# Patient Record
Sex: FEMALE | Race: WHITE | NOT HISPANIC OR LATINO | Employment: FULL TIME | URBAN - METROPOLITAN AREA
[De-identification: names, ages, dates, MRNs, and addresses within clinical notes are randomized per-mention and may not be internally consistent; named-entity substitution may affect disease eponyms.]

---

## 2017-01-13 ENCOUNTER — ALLSCRIPTS OFFICE VISIT (OUTPATIENT)
Dept: OTHER | Facility: OTHER | Age: 67
End: 2017-01-13

## 2017-01-25 ENCOUNTER — GENERIC CONVERSION - ENCOUNTER (OUTPATIENT)
Dept: OTHER | Facility: OTHER | Age: 67
End: 2017-01-25

## 2017-02-07 ENCOUNTER — GENERIC CONVERSION - ENCOUNTER (OUTPATIENT)
Dept: OTHER | Facility: OTHER | Age: 67
End: 2017-02-07

## 2017-03-01 ENCOUNTER — GENERIC CONVERSION - ENCOUNTER (OUTPATIENT)
Dept: OTHER | Facility: OTHER | Age: 67
End: 2017-03-01

## 2017-03-11 ENCOUNTER — APPOINTMENT (OUTPATIENT)
Dept: LAB | Facility: CLINIC | Age: 67
End: 2017-03-11
Payer: COMMERCIAL

## 2017-03-11 DIAGNOSIS — R92.8 OTHER ABNORMAL AND INCONCLUSIVE FINDINGS ON DIAGNOSTIC IMAGING OF BREAST: ICD-10-CM

## 2017-03-11 DIAGNOSIS — E78.5 HYPERLIPIDEMIA: ICD-10-CM

## 2017-03-11 DIAGNOSIS — J44.9 CHRONIC OBSTRUCTIVE PULMONARY DISEASE (HCC): ICD-10-CM

## 2017-03-11 DIAGNOSIS — I10 ESSENTIAL (PRIMARY) HYPERTENSION: ICD-10-CM

## 2017-03-11 DIAGNOSIS — E11.9 TYPE 2 DIABETES MELLITUS WITHOUT COMPLICATIONS (HCC): ICD-10-CM

## 2017-03-11 DIAGNOSIS — E03.9 HYPOTHYROIDISM: ICD-10-CM

## 2017-03-11 LAB
ANION GAP SERPL CALCULATED.3IONS-SCNC: 6 MMOL/L (ref 4–13)
BUN SERPL-MCNC: 18 MG/DL (ref 5–25)
CALCIUM SERPL-MCNC: 9.2 MG/DL (ref 8.3–10.1)
CHLORIDE SERPL-SCNC: 106 MMOL/L (ref 100–108)
CHOLEST SERPL-MCNC: 109 MG/DL (ref 50–200)
CO2 SERPL-SCNC: 30 MMOL/L (ref 21–32)
CREAT SERPL-MCNC: 0.86 MG/DL (ref 0.6–1.3)
CREAT UR-MCNC: 258 MG/DL
EST. AVERAGE GLUCOSE BLD GHB EST-MCNC: 134 MG/DL
GFR SERPL CREATININE-BSD FRML MDRD: >60 ML/MIN/1.73SQ M
GLUCOSE SERPL-MCNC: 92 MG/DL (ref 65–140)
HBA1C MFR BLD: 6.3 % (ref 4.2–6.3)
HDLC SERPL-MCNC: 42 MG/DL (ref 40–60)
LDLC SERPL CALC-MCNC: 36 MG/DL (ref 0–100)
MICROALBUMIN UR-MCNC: 10.7 MG/L (ref 0–20)
MICROALBUMIN/CREAT 24H UR: 4 MG/G CREATININE (ref 0–30)
POTASSIUM SERPL-SCNC: 4.4 MMOL/L (ref 3.5–5.3)
SODIUM SERPL-SCNC: 142 MMOL/L (ref 136–145)
TRIGL SERPL-MCNC: 157 MG/DL

## 2017-03-11 PROCEDURE — 83036 HEMOGLOBIN GLYCOSYLATED A1C: CPT

## 2017-03-11 PROCEDURE — 82043 UR ALBUMIN QUANTITATIVE: CPT

## 2017-03-11 PROCEDURE — 80061 LIPID PANEL: CPT

## 2017-03-11 PROCEDURE — 80048 BASIC METABOLIC PNL TOTAL CA: CPT

## 2017-03-11 PROCEDURE — 82570 ASSAY OF URINE CREATININE: CPT

## 2017-03-11 PROCEDURE — 36415 COLL VENOUS BLD VENIPUNCTURE: CPT

## 2017-03-23 ENCOUNTER — ALLSCRIPTS OFFICE VISIT (OUTPATIENT)
Dept: OTHER | Facility: OTHER | Age: 67
End: 2017-03-23

## 2017-03-23 DIAGNOSIS — L98.9 DISORDER OF SKIN OR SUBCUTANEOUS TISSUE: ICD-10-CM

## 2017-03-23 DIAGNOSIS — K21.9 GASTRO-ESOPHAGEAL REFLUX DISEASE WITHOUT ESOPHAGITIS: ICD-10-CM

## 2017-03-23 DIAGNOSIS — E03.9 HYPOTHYROIDISM: ICD-10-CM

## 2017-03-23 DIAGNOSIS — E78.5 HYPERLIPIDEMIA: ICD-10-CM

## 2017-03-23 DIAGNOSIS — Z00.00 ENCOUNTER FOR GENERAL ADULT MEDICAL EXAMINATION WITHOUT ABNORMAL FINDINGS: ICD-10-CM

## 2017-03-23 DIAGNOSIS — E11.9 TYPE 2 DIABETES MELLITUS WITHOUT COMPLICATIONS (HCC): ICD-10-CM

## 2017-07-20 ENCOUNTER — GENERIC CONVERSION - ENCOUNTER (OUTPATIENT)
Dept: OTHER | Facility: OTHER | Age: 67
End: 2017-07-20

## 2017-07-23 DIAGNOSIS — E78.5 HYPERLIPIDEMIA: ICD-10-CM

## 2017-07-23 DIAGNOSIS — E03.9 HYPOTHYROIDISM: ICD-10-CM

## 2017-07-23 DIAGNOSIS — K21.9 GASTRO-ESOPHAGEAL REFLUX DISEASE WITHOUT ESOPHAGITIS: ICD-10-CM

## 2017-07-23 DIAGNOSIS — L98.9 DISORDER OF SKIN OR SUBCUTANEOUS TISSUE: ICD-10-CM

## 2017-07-23 DIAGNOSIS — R92.2 INCONCLUSIVE MAMMOGRAM: ICD-10-CM

## 2017-07-23 DIAGNOSIS — Z78.0 ASYMPTOMATIC MENOPAUSAL STATE: ICD-10-CM

## 2017-07-23 DIAGNOSIS — Z00.00 ENCOUNTER FOR GENERAL ADULT MEDICAL EXAMINATION WITHOUT ABNORMAL FINDINGS: ICD-10-CM

## 2017-07-23 DIAGNOSIS — E11.9 TYPE 2 DIABETES MELLITUS WITHOUT COMPLICATIONS (HCC): ICD-10-CM

## 2017-08-21 ENCOUNTER — ALLSCRIPTS OFFICE VISIT (OUTPATIENT)
Dept: OTHER | Facility: OTHER | Age: 67
End: 2017-08-21

## 2017-08-21 PROCEDURE — G0145 SCR C/V CYTO,THINLAYER,RESCR: HCPCS | Performed by: NURSE PRACTITIONER

## 2017-08-22 ENCOUNTER — LAB REQUISITION (OUTPATIENT)
Dept: LAB | Facility: HOSPITAL | Age: 67
End: 2017-08-22
Payer: MEDICARE

## 2017-08-22 DIAGNOSIS — Z01.419 ENCOUNTER FOR GYNECOLOGICAL EXAMINATION WITHOUT ABNORMAL FINDING: ICD-10-CM

## 2017-08-24 LAB
LAB AP GYN PRIMARY INTERPRETATION: NORMAL
Lab: NORMAL

## 2017-08-25 ENCOUNTER — GENERIC CONVERSION - ENCOUNTER (OUTPATIENT)
Dept: OTHER | Facility: OTHER | Age: 67
End: 2017-08-25

## 2017-08-30 ENCOUNTER — APPOINTMENT (OUTPATIENT)
Dept: LAB | Facility: CLINIC | Age: 67
End: 2017-08-30
Payer: COMMERCIAL

## 2017-08-30 DIAGNOSIS — K21.9 GASTRO-ESOPHAGEAL REFLUX DISEASE WITHOUT ESOPHAGITIS: ICD-10-CM

## 2017-08-30 DIAGNOSIS — Z00.00 ENCOUNTER FOR GENERAL ADULT MEDICAL EXAMINATION WITHOUT ABNORMAL FINDINGS: ICD-10-CM

## 2017-08-30 DIAGNOSIS — L98.9 DISORDER OF SKIN OR SUBCUTANEOUS TISSUE: ICD-10-CM

## 2017-08-30 DIAGNOSIS — E03.9 HYPOTHYROIDISM: ICD-10-CM

## 2017-08-30 DIAGNOSIS — E11.9 TYPE 2 DIABETES MELLITUS WITHOUT COMPLICATIONS (HCC): ICD-10-CM

## 2017-08-30 DIAGNOSIS — E78.5 HYPERLIPIDEMIA: ICD-10-CM

## 2017-08-30 LAB
ALBUMIN SERPL BCP-MCNC: 3.4 G/DL (ref 3.5–5)
ALP SERPL-CCNC: 69 U/L (ref 46–116)
ALT SERPL W P-5'-P-CCNC: 22 U/L (ref 12–78)
ANION GAP SERPL CALCULATED.3IONS-SCNC: 5 MMOL/L (ref 4–13)
AST SERPL W P-5'-P-CCNC: 15 U/L (ref 5–45)
BILIRUB SERPL-MCNC: 0.33 MG/DL (ref 0.2–1)
BUN SERPL-MCNC: 19 MG/DL (ref 5–25)
CALCIUM SERPL-MCNC: 8.8 MG/DL (ref 8.3–10.1)
CHLORIDE SERPL-SCNC: 106 MMOL/L (ref 100–108)
CHOLEST SERPL-MCNC: 115 MG/DL (ref 50–200)
CO2 SERPL-SCNC: 27 MMOL/L (ref 21–32)
CREAT SERPL-MCNC: 0.74 MG/DL (ref 0.6–1.3)
EST. AVERAGE GLUCOSE BLD GHB EST-MCNC: 134 MG/DL
GFR SERPL CREATININE-BSD FRML MDRD: 83 ML/MIN/1.73SQ M
GLUCOSE P FAST SERPL-MCNC: 107 MG/DL (ref 65–99)
HBA1C MFR BLD: 6.3 % (ref 4.2–6.3)
HDLC SERPL-MCNC: 43 MG/DL (ref 40–60)
LDLC SERPL CALC-MCNC: 50 MG/DL (ref 0–100)
POTASSIUM SERPL-SCNC: 4.4 MMOL/L (ref 3.5–5.3)
PROT SERPL-MCNC: 6.9 G/DL (ref 6.4–8.2)
SODIUM SERPL-SCNC: 138 MMOL/L (ref 136–145)
TRIGL SERPL-MCNC: 111 MG/DL
TSH SERPL DL<=0.05 MIU/L-ACNC: 1.86 UIU/ML (ref 0.36–3.74)

## 2017-08-30 PROCEDURE — 36415 COLL VENOUS BLD VENIPUNCTURE: CPT

## 2017-08-30 PROCEDURE — 80061 LIPID PANEL: CPT

## 2017-08-30 PROCEDURE — 80053 COMPREHEN METABOLIC PANEL: CPT

## 2017-08-30 PROCEDURE — 84443 ASSAY THYROID STIM HORMONE: CPT

## 2017-08-30 PROCEDURE — 83036 HEMOGLOBIN GLYCOSYLATED A1C: CPT

## 2017-09-01 ENCOUNTER — GENERIC CONVERSION - ENCOUNTER (OUTPATIENT)
Dept: OTHER | Facility: OTHER | Age: 67
End: 2017-09-01

## 2018-01-01 DIAGNOSIS — I10 ESSENTIAL (PRIMARY) HYPERTENSION: ICD-10-CM

## 2018-01-01 DIAGNOSIS — E03.9 HYPOTHYROIDISM: ICD-10-CM

## 2018-01-01 DIAGNOSIS — K58.1 IRRITABLE BOWEL SYNDROME WITH CONSTIPATION: ICD-10-CM

## 2018-01-01 DIAGNOSIS — R92.8 OTHER ABNORMAL AND INCONCLUSIVE FINDINGS ON DIAGNOSTIC IMAGING OF BREAST: ICD-10-CM

## 2018-01-01 DIAGNOSIS — E66.9 OBESITY: ICD-10-CM

## 2018-01-01 DIAGNOSIS — E11.9 TYPE 2 DIABETES MELLITUS WITHOUT COMPLICATIONS (HCC): ICD-10-CM

## 2018-01-01 DIAGNOSIS — R92.2 INCONCLUSIVE MAMMOGRAM: ICD-10-CM

## 2018-01-08 ENCOUNTER — HOSPITAL ENCOUNTER (OUTPATIENT)
Dept: BONE DENSITY | Facility: CLINIC | Age: 68
Discharge: HOME | End: 2018-01-08
Payer: COMMERCIAL

## 2018-01-08 ENCOUNTER — GENERIC CONVERSION - ENCOUNTER (OUTPATIENT)
Dept: OTHER | Facility: OTHER | Age: 68
End: 2018-01-08

## 2018-01-08 ENCOUNTER — HOSPITAL ENCOUNTER (OUTPATIENT)
Dept: MAMMOGRAPHY | Facility: CLINIC | Age: 68
Discharge: HOME | End: 2018-01-08
Payer: COMMERCIAL

## 2018-01-08 DIAGNOSIS — Z78.0 ASYMPTOMATIC MENOPAUSAL STATE: ICD-10-CM

## 2018-01-08 DIAGNOSIS — R92.2 INCONCLUSIVE MAMMOGRAM: ICD-10-CM

## 2018-01-08 PROCEDURE — 77080 DXA BONE DENSITY AXIAL: CPT

## 2018-01-08 PROCEDURE — 77067 SCR MAMMO BI INCL CAD: CPT

## 2018-01-08 PROCEDURE — 77063 BREAST TOMOSYNTHESIS BI: CPT

## 2018-01-09 ENCOUNTER — GENERIC CONVERSION - ENCOUNTER (OUTPATIENT)
Dept: OTHER | Facility: OTHER | Age: 68
End: 2018-01-09

## 2018-01-11 NOTE — RESULT NOTES
Verified Results  (1) THIN PREP PAP WITH IMAGING [id number] 02:42PM Jesu Haji     Test Name Result Flag Reference   LAB AP CASE REPORT (Report)     Gynecologic Cytology Report            Case: [id number]                  Authorizing Provider: PRESLEY Brown    Collected:      08/21/2017           First Screen:     COURTNEY Wong    Received:      08/22/2017 1437        Specimen:  LIQUID-BASED PAP, SCREENING, Cervix   LAB AP GYN PRIMARY INTERPRETATION      Negative for intraepithelial lesion or malignancy  Electronically signed by COURTNEY Wong on 8/24/2017 at 2:42 PM   LAB AP GYN SPECIMEN ADEQUACY      Satisfactory for evaluation  (See note)   LAB AP GYN NOTE      No endocervical cells identified  It is difficult to differentiate between   squamous metaplastic cells and parabasal cells in atrophic specimens due   to numerous causes including menopause, postpartum changes and   progestational agents  LAB AP GYN ADDITIONAL INFORMATION (Report)     "[employer]'s FDA approved ,  and ThinPrep Imaging System are   utilized with strict adherence to the 's instruction manual to   prepare gynecologic and non-gynecologic cytology specimens for the   production of ThinPrep slides as well as for gynecologic ThinPrep imaging  These processes have been validated by our laboratory and/or by the     The Pap test is not a diagnostic procedure and should not be used as the   sole means to detect cervical cancer  It is only a screening procedure to   aid in the detection of cervical cancer and its precursors  Both   false-negative and false-positive results have been experienced  Your   patient's test result should be interpreted in this context together with   the history and clinical findings     LAB AP LMP POSTMENO     POSTMENO

## 2018-01-12 VITALS
SYSTOLIC BLOOD PRESSURE: 140 MMHG | HEIGHT: 63 IN | HEART RATE: 80 BPM | DIASTOLIC BLOOD PRESSURE: 74 MMHG | WEIGHT: 192.25 LBS | BODY MASS INDEX: 34.06 KG/M2

## 2018-01-13 VITALS
BODY MASS INDEX: 35.44 KG/M2 | HEIGHT: 63 IN | SYSTOLIC BLOOD PRESSURE: 142 MMHG | WEIGHT: 200 LBS | DIASTOLIC BLOOD PRESSURE: 82 MMHG

## 2018-01-13 VITALS
BODY MASS INDEX: 34.9 KG/M2 | SYSTOLIC BLOOD PRESSURE: 130 MMHG | HEART RATE: 82 BPM | TEMPERATURE: 98.4 F | WEIGHT: 197 LBS | DIASTOLIC BLOOD PRESSURE: 76 MMHG

## 2018-01-19 ENCOUNTER — HOSPITAL ENCOUNTER (OUTPATIENT)
Dept: MAMMOGRAPHY | Facility: CLINIC | Age: 68
Discharge: HOME | End: 2018-01-19
Payer: COMMERCIAL

## 2018-01-19 ENCOUNTER — GENERIC CONVERSION - ENCOUNTER (OUTPATIENT)
Dept: OBGYN CLINIC | Facility: CLINIC | Age: 68
End: 2018-01-19

## 2018-01-19 ENCOUNTER — HOSPITAL ENCOUNTER (OUTPATIENT)
Dept: ULTRASOUND IMAGING | Facility: CLINIC | Age: 68
Discharge: HOME | End: 2018-01-19
Payer: COMMERCIAL

## 2018-01-19 DIAGNOSIS — R92.8 OTHER ABNORMAL AND INCONCLUSIVE FINDINGS ON DIAGNOSTIC IMAGING OF BREAST: ICD-10-CM

## 2018-01-19 DIAGNOSIS — R92.2 INCONCLUSIVE MAMMOGRAM: ICD-10-CM

## 2018-01-19 PROCEDURE — 76642 ULTRASOUND BREAST LIMITED: CPT

## 2018-01-19 PROCEDURE — 77065 DX MAMMO INCL CAD UNI: CPT

## 2018-01-19 PROCEDURE — G0279 TOMOSYNTHESIS, MAMMO: HCPCS

## 2018-01-22 VITALS
SYSTOLIC BLOOD PRESSURE: 130 MMHG | DIASTOLIC BLOOD PRESSURE: 80 MMHG | OXYGEN SATURATION: 96 % | BODY MASS INDEX: 35.97 KG/M2 | HEART RATE: 75 BPM | WEIGHT: 203 LBS | HEIGHT: 63 IN

## 2018-01-23 NOTE — RESULT NOTES
Verified Results  * DXA BONE DENSITY SPINE HIP AND PELVIS [id number] 07:31AM Cady Diaz Order Number: [id number]    - Patient Instructions: To schedule this appointment, please contact Central Scheduling at [phone]  Test Name Result Flag Reference   DXA BONE DENSITY SPINE HIP AND PELVIS (Report)     CENTRAL DXA SCAN     CLINICAL HISTORY:  69year old post-menopausal  female  Z78 0: Asymptomatic menopausal state  History taken directly from the electronic ordering system  TECHNIQUE: Bone densitometry was performed using a Horizon C bone densitometer  Regions of interest appear properly placed  There are no obvious fractures or other confounding variables which could limit the study  COMPARISON: None  RESULTS:    LUMBAR SPINE: L1-L4:   BMD 1 045 gm/cm2   T-score 0 0   Z-score 2 1     LEFT TOTAL HIP:   BMD 1 078 gm/cm2   T-score 1 1   Z-score 2 6     LEFT FEMORAL NECK:   BMD 0 772 gm/cm2   T-score -0 7   Z-score 1 1             IMPRESSION:   1  Based on the [facility] classification, this study is normal and the patient is considered at low risk for fracture  2  A daily intake of calcium of at least 1200 mg and vitamin D, 800-1000 IU, as well as weight bearing and muscle strengthening exercise, fall prevention and avoidance of tobacco and excessive alcohol intake as basic preventive measures are recommended  3  Repeat DXA scan on the same equipment in 18-24 months as clinically indicated  The 10 year risk of hip fracture is 0 6%, with the 10 year risk of major osteoporotic fracture being 7 5%, as calculated by the [facility] fracture risk assessment tool (FRAX)  The current NOF guidelines recommend treating patients with FRAX 10 year risk score   of >3% for hip fracture and >20% for major osteoporotic fracture        WHO CLASSIFICATION:   Normal (a T-score of -1 0 or higher)   Low bone mineral density (a T-score of less than -1 0 but higher than -2 5)   Osteoporosis (a T-score of -2 5 or less)   Severe osteoporosis (a T-score of -2 5 or less with a fragility fracture)             Workstation performed: [id number]     Signed by:   Juliette Rosenthal MD   1/8/18     MAMMO SCREENING BILATERAL W 3D & CAD [id number] 07:30AM Bin Yost Order Number: [id number]    - Patient Instructions: To schedule this appointment, please contact Central Scheduling at [phone]  Do not wear any perfume, powder, lotion or deodorant on breast or underarm area  Please bring your doctors order, referral (if needed) and insurance information with you on the day of the test  Failure to bring this information may result in this test being rescheduled  Arrive 15 minutes prior to your appointment time to register  On the day of your test, please bring any prior mammogram or breast studies with you that were not performed at a [facility]  Failure to bring prior exams may result in your test needing to be rescheduled  Test Name Result Flag Reference   MAMMO SCREENING BILATERAL W 3D & CAD (Report)     Patient History:   Patient is postmenopausal    Family history of breast cancer at age 72 in mother, endometrial    cancer at age 61 in mother, breast cancer under age 48 in    maternal aunt, breast cancer at age 48 or over in maternal aunt,    breast cancer at age 48 or over in maternal aunt, breast cancer    at age 48 or over in maternal grandmother  Patient has never smoked  Patient's BMI is 35 4  Reason for exam: screening, asymptomatic  Screening     Mammo Screening Bilateral W DBT and CAD: January 8, 2018 - Check    In #: [de-identified]   2D/3D Procedure   3D Bilateral CC and MLO view(s) were taken  2D Bilateral CC and MLO view(s) were taken  Technologist: KEVIN Grajeda (KEVIN)(M)   Prior study comparison: October 13, 2016, bilateral screening    mammogram, performed at [facility]  August 12, 2014, left    breast diagnostic mammogram, performed at [facility]  August 1, 2014, bilateral screening mammogram, performed at    [address]  April 2, 2013, bilateral screening mammogram,    performed at [address]  November 9, 2011, bilateral    screening mammogram, performed at [address]  There are scattered fibroglandular densities  Increasing right focal asymmetry along the posterior nipple line  The finding is in the deep 3rd of the breast at approximately    8 2 cm in depth on both projections  No left-sided developing    focal asymmetries or masses  Few, scattered round calcifications in both breasts are    characteristically benign  No suspicious microcalcifications,    architectural distortion, or abnormalities of the nipples or skin   in either breast      SUMMARY:   1 New focal asymmetry in the right breast in the deep 3rd along    the posterior nipple line  This warrants further imaging    evaluation  Recommend unilateral right diagnostic mammogram    including true ML, spot compression CC, and spot compression MLO    views as well as targeted right ultrasound  2 No evidence for malignancy in the left breast     ACR BI-RADSï¾® Assessments: BiRad:0 - Incomplete: needs additional    imaging evaluation     Recommendation:   Further imaging of the right breast  A breast health care nurse    from our facility will be contacting the patient regarding the    need for additional imaging  The patient is scheduled in a reminder system for screening    mammography  8-10% of cancers will be missed on mammography  Management of a    palpable abnormality must be based on clinical grounds  Patients    will be notified of their results via letter from our facility  Accredited by [address] [place] [address] of Radiology and FDA       Transcription Location: [facility]: [id number]     Risk Value(s):   Tyrer-Cuzick 10 Year: 13 400%, Tyrer-Cuzick Lifetime: 21 900%,    Myriad Table: 2 6%, NIKKI 5 Year: 3 4%, NCI Lifetime: 10 2%, MRS    Risk Manager: Based on personal and/or family history,    consideration of hereditary risk assessment may be warranted

## 2018-01-23 NOTE — MISCELLANEOUS
Message   Recorded as Task   Date: 01/17/2018 02:52 PM, Created By: Dinorah Duong   Task Name: Follow Up   Assigned To: Aguilar Valentina   Regarding Patient: Temitope Forde, Status: In Progress   Comment:    Virgen Baxter - 17 Jan 2018 2:52 PM     TASK CREATED  Pt is aware of screening mammogram results and has an appointment on 1/19/18 for a right 3D diagnostic mammogram and right LIMITED(diagnostic) u/s if needed  Please enter orders into [employer] for these tests  Thank you! Bridget De Dios - 17 Jan 2018 2:52 PM     TASK IN PROGRESS   Bridget De Dios - 17 Jan 2018 2:55 PM     TASK EDITED  Orders area available in Allscripts  Active Problems    1  Abdominal pain, bilateral lower quadrant (789 03,789 04) (R10 31,R10 32)   2  Abnormal mammogram (793 80) (R92 8)   3  Active advance directive (V49 89) (Z78 9)   4  Allergic rhinitis (477 9) (J30 9)   5  Asthma (493 90) (J45 909)   6  Asymptomatic postmenopausal state (V49 81) (Z78 0)   7  Chronic obstructive pulmonary disease (496) (J44 9)   8  Cramp in limb (729 82) (R25 2)   9  Dense breasts (793 82) (R92 2)   10  DMII (diabetes mellitus, type 2) (250 00) (E11 9)   11  Edema (782 3) (R60 9)   12  Encounter for gynecological examination with abnormal finding (V72 31) (Z01 411)   13  Encounter for gynecological examination without abnormal finding (V72 31) (Z01 419)   14  Esophageal reflux (530 81) (K21 9)   15  History of knee replacement, total (V43 65) (Z96 659)   16  Hyperlipidemia (272 4) (E78 5)   17  Hypertension (401 9) (I10)   18  Hypothyroidism (244 9) (E03 9)   19  Irritable bowel syndrome with constipation (564 1) (K58 1)   20  Need for immunization against influenza (V04 81) (Z23)   21  Obesity (278 00) (E66 9)   22  Obstructive sleep apnea (327 23) (G47 33)   23  Primary osteoarthritis (715 10) (M19 91)   24  Rectal bleeding (569 3) (K62 5)   25  Shortness of breath on exertion (786 05) (R06 02)   26  Skin lesion (889 9) (L98 9)   27  Vaginal itching (698 1) (L29 8)    Current Meds   1  Aspir-81 TBEC; Therapy: (Recorded:02Jan2015) to Recorded   2  B Complex Oral Tablet; Therapy: (Recorded:02Jan2015) to Recorded   3  BD Insulin Syringe Ultrafine 31G X 15/64" 0 3 ML Miscellaneous; Therapy: 10JGK2555 to (Evaluate:26Nov2015) Recorded   4  Calcium 600+D 600-200 MG-UNIT Oral Tablet; Therapy: (QOIYHQGY:56MGW7674) to Recorded   5  Clindamycin HCl - 300 MG Oral Capsule; Therapy: 22Apr2015 to (Evaluate:86Fbc4108) Recorded   6  Clotrimazole-Betamethasone 1-0 05 % External Cream; APPLY AS DIRECTED; Therapy: 57Jvm0421 to (Evaluate:20Oct2017)  Requested for: 86Ooe5152; Last   Rx:52Plg8412 Ordered   7  Ezetimibe 10 MG Oral Tablet; Take 1 tablet daily; Therapy: 39Yro2962 to (Last Rx:01Dzr8288)  Requested for: 28Fvf0298 Ordered   8  Fish Oil 1200 MG Oral Capsule; Therapy: (079 0452 1744) to Recorded   9  FreeStyle Lancets Miscellaneous; USE TWICE DAILY   DX:250 00; Therapy: 55FBQ8944 to (Last WZ:13KGA3755)  Requested for: 80WQR2953 Ordered   10  FreeStyle Lite Test In Vitro Strip; USE TO TEST BLOOD SUGARS FOUR TIMES A DAY; Therapy: 05WJL2089 to (Last Rx:09Jun2017)  Requested for: 34GLM7449 Ordered   11  Furosemide 20 MG Oral Tablet; take 1 tablet every day; Therapy: 33PHI3753 to (Clemon Cerise)  Requested for: 52Ymd8023; Last    Rx:94Mqx2178 Ordered   12  GlipiZIDE ER 2 5 MG Oral Tablet Extended Release 24 Hour (Glucotrol XL); TAKE ONE    TABLET BY MOUTH EVERY MORNING; Therapy: 13RSF8935 to (Evaluate:30Mar2018)  Requested for: 26ELD6304; Last    Rx:01Akc4351 Ordered   13  HumaLOG 100 UNIT/ML Subcutaneous Solution; INJECT AS DIRECTED  1 UNIT OVER    150  SLIDING SCALE; Therapy: 66BZN6665 to (Last Rx:77Hpp1963)  Requested for: 10Eki4536 Ordered   14  Levothyroxine Sodium 75 MCG Oral Tablet (Synthroid); take 1 tablet every day;     Therapy: 03NKG2739 to (Michael Kelly)  Requested for: 80Nbc9834; Last    VV:37FKA2588 Ordered   15  Linzess 145 MCG Oral Capsule; TAKE 1 CAPSULE IN THE MORNING AT LEAST 30    MINUTES BEFORE BREAKFAST DAILY; Therapy: 05XOD7934 to (Last Rx:04Mar2017)  Requested for: 84HYR0014 Ordered   16  Losartan Potassium 50 MG Oral Tablet; TAKE 1 TABLET DAILY AS DIRECTED; Therapy: 85NIO5203 to (Evaluate:30Mar2018)  Requested for: 04ZMK5104; Last    Rx:04Apr2017 Ordered   17  Magnesium Oxide 400 MG Oral Capsule; Therapy: (073 0446 1744) to Recorded   18  Meloxicam 15 MG Oral Tablet; TAKE ONE TABLET BY MOUTH DAILY AS NEEDED; Therapy: 51XOT2026 to (Evaluate:02Jul2017)  Requested for: 61JZS3575; Last    Rx:84Tma1423 Ordered   19  MetFORMIN HCl  MG Oral Tablet Extended Release 24 Hour; TAKE 1 TABLET IN    THE MORNING  AND TAKE 2 TABLETS IN THE EVENING; Therapy: 17WXU3322 to (Evaluate:30Mar2018)  Requested for: 78 955 399; Last    Rx:04Apr2017 Ordered   20  Pantoprazole Sodium 40 MG Oral Tablet Delayed Release (Protonix); take 1 tablet every    day; Therapy: 91MOE0851 to (Evaluate:30Mar2018)  Requested for: 81LJF3191; Last    Rx:04Apr2017 Ordered   21  Potassium Chloride ER 10 MEQ Oral Capsule Extended Release (Micro-K); TAKE 1    CAPSULE EVERY DAY; Therapy: 91LPF8601 to (Velna Leopard)  Requested for: 77IWT1195; Last    Rx:06Nov2017 Ordered   22  Rosuvastatin Calcium 20 MG Oral Tablet; Take 1 tablet daily; Therapy: 13TYG6139 to (Last Rx:50Rxc4355)  Requested for: 54Uuy6867 Ordered   23  Symbicort 160-4 5 MCG/ACT Inhalation Aerosol; USE 2 INHALATIONS TWICE A DAY    (RINSE MOUTH AFTER USE); Therapy: 60MHA9696 to (Last Rx:32Qxe6586)  Requested for: 18TOT0257 Ordered   24  Ventolin  (90 Base) MCG/ACT Inhalation Aerosol Solution; INHALE 2 PUFFS    FOUR TIMES DAILY AS NEEDED  Requested for: 45BYV7082; Last Rx:19Jan2016    Ordered   25  Zetia 10 MG Oral Tablet (Ezetimibe); Take 1 tablet daily;     Therapy: 27RWY5879 to (Last Rx:17Bxe2175)  Requested for: 48WOF0439 Ordered    Allergies    1  Augmentin TABS   2  Darvocet-N 50 TABS   3  Demerol TABS   4  Erythromycin Derivatives   5  Morphine Derivatives   6  Penicillins   7  Relafen TABS   8  Robaxin TABS   9  Sulfa Drugs   10  Tetracyclines    Plan  Abnormal mammogram    · *US BREAST RIGHT LIMITED (DIAGNOSTIC); Status:Hold For -  Scheduling,Retrospective By Protocol Authorization; Requested IXB:[id number]; Abnormal mammogram, Dense breasts    · MAMMO DIAGNOSTIC RIGHT W 3D & CAD; Status:Active - Retrospective By Protocol  Authorization;  Requested SBJ:[id number];     Signatures   Electronically signed by : Jose Angel [place], ; Jan 17 2018  2:56PM EST                       (Author)

## 2018-01-23 NOTE — MISCELLANEOUS
Message   Recorded as Task   Date: 01/08/2018 05:57 PM, Created By: Yaa Moreno   Task Name: Follow Up   Assigned To: Anabelle Garcia   Regarding Patient: Jonnathan Robledo, Status: In Progress   Comment:    Joanna Kaur - 08 Jan 2018 5:57 PM     TASK CREATED  Pls notify pt her dxa scan was nml,thx  She should continue calcium,vit d and weight bearing exercise if possible   Joanna Kaur - 08 Jan 2018 5:59 PM     TASK EDITED  Pls see below   Ramone 91 - 09 Jan 2018 7:53 AM     TASK IN PROGRESS   TanaAnnie saravia - 09 Jan 2018 8:06 AM     TASK EDITED  Left voicemail message today @ [phone] per Pt's communication consent form signed on 8/21/17 in Pt's EHR  Pt informed that Fred Chacon reviewed Pt's recent DEXA Scan test result, DEXA Scan result normal per PRESLEY Kaur's review  Advised Pt, via voicemail message, that she should continue taking calcium supplement 1200 mg, 800-1000 IU vitamin D, and weight bearing exercise if possible  Reiterated to Pt that if she has any questions/concerns, to contact office  Active Problems    1  Abdominal pain, bilateral lower quadrant (789 03,789 04) (R10 31,R10 32)   2  Active advance directive (V49 89) (Z78 9)   3  Allergic rhinitis (477 9) (J30 9)   4  Asthma (493 90) (J45 909)   5  Asymptomatic postmenopausal state (V49 81) (Z78 0)   6  Chronic obstructive pulmonary disease (496) (J44 9)   7  Cramp in limb (729 82) (R25 2)   8  Dense breasts (793 82) (R92 2)   9  DMII (diabetes mellitus, type 2) (250 00) (E11 9)   10  Edema (782 3) (R60 9)   11  Encounter for gynecological examination with abnormal finding (V72 31) (Z01 411)   12  Encounter for gynecological examination without abnormal finding (V72 31) (Z01 419)   13  Esophageal reflux (530 81) (K21 9)   14  History of knee replacement, total (V43 65) (Z96 659)   15  Hyperlipidemia (272 4) (E78 5)   16  Hypertension (401 9) (I10)   17  Hypothyroidism (244 9) (E03 9)   18   Irritable bowel syndrome with constipation (564 1) (K58 1)   19  Need for immunization against influenza (V04 81) (Z23)   20  Obesity (278 00) (E66 9)   21  Obstructive sleep apnea (327 23) (G47 33)   22  Primary osteoarthritis (715 10) (M19 91)   23  Rectal bleeding (569 3) (K62 5)   24  Shortness of breath on exertion (786 05) (R06 02)   25  Skin lesion (709 9) (L98 9)   26  Vaginal itching (698 1) (L29 8)    Current Meds   1  Aspir-81 TBEC; Therapy: (Recorded:02Jan2015) to Recorded   2  B Complex Oral Tablet; Therapy: (Recorded:02Jan2015) to Recorded   3  BD Insulin Syringe Ultrafine 31G X 15/64" 0 3 ML Miscellaneous; Therapy: 08PZI2266 to (Evaluate:26Nov2015) Recorded   4  Calcium 600+D 600-200 MG-UNIT Oral Tablet; Therapy: (OXGYWM:72KSV9806) to Recorded   5  Clindamycin HCl - 300 MG Oral Capsule; Therapy: 22Apr2015 to (Evaluate:54Ioq9281) Recorded   6  Clotrimazole-Betamethasone 1-0 05 % External Cream; APPLY AS DIRECTED; Therapy: 11Sau3599 to (Evaluate:20Oct2017)  Requested for: 55Ngb9396; Last   Rx:86Qrs4483 Ordered   7  Ezetimibe 10 MG Oral Tablet; Take 1 tablet daily; Therapy: 51Oxj7938 to (Last Rx:01Jpx0210)  Requested for: 34Muo5384 Ordered   8  Fish Oil 1200 MG Oral Capsule; Therapy: (0699 982 13 20) to Recorded   9  FreeStyle Lancets Miscellaneous; USE TWICE DAILY   DX:250 00; Therapy: 29RDU0581 to (Last CHILANGO:83CHX3167)  Requested for: 09FQD4273 Ordered   10  FreeStyle Lite Test In Vitro Strip; USE TO TEST BLOOD SUGARS FOUR TIMES A DAY; Therapy: 45XLZ1475 to (Last Rx:09Jun2017)  Requested for: 21CQH0095 Ordered   11  Furosemide 20 MG Oral Tablet; take 1 tablet every day; Therapy: 46HBB0432 to (Gloria Curran)  Requested for: 45Led9437; Last    Rx:24Gbj1244 Ordered   12  GlipiZIDE ER 2 5 MG Oral Tablet Extended Release 24 Hour (Glucotrol XL); TAKE ONE    TABLET BY MOUTH EVERY MORNING; Therapy: 06GNA7056 to (Evaluate:30Mar2018)  Requested for: 02MRD3784; Last    Rx:04Apr2017 Ordered   13  HumaLOG 100 UNIT/ML Subcutaneous Solution; INJECT AS DIRECTED  1 UNIT OVER    150  SLIDING SCALE; Therapy: 84PPY2901 to (Last Rx:10Apr2017)  Requested for: 44Gpq3068 Ordered   14  Levothyroxine Sodium 75 MCG Oral Tablet (Synthroid); take 1 tablet every day; Therapy: 71IDM9126 to (Gil Gaytan)  Requested for: 45Cql0748; Last    Rx:52Doy1369 Ordered   15  Linzess 145 MCG Oral Capsule; TAKE 1 CAPSULE IN THE MORNING AT LEAST 30    MINUTES BEFORE BREAKFAST DAILY; Therapy: 51UPV4485 to (Last Rx:04Mar2017)  Requested for: 81GWV0001 Ordered   16  Losartan Potassium 50 MG Oral Tablet; TAKE 1 TABLET DAILY AS DIRECTED; Therapy: 21IKB2565 to (Evaluate:30Mar2018)  Requested for: 05NXF3406; Last    Rx:04Apr2017 Ordered   17  Magnesium Oxide 400 MG Oral Capsule; Therapy: (50-92-49-29) to Recorded   18  Meloxicam 15 MG Oral Tablet; TAKE ONE TABLET BY MOUTH DAILY AS NEEDED; Therapy: 73RFP3578 to (Evaluate:02Jul2017)  Requested for: 19NVL1141; Last    Rx:88Jwc0985 Ordered   19  MetFORMIN HCl  MG Oral Tablet Extended Release 24 Hour; TAKE 1 TABLET IN    THE MORNING  AND TAKE 2 TABLETS IN THE EVENING; Therapy: 27DHY3135 to (Evaluate:30Mar2018)  Requested for: 78 955 399; Last    Rx:04Apr2017 Ordered   20  Pantoprazole Sodium 40 MG Oral Tablet Delayed Release (Protonix); take 1 tablet every    day; Therapy: 39IYF3989 to (Evaluate:30Mar2018)  Requested for: 21HDF9030; Last    Rx:04Apr2017 Ordered   21  Potassium Chloride ER 10 MEQ Oral Capsule Extended Release (Micro-K); TAKE 1    CAPSULE EVERY DAY; Therapy: 52PZK4744 to (Mg Valentin)  Requested for: 50BAL5978; Last    Rx:06Nov2017 Ordered   22  Rosuvastatin Calcium 20 MG Oral Tablet; Take 1 tablet daily; Therapy: 89VRP6970 to (Last Rx:49Gsc3390)  Requested for: 01Dbq2547 Ordered   23  Symbicort 160-4 5 MCG/ACT Inhalation Aerosol; USE 2 INHALATIONS TWICE A DAY    (RINSE MOUTH AFTER USE);     Therapy: 94JXX4229 to (Last Rx:76Aam4383) Requested for: 28AVG3747 Ordered   24  Ventolin  (90 Base) MCG/ACT Inhalation Aerosol Solution; INHALE 2 PUFFS    FOUR TIMES DAILY AS NEEDED  Requested for: 92RYV8918; Last Rx:19Jan2016    Ordered   25  Zetia 10 MG Oral Tablet (Ezetimibe); Take 1 tablet daily; Therapy: 36ZWE8629 to (Last Rx:76Tcb3805)  Requested for: 63Nqz9163 Ordered    Allergies    1  Augmentin TABS   2  Darvocet-N 50 TABS   3  Demerol TABS   4  Erythromycin Derivatives   5  Morphine Derivatives   6  Penicillins   7  Relafen TABS   8  Robaxin TABS   9  Sulfa Drugs   10   Tetracyclines    Signatures   Electronically signed by : Rio Rodriguez MA; Jan 9 2018  8:06AM EST                       (Author)

## 2018-02-02 RX ORDER — MELOXICAM 15 MG/1
1 TABLET ORAL DAILY PRN
COMMUNITY
Start: 2016-07-07 | End: 2018-03-28 | Stop reason: SDUPTHER

## 2018-02-02 RX ORDER — GLIPIZIDE 2.5 MG/1
1 TABLET, EXTENDED RELEASE ORAL
COMMUNITY
Start: 2015-06-30 | End: 2018-03-28 | Stop reason: SDUPTHER

## 2018-02-02 RX ORDER — CHLORHEXIDINE GLUCONATE 0.12 MG/ML
RINSE ORAL
Refills: 1 | COMMUNITY
Start: 2017-11-15 | End: 2018-02-05 | Stop reason: ALTCHOICE

## 2018-02-02 RX ORDER — LANCETS 28 GAUGE
EACH MISCELLANEOUS 2 TIMES DAILY
COMMUNITY
Start: 2016-05-06 | End: 2018-07-12

## 2018-02-02 RX ORDER — PANTOPRAZOLE SODIUM 40 MG/1
1 TABLET, DELAYED RELEASE ORAL DAILY
COMMUNITY
Start: 2015-02-25 | End: 2018-03-28 | Stop reason: SDUPTHER

## 2018-02-02 RX ORDER — CALCIUM CARBONATE/VITAMIN D3 500-10/5ML
LIQUID (ML) ORAL
COMMUNITY

## 2018-02-02 RX ORDER — B-COMPLEX WITH VITAMIN C
TABLET ORAL
COMMUNITY
End: 2019-03-25 | Stop reason: ALTCHOICE

## 2018-02-02 RX ORDER — FUROSEMIDE 20 MG/1
1 TABLET ORAL DAILY
COMMUNITY
Start: 2016-12-06 | End: 2018-03-28 | Stop reason: SDUPTHER

## 2018-02-02 RX ORDER — LEVOTHYROXINE SODIUM 0.07 MG/1
1 TABLET ORAL DAILY
COMMUNITY
Start: 2016-12-06 | End: 2018-03-28 | Stop reason: SDUPTHER

## 2018-02-02 RX ORDER — METFORMIN HYDROCHLORIDE 500 MG/1
TABLET, EXTENDED RELEASE ORAL
COMMUNITY
Start: 2015-03-05 | End: 2018-02-05 | Stop reason: SDUPTHER

## 2018-02-02 RX ORDER — AMOXICILLIN 500 MG
CAPSULE ORAL
COMMUNITY

## 2018-02-02 RX ORDER — ALBUTEROL SULFATE 90 UG/1
2 AEROSOL, METERED RESPIRATORY (INHALATION) 4 TIMES DAILY PRN
COMMUNITY
End: 2019-03-20 | Stop reason: HOSPADM

## 2018-02-02 RX ORDER — CLINDAMYCIN HYDROCHLORIDE 300 MG/1
CAPSULE ORAL
COMMUNITY
Start: 2015-04-22 | End: 2019-04-04 | Stop reason: ALTCHOICE

## 2018-02-02 RX ORDER — EZETIMIBE 10 MG/1
1 TABLET ORAL DAILY
COMMUNITY
Start: 2017-09-11 | End: 2018-06-08 | Stop reason: SDUPTHER

## 2018-02-02 RX ORDER — POTASSIUM CHLORIDE 750 MG/1
1 CAPSULE, EXTENDED RELEASE ORAL DAILY
COMMUNITY
Start: 2016-12-06 | End: 2018-03-28 | Stop reason: SDUPTHER

## 2018-02-02 RX ORDER — CLOTRIMAZOLE AND BETAMETHASONE DIPROPIONATE 10; .64 MG/G; MG/G
CREAM TOPICAL
COMMUNITY
Start: 2017-08-21 | End: 2018-03-27 | Stop reason: SDUPTHER

## 2018-02-02 RX ORDER — BUDESONIDE AND FORMOTEROL FUMARATE DIHYDRATE 160; 4.5 UG/1; UG/1
AEROSOL RESPIRATORY (INHALATION) 2 TIMES DAILY
Status: ON HOLD | COMMUNITY
Start: 2016-07-11 | End: 2018-07-30

## 2018-02-02 RX ORDER — ASPIRIN 81 MG/1
TABLET ORAL
COMMUNITY
End: 2020-11-02 | Stop reason: CLARIF

## 2018-02-02 RX ORDER — LOSARTAN POTASSIUM 50 MG/1
1 TABLET ORAL DAILY
COMMUNITY
Start: 2015-02-25 | End: 2018-03-28 | Stop reason: SDUPTHER

## 2018-02-02 RX ORDER — ROSUVASTATIN CALCIUM 20 MG/1
1 TABLET, COATED ORAL DAILY
COMMUNITY
Start: 2016-12-01 | End: 2018-05-28 | Stop reason: SDUPTHER

## 2018-02-03 ENCOUNTER — APPOINTMENT (OUTPATIENT)
Dept: LAB | Facility: CLINIC | Age: 68
End: 2018-02-03
Payer: COMMERCIAL

## 2018-02-03 DIAGNOSIS — I10 ESSENTIAL (PRIMARY) HYPERTENSION: ICD-10-CM

## 2018-02-03 DIAGNOSIS — K58.1 IRRITABLE BOWEL SYNDROME WITH CONSTIPATION: ICD-10-CM

## 2018-02-03 DIAGNOSIS — E66.9 OBESITY: ICD-10-CM

## 2018-02-03 DIAGNOSIS — E11.9 TYPE 2 DIABETES MELLITUS WITHOUT COMPLICATIONS (HCC): ICD-10-CM

## 2018-02-03 DIAGNOSIS — E03.9 HYPOTHYROIDISM: ICD-10-CM

## 2018-02-03 LAB
ALBUMIN SERPL BCP-MCNC: 4.2 G/DL (ref 3.5–5)
ALP SERPL-CCNC: 72 U/L (ref 46–116)
ALT SERPL W P-5'-P-CCNC: 41 U/L (ref 12–78)
ANION GAP SERPL CALCULATED.3IONS-SCNC: 7 MMOL/L (ref 4–13)
AST SERPL W P-5'-P-CCNC: 23 U/L (ref 5–45)
BASOPHILS # BLD MANUAL: 0.19 THOUSAND/UL (ref 0–0.1)
BASOPHILS NFR MAR MANUAL: 3 % (ref 0–1)
BILIRUB SERPL-MCNC: 0.36 MG/DL (ref 0.2–1)
BUN SERPL-MCNC: 22 MG/DL (ref 5–25)
CALCIUM SERPL-MCNC: 9.5 MG/DL (ref 8.3–10.1)
CHLORIDE SERPL-SCNC: 105 MMOL/L (ref 100–108)
CHOLEST SERPL-MCNC: 128 MG/DL (ref 50–200)
CO2 SERPL-SCNC: 28 MMOL/L (ref 21–32)
CREAT SERPL-MCNC: 0.81 MG/DL (ref 0.6–1.3)
CREAT UR-MCNC: 47.3 MG/DL
EOSINOPHIL # BLD MANUAL: 0.43 THOUSAND/UL (ref 0–0.4)
EOSINOPHIL NFR BLD MANUAL: 7 % (ref 0–6)
ERYTHROCYTE [DISTWIDTH] IN BLOOD BY AUTOMATED COUNT: 13.8 % (ref 11.6–15.1)
EST. AVERAGE GLUCOSE BLD GHB EST-MCNC: 128 MG/DL
GFR SERPL CREATININE-BSD FRML MDRD: 74 ML/MIN/1.73SQ M
GLUCOSE P FAST SERPL-MCNC: 92 MG/DL (ref 65–99)
HBA1C MFR BLD: 6.1 % (ref 4.2–6.3)
HCT VFR BLD AUTO: 40.5 % (ref 34.8–46.1)
HDLC SERPL-MCNC: 42 MG/DL (ref 40–60)
HGB BLD-MCNC: 13.2 G/DL (ref 11.5–15.4)
LDLC SERPL CALC-MCNC: 60 MG/DL (ref 0–100)
LYMPHOCYTES # BLD AUTO: 1.8 THOUSAND/UL (ref 0.6–4.47)
LYMPHOCYTES # BLD AUTO: 29 % (ref 14–44)
MCH RBC QN AUTO: 28.5 PG (ref 26.8–34.3)
MCHC RBC AUTO-ENTMCNC: 32.6 G/DL (ref 31.4–37.4)
MCV RBC AUTO: 88 FL (ref 82–98)
MICROALBUMIN UR-MCNC: <5 MG/L (ref 0–20)
MICROALBUMIN/CREAT 24H UR: <11 MG/G CREATININE (ref 0–30)
MONOCYTES # BLD AUTO: 0.56 THOUSAND/UL (ref 0–1.22)
MONOCYTES NFR BLD: 9 % (ref 4–12)
NEUTROPHILS # BLD MANUAL: 3.1 THOUSAND/UL (ref 1.85–7.62)
NEUTS SEG NFR BLD AUTO: 50 % (ref 43–75)
NRBC BLD AUTO-RTO: 0 /100 WBCS
PLATELET # BLD AUTO: 306 THOUSANDS/UL (ref 149–390)
PLATELET BLD QL SMEAR: ADEQUATE
PMV BLD AUTO: 9.4 FL (ref 8.9–12.7)
POIKILOCYTOSIS BLD QL SMEAR: PRESENT
POTASSIUM SERPL-SCNC: 4.3 MMOL/L (ref 3.5–5.3)
PROT SERPL-MCNC: 7.9 G/DL (ref 6.4–8.2)
RBC # BLD AUTO: 4.63 MILLION/UL (ref 3.81–5.12)
RBC MORPH BLD: PRESENT
SODIUM SERPL-SCNC: 140 MMOL/L (ref 136–145)
T4 FREE SERPL-MCNC: 1.18 NG/DL (ref 0.76–1.46)
TRIGL SERPL-MCNC: 130 MG/DL
TSH SERPL DL<=0.05 MIU/L-ACNC: 1.04 UIU/ML (ref 0.36–3.74)
VARIANT LYMPHS # BLD AUTO: 2 %
WBC # BLD AUTO: 6.19 THOUSAND/UL (ref 4.31–10.16)

## 2018-02-03 PROCEDURE — 85007 BL SMEAR W/DIFF WBC COUNT: CPT

## 2018-02-03 PROCEDURE — 84443 ASSAY THYROID STIM HORMONE: CPT

## 2018-02-03 PROCEDURE — 83036 HEMOGLOBIN GLYCOSYLATED A1C: CPT

## 2018-02-03 PROCEDURE — 82043 UR ALBUMIN QUANTITATIVE: CPT

## 2018-02-03 PROCEDURE — 82570 ASSAY OF URINE CREATININE: CPT

## 2018-02-03 PROCEDURE — 80053 COMPREHEN METABOLIC PANEL: CPT

## 2018-02-03 PROCEDURE — 84439 ASSAY OF FREE THYROXINE: CPT

## 2018-02-03 PROCEDURE — 80061 LIPID PANEL: CPT

## 2018-02-03 PROCEDURE — 36415 COLL VENOUS BLD VENIPUNCTURE: CPT

## 2018-02-03 PROCEDURE — 85027 COMPLETE CBC AUTOMATED: CPT

## 2018-02-05 ENCOUNTER — OFFICE VISIT (OUTPATIENT)
Dept: INTERNAL MEDICINE CLINIC | Facility: CLINIC | Age: 68
End: 2018-02-05
Payer: COMMERCIAL

## 2018-02-05 VITALS
WEIGHT: 206.6 LBS | RESPIRATION RATE: 14 BRPM | DIASTOLIC BLOOD PRESSURE: 70 MMHG | BODY MASS INDEX: 36.61 KG/M2 | HEIGHT: 63 IN | HEART RATE: 80 BPM | SYSTOLIC BLOOD PRESSURE: 116 MMHG

## 2018-02-05 DIAGNOSIS — I10 ESSENTIAL HYPERTENSION: ICD-10-CM

## 2018-02-05 DIAGNOSIS — E11.8 TYPE 2 DIABETES MELLITUS WITH COMPLICATION, WITHOUT LONG-TERM CURRENT USE OF INSULIN (HCC): Primary | ICD-10-CM

## 2018-02-05 DIAGNOSIS — R06.02 SHORTNESS OF BREATH: ICD-10-CM

## 2018-02-05 DIAGNOSIS — E78.00 PURE HYPERCHOLESTEROLEMIA: ICD-10-CM

## 2018-02-05 DIAGNOSIS — K21.9 GASTROESOPHAGEAL REFLUX DISEASE, ESOPHAGITIS PRESENCE NOT SPECIFIED: ICD-10-CM

## 2018-02-05 DIAGNOSIS — E11.9 CONTROLLED TYPE 2 DIABETES MELLITUS WITHOUT COMPLICATION, WITHOUT LONG-TERM CURRENT USE OF INSULIN (HCC): ICD-10-CM

## 2018-02-05 DIAGNOSIS — E03.9 HYPOTHYROIDISM, UNSPECIFIED TYPE: ICD-10-CM

## 2018-02-05 PROCEDURE — 99214 OFFICE O/P EST MOD 30 MIN: CPT | Performed by: INTERNAL MEDICINE

## 2018-02-05 RX ORDER — METFORMIN HYDROCHLORIDE 500 MG/1
TABLET, EXTENDED RELEASE ORAL
Qty: 270 TABLET | Refills: 3 | Status: SHIPPED | OUTPATIENT
Start: 2018-02-05 | End: 2018-03-02 | Stop reason: SDUPTHER

## 2018-02-05 NOTE — PROGRESS NOTES
Assessment/Plan:  Regarding her blood sugars they are stable with an A1c of 6 1  Her LDL cholesterol is less than 100  She does have dyspnea on exertion is concerned about cardiac issues  She will be referred to Cardiology  She needs to see her pulmonologist   I reviewed her labs with her  She is also having some bilateral hip pain  She is going to choose orthopedist for that purpose  Otherwise I reviewed all of her labs and she is stable  Will see her back in 4 months  No problem-specific Assessment & Plan notes found for this encounter  Diagnoses and all orders for this visit:    Type 2 diabetes mellitus with complication, without long-term current use of insulin (HCC)  -     metFORMIN (GLUCOPHAGE-XR) 500 mg 24 hr tablet; TAKE 1 TABLET IN THE MORNING AND 2 TABLETS IN THE EVENING  -     Basic metabolic panel; Future  -     Hemoglobin A1c; Future    Controlled type 2 diabetes mellitus without complication, without long-term current use of insulin ([facility])    Pure hypercholesterolemia  -     Lipid panel; Future    Essential hypertension    Hypothyroidism, unspecified type  -     TSH, 3rd generation with T4 reflex; Future    Gastroesophageal reflux disease, esophagitis presence not specified    Shortness of breath  -     Ambulatory referral to Cardiology; Future    Other orders  -     aspirin (ASPIR-81) 81 mg EC tablet; Take by mouth  -     B Complex Vitamins (B COMPLEX 1 PO); Take by mouth  -     Insulin Syringe-Needle U-100 (BD INSULIN SYRINGE ULTRAFINE) 31G X 15/64" 0 3 ML MISC; by Does not apply route  -     Calcium Carbonate-Vitamin D (CALCIUM 600+D) 600-200 MG-UNIT TABS; Take by mouth  -     Discontinue: chlorhexidine (PERIDEX) 0 12 % solution; RINSE AS DIRECTED TWICE DAILY  -     clindamycin (CLEOCIN) 300 MG capsule; Take by mouth  -     clotrimazole-betamethasone (LOTRISONE) 1-0 05 % cream; Apply topically  -     ezetimibe (ZETIA) 10 mg tablet;  Take 1 tablet by mouth daily  - Omega-3 Fatty Acids (FISH OIL) 1200 MG CAPS; Take by mouth  -     Lancets (FREESTYLE) lancets; by Does not apply route 2 (two) times a day  -     glucose blood (FREESTYLE LITE) test strip; by In Vitro route  -     furosemide (LASIX) 20 mg tablet; Take 1 tablet by mouth daily  -     glipiZIDE (GLUCOTROL XL) 2 5 mg 24 hr tablet; Take 1 tablet by mouth  -     insulin lispro (HUMALOG) 100 units/mL injection; Inject under the skin  -     levothyroxine 75 mcg tablet; Take 1 tablet by mouth daily  -     Linaclotide (LINZESS) 145 MCG CAPS; Take by mouth  -     losartan (COZAAR) 50 mg tablet; Take 1 tablet by mouth daily  -     Magnesium Oxide 400 MG CAPS; Take by mouth  -     meloxicam (MOBIC) 15 mg tablet; Take 1 tablet by mouth daily as needed  -     Discontinue: metFORMIN (GLUCOPHAGE-XR) 500 mg 24 hr tablet; Take by mouth  -     pantoprazole (PROTONIX) 40 mg tablet; Take 1 tablet by mouth daily  -     potassium chloride (MICRO-K) 10 MEQ CR capsule; Take 1 capsule by mouth daily  -     rosuvastatin (CRESTOR) 20 MG tablet; Take 1 tablet by mouth daily  -     budesonide-formoterol (SYMBICORT) 160-4 5 mcg/act inhaler; Inhale Twice daily  -     albuterol (VENTOLIN HFA) 90 mcg/act inhaler; Inhale 2 puffs 4 (four) times a day as needed          Subjective:  Problems are 1  Type 2 diabetes 2  Hyperlipidemia 3  Hypothyroid 4  Hypertension 5  GERD 6  Shortness of breath      Patient ID: Karyn Melendez is a 69 y o  female  HPI she comes in for follow-up  She is having problems with her hips  She is also having some shortness of breath and is going to see her pulmonologist     She has GERD is going to follow up with her gastroenterologist     Her blood sugars are under fairly good control      The following portions of the patient's history were reviewed and updated as appropriate: allergies, current medications, past family history, past medical history, past social history, past surgical history and problem list     Review of Systems   Constitutional: Negative for activity change, appetite change, chills, diaphoresis, fatigue, fever and unexpected weight change  HENT: Negative for congestion, ear pain, hearing loss, mouth sores, nosebleeds, postnasal drip, sinus pain, sinus pressure, sore throat and trouble swallowing  Eyes: Negative for pain, discharge and visual disturbance  Respiratory: Positive for shortness of breath and wheezing  Negative for apnea, cough and chest tightness  Cardiovascular: Negative for chest pain, palpitations and leg swelling  Gastrointestinal: Negative for abdominal pain, anal bleeding, blood in stool, constipation, diarrhea, nausea and vomiting  Endocrine: Negative for polydipsia, polyphagia and polyuria  She has well-controlled type 2 diabetes  Genitourinary: Negative for decreased urine volume, dysuria, flank pain, frequency, hematuria and urgency  Musculoskeletal: Positive for arthralgias  Negative for back pain, gait problem, joint swelling and myalgias  Skin: Negative for pallor, rash and wound  Allergic/Immunologic: Negative for environmental allergies and food allergies  Neurological: Negative for dizziness, tremors, seizures, syncope, speech difficulty, weakness, light-headedness, numbness and headaches  Hematological: Negative for adenopathy  Does not bruise/bleed easily  Psychiatric/Behavioral: Negative for agitation, confusion, hallucinations, sleep disturbance and suicidal ideas  The patient is not nervous/anxious  Objective:     Physical Exam   Constitutional: She appears well-developed  No distress  She is moderately overweight  HENT:   Head: Normocephalic  Right Ear: External ear normal    Left Ear: External ear normal    Nose: Nose normal    Mouth/Throat: Oropharynx is clear and moist  No oropharyngeal exudate  Eyes: Conjunctivae and EOM are normal  Pupils are equal, round, and reactive to light   Right eye exhibits no discharge  Left eye exhibits no discharge  Neck: Normal range of motion  Neck supple  No thyromegaly present  Cardiovascular: Normal rate, regular rhythm, normal heart sounds and intact distal pulses  Exam reveals no gallop and no friction rub  No murmur heard  Pulmonary/Chest: Effort normal and breath sounds normal  No respiratory distress  She has no wheezes  She has no rales  Abdominal: Soft  Bowel sounds are normal  She exhibits no distension and no mass  There is no tenderness  There is no rebound and no guarding  Abdomen is overweight   Musculoskeletal: Normal range of motion  She exhibits no edema, tenderness or deformity  Lymphadenopathy:     She has no cervical adenopathy  Neurological: She is alert  She has normal reflexes  No cranial nerve deficit  Coordination normal    Skin: Skin is warm and dry  No rash noted  No erythema  Foot exam is unremarkable  Psychiatric: She has a normal mood and affect  Her behavior is normal  Judgment and thought content normal    Nursing note and vitals reviewed

## 2018-02-06 ENCOUNTER — TELEPHONE (OUTPATIENT)
Dept: CARDIOLOGY CLINIC | Facility: CLINIC | Age: 68
End: 2018-02-06

## 2018-02-06 NOTE — TELEPHONE ENCOUNTER
Patient was referred by Ryan Rockwell to [facility] for shortness of breath will [facility] see patient?  Please advise

## 2018-02-12 ENCOUNTER — TELEPHONE (OUTPATIENT)
Dept: CARDIOLOGY CLINIC | Facility: CLINIC | Age: 68
End: 2018-02-12

## 2018-02-27 ENCOUNTER — TRANSCRIBE ORDERS (OUTPATIENT)
Dept: NON INVASIVE DIAGNOSTICS | Facility: CLINIC | Age: 68
End: 2018-02-27

## 2018-02-27 ENCOUNTER — OFFICE VISIT (OUTPATIENT)
Dept: CARDIOLOGY CLINIC | Facility: CLINIC | Age: 68
End: 2018-02-27
Payer: COMMERCIAL

## 2018-02-27 VITALS
HEIGHT: 63 IN | SYSTOLIC BLOOD PRESSURE: 158 MMHG | DIASTOLIC BLOOD PRESSURE: 70 MMHG | WEIGHT: 207.6 LBS | HEART RATE: 83 BPM | OXYGEN SATURATION: 96 % | BODY MASS INDEX: 36.79 KG/M2

## 2018-02-27 DIAGNOSIS — I10 HYPERTENSION, ESSENTIAL: ICD-10-CM

## 2018-02-27 DIAGNOSIS — Z13.6 ENCOUNTER FOR SCREENING FOR CARDIOVASCULAR DISORDERS: Primary | ICD-10-CM

## 2018-02-27 DIAGNOSIS — E78.2 COMBINED HYPERLIPIDEMIA: ICD-10-CM

## 2018-02-27 DIAGNOSIS — Z13.6 ENCOUNTER FOR SCREENING FOR VASCULAR DISEASE: ICD-10-CM

## 2018-02-27 DIAGNOSIS — K59.09 OTHER CONSTIPATION: Primary | ICD-10-CM

## 2018-02-27 DIAGNOSIS — R06.02 SHORTNESS OF BREATH: Primary | ICD-10-CM

## 2018-02-27 PROCEDURE — 99243 OFF/OP CNSLTJ NEW/EST LOW 30: CPT | Performed by: INTERNAL MEDICINE

## 2018-02-27 RX ORDER — LINACLOTIDE 145 UG/1
CAPSULE, GELATIN COATED ORAL
Qty: 90 CAPSULE | Refills: 3 | Status: SHIPPED | OUTPATIENT
Start: 2018-02-27 | End: 2019-02-12 | Stop reason: SDUPTHER

## 2018-02-27 NOTE — PROGRESS NOTES
Cardiology Consultation     [facility]  [id number]  1950  [address] [facility] [address] [facility] [address]      Cardiology consultation from Dr Elkin Morelos for symptoms of shortness of breath    History of present illness:  Patient with known history of diabetes, hypertension hyperlipidemia obesity with presents with symptoms of shortness of breath with exertion  Patient was seen by primary care physician recently  Patient has been having symptoms of shortness of breath with minimal exertion  Patient also has COPD  She is not able to determine whether the symptoms are related to that or cardiac  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  Patient does not have any history of coronary artery disease or MI in the past   Patient had cardiac catheterization many years ago  No recent cardiac workup  No history of orthopnea PND  No history of palpitations or dizziness  She states that she has been compliant with all her present medications            Past Medical History:   Diagnosis Date    Abnormal mammogram     Abnormal weight gain     Achilles tendinitis     Angina pectoris (HCC)     Colon, diverticulosis     Disc degeneration, lumbar     Early satiety     Enthesopathy     hip region    Esophagitis, reflux     Hypercholesterolemia     Irritable bowel syndrome     Left heart failure (HCC)     Rosacea     Sacroiliitis (HCC)      Social History     Social History    Marital status:      Spouse name: N/A    Number of children: N/A    Years of education: N/A     Occupational History    nurse      full-time     Social History Main Topics    Smoking status: Never Smoker    Smokeless tobacco: Never Used    Alcohol use Yes      Comment: (history) occasional    Drug use: No    Sexual activity: Not Currently     Other Topics Concern    Not on file     Social History Narrative    Active advance directive    DME- freestyle lite blood glucose meter device kit QID          Family History   Problem Relation Age of Onset    Arthritis Mother     Heart disease Mother      cardiac disorder    Diabetes Mother     Hiatal hernia Mother     Hypertension Mother     Irritable bowel syndrome Mother     Breast cancer Mother     Uterine cancer Mother     Osteoporosis Mother     Thyroid disease Mother     Other Mother      gastric reflux    Heart disease Father      cardiac disorder    Hiatal hernia Father     Ulcers Father     Other Father      gastric reflux    Hiatal hernia Sister     Thyroid disease Sister     Other Sister      gastric reflux    No Known Problems Brother     Brain cancer Maternal Grandmother     Breast cancer Maternal Grandmother     No Known Problems Maternal Grandfather     No Known Problems Paternal Grandmother     No Known Problems Paternal Grandfather     Hiatal hernia Child     Other Child      gastric reflux    Irritable bowel syndrome Daughter     Breast cancer Maternal Aunt     Uterine cancer Maternal Aunt      Past Surgical History:   Procedure Laterality Date     SECTION      FOOT SURGERY Right     REPLACEMENT TOTAL KNEE Right     SHOULDER ARTHROPLASTY      SINUS SURGERY      TONSILLECTOMY      TUBAL LIGATION         Current Outpatient Prescriptions:     albuterol (VENTOLIN HFA) 90 mcg/act inhaler, Inhale 2 puffs 4 (four) times a day as needed, Disp: , Rfl:     aspirin (ASPIR-81) 81 mg EC tablet, Take by mouth, Disp: , Rfl:     B Complex Vitamins (B COMPLEX 1 PO), Take by mouth, Disp: , Rfl:     budesonide-formoterol (SYMBICORT) 160-4 5 mcg/act inhaler, Inhale Twice daily, Disp: , Rfl:     Calcium Carbonate-Vitamin D (CALCIUM 600+D) 600-200 MG-UNIT TABS, Take by mouth, Disp: , Rfl:     clindamycin (CLEOCIN) 300 MG capsule, Take by mouth, Disp: , Rfl:     clotrimazole-betamethasone (LOTRISONE) 1-0 05 % cream, Apply topically, Disp: , Rfl:     ezetimibe (ZETIA) 10 mg tablet, Take 1 tablet by mouth daily, Disp: , Rfl:     furosemide (LASIX) 20 mg tablet, Take 1 tablet by mouth daily, Disp: , Rfl:     glipiZIDE (GLUCOTROL XL) 2 5 mg 24 hr tablet, Take 1 tablet by mouth, Disp: , Rfl:     glucose blood (FREESTYLE LITE) test strip, by In Vitro route, Disp: , Rfl:     insulin lispro (HUMALOG) 100 units/mL injection, Inject under the skin, Disp: , Rfl:     Insulin Syringe-Needle U-100 (BD INSULIN SYRINGE ULTRAFINE) 31G X 15/64" 0 3 ML MISC, by Does not apply route, Disp: , Rfl:     Lancets (FREESTYLE) lancets, by Does not apply route 2 (two) times a day, Disp: , Rfl:     levothyroxine 75 mcg tablet, Take 1 tablet by mouth daily, Disp: , Rfl:     LINZESS 145 MCG CAPS, TAKE 1 CAPSULE IN THE MORNING AT LEAST 30 MINUTES BEFORE BREAKFAST DAILY, Disp: 90 capsule, Rfl: 3    losartan (COZAAR) 50 mg tablet, Take 1 tablet by mouth daily, Disp: , Rfl:     Magnesium Oxide 400 MG CAPS, Take by mouth, Disp: , Rfl:     meloxicam (MOBIC) 15 mg tablet, Take 1 tablet by mouth daily as needed, Disp: , Rfl:     metFORMIN (GLUCOPHAGE-XR) 500 mg 24 hr tablet, TAKE 1 TABLET IN THE MORNING AND 2 TABLETS IN THE EVENING, Disp: 270 tablet, Rfl: 3    Omega-3 Fatty Acids (FISH OIL) 1200 MG CAPS, Take by mouth, Disp: , Rfl:     pantoprazole (PROTONIX) 40 mg tablet, Take 1 tablet by mouth daily, Disp: , Rfl:     potassium chloride (MICRO-K) 10 MEQ CR capsule, Take 1 capsule by mouth daily, Disp: , Rfl:     rosuvastatin (CRESTOR) 20 MG tablet, Take 1 tablet by mouth daily, Disp: , Rfl:   Allergies   Allergen Reactions    Amoxicillin-Pot Clavulanate Anaphylaxis, Hives, Itching and Facial Swelling    Darvon [Propoxyphene]     Erythromycin Facial Swelling, Hives, Itching, Nasal Congestion, Swelling, Throat Swelling and Vomiting    Meperidine     Methocarbamol     Morphine Hives, Itching, Other (See Comments), Swelling and Syncope    Nabumetone Hives, Itching and Swelling    Penicillins     Tetracyclines & Related     Sulfa Antibiotics Hives, Itching, Rash and Swelling     Vitals:    02/27/18 0814   BP: 158/70   Pulse: 83   SpO2: 96%   Weight: 94 2 kg (207 lb 9 6 oz)   Height: 5' 3" (1 6 m)       Labs:  Appointment on 02/03/2018   Component Date Value    WBC 02/03/2018 6 19     RBC 02/03/2018 4 63     Hemoglobin 02/03/2018 13 2     Hematocrit 02/03/2018 40 5     MCV 02/03/2018 88     MCH 02/03/2018 28 5     MCHC 02/03/2018 32 6     RDW 02/03/2018 13 8     MPV 02/03/2018 9 4     Platelets 25/76/3237 306     nRBC 02/03/2018 0     Sodium 02/03/2018 140     Potassium 02/03/2018 4 3     Chloride 02/03/2018 105     CO2 02/03/2018 28     Anion Gap 02/03/2018 7     BUN 02/03/2018 22     Creatinine 02/03/2018 0 81     Glucose, Fasting 02/03/2018 92     Calcium 02/03/2018 9 5     AST 02/03/2018 23     ALT 02/03/2018 41     Alkaline Phosphatase 02/03/2018 72     Total Protein 02/03/2018 7 9     Albumin 02/03/2018 4 2     Total Bilirubin 02/03/2018 0 36     eGFR 02/03/2018 74     Hemoglobin A1C 02/03/2018 6 1     EAG 02/03/2018 128     Cholesterol 02/03/2018 128     Triglycerides 02/03/2018 130     HDL, Direct 02/03/2018 42     LDL Calculated 02/03/2018 60     Creatinine, Ur 02/03/2018 47 3     Microalbum  ,U,Random 02/03/2018 <5 0     Microalb Creat Ratio 02/03/2018 <11     Free T4 02/03/2018 1 18     TSH 3RD GENERATON 02/03/2018 1 040     Segmented % 02/03/2018 50     Lymphocytes % 02/03/2018 29     Monocytes % 02/03/2018 9     Eosinophils % 02/03/2018 7*    Basophils % 02/03/2018 3*    Atypical Lymphocytes % 02/03/2018 2*    Absolute Neutrophils 02/03/2018 3 10     Lymphocytes Absolute 02/03/2018 1 80     Monocytes Absolute 02/03/2018 0 56     Eosinophils Absolute 02/03/2018 0 43*    Basophils Absolute 02/03/2018 0 19*    RBC Morphology 02/03/2018 Present     Poikilocytes 02/03/2018 Present     Platelet Estimate 80/10/9421 Adequate      Imaging: No results found  Review of Systems:  Review of Systems   REVIEW OF SYSTEMS:  Constitutional:  Denies fever or chills   Eyes:  Denies change in visual acuity   HENT:  Denies nasal congestion or sore throat   Respiratory:  shortness of breath   Cardiovascular:  Denies chest pain or edema   GI:  Denies abdominal pain, nausea, vomiting, bloody stools or diarrhea   :  Denies dysuria, frequency, difficulty in micturition and nocturia  Musculoskeletal:  Denies back pain or joint pain   Neurologic:  Denies headache, focal weakness or sensory changes   Endocrine:  Denies polyuria or polydipsia   Lymphatic:  Denies swollen glands   Psychiatric:  Denies depression or anxiety     Physical Exam:  Physical Exam  PHYSICAL EXAM:  General:  Patient is not in acute distress   Head: Normocephalic, Atraumatic  HEENT:  Both pupils normal-size atraumatic, normocephalic, nonicteric  Neck:  JVP not raised  Trachea central  No carotid bruit  Respiratory:  Decreased breath sounds no crackles  no rhonchi  Cardiovascular:  Regular rate and rhythm no S3 no murmurs  GI:  Abdomen soft nontender  No organomegaly  Lymphatic:  No cervical or inguinal lymphadenopathy  Neurologic:  Patient is awake alert, oriented   Grossly nonfocal      Discussion/Summary:  Patient with multiple risk factors for coronary artery disease including diabetes hypertension hyperlipidemia and obesity with symptoms of shortness of breath with exertion of unclear etiology  Lengthy discussion with patient regarding possible etiologies for shortness of breath  Patient will be scheduled for an echocardiogram to evaluate ejection fraction valves and look for evidence of pulmonary hypertension given symptoms of shortness of breath and obesity  Patient will also need a stress test to assess for ischemia  Patient is unable to exercise on the treadmill because of COPD and shortness of breath with minimal exertion    In view of this, patient will be scheduled for a pharmacological nuclear stress test to assess for ischemia  Sensitivity and specificity of different modalities of stress imaging were discussed with the patient  Importance of salt restriction reinforced  Patient will continue her present medications  Follow-up in 6 to 8 weeks after cardiac testing  Patient is agreeable with the plan of care

## 2018-03-02 DIAGNOSIS — E11.8 TYPE 2 DIABETES MELLITUS WITH COMPLICATION, WITHOUT LONG-TERM CURRENT USE OF INSULIN (HCC): ICD-10-CM

## 2018-03-05 LAB
LEFT EYE DIABETIC RETINOPATHY: NORMAL
RIGHT EYE DIABETIC RETINOPATHY: NORMAL

## 2018-03-05 PROCEDURE — 3072F LOW RISK FOR RETINOPATHY: CPT | Performed by: INTERNAL MEDICINE

## 2018-03-05 RX ORDER — METFORMIN HYDROCHLORIDE 500 MG/1
TABLET, EXTENDED RELEASE ORAL
Qty: 270 TABLET | Refills: 3 | Status: SHIPPED | OUTPATIENT
Start: 2018-03-05 | End: 2018-03-27 | Stop reason: SDUPTHER

## 2018-03-05 RX ORDER — METFORMIN HYDROCHLORIDE 500 MG/1
TABLET, EXTENDED RELEASE ORAL
Qty: 270 TABLET | Refills: 0 | Status: SHIPPED | OUTPATIENT
Start: 2018-03-05 | End: 2018-04-12

## 2018-03-05 NOTE — TELEPHONE ENCOUNTER
From: Lety Scott  Sent: 3/2/2018 1:01 PM EST  Subject: Medication Renewal Request    Tyrone Alves would like a refill of the following medications:     metFORMIN (GLUCOPHAGE-XR) 500 mg 24 hr tablet Enoch Avalos, ]   Patient Comment: [facility], 500 mg am, two 500 mg tablets pm    Preferred pharmacy: [facility]

## 2018-03-06 DIAGNOSIS — E11.9 TYPE 2 DIABETES MELLITUS WITHOUT COMPLICATION, WITHOUT LONG-TERM CURRENT USE OF INSULIN (HCC): Primary | ICD-10-CM

## 2018-03-06 RX ORDER — BLOOD-GLUCOSE METER
KIT MISCELLANEOUS
Qty: 400 EACH | Refills: 2 | Status: SHIPPED | OUTPATIENT
Start: 2018-03-06 | End: 2018-07-12

## 2018-03-07 NOTE — PROGRESS NOTES
History of Present Illness    Revaccination   Vaccine Information: Vaccine(s) Given (names): [id number]  Spoke with patient regarding vaccine out of temperature range and risks and benefits of revaccination  Action(s): Pt will be revaccinated  Appointment scheduled: [id number]  No Show Appt(s): 23270410  Revaccination Completed: [id number]  Active Problems    1  Abdominal pain, bilateral lower quadrant (789 03,789 04) (R10 31,R10 32)   2  Abnormal mammogram (793 80) (R92 8)   3  Allergic rhinitis (477 9) (J30 9)   4  Asthma (493 90) (J45 909)   5  Chronic obstructive pulmonary disease (496) (J44 9)   6  Cramp in limb (729 82) (R25 2)   7  Edema (782 3) (R60 9)   8  Encounter for screening mammogram for breast cancer (V76 12) (Z12 31)   9  Flu vaccine need (V04 81) (Z23)   10  History of knee replacement, total (V43 65) (Z96 659)   11  Irritable bowel syndrome with constipation (564 1) (K58 1)   12  Need for 23-polyvalent pneumococcal polysaccharide vaccine (V03 82) (Z23)   13  Need for pneumococcal vaccination (V03 82) (Z23)   14  Obesity (278 00) (E66 9)   15  Obstructive sleep apnea (327 23) (G47 33)   16  Pre-operative cardiovascular examination (V72 81) (Z01 810)   17  Primary osteoarthritis (715 10) (M19 91)   18  Rectal bleeding (569 3) (K62 5)   19  Rectal bleeding (569 3) (K62 5)   20  Shortness of breath on exertion (786 05) (R06 02)    Immunizations  Influenza --- Antoinettevia Effie: [id number] Albarran: 30-Sep-2015   [facility]: 30-Sep-[facility]   Pneumo Other --- Series1: 17-Nov-2008     Current Meds   1  Aspir-81 TBEC   2  B Complex Oral Tablet   3  BD Insulin Syringe Ultrafine 31G X 15/64" 0 3 ML Miscellaneous   4  Calcium 600+D 600-200 MG-UNIT Oral Tablet   5  Centrum Silver Ultra Womens Oral Tablet   6  Clindamycin HCl - 300 MG Oral Capsule   7  Crestor 20 MG Oral Tablet; TAKE 1 TABLET DAILY   8  Fish Oil 1200 MG Oral Capsule   9   FreeStyle Lancets Miscellaneous; USE TWICE DAILY DX:250 00   8  FreeStyle Lite Test In Vitro Strip; TEST blood sugars four times daily  Dx:250 00   11  Furosemide 20 MG Oral Tablet; Take 1 tablet daily   12  GlipiZIDE ER 2 5 MG Oral Tablet Extended Release 24 Hour; TAKE ONE TABLET BY    MOUTH EVERY MORNING   13  HumaLOG 100 UNIT/ML Subcutaneous Solution; USE AS DIRECTED   14  Levothyroxine Sodium 75 MCG Oral Tablet; take 1 tablet every day   15  Linzess 145 MCG Oral Capsule; take 1 capsule daily   16  Linzess 145 MCG Oral Capsule; Take 145 mcg by mouth daily  Take 30 minutes before    her first meal of the day   17  Losartan Potassium 50 MG Oral Tablet; TAKE 1 TABLET DAILY AS DIRECTED   18  Magnesium Oxide 400 MG Oral Capsule   19  Meloxicam 15 MG Oral Tablet; TAKE 1 TABLET DAILY WITH FOOD   20  Meloxicam 15 MG Oral Tablet; TAKE ONE TABLET BY MOUTH DAILY AS NEEDED   21  MetFORMIN HCl  MG Oral Tablet Extended Release 24 Hour; PT  TO TAKE 1 TAB    IN AM, AND 2 TABS IN PM   22  Pantoprazole Sodium 40 MG Oral Tablet Delayed Release; take 1 tablet every day   23  Potassium Chloride ER 10 MEQ Oral Capsule Extended Release; take 1 capsule daily   24  Rosuvastatin Calcium 20 MG Oral Tablet; Take 1 tablet daily   25  Symbicort 160-4 5 MCG/ACT Inhalation Aerosol; USE 2 INHALATIONS TWICE A DAY    (RINSE MOUTH AFTER USE)   26  Ventolin  (90 Base) MCG/ACT Inhalation Aerosol Solution; INHALE 2 PUFFS    FOUR TIMES DAILY AS NEEDED   27  Zetia 10 MG Oral Tablet; TAKE 1 TABLET DAILY   28  Zyrtec 10 MG TABS; TAKE 1 TABLET DAILY AS NEEDED    Allergies    1  Erythromycin Derivatives   2  Morphine Derivatives   3  Penicillins   4  Relafen TABS   5  Robaxin TABS   6  Sulfa Drugs    Future Appointments    Date/Time Provider Specialty Site   08/03/2017 01:00 PM Song [email or web address] DO Internal Medicine [facility]     Signatures   Electronically signed by : Jeannette Lawler DO;  Apr 18 2017  6:52PM EST                       (Author)

## 2018-03-12 ENCOUNTER — HOSPITAL ENCOUNTER (OUTPATIENT)
Dept: NON INVASIVE DIAGNOSTICS | Facility: CLINIC | Age: 68
Discharge: HOME | End: 2018-03-12
Payer: COMMERCIAL

## 2018-03-12 DIAGNOSIS — R06.02 SHORTNESS OF BREATH: ICD-10-CM

## 2018-03-12 PROCEDURE — 78452 HT MUSCLE IMAGE SPECT MULT: CPT

## 2018-03-12 PROCEDURE — 93017 CV STRESS TEST TRACING ONLY: CPT

## 2018-03-12 PROCEDURE — A9502 TC99M TETROFOSMIN: HCPCS

## 2018-03-12 RX ORDER — AMINOPHYLLINE DIHYDRATE 25 MG/ML
50 INJECTION, SOLUTION INTRAVENOUS ONCE
Status: COMPLETED | OUTPATIENT
Start: 2018-03-12 | End: 2018-03-12

## 2018-03-12 RX ADMIN — AMINOPHYLLINE 50 MG: 25 INJECTION, SOLUTION INTRAVENOUS at 14:32

## 2018-03-12 RX ADMIN — REGADENOSON 0.4 MG: 0.08 INJECTION, SOLUTION INTRAVENOUS at 14:29

## 2018-03-14 ENCOUNTER — TELEPHONE (OUTPATIENT)
Dept: CARDIOLOGY CLINIC | Facility: CLINIC | Age: 68
End: 2018-03-14

## 2018-03-14 LAB
ARRHY DURING EX: NORMAL
CHEST PAIN STATEMENT: NORMAL
MAX DIASTOLIC BP: 70 MMHG
MAX HEART RATE: 97 BPM
MAX PREDICTED HEART RATE: 151 BPM
MAX. SYSTOLIC BP: 126 MMHG
PROTOCOL NAME: NORMAL
REASON FOR TERMINATION: NORMAL
TARGET HR FORMULA: NORMAL
TEST INDICATION: NORMAL
TIME IN EXERCISE PHASE: NORMAL

## 2018-03-14 NOTE — TELEPHONE ENCOUNTER
----- Message from Basilia Nugent PA-C sent at 3/13/2018  4:45 PM EDT -----  pls call pt-- stress test normal

## 2018-03-19 ENCOUNTER — HOSPITAL ENCOUNTER (OUTPATIENT)
Dept: NON INVASIVE DIAGNOSTICS | Facility: CLINIC | Age: 68
Discharge: HOME | End: 2018-03-19
Payer: COMMERCIAL

## 2018-03-19 ENCOUNTER — APPOINTMENT (OUTPATIENT)
Dept: NON INVASIVE DIAGNOSTICS | Facility: CLINIC | Age: 68
End: 2018-03-19
Payer: COMMERCIAL

## 2018-03-19 DIAGNOSIS — Z13.6 ENCOUNTER FOR SCREENING FOR CARDIOVASCULAR DISORDERS: ICD-10-CM

## 2018-03-19 DIAGNOSIS — R06.02 SHORTNESS OF BREATH: ICD-10-CM

## 2018-03-19 PROCEDURE — 93880 EXTRACRANIAL BILAT STUDY: CPT | Performed by: SURGERY

## 2018-03-19 PROCEDURE — 93979 VASCULAR STUDY: CPT | Performed by: SURGERY

## 2018-03-19 PROCEDURE — 93306 TTE W/DOPPLER COMPLETE: CPT

## 2018-03-19 PROCEDURE — 93922 UPR/L XTREMITY ART 2 LEVELS: CPT | Performed by: SURGERY

## 2018-03-19 PROCEDURE — 93306 TTE W/DOPPLER COMPLETE: CPT | Performed by: INTERNAL MEDICINE

## 2018-03-20 ENCOUNTER — TELEPHONE (OUTPATIENT)
Dept: CARDIOLOGY CLINIC | Facility: CLINIC | Age: 68
End: 2018-03-20

## 2018-03-20 NOTE — TELEPHONE ENCOUNTER
----- Message from Franky Paul MD sent at 3/19/2018  9:11 PM EDT -----  The vascular screen was negative  Echo shows normal EF, Mild aortic stenosis

## 2018-03-23 ENCOUNTER — OFFICE VISIT (OUTPATIENT)
Dept: DERMATOLOGY | Facility: CLINIC | Age: 68
End: 2018-03-23
Payer: COMMERCIAL

## 2018-03-23 DIAGNOSIS — L57.0 ACTINIC KERATOSIS: Primary | ICD-10-CM

## 2018-03-23 DIAGNOSIS — Z13.89 SCREENING FOR SKIN CONDITION: ICD-10-CM

## 2018-03-23 DIAGNOSIS — L82.1 SEBORRHEIC KERATOSIS: ICD-10-CM

## 2018-03-23 PROCEDURE — 99203 OFFICE O/P NEW LOW 30 MIN: CPT | Performed by: DERMATOLOGY

## 2018-03-23 PROCEDURE — 17000 DESTRUCT PREMALG LESION: CPT | Performed by: DERMATOLOGY

## 2018-03-23 NOTE — PATIENT INSTRUCTIONS
Actinic Keratosis:  Patient advised lesions are precancers  These should resolve with cryosurgery if not to let us know sun block recommended  Seborrheic Keratosis  Patient reasurred these are normal growths we acquire with age no treatment needed  Screening for Dermatologic Disorders: Nothing else of concern noted on complete exam follow up in 1 year   Treatment with Cryotherapy    The doctor has treated your skin with nitrogen, which is 320 degrees Fahrenheit below zero  He has given the treated area "frostbite "    Stinging should subside within a few hours  You can take Tylenol for pain, if needed  Over the next few days, it is normal if the area becomes reddened, a blood blister, or swollen with fluid  If the lesion treated was near the eye - you could get a swollen eye over the next few days  Do not panic! This is all temporary, and will resolve with time  There is no special treatment - just keep the area clean  Makeup and BandAids can be used, if preferred  When the area starts to dry up and peel off, using Vaseline can help healing  It usually takes up to a month for it to heal   Some lesions are recurrent and may require repeat treatments  If a lesion has not healed in one month, please don't hesitate to contact us  If you have any further questions that are not answered here, please call us  [phone]    Thank you for allowing us to care for you

## 2018-03-23 NOTE — PROGRESS NOTES
[address] [facility] OF [address] [facility] DERMATOLOGY  [address] [place] [address]     MRN: [id number] : 1950  Encounter: [id number]  Patient Information: Franca Booker  Chief complaint: 66-year-old female who had not seen for several years with history of rosacea had recent biopsy performed by Dr Landon Dakin which revealed early actinic keratosis    Patient now notes a new lesion on the left cheek and basically concerned    History of present illness:   Past Medical History:   Diagnosis Date    Abnormal mammogram     Abnormal weight gain     Achilles tendinitis     Angina pectoris (HCC)     Colon, diverticulosis     Disc degeneration, lumbar     Early satiety     Enthesopathy     hip region    Esophagitis, reflux     Hypercholesterolemia     Irritable bowel syndrome     Left heart failure ([address] )     Rosacea     Sacroiliitis ([address] )      Past Surgical History:   Procedure Laterality Date     SECTION      FOOT SURGERY Right     REPLACEMENT TOTAL KNEE Right     SHOULDER ARTHROPLASTY      SINUS SURGERY      TONSILLECTOMY      TUBAL LIGATION       Social History   History   Alcohol Use    Yes     Comment: (history) occasional     History   Drug Use No     History   Smoking Status    Never Smoker   Smokeless Tobacco    Never Used     Family History   Problem Relation Age of Onset    Arthritis Mother     Heart disease Mother      cardiac disorder    Diabetes Mother     Hiatal hernia Mother     Hypertension Mother     Irritable bowel syndrome Mother     Breast cancer Mother     Uterine cancer Mother     Osteoporosis Mother     Thyroid disease Mother     Other Mother      gastric reflux    Heart disease Father      cardiac disorder    Hiatal hernia Father     Ulcers Father     Other Father      gastric reflux    Hiatal hernia Sister     Thyroid disease Sister     Other Sister      gastric reflux    No Known Problems Brother     Brain cancer Maternal Grandmother     Breast cancer Maternal Grandmother     No Known Problems Maternal Grandfather     No Known Problems Paternal Grandmother     No Known Problems Paternal Grandfather     Hiatal hernia Child     Other Child      gastric reflux    Irritable bowel syndrome Daughter     Breast cancer Maternal Aunt     Uterine cancer Maternal Aunt      Meds/Allergies   Allergies   Allergen Reactions    Amoxicillin-Pot Clavulanate Anaphylaxis, Hives, Itching and Facial Swelling    Darvon [Propoxyphene]     Erythromycin Facial Swelling, Hives, Itching, Nasal Congestion, Swelling, Throat Swelling and Vomiting    Meperidine     Methocarbamol     Morphine Hives, Itching, Other (See Comments), Swelling and Syncope    Nabumetone Hives, Itching and Swelling    Penicillins     Tetracyclines & Related     Sulfa Antibiotics Hives, Itching, Rash and Swelling       Meds:  Prior to Admission medications    Medication Sig Start Date End Date Taking?  Authorizing Provider   albuterol (VENTOLIN HFA) 90 mcg/act inhaler Inhale 2 puffs 4 (four) times a day as needed   Yes Historical Provider, MD   aspirin (ASPIR-81) 81 mg EC tablet Take by mouth   Yes Historical Provider, MD   B Complex Vitamins (B COMPLEX 1 PO) Take by mouth   Yes Historical Provider, MD   budesonide-formoterol (SYMBICORT) 160-4 5 mcg/act inhaler Inhale Twice daily 7/11/16  Yes Historical Provider, MD   Calcium Carbonate-Vitamin D (CALCIUM 600+D) 600-200 MG-UNIT TABS Take by mouth   Yes Historical Provider, MD   clindamycin (CLEOCIN) 300 MG capsule Take by mouth 4/22/15  Yes Historical Provider, MD   clotrimazole-betamethasone (Miranda Torrez) 1-0 05 % cream Apply topically 8/21/17  Yes Historical Provider, MD   ezetimibe (ZETIA) 10 mg tablet Take 1 tablet by mouth daily 9/11/17  Yes Historical Provider, MD   FREESTYLE LITE test strip USE TO TEST BLOOD SUGARS FOUR TIMES A DAY 3/6/18  Yes Jeannette Ser, DO   furosemide (LASIX) 20 mg tablet Take 1 tablet by mouth daily 12/6/16  Yes Historical Provider, MD   glipiZIDE (GLUCOTROL XL) 2 5 mg 24 hr tablet Take 1 tablet by mouth 6/30/15  Yes Historical Provider, MD   insulin lispro (HUMALOG) 100 units/mL injection Inject under the skin 10/1/15  Yes Historical Provider, MD   Insulin Syringe-Needle U-100 (BD INSULIN SYRINGE ULTRAFINE) 31G X 15/64" 0 3 ML MISC by Does not apply route 10/1/15  Yes Historical Provider, MD   Lancets (FREESTYLE) lancets by Does not apply route 2 (two) times a day 5/6/16  Yes Historical Provider, MD   levothyroxine 75 mcg tablet Take 1 tablet by mouth daily 12/6/16  Yes Historical Provider, MD   LINZESS 145 MCG CAPS TAKE 1 CAPSULE IN THE MORNING AT LEAST [address] 2/27/18  Yes Valorie Domínguez MD   losartan (COZAAR) 50 mg tablet Take 1 tablet by mouth daily 2/25/15  Yes Historical Provider, MD   Magnesium Oxide 400 MG CAPS Take by mouth   Yes Historical Provider, MD   meloxicam (MOBIC) 15 mg tablet Take 1 tablet by mouth daily as needed 7/7/16  Yes Historical Provider, MD   metFORMIN (GLUCOPHAGE-XR) 500 mg 24 hr tablet TAKE 1 TABLET IN THE MORNING AND 2 TABLETS IN THE EVENING 3/5/18  Yes Micky Romero DO   Omega-3 Fatty Acids ([address]) 1200 MG CAPS Take by mouth   Yes Historical Provider, MD   pantoprazole (PROTONIX) 40 mg tablet Take 1 tablet by mouth daily 2/25/15  Yes Historical Provider, MD   potassium chloride (MICRO-K) 10 MEQ CR capsule Take 1 capsule by mouth daily 12/6/16  Yes Historical Provider, MD   rosuvastatin (CRESTOR) 20 MG tablet Take 1 tablet by mouth daily 12/1/16  Yes Historical Provider, MD   metFORMIN (GLUCOPHAGE-XR) 500 mg 24 hr tablet TAKE 1 TABLET IN THE MORNING AND 2 TABLETS IN THE EVENING 3/5/18   Micky Romero DO       Subjective:     Review of Systems:    General: negative for - chills, fatigue, fever,  weight gain or weight loss  Psychological: negative for - anxiety, behavioral disorder, concentration difficulties, decreased libido, depression, irritability, memory difficulties, mood swings, sleep disturbances or suicidal ideation  ENT: negative for - hearing difficulties , nasal congestion, nasal discharge, oral lesions, sinus pain, sneezing, sore throat  Allergy and Immunology: negative for - hives, insect bite sensitivity,  Hematological and Lymphatic: negative for - bleeding problems, blood clots,bruising, swollen lymph nodes  Endocrine: negative for - hair pattern changes, hot flashes, malaise/lethargy, mood swings, palpitations, polydipsia/polyuria, skin changes, temperature intolerance or unexpected weight change  Respiratory: negative for - cough, hemoptysis, orthopnea, shortness of breath, or wheezing  Cardiovascular: negative for - chest pain, dyspnea on exertion, edema,  Gastrointestinal: negative for - abdominal pain, nausea/vomiting  Genito-Urinary: negative for - dysuria, incontinence, irregular/heavy menses or urinary frequency/urgency  Musculoskeletal: negative for - gait disturbance, joint pain, joint stiffness, joint swelling, muscle pain, muscular weakness  Dermatological:  As in HPI  Neurological: negative for confusion, dizziness, headaches, impaired coordination/balance, memory loss, numbness/tingling, seizures, speech problems, tremors or weakness       Objective: There were no vitals taken for this visit      Physical Exam:    General Appearance:    Alert, cooperative, no distress   Head:    Normocephalic, without obvious abnormality, atraumatic           Skin:   A full skin exam was performed including scalp, head scalp, eyes, ears, nose, lips, neck, chest, axilla, abdomen, back, buttocks, bilateral upper extremities, bilateral lower extremities, hands, feet, fingers, toes, fingernails, and toenails  The scaling erythematous areas noted below normal keratotic papules with greasy stuck on appearance nothing else remarkable noted on exam no active rosacea noted     Physical Exam   HENT:   Head: Cryotherapy Procedure Note    Pre-operative Diagnosis: actinic keratosis    Plan:  1  Instructed to keep the area dry and clean thereafter  Apply petrolatum if area gets crusty  2  Recommended that the patient use acetaminophen  as needed for pain  Locations:  cheeks    Indications: Destruction of  Actinic keratosis x2    Patient informed of risks (permanent scarring, infection, light or dark discoloration, bleeding, infection, weakness, numbness and recurrence of the lesion) and benefits of the procedure and verbal informed consent obtained  The areas are treated with liquid nitrogen therapy, frozen until ice ball extended 2 mm beyond lesion, allowed to thaw, and treated again  The patient tolerated procedure well  The patient was instructed on post-op care, warned that there may be blister formation, redness and pain  Recommend OTC analgesia as needed for pain  Condition:  Stable    Complications:  none  Assessment:     1  Actinic keratosis     2  Seborrheic keratosis     3  Screening for skin condition           Plan:     Actinic Keratosis:  Patient advised lesions are precancers  These should resolve with cryosurgery if not to let us know sun block recommended  Seborrheic Keratosis  Patient reasurred these are normal growths we acquire with age no treatment needed  Screening for Dermatologic Disorders: Nothing else of concern noted on complete exam follow up in 1 year Rosacea does not appear to be quite active at this time no treatment needed  Lorena Gaitan MD  3/23/2018,8:51 AM    Portions of the record may have been created with voice recognition software   Occasional wrong word or "sound a like" substitutions may have occurred due to the inherent limitations of voice recognition software   Read the chart carefully and recognize, using context, where substitutions have occurred

## 2018-03-27 ENCOUNTER — OFFICE VISIT (OUTPATIENT)
Dept: OBGYN CLINIC | Age: 68
End: 2018-03-27
Payer: COMMERCIAL

## 2018-03-27 VITALS
WEIGHT: 189 LBS | BODY MASS INDEX: 33.49 KG/M2 | HEIGHT: 63 IN | SYSTOLIC BLOOD PRESSURE: 132 MMHG | DIASTOLIC BLOOD PRESSURE: 80 MMHG

## 2018-03-27 DIAGNOSIS — L90.0 LICHEN SCLEROSUS: Primary | ICD-10-CM

## 2018-03-27 DIAGNOSIS — I10 ESSENTIAL HYPERTENSION: ICD-10-CM

## 2018-03-27 DIAGNOSIS — M25.50 ARTHRALGIA, UNSPECIFIED JOINT: ICD-10-CM

## 2018-03-27 DIAGNOSIS — E11.8 TYPE 2 DIABETES MELLITUS WITH COMPLICATION, WITHOUT LONG-TERM CURRENT USE OF INSULIN (HCC): Primary | ICD-10-CM

## 2018-03-27 DIAGNOSIS — E03.9 ACQUIRED HYPOTHYROIDISM: ICD-10-CM

## 2018-03-27 PROBLEM — E78.5 DYSLIPIDEMIA, GOAL LDL BELOW 70: Status: ACTIVE | Noted: 2018-03-27

## 2018-03-27 PROBLEM — E11.9 DIABETES MELLITUS TYPE 2, CONTROLLED, WITHOUT COMPLICATIONS (HCC): Status: ACTIVE | Noted: 2018-03-27

## 2018-03-27 PROCEDURE — 99213 OFFICE O/P EST LOW 20 MIN: CPT | Performed by: NURSE PRACTITIONER

## 2018-03-27 RX ORDER — CLOTRIMAZOLE AND BETAMETHASONE DIPROPIONATE 10; .64 MG/G; MG/G
CREAM TOPICAL
Qty: 45 G | Refills: 1 | Status: SHIPPED | OUTPATIENT
Start: 2018-03-27 | End: 2019-04-02 | Stop reason: SDUPTHER

## 2018-03-27 RX ORDER — CLOTRIMAZOLE AND BETAMETHASONE DIPROPIONATE 10; .64 MG/G; MG/G
CREAM TOPICAL
Qty: 30 G | Refills: 0 | Status: SHIPPED | OUTPATIENT
Start: 2018-03-27 | End: 2018-03-27 | Stop reason: CLARIF

## 2018-03-27 NOTE — PATIENT INSTRUCTIONS
Dermatitis   WHAT YOU NEED TO KNOW:   Dermatitis is skin inflammation  You may have an itchy rash, redness, or swelling  You may also have bumps or blisters that crust over or ooze clear fluid  Dermatitis can be caused by allergens such as dust mites, pet dander, pollen, and certain foods  It can also develop when something touches your skin and irritates it or causes an allergic reaction  Examples include soaps, chemicals, latex, and poison ivy  DISCHARGE INSTRUCTIONS:   Call 911 if you have any of the following symptoms of anaphylaxis:   · Sudden trouble breathing    · Throat swelling and tightness    · Dizziness, lightheadedness, fainting, or confusion  Return to the emergency department if:   · You develop a fever or have red streaks going up your arm or leg  · Your rash gets more swollen, red, or hot  Contact your healthcare provider if:   · Your skin blisters, oozes white or yellow pus, or has a foul-smelling discharge  · Your rash spreads or does not get better, even after treatment  · You have questions or concerns about your condition or care  Medicines:   · Medicines  help decrease itching and inflammation, or treat a bacterial infection  They may be given as a topical cream, shot, or a pill  · Take your medicine as directed  Contact your healthcare provider if you think your medicine is not helping or if you have side effects  Tell him of her if you are allergic to any medicine  Keep a list of the medicines, vitamins, and herbs you take  Include the amounts, and when and why you take them  Bring the list or the pill bottles to follow-up visits  Carry your medicine list with you in case of an emergency  Apply a cool compress to your rash: This will help soothe your skin  Keep your skin moist:  Rub unscented cream or lotion on your skin to prevent dryness and itching  Do this right after a lukewarm bath or shower when your skin is still damp    Avoid skin irritants:  Do not use skin irritants, such as makeup, hair products, soaps, and cleansers  Use products that do not contain fragrances or dye  Follow up with your healthcare provider as directed:  Write down your questions so you remember to ask them during your visits  © 2017 [employer] Information is for End User's use only and may not be sold, redistributed or otherwise used for commercial purposes  All illustrations and images included in CareNotes® are the copyrighted property of A D A M [employer] Inc  or [employer]  The above information is an  only  It is not intended as medical advice for individual conditions or treatments  Talk to your doctor, nurse or pharmacist before following any medical regimen to see if it is safe and effective for you

## 2018-03-27 NOTE — PROGRESS NOTES
Assessment/Plan:    No problem-specific Assessment & Plan notes found for this encounter  Diagnoses and all orders for this visit:    Lichen sclerosus  -     clotrimazole-betamethasone (LOTRISONE) 1-0 05 % cream; Apply topically Medrol Dose Pack scheduling ONLY for 1 dose      Call if no symptom improvement, all questions answered  Subjective:      Patient ID: Jon Bethea is a 69 y o  female  Pleasant 69 y o  here for improved  vaginal issues  She has used the lotrisone with relief  She seems to have features of LS but declines vulvar biopsy at this time since the lotrisone is helping and would not change her therapy  Denies recent antibiotic use but has been in the past  Denies douching  Denies fever, pelvic pain or dysparunia  The following portions of the patient's history were reviewed and updated as appropriate:   She  has a past medical history of Abnormal mammogram; Abnormal weight gain; Achilles tendinitis; Angina pectoris ([address] ); Colon, diverticulosis; Disc degeneration, lumbar; Early satiety; Enthesopathy; Esophagitis, reflux; Hypercholesterolemia; Irritable bowel syndrome; Left heart failure ([address] ); Rosacea; and Sacroiliitis ([address] )  She   Patient Active Problem List    Diagnosis Date Noted    Diabetes mellitus type 2, controlled, without complications ([address] ) 71/15/5552    Dyslipidemia, goal LDL below 70     Lichen sclerosus 9769    Actinic keratosis 2018    Seborrheic keratosis 2018    Screening for skin condition 2018    Obesity     Dysmetabolic syndrome X     Benign essential hypertension 2012     She  has a past surgical history that includes  section; Foot surgery (Right); SHOULDER ARTHROPLASTY; Sinus surgery; Tonsillectomy; Replacement total knee (Right); and Tubal ligation    Her family history includes Arthritis in her mother; Brain cancer in her maternal grandmother; Breast cancer in her maternal aunt, maternal grandmother, and mother; Diabetes in her mother; Heart disease in her father and mother; Hiatal hernia in her child, father, mother, and sister; Hypertension in her mother; Irritable bowel syndrome in her daughter and mother; No Known Problems in her brother, maternal grandfather, paternal grandfather, and paternal grandmother; Osteoporosis in her mother; Other in her child, father, mother, and sister; Thyroid disease in her mother and sister; Ulcers in her father; Uterine cancer in her maternal aunt and mother  She  reports that she has never smoked  She has never used smokeless tobacco  She reports that she drinks alcohol  She reports that she does not use drugs    Current Outpatient Prescriptions   Medication Sig Dispense Refill    albuterol (VENTOLIN HFA) 90 mcg/act inhaler Inhale 2 puffs 4 (four) times a day as needed      aspirin (ASPIR-81) 81 mg EC tablet Take by mouth      B Complex Vitamins (B COMPLEX 1 PO) Take by mouth      budesonide-formoterol (SYMBICORT) 160-4 5 mcg/act inhaler Inhale Twice daily      Calcium Carbonate-Vitamin D (CALCIUM 600+D) 600-200 MG-UNIT TABS Take by mouth      clindamycin (CLEOCIN) 300 MG capsule Take by mouth      clotrimazole-betamethasone (LOTRISONE) 1-0 05 % cream Apply topically Medrol Dose Pack scheduling ONLY for 1 dose 30 g 0    ezetimibe (ZETIA) 10 mg tablet Take 1 tablet by mouth daily      FREESTYLE LITE test strip USE TO TEST BLOOD SUGARS FOUR TIMES A  each 2    furosemide (LASIX) 20 mg tablet Take 1 tablet by mouth daily      glipiZIDE (GLUCOTROL XL) 2 5 mg 24 hr tablet Take 1 tablet by mouth      insulin lispro (HUMALOG) 100 units/mL injection Inject under the skin      Insulin Syringe-Needle U-100 (BD INSULIN SYRINGE ULTRAFINE) 31G X 15/64" 0 3 ML MISC by Does not apply route      Lancets (FREESTYLE) lancets by Does not apply route 2 (two) times a day      levothyroxine 75 mcg tablet Take 1 tablet by mouth daily      LINZESS 145 MCG CAPS TAKE 1 CAPSULE IN THE MORNING AT LEAST 30 MINUTES BEFORE BREAKFAST DAILY 90 capsule 3    losartan (COZAAR) 50 mg tablet Take 1 tablet by mouth daily      Magnesium Oxide 400 MG CAPS Take by mouth      meloxicam (MOBIC) 15 mg tablet Take 1 tablet by mouth daily as needed      metFORMIN (GLUCOPHAGE-XR) 500 mg 24 hr tablet TAKE 1 TABLET IN THE MORNING AND 2 TABLETS IN THE EVENING 270 tablet 0    Omega-3 Fatty Acids (FISH OIL) 1200 MG CAPS Take by mouth      pantoprazole (PROTONIX) 40 mg tablet Take 1 tablet by mouth daily      potassium chloride (MICRO-K) 10 MEQ CR capsule Take 1 capsule by mouth daily      rosuvastatin (CRESTOR) 20 MG tablet Take 1 tablet by mouth daily       No current facility-administered medications for this visit        Current Outpatient Prescriptions on File Prior to Visit   Medication Sig    albuterol (VENTOLIN HFA) 90 mcg/act inhaler Inhale 2 puffs 4 (four) times a day as needed    aspirin (ASPIR-81) 81 mg EC tablet Take by mouth    B Complex Vitamins (B COMPLEX 1 PO) Take by mouth    budesonide-formoterol (SYMBICORT) 160-4 5 mcg/act inhaler Inhale Twice daily    Calcium Carbonate-Vitamin D (CALCIUM 600+D) 600-200 MG-UNIT TABS Take by mouth    clindamycin (CLEOCIN) 300 MG capsule Take by mouth    ezetimibe (ZETIA) 10 mg tablet Take 1 tablet by mouth daily    FREESTYLE LITE test strip USE TO TEST BLOOD SUGARS FOUR TIMES A DAY    furosemide (LASIX) 20 mg tablet Take 1 tablet by mouth daily    glipiZIDE (GLUCOTROL XL) 2 5 mg 24 hr tablet Take 1 tablet by mouth    insulin lispro (HUMALOG) 100 units/mL injection Inject under the skin    Insulin Syringe-Needle U-100 (BD INSULIN SYRINGE ULTRAFINE) 31G X 15/64" 0 3 ML MISC by Does not apply route    Lancets (FREESTYLE) lancets by Does not apply route 2 (two) times a day    levothyroxine 75 mcg tablet Take 1 tablet by mouth daily    LINZESS 145 MCG CAPS TAKE 1 CAPSULE IN THE MORNING AT LEAST 30 MINUTES BEFORE BREAKFAST DAILY    losartan (COZAAR) 50 mg tablet Take 1 tablet by mouth daily    Magnesium Oxide 400 MG CAPS Take by mouth    meloxicam (MOBIC) 15 mg tablet Take 1 tablet by mouth daily as needed    metFORMIN (GLUCOPHAGE-XR) 500 mg 24 hr tablet TAKE 1 TABLET IN THE MORNING AND 2 TABLETS IN THE EVENING    Omega-3 Fatty Acids (FISH OIL) 1200 MG CAPS Take by mouth    pantoprazole (PROTONIX) 40 mg tablet Take 1 tablet by mouth daily    potassium chloride (MICRO-K) 10 MEQ CR capsule Take 1 capsule by mouth daily    rosuvastatin (CRESTOR) 20 MG tablet Take 1 tablet by mouth daily    [DISCONTINUED] clotrimazole-betamethasone (LOTRISONE) 1-0 05 % cream Apply topically    [DISCONTINUED] metFORMIN (GLUCOPHAGE-XR) 500 mg 24 hr tablet TAKE 1 TABLET IN THE MORNING AND 2 TABLETS IN THE EVENING     No current facility-administered medications on file prior to visit  She is allergic to amoxicillin-pot clavulanate; meperidine; darvon [propoxyphene]; erythromycin; methocarbamol; morphine; nabumetone; penicillins; tetracyclines & related; and sulfa antibiotics  OB History      Para Term  AB Living    3 3 3     3    SAB TAB Ectopic Multiple Live Births            3          Review of Systems   Constitutional: Negative for chills, fatigue, fever and unexpected weight change  Respiratory: Negative for shortness of breath  Gastrointestinal: Negative for anal bleeding, blood in stool, constipation and diarrhea  Genitourinary: Negative for difficulty urinating, dysuria and hematuria  Objective:      /80 (BP Location: Right arm, Patient Position: Sitting)   Ht 5' 3" (1 6 m)   Wt 85 7 kg (189 lb)   Breastfeeding? No   BMI 33 48 kg/m²          Physical Exam   Constitutional: She appears well-developed and well-nourished  She is cooperative  No distress  Abdominal: Soft  Hernia confirmed negative in the right inguinal area and confirmed negative in the left inguinal area  Genitourinary: There is labial fusion  There is no rash, tenderness, lesion or injury on the right labia  There is no rash, tenderness, lesion or injury on the left labia  Uterus is not deviated, not enlarged, not fixed and not tender  Cervix exhibits no motion tenderness, no discharge and no friability  Right adnexum displays no mass, no tenderness and no fullness  Left adnexum displays no mass, no tenderness and no fullness  No erythema, tenderness or bleeding in the vagina  No foreign body in the vagina  No signs of injury around the vagina  No vaginal discharge found  Genitourinary Comments: Less lichenification noted today, small amounts of fusion upper labia, seems improved  Lymphadenopathy:        Right: No inguinal adenopathy present  Left: No inguinal adenopathy present  Skin: She is not diaphoretic

## 2018-03-27 NOTE — TELEPHONE ENCOUNTER
PT NEEDS NEW 90 DAY RX'S FOR    MOBIC 15MG DAILY AM  - 20 DAYS LEFT  FUROSEMIDE 20MG DAILY - 20 DAYS LEFT  KLOR CON ER 10 MEQ - 90 DAYS LEFT  PANTOPRAZOLE 40MG - COMMPLETELY OUT OF MED  LEVOTHYROXINE 75 MEQ HA - 20 DAYS LEFT  LOSARTAN 50MG HS - 20 DAYS LEFT  GLIPIZIDE ER 2 5MG - 90 DAYS LEFT  25 INSULIN SYRINGES    PLEASE SEND TO [facility]    PUT PAPER DETAILING MEDICATIONS IN DR BUTT BIN

## 2018-03-28 PROCEDURE — 4010F ACE/ARB THERAPY RXD/TAKEN: CPT | Performed by: INTERNAL MEDICINE

## 2018-03-28 RX ORDER — POTASSIUM CHLORIDE 750 MG/1
10 CAPSULE, EXTENDED RELEASE ORAL DAILY
Qty: 90 CAPSULE | Refills: 3 | Status: SHIPPED | OUTPATIENT
Start: 2018-03-28 | End: 2019-02-28 | Stop reason: SDUPTHER

## 2018-03-28 RX ORDER — FUROSEMIDE 20 MG/1
20 TABLET ORAL DAILY
Qty: 90 TABLET | Refills: 3 | Status: SHIPPED | OUTPATIENT
Start: 2018-03-28 | End: 2019-02-28 | Stop reason: SDUPTHER

## 2018-03-28 RX ORDER — MELOXICAM 15 MG/1
15 TABLET ORAL DAILY PRN
Qty: 90 TABLET | Refills: 3 | Status: SHIPPED | OUTPATIENT
Start: 2018-03-28 | End: 2020-03-18 | Stop reason: ALTCHOICE

## 2018-03-28 RX ORDER — LOSARTAN POTASSIUM 50 MG/1
50 TABLET ORAL DAILY
Qty: 90 TABLET | Refills: 3 | Status: SHIPPED | OUTPATIENT
Start: 2018-03-28 | End: 2019-01-16 | Stop reason: SDUPTHER

## 2018-03-28 RX ORDER — GLIPIZIDE 2.5 MG/1
2.5 TABLET, EXTENDED RELEASE ORAL DAILY
Qty: 90 TABLET | Refills: 3 | Status: SHIPPED | OUTPATIENT
Start: 2018-03-28 | End: 2019-02-28 | Stop reason: SDUPTHER

## 2018-03-28 RX ORDER — LEVOTHYROXINE SODIUM 0.07 MG/1
75 TABLET ORAL DAILY
Qty: 90 TABLET | Refills: 3 | Status: SHIPPED | OUTPATIENT
Start: 2018-03-28 | End: 2019-02-28 | Stop reason: SDUPTHER

## 2018-03-28 RX ORDER — PANTOPRAZOLE SODIUM 40 MG/1
40 TABLET, DELAYED RELEASE ORAL DAILY
Qty: 90 TABLET | Refills: 3 | Status: SHIPPED | OUTPATIENT
Start: 2018-03-28 | End: 2019-02-28 | Stop reason: SDUPTHER

## 2018-04-12 ENCOUNTER — TELEPHONE (OUTPATIENT)
Dept: INTERNAL MEDICINE CLINIC | Facility: CLINIC | Age: 68
End: 2018-04-12

## 2018-04-12 ENCOUNTER — OFFICE VISIT (OUTPATIENT)
Dept: INTERNAL MEDICINE CLINIC | Facility: CLINIC | Age: 68
End: 2018-04-12
Payer: COMMERCIAL

## 2018-04-12 VITALS
DIASTOLIC BLOOD PRESSURE: 80 MMHG | TEMPERATURE: 98.1 F | OXYGEN SATURATION: 97 % | SYSTOLIC BLOOD PRESSURE: 146 MMHG | HEART RATE: 84 BPM | HEIGHT: 63 IN

## 2018-04-12 DIAGNOSIS — T78.40XA ALLERGIC REACTION, INITIAL ENCOUNTER: Primary | ICD-10-CM

## 2018-04-12 DIAGNOSIS — E66.09 OBESITY DUE TO EXCESS CALORIES, UNSPECIFIED CLASSIFICATION, UNSPECIFIED WHETHER SERIOUS COMORBIDITY PRESENT: ICD-10-CM

## 2018-04-12 PROCEDURE — 1101F PT FALLS ASSESS-DOCD LE1/YR: CPT | Performed by: INTERNAL MEDICINE

## 2018-04-12 PROCEDURE — 99213 OFFICE O/P EST LOW 20 MIN: CPT | Performed by: INTERNAL MEDICINE

## 2018-04-12 RX ORDER — METFORMIN HYDROCHLORIDE 500 MG/1
500 TABLET, EXTENDED RELEASE ORAL
COMMUNITY
End: 2019-06-25 | Stop reason: SDUPTHER

## 2018-04-12 RX ORDER — PREDNISONE 10 MG/1
TABLET ORAL
Qty: 40 TABLET | Refills: 0 | Status: SHIPPED | OUTPATIENT
Start: 2018-04-12 | End: 2018-04-25 | Stop reason: ALTCHOICE

## 2018-04-12 NOTE — TELEPHONE ENCOUNTER
Pharmacy is calling for a script for patient Katya Buchanan  Patient is having an allergic reaction and is waiting for script

## 2018-04-12 NOTE — TELEPHONE ENCOUNTER
Was seen in office today for allergic rx to something and prescribed prednisone but pharm never received, I called in to pharm

## 2018-04-12 NOTE — PROGRESS NOTES
Assessment/Plan:  Her exam demonstrates has scattered rash  She has erythema on the top of her scalp  The posterior pharynx demonstrates no respiratory embarrassment  Lungs are completely clear blood pressure stable  No signs of angioedema  She has tolerated prednisone in the past but has a problem with her right Achilles tendon  No real choice at this point except to give a short course of prednisone  Will take 40 mg a day decreasing every 48 hours by 10 mg     I have advised her to notify her foot and ankle surgeon of the use of prednisone to clear it with him  I also advised her to see an allergist to determine whether she is allergic to shrimp or whether it is 1 of her other medicines  If she does not clear she will let me know and I will see her back here as previously planned    No problem-specific Assessment & Plan notes found for this encounter  Diagnoses and all orders for this visit:    Allergic reaction, initial encounter  -     predniSONE 10 mg tablet; Take 4 tablets daily x2 days, then take 3 tablets daily x2 days, then take 2 tablets daily x2 days, then take 1 tablet daily x2 days then discontinue medicine    Obesity due to excess calories, unspecified classification, unspecified whether serious comorbidity present  -     Ambulatory referral to Weight Management; Future    Other orders  -     metFORMIN (GLUCOPHAGE-XR) 500 mg 24 hr tablet; Take 500 mg by mouth 3 (three) times a day with meals          Subjective:  Rash     Patient ID: Den Swann is a 69 y o  female  HPI this is a patient with multiple medical problems including diabetes  She developed a rash and pruritus in the last 48 hours  2 days ago she was eating shrimp  She had it on 2 successive days  That night she developed acute pruritic rash  It is body  There is some urticaria  She has never had a reaction to shrimp before but she eats a lot of it  She is on multiple medicines      She has not had any respiratory embarrassment  She is not wheezing  The following portions of the patient's history were reviewed and updated as appropriate: allergies, current medications, past family history, past medical history, past social history, past surgical history and problem list     Review of Systems   Constitutional: Negative for activity change, chills, diaphoresis and fever  HENT: Positive for facial swelling  Negative for hearing loss, postnasal drip and sinus pressure  She has had some facial swelling  Eyes: Positive for itching and visual disturbance  She has had a little bit of swelling about her eyes   Respiratory: Negative for cough, shortness of breath and wheezing  Cardiovascular: Negative  Skin: Positive for rash           Objective:     Physical Exam

## 2018-04-12 NOTE — PATIENT INSTRUCTIONS
Please clear the use of prednisone with your foot and ankle surgeon  Do not he tripped again  Suggest seeing allergist to determine cause of allergic reaction      See me back here as previously planned

## 2018-04-25 ENCOUNTER — OFFICE VISIT (OUTPATIENT)
Dept: CARDIOLOGY CLINIC | Facility: CLINIC | Age: 68
End: 2018-04-25
Payer: COMMERCIAL

## 2018-04-25 VITALS
HEIGHT: 63 IN | WEIGHT: 209.4 LBS | OXYGEN SATURATION: 97 % | DIASTOLIC BLOOD PRESSURE: 78 MMHG | BODY MASS INDEX: 37.1 KG/M2 | SYSTOLIC BLOOD PRESSURE: 134 MMHG | HEART RATE: 78 BPM

## 2018-04-25 DIAGNOSIS — E78.5 DYSLIPIDEMIA, GOAL LDL BELOW 70: ICD-10-CM

## 2018-04-25 DIAGNOSIS — I10 BENIGN ESSENTIAL HYPERTENSION: Primary | ICD-10-CM

## 2018-04-25 DIAGNOSIS — E66.09 OBESITY DUE TO EXCESS CALORIES, UNSPECIFIED CLASSIFICATION, UNSPECIFIED WHETHER SERIOUS COMORBIDITY PRESENT: ICD-10-CM

## 2018-04-25 PROCEDURE — 99214 OFFICE O/P EST MOD 30 MIN: CPT | Performed by: INTERNAL MEDICINE

## 2018-04-25 NOTE — PROGRESS NOTES
[facility] [address]  [facility] [address]  Cardiology Follow Up    Den Shree  1950  [id number]      1  Benign essential hypertension     2  Dyslipidemia, goal LDL below 70     3  Obesity due to excess calories, unspecified classification, unspecified whether serious comorbidity present         Chief Complaint   Patient presents with    Follow-up     had testing       Interval History:  Follow-up visit  Patient denies any chest pain  Patient does have some shortness of breath with exertion which is stable  Patient had an echocardiogram as well as nuclear stress test   No new complaints otherwise  She states that she has been compliant with all her present medications        Patient Active Problem List   Diagnosis    Actinic keratosis    Seborrheic keratosis    Screening for skin condition    Diabetes mellitus type 2, controlled, without complications ([address] )    Dyslipidemia, goal LDL below 79    Dysmetabolic syndrome X    Benign essential hypertension    Obesity    Lichen sclerosus     Past Medical History:   Diagnosis Date    Abnormal mammogram     Abnormal weight gain     Achilles tendinitis     Angina pectoris (HCC)     Colon, diverticulosis     Disc degeneration, lumbar     Early satiety     Enthesopathy     hip region    Esophagitis, reflux     Hypercholesterolemia     Irritable bowel syndrome     Left heart failure (HCC)     Rosacea     Sacroiliitis (HCC)      Social History     Social History    Marital status:      Spouse name: N/A    Number of children: N/A    Years of education: N/A     Occupational History    nurse      full-time     Social History Main Topics    Smoking status: Never Smoker    Smokeless tobacco: Never Used    Alcohol use Yes      Comment: (history) occasional    Drug use: No    Sexual activity: Not Currently     Other Topics Concern    Not on file     Social History Narrative    Active advance directive    DME- freestyle lite blood glucose meter device kit QID          Family History   Problem Relation Age of Onset    Arthritis Mother     Heart disease Mother      cardiac disorder    Diabetes Mother     Hiatal hernia Mother     Hypertension Mother     Irritable bowel syndrome Mother     Breast cancer Mother     Uterine cancer Mother     Osteoporosis Mother     Thyroid disease Mother     Other Mother      gastric reflux    Heart disease Father      cardiac disorder    Hiatal hernia Father     Ulcers Father     Other Father      gastric reflux    Hiatal hernia Sister     Thyroid disease Sister     Other Sister      gastric reflux    No Known Problems Brother     Brain cancer Maternal Grandmother     Breast cancer Maternal Grandmother     No Known Problems Maternal Grandfather     No Known Problems Paternal Grandmother     No Known Problems Paternal Grandfather     Hiatal hernia Child     Other Child      gastric reflux    Irritable bowel syndrome Daughter     Breast cancer Maternal Aunt     Uterine cancer Maternal Aunt      Past Surgical History:   Procedure Laterality Date     SECTION      FOOT SURGERY Right     REPLACEMENT TOTAL KNEE Right     SHOULDER ARTHROPLASTY      SINUS SURGERY      TONSILLECTOMY      TUBAL LIGATION         Current Outpatient Prescriptions:     albuterol (VENTOLIN HFA) 90 mcg/act inhaler, Inhale 2 puffs 4 (four) times a day as needed, Disp: , Rfl:     aspirin (ASPIR-81) 81 mg EC tablet, Take by mouth, Disp: , Rfl:     B Complex Vitamins (B COMPLEX 1 PO), Take by mouth, Disp: , Rfl:     budesonide-formoterol (SYMBICORT) 160-4 5 mcg/act inhaler, Inhale Twice daily, Disp: , Rfl:     Calcium Carbonate-Vitamin D (CALCIUM 600+D) 600-200 MG-UNIT TABS, Take by mouth, Disp: , Rfl:     ezetimibe (ZETIA) 10 mg tablet, Take 1 tablet by mouth daily, Disp: , Rfl:     FREESTYLE LITE test strip, USE TO TEST BLOOD SUGARS FOUR TIMES A DAY, Disp: 400 each, Rfl: 2    furosemide (LASIX) 20 mg tablet, Take 1 tablet (20 mg total) by mouth daily, Disp: 90 tablet, Rfl: 3    glipiZIDE (GLUCOTROL XL) 2 5 mg 24 hr tablet, Take 1 tablet (2 5 mg total) by mouth daily, Disp: 90 tablet, Rfl: 3    insulin lispro (HUMALOG) 100 units/mL injection, Inject under the skin, Disp: , Rfl:     Insulin Syringe-Needle U-100 (BD INSULIN SYRINGE ULTRAFINE) 31G X 15/64" 0 3 ML MISC, by Does not apply route daily Use as directed , Disp: 25 each, Rfl: 3    Lancets (FREESTYLE) lancets, by Does not apply route 2 (two) times a day, Disp: , Rfl:     levothyroxine 75 mcg tablet, Take 1 tablet (75 mcg total) by mouth daily, Disp: 90 tablet, Rfl: 3    LINZESS 145 MCG CAPS, TAKE 1 CAPSULE IN THE MORNING AT LEAST 30 MINUTES BEFORE BREAKFAST DAILY, Disp: 90 capsule, Rfl: 3    losartan (COZAAR) 50 mg tablet, Take 1 tablet (50 mg total) by mouth daily, Disp: 90 tablet, Rfl: 3    Magnesium Oxide 400 MG CAPS, Take by mouth, Disp: , Rfl:     meloxicam (MOBIC) 15 mg tablet, Take 1 tablet (15 mg total) by mouth daily as needed (P r n  pain), Disp: 90 tablet, Rfl: 3    metFORMIN (GLUCOPHAGE-XR) 500 mg 24 hr tablet, Take 500 mg by mouth 3 (three) times a day with meals, Disp: , Rfl:     Omega-3 Fatty Acids (FISH OIL) 1200 MG CAPS, Take by mouth, Disp: , Rfl:     pantoprazole (PROTONIX) 40 mg tablet, Take 1 tablet (40 mg total) by mouth daily, Disp: 90 tablet, Rfl: 3    potassium chloride (MICRO-K) 10 MEQ CR capsule, Take 1 capsule (10 mEq total) by mouth daily, Disp: 90 capsule, Rfl: 3    rosuvastatin (CRESTOR) 20 MG tablet, Take 1 tablet by mouth daily, Disp: , Rfl:     clindamycin (CLEOCIN) 300 MG capsule, Take by mouth, Disp: , Rfl:     clotrimazole-betamethasone (LOTRISONE) 1-0 05 % cream, Apply topically daily at bedtime as needed (for itching), Disp: 45 g, Rfl: 1  Allergies   Allergen Reactions    Amoxicillin-Pot Clavulanate Anaphylaxis, Hives, Itching and Facial Swelling    Meperidine Anaphylaxis    Darvon [Propoxyphene]     Erythromycin Facial Swelling, Hives, Itching, Nasal Congestion, Swelling, Throat Swelling and Vomiting    Methocarbamol     Morphine Hives, Itching, Other (See Comments), Swelling and Syncope    Nabumetone Hives, Itching and Swelling    Penicillins     Tetracyclines & Related     Sulfa Antibiotics Hives, Itching, Rash and Swelling       Labs:  Hospital Outpatient Visit on 03/12/2018   Component Date Value    Protocol Name 03/12/2018 LEXISCAN-SIT     Time In Exercise Phase 03/12/2018 00:03:00     MAX  SYSTOLIC BP 46/10/8146 540     Max Diastolic Bp 12/18/9643 70     Max Heart Rate 03/12/2018 97     Max Predicted Heart Rate 03/12/2018 151     Reason for Termination 03/12/2018 Protocol Complete     Test Indication 03/12/2018 DYSPNEA, SOB     Target Hr Formular 03/12/2018 (220 - Age)*85%     Arrhy During Ex 03/12/2018 none     Chest Pain Statement 03/12/2018 none      Imaging: No results found  Review of Systems:  Review of Systems   REVIEW OF SYSTEMS:  Constitutional:  Denies fever or chills   Eyes:  Denies change in visual acuity   HENT:  Denies nasal congestion or sore throat   Respiratory:  Denies cough or shortness of breath   Cardiovascular:  Denies chest pain or edema   GI:  Denies abdominal pain, nausea, vomiting, bloody stools or diarrhea   :  Denies dysuria, frequency, difficulty in micturition and nocturia  Musculoskeletal:  Denies back pain or joint pain   Neurologic:  Denies headache, focal weakness or sensory changes   Endocrine:  Denies polyuria or polydipsia   Lymphatic:  Denies swollen glands   Psychiatric:  Denies depression or anxiety   Physical Exam:    /78   Pulse 78   Ht 5' 3" (1 6 m)   Wt 95 kg (209 lb 6 4 oz)   SpO2 97%   BMI 37 09 kg/m²     Physical Exam   PHYSICAL EXAM:  General:  Patient is not in acute distress   Head: Normocephalic, Atraumatic    HEENT:  Both pupils normal-size atraumatic, normocephalic, nonicteric  Neck:  JVP not raised  Trachea central  No carotid bruit  Respiratory:  normal breath sounds no crackles  no rhonchi  Cardiovascular:  Regular rate and rhythm no S3 no murmurs  GI:  Abdomen soft nontender  No organomegaly  Lymphatic:  No cervical or inguinal lymphadenopathy  Neurologic:  Patient is awake alert, oriented   Grossly nonfocal    Discussion/Summary:  Patient with multiple medical problems who seems to be doing reasonably well from cardiac standpoint  Previous studies reviewed with patient  Medications reviewed and possible side effects discussed  concepts of cardiovascular disease , signs and symptoms of heart disease  Dietary and risk factor modification reinforced  All questions answered  Safety measures reviewed  Patient advised to report any problems prompting medical attention  Echocardiogram showed normal ejection fraction and no significant valvular abnormalities  Pharmacological nuclear stress test showed no evidence of ischemia ejection fraction was normal   Sensitivity and specificity of stress test discussed with patient  Symptoms to watch out from cardiac standpoint which would indicate the need for further cardiac evaluation also discussed  Follow-up in 6 months  Follow-up with Dr Rosco Severs primary care physician

## 2018-05-08 ENCOUNTER — EVALUATION (OUTPATIENT)
Dept: PHYSICAL THERAPY | Facility: CLINIC | Age: 68
End: 2018-05-08
Payer: COMMERCIAL

## 2018-05-08 DIAGNOSIS — G89.29 CHRONIC PAIN OF RIGHT ANKLE: Primary | ICD-10-CM

## 2018-05-08 DIAGNOSIS — M25.571 CHRONIC PAIN OF RIGHT ANKLE: Primary | ICD-10-CM

## 2018-05-08 PROCEDURE — 97162 PT EVAL MOD COMPLEX 30 MIN: CPT | Performed by: PHYSICAL THERAPIST

## 2018-05-08 PROCEDURE — G8979 MOBILITY GOAL STATUS: HCPCS | Performed by: PHYSICAL THERAPIST

## 2018-05-08 PROCEDURE — 97140 MANUAL THERAPY 1/> REGIONS: CPT | Performed by: PHYSICAL THERAPIST

## 2018-05-08 PROCEDURE — G8978 MOBILITY CURRENT STATUS: HCPCS | Performed by: PHYSICAL THERAPIST

## 2018-05-08 PROCEDURE — 97110 THERAPEUTIC EXERCISES: CPT | Performed by: PHYSICAL THERAPIST

## 2018-05-08 NOTE — PROGRESS NOTES
PT Evaluation     Today's date: 2018  Patient name: Aguila Henry  : 1950  MRN: [id number]  Referring provider: Himanshu Kumar DPM  Dx:   Encounter Diagnosis     ICD-10-CM    1  Chronic pain of right ankle M25 571     G89 29                   Assessment  Impairments: abnormal or restricted ROM, activity intolerance, impaired physical strength, lacks appropriate home exercise program, pain with function and weight-bearing intolerance    Assessment details: Patient is a 69year old female presenting to physical therapy with right posterior, medial, and lateral ankle pain  Patient has contributing factors of decreased ankle mobility, strength, and decreased right sural nerve mobility  These deficits are limiting this patient's ability to ambulate, stand and manage stairs  Patient will benefit from physical therapy in order to address the deficits contributing to functional limitations and facilitate return to prior level of functioning  Patient was provided a home exercise program and demonstrated an understanding of exercises  Patient was advised to stop performing home exercise program if symptoms increase or new complaints developed  Verbal understanding demonstrated regarding home exercise program instructions  Patient was educated on and agreeable to plan of care       Understanding of Dx/Px/POC: good   Prognosis: fair    Goals  Short term goals:  1) Patient will demonstrate improved right ankle ROM by 5-10 degrees  in 4 weeks  2) Patient will demonstrate increased right ankle strength by 1/2 grade in 4 weeks  3) Patient will demonstrate negative sural nerve bias SLR in 4 weeks  Long term goals:  1) Patient will demonstrate ability to walk > 1 miles with right ankle pain <3/10 in 6 weeks  2) Patient will demonstrate ability to stand > 30 minutes with right ankle pain <3/10 in 6 weeks  3) Patient will demonstrate ability to manage stairs with step over step gait pattern in 6 weeks  4) Patient will demonstrate independence with HEP in 6 weeks           Plan  Patient would benefit from: skilled PT  Referral necessary: No  Planned modality interventions: cryotherapy and thermotherapy: hydrocollator packs  Planned therapy interventions: manual therapy, massage, Harmon taping, joint mobilization, neuromuscular re-education, patient education, postural training, stretching, strengthening, therapeutic activities, therapeutic training, therapeutic exercise, home exercise program, graded exercise, functional ROM exercises, flexibility, balance, gait training, balance/weight bearing training, ADL retraining, ADL training, activity modification and body mechanics training  Frequency: 2x week  Duration in weeks: 6        Subjective Evaluation    History of Present Illness  Mechanism of injury: Patient reports that her ankle started hurting in October of 2017  Patient reports that the pain came on following a bout of using in elliptical  Patient reports that she had been doing the elliptical for about a year  She states that there was no increase in mileage  Patient report that her pain has been staying about the same  Patient reports that she had an MRI that revealed tendinitis of achilles, and tendinosis of flexor hallucis longus, and posterior tibialis  Patient reports that she was put in a cam boot in the beginning of April 2018  Patient states that she was put in a boot for 4 week  She states there has been no change in her pain  Patient reports that that she injured the foot was injured approxiamtely 3 years ago while walking to pool barefoot  She states that she did not have a traumatic injury and fetl a pop  She states that MRI revealed a posterior tibial tendon tear  Patient reports that she underwent surgery to repair this on 5/20/2016 along with an achilles tendon lengthening procedure   Patient reports that she would like to she would like to be able to ambulate 1-2 miles, get back to exercising at the gym    Pain  No pain reported  Current pain ratin  At best pain ratin  At worst pain rating: 10  Location: Right foot pain (worse in heel) - constant, varying, deep " knife-like"    Social Support  Steps to enter house: yes (5 steps bilateral hand rails)  Lives in: multiple-level home  Lives with: alone    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)  Exercise history: Patient states that she has a house keeper that comes in once a week  Patient also has yard keeper  Patient reports that she likes going to the gym   Has not been going to the gym since 2017          Objective     Static Posture     Comments  Pes planus bilaterally, calcaneal eversion > on right     Active Range of Motion   Left Ankle/Foot   Dorsiflexion (ke): 1 degrees   Plantar flexion: 56 degrees   Inversion: 45 degrees   Eversion: 25 degrees     Right Ankle/Foot   Dorsiflexion (ke): -2 degrees   Plantar flexion: 50 degrees   Inversion: 12 degrees   Eversion: 2 degrees     Strength/Myotome Testing     Left Ankle/Foot   Dorsiflexion: 5  Plantar flexion: 5  Inversion: 5  Eversion: 5    Right Ankle/Foot   Dorsiflexion: 5  Plantar flexion: 5  Inversion: 4+  Eversion: 4+    Tests     Additional Tests Details  Right neural SLR (-)  Right peroneal bias SLR (-)  Right sural bial SLR (+)      Flowsheet Rows      Most Recent Value   PT/OT G-Codes   Current Score  36   Projected Score  50   FOTO information reviewed  Yes   Assessment Type  Evaluation   G code set  Mobility: Walking & Moving Around   Mobility: Walking and Moving Around Current Status ()  CL   Mobility: Walking and Moving Around Goal Status ()  CK          Precautions: HTN, DM, Right Tibialis Posterior Tendon repair, Right TKA, Left TSA, Asthma    Daily Treatment Diary     Manual              Right sural nerve glides TK                                                                    Exercise Diary              Seated sural nerve glides 15x            Inversion/Eversion 2 x 10            Supine calf stretch with strap 5 x 20 sec                                                                                                                                                                                                                                              Modalities

## 2018-05-15 ENCOUNTER — OFFICE VISIT (OUTPATIENT)
Dept: PHYSICAL THERAPY | Facility: CLINIC | Age: 68
End: 2018-05-15
Payer: COMMERCIAL

## 2018-05-15 DIAGNOSIS — G89.29 CHRONIC PAIN OF RIGHT ANKLE: Primary | ICD-10-CM

## 2018-05-15 DIAGNOSIS — M25.571 CHRONIC PAIN OF RIGHT ANKLE: Primary | ICD-10-CM

## 2018-05-15 PROCEDURE — 97110 THERAPEUTIC EXERCISES: CPT

## 2018-05-15 PROCEDURE — 97140 MANUAL THERAPY 1/> REGIONS: CPT

## 2018-05-15 PROCEDURE — 97112 NEUROMUSCULAR REEDUCATION: CPT

## 2018-05-15 NOTE — PROGRESS NOTES
Daily Note     Today's date: 5/15/2018  Patient name: Denise Valenzuela  : 1950  MRN: [id number]  Referring provider: Sky Arthur DPM  Dx:   Encounter Diagnosis     ICD-10-CM    1  Chronic pain of right ankle M25 571     G89 29                   Subjective: Pt reports having 3/10 pain in her right ankle  Notes that she is compliant with her HEP  Objective: See treatment diary below  Precautions: HTN, DM, Right Tibialis Posterior Tendon repair, Right TKA, Left TSA, Asthma    Daily Treatment Diary     Manual  5/8 5/15           Right sural nerve glides TK MM                                                                   Exercise Diary  5/8 5/15           Seated sural nerve glides 15x 3x10           Inversion/Eversion 2 x 10 3x10           Supine calf stretch with strap 5 x 20 sec  5x20"           HR/TR  25x           BAPS A/P M/L  20x ea seated           SLS  20"x4           Heel & toe walking  2 laps           ABC's  1x                                                                                                                                                                           Modalities                                                             Assessment: Tolerated treatment fair with additions of new TE  Pt did have increased pain in heel with heel walking  Pt is having tightness in her achilles but noted an increase in motion with BAPS board  Patient demonstrated fatigue post treatment, exhibited good technique with therapeutic exercises and would benefit from continued PT      Plan: Continue per plan of care

## 2018-05-17 ENCOUNTER — OFFICE VISIT (OUTPATIENT)
Dept: PHYSICAL THERAPY | Facility: CLINIC | Age: 68
End: 2018-05-17
Payer: COMMERCIAL

## 2018-05-17 DIAGNOSIS — G89.29 CHRONIC PAIN OF RIGHT ANKLE: Primary | ICD-10-CM

## 2018-05-17 DIAGNOSIS — M25.571 CHRONIC PAIN OF RIGHT ANKLE: Primary | ICD-10-CM

## 2018-05-17 PROCEDURE — 97140 MANUAL THERAPY 1/> REGIONS: CPT

## 2018-05-17 PROCEDURE — 97010 HOT OR COLD PACKS THERAPY: CPT

## 2018-05-17 PROCEDURE — 97110 THERAPEUTIC EXERCISES: CPT

## 2018-05-17 NOTE — PROGRESS NOTES
Daily Note     Today's date: 2018  Patient name: Warden Lewis  : 1950  MRN: [id number]  Referring provider: Janell Muir DPM  Dx:   Encounter Diagnosis     ICD-10-CM    1  Chronic pain of right ankle M25 571     G89 29                   Subjective: Patient repots heel pain and R ankle pain upon arrival        Objective: See treatment diary below  Precautions: HTN, DM, Right Tibialis Posterior Tendon repair, Right TKA, Left TSA, Asthma    Daily Treatment Diary     Manual  5/8 5/15 5/17          Right sural nerve glides TK MM JK          Ankle PROM                                                        Exercise Diary  5/8 5/15 5/17          Seated sural nerve glides 15x 3x10 2x10          Inversion/Eversion 2 x 10 3x10 3x10          Supine calf stretch with strap 5 x 20 sec  5x20" :30 x5          HR/TR  25x Heel raises only          BAPS A/P M/L  20x ea seated 20x seated          SLS  20"x4 20" x 4          Heel & toe walking  2 laps NP          ABC's  1x 2x          T-band ankle    RTB 2x10          Bike ROM    5 min  Modalities              Cp   10 min                                            Assessment: Patient demonstrates fair tolerance to TE, verbal cuing utilized for the facilitation of proper form and dosage  Held toe raises and heel walking today secondary to heel discomfort  Decreased proprioception noted on R ankle during balance activities  Reminded patient to continue with home exercise program and demonstrates verbal  understanding of prescribed exercises  Patient advised to stop performing home exercise program if symptoms increase or new complaints developed  Monitor Nv  Patient would benefit from continued therapy to improve ankle pain  Plan: Continue per plan of care

## 2018-05-22 ENCOUNTER — OFFICE VISIT (OUTPATIENT)
Dept: PHYSICAL THERAPY | Facility: CLINIC | Age: 68
End: 2018-05-22
Payer: COMMERCIAL

## 2018-05-22 DIAGNOSIS — M25.571 CHRONIC PAIN OF RIGHT ANKLE: Primary | ICD-10-CM

## 2018-05-22 DIAGNOSIS — G89.29 CHRONIC PAIN OF RIGHT ANKLE: Primary | ICD-10-CM

## 2018-05-22 PROCEDURE — 97112 NEUROMUSCULAR REEDUCATION: CPT

## 2018-05-22 PROCEDURE — 97140 MANUAL THERAPY 1/> REGIONS: CPT

## 2018-05-22 NOTE — PROGRESS NOTES
Daily Note     Today's date: 2018  Patient name: Chata Fontenot  : 1950  MRN: [id number]  Referring provider: Arnie Monae DPM  Dx:   Encounter Diagnosis     ICD-10-CM    1  Chronic pain of right ankle M25 571     G89 29                   Subjective: Patient repots decreased heel pain and occasional ankle pain  Objective: See treatment diary below  Precautions: HTN, DM, Right Tibialis Posterior Tendon repair, Right TKA, Left TSA, Asthma    Daily Treatment Diary     Manual  5/8 5/15 5/17 5/22         Right sural nerve glides TK MM JK JK         Ankle PROM    JK                                                    Exercise Diary  5/8 5/15 5/17 5/22         Seated sural nerve glides 15x 3x10 2x10 2x10         Inversion/Eversion 2 x 10 3x10 3x10 3x10         Supine calf stretch with strap 5 x 20 sec  5x20" :30 x5 :30x5         HR/TR  25x Heel raises only Heel raises only 25x         BAPS A/P M/L  20x ea seated 20x seated 20x seated         SLS  20"x4 20" x 4 20" x 2         Heel & toe walking  2 laps NP NP         ABC's  1x 2x 2x         T-band ankle    RTB 2x10 RTB 2x10         Bike ROM    5 min  Standing Rockerboard    Ap/m/l  2x10         Step ups    2x10                                                                                                                     Modalities             Cp   10 min 10                                           Assessment: Patient repots decreased discomfort following manual interventions  Patient repots she feels like she gained ROM in the ankle since last session  Decreased ROM with IV noted during PROM  Decreased proprioception noted on R ankle during balance activities  Patient tolerated TE well, no complaints of increased sx t/o  Patient offers no complaints post session  Patient would benefit from continued therapy to improve ankle pain  Plan: Continue per plan of care

## 2018-05-24 ENCOUNTER — OFFICE VISIT (OUTPATIENT)
Dept: PHYSICAL THERAPY | Facility: CLINIC | Age: 68
End: 2018-05-24
Payer: COMMERCIAL

## 2018-05-24 DIAGNOSIS — M25.571 CHRONIC PAIN OF RIGHT ANKLE: Primary | ICD-10-CM

## 2018-05-24 DIAGNOSIS — G89.29 CHRONIC PAIN OF RIGHT ANKLE: Primary | ICD-10-CM

## 2018-05-24 PROCEDURE — 97112 NEUROMUSCULAR REEDUCATION: CPT

## 2018-05-24 PROCEDURE — 97140 MANUAL THERAPY 1/> REGIONS: CPT

## 2018-05-24 NOTE — PROGRESS NOTES
Daily Note     Today's date: 2018  Patient name: Catalina Neil  : 1950  MRN: [id number]  Referring provider: Winnie Hendrickson DPM  Dx:   Encounter Diagnosis     ICD-10-CM    1  Chronic pain of right ankle M25 571     G89 29                   Subjective: Patient denies pain, hoever reports soreness in ankle  Objective: See treatment diary below  Precautions: HTN, DM, Right Tibialis Posterior Tendon repair, Right TKA, Left TSA, Asthma    Daily Treatment Diary     Manual  5/8 5/15 5/17 5/22 5/24        Right sural nerve glides TK MM JK JK JK        Ankle PROM    JK JK                                                   Exercise Diary  5/8 5/15 5/17 5/22 5/24        Seated sural nerve glides 15x 3x10 2x10 2x10 2x10        Inversion/Eversion 2 x 10 3x10 3x10 3x10 3 x10        Supine calf stretch with strap 5 x 20 sec  5x20" :30 x5 :30x5 :30 x 5        HR/TR  25x Heel raises only Heel raises only 25x Heel raises only 2x10        BAPS A/P M/L  20x ea seated 20x seated 20x seated 20x seated        SLS  20"x4 20" x 4 20" x 2 20" x 2        Heel & toe walking  2 laps NP NP NP        ABC's  1x 2x 2x 2x        T-band ankle    RTB 2x10 RTB 2x10 GTB 2x10        Bike ROM    5 min  Standing Rockerboard    Ap/m/l  2x10 Ap/ml 2x10        Step ups    2x10 2x10                                                                                                                    Modalities            Cp   10 min 10 Np                                          Assessment: Patient continues to experience decreased discomfort following manual interventions  IV continues to be limited  Decreased ROM with IV noted during PROM  Slight improvements in standing balance noted this session however decreased proprioception demonstrated  Patient tolerated TE well, no complaints of increased sx t/o  Patient offers no complaints post session   Patient would benefit from continued therapy to improve ankle pain        Plan: Continue per plan of care

## 2018-05-28 DIAGNOSIS — E78.2 MIXED HYPERLIPIDEMIA: Primary | ICD-10-CM

## 2018-05-29 ENCOUNTER — OFFICE VISIT (OUTPATIENT)
Dept: PHYSICAL THERAPY | Facility: CLINIC | Age: 68
End: 2018-05-29
Payer: COMMERCIAL

## 2018-05-29 DIAGNOSIS — M25.571 CHRONIC PAIN OF RIGHT ANKLE: Primary | ICD-10-CM

## 2018-05-29 DIAGNOSIS — G89.29 CHRONIC PAIN OF RIGHT ANKLE: Primary | ICD-10-CM

## 2018-05-29 PROCEDURE — 97140 MANUAL THERAPY 1/> REGIONS: CPT

## 2018-05-29 PROCEDURE — 97110 THERAPEUTIC EXERCISES: CPT

## 2018-05-29 RX ORDER — ROSUVASTATIN CALCIUM 20 MG/1
TABLET, COATED ORAL
Qty: 90 TABLET | Refills: 2 | Status: SHIPPED | OUTPATIENT
Start: 2018-05-29 | End: 2019-02-25 | Stop reason: SDUPTHER

## 2018-05-29 NOTE — PROGRESS NOTES
Daily Note     Today's date: 2018  Patient name: Ninoska Britton  : 1950  MRN: [id number]  Referring provider: Jeanette Meléndez DPM  Dx:   Encounter Diagnosis     ICD-10-CM    1  Chronic pain of right ankle M25 571     G89 29        Start Time: 1615  Stop Time: 1703  Total time in clinic (min): 48 minutes    Subjective: Upon presentation patient repots pain in R foot/heel as "3/10"  Patient repots today she had a "shot of pain" into her heel when she was turning to get an ice pack out of the fridge today  Patient reports the pain only lasted about 45 sec  Objective: See treatment diary below  Precautions: HTN, DM, Right Tibialis Posterior Tendon repair, Right TKA, Left TSA, Asthma    Daily Treatment Diary     Manual  5/8 5/15 5/17 5/22 5/24 5/29       Right sural nerve glides TK MM JK JK JK        Ankle PROM    JK JK JK                                                  Exercise Diary  5/8 5/15 5/17 5/22 5/24 5/29       Seated sural nerve glides 15x 3x10 2x10 2x10 2x10 2x10       Inversion/Eversion 2 x 10 3x10 3x10 3x10 3 x10 3x10       Supine calf stretch with strap 5 x 20 sec  5x20" :30 x5 :30x5 :30 x 5 :30 x 5       HR/TR  25x Heel raises only Heel raises only 25x Heel raises only 2x10 Heels raises only 2 x10       BAPS A/P M/L  20x ea seated 20x seated 20x seated 20x seated 20x seated        SLS  20"x4 20" x 4 20" x 2 20" x 2 20" x3       Heel & toe walking  2 laps NP NP NP NP       ABC's  1x 2x 2x 2x 2x       T-band ankle    RTB 2x10 RTB 2x10 GTB 2x10 GTB 2x10       Bike ROM    5 min            Standing Rockerboard    Ap/m/l  2x10 Ap/ml 2x10 Ap/ml 2x10       Step ups    2x10 2x10 2x10       TG lvl 3      2x10       Biodex LOS      lvl 3 2x10                                                                                         Modalities           Cp   10 min 10 Np                                          Assessment: Patient continues to demonstrate tightness into IV during PROM and AROM  Patient experienced decreased pain levels following  Manual interventions  Patient tolerated TE well, no complaints of increased sx t/o  Patient offers no complaints post session  Patient would benefit from continued therapy to improve ankle pain  Plan: Continue per plan of care

## 2018-05-31 ENCOUNTER — OFFICE VISIT (OUTPATIENT)
Dept: PHYSICAL THERAPY | Facility: CLINIC | Age: 68
End: 2018-05-31
Payer: COMMERCIAL

## 2018-05-31 DIAGNOSIS — G89.29 CHRONIC PAIN OF RIGHT ANKLE: Primary | ICD-10-CM

## 2018-05-31 DIAGNOSIS — M25.571 CHRONIC PAIN OF RIGHT ANKLE: Primary | ICD-10-CM

## 2018-05-31 PROCEDURE — G0283 ELEC STIM OTHER THAN WOUND: HCPCS | Performed by: PHYSICAL THERAPIST

## 2018-05-31 PROCEDURE — 97110 THERAPEUTIC EXERCISES: CPT | Performed by: PHYSICAL THERAPIST

## 2018-05-31 PROCEDURE — 97014 ELECTRIC STIMULATION THERAPY: CPT | Performed by: PHYSICAL THERAPIST

## 2018-05-31 PROCEDURE — 97140 MANUAL THERAPY 1/> REGIONS: CPT | Performed by: PHYSICAL THERAPIST

## 2018-05-31 NOTE — PROGRESS NOTES
Daily Note     Today's date: 2018  Patient name: Catalina Neil  : 1950  MRN: [id number]  Referring provider: Winnie Hendrickson DPM  Dx:   Encounter Diagnosis     ICD-10-CM    1  Chronic pain of right ankle M25 571     G89 29                   Subjective: Patient stated moderate swelling and pain prior to treatment of insidious onset  Objective: See treatment diary below  Precautions: HTN, DM, Right Tibialis Posterior Tendon repair, Right TKA, Left TSA, Asthma    Daily Treatment Diary     Manual  5/8 5/15 5/17 5/22 5/24 5/29 5/31      Right sural nerve glides TK MM JK JK JK        Ankle PROM    JK JK JK KK                                                 Exercise Diary  5/8 5/15 5/17 5/22 5/24 5/29 5/31      Seated sural nerve glides 15x 3x10 2x10 2x10 2x10 2x10 2x10      Inversion/Eversion 2 x 10 3x10 3x10 3x10 3 x10 3x10 3x10      Supine calf stretch with strap 5 x 20 sec  5x20" :30 x5 :30x5 :30 x 5 :30 x 5 :30x5      HR/TR  25x Heel raises only Heel raises only 25x Heel raises only 2x10 Heels raises only 2 x10 Heel raises only 2x10      BAPS A/P M/L  20x ea seated 20x seated 20x seated 20x seated 20x seated  20x seated      SLS  20"x4 20" x 4 20" x 2 20" x 2 20" x3 20"x3      Heel & toe walking  2 laps NP NP NP NP NP      ABC's  1x 2x 2x 2x 2x 2x      T-band ankle    RTB 2x10 RTB 2x10 GTB 2x10 GTB 2x10 GTB 2x10      Bike ROM    5 min            Standing Rockerboard    Ap/m/l  2x10 Ap/ml 2x10 Ap/ml 2x10 Ap/ml      Step ups    2x10 2x10 2x10 2x10      TG lvl 3      2x10 2x10      Biodex LOS      lvl 3 2x10 lvl 3 2x10                                                                                        Modalities          Cp   10 min 10 Np  10'      H-wave - low       10' with CP                           Assessment: Patient demonstrated mild pain with dorsiflexion manual PROM; patient stated decreased pain and demonstrated decreased edema in ankle at completion of treatment session  Plan: Continue per plan of care

## 2018-06-05 ENCOUNTER — OFFICE VISIT (OUTPATIENT)
Dept: PHYSICAL THERAPY | Facility: CLINIC | Age: 68
End: 2018-06-05
Payer: COMMERCIAL

## 2018-06-05 DIAGNOSIS — G89.29 CHRONIC PAIN OF RIGHT ANKLE: Primary | ICD-10-CM

## 2018-06-05 DIAGNOSIS — M25.571 CHRONIC PAIN OF RIGHT ANKLE: Primary | ICD-10-CM

## 2018-06-05 PROCEDURE — 97140 MANUAL THERAPY 1/> REGIONS: CPT | Performed by: PHYSICAL THERAPIST

## 2018-06-05 PROCEDURE — 97110 THERAPEUTIC EXERCISES: CPT | Performed by: PHYSICAL THERAPIST

## 2018-06-05 PROCEDURE — 97112 NEUROMUSCULAR REEDUCATION: CPT | Performed by: PHYSICAL THERAPIST

## 2018-06-05 NOTE — PROGRESS NOTES
Daily Note     Today's date: 2018  Patient name: Aguila Henry  : 1950  MRN: [id number]  Referring provider: Himanshu Kumar DPM  Dx:   Encounter Diagnosis     ICD-10-CM    1  Chronic pain of right ankle M25 571     G89 29                   Subjective: Patient reports that overall her right ankle is doing better  She states that she occasionally gets a sharp pain in her right ankle while weight bearing  This has been coming on less often        Objective: See treatment diary below  Precautions: HTN, DM, Right Tibialis Posterior Tendon repair, Right TKA, Left TSA, Asthma     Daily Treatment Diary      Manual  5/8 5/15 5/17 5/22 5/24 5/29 5/31  6       Right sural nerve glides TK MM JK JK JK             Ankle PROM       JK JK JK KK          Right prone quad stretch               TK                                                             Exercise Diary  5/8 5/15 5/17 5/22 5/24 5/29 5/31  6       Seated sural nerve glides 15x 3x10 2x10 2x10 2x10 2x10 2x10  2x10       Inversion/Eversion 2 x 10 3x10 3x10 3x10 3 x10 3x10 3x10 3x10       Supine calf stretch with strap 5 x 20 sec   5x20" :30 x5 :30x5 :30 x 5 :30 x 5 :30x5 :30x5       HR/TR   25x Heel raises only Heel raises only 25x Heel raises only 2x10 Heels raises only 2 x10 Heel raises only 2x10  Heel raises only 2x10       BAPS A/P M/L   20x ea seated 20x seated 20x seated 20x seated 20x seated  20x seated 20x seated       SLS   20"x4 20" x 4 20" x 2 20" x 2 20" x3 20"x3 20"x3       ABC's   1x 2x 2x 2x 2x 2x  2x       T-band ankle      RTB 2x10 RTB 2x10 GTB 2x10 GTB 2x10 GTB 2x10 GTB 2x10       Bike ROM      5 min                  Standing Rockerboard       Ap/m/l  2x10 Ap/ml 2x10 Ap/ml 2x10 Ap/ml  Ap/ml       Step ups       2x10 2x10 2x10 2x10  2x10       TG lvl 3           2x10 2x10  2x10       Biodex LOS           lvl 3 2x10 lvl 3 2x10  lvl 3 2x10        Prone quad stretch               5 x 30 sec                                                                                                                                     Modalities      5/17 5/22 5/24 5/29 5/31         Cp     10 min 10 Np   10'         H-wave - low             10' with CP                                        Assessment: Patient demonstrating improved gait, decreased knee stiffness, and decrease right ankle pain with ambulation following prone quad stretch  She was educated to complete prone quad stretch as part of HEP  She will continue to benefit from PT in order to improve right geovanny strength, right ankle mobility, and improve right knee mobility  Plan: Continue per plan of care

## 2018-06-07 ENCOUNTER — OFFICE VISIT (OUTPATIENT)
Dept: PHYSICAL THERAPY | Facility: CLINIC | Age: 68
End: 2018-06-07
Payer: COMMERCIAL

## 2018-06-07 DIAGNOSIS — M25.571 CHRONIC PAIN OF RIGHT ANKLE: Primary | ICD-10-CM

## 2018-06-07 DIAGNOSIS — G89.29 CHRONIC PAIN OF RIGHT ANKLE: Primary | ICD-10-CM

## 2018-06-07 PROCEDURE — 97110 THERAPEUTIC EXERCISES: CPT

## 2018-06-07 PROCEDURE — 97112 NEUROMUSCULAR REEDUCATION: CPT

## 2018-06-07 PROCEDURE — 97140 MANUAL THERAPY 1/> REGIONS: CPT

## 2018-06-07 NOTE — PROGRESS NOTES
Daily Note     Today's date: 2018  Patient name: Ruba Broderick  : 1950  MRN: [id number]  Referring provider: Deny Leavitt DPM  Dx:   Encounter Diagnosis     ICD-10-CM    1  Chronic pain of right ankle M25 571     G89 29                   Subjective: Upon presentation patient reports she is walking better since the addition of prone quad stretch, reports compliance to HEP         Objective: See treatment diary below  Precautions: HTN, DM, Right Tibialis Posterior Tendon repair, Right TKA, Left TSA, Asthma     Daily Treatment Diary      Manual  5/8 5/15 5/17 5/22 5/24 5/29 5/31  6/5  6     Right sural nerve glides TK MM JK JK JK             Ankle PROM       JK JK JK KK    JK      Right prone quad stretch               TK  JK                                                           Exercise Diary  5/8 5/15 5/17 5/22 5/24 5/29 5/31  6  6     Seated sural nerve glides 15x 3x10 2x10 2x10 2x10 2x10 2x10  2x10  2x10     Inversion/Eversion 2 x 10 3x10 3x10 3x10 3 x10 3x10 3x10 3x10  3x10     Supine calf stretch with strap 5 x 20 sec   5x20" :30 x5 :30x5 :30 x 5 :30 x 5 :30x5 :30x5  :30x5     HR/TR   25x Heel raises only Heel raises only 25x Heel raises only 2x10 Heels raises only 2 x10 Heel raises only 2x10  Heel raises only 2x10  Heel raises only 20x     BAPS A/P M/L   20x ea seated 20x seated 20x seated 20x seated 20x seated  20x seated 20x seated  20x seated     SLS   20"x4 20" x 4 20" x 2 20" x 2 20" x3 20"x3 20"x3  20"x3     ABC's   1x 2x 2x 2x 2x 2x  2x  2x     T-band ankle      RTB 2x10 RTB 2x10 GTB 2x10 GTB 2x10 GTB 2x10 GTB 2x10  GTB 2x10     Bike ROM      5 min             5 min     Standing Rockerboard       Ap/m/l  2x10 Ap/ml 2x10 Ap/ml 2x10 Ap/ml  Ap/ml  ap/ml     Step ups       2x10 2x10 2x10 2x10  2x10       TG lvl 3           2x10 2x10  2x10  2x10     Biodex LOS           lvl 3 2x10 lvl 3 2x10  lvl 3 2x10  lvl3 2x10      Prone quad stretch               5 x 30 sec [id number]     ITB stretch                   5x30 sec                                                                                                           Modalities      5/17 5/22 5/24 5/29 5/31         Cp     10 min 10 Np   10'         H-wave - low             10' with CP                                        Assessment: Patient continues to demonstrates improvements in gait following prone quad stretch, also further results with ITB stretch  Patient was provided with updated home exercise program and demonstrates verbal  understanding of prescribed exercises and instructions  Patient advised to stop performing home exercise program if symptoms increase or new complaints developed  Monitor Nv  Patient offers no complaints post session  Patient would benefit from continued therapy to improve strength, right ankle mobility, and improve right knee mobility  Plan: Continue per plan of care

## 2018-06-08 DIAGNOSIS — E78.2 MIXED HYPERLIPIDEMIA: Primary | ICD-10-CM

## 2018-06-08 RX ORDER — EZETIMIBE 10 MG/1
TABLET ORAL
Qty: 90 TABLET | Refills: 2 | Status: SHIPPED | OUTPATIENT
Start: 2018-06-08 | End: 2019-03-05 | Stop reason: SDUPTHER

## 2018-06-12 ENCOUNTER — OFFICE VISIT (OUTPATIENT)
Dept: PHYSICAL THERAPY | Facility: CLINIC | Age: 68
End: 2018-06-12
Payer: COMMERCIAL

## 2018-06-12 ENCOUNTER — TELEPHONE (OUTPATIENT)
Dept: OBGYN CLINIC | Facility: CLINIC | Age: 68
End: 2018-06-12

## 2018-06-12 ENCOUNTER — TELEPHONE (OUTPATIENT)
Dept: OBGYN CLINIC | Age: 68
End: 2018-06-12

## 2018-06-12 DIAGNOSIS — M25.571 CHRONIC PAIN OF RIGHT ANKLE: Primary | ICD-10-CM

## 2018-06-12 DIAGNOSIS — G89.29 CHRONIC PAIN OF RIGHT ANKLE: Primary | ICD-10-CM

## 2018-06-12 PROCEDURE — 97110 THERAPEUTIC EXERCISES: CPT

## 2018-06-12 PROCEDURE — 97140 MANUAL THERAPY 1/> REGIONS: CPT

## 2018-06-12 NOTE — TELEPHONE ENCOUNTER
----- Message from Gui Chavez sent at 6/12/2018  2:18 PM EDT -----  Regarding: Non-Urgent Medical Question  Contact: [phone]  I have an increasing amount of discharge, now with the appearance of purulent drainage with some dilute appearing blood  Called the office today  Am unsure if this is urinary or vaginal   No pain, but lower abdominal cramping  Just want to make sure if it is a uti, I don't get a kidney infection

## 2018-06-12 NOTE — PROGRESS NOTES
Daily Note     Today's date: 2018  Patient name: Gene Sarmiento  : 1950  MRN: [id number]  Referring provider: Yolette Swift DPM  Dx:   Encounter Diagnosis     ICD-10-CM    1  Chronic pain of right ankle M25 571     G89 29                   Subjective: Upon presentation patient reports she is walking better since the addition of prone quad stretch, reports compliance to HEP         Objective: See treatment diary below  Precautions: HTN, DM, Right Tibialis Posterior Tendon repair, Right TKA, Left TSA, Asthma     Daily Treatment Diary      Manual  5/8 5/15 5/17 5/22 5/24 5/29 5/31  6/5  6/7  6/12   Right sural nerve glides TK MM JK JK JK             Ankle PROM       JK JK JK KK    JK  JK    Right prone quad stretch               TK  JK  JK                                                         Exercise Diary  5/8 5/15 5/17 5/22 5/24 5/29 5/31  6/5  6/7  6/12   Seated sural nerve glides 15x 3x10 2x10 2x10 2x10 2x10 2x10  2x10  2x10  2x10   Inversion/Eversion 2 x 10 3x10 3x10 3x10 3 x10 3x10 3x10 3x10  3x10  3x10   Supine calf stretch with strap 5 x 20 sec   5x20" :30 x5 :30x5 :30 x 5 :30 x 5 :30x5 :30x5  :30x5  :30 x5   HR/TR   25x Heel raises only Heel raises only 25x Heel raises only 2x10 Heels raises only 2 x10 Heel raises only 2x10  Heel raises only 2x10  Heel raises only 20x  Heel raises   BAPS A/P M/L   20x ea seated 20x seated 20x seated 20x seated 20x seated  20x seated 20x seated  20x seated  20x seated   SLS   20"x4 20" x 4 20" x 2 20" x 2 20" x3 20"x3 20"x3  20"x3  20" x 3   ABC's   1x 2x 2x 2x 2x 2x  2x  2x  2x   T-band ankle      RTB 2x10 RTB 2x10 GTB 2x10 GTB 2x10 GTB 2x10 GTB 2x10  GTB 2x10  GTB   Bike ROM      5 min             5 min     Standing Rockerboard       Ap/m/l  2x10 Ap/ml 2x10 Ap/ml 2x10 Ap/ml  Ap/ml  ap/ml  2x10   Step ups       2x10 2x10 2x10 2x10  2x10    2x10   TG lvl 3           2x10 2x10  2x10  2x10  2x10   Biodex LOS           lvl 3 2x10 lvl 3 2x10  lvl 3 2x10  lvl3 2x10  lvl 3 2x10    Prone quad stretch               5 x 30 sec [id number]  5 x 30 sec   ITB stretch                   5x30 sec  5x30 sec                                                                                                         Modalities      5/17 5/22 5/24 5/29 5/31         Cp     10 min 10 Np   10'         H-wave - low             10' with CP                                        Assessment: Patient continues to demonstrates improvements in gait following prone quad stretch    Patient offers no complaints post session  Patient would benefit from continued therapy to improve strength, right ankle mobility, and improve right knee mobility  Plan: Continue per plan of care

## 2018-06-12 NOTE — TELEPHONE ENCOUNTER
Pt called complaining about vaginal discharge and a tint of pink  I scheduled her to see you next week  Pt would like to know if she should have labwork done in the interim  Please advise

## 2018-06-12 NOTE — TELEPHONE ENCOUNTER
----- Message from Zihollie Shown sent at 6/12/2018  2:18 PM EDT -----  Regarding: Non-Urgent Medical Question  Contact: [phone]  I have an increasing amount of discharge, now with the appearance of purulent drainage with some dilute appearing blood  Called the office today  Am unsure if this is urinary or vaginal   No pain, but lower abdominal cramping  Just want to make sure if it is a uti, I don't get a kidney infection

## 2018-06-13 DIAGNOSIS — E11.9 TYPE 2 DIABETES MELLITUS WITHOUT COMPLICATION, WITHOUT LONG-TERM CURRENT USE OF INSULIN (HCC): Primary | ICD-10-CM

## 2018-06-13 NOTE — TELEPHONE ENCOUNTER
Patient returned call - she has an appointment scheduled with MYRANDA tomorrow in [facility]

## 2018-06-14 ENCOUNTER — OFFICE VISIT (OUTPATIENT)
Dept: OBGYN CLINIC | Facility: MEDICAL CENTER | Age: 68
End: 2018-06-14
Payer: COMMERCIAL

## 2018-06-14 ENCOUNTER — APPOINTMENT (OUTPATIENT)
Dept: PHYSICAL THERAPY | Facility: CLINIC | Age: 68
End: 2018-06-14
Payer: COMMERCIAL

## 2018-06-14 ENCOUNTER — TRANSCRIBE ORDERS (OUTPATIENT)
Dept: ADMINISTRATIVE | Facility: HOSPITAL | Age: 68
End: 2018-06-14

## 2018-06-14 VITALS
RESPIRATION RATE: 14 BRPM | BODY MASS INDEX: 38.62 KG/M2 | DIASTOLIC BLOOD PRESSURE: 68 MMHG | WEIGHT: 218 LBS | HEIGHT: 63 IN | SYSTOLIC BLOOD PRESSURE: 130 MMHG

## 2018-06-14 DIAGNOSIS — N95.0 POSTMENOPAUSAL BLEEDING: Primary | ICD-10-CM

## 2018-06-14 PROCEDURE — 88305 TISSUE EXAM BY PATHOLOGIST: CPT | Performed by: PATHOLOGY

## 2018-06-14 PROCEDURE — 58100 BIOPSY OF UTERUS LINING: CPT | Performed by: OBSTETRICS & GYNECOLOGY

## 2018-06-14 PROCEDURE — 99214 OFFICE O/P EST MOD 30 MIN: CPT | Performed by: OBSTETRICS & GYNECOLOGY

## 2018-06-14 NOTE — PROGRESS NOTES
Assessment/Plan:      Diagnoses and all orders for this visit:    Postmenopausal bleeding  -     AMB US pelvis complete w transvaginal; Future          Subjective:     Patient ID: Denise Valenzuela is a 77 y o  female  Patient is a 70-year-old female, para 3,  here with a 10 day history of a copious vaginal discharge with a small amount of blood  Ten days ago the blood was scant recently there was a little bit more blood like a light period  Patient is not experiencing pelvic pain but she does have menstrual type cramps  Patient states her old gynecologist attempted endometrial biopsy in the office and was unsuccessful  Patient is very hesitant to have an endometrial biopsy today  Patient carries a diagnosis of lichen sclerosis  Past medical history significant for metabolic syndrome and IBS  Past surgical history is significant for 2 C sections left shoulder replacement and right knee replacement  Patient has a strong family history of breast cancer and a mother with uterine cancer  Patient has numerous drug allergies  Vaginal Discharge   The patient's primary symptoms include vaginal discharge  Pertinent negatives include no abdominal pain or fever  Review of Systems   Constitutional: Negative for fatigue and fever  Gastrointestinal: Negative for abdominal distention and abdominal pain  Genitourinary: Positive for vaginal bleeding and vaginal discharge  Negative for genital sores  Objective:     Physical Exam   Constitutional: She appears well-developed and well-nourished  HENT:   Head: Normocephalic  Pulmonary/Chest: Effort normal    Abdominal: Soft  She exhibits no distension and no mass  There is no tenderness  There is no rebound and no guarding  Genitourinary:   Genitourinary Comments: The small dimple at the top of the vagina which is a stenotic cervical os  There is old blood on the speculum  No alternative source of bleeding noted   Suspicion is the blood/discharge is coming from the uterus  On pelvic exam uterus is not enlarged midposition no adnexal masses are palpated  There are no anal or urethral lesions noted

## 2018-06-14 NOTE — PROGRESS NOTES
Procedures  Endometrial Biopsy Procedure Note    Pre-operative Diagnosis:  Postmenopausal bleeding    Post-operative Diagnosis: same    Indications: postmenopausal bleeding    Procedure Details    Urine pregnancy test was done not and result was N/A  The risks (including infection, bleeding, pain, and uterine perforation) and benefits of the procedure were explained to the patient and Written informed consent was obtained  The patient was placed in the dorsal lithotomy position  Bimanual exam showed the uterus to be in the neutral position  A speculum inserted in the vagina, and the cervix prepped with povidone iodine  A sharp tenaculum was applied to the anterior lip of the cervix for stabilization  The cervix stabbed with #11 blade  Velásquez milky discharge was encountered  A sterile uterine sound was used to sound the uterus to a depth of 8cm  A Pipelle endometrial aspirator was used to sample the endometrium  4 passes were made  Last sample was sent separately as it had less "Milky" D/C and more tissue  A(n) moderate amount of tissue was obtained  Sample was sent for pathologic examination  Condition:  Stable    Complications:  None    Plan:    The patient was advised to call for any fever or for prolonged or severe pain or bleeding  She was advised to use NSAID as needed for mild to moderate pain  She was advised to avoid vaginal intercourse for 48 hours or until the bleeding has completely stopped  Attending Physician Documentation:  I was present for or participated in the entire procedure, including opening and closing

## 2018-06-15 ENCOUNTER — HOSPITAL ENCOUNTER (OUTPATIENT)
Dept: ULTRASOUND IMAGING | Facility: CLINIC | Age: 68
Discharge: HOME | End: 2018-06-15
Payer: COMMERCIAL

## 2018-06-15 DIAGNOSIS — N95.0 POSTMENOPAUSAL BLEEDING: ICD-10-CM

## 2018-06-15 PROCEDURE — 76830 TRANSVAGINAL US NON-OB: CPT

## 2018-06-15 PROCEDURE — 76856 US EXAM PELVIC COMPLETE: CPT

## 2018-06-18 ENCOUNTER — TELEPHONE (OUTPATIENT)
Dept: OBGYN CLINIC | Facility: CLINIC | Age: 68
End: 2018-06-18

## 2018-06-18 NOTE — TELEPHONE ENCOUNTER
----- Message from Kacie Mackenzie sent at 6/18/2018  1:43 PM EDT -----  Regarding: Non-Urgent Medical Question  Contact: [phone]  Saw Dr Janelle Bunch last Thursday for post menapausal bleeding  Had culture, bbiopsies of the endometrum on Thursday, and abdominal ulatra/intravaginal sound done on Friday  He said up to 2 weeks for results if they are sent to [facility], sooner if not  Will be following up with him once results are in  Did not feel I should wait 8 days as I could not get in to see you before then  Results should be visible to you if you want to follow at all

## 2018-06-19 ENCOUNTER — TELEPHONE (OUTPATIENT)
Dept: OBGYN CLINIC | Facility: CLINIC | Age: 68
End: 2018-06-19

## 2018-06-19 ENCOUNTER — OFFICE VISIT (OUTPATIENT)
Dept: PHYSICAL THERAPY | Facility: CLINIC | Age: 68
End: 2018-06-19
Payer: COMMERCIAL

## 2018-06-19 DIAGNOSIS — C56.9 CARCINOMA OF OVARY, UNSPECIFIED LATERALITY (HCC): Primary | ICD-10-CM

## 2018-06-19 DIAGNOSIS — M25.571 CHRONIC PAIN OF RIGHT ANKLE: Primary | ICD-10-CM

## 2018-06-19 DIAGNOSIS — G89.29 CHRONIC PAIN OF RIGHT ANKLE: Primary | ICD-10-CM

## 2018-06-19 PROCEDURE — 97110 THERAPEUTIC EXERCISES: CPT

## 2018-06-19 PROCEDURE — 97140 MANUAL THERAPY 1/> REGIONS: CPT

## 2018-06-19 NOTE — PROGRESS NOTES
Daily Note     Today's date: 2018  Patient name: Rob Radford  : 1950  MRN: [id number]  Referring provider: Carroll Shaw DPM  Dx:   Encounter Diagnosis     ICD-10-CM    1  Chronic pain of right ankle M25 571     G89 29                   Subjective: Upon presentation patient repots she is going to self DC due to medical issues she is currently experiencing and need availability for other appointments  Patient repots yesterday was a good day for her foot, able to stand most of the day without heel pain         Objective: See treatment diary below  Precautions: HTN, DM, Right Tibialis Posterior Tendon repair, Right TKA, Left TSA, Asthma     Daily Treatment Diary      Manual  6/19 5/15 5/17 5/22 5/24 5/29 5/31  6/5  6/7  6/12   Right sural nerve glides  MM JK JK JK             Ankle PROM  JK     JK JK JK KK    JK  JK    Right prone quad stretch  Jk             TK  JK  JK                                                         Exercise Diary  6/19 5/15 5/17 5/22 5/24 5/29 5/31  6/5  6/7  6/12   Seated sural nerve glides 15x 3x10 2x10 2x10 2x10 2x10 2x10  2x10  2x10  2x10   Inversion/Eversion 2 x 10 3x10 3x10 3x10 3 x10 3x10 3x10 3x10  3x10  3x10   Supine calf stretch with strap 5 x 20 sec  :20 x10 5x20" :30 x5 :30x5 :30 x 5 :30 x 5 :30x5 :30x5  :30x5  :30 x5   HR/TR  25x 25x Heel raises only Heel raises only 25x Heel raises only 2x10 Heels raises only 2 x10 Heel raises only 2x10  Heel raises only 2x10  Heel raises only 20x  Heel raises   BAPS A/P M/L  20x seated 20x ea seated 20x seated 20x seated 20x seated 20x seated  20x seated 20x seated  20x seated  20x seated   SLS  :20 x 4 20"x4 20" x 4 20" x 2 20" x 2 20" x3 20"x3 20"x3  20"x3  20" x 3   ABC's  2x 1x 2x 2x 2x 2x 2x  2x  2x  2x   T-band ankle   GTB 2x10   RTB 2x10 RTB 2x10 GTB 2x10 GTB 2x10 GTB 2x10 GTB 2x10  GTB 2x10  GTB   Bike ROM      5 min             5 min     Standing Rockerboard  Ap/ml 20x     Ap/m/l  2x10 Ap/ml 2x10 Ap/ml 2x10 Ap/ml  Ap/ml  ap/ml  2x10   Step ups  2x10     2x10 2x10 2x10 2x10  2x10    2x10   TG lvl 3  2x10         2x10 2x10  2x10  2x10  2x10   Biodex LOS  lvl 3 2x10         lvl 3 2x10 lvl 3 2x10  lvl 3 2x10  lvl3 2x10  lvl 3 2x10    Prone quad stretch  :30x5             5 x 30 sec [id number]  5 x 30 sec   ITB stretch   :30 x 5                5x30 sec  5x30 sec                                                                                                         Modalities      5/17 5/22 5/24 5/29 5/31         Cp     10 min 10 Np   10'         H-wave - low             10' with CP                                        Assessment: Patient continues to demonstrates improvements in gait following prone quad stretch  Improvements noted in ankle ROM and balance noted this session  Patient offers no complaints post session, see PT DC for further information  Plan: Continue per plan of care

## 2018-06-19 NOTE — TELEPHONE ENCOUNTER
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

## 2018-06-20 ENCOUNTER — TELEPHONE (OUTPATIENT)
Dept: OBGYN CLINIC | Facility: CLINIC | Age: 68
End: 2018-06-20

## 2018-06-20 NOTE — TELEPHONE ENCOUNTER
Just an FYI- I see you already know of pt's eb diagnosis, but pathology called wanted to make sure you were aware of her adenocarcinoma

## 2018-06-20 NOTE — TELEPHONE ENCOUNTER
----- Message from Corbin Gray sent at 6/20/2018 10:19 AM EDT -----  Regarding: Non-Urgent Medical Question  Contact: [phone]  I have been diagnosed with endometrial adenocarcinoma and am scheduled for Dr Kavitha Aragon on the 27th  Lab result and US result in [facility]

## 2018-06-21 ENCOUNTER — APPOINTMENT (OUTPATIENT)
Dept: PHYSICAL THERAPY | Facility: CLINIC | Age: 68
End: 2018-06-21
Payer: COMMERCIAL

## 2018-06-24 PROBLEM — C54.1 ENDOMETRIAL CANCER (HCC): Status: ACTIVE | Noted: 2018-06-24

## 2018-06-25 PROCEDURE — G8979 MOBILITY GOAL STATUS: HCPCS | Performed by: PHYSICAL THERAPIST

## 2018-06-25 PROCEDURE — G8980 MOBILITY D/C STATUS: HCPCS | Performed by: PHYSICAL THERAPIST

## 2018-06-25 NOTE — PROGRESS NOTES
PT Discharge    Today's date: 2018  Patient name: Kyara Mujica  : 1950  MRN: [id number]  Referring provider: Rahul Green DPM  Dx:   Encounter Diagnosis     ICD-10-CM    1  Chronic pain of right ankle M25 571     G89 29        Start Time: 1535  Stop Time: 1641  Total time in clinic (min): 66 minutes    Assessment  Impairments: abnormal or restricted ROM, activity intolerance, impaired physical strength, lacks appropriate home exercise program, pain with function and weight-bearing intolerance    Assessment details: Patient wishes to be discharged from PT at this time  She will be discharged to HEP at this time      Understanding of Dx/Px/POC: good   Prognosis: fair    Goals  Short term goals:  1) Patient will demonstrate improved right ankle ROM by 5-10 degrees  in 4 weeks-met  2) Patient will demonstrate increased right ankle strength by 1/2 grade in 4 weeks-met  3) Patient will demonstrate negative sural nerve bias SLR in 4 weeks-met  Long term goals:  1) Patient will demonstrate ability to walk > 1 miles with right ankle pain <3/10 in 6 weeks-not met  2) Patient will demonstrate ability to stand > 30 minutes with right ankle pain <3/10 in 6 weeks- not met  3) Patient will demonstrate ability to manage stairs with step over step gait pattern in 6 weeks- not met  4) Patient will demonstrate independence with HEP in 6 weeks- met         Plan  Patient would benefit from: skilled PT  Referral necessary: No  Planned modality interventions: cryotherapy and thermotherapy: hydrocollator packs  Planned therapy interventions: manual therapy, massage, Harmon taping, joint mobilization, neuromuscular re-education, patient education, postural training, stretching, strengthening, therapeutic activities, therapeutic training, therapeutic exercise, home exercise program, graded exercise, functional ROM exercises, flexibility, balance, gait training, balance/weight bearing training, ADL retraining, ADL training, activity modification and body mechanics training  Frequency: 2x week  Duration in weeks: 6        Subjective Evaluation    History of Present Illness  Mechanism of injury: Patient reports that she has noticed an improvement since starting physical therapy  She states that she has noticed an improvement in ankle mobility  She states that she still has difficulty with prolonged standing, walking and swelling  Patient reports that she can no longer continue with physical therapy secondary to some other health concerns that started recently  Patient states that she has returned to 50% prior level of functioning  Pain  No pain reported  Current pain ratin  At best pain ratin  At worst pain rating: 10  Location: Right foot pain (worse in heel) - constant, varying, deep " knife-like"    Social Support  Steps to enter house: yes (5 steps bilateral hand rails)  Lives in: multiple-level home  Lives with: alone    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)  Exercise history: Patient states that she has a house keeper that comes in once a week  Patient also has yard keeper  Patient reports that she likes going to the gym   Has not been going to the gym since 2017          Objective     Static Posture     Comments  Pes planus bilaterally, calcaneal eversion > on right     Active Range of Motion   Left Ankle/Foot   Dorsiflexion (ke): 5 degrees   Plantar flexion: 56 degrees   Inversion: 45 degrees   Eversion: 25 degrees     Right Ankle/Foot   Dorsiflexion (ke): 8 degrees   Plantar flexion: 55 degrees   Inversion: 32 degrees   Eversion: 5 degrees     Strength/Myotome Testing     Left Ankle/Foot   Dorsiflexion: 5  Plantar flexion: 5  Inversion: 5  Eversion: 5    Right Ankle/Foot   Dorsiflexion: 5  Plantar flexion: 5  Inversion: 5  Eversion: 5    Tests     Additional Tests Details  Right neural SLR (-)  Right peroneal bias SLR (-)  Right sural bial SLR (+)          Precautions: HTN, DM, Right Tibialis Posterior Tendon repair, Right TKA, Left TSA, Asthma    Daily Treatment Diary     Manual  5/8            Right sural nerve glides TK                                                                    Exercise Diary  5/8            Seated sural nerve glides 15x            Inversion/Eversion 2 x 10            Supine calf stretch with strap 5 x 20 sec                                                                                                                                                                                                                                              Modalities

## 2018-06-26 ENCOUNTER — TELEPHONE (OUTPATIENT)
Dept: INTERNAL MEDICINE CLINIC | Facility: CLINIC | Age: 68
End: 2018-06-26

## 2018-06-26 ENCOUNTER — APPOINTMENT (OUTPATIENT)
Dept: PHYSICAL THERAPY | Facility: CLINIC | Age: 68
End: 2018-06-26
Payer: COMMERCIAL

## 2018-06-26 NOTE — TELEPHONE ENCOUNTER
She wants to  the result of the Biopsy she had done 6/14/18 w/Dr Andrade Ramírez    She was DX'ed with Stage 1 endometrium cancer and has an appt w/oncology [facility] tomorrow  Georgette Ormond Georgette Ormond She said it isn't in "My Chart" but needs to get the report today so she can look it over before her appt so she knows what questions to ask  Georgette Ormond She wants a call to let her know if she can pick this up today  Georgette Ormond

## 2018-06-27 ENCOUNTER — OFFICE VISIT (OUTPATIENT)
Dept: GYNECOLOGIC ONCOLOGY | Facility: CLINIC | Age: 68
End: 2018-06-27
Payer: COMMERCIAL

## 2018-06-27 VITALS
RESPIRATION RATE: 16 BRPM | TEMPERATURE: 98.3 F | HEART RATE: 86 BPM | SYSTOLIC BLOOD PRESSURE: 146 MMHG | BODY MASS INDEX: 38.98 KG/M2 | WEIGHT: 220 LBS | HEIGHT: 63 IN | DIASTOLIC BLOOD PRESSURE: 92 MMHG

## 2018-06-27 DIAGNOSIS — C54.1 ENDOMETRIAL CANCER (HCC): Primary | ICD-10-CM

## 2018-06-27 PROCEDURE — 99244 OFF/OP CNSLTJ NEW/EST MOD 40: CPT | Performed by: OBSTETRICS & GYNECOLOGY

## 2018-06-27 NOTE — PROGRESS NOTES
Cooper Becerra  1950  [facility]  GYN ONC MO  [facility] [address]    Chief Complaint   Patient presents with    Consult     Endometrial cancer       Assessment/Plan     Assessment:  1  Endometrial cancer [facility])  Case request operating room: HYSTERECTOMY LAPAROSCOPIC TOTAL ([address]) W/ ROBOTICS    Type and screen    Comprehensive metabolic panel    CBC and differential    HEMOGLOBIN A1C W/ EAG ESTIMATION    Case request operating room: HYSTERECTOMY LAPAROSCOPIC TOTAL ([address]) W/ ROBOTICS         Plan:  The patient is requesting definitive surgical management  The patient has signed informed consent for a robotic assisted total laparoscopic hysterectomy, bilateral salpingo-oophorectomy, bilateral pelvic lymph node dissection, possible exploratory laparotomy and any other indicated procedure  Patient understands the risks, benefits and alternative treatments and agrees to proceed  The patient understands the risks associated with surgery which include, but are not limited to: infection, deep vein thrombosis, blood clots to the lungs, wound healing problems, complications with extensive blood loss, blood transfusion, damage to nearby organs (ureters, bladder, bowel, major nerves and blood vessels), anesthesia and death  The patient will require both medical and cardiac clearance prior to her surgery  History of Present Illness: The patient is a delightful 23-year-old  with recently diagnosed endometrial cancer  She presents for evaluation and treatment  The patient presented with postmenopausal bleeding for approximately 1 month     An endometrial biopsy was performed which showed a 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  Patient went through menopause at age 62  Patient endorses a history of abnormal Pap smears in the past but have been negative most recently    Patient has used both birth control and combination hormone replacement therapy in the past  Patient has had a tubal ligation for birth control  Patient has also had 2  sections  Endometrial cancer ([facility] [address] )    2018 Biopsy     FIGO 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  Biopsy performed by Dr Franco Louie  Review of Systems   Constitutional: Positive for diaphoresis and fatigue  Negative for activity change, appetite change, chills, fever and unexpected weight change  HENT: Positive for congestion and sneezing  Negative for dental problem, drooling, ear discharge, ear pain, facial swelling, hearing loss, mouth sores, nosebleeds, postnasal drip, rhinorrhea, sinus pain, sinus pressure, sore throat, tinnitus, trouble swallowing and voice change  Eyes: Positive for itching  Negative for photophobia, pain, discharge, redness and visual disturbance  Respiratory: Positive for shortness of breath  Negative for apnea, cough, choking, chest tightness, wheezing and stridor  Cardiovascular: Negative for chest pain, palpitations and leg swelling  Gastrointestinal: Positive for abdominal distention and constipation  Negative for abdominal pain, anal bleeding, blood in stool, diarrhea, nausea, rectal pain and vomiting  Endocrine: Negative for cold intolerance, heat intolerance, polydipsia, polyphagia and polyuria  Genitourinary: Positive for vaginal bleeding and vaginal discharge  Negative for decreased urine volume, difficulty urinating, dyspareunia, dysuria, enuresis, flank pain, frequency, genital sores, hematuria, menstrual problem, pelvic pain, urgency and vaginal pain  Musculoskeletal: Positive for back pain and myalgias  Negative for arthralgias, gait problem, joint swelling, neck pain and neck stiffness  Skin: Negative for color change, pallor, rash and wound  Allergic/Immunologic: Positive for environmental allergies  Negative for food allergies and immunocompromised state     Neurological: Negative for dizziness, tremors, seizures, syncope, facial asymmetry, speech difficulty, weakness, light-headedness, numbness and headaches  Hematological: Negative for adenopathy  Does not bruise/bleed easily  Psychiatric/Behavioral: Positive for sleep disturbance  Negative for agitation, behavioral problems, confusion, decreased concentration, dysphoric mood, hallucinations, self-injury and suicidal ideas  The patient is not nervous/anxious and is not hyperactive          Past Medical History:   Diagnosis Date    Abnormal mammogram     Abnormal weight gain     Achilles tendinitis     Angina pectoris (HCC)     Asthma     COPD [facility] obstructive pulmonary disease) [facility])     Disc degeneration, lumbar     Disease of thyroid gland     hypo    Dyslipidemia     Early satiety     Enthesopathy     hip region    Esophagitis, reflux     Hypercholesterolemia     Hypertension     IBS (irritable bowel syndrome)     Irritable bowel syndrome     Left heart failure ([facility] [address] )     Osteoarthritis     Rosacea     Sacroiliitis ([facility])        Past Surgical History:   Procedure Laterality Date     SECTION      x2    COLONOSCOPY      ELBOW SURGERY      left ulna/radius    FOOT SURGERY Right     KNEE ARTHROSCOPY W/ MENISCAL REPAIR Right     REPLACEMENT TOTAL KNEE Right     SHOULDER ARTHROPLASTY      SINUS SURGERY      TONSILLECTOMY      TUBAL LIGATION      WRIST SURGERY      ganglonic cyst       OB History      Para Term  AB Living    3 3 3     3    SAB TAB Ectopic Multiple Live Births            3        Obstetric Comments    Menarche 13  Hx bcp  Hx abnormal pap  Hx hormone replacement          Family History   Problem Relation Age of Onset    Arthritis Mother     Heart disease Mother         cardiac disorder    Diabetes Mother     Hiatal hernia Mother     Hypertension Mother     Irritable bowel syndrome Mother     Breast cancer Mother         x2    Uterine cancer Mother     Osteoporosis Mother     Thyroid disease Mother     Other Mother         gastric reflux    Heart disease Father         cardiac disorder    Hiatal hernia Father     Ulcers Father     Other Father         gastric reflux    Hiatal hernia Sister     Thyroid disease Sister     Other Sister         gastric reflux    Lung cancer Sister     No Known Problems Brother     Brain cancer Maternal Grandmother     Breast cancer Maternal Grandmother     No Known Problems Maternal Grandfather     No Known Problems Paternal Grandmother     No Known Problems Paternal Grandfather     Hiatal hernia Child     Other Child         gastric reflux    Irritable bowel syndrome Daughter     Breast cancer Maternal Aunt         x3 sis    Uterine cancer Maternal Aunt         x3 sis       Social History     Social History    Marital status:      Spouse name: N/A    Number of children: N/A    Years of education: N/A     Occupational History    nurse      full-time     Social History Main Topics    Smoking status: Never Smoker    Smokeless tobacco: Never Used    Alcohol use Yes      Comment: (history) occasional    Drug use: No    Sexual activity: Not Currently     Other Topics Concern    Not on file     Social History Narrative    Active advance directive    DME- freestyle lite blood glucose meter device kit QID             Current Outpatient Prescriptions:     albuterol (VENTOLIN HFA) 90 mcg/act inhaler, Inhale 2 puffs 4 (four) times a day as needed, Disp: , Rfl:     aspirin (ASPIR-81) 81 mg EC tablet, Take by mouth, Disp: , Rfl:     B Complex Vitamins (B COMPLEX 1 PO), Take by mouth, Disp: , Rfl:     budesonide-formoterol (SYMBICORT) 160-4 5 mcg/act inhaler, Inhale Twice daily, Disp: , Rfl:     Calcium Carbonate-Vitamin D (CALCIUM 600+D) 600-200 MG-UNIT TABS, Take by mouth, Disp: , Rfl:     clindamycin (CLEOCIN) 300 MG capsule, Take by mouth, Disp: , Rfl:     clotrimazole-betamethasone (LOTRISONE) 1-0 05 % cream, Apply topically daily at bedtime as needed (for itching), Disp: 45 g, Rfl: 1    ezetimibe (ZETIA) 10 mg tablet, TAKE 1 TABLET DAILY, Disp: 90 tablet, Rfl: 2    FREESTYLE LITE test strip, USE TO TEST BLOOD SUGARS FOUR TIMES A DAY, Disp: 400 each, Rfl: 2    furosemide (LASIX) 20 mg tablet, Take 1 tablet (20 mg total) by mouth daily, Disp: 90 tablet, Rfl: 3    glipiZIDE (GLUCOTROL XL) 2 5 mg 24 hr tablet, Take 1 tablet (2 5 mg total) by mouth daily, Disp: 90 tablet, Rfl: 3    insulin lispro (HUMALOG) 100 units/mL injection, Inject under the skin, Disp: , Rfl:     Insulin Syringe-Needle U-100 (BD INSULIN SYRINGE ULTRAFINE) 31G X 15/64" 0 3 ML MISC, by Does not apply route daily Use as directed , Disp: 25 each, Rfl: 3    Lancets (FREESTYLE) lancets, by Does not apply route 2 (two) times a day, Disp: , Rfl:     levothyroxine 75 mcg tablet, Take 1 tablet (75 mcg total) by mouth daily, Disp: 90 tablet, Rfl: 3    LINZESS 145 MCG CAPS, TAKE 1 CAPSULE IN THE MORNING AT LEAST 30 MINUTES BEFORE BREAKFAST DAILY, Disp: 90 capsule, Rfl: 3    losartan (COZAAR) 50 mg tablet, Take 1 tablet (50 mg total) by mouth daily, Disp: 90 tablet, Rfl: 3    Magnesium Oxide 400 MG CAPS, Take by mouth, Disp: , Rfl:     meloxicam (MOBIC) 15 mg tablet, Take 1 tablet (15 mg total) by mouth daily as needed (P r n  pain), Disp: 90 tablet, Rfl: 3    metFORMIN (GLUCOPHAGE-XR) 500 mg 24 hr tablet, Take 500 mg by mouth 3 (three) times a day with meals, Disp: , Rfl:     Omega-3 Fatty Acids (FISH OIL) 1200 MG CAPS, Take by mouth, Disp: , Rfl:     pantoprazole (PROTONIX) 40 mg tablet, Take 1 tablet (40 mg total) by mouth daily, Disp: 90 tablet, Rfl: 3    potassium chloride (MICRO-K) 10 MEQ CR capsule, Take 1 capsule (10 mEq total) by mouth daily, Disp: 90 capsule, Rfl: 3    rosuvastatin (CRESTOR) 20 MG tablet, TAKE 1 TABLET DAILY, Disp: 90 tablet, Rfl: 2    Allergies   Allergen Reactions    Amoxicillin-Pot Clavulanate Anaphylaxis, Hives, Itching and Facial Swelling    Meperidine Anaphylaxis    Darvon [Propoxyphene]     Erythromycin Facial Swelling, Hives, Itching, Nasal Congestion, Swelling, Throat Swelling and Vomiting    Methocarbamol     Morphine Hives, Itching, Other (See Comments), Swelling and Syncope    Nabumetone Hives, Itching and Swelling    Penicillins     Reglan [Metoclopramide]     Tetracyclines & Related     Sulfa Antibiotics Hives, Itching, Rash and Swelling       Physical Exam   Constitutional: She is oriented to person, place, and time  She appears well-developed and well-nourished  No distress  HENT:   Head: Normocephalic and atraumatic  Right Ear: External ear normal    Left Ear: External ear normal    Nose: Nose normal    Mouth/Throat: No oropharyngeal exudate  Eyes: Pupils are equal, round, and reactive to light  Right eye exhibits no discharge  Left eye exhibits no discharge  No scleral icterus  Neck: Normal range of motion  Neck supple  No JVD present  No tracheal deviation present  No thyromegaly present  Cardiovascular: Normal rate, regular rhythm, normal heart sounds and intact distal pulses  Exam reveals no gallop and no friction rub  No murmur heard  Pulmonary/Chest: Effort normal and breath sounds normal  No stridor  No respiratory distress  She has no wheezes  She has no rales  She exhibits no tenderness  Abdominal: Soft  Bowel sounds are normal  She exhibits no distension and no mass  There is no tenderness  There is no rebound and no guarding  Genitourinary: Vagina normal and uterus normal    Genitourinary Comments: The external female genitalia is normal  The bartholin's, uretheral and skenes glands are normal  The urethral meatus is normal  Speculum exam reveals a grossly normal vagina with age-appropriate atrophy  No masses, lesions or bleeding  Bimanual exam notes a normal uterus with no adnexal masses  Musculoskeletal: Normal range of motion  She exhibits no edema, tenderness or deformity  Lymphadenopathy:     She has no cervical adenopathy  Neurological: She is alert and oriented to person, place, and time  She has normal reflexes  No cranial nerve deficit  She exhibits normal muscle tone  Coordination normal    Skin: Skin is warm and dry  No rash noted  She is not diaphoretic  No erythema  No pallor  Psychiatric: She has a normal mood and affect  Her behavior is normal  Judgment and thought content normal      Final Diagnosis   A  Endometrium, biopsy:     - Endometrial adenocarcinoma, endometrioid type, FIGO grade I         Pelvic ultrasound 6/15/2018  FINDINGS:     UTERUS:  The uterus is anteverted in position, measuring 7 6 x 2 7 x 4 6 cm  Contour and echotexture appear normal   The cervix shows no suspicious abnormality      ENDOMETRIUM:    Regional thickness is approximately 10 to 11 mm  There is no hypervascularity      OVARIES/ADNEXA:  Right ovary:  1 5 x 1 1 x 1 3 cm  No suspicious right ovarian abnormality  Doppler flow within normal limits      Left ovary:  1 8 x 1 2 x 1 5 cm  No suspicious left ovarian abnormality  Doppler flow within normal limits      No suspicious adnexal mass or loculated collections  There is no free fluid      IMPRESSION:     1  Endometrial thickness is between 10 and 11 mm  In a postmenopausal patient with vaginal bleeding, tissue sampling is recommended  2  Postmenopausal ovarian size is normal   3  There is no adnexal mass or cyst   There is no free fluid within the pelvis  Андрей Squires MD, PhD, Tato Benitez  Attending Physician, [address] [place] [address]

## 2018-06-27 NOTE — LETTER
June 27, 2018     Raymond Jade MD  [address] [place] [address]    Patient: Whitney Renee   YOB: 1950   Date of Visit: 6/27/2018       Dear Dr Juarez Hardy: Thank you for referring Mckenna Xavier to me for evaluation  Below are my notes for this consultation  If you have questions, please do not hesitate to call me  I look forward to following your patient along with you  Sincerely,        Tiny Arzola MD        CC: No Recipients  Tiny Arzola MD  6/27/2018  2:18 PM  Sign at close encounter  Whitney Renee  1950  [address] [address]    Chief Complaint   Patient presents with    Consult     Endometrial cancer       Assessment/Plan     Assessment:  1  Endometrial cancer [facility])  Case request operating room: HYSTERECTOMY LAPAROSCOPIC TOTAL ([address] [place] [address]) W/ ROBOTICS    Type and screen    Comprehensive metabolic panel    CBC and differential    HEMOGLOBIN A1C W/ EAG ESTIMATION    Case request operating room: HYSTERECTOMY LAPAROSCOPIC TOTAL ([address] [place] [address]) W/ ROBOTICS         Plan:  The patient is requesting definitive surgical management  The patient has signed informed consent for a robotic assisted total laparoscopic hysterectomy, bilateral salpingo-oophorectomy, bilateral pelvic lymph node dissection, possible exploratory laparotomy and any other indicated procedure  Patient understands the risks, benefits and alternative treatments and agrees to proceed  The patient understands the risks associated with surgery which include, but are not limited to: infection, deep vein thrombosis, blood clots to the lungs, wound healing problems, complications with extensive blood loss, blood transfusion, damage to nearby organs (ureters, bladder, bowel, major nerves and blood vessels), anesthesia and death  The patient will require both medical and cardiac clearance prior to her surgery      History of Present Illness: The patient is a delightful 68-year-old  with recently diagnosed endometrial cancer  She presents for evaluation and treatment  The patient presented with postmenopausal bleeding for approximately 1 month     An endometrial biopsy was performed which showed a 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  Patient went through menopause at age 62  Patient endorses a history of abnormal Pap smears in the past but have been negative most recently  Patient has used both birth control and combination hormone replacement therapy in the past   Patient has had a tubal ligation for birth control  Patient has also had 2  sections  Endometrial cancer ([facility] [address] )    2018 Biopsy     FIGO 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  Biopsy performed by Dr Rod Lopez  Review of Systems   Constitutional: Positive for diaphoresis and fatigue  Negative for activity change, appetite change, chills, fever and unexpected weight change  HENT: Positive for congestion and sneezing  Negative for dental problem, drooling, ear discharge, ear pain, facial swelling, hearing loss, mouth sores, nosebleeds, postnasal drip, rhinorrhea, sinus pain, sinus pressure, sore throat, tinnitus, trouble swallowing and voice change  Eyes: Positive for itching  Negative for photophobia, pain, discharge, redness and visual disturbance  Respiratory: Positive for shortness of breath  Negative for apnea, cough, choking, chest tightness, wheezing and stridor  Cardiovascular: Negative for chest pain, palpitations and leg swelling  Gastrointestinal: Positive for abdominal distention and constipation  Negative for abdominal pain, anal bleeding, blood in stool, diarrhea, nausea, rectal pain and vomiting  Endocrine: Negative for cold intolerance, heat intolerance, polydipsia, polyphagia and polyuria  Genitourinary: Positive for vaginal bleeding and vaginal discharge   Negative for decreased urine volume, difficulty urinating, dyspareunia, dysuria, enuresis, flank pain, frequency, genital sores, hematuria, menstrual problem, pelvic pain, urgency and vaginal pain  Musculoskeletal: Positive for back pain and myalgias  Negative for arthralgias, gait problem, joint swelling, neck pain and neck stiffness  Skin: Negative for color change, pallor, rash and wound  Allergic/Immunologic: Positive for environmental allergies  Negative for food allergies and immunocompromised state  Neurological: Negative for dizziness, tremors, seizures, syncope, facial asymmetry, speech difficulty, weakness, light-headedness, numbness and headaches  Hematological: Negative for adenopathy  Does not bruise/bleed easily  Psychiatric/Behavioral: Positive for sleep disturbance  Negative for agitation, behavioral problems, confusion, decreased concentration, dysphoric mood, hallucinations, self-injury and suicidal ideas  The patient is not nervous/anxious and is not hyperactive          Past Medical History:   Diagnosis Date    Abnormal mammogram     Abnormal weight gain     Achilles tendinitis     Angina pectoris ([facility])     Asthma     COPD (chronic obstructive pulmonary disease) ([facility])     Disc degeneration, lumbar     Disease of thyroid gland     hypo    Dyslipidemia     Early satiety     Enthesopathy     hip region    Esophagitis, reflux     Hypercholesterolemia     Hypertension     IBS (irritable bowel syndrome)     Irritable bowel syndrome     Left heart failure ([facility])     Osteoarthritis     Rosacea     Sacroiliitis ([facility] [address] )        Past Surgical History:   Procedure Laterality Date     SECTION      x2    COLONOSCOPY      ELBOW SURGERY      left ulna/radius    FOOT SURGERY Right     KNEE ARTHROSCOPY W/ MENISCAL REPAIR Right     REPLACEMENT TOTAL KNEE Right     SHOULDER ARTHROPLASTY      SINUS SURGERY      TONSILLECTOMY      TUBAL LIGATION      WRIST SURGERY      ganglonic cyst OB History      Para Term  AB Living    3 3 3     3    SAB TAB Ectopic Multiple Live Births            3        Obstetric Comments    Menarche 13  Hx bcp  Hx abnormal pap  Hx hormone replacement          Family History   Problem Relation Age of Onset    Arthritis Mother     Heart disease Mother         cardiac disorder    Diabetes Mother     Hiatal hernia Mother     Hypertension Mother     Irritable bowel syndrome Mother     Breast cancer Mother         x2   Kiarra Aleman Uterine cancer Mother     Osteoporosis Mother     Thyroid disease Mother     Other Mother         gastric reflux    Heart disease Father         cardiac disorder    Hiatal hernia Father     Ulcers Father     Other Father         gastric reflux    Hiatal hernia Sister     Thyroid disease Sister     Other Sister         gastric reflux    Lung cancer Sister     No Known Problems Brother     Brain cancer Maternal Grandmother     Breast cancer Maternal Grandmother     No Known Problems Maternal Grandfather     No Known Problems Paternal Grandmother     No Known Problems Paternal Grandfather     Hiatal hernia Child     Other Child         gastric reflux    Irritable bowel syndrome Daughter     Breast cancer Maternal Aunt         x3 sis    Uterine cancer Maternal Aunt         x3 sis       Social History     Social History    Marital status:      Spouse name: N/A    Number of children: N/A    Years of education: N/A     Occupational History    nurse      full-time     Social History Main Topics    Smoking status: Never Smoker    Smokeless tobacco: Never Used    Alcohol use Yes      Comment: (history) occasional    Drug use: No    Sexual activity: Not Currently     Other Topics Concern    Not on file     Social History Narrative    Active advance directive    DME- freestyle lite blood glucose meter device kit QID             Current Outpatient Prescriptions:     albuterol (VENTOLIN HFA) 90 mcg/act inhaler, Inhale 2 puffs 4 (four) times a day as needed, Disp: , Rfl:     aspirin (ASPIR-81) 81 mg EC tablet, Take by mouth, Disp: , Rfl:     B Complex Vitamins (B COMPLEX 1 PO), Take by mouth, Disp: , Rfl:     budesonide-formoterol (SYMBICORT) 160-4 5 mcg/act inhaler, Inhale Twice daily, Disp: , Rfl:     Calcium Carbonate-Vitamin D (CALCIUM 600+D) 600-200 MG-UNIT TABS, Take by mouth, Disp: , Rfl:     clindamycin (CLEOCIN) 300 MG capsule, Take by mouth, Disp: , Rfl:     clotrimazole-betamethasone (LOTRISONE) 1-0 05 % cream, Apply topically daily at bedtime as needed (for itching), Disp: 45 g, Rfl: 1    ezetimibe (ZETIA) 10 mg tablet, TAKE 1 TABLET DAILY, Disp: 90 tablet, Rfl: 2    FREESTYLE LITE test strip, USE TO TEST BLOOD SUGARS FOUR TIMES A DAY, Disp: 400 each, Rfl: 2    furosemide (LASIX) 20 mg tablet, Take 1 tablet (20 mg total) by mouth daily, Disp: 90 tablet, Rfl: 3    glipiZIDE (GLUCOTROL XL) 2 5 mg 24 hr tablet, Take 1 tablet (2 5 mg total) by mouth daily, Disp: 90 tablet, Rfl: 3    insulin lispro (HUMALOG) 100 units/mL injection, Inject under the skin, Disp: , Rfl:     Insulin Syringe-Needle U-100 (BD INSULIN SYRINGE ULTRAFINE) 31G X 15/64" 0 3 ML MISC, by Does not apply route daily Use as directed , Disp: 25 each, Rfl: 3    Lancets (FREESTYLE) lancets, by Does not apply route 2 (two) times a day, Disp: , Rfl:     levothyroxine 75 mcg tablet, Take 1 tablet (75 mcg total) by mouth daily, Disp: 90 tablet, Rfl: 3    LINZESS 145 MCG CAPS, TAKE 1 CAPSULE IN THE MORNING AT LEAST 30 MINUTES BEFORE BREAKFAST DAILY, Disp: 90 capsule, Rfl: 3    losartan (COZAAR) 50 mg tablet, Take 1 tablet (50 mg total) by mouth daily, Disp: 90 tablet, Rfl: 3    Magnesium Oxide 400 MG CAPS, Take by mouth, Disp: , Rfl:     meloxicam (MOBIC) 15 mg tablet, Take 1 tablet (15 mg total) by mouth daily as needed (P r n  pain), Disp: 90 tablet, Rfl: 3    metFORMIN (GLUCOPHAGE-XR) 500 mg 24 hr tablet, Take 500 mg by mouth 3 (three) times a day with meals, Disp: , Rfl:     Omega-3 Fatty Acids (FISH OIL) 1200 MG CAPS, Take by mouth, Disp: , Rfl:     pantoprazole (PROTONIX) 40 mg tablet, Take 1 tablet (40 mg total) by mouth daily, Disp: 90 tablet, Rfl: 3    potassium chloride (MICRO-K) 10 MEQ CR capsule, Take 1 capsule (10 mEq total) by mouth daily, Disp: 90 capsule, Rfl: 3    rosuvastatin (CRESTOR) 20 MG tablet, TAKE 1 TABLET DAILY, Disp: 90 tablet, Rfl: 2    Allergies   Allergen Reactions    Amoxicillin-Pot Clavulanate Anaphylaxis, Hives, Itching and Facial Swelling    Meperidine Anaphylaxis    Darvon [Propoxyphene]     Erythromycin Facial Swelling, Hives, Itching, Nasal Congestion, Swelling, Throat Swelling and Vomiting    Methocarbamol     Morphine Hives, Itching, Other (See Comments), Swelling and Syncope    Nabumetone Hives, Itching and Swelling    Penicillins     Reglan [Metoclopramide]     Tetracyclines & Related     Sulfa Antibiotics Hives, Itching, Rash and Swelling       Physical Exam   Constitutional: She is oriented to person, place, and time  She appears well-developed and well-nourished  No distress  HENT:   Head: Normocephalic and atraumatic  Right Ear: External ear normal    Left Ear: External ear normal    Nose: Nose normal    Mouth/Throat: No oropharyngeal exudate  Eyes: Pupils are equal, round, and reactive to light  Right eye exhibits no discharge  Left eye exhibits no discharge  No scleral icterus  Neck: Normal range of motion  Neck supple  No JVD present  No tracheal deviation present  No thyromegaly present  Cardiovascular: Normal rate, regular rhythm, normal heart sounds and intact distal pulses  Exam reveals no gallop and no friction rub  No murmur heard  Pulmonary/Chest: Effort normal and breath sounds normal  No stridor  No respiratory distress  She has no wheezes  She has no rales  She exhibits no tenderness  Abdominal: Soft   Bowel sounds are normal  She exhibits no distension and no mass  There is no tenderness  There is no rebound and no guarding  Genitourinary: Vagina normal and uterus normal    Genitourinary Comments: The external female genitalia is normal  The bartholin's, uretheral and skenes glands are normal  The urethral meatus is normal  Speculum exam reveals a grossly normal vagina with age-appropriate atrophy  No masses, lesions or bleeding  Bimanual exam notes a normal uterus with no adnexal masses  Musculoskeletal: Normal range of motion  She exhibits no edema, tenderness or deformity  Lymphadenopathy:     She has no cervical adenopathy  Neurological: She is alert and oriented to person, place, and time  She has normal reflexes  No cranial nerve deficit  She exhibits normal muscle tone  Coordination normal    Skin: Skin is warm and dry  No rash noted  She is not diaphoretic  No erythema  No pallor  Psychiatric: She has a normal mood and affect  Her behavior is normal  Judgment and thought content normal      Final Diagnosis   A  Endometrium, biopsy:     - Endometrial adenocarcinoma, endometrioid type, FIGO grade I         Pelvic ultrasound 6/15/2018  FINDINGS:     UTERUS:  The uterus is anteverted in position, measuring 7 6 x 2 7 x 4 6 cm  Contour and echotexture appear normal   The cervix shows no suspicious abnormality      ENDOMETRIUM:    Regional thickness is approximately 10 to 11 mm  There is no hypervascularity      OVARIES/ADNEXA:  Right ovary:  1 5 x 1 1 x 1 3 cm  No suspicious right ovarian abnormality  Doppler flow within normal limits      Left ovary:  1 8 x 1 2 x 1 5 cm  No suspicious left ovarian abnormality  Doppler flow within normal limits      No suspicious adnexal mass or loculated collections  There is no free fluid      IMPRESSION:     1  Endometrial thickness is between 10 and 11 mm  In a postmenopausal patient with vaginal bleeding, tissue sampling is recommended  2   Postmenopausal ovarian size is normal   3  There is no adnexal mass or cyst   There is no free fluid within the pelvis  Андрей Salcido MD, PhD, Silvia Staton  Attending Physician, [address] [place] [address]

## 2018-06-28 ENCOUNTER — APPOINTMENT (OUTPATIENT)
Dept: PHYSICAL THERAPY | Facility: CLINIC | Age: 68
End: 2018-06-28
Payer: COMMERCIAL

## 2018-07-12 RX ORDER — SACCHAROMYCES BOULARDII 250 MG
250 CAPSULE ORAL DAILY
COMMUNITY

## 2018-07-12 RX ORDER — CETIRIZINE HYDROCHLORIDE 10 MG/1
10 TABLET, CHEWABLE ORAL DAILY
COMMUNITY
End: 2022-06-21 | Stop reason: ALTCHOICE

## 2018-07-12 NOTE — PRE-PROCEDURE INSTRUCTIONS
Pre-Surgery Instructions:   Medication Instructions    albuterol (VENTOLIN HFA) 90 mcg/act inhaler Instructed patient per Anesthesia Guidelines   aspirin (ASPIR-81) 81 mg EC tablet Instructed patient per Anesthesia Guidelines   B Complex Vitamins (B COMPLEX 1 PO) Instructed patient per Anesthesia Guidelines   budesonide-formoterol (SYMBICORT) 160-4 5 mcg/act inhaler Instructed patient per Anesthesia Guidelines   Calcium Carbonate-Vitamin D (CALCIUM 600+D) 600-200 MG-UNIT TABS Instructed patient per Anesthesia Guidelines   cetirizine (ZyrTEC) 10 MG chewable tablet Instructed patient per Anesthesia Guidelines   clindamycin (CLEOCIN) 300 MG capsule Instructed patient per Anesthesia Guidelines   clotrimazole-betamethasone (LOTRISONE) 1-0 05 % cream Patient was instructed to contact Physician for medication instruction   ezetimibe (ZETIA) 10 mg tablet Instructed patient per Anesthesia Guidelines   furosemide (LASIX) 20 mg tablet Patient was instructed to contact Physician for medication instruction   glipiZIDE (GLUCOTROL XL) 2 5 mg 24 hr tablet Instructed patient per Anesthesia Guidelines   KRILL OIL PO Instructed patient per Anesthesia Guidelines   levothyroxine 75 mcg tablet Instructed patient per Anesthesia Guidelines   LINZESS 145 MCG CAPS Instructed patient per Anesthesia Guidelines   losartan (COZAAR) 50 mg tablet Instructed patient per Anesthesia Guidelines   Magnesium Oxide 400 MG CAPS Instructed patient per Anesthesia Guidelines   meloxicam (MOBIC) 15 mg tablet Instructed patient per Anesthesia Guidelines   metFORMIN (GLUCOPHAGE) 1000 MG tablet Instructed patient per Anesthesia Guidelines   metFORMIN (GLUCOPHAGE-XR) 500 mg 24 hr tablet Instructed patient per Anesthesia Guidelines   Omega-3 Fatty Acids (FISH OIL) 1200 MG CAPS Instructed patient per Anesthesia Guidelines   pantoprazole (PROTONIX) 40 mg tablet Instructed patient per Anesthesia Guidelines   potassium chloride (MICRO-K) 10 MEQ CR capsule Patient was instructed to contact Physician for medication instruction   rosuvastatin (CRESTOR) 20 MG tablet Instructed patient per Anesthesia Guidelines   saccharomyces boulardii (FLORASTOR) 250 mg capsule Instructed patient per Anesthesia Guidelines  REVIEWED  PRINTED SURGICAL INSTRUCTIONS WITH PATIENT , PATIENT VERBALIZED UNDERSTANDING  MEDICATIONS REVIEWED  Education Index     Med Instructions Troubleshoot   ACE/ARB Med Class     Do not take this medication the day before and the morning of the day of surgery/procedure  Diuretic Med Class     Continue this medication up to the evening before surgery/procedure, but do not take the morning of the day of surgery  ASA Med Class: Aspirin     Should be discontinued at least one week prior to planned operation, unless specifically stated otherwise by surgical service  Your Surgeon may have patient stop taking aspirin up to a week before surgery if having intracranial, middle ear, posterior eye, spine surgery or prostate surgery  [Patients taking aspirin for coronary stents should be reviewed by an anesthesiologist in the optimization clinic  Please do not discontinue aspirin in patients with coronary stents unless given specific permission to do so by the cardiologist who prescribed medication ]   If your surgeon approves please continue to take this medication on your normal schedule  You may take this medication on the morning of your surgery with a sip of water  Herbal Med Class     Stop taking this herbal medications at least one week prior to surgery/procedure  Inhalational Med Class     Continue to take these inhaler medications on your normal schedule up to and including the day of surgery     Insulin Med Class     Pre-Surgery/Procedure Instructions for Adult Patients who Take Medicine for Diabetes or to Control their Blood Sugar     Day Before Surgery/Procedure  Use the directions based on the type of medicine you take for your diabetes  1  If you are having a procedure that does not require a bowel prep:  ? Pre-Mixed Insulin (Intermediate Acting: Humalog 75/25, Humulin 70/30  Novolog 70/30, Regular Insulin)  § Take ½ your regular dose the evening before your procedure  ? Rapid/Fast Acting Insulin/Long Acting Insulin (Humalog U200, NovoLog, Apidra, Lantus, Levemir, Georgetta [place], [place])  § Take your FULL regular dose the day before procedure  ? Oral Diabetic Medicines including Glipizide/Glimepiride/Glucotrol (sulfonylurea)  § Take your regular dose with dinner the evening before your procedure  2  If you are having a procedure (e g  Colonoscopy) that requires a bowel prep and you are allowed to have at least a clear liquid diet:  ? Pre-Mixed Insulin (Intermediate Acting: Humalog 75/25, Humulin 70/30, Novolog 70/30, Regular Insulin)  § Take ½ your regular dose the evening before your procedure  ? Rapid/Fast Acting Insulin (Humalog U200, NovoLog, Apidra, Fiasp)  § Take ½ your regular dose the evening before your procedure  ? Long Acting Insulin (Lantus, Levemir, Georgetta [place])  § Take your FULL regular dose the day before procedure  ? Oral Glipizide/Glimepiride/Glucotrol (sulfonylurea)  § Take ½ your regular dose the evening before your procedure  ? Oral Diabetic Medicines that are NOT Glipizide/Glimepiride/Glucotrol  § Take your regular dose with dinner in the evening before your procedure      Day of Surgery/Procedure  · Long Acting Insulin (Lantus, Levemir, Georgetta [place])  ? If you usually take your Long-Acting Insulin in the morning, take the full dose as scheduled  · With the exception of the morning Long-Acting Insulin noted above, DO NOT take ANY diabetic medicine on the day of your procedure unless you were instructed by the doctor who manages your diabetic medicines  · Continue to check your blood sugars    · If you have an insulin pump then consult with your Endocrinologist for instructions  · If you cannot see your Endocrinologist, on the day of the procedure set your insulin pump to your basal rate only  Please bring your insulin pump supplies to the hospital      This Educational material has been approved by the Patient Education Advisory Committee  Date prepared: 1/17/2018          Expiration date: 1/17/2019        Approval Number:                     NonStatin Med Class     Continue to take this medication on your normal schedule  If this is an oral medication and you take it in the morning, then you may take this medicine with a sip of water  NSAID Med Class     Stop taking this medication at least 3 days prior to surgery/procedure  Statin Med Class     Continue to take this medication on your normal schedule  If this is an oral medication and you take it in the morning, then you may take this medicine with a sip of water  Thyroxine Med Class     Continue to take this medication on your normal schedule  If this is an oral medication and you take it in the morning, then you may take this medicine with a sip of water

## 2018-07-14 ENCOUNTER — APPOINTMENT (OUTPATIENT)
Dept: LAB | Facility: CLINIC | Age: 68
End: 2018-07-14
Payer: COMMERCIAL

## 2018-07-14 DIAGNOSIS — E11.8 TYPE 2 DIABETES MELLITUS WITH COMPLICATION, WITHOUT LONG-TERM CURRENT USE OF INSULIN (HCC): ICD-10-CM

## 2018-07-14 DIAGNOSIS — E03.9 HYPOTHYROIDISM, UNSPECIFIED TYPE: ICD-10-CM

## 2018-07-14 DIAGNOSIS — C54.1 ENDOMETRIAL CANCER (HCC): ICD-10-CM

## 2018-07-14 DIAGNOSIS — E78.00 PURE HYPERCHOLESTEROLEMIA: ICD-10-CM

## 2018-07-14 LAB
ALBUMIN SERPL BCP-MCNC: 4.1 G/DL (ref 3.5–5)
ALP SERPL-CCNC: 63 U/L (ref 46–116)
ALT SERPL W P-5'-P-CCNC: 73 U/L (ref 12–78)
ANION GAP SERPL CALCULATED.3IONS-SCNC: 6 MMOL/L (ref 4–13)
AST SERPL W P-5'-P-CCNC: 40 U/L (ref 5–45)
BACTERIA UR QL AUTO: NORMAL /HPF
BASOPHILS # BLD AUTO: 0.06 THOUSANDS/ΜL (ref 0–0.1)
BASOPHILS NFR BLD AUTO: 1 % (ref 0–1)
BILIRUB SERPL-MCNC: 0.38 MG/DL (ref 0.2–1)
BILIRUB UR QL STRIP: NEGATIVE
BUN SERPL-MCNC: 20 MG/DL (ref 5–25)
CALCIUM SERPL-MCNC: 9 MG/DL (ref 8.3–10.1)
CHLORIDE SERPL-SCNC: 105 MMOL/L (ref 100–108)
CHOLEST SERPL-MCNC: 94 MG/DL (ref 50–200)
CLARITY UR: CLEAR
CO2 SERPL-SCNC: 26 MMOL/L (ref 21–32)
COLOR UR: YELLOW
CREAT SERPL-MCNC: 0.84 MG/DL (ref 0.6–1.3)
CREAT UR-MCNC: 53.2 MG/DL
EOSINOPHIL # BLD AUTO: 0.13 THOUSAND/ΜL (ref 0–0.61)
EOSINOPHIL NFR BLD AUTO: 2 % (ref 0–6)
ERYTHROCYTE [DISTWIDTH] IN BLOOD BY AUTOMATED COUNT: 13.9 % (ref 11.6–15.1)
EST. AVERAGE GLUCOSE BLD GHB EST-MCNC: 131 MG/DL
GFR SERPL CREATININE-BSD FRML MDRD: 71 ML/MIN/1.73SQ M
GLUCOSE P FAST SERPL-MCNC: 111 MG/DL (ref 65–99)
GLUCOSE UR STRIP-MCNC: NEGATIVE MG/DL
HBA1C MFR BLD: 6.2 % (ref 4.2–6.3)
HCT VFR BLD AUTO: 41.9 % (ref 34.8–46.1)
HDLC SERPL-MCNC: 36 MG/DL (ref 40–60)
HGB BLD-MCNC: 12.9 G/DL (ref 11.5–15.4)
HGB UR QL STRIP.AUTO: NEGATIVE
HYALINE CASTS #/AREA URNS LPF: NORMAL /LPF
IMM GRANULOCYTES # BLD AUTO: 0.01 THOUSAND/UL (ref 0–0.2)
IMM GRANULOCYTES NFR BLD AUTO: 0 % (ref 0–2)
KETONES UR STRIP-MCNC: NEGATIVE MG/DL
LDLC SERPL CALC-MCNC: 35 MG/DL (ref 0–100)
LEUKOCYTE ESTERASE UR QL STRIP: ABNORMAL
LYMPHOCYTES # BLD AUTO: 2.68 THOUSANDS/ΜL (ref 0.6–4.47)
LYMPHOCYTES NFR BLD AUTO: 48 % (ref 14–44)
MCH RBC QN AUTO: 27.9 PG (ref 26.8–34.3)
MCHC RBC AUTO-ENTMCNC: 30.8 G/DL (ref 31.4–37.4)
MCV RBC AUTO: 91 FL (ref 82–98)
MICROALBUMIN UR-MCNC: <5 MG/L (ref 0–20)
MICROALBUMIN/CREAT 24H UR: <9 MG/G CREATININE (ref 0–30)
MONOCYTES # BLD AUTO: 0.4 THOUSAND/ΜL (ref 0.17–1.22)
MONOCYTES NFR BLD AUTO: 7 % (ref 4–12)
NEUTROPHILS # BLD AUTO: 2.39 THOUSANDS/ΜL (ref 1.85–7.62)
NEUTS SEG NFR BLD AUTO: 42 % (ref 43–75)
NITRITE UR QL STRIP: NEGATIVE
NON-SQ EPI CELLS URNS QL MICRO: NORMAL /HPF
NONHDLC SERPL-MCNC: 58 MG/DL
NRBC BLD AUTO-RTO: 0 /100 WBCS
PH UR STRIP.AUTO: 7 [PH] (ref 4.5–8)
PLATELET # BLD AUTO: 325 THOUSANDS/UL (ref 149–390)
PMV BLD AUTO: 9.4 FL (ref 8.9–12.7)
POTASSIUM SERPL-SCNC: 4.4 MMOL/L (ref 3.5–5.3)
PROT SERPL-MCNC: 7.5 G/DL (ref 6.4–8.2)
PROT UR STRIP-MCNC: NEGATIVE MG/DL
RBC # BLD AUTO: 4.62 MILLION/UL (ref 3.81–5.12)
RBC #/AREA URNS AUTO: NORMAL /HPF
SODIUM SERPL-SCNC: 137 MMOL/L (ref 136–145)
SP GR UR STRIP.AUTO: 1.01 (ref 1–1.03)
TRIGL SERPL-MCNC: 114 MG/DL
TSH SERPL DL<=0.05 MIU/L-ACNC: 1.3 UIU/ML (ref 0.36–3.74)
UROBILINOGEN UR QL STRIP.AUTO: 0.2 E.U./DL
WBC # BLD AUTO: 5.67 THOUSAND/UL (ref 4.31–10.16)
WBC #/AREA URNS AUTO: NORMAL /HPF

## 2018-07-14 PROCEDURE — 82043 UR ALBUMIN QUANTITATIVE: CPT | Performed by: INTERNAL MEDICINE

## 2018-07-14 PROCEDURE — 86900 BLOOD TYPING SEROLOGIC ABO: CPT

## 2018-07-14 PROCEDURE — 80061 LIPID PANEL: CPT

## 2018-07-14 PROCEDURE — 85025 COMPLETE CBC W/AUTO DIFF WBC: CPT

## 2018-07-14 PROCEDURE — 81001 URINALYSIS AUTO W/SCOPE: CPT | Performed by: INTERNAL MEDICINE

## 2018-07-14 PROCEDURE — 86901 BLOOD TYPING SEROLOGIC RH(D): CPT

## 2018-07-14 PROCEDURE — 36415 COLL VENOUS BLD VENIPUNCTURE: CPT

## 2018-07-14 PROCEDURE — 80053 COMPREHEN METABOLIC PANEL: CPT

## 2018-07-14 PROCEDURE — 86850 RBC ANTIBODY SCREEN: CPT

## 2018-07-14 PROCEDURE — 84443 ASSAY THYROID STIM HORMONE: CPT

## 2018-07-14 PROCEDURE — 82570 ASSAY OF URINE CREATININE: CPT | Performed by: INTERNAL MEDICINE

## 2018-07-14 PROCEDURE — 83036 HEMOGLOBIN GLYCOSYLATED A1C: CPT

## 2018-07-15 LAB
ABO GROUP BLD: NORMAL
BLD GP AB SCN SERPL QL: NEGATIVE
RH BLD: POSITIVE
SPECIMEN EXPIRATION DATE: NORMAL

## 2018-07-17 ENCOUNTER — APPOINTMENT (OUTPATIENT)
Dept: LAB | Facility: CLINIC | Age: 68
End: 2018-07-17
Payer: COMMERCIAL

## 2018-07-17 ENCOUNTER — CONSULT (OUTPATIENT)
Dept: INTERNAL MEDICINE CLINIC | Facility: CLINIC | Age: 68
End: 2018-07-17
Payer: COMMERCIAL

## 2018-07-17 VITALS
SYSTOLIC BLOOD PRESSURE: 134 MMHG | BODY MASS INDEX: 37.03 KG/M2 | WEIGHT: 209 LBS | OXYGEN SATURATION: 98 % | HEART RATE: 69 BPM | HEIGHT: 63 IN | DIASTOLIC BLOOD PRESSURE: 80 MMHG

## 2018-07-17 DIAGNOSIS — Z01.818 PREOPERATIVE EVALUATION TO RULE OUT SURGICAL CONTRAINDICATION: Primary | ICD-10-CM

## 2018-07-17 DIAGNOSIS — Z01.818 PREOPERATIVE EVALUATION TO RULE OUT SURGICAL CONTRAINDICATION: ICD-10-CM

## 2018-07-17 LAB
APTT PPP: 29 SECONDS (ref 24–36)
INR PPP: 0.98 (ref 0.86–1.17)
PROTHROMBIN TIME: 13.1 SECONDS (ref 11.8–14.2)

## 2018-07-17 PROCEDURE — 36415 COLL VENOUS BLD VENIPUNCTURE: CPT

## 2018-07-17 PROCEDURE — 85610 PROTHROMBIN TIME: CPT

## 2018-07-17 PROCEDURE — 99243 OFF/OP CNSLTJ NEW/EST LOW 30: CPT | Performed by: INTERNAL MEDICINE

## 2018-07-17 PROCEDURE — 85730 THROMBOPLASTIN TIME PARTIAL: CPT

## 2018-07-17 NOTE — PROGRESS NOTES
Assessment/Plan:  The patient is stable from a cardiopulmonary standpoint  She does have some dyspnea on exertion up hills but on the flat is normal   She has adequate oxygen saturations with an O2 sat of 96  Blood sugars are under control at this time  She had a negative stress test recently  She is going to be seen by her cardiologist shortly to evaluate her prior to her surgery  From a medical standpoint she is optimized for surgery and there is no contraindication from a medical standpoint to her anticipated surgery  She will be discontinuing any vitamin supplements and aspirin prior to surgery  I will see her postoperatively  Recent Results (from the past 1008 hour(s))   Tissue Exam    Collection Time: 06/14/18  4:39 PM   Result Value Ref Range    Case Report       Surgical Pathology Report                         Case: [id number]                                   Authorizing Provider:  Denisse Gongora MD    Collected:           06/14/2018 6685              Ordering Location:     [address] [place], [place] [address]way:            06/14/2018 1639                                     [facility]                                                                          Pathologist:           Telma Walker MD                                                         Specimen:    Endometrium, EMB                                                                           Final Diagnosis       A  Endometrium, biopsy:     - Endometrial adenocarcinoma, endometrioid type, FIGO grade I  Note       Intradepartmental consultation is in agreement  Results telephoned to Dr Jenny Whitten on 6/20/18 ast 1045 hours  Additional Information       All controls performed with the immunohistochemical stains reported above reacted appropriately    These tests were developed and their performance characteristics determined by [facility] or [facility]  They may not be cleared or approved by the [place]  Food and Drug Administration  The FDA has determined that such clearance or approval is not necessary  These tests are used for clinical purposes  They should not be regarded as investigational or for research  This laboratory has been approved by CLIA 88, designated as a high-complexity laboratory and is qualified to perform these tests  - Interpretation performed at [facility] [place], [address] [place] [address]       Gross Description       A  The specimen is received in formalin, labeled with the patient's name and hospital number, and is designated "EMB  The specimen consists of multiple tan-red soft tissue and hemorrhagic tissue fragments admixed with tan, friable caseous material measuring in aggregate 2 7 x 1 0 x 0 1 cm in greatest dimension  The specimen is entirely submitted in 1 cassette  Note: The estimated total formalin fixation time based upon information provided by the submitting clinician and the standard processing schedule is under 72 hours       ANUPAMA PRICE     Tissue Exam    Collection Time: 06/14/18  4:41 PM   Result Value Ref Range    Case Report       Surgical Pathology Report                         Case: [id number]                                   Authorizing Provider:  Brittani Tripathi MD    Collected:           06/14/2018 1641              Ordering Location:     [address] [place], [place] [address]way:            06/14/2018 [address] [place] [address]                                     [facility]                                                                          Pathologist:           Nolan Thao MD                                                               Specimen:    Endometrium, EMB                                                                           Addendum Gabriella Escalante of Dr Kush omalley was notified of the diagnosis on 6/20/18 at 9:21 am via phone  Final Diagnosis       A  Endometrium (biopsy):  - Endometrioid adenocarcinoma (FIGO grade 1 in this limited biopsy material)    Comment:   - Intradepartmental consultation concurs with the diagnosis of carcinoma   - Dr Ana Smith was notified of the diagnosis on 6/18/18 at 10:21 am via Epic messaging/email  Interpretation performed at [facility], [address] [place] [id number]        Additional Information       All controls performed with the immunohistochemical stains reported above reacted appropriately  These tests were developed and their performance characteristics determined by [facility] or [address] [place] [address]  They may not be cleared or approved by the [place]  Food and Drug Administration  The FDA has determined that such clearance or approval is not necessary  These tests are used for clinical purposes  They should not be regarded as investigational or for research  This laboratory has been approved by [facility] 88, designated as a high-complexity laboratory and is qualified to perform these tests  Gross Description       A  The specimen is received in formalin, labeled with the patient's name and medical record number, and is designated "EMB  The specimen consists of multiple tan-red soft tissue and hemorrhagic tissue fragments admixed with tan, focally friable caseous material measuring in aggregate 1 5 x 0 3 by less than 0 1 cm in greatest dimension  The specimen is entirely submitted in 1 cassette  Note: The estimated total formalin fixation time based upon information provided by the submitting clinician and the standard processing schedule is under 72 hours       Kalie     UA (URINE) with reflex to Microscopic    Collection Time: 07/14/18  7:38 AM   Result Value Ref Range    Color, UA Yellow     Clarity, UA Clear     Specific [place], UA 1 011 1 003 - 1 030 pH, UA 7 0 4 5 - 8 0    Leukocytes, UA Small (A) Negative    Nitrite, UA Negative Negative    Protein, UA Negative Negative mg/dl    Glucose, UA Negative Negative mg/dl    Ketones, UA Negative Negative mg/dl    Urobilinogen, UA 0 2 0 2, 1 0 E U /dl E U /dl    Bilirubin, UA Negative Negative    Blood, UA Negative Negative   Microalbumin / creatinine urine ratio    Collection Time: 07/14/18  7:38 AM   Result Value Ref Range    Creatinine, Ur 53 2 mg/dL    Microalbum  ,U,Random <5 0 0 0 - 20 0 mg/L    Microalb Creat Ratio <9 0 - 30 mg/g creatinine   Hemoglobin A1c    Collection Time: 07/14/18  7:38 AM   Result Value Ref Range    Hemoglobin A1C 6 2 4 2 - 6 3 %     mg/dl   Lipid panel    Collection Time: 07/14/18  7:38 AM   Result Value Ref Range    Cholesterol 94 50 - 200 mg/dL    Triglycerides 114 <=150 mg/dL    HDL, Direct 36 (L) 40 - 60 mg/dL    LDL Calculated 35 0 - 100 mg/dL    Non-HDL-Chol (CHOL-HDL) 58 mg/dl   TSH, 3rd generation with T4 reflex    Collection Time: 07/14/18  7:38 AM   Result Value Ref Range    TSH 3RD GENERATON 1 300 0 358 - 3 740 uIU/mL   Type and screen    Collection Time: 07/14/18  7:38 AM   Result Value Ref Range    ABO Grouping B     Rh Factor Positive     Antibody Screen Negative     Specimen Expiration Date [id number]    Comprehensive metabolic panel    Collection Time: 07/14/18  7:38 AM   Result Value Ref Range    Sodium 137 136 - 145 mmol/L    Potassium 4 4 3 5 - 5 3 mmol/L    Chloride 105 100 - 108 mmol/L    CO2 26 21 - 32 mmol/L    Anion Gap 6 4 - 13 mmol/L    BUN 20 5 - 25 mg/dL    Creatinine 0 84 0 60 - 1 30 mg/dL    Glucose, Fasting 111 (H) 65 - 99 mg/dL    Calcium 9 0 8 3 - 10 1 mg/dL    AST 40 5 - 45 U/L    ALT 73 12 - 78 U/L    Alkaline Phosphatase 63 46 - 116 U/L    Total Protein 7 5 6 4 - 8 2 g/dL    Albumin 4 1 3 5 - 5 0 g/dL    Total Bilirubin 0 38 0 20 - 1 00 mg/dL    eGFR 71 ml/min/1 73sq m   CBC and differential    Collection Time: 07/14/18  7:38 AM   Result Value Ref Range    WBC 5 67 4 31 - 10 16 Thousand/uL    RBC 4 62 3 81 - 5 12 Million/uL    Hemoglobin 12 9 11 5 - 15 4 g/dL    Hematocrit 41 9 34 8 - 46 1 %    MCV 91 82 - 98 fL    MCH 27 9 26 8 - 34 3 pg    MCHC 30 8 (L) 31 4 - 37 4 g/dL    RDW 13 9 11 6 - 15 1 %    MPV 9 4 8 9 - 12 7 fL    Platelets 275 972 - 274 Thousands/uL    nRBC 0 /100 WBCs    Neutrophils Relative 42 (L) 43 - 75 %    Immat GRANS % 0 0 - 2 %    Lymphocytes Relative 48 (H) 14 - 44 %    Monocytes Relative 7 4 - 12 %    Eosinophils Relative 2 0 - 6 %    Basophils Relative 1 0 - 1 %    Neutrophils Absolute 2 39 1 85 - 7 62 Thousands/µL    Immature Grans Absolute 0 01 0 00 - 0 20 Thousand/uL    Lymphocytes Absolute 2 68 0 60 - 4 47 Thousands/µL    Monocytes Absolute 0 40 0 17 - 1 22 Thousand/µL    Eosinophils Absolute 0 13 0 00 - 0 61 Thousand/µL    Basophils Absolute 0 06 0 00 - 0 10 Thousands/µL   Urine Microscopic    Collection Time: 07/14/18  7:38 AM   Result Value Ref Range    RBC, UA None Seen None Seen, 0-5 /hpf    WBC, UA None Seen None Seen, 0-5, 5-55, 5-65 /hpf    Epithelial Cells None Seen None Seen, Occasional /hpf    Bacteria, UA None Seen None Seen, Occasional /hpf    Hyaline Casts, UA None Seen None Seen /lpf       1  Preoperative evaluation to rule out surgical contraindication  Protime-INR    APTT       Orders Placed This Encounter   Procedures    Protime-INR    APTT         Subjective:  Uterine cancer     Patient ID: Adeel Born is a 77 y o  female  HPI she comes in for preoperative evaluation  She has a history of multiple medical problems including 1  Diabetes 2  Asthma 3  Hypothyroidism 4  Irritable bowel syndrome 5  Hyperlipidemia 6  Hypertension  The patient was recently found to have uterine bleeding  Biopsy confirmed endometrial carcinoma  She is for bilateral oophorectomy and hysterectomy on July 30, 2018       The patient has multiple problems as listed below    She actually has been feeling fairly well lately  She had a negative stress test several months ago  She has not had any recurrent chest pain  She has some dyspnea on exertion climbing [place]  Her blood sugars are under control with an A1c of 6 2  The following portions of the patient's history were reviewed and updated as appropriate:   She has a past medical history of Abnormal mammogram; Abnormal weight gain; Achilles tendinitis; Angina pectoris ([facility] [facility] ); Asthma; COPD (chronic obstructive pulmonary disease) ([facility] ); Diabetes mellitus ([facility] ); Disc degeneration, lumbar; Disease of thyroid gland; Dyslipidemia; Early satiety; Edema; Enthesopathy; Esophagitis, reflux; GERD (gastroesophageal reflux disease); Hard to intubate; Hypercholesterolemia; Hypertension; IBS (irritable bowel syndrome); Irritable bowel syndrome; Osteoarthritis; Rosacea; and Sacroiliitis ([facility] [facility] )  ,   does not have any pertinent problems on file  ,   has a past surgical history that includes  section; Foot surgery (Right); SHOULDER ARTHROPLASTY; Sinus surgery; Tonsillectomy; Replacement total knee (Right); Tubal ligation; Wrist surgery; Elbow surgery; Knee arthroscopy w/ meniscal repair (Right); Colonoscopy; Knee arthroscopy; Joint replacement (Left); Fracture surgery; Fracture surgery (Left); and Esophagogastroduodenoscopy  ,  family history includes Arthritis in her mother; Brain cancer in her maternal grandmother; Breast cancer in her maternal aunt, maternal grandmother, and mother; Diabetes in her mother; Heart disease in her father and mother; Hiatal hernia in her child, father, mother, and sister; Hypertension in her mother; Irritable bowel syndrome in her daughter and mother; Lung cancer in her sister; [place] Cousin Hypertension in her son; No Known Problems in her brother, maternal grandfather, paternal grandfather, and paternal grandmother; Osteoporosis in her mother; Other in her child, father, mother, and sister;  Thyroid disease in her mother and sister; Ulcers in her father; Uterine cancer in her maternal aunt and mother  ,   reports that she has never smoked  She has never used smokeless tobacco  She reports that she drinks about 1 8 oz of alcohol per week   She reports that she does not use drugs  ,  is allergic to amoxicillin-pot clavulanate; meperidine; morphine; darvon [propoxyphene]; erythromycin; methocarbamol; nabumetone; penicillins; tetracyclines & related; reglan [metoclopramide]; and sulfa antibiotics       Current Outpatient Prescriptions:     albuterol (VENTOLIN HFA) 90 mcg/act inhaler, Inhale 2 puffs 4 (four) times a day as needed, Disp: , Rfl:     aspirin (ASPIR-81) 81 mg EC tablet, Take by mouth, Disp: , Rfl:     B Complex Vitamins (B COMPLEX 1 PO), Take by mouth, Disp: , Rfl:     budesonide-formoterol (SYMBICORT) 160-4 5 mcg/act inhaler, Inhale Twice daily, Disp: , Rfl:     Calcium Carbonate-Vitamin D (CALCIUM 600+D) 600-200 MG-UNIT TABS, Take by mouth, Disp: , Rfl:     cetirizine (ZyrTEC) 10 MG chewable tablet, Chew 10 mg daily, Disp: , Rfl:     clindamycin (CLEOCIN) 300 MG capsule, Take by mouth Before dental procedures 600mg , Disp: , Rfl:     clotrimazole-betamethasone (LOTRISONE) 1-0 05 % cream, Apply topically daily at bedtime as needed (for itching), Disp: 45 g, Rfl: 1    ezetimibe (ZETIA) 10 mg tablet, TAKE 1 TABLET DAILY, Disp: 90 tablet, Rfl: 2    furosemide (LASIX) 20 mg tablet, Take 1 tablet (20 mg total) by mouth daily, Disp: 90 tablet, Rfl: 3    glipiZIDE (GLUCOTROL XL) 2 5 mg 24 hr tablet, Take 1 tablet (2 5 mg total) by mouth daily, Disp: 90 tablet, Rfl: 3    insulin lispro (HUMALOG) 100 units/mL injection, Inject under the skin Only when on steroids or with a sick day , Disp: , Rfl:     KRILL OIL PO, Take by mouth, Disp: , Rfl:     levothyroxine 75 mcg tablet, Take 1 tablet (75 mcg total) by mouth daily, Disp: 90 tablet, Rfl: 3    LINZESS 145 MCG CAPS, TAKE 1 CAPSULE IN THE MORNING AT LEAST 30 MINUTES BEFORE BREAKFAST DAILY, Disp: 90 capsule, Rfl: 3    losartan (COZAAR) 50 mg tablet, Take 1 tablet (50 mg total) by mouth daily, Disp: 90 tablet, Rfl: 3    Magnesium Oxide 400 MG CAPS, Take by mouth, Disp: , Rfl:     meloxicam (MOBIC) 15 mg tablet, Take 1 tablet (15 mg total) by mouth daily as needed (P r n  pain), Disp: 90 tablet, Rfl: 3    metFORMIN (GLUCOPHAGE) 1000 MG tablet, Take 1,000 mg by mouth 2 (two) times a day with meals, Disp: , Rfl:     metFORMIN (GLUCOPHAGE-XR) 500 mg 24 hr tablet, Take 500 mg by mouth daily with breakfast  , Disp: , Rfl:     Omega-3 Fatty Acids (FISH OIL) 1200 MG CAPS, Take by mouth, Disp: , Rfl:     pantoprazole (PROTONIX) 40 mg tablet, Take 1 tablet (40 mg total) by mouth daily, Disp: 90 tablet, Rfl: 3    potassium chloride (MICRO-K) 10 MEQ CR capsule, Take 1 capsule (10 mEq total) by mouth daily (Patient taking differently: Take 20 mEq by mouth daily  ), Disp: 90 capsule, Rfl: 3    rosuvastatin (CRESTOR) 20 MG tablet, TAKE 1 TABLET DAILY, Disp: 90 tablet, Rfl: 2    saccharomyces boulardii (FLORASTOR) 250 mg capsule, Take 250 mg by mouth 3 (three) times a day, Disp: , Rfl:     Review of Systems   Constitutional: Negative for activity change, appetite change, chills, diaphoresis, fatigue, fever and unexpected weight change  HENT: Negative for congestion, ear pain, hearing loss, mouth sores, nosebleeds, postnasal drip, sinus pain, sinus pressure, sore throat and trouble swallowing  Eyes: Negative for pain, discharge and visual disturbance  Respiratory: Positive for shortness of breath  Negative for apnea, cough, chest tightness and wheezing  Cardiovascular: Negative for chest pain, palpitations and leg swelling  Gastrointestinal: Negative for abdominal pain, anal bleeding, blood in stool, constipation, diarrhea, nausea and vomiting  Endocrine: Negative for polydipsia and polyphagia  Genitourinary: Positive for vaginal bleeding   Negative for decreased urine volume, dysuria, flank pain, frequency, hematuria and urgency  Musculoskeletal: Negative for arthralgias, back pain, gait problem, joint swelling and myalgias  Skin: Negative for rash and wound  Allergic/Immunologic: Negative for environmental allergies and food allergies  Neurological: Negative for dizziness, tremors, seizures, syncope, speech difficulty, light-headedness, numbness and headaches  Hematological: Negative for adenopathy  Does not bruise/bleed easily  Psychiatric/Behavioral: Negative for agitation, confusion, hallucinations, sleep disturbance and suicidal ideas  The patient is not nervous/anxious  Objective:  /80 (BP Location: Left arm)   Pulse 69   Ht 5' 3 25" (1 607 m)   Wt 94 8 kg (209 lb)   SpO2 98%   BMI 36 73 kg/m²      Physical Exam   Constitutional: No distress  She is overweight  She weighs 209 pounds  Her blood pressure is 136/72  Rhythm is regular  Rate is 80  Respiratory rate is 20  HENT:   Head: Normocephalic  Right Ear: External ear normal    Left Ear: External ear normal    Nose: Nose normal    Mouth/Throat: Oropharynx is clear and moist  No oropharyngeal exudate  Eyes: Conjunctivae and EOM are normal  Pupils are equal, round, and reactive to light  Right eye exhibits no discharge  Left eye exhibits no discharge  Neck: Normal range of motion  Neck supple  No thyromegaly present  Cardiovascular: Normal rate, regular rhythm, normal heart sounds and intact distal pulses  Exam reveals no gallop and no friction rub  No murmur heard  Pulmonary/Chest: Effort normal and breath sounds normal  No respiratory distress  She has no wheezes  She has no rales  Abdominal: Soft  Bowel sounds are normal  She exhibits no distension and no mass  There is no tenderness  There is no rebound and no guarding  Abdomen is overweight  Nontender  No appreciable masses  Musculoskeletal: Normal range of motion  She exhibits no edema, tenderness or deformity     Lymphadenopathy:     She has no cervical adenopathy  Neurological: She is alert  She has normal reflexes  No cranial nerve deficit  Coordination normal    Skin: Skin is warm and dry  No rash noted  No erythema  Psychiatric: She has a normal mood and affect  Her behavior is normal  Judgment and thought content normal    Nursing note and vitals reviewed

## 2018-07-18 ENCOUNTER — OFFICE VISIT (OUTPATIENT)
Dept: CARDIOLOGY CLINIC | Facility: CLINIC | Age: 68
End: 2018-07-18
Payer: COMMERCIAL

## 2018-07-18 ENCOUNTER — ANESTHESIA EVENT (OUTPATIENT)
Dept: PERIOP | Facility: HOSPITAL | Age: 68
End: 2018-07-18
Payer: COMMERCIAL

## 2018-07-18 VITALS
WEIGHT: 208.8 LBS | DIASTOLIC BLOOD PRESSURE: 76 MMHG | HEIGHT: 63 IN | HEART RATE: 84 BPM | SYSTOLIC BLOOD PRESSURE: 128 MMHG | OXYGEN SATURATION: 96 % | BODY MASS INDEX: 37 KG/M2

## 2018-07-18 DIAGNOSIS — E78.00 PURE HYPERCHOLESTEROLEMIA: ICD-10-CM

## 2018-07-18 DIAGNOSIS — I10 ESSENTIAL HYPERTENSION: ICD-10-CM

## 2018-07-18 DIAGNOSIS — Z01.810 PREOPERATIVE CARDIOVASCULAR EXAMINATION: Primary | ICD-10-CM

## 2018-07-18 PROCEDURE — 99213 OFFICE O/P EST LOW 20 MIN: CPT | Performed by: INTERNAL MEDICINE

## 2018-07-18 PROCEDURE — 93000 ELECTROCARDIOGRAM COMPLETE: CPT | Performed by: INTERNAL MEDICINE

## 2018-07-18 NOTE — PROGRESS NOTES
[facility] [address]  [facility] [id number]  Cardiology Follow Up    Darline Kurtz  1950  [id number]    1  Preoperative cardiovascular examination  POCT ECG   2  Essential hypertension     3  Pure hypercholesterolemia         Chief Complaint   Patient presents with    Pre-op Exam     Clearance        Interval History:  Patient presents for cardiac clearance to undergo hysterectomy as she was recently diagnosed with endometrial cancer  Patient states she is feeling well from a cardiac standpoint, she denies any chest pain, chest pressure, chest heaviness, shortness of breath  She denies any dizziness, syncope, palpitations  She had recent stress test and echocardiogram done March 2018       Patient Active Problem List   Diagnosis    Actinic keratosis    Seborrheic keratosis    Screening for skin condition    Diabetes mellitus type 2, controlled, without complications ([address] )    Dyslipidemia, goal LDL below 79    Dysmetabolic syndrome X    Benign essential hypertension    Obesity    Lichen sclerosus    Endometrial cancer ([address] )    Preoperative evaluation to rule out surgical contraindication     Past Medical History:   Diagnosis Date    Abnormal mammogram     Abnormal weight gain     Achilles tendinitis     Angina pectoris (HCC)     Asthma     COPD (chronic obstructive pulmonary disease) (HCC)     Diabetes mellitus (HCC)     Disc degeneration, lumbar     Disease of thyroid gland     hypo    Dyslipidemia     Early satiety     Edema     idiopathic peripheral    Enthesopathy     hip region    Esophagitis, reflux     GERD (gastroesophageal reflux disease)     Hard to intubate     difficulty with intubation and had to be intubated twice    Hypercholesterolemia     Hypertension     IBS (irritable bowel syndrome)     Irritable bowel syndrome     Osteoarthritis     Rosacea     Sacroiliitis ([address] )      Social History     Social History    Marital status:      Spouse name: N/A    Number of children: N/A    Years of education: N/A     Occupational History    nurse      full-time     Social History Main Topics    Smoking status: Never Smoker    Smokeless tobacco: Never Used    Alcohol use 1 8 oz/week     3 Glasses of wine per week      Comment: (history) occasional   per week    Drug use: No    Sexual activity: Not Currently     Other Topics Concern    Not on file     Social History Narrative    Active advance directive    DME- freestyle lite blood glucose meter device kit QID          Family History   Problem Relation Age of Onset    Arthritis Mother     Heart disease Mother         cardiac disorder    Diabetes Mother     Hiatal hernia Mother     Hypertension Mother     Irritable bowel syndrome Mother     Breast cancer Mother         x2    Uterine cancer Mother     Osteoporosis Mother     Thyroid disease Mother     Other Mother         gastric reflux    Heart disease Father         cardiac disorder    Hiatal hernia Father     Ulcers Father     Other Father         gastric reflux    Hiatal hernia Sister     Thyroid disease Sister     Other Sister         gastric reflux    Lung cancer Sister     No Known Problems Brother     Brain cancer Maternal Grandmother     Breast cancer Maternal Grandmother     No Known Problems Maternal Grandfather     No Known Problems Paternal Grandmother     No Known Problems Paternal Grandfather     Hiatal hernia Child     Other Child         gastric reflux    Irritable bowel syndrome Daughter     Breast cancer Maternal Aunt         x3 sis    Uterine cancer Maternal Aunt         x3 sis    Malig Hypertension Son      Past Surgical History:   Procedure Laterality Date     SECTION      x2    COLONOSCOPY      ELBOW SURGERY      left ulna/radius    ESOPHAGOGASTRODUODENOSCOPY      FOOT SURGERY Right     FRACTURE SURGERY      FRACTURE SURGERY Left     tibia    JOINT REPLACEMENT Left     shoulder,  right knee    KNEE ARTHROSCOPY      KNEE ARTHROSCOPY W/ MENISCAL REPAIR Right     REPLACEMENT TOTAL KNEE Right     SHOULDER ARTHROPLASTY      SINUS SURGERY      TONSILLECTOMY      TUBAL LIGATION      WRIST SURGERY      ganglonic cyst       Current Outpatient Prescriptions:     albuterol (VENTOLIN HFA) 90 mcg/act inhaler, Inhale 2 puffs 4 (four) times a day as needed, Disp: , Rfl:     aspirin (ASPIR-81) 81 mg EC tablet, Take by mouth, Disp: , Rfl:     B Complex Vitamins (B COMPLEX 1 PO), Take by mouth, Disp: , Rfl:     budesonide-formoterol (SYMBICORT) 160-4 5 mcg/act inhaler, Inhale Twice daily, Disp: , Rfl:     Calcium Carbonate-Vitamin D (CALCIUM 600+D) 600-200 MG-UNIT TABS, Take by mouth, Disp: , Rfl:     cetirizine (ZyrTEC) 10 MG chewable tablet, Chew 10 mg daily, Disp: , Rfl:     clindamycin (CLEOCIN) 300 MG capsule, Take by mouth Before dental procedures 600mg , Disp: , Rfl:     clotrimazole-betamethasone (LOTRISONE) 1-0 05 % cream, Apply topically daily at bedtime as needed (for itching), Disp: 45 g, Rfl: 1    ezetimibe (ZETIA) 10 mg tablet, TAKE 1 TABLET DAILY, Disp: 90 tablet, Rfl: 2    furosemide (LASIX) 20 mg tablet, Take 1 tablet (20 mg total) by mouth daily, Disp: 90 tablet, Rfl: 3    glipiZIDE (GLUCOTROL XL) 2 5 mg 24 hr tablet, Take 1 tablet (2 5 mg total) by mouth daily, Disp: 90 tablet, Rfl: 3    insulin lispro (HUMALOG) 100 units/mL injection, Inject under the skin Only when on steroids or with a sick day , Disp: , Rfl:     KRILL OIL PO, Take by mouth, Disp: , Rfl:     levothyroxine 75 mcg tablet, Take 1 tablet (75 mcg total) by mouth daily, Disp: 90 tablet, Rfl: 3    LINZESS 145 MCG CAPS, TAKE 1 CAPSULE IN THE MORNING AT LEAST 30 MINUTES BEFORE BREAKFAST DAILY, Disp: 90 capsule, Rfl: 3    losartan (COZAAR) 50 mg tablet, Take 1 tablet (50 mg total) by mouth daily, Disp: 90 tablet, Rfl: 3    Magnesium Oxide 400 MG CAPS, Take by mouth, Disp: , Rfl:    meloxicam (MOBIC) 15 mg tablet, Take 1 tablet (15 mg total) by mouth daily as needed (P r n  pain), Disp: 90 tablet, Rfl: 3    metFORMIN (GLUCOPHAGE) 1000 MG tablet, Take 1,000 mg by mouth 2 (two) times a day with meals, Disp: , Rfl:     metFORMIN (GLUCOPHAGE-XR) 500 mg 24 hr tablet, Take 500 mg by mouth daily with breakfast  , Disp: , Rfl:     Omega-3 Fatty Acids (FISH OIL) 1200 MG CAPS, Take by mouth, Disp: , Rfl:     pantoprazole (PROTONIX) 40 mg tablet, Take 1 tablet (40 mg total) by mouth daily, Disp: 90 tablet, Rfl: 3    potassium chloride (MICRO-K) 10 MEQ CR capsule, Take 1 capsule (10 mEq total) by mouth daily (Patient taking differently: Take 20 mEq by mouth daily  ), Disp: 90 capsule, Rfl: 3    rosuvastatin (CRESTOR) 20 MG tablet, TAKE 1 TABLET DAILY, Disp: 90 tablet, Rfl: 2    saccharomyces boulardii (FLORASTOR) 250 mg capsule, Take 250 mg by mouth 3 (three) times a day, Disp: , Rfl:   Allergies   Allergen Reactions    Amoxicillin-Pot Clavulanate Anaphylaxis, Hives, Itching and Facial Swelling    Meperidine Anaphylaxis    Morphine Hives, Itching, Swelling, Other (See Comments) and Syncope     hypotension    Darvon [Propoxyphene]     Erythromycin Facial Swelling, Hives, Itching, Nasal Congestion, Swelling, Throat Swelling and Vomiting    Methocarbamol     Nabumetone Hives, Itching and Swelling    Penicillins     Tetracyclines & Related     Reglan [Metoclopramide] Anxiety    Sulfa Antibiotics Hives, Itching, Rash and Swelling       Echocardiogram, EF 60%, trace MR, mild aortic stenosis, trace TR  Stress test, normal study  EF 65%, no ischemia  Imaging: No results found  Review of Systems:  Review of Systems   Constitutional: Negative  HENT: Negative  Respiratory: Negative  Cardiovascular: Negative  Genitourinary: Negative  Neurological: Negative  All other systems reviewed and are negative          /76   Pulse 84   Ht 5' 3 25" (1 607 m)   Wt 94 7 kg (208 lb 12 8 oz)   SpO2 96%   BMI 36 70 kg/m²     Physical Exam:  Physical Exam   Constitutional: She is oriented to person, place, and time  She appears well-developed and well-nourished  HENT:   Head: Normocephalic and atraumatic  Cardiovascular: Normal rate, regular rhythm and normal heart sounds  Pulmonary/Chest: Effort normal and breath sounds normal    Musculoskeletal: Normal range of motion  Neurological: She is alert and oriented to person, place, and time  Psychiatric: She has a normal mood and affect  Her behavior is normal  Judgment and thought content normal    Nursing note and vitals reviewed  Discussion/Summary:  1-preoperative cardiac clearance to undergo hysterectomy because of endometrial cancer  EKG done, normal sinus rhythm, within normal limits, rate 77    2-hypertension, stable  Continue all medications    3-hyperlipidemia, stable  Continue medications    There is no contraindication of a cardiac standpoint to undergo hysterectomy    Continue all medications  Previous studies reviewed with patient, medications reviewed and possible side effects discussed  Continue risk factor modification  Optimize weight, regular exercise and follow up with appropriate specialists and primary care physician as discussed  All questions answered  Patient advised to report any problems prompting to medical attention   Return for follow up visit in 6 months or earlier if needed

## 2018-07-18 NOTE — PATIENT INSTRUCTIONS
Continue all medications  Previous studies reviewed with patient, medications reviewed and possible side effects discussed  Continue risk factor modification  Optimize weight, regular exercise and follow up with appropriate specialists and primary care physician as discussed  All questions answered  Patient advised to report any problems prompting to medical attention   Return for follow up visit in 6 months or earlier if needed

## 2018-07-20 ENCOUNTER — APPOINTMENT (OUTPATIENT)
Dept: RADIOLOGY | Facility: CLINIC | Age: 68
End: 2018-07-20
Payer: COMMERCIAL

## 2018-07-20 DIAGNOSIS — C54.1 ENDOMETRIAL CANCER (HCC): ICD-10-CM

## 2018-07-20 PROCEDURE — 71046 X-RAY EXAM CHEST 2 VIEWS: CPT

## 2018-07-25 ENCOUNTER — OFFICE VISIT (OUTPATIENT)
Dept: PULMONOLOGY | Facility: CLINIC | Age: 68
End: 2018-07-25
Payer: COMMERCIAL

## 2018-07-25 VITALS
SYSTOLIC BLOOD PRESSURE: 124 MMHG | HEIGHT: 63 IN | HEART RATE: 77 BPM | WEIGHT: 210 LBS | OXYGEN SATURATION: 97 % | BODY MASS INDEX: 37.21 KG/M2 | DIASTOLIC BLOOD PRESSURE: 84 MMHG

## 2018-07-25 DIAGNOSIS — J45.30 MILD PERSISTENT ASTHMA WITHOUT COMPLICATION: Primary | ICD-10-CM

## 2018-07-25 DIAGNOSIS — R06.02 SOB (SHORTNESS OF BREATH): ICD-10-CM

## 2018-07-25 DIAGNOSIS — G47.33 OBSTRUCTIVE SLEEP APNEA: ICD-10-CM

## 2018-07-25 DIAGNOSIS — E66.9 OBESITY (BMI 30-39.9): ICD-10-CM

## 2018-07-25 PROCEDURE — 99203 OFFICE O/P NEW LOW 30 MIN: CPT | Performed by: INTERNAL MEDICINE

## 2018-07-25 RX ORDER — ALBUTEROL SULFATE 90 UG/1
2 AEROSOL, METERED RESPIRATORY (INHALATION) EVERY 6 HOURS PRN
Qty: 18 G | Refills: 2 | Status: SHIPPED | OUTPATIENT
Start: 2018-07-25 | End: 2018-08-17 | Stop reason: CLARIF

## 2018-07-26 NOTE — PROGRESS NOTES
Assessment/Plan:   Diagnoses and all orders for this visit:    Mild persistent asthma without complication    SOB (shortness of breath)  -     Complete pulmonary function test; Future  -     albuterol (VENTOLIN HFA) 90 mcg/act inhaler; Inhale 2 puffs every 6 (six) hours as needed for wheezing    Obstructive sleep apnea    Obesity (BMI 30-39  9)        Mild persistent asthma without complication  Patient had PFTs in 2015 reviewed from [facility], with mild obstructive ventilatory limitation and response to the bronchodilator, mildly decreased DLCO  She will continue with her current medications, Symbicort 160/4 52 puffs twice daily and Ventolin MDI 2 puffs every 6 hourly when necessary  Obstructive sleep apnea mild not using CPAP,Her prior study demonstrating an RDI of 5, although increased in severity during the REM stage, no weight gain since her prior study in 2015  Could not tolerate the CPAP then  There is no pulmonary contraindication for the surgery intended, she has mild to moderate respiratory complications understands and verbalizes  She will continue with her inhalers postop, and incentive spirometry  Chest x-ray recently done and reviewed with no evidence of any infiltrates  Call if any worsening symptoms otherwise we will see her back in 3 months or when necessary earlier as needed  Return in about 3 months (around 10/25/2018)  All questions are answered to the patient's satisfaction and understanding  She verbalizes understanding  She is encouraged to call with any further questions or concerns  Portions of the record may have been created with voice recognition software  Occasional wrong word or "sound a like" substitutions may have occurred due to the inherent limitations of voice recognition software  Read the chart carefully and recognize, using context, where substitutions have occurred      Electronically Signed by Serene Stokes MD    ______________________________________________________________________    Chief Complaint:   Chief Complaint   Patient presents with    Shortness of Breath       Patient ID: Ana Palomares is a 77 y o  y o  female has a past medical history of Abnormal mammogram; Abnormal weight gain; Achilles tendinitis; Angina pectoris ([address] ); Asthma; COPD (chronic obstructive pulmonary disease) ([address] ); Diabetes mellitus ([address] ); Disc degeneration, lumbar; Disease of thyroid gland; Dyslipidemia; Early satiety; Edema; Enthesopathy; Esophagitis, reflux; GERD (gastroesophageal reflux disease); Hard to intubate; Hypercholesterolemia; Hypertension; IBS (irritable bowel syndrome); Irritable bowel syndrome; Osteoarthritis; Rosacea; and Sacroiliitis ([address] )  7/25/2018  Patient is a very pleasant 68-year-old lady who has never smoked, with history of hypertension, history of asthma mild intermittent 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[employer], currently retired, she was in [address] [place] [address] the past,  She was seen about 3 years ago, for daytime tiredness and fatigue, and she had an polysomnogram done, which demonstrated mild obstructive sleep apnea with increased respiratory events during the REM stage, patient could not tolerate the CPAP then and did not use it  Her asthma has been stable with the long-acting bronchodilator that she has been using  Not having the need to use the rescue inhaler frequently  She is currently going in for surgery recently diagnosed with endometrial cancer, going for surgery by robotic hysterectomy on 7/30/2018      primary symptoms   Associated symptoms include coughing (Occasional dry cough, states with the inhalers has been doing well ) and myalgias  Pertinent negatives include no chest pain, fever, headaches or sore throat  Review of Systems   Constitutional: Negative for appetite change and fever  HENT: Positive for postnasal drip, rhinorrhea and sneezing   Negative for ear pain, sore throat and trouble swallowing  Respiratory: Positive for cough (Occasional dry cough, states with the inhalers has been doing well ) and shortness of breath (Shortness of breath and dyspnea on exertion at baseline  )  Cardiovascular: Negative for chest pain  Musculoskeletal: Positive for myalgias  Neurological: Negative for headaches  Smoking history: She reports that she has never smoked   She has never used smokeless tobacco     The following portions of the patient's history were reviewed and updated as appropriate: allergies, current medications, past family history, past medical history, past social history, past surgical history and problem list     Immunization History   Administered Date(s) Administered    Influenza Split High Dose Preservative Free IM 10/06/2014, 09/30/2015, 10/20/2017    Pneumococcal Conjugate 13-Valent 09/30/2015    Pneumococcal Polysaccharide PPV23 11/17/2008, 10/06/2014, 03/23/2017     Current Outpatient Prescriptions   Medication Sig Dispense Refill    albuterol (VENTOLIN HFA) 90 mcg/act inhaler Inhale 2 puffs 4 (four) times a day as needed      aspirin (ASPIR-81) 81 mg EC tablet Take by mouth      B Complex Vitamins (B COMPLEX 1 PO) Take by mouth      budesonide-formoterol (SYMBICORT) 160-4 5 mcg/act inhaler Inhale Twice daily      Calcium Carbonate-Vitamin D (CALCIUM 600+D) 600-200 MG-UNIT TABS Take by mouth      cetirizine (ZyrTEC) 10 MG chewable tablet Chew 10 mg daily      clindamycin (CLEOCIN) 300 MG capsule Take by mouth Before dental procedures 600mg       clotrimazole-betamethasone (LOTRISONE) 1-0 05 % cream Apply topically daily at bedtime as needed (for itching) 45 g 1    ezetimibe (ZETIA) 10 mg tablet TAKE 1 TABLET DAILY 90 tablet 2    furosemide (LASIX) 20 mg tablet Take 1 tablet (20 mg total) by mouth daily 90 tablet 3    glipiZIDE (GLUCOTROL XL) 2 5 mg 24 hr tablet Take 1 tablet (2 5 mg total) by mouth daily 90 tablet 3    insulin lispro (HUMALOG) 100 units/mL injection Inject under the skin Only when on steroids or with a sick day       KRILL OIL PO Take by mouth      levothyroxine 75 mcg tablet Take 1 tablet (75 mcg total) by mouth daily 90 tablet 3    LINZESS 145 MCG CAPS TAKE 1 CAPSULE IN THE MORNING AT LEAST 30 MINUTES BEFORE BREAKFAST DAILY 90 capsule 3    losartan (COZAAR) 50 mg tablet Take 1 tablet (50 mg total) by mouth daily 90 tablet 3    Magnesium Oxide 400 MG CAPS Take by mouth      meloxicam (MOBIC) 15 mg tablet Take 1 tablet (15 mg total) by mouth daily as needed (P r n  pain) 90 tablet 3    metFORMIN (GLUCOPHAGE) 1000 MG tablet Take 1,000 mg by mouth 2 (two) times a day with meals      metFORMIN (GLUCOPHAGE-XR) 500 mg 24 hr tablet Take 500 mg by mouth daily with breakfast        Omega-3 Fatty Acids (FISH OIL) 1200 MG CAPS Take by mouth      pantoprazole (PROTONIX) 40 mg tablet Take 1 tablet (40 mg total) by mouth daily 90 tablet 3    potassium chloride (MICRO-K) 10 MEQ CR capsule Take 1 capsule (10 mEq total) by mouth daily (Patient taking differently: Take 20 mEq by mouth daily  ) 90 capsule 3    rosuvastatin (CRESTOR) 20 MG tablet TAKE 1 TABLET DAILY 90 tablet 2    saccharomyces boulardii (FLORASTOR) 250 mg capsule Take 250 mg by mouth 3 (three) times a day      albuterol (VENTOLIN HFA) 90 mcg/act inhaler Inhale 2 puffs every 6 (six) hours as needed for wheezing 18 g 2     No current facility-administered medications for this visit  Allergies: Amoxicillin-pot clavulanate; Meperidine; Morphine; Darvon [propoxyphene]; Erythromycin; Methocarbamol; Nabumetone; Penicillins; Tetracyclines & related; Reglan [metoclopramide]; and Sulfa antibiotics    Objective:  Vitals:    07/25/18 1032   BP: 124/84   Pulse: 77   SpO2: 97%   Weight: 95 3 kg (210 lb)   Height: 5' 3 26" (1 607 m)   Oxygen Therapy  SpO2: 97 %      Wt Readings from Last 3 Encounters:   07/25/18 95 3 kg (210 lb)   07/18/18 94 7 kg (208 lb 12 8 oz)   07/17/18 94 8 kg (209 lb) Body mass index is 36 89 kg/m²  Physical Exam   Constitutional: She is oriented to person, place, and time  She appears well-developed and well-nourished  HENT:   Head: Normocephalic and atraumatic  Crowded oropharyngeal airways, Mallampati score 3   Eyes: EOM are normal  Pupils are equal, round, and reactive to light  Neck: Normal range of motion  Neck supple  Short and wide neck   Cardiovascular: Normal rate, regular rhythm and normal heart sounds  Pulmonary/Chest: Effort normal and breath sounds normal    Abdominal: Soft  Bowel sounds are normal    Musculoskeletal: Normal range of motion  Neurological: She is alert and oriented to person, place, and time  Skin: Skin is warm and dry  Psychiatric: She has a normal mood and affect  Her behavior is normal         Xr Chest Pa & Lateral    Result Date: 7/23/2018  Narrative: CHEST INDICATION:   C54 1: Malignant neoplasm of endometrium  COMPARISON:  None EXAM PERFORMED/VIEWS:  XR CHEST PA & LATERAL FINDINGS: Cardiomediastinal silhouette appears unremarkable  Atherosclerosis of the aorta is noted  The lungs are clear  No pneumothorax or pleural effusion  Patient has a left shoulder prosthesis  No acute fractures or destructive bone lesions are appreciated  Impression: No acute cardiopulmonary disease   Workstation performed: [id number]   Answers for HPI/ROS submitted by the patient on 7/23/2018   Primary symptoms  Do you have a hoarse voice?: Yes  Chronicity: chronic  When did you first notice your symptoms?: more than 1 year ago  How often do your symptoms occur?: intermittently  Since you first noticed this problem, how has it changed?: unchanged  Do you have shortness of breath that occurs with effort or exertion?: Yes  Do you have ear congestion?: Yes  Do you have heartburn?: Yes  Do you have fatigue?: No  Do you have nasal congestion?: Yes  Do you have shortness of breath when lying flat?: No  Do you have shortness of breath when you wake up?: No  Do you have sweats?: Yes  Have you experienced weight loss?: No  Which of the following makes your symptoms worse?: exposure to smoke, lying down, pollen, strenuous activity, URI  Which of the following makes your symptoms better?: nothing

## 2018-07-30 ENCOUNTER — HOSPITAL ENCOUNTER (OUTPATIENT)
Facility: HOSPITAL | Age: 68
Setting detail: OUTPATIENT SURGERY
Discharge: HOME | End: 2018-07-30
Attending: OBSTETRICS & GYNECOLOGY | Admitting: OBSTETRICS & GYNECOLOGY
Payer: COMMERCIAL

## 2018-07-30 ENCOUNTER — ANESTHESIA (OUTPATIENT)
Dept: PERIOP | Facility: HOSPITAL | Age: 68
End: 2018-07-30
Payer: COMMERCIAL

## 2018-07-30 VITALS
SYSTOLIC BLOOD PRESSURE: 139 MMHG | OXYGEN SATURATION: 97 % | HEART RATE: 83 BPM | TEMPERATURE: 99.9 F | DIASTOLIC BLOOD PRESSURE: 77 MMHG | RESPIRATION RATE: 16 BRPM

## 2018-07-30 DIAGNOSIS — Z90.710 HISTORY OF ROBOT-ASSISTED LAPAROSCOPIC HYSTERECTOMY: Primary | ICD-10-CM

## 2018-07-30 DIAGNOSIS — C54.1 ENDOMETRIAL CANCER (HCC): ICD-10-CM

## 2018-07-30 LAB
ABO GROUP BLD: NORMAL
BLD GP AB SCN SERPL QL: NEGATIVE
GLUCOSE SERPL-MCNC: 158 MG/DL (ref 65–140)
GLUCOSE SERPL-MCNC: 160 MG/DL (ref 65–140)
GLUCOSE SERPL-MCNC: 239 MG/DL (ref 65–140)
RH BLD: POSITIVE
SPECIMEN EXPIRATION DATE: NORMAL

## 2018-07-30 PROCEDURE — 88341 IMHCHEM/IMCYTCHM EA ADD ANTB: CPT | Performed by: PATHOLOGY

## 2018-07-30 PROCEDURE — 86900 BLOOD TYPING SEROLOGIC ABO: CPT | Performed by: OBSTETRICS & GYNECOLOGY

## 2018-07-30 PROCEDURE — 88342 IMHCHEM/IMCYTCHM 1ST ANTB: CPT | Performed by: PATHOLOGY

## 2018-07-30 PROCEDURE — 82948 REAGENT STRIP/BLOOD GLUCOSE: CPT

## 2018-07-30 PROCEDURE — 88309 TISSUE EXAM BY PATHOLOGIST: CPT | Performed by: PATHOLOGY

## 2018-07-30 PROCEDURE — 86850 RBC ANTIBODY SCREEN: CPT | Performed by: OBSTETRICS & GYNECOLOGY

## 2018-07-30 PROCEDURE — 88112 CYTOPATH CELL ENHANCE TECH: CPT | Performed by: PATHOLOGY

## 2018-07-30 PROCEDURE — 88307 TISSUE EXAM BY PATHOLOGIST: CPT | Performed by: PATHOLOGY

## 2018-07-30 PROCEDURE — 58571 TLH W/T/O 250 G OR LESS: CPT | Performed by: OBSTETRICS & GYNECOLOGY

## 2018-07-30 PROCEDURE — 86901 BLOOD TYPING SEROLOGIC RH(D): CPT | Performed by: OBSTETRICS & GYNECOLOGY

## 2018-07-30 PROCEDURE — 38571 LAPAROSCOPY LYMPHADENECTOMY: CPT | Performed by: OBSTETRICS & GYNECOLOGY

## 2018-07-30 RX ORDER — INDOCYANINE GREEN AND WATER 25 MG
KIT INJECTION AS NEEDED
Status: DISCONTINUED | OUTPATIENT
Start: 2018-07-30 | End: 2018-07-30 | Stop reason: HOSPADM

## 2018-07-30 RX ORDER — ONDANSETRON 2 MG/ML
4 INJECTION INTRAMUSCULAR; INTRAVENOUS ONCE
Status: COMPLETED | OUTPATIENT
Start: 2018-07-30 | End: 2018-07-30

## 2018-07-30 RX ORDER — SODIUM CHLORIDE, SODIUM LACTATE, POTASSIUM CHLORIDE, CALCIUM CHLORIDE 600; 310; 30; 20 MG/100ML; MG/100ML; MG/100ML; MG/100ML
INJECTION, SOLUTION INTRAVENOUS CONTINUOUS PRN
Status: DISCONTINUED | OUTPATIENT
Start: 2018-07-30 | End: 2018-07-30 | Stop reason: SURG

## 2018-07-30 RX ORDER — SODIUM CHLORIDE, SODIUM LACTATE, POTASSIUM CHLORIDE, CALCIUM CHLORIDE 600; 310; 30; 20 MG/100ML; MG/100ML; MG/100ML; MG/100ML
125 INJECTION, SOLUTION INTRAVENOUS CONTINUOUS
Status: DISCONTINUED | OUTPATIENT
Start: 2018-07-30 | End: 2018-07-30 | Stop reason: HOSPADM

## 2018-07-30 RX ORDER — SODIUM CHLORIDE 9 MG/ML
INJECTION, SOLUTION INTRAVENOUS CONTINUOUS PRN
Status: DISCONTINUED | OUTPATIENT
Start: 2018-07-30 | End: 2018-07-30 | Stop reason: SURG

## 2018-07-30 RX ORDER — FENTANYL CITRATE 50 UG/ML
INJECTION, SOLUTION INTRAMUSCULAR; INTRAVENOUS AS NEEDED
Status: DISCONTINUED | OUTPATIENT
Start: 2018-07-30 | End: 2018-07-30 | Stop reason: SURG

## 2018-07-30 RX ORDER — PROPOFOL 10 MG/ML
INJECTION, EMULSION INTRAVENOUS CONTINUOUS PRN
Status: DISCONTINUED | OUTPATIENT
Start: 2018-07-30 | End: 2018-07-30 | Stop reason: SURG

## 2018-07-30 RX ORDER — ALBUMIN, HUMAN INJ 5% 5 %
SOLUTION INTRAVENOUS CONTINUOUS PRN
Status: DISCONTINUED | OUTPATIENT
Start: 2018-07-30 | End: 2018-07-30 | Stop reason: SURG

## 2018-07-30 RX ORDER — PROMETHAZINE HYDROCHLORIDE 25 MG/ML
12.5 INJECTION, SOLUTION INTRAMUSCULAR; INTRAVENOUS ONCE
Status: COMPLETED | OUTPATIENT
Start: 2018-07-30 | End: 2018-07-30

## 2018-07-30 RX ORDER — OXYCODONE HYDROCHLORIDE AND ACETAMINOPHEN 5; 325 MG/1; MG/1
1 TABLET ORAL EVERY 4 HOURS PRN
Qty: 30 TABLET | Refills: 0 | Status: SHIPPED | OUTPATIENT
Start: 2018-07-30 | End: 2018-08-09

## 2018-07-30 RX ORDER — LIDOCAINE HYDROCHLORIDE 10 MG/ML
INJECTION, SOLUTION INFILTRATION; PERINEURAL AS NEEDED
Status: DISCONTINUED | OUTPATIENT
Start: 2018-07-30 | End: 2018-07-30 | Stop reason: SURG

## 2018-07-30 RX ORDER — SUCCINYLCHOLINE CHLORIDE 20 MG/ML
INJECTION INTRAMUSCULAR; INTRAVENOUS AS NEEDED
Status: DISCONTINUED | OUTPATIENT
Start: 2018-07-30 | End: 2018-07-30 | Stop reason: SURG

## 2018-07-30 RX ORDER — MAGNESIUM HYDROXIDE 1200 MG/15ML
LIQUID ORAL AS NEEDED
Status: DISCONTINUED | OUTPATIENT
Start: 2018-07-30 | End: 2018-07-30 | Stop reason: HOSPADM

## 2018-07-30 RX ORDER — SCOLOPAMINE TRANSDERMAL SYSTEM 1 MG/1
1 PATCH, EXTENDED RELEASE TRANSDERMAL
Status: DISCONTINUED | OUTPATIENT
Start: 2018-07-30 | End: 2018-07-30 | Stop reason: HOSPADM

## 2018-07-30 RX ORDER — OXYCODONE HYDROCHLORIDE AND ACETAMINOPHEN 5; 325 MG/1; MG/1
1 TABLET ORAL EVERY 4 HOURS PRN
Status: DISCONTINUED | OUTPATIENT
Start: 2018-07-30 | End: 2018-07-30 | Stop reason: HOSPADM

## 2018-07-30 RX ORDER — EPHEDRINE SULFATE 50 MG/ML
INJECTION, SOLUTION INTRAVENOUS AS NEEDED
Status: DISCONTINUED | OUTPATIENT
Start: 2018-07-30 | End: 2018-07-30 | Stop reason: SURG

## 2018-07-30 RX ORDER — GLYCOPYRROLATE 0.2 MG/ML
INJECTION INTRAMUSCULAR; INTRAVENOUS AS NEEDED
Status: DISCONTINUED | OUTPATIENT
Start: 2018-07-30 | End: 2018-07-30 | Stop reason: SURG

## 2018-07-30 RX ORDER — ONDANSETRON 2 MG/ML
INJECTION INTRAMUSCULAR; INTRAVENOUS AS NEEDED
Status: DISCONTINUED | OUTPATIENT
Start: 2018-07-30 | End: 2018-07-30 | Stop reason: SURG

## 2018-07-30 RX ORDER — PROPOFOL 10 MG/ML
INJECTION, EMULSION INTRAVENOUS AS NEEDED
Status: DISCONTINUED | OUTPATIENT
Start: 2018-07-30 | End: 2018-07-30 | Stop reason: SURG

## 2018-07-30 RX ORDER — MIDAZOLAM HYDROCHLORIDE 1 MG/ML
INJECTION INTRAMUSCULAR; INTRAVENOUS AS NEEDED
Status: DISCONTINUED | OUTPATIENT
Start: 2018-07-30 | End: 2018-07-30 | Stop reason: SURG

## 2018-07-30 RX ORDER — BUPIVACAINE HYDROCHLORIDE 2.5 MG/ML
INJECTION, SOLUTION INFILTRATION; PERINEURAL AS NEEDED
Status: DISCONTINUED | OUTPATIENT
Start: 2018-07-30 | End: 2018-07-30 | Stop reason: HOSPADM

## 2018-07-30 RX ORDER — ROCURONIUM BROMIDE 10 MG/ML
INJECTION, SOLUTION INTRAVENOUS AS NEEDED
Status: DISCONTINUED | OUTPATIENT
Start: 2018-07-30 | End: 2018-07-30 | Stop reason: SURG

## 2018-07-30 RX ORDER — FENTANYL CITRATE/PF 50 MCG/ML
25 SYRINGE (ML) INJECTION
Status: COMPLETED | OUTPATIENT
Start: 2018-07-30 | End: 2018-07-30

## 2018-07-30 RX ORDER — GENTAMICIN SULFATE 80 MG/50ML
1.5 INJECTION, SOLUTION INTRAVENOUS ONCE
Status: COMPLETED | OUTPATIENT
Start: 2018-07-30 | End: 2018-07-30

## 2018-07-30 RX ORDER — ONDANSETRON 2 MG/ML
4 INJECTION INTRAMUSCULAR; INTRAVENOUS EVERY 6 HOURS PRN
Status: DISCONTINUED | OUTPATIENT
Start: 2018-07-30 | End: 2018-07-30 | Stop reason: HOSPADM

## 2018-07-30 RX ORDER — CLINDAMYCIN PHOSPHATE 900 MG/50ML
900 INJECTION INTRAVENOUS ONCE
Status: COMPLETED | OUTPATIENT
Start: 2018-07-30 | End: 2018-07-30

## 2018-07-30 RX ADMIN — PROPOFOL 200 MG: 10 INJECTION, EMULSION INTRAVENOUS at 07:41

## 2018-07-30 RX ADMIN — DEXAMETHASONE SODIUM PHOSPHATE 10 MG: 10 INJECTION INTRAMUSCULAR; INTRAVENOUS at 08:05

## 2018-07-30 RX ADMIN — GLYCOPYRROLATE 0.1 MG: 0.2 INJECTION, SOLUTION INTRAMUSCULAR; INTRAVENOUS at 07:28

## 2018-07-30 RX ADMIN — ONDANSETRON 4 MG: 2 INJECTION INTRAMUSCULAR; INTRAVENOUS at 09:23

## 2018-07-30 RX ADMIN — FENTANYL CITRATE 25 MCG: 50 INJECTION, SOLUTION INTRAMUSCULAR; INTRAVENOUS at 10:41

## 2018-07-30 RX ADMIN — GLYCOPYRROLATE 0.4 MG: 0.2 INJECTION, SOLUTION INTRAMUSCULAR; INTRAVENOUS at 09:24

## 2018-07-30 RX ADMIN — FENTANYL CITRATE 25 MCG: 50 INJECTION, SOLUTION INTRAMUSCULAR; INTRAVENOUS at 10:00

## 2018-07-30 RX ADMIN — GENTAMICIN SULFATE 80 MG: 80 INJECTION, SOLUTION INTRAVENOUS at 07:41

## 2018-07-30 RX ADMIN — FENTANYL CITRATE 50 MCG: 50 INJECTION, SOLUTION INTRAMUSCULAR; INTRAVENOUS at 07:41

## 2018-07-30 RX ADMIN — ALBUMIN (HUMAN): 12.5 SOLUTION INTRAVENOUS at 08:34

## 2018-07-30 RX ADMIN — FENTANYL CITRATE 25 MCG: 50 INJECTION, SOLUTION INTRAMUSCULAR; INTRAVENOUS at 10:21

## 2018-07-30 RX ADMIN — PROMETHAZINE HYDROCHLORIDE 12.5 MG: 25 INJECTION INTRAMUSCULAR; INTRAVENOUS at 10:22

## 2018-07-30 RX ADMIN — FENTANYL CITRATE 50 MCG: 50 INJECTION, SOLUTION INTRAMUSCULAR; INTRAVENOUS at 07:35

## 2018-07-30 RX ADMIN — PROPOFOL 140 MCG/KG/MIN: 10 INJECTION, EMULSION INTRAVENOUS at 07:41

## 2018-07-30 RX ADMIN — NEOSTIGMINE METHYLSULFATE 4 MG: 1 INJECTION, SOLUTION INTRAMUSCULAR; INTRAVENOUS; SUBCUTANEOUS at 09:24

## 2018-07-30 RX ADMIN — LIDOCAINE HYDROCHLORIDE 100 MG: 10 INJECTION, SOLUTION INFILTRATION; PERINEURAL at 07:41

## 2018-07-30 RX ADMIN — EPHEDRINE SULFATE 5 MG: 50 INJECTION, SOLUTION INTRAMUSCULAR; INTRAVENOUS; SUBCUTANEOUS at 08:47

## 2018-07-30 RX ADMIN — SCOPOLAMINE 1 PATCH: 1 PATCH, EXTENDED RELEASE TRANSDERMAL at 07:25

## 2018-07-30 RX ADMIN — ROCURONIUM BROMIDE 40 MG: 10 INJECTION INTRAVENOUS at 08:05

## 2018-07-30 RX ADMIN — ONDANSETRON 4 MG: 2 INJECTION INTRAMUSCULAR; INTRAVENOUS at 07:28

## 2018-07-30 RX ADMIN — SODIUM CHLORIDE, SODIUM LACTATE, POTASSIUM CHLORIDE, AND CALCIUM CHLORIDE: .6; .31; .03; .02 INJECTION, SOLUTION INTRAVENOUS at 07:11

## 2018-07-30 RX ADMIN — OXYCODONE HYDROCHLORIDE AND ACETAMINOPHEN 1 TABLET: 5; 325 TABLET ORAL at 12:35

## 2018-07-30 RX ADMIN — CLINDAMYCIN PHOSPHATE 900 MG: 18 INJECTION, SOLUTION INTRAMUSCULAR; INTRAVENOUS at 07:41

## 2018-07-30 RX ADMIN — SODIUM CHLORIDE: 0.9 INJECTION, SOLUTION INTRAVENOUS at 07:44

## 2018-07-30 RX ADMIN — FENTANYL CITRATE 25 MCG: 50 INJECTION, SOLUTION INTRAMUSCULAR; INTRAVENOUS at 10:07

## 2018-07-30 RX ADMIN — MIDAZOLAM 2 MG: 1 INJECTION INTRAMUSCULAR; INTRAVENOUS at 07:28

## 2018-07-30 RX ADMIN — SUCCINYLCHOLINE CHLORIDE 100 MG: 20 INJECTION, SOLUTION INTRAMUSCULAR; INTRAVENOUS at 07:41

## 2018-07-30 RX ADMIN — ONDANSETRON 4 MG: 2 INJECTION, SOLUTION INTRAMUSCULAR; INTRAVENOUS at 10:07

## 2018-07-30 RX ADMIN — ALBUMIN (HUMAN): 12.5 SOLUTION INTRAVENOUS at 09:05

## 2018-07-30 NOTE — OP NOTE
OPERATIVE REPORT  PATIENT NAME: Guillermina Caro    :  1950  MRN: [id number]  Pt Location: [facility] OR ROOM 14    SURGERY DATE: 2018    Surgeon(s) and Role:     * Edna Yepez MD - Primary     * Leslee Recinos MD - Fellow     * Alessandro Fargoso PA-C - Assisting     * Nohemy Hernandez MD - Assisting     * Yareli Frank DO - Assisting     * Nicholas Buenrostro MD - Assisting    Preop Diagnosis:  Endometrial cancer [facility]) [C54 1]    Post-Op Diagnosis Codes:     * Endometrial cancer ([facility] [address] ) [C54 1]    Procedure(s) (LRB):  ROBOTIC TOTAL LAPAROSCOPIC HYSTERECTOMY, BILATERAL SALPINGOOPHORECTOMY, BILATERAL PELVIC LYMPHNODE DISSECTION (N/A)    Specimen(s):  ID Type Source Tests Collected by Time Destination   1 :  Washing Pelvic Washing NON-GYNECOLOGIC CYTOLOGY Edna Yepez MD 2018 [id number]    2 : right sentinel pelvic lymph node Tissue Lymph Node, Flasher TISSUE EXAM Edna Yepez MD 2018 7983    3 : left sentinel pelvic lymph node Tissue Lymph Node, Flasher TISSUE EXAM Edna Yepez MD 2018 1046    4 : cervix Tissue Uterus w/Bilateral Ovaries and Fallopian Tubes TISSUE 48 Fernanda Romero MD 2018 9536    5 : right pelvic lymph nodes Tissue Lymph Node TISSUE EXAM Edna Yepez MD 2018 0858    6 : left pelvic lymph nodes Tissue Lymph Node TISSUE EXAM Edna Yepez MD 2018 0901        Estimated Blood Loss:   100 mL    Drains:  [REMOVED] Urethral Catheter Double-lumen;Non-latex 16 Fr  (Removed)   Number of days: 0       Anesthesia Type:   General    Operative Indications:  Endometrial cancer ([facility] [address] ) [C54 1]  The patient is a delightful 27-year-old with biopsy-proven grade 1 endometrial cancer  She opted for definitive surgical management      Operative Findings:  1) grossly normal appearing cervix, uterus, bilateral fallopian tubes and ovaries  2) no suspicious lymphoid tissue    Complications:   None    Procedure and Technique:  The patient was taken to the operating room where general endotracheal anesthesia was induced without complications  The patient received antibiotic prophylaxis per hospital protocol  Sequential compression devices were applied to the lower extremities and activated prior to induction of anesthesia  The patient was placed in the dorsolithotomy position in [address] [place] [address] and her lower abdomen, perineum and vagina were prepped and draped in the usual sterile fashion  Under direct visualization the uterus was sounded and the endocervix was dilated  A  CAR uterine manipulator was easily placed  Hernandez catheter was placed and the intravaginal occluder balloon was inflated  Attention was turned to the abdomen  All port sites were infiltrated with bupivacaine at the beginning of completion of the procedure  A 10 mm skin incision was made approximately 4 cm above the umbilicus near the midline  Then, using a 5 mm Endopath Excel trocar and the 5 mm laparoscope, the peritoneal cavity was entered under direct visualization  Good intraperitoneal location was confirmed and pneumoperitoneum was created to maximal pressure 15 mm of mercury  Three 8 mm robotic trocars were placed (2 on the left and 1 on the right) and an additional 8 mm long trocar was placed in the midline port site for camera use  A short 11 mm trocar was placed in the right flank for assistant's use  The patient was placed in steep Trendelenburg and the [employer] system was docked  The above-mentioned findings were noted  The round ligaments were cauterized and divided bilaterally and the bladder was mobilized anteriorly  The peritoneum lateral to the ovarian vessels was divided to both sides, the paravesical and pararectal spaces were developed  The ureters were clearly identified  on both sides  Bilateral ovarian vessels were skeletonized, cauterized and divided  The uterine vessels were skeletonized cauterized and divided bilaterally    The cardinal ligaments were serially cauterized and divided on both sides  The bladder was further mobilized anteriorly and a circumferential colpotomy was made  The specimen was easily delivered through the vagina  Attention was turned to the pelvic sidewalls  All lymph node bearing tissue located between the genitofemoral nerve laterally, the deep circumflex iliac vein distally, the superior vesical artery medially and the obturator nerves inferiorly was excised using a combination of sharp and blunt dissection  Lymph nodes from both sides were placed in an Endo Catch bag and retrieved through the vagina  The vaginal cuff was closed using a running stitch of 2-0 Stratafix  Airtight closure and excellent hemostasis was confirmed  Copious irrigation was used  All areas of dissection were reinspected and hemostasis was confirmed at low intraperitoneal pressures  The robot was undocked  The 11 mm trocar sites was closed using a deep stitch of zero back with a Russell Gold needle device  Pneumoperitoneum was completely released with the assistance of Valsalva maneuvers  The skin at all port sites was closed using a subcuticular stitch of 4-0 Monocryl  Histoacryl was applied to all incisions  The Hernandez was removed  The patient tolerated the procedure well  Sponge, lap, needle and instrument counts were reported as correct x2  The patient was successfully extubated in the operating room and transferred to the post anesthetic care unit in stable condition       I was present for the entire procedure    Patient Disposition:  PACU     SIGNATURE: An Garza MD  DATE: July 30, 2018  TIME: 9:22 AM

## 2018-07-30 NOTE — DISCHARGE INSTRUCTIONS
Hysterectomy   AMBULATORY CARE:   What you need to know about a hysterectomy:  A hysterectomy is surgery to remove your uterus  Your ovaries, fallopian tubes, cervix, or part of your vagina may also need to be removed  The organs and tissue that will be removed depends on your medical condition  How to prepare for a hysterectomy:  Your healthcare provider will talk to you about how to prepare for surgery  He may tell you not to eat or drink anything after midnight on the day of your surgery  You will need to stop taking aspirin 7 to 10 days before your procedure  You will need to stop taking NSAIDs 3 days before you procedure  You will also need to stop taking certain herbal supplements 7 days before your procedure  These include garlic, gingko biloba, and ginseng  Your provider will tell you what medicines to take or not take on the day of your surgery  You will be given an antibiotic through your IV to help prevent a bacterial infection  Arrange for someone to drive you home and stay with you after surgery  What will happen during a hysterectomy:   · You may be given general anesthesia to keep you asleep and free from pain during surgery  You may instead be given regional anesthesia to numb the lower part of your body  Your uterus may be removed through your vagina (vaginal hysterectomy) or through a an incision in your abdomen (abdominal hysterectomy)  It may also be done through several small incisions in your abdomen (laparoscopic hysterectomy)  During a laparoscopic hysterectomy, a laparoscope and other tools will be put into your abdomen through the small incisions  The laparoscope is a long metal tube with a light and camera on the end  Your abdomen will be filled with a gas  This allows your surgeon to see inside your abdomen more clearly  · Your surgeon will cut and tie the ligaments that hold your uterus in place  The blood vessels that go to your uterus will also be tied to help decrease bleeding  Your surgeon may also remove other organs or tissue such as your ovaries, fallopian tubes, cervix, lymph nodes, or part of your vagina  · Your surgeon may instead use robotic arms to place a laparoscope and other tools inside your abdomen through the incisions  He will use the machine to look inside your abdomen and guide the robotic arms  He will use the tools attached to the robotic arms to remove your uterus, cervix, or other tissues  · Any incisions that were made during surgery will be closed with stitches, staples, surgical glue, or surgical tape  The incisions may be covered with a bandage  A vaginal pack or sanitary pad may be used to absorb the bleeding  A vaginal pack is a special gauze that is inserted into the vagina  It is removed before you go home or to a hospital room  What will happen after a hysterectomy:  You may have a catheter to help drain your bladder for up to 24 hours  You may also have pain in your shoulders or near your ribs if gas was put in your abdomen  You will have pain for the first few days after surgery  You will need to wear sanitary pads for vaginal bleeding that occurs after surgery  You will be asked to walk as soon as possible after surgery  This will help to prevent blood clots in your legs  You may need to stay in the hospital for 1 to 4 days after surgery  The length of time depends on the type of hysterectomy you had  Risks of a hysterectomy:   · The surgeon may need to change the type of surgery he was planning to do  For example, he may need to change from a laparoscopic surgery to an open abdominal surgery  You will not be able to become pregnant after you have a hysterectomy  You will go through menopause if your ovaries are removed  · You may bleed more than expected or get an infection  Your bladder, ureters, or bowels may be damaged during surgery  If your ureters were injured, you may need a catheter to drain your bladder for several days to weeks  You may get scar tissue in your abdomen that blocks your intestine or causes pelvic pain  If you have a hysterectomy to treat cancer, this surgery may not take it away completely  There is also a chance that the cancer may return  You may get a blood clot in your leg or arm  This may become life-threatening  Call 911 for any of the following:   · You feel lightheaded, short of breath, and have chest pain  · You cough up blood  Seek care immediately:   · Your arm or leg feels warm, tender, and painful  It may look swollen and red  · You have increasing abdominal or pelvic pain  Contact your healthcare provider or gynecologist if:   · You have heavy vaginal bleeding that fills 1 or more sanitary pads in 1 hour  · You have a fever  · You have nausea or are vomiting  · You feel pain or burning when you urinate, or you have trouble urinating  · You have pus or a foul-smelling odor coming from your vagina  · Your wound is red, swollen, or draining pus  · You feel pressure in your rectum  · You have questions or concerns about your condition or care  Medicines:   · Prescription pain medicine  may be given  Ask your healthcare provider how to take this medicine safely  · Stool softeners  help treat or prevent constipation  · Take your medicine as directed  Contact your healthcare provider if you think your medicine is not helping or if you have side effects  Tell him or her if you are allergic to any medicine  Keep a list of the medicines, vitamins, and herbs you take  Include the amounts, and when and why you take them  Bring the list or the pill bottles to follow-up visits  Carry your medicine list with you in case of an emergency  Activity:   · Wear an abdominal binder as directed  An abdominal binder will decrease pain when you move or cough  · Rest as needed  Get up and move around as directed to help prevent blood clots   Start with short walks and slowly increase the distance every day  Limit the number of times you climb stairs to 2 times each day  Plan most of your daily activities on one level of your home  · Do not lift objects heavier than 10 pounds for 6 weeks  Avoid strenuous activity for 2 weeks  · Do not strain during bowel movements  High-fiber foods and extra liquids can help you prevent constipation  Examples of high-fiber foods are fruit and bran  Prune juice and water are good liquids to drink  · Do not have sex, use tampons, or douche for up to 8 weeks  Ask your healthcare provider if it is okay to take a tub bath  · Do not go in pools or hot tubs for 6 weeks or as directed  · Ask when it is safe for you to drive, return to work, and return to other regular activities  Wound care: If you have abdominal incisions, care for them as directed  Carefully wash around the wound with soap and water  It is okay to let the soap and water run over your incision  Do not  scrub your incision  Dry the area and put on new, clean bandages as directed  Change your bandages when they get wet or dirty  If you have strips of medical tape, let them fall off on their own  It may take 7 to 14 days for them to fall off  Check your incision every day for redness, swelling, or pus  Deep breathing:  Take deep breaths and cough 10 times each hour  This will decrease your risk for a lung infection  Take a deep breath and hold it for as long as you can  Let the air out and then cough strongly  Deep breaths help open your airway  You may be given an incentive spirometer to help you take deep breaths  Put the plastic piece in your mouth and take a slow, deep breath, then let the air out and cough  Repeat these steps 10 times every hour  Get support: This surgery may be life-changing for you and your family  You will no longer be able to get pregnant  Sudden changes in the levels of your hormones may occur and cause mood swings and depression   You may feel angry, sad, or frightened, or cry frequently and unexpectedly  These feelings are normal  Talk to your healthcare provider about where you can get support  You can also ask if hormone replacement medicine is right for you  Follow up with your healthcare provider or gynecologist as directed: You may need to return to have stitches removed, and for other tests  Write down your questions so you remember to ask them during your visits  © 2017 [employer] Information is for End User's use only and may not be sold, redistributed or otherwise used for commercial purposes  All illustrations and images included in CareNotes® are the copyrighted property of [employer]  or [employer]  The above information is an  only  It is not intended as medical advice for individual conditions or treatments  Talk to your doctor, nurse or pharmacist before following any medical regimen to see if it is safe and effective for you

## 2018-07-30 NOTE — ANESTHESIA POSTPROCEDURE EVALUATION
Post-Op Assessment Note      CV Status:  Stable    Post-procedure mental status: Sleepy, yet easily AROUSABLE  Hydration Status:  Stable    PONV Controlled:  None    Airway Patency:  Patent    Post Op Vitals Reviewed: Yes          Staff: CRNA       Comments: Pt  to Pacu  Repoirt given  Course uneventfulk  VSS  Airway patent            /64 (07/30/18 0949)    Temp (!) 96 7 °F (35 9 °C) (07/30/18 0944)    Pulse 71 (07/30/18 0949)   Resp 16 (07/30/18 0949)    SpO2 96 % (07/30/18 0949)

## 2018-07-30 NOTE — H&P
Assessment: Grade 1 endometrial cancer    Plan:  The patient has signed informed consent for a robotic assisted total laparoscopic hysterectomy, bilateral salpingo-oophorectomy, bilateral pelvic lymph node dissection, possible exploratory laparotomy and any other indicated procedure      Patient understands the risks, benefits and alternative treatments and agrees to proceed  The patient understands the risks associated with surgery which include, but are not limited to: infection, deep vein thrombosis, blood clots to the lungs, wound healing problems, complications with extensive blood loss, blood transfusion, damage to nearby organs (ureters, bladder, bowel, major nerves and blood vessels), anesthesia and death       The patient has both medical and cardiac clearance      History of Present Illness: The patient is a delightful 68-year-old  with recently diagnosed endometrial cancer  She presents for evaluation and treatment      The patient presented with postmenopausal bleeding for approximately 1 month     An endometrial biopsy was performed which showed a 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      Patient went through menopause at age 62  Patient endorses a history of abnormal Pap smears in the past but have been negative most recently  Patient has used both birth control and combination hormone replacement therapy in the past   Patient has had a tubal ligation for birth control  Patient has also had 2  sections                  Endometrial cancer [facility])     2018 Biopsy       FIGO 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  Biopsy performed by Dr Denisse Gongora                 Review of Systems   Constitutional: Positive for diaphoresis and fatigue  Negative for activity change, appetite change, chills, fever and unexpected weight change  HENT: Positive for congestion and sneezing   Negative for dental problem, drooling, ear discharge, ear pain, facial swelling, hearing loss, mouth sores, nosebleeds, postnasal drip, rhinorrhea, sinus pain, sinus pressure, sore throat, tinnitus, trouble swallowing and voice change  Eyes: Positive for itching  Negative for photophobia, pain, discharge, redness and visual disturbance  Respiratory: Positive for shortness of breath  Negative for apnea, cough, choking, chest tightness, wheezing and stridor  Cardiovascular: Negative for chest pain, palpitations and leg swelling  Gastrointestinal: Positive for abdominal distention and constipation  Negative for abdominal pain, anal bleeding, blood in stool, diarrhea, nausea, rectal pain and vomiting  Endocrine: Negative for cold intolerance, heat intolerance, polydipsia, polyphagia and polyuria  Genitourinary: Positive for vaginal bleeding and vaginal discharge  Negative for decreased urine volume, difficulty urinating, dyspareunia, dysuria, enuresis, flank pain, frequency, genital sores, hematuria, menstrual problem, pelvic pain, urgency and vaginal pain  Musculoskeletal: Positive for back pain and myalgias  Negative for arthralgias, gait problem, joint swelling, neck pain and neck stiffness  Skin: Negative for color change, pallor, rash and wound  Allergic/Immunologic: Positive for environmental allergies  Negative for food allergies and immunocompromised state  Neurological: Negative for dizziness, tremors, seizures, syncope, facial asymmetry, speech difficulty, weakness, light-headedness, numbness and headaches  Hematological: Negative for adenopathy  Does not bruise/bleed easily  Psychiatric/Behavioral: Positive for sleep disturbance  Negative for agitation, behavioral problems, confusion, decreased concentration, dysphoric mood, hallucinations, self-injury and suicidal ideas   The patient is not nervous/anxious and is not hyperactive           Medical History   Past Medical History:   Diagnosis Date    Abnormal mammogram      Abnormal weight gain      Achilles tendinitis      Angina pectoris (HCC)      Asthma      COPD (chronic obstructive pulmonary disease) (HCC)      Disc degeneration, lumbar      Disease of thyroid gland       hypo    Dyslipidemia      Early satiety      Enthesopathy       hip region    Esophagitis, reflux      Hypercholesterolemia      Hypertension      IBS (irritable bowel syndrome)      Irritable bowel syndrome      Left heart failure (HCC)      Osteoarthritis      Rosacea      Sacroiliitis (HCC)              Surgical History         Past Surgical History:   Procedure Laterality Date     SECTION         x2    COLONOSCOPY        ELBOW SURGERY         left ulna/radius    FOOT SURGERY Right      KNEE ARTHROSCOPY W/ MENISCAL REPAIR Right      REPLACEMENT TOTAL KNEE Right      SHOULDER ARTHROPLASTY        SINUS SURGERY        TONSILLECTOMY        TUBAL LIGATION        WRIST SURGERY         ganglonic cyst                     OB History       Para Term  AB Living     3 3 3     3     SAB TAB Ectopic Multiple Live Births             3           Obstetric Comments     Menarche 13  Hx bcp  Hx abnormal pap  Hx hormone replacement             Family History   Problem Relation Age of Onset    Arthritis Mother      Heart disease Mother           cardiac disorder    Diabetes Mother      Hiatal hernia Mother      Hypertension Mother      Irritable bowel syndrome Mother      Breast cancer Mother           x2    Uterine cancer Mother      Osteoporosis Mother      Thyroid disease Mother      Other Mother           gastric reflux    Heart disease Father           cardiac disorder    Hiatal hernia Father      Ulcers Father      Other Father           gastric reflux    Hiatal hernia Sister      Thyroid disease Sister      Other Sister           gastric reflux    Lung cancer Sister      No Known Problems Brother      Brain cancer Maternal Grandmother      Breast cancer Maternal Grandmother      No Known Problems Maternal Grandfather      No Known Problems Paternal Grandmother      No Known Problems Paternal Grandfather      Hiatal hernia Child      Other Child           gastric reflux    Irritable bowel syndrome Daughter      Breast cancer Maternal Aunt           x3 sis    Uterine cancer Maternal Aunt           x3 sis         Social History   Social History            Social History    Marital status:        Spouse name: N/A    Number of children: N/A    Years of education: N/A            Occupational History    nurse         full-time             Social History Main Topics    Smoking status: Never Smoker    Smokeless tobacco: Never Used    Alcohol use Yes         Comment: (history) occasional    Drug use: No    Sexual activity: Not Currently           Other Topics Concern    Not on file          Social History Narrative     Active advance directive     DME- freestyle lite blood glucose meter device kit QID                     Current Outpatient Prescriptions:     albuterol (VENTOLIN HFA) 90 mcg/act inhaler, Inhale 2 puffs 4 (four) times a day as needed, Disp: , Rfl:     aspirin (ASPIR-81) 81 mg EC tablet, Take by mouth, Disp: , Rfl:     B Complex Vitamins (B COMPLEX 1 PO), Take by mouth, Disp: , Rfl:     budesonide-formoterol (SYMBICORT) 160-4 5 mcg/act inhaler, Inhale Twice daily, Disp: , Rfl:     Calcium Carbonate-Vitamin D (CALCIUM 600+D) 600-200 MG-UNIT TABS, Take by mouth, Disp: , Rfl:     clindamycin (CLEOCIN) 300 MG capsule, Take by mouth, Disp: , Rfl:     clotrimazole-betamethasone (LOTRISONE) 1-0 05 % cream, Apply topically daily at bedtime as needed (for itching), Disp: 45 g, Rfl: 1    ezetimibe (ZETIA) 10 mg tablet, TAKE 1 TABLET DAILY, Disp: 90 tablet, Rfl: 2    FREESTYLE LITE test strip, USE TO TEST BLOOD SUGARS FOUR TIMES A DAY, Disp: 400 each, Rfl: 2    furosemide (LASIX) 20 mg tablet, Take 1 tablet (20 mg total) by mouth daily, Disp: 90 tablet, Rfl: 3   glipiZIDE (GLUCOTROL XL) 2 5 mg 24 hr tablet, Take 1 tablet (2 5 mg total) by mouth daily, Disp: 90 tablet, Rfl: 3    insulin lispro (HUMALOG) 100 units/mL injection, Inject under the skin, Disp: , Rfl:     Insulin Syringe-Needle U-100 (BD INSULIN SYRINGE ULTRAFINE) 31G X 15/64" 0 3 ML MISC, by Does not apply route daily Use as directed , Disp: 25 each, Rfl: 3    Lancets (FREESTYLE) lancets, by Does not apply route 2 (two) times a day, Disp: , Rfl:     levothyroxine 75 mcg tablet, Take 1 tablet (75 mcg total) by mouth daily, Disp: 90 tablet, Rfl: 3    LINZESS 145 MCG CAPS, TAKE 1 CAPSULE IN THE MORNING AT LEAST 30 MINUTES BEFORE BREAKFAST DAILY, Disp: 90 capsule, Rfl: 3    losartan (COZAAR) 50 mg tablet, Take 1 tablet (50 mg total) by mouth daily, Disp: 90 tablet, Rfl: 3    Magnesium Oxide 400 MG CAPS, Take by mouth, Disp: , Rfl:     meloxicam (MOBIC) 15 mg tablet, Take 1 tablet (15 mg total) by mouth daily as needed (P r n  pain), Disp: 90 tablet, Rfl: 3    metFORMIN (GLUCOPHAGE-XR) 500 mg 24 hr tablet, Take 500 mg by mouth 3 (three) times a day with meals, Disp: , Rfl:     Omega-3 Fatty Acids (FISH OIL) 1200 MG CAPS, Take by mouth, Disp: , Rfl:     pantoprazole (PROTONIX) 40 mg tablet, Take 1 tablet (40 mg total) by mouth daily, Disp: 90 tablet, Rfl: 3    potassium chloride (MICRO-K) 10 MEQ CR capsule, Take 1 capsule (10 mEq total) by mouth daily, Disp: 90 capsule, Rfl: 3    rosuvastatin (CRESTOR) 20 MG tablet, TAKE 1 TABLET DAILY, Disp: 90 tablet, Rfl: 2          Allergies   Allergen Reactions    Amoxicillin-Pot Clavulanate Anaphylaxis, Hives, Itching and Facial Swelling    Meperidine Anaphylaxis    Darvon [Propoxyphene]      Erythromycin Facial Swelling, Hives, Itching, Nasal Congestion, Swelling, Throat Swelling and Vomiting    Methocarbamol      Morphine Hives, Itching, Other (See Comments), Swelling and Syncope    Nabumetone Hives, Itching and Swelling    Penicillins      Reglan [Metoclopramide]      Tetracyclines & Related      Sulfa Antibiotics Hives, Itching, Rash and Swelling         Physical Exam   Constitutional: She is oriented to person, place, and time  She appears well-developed and well-nourished  No distress  HENT:   Head: Normocephalic and atraumatic  Right Ear: External ear normal    Left Ear: External ear normal    Nose: Nose normal    Mouth/Throat: No oropharyngeal exudate  Eyes: Pupils are equal, round, and reactive to light  Right eye exhibits no discharge  Left eye exhibits no discharge  No scleral icterus  Neck: Normal range of motion  Neck supple  No JVD present  No tracheal deviation present  No thyromegaly present  Cardiovascular: Normal rate, regular rhythm, normal heart sounds and intact distal pulses  Exam reveals no gallop and no friction rub  No murmur heard  Pulmonary/Chest: Effort normal and breath sounds normal  No stridor  No respiratory distress  She has no wheezes  She has no rales  She exhibits no tenderness  Abdominal: Soft  Bowel sounds are normal  She exhibits no distension and no mass  There is no tenderness  There is no rebound and no guarding  Genitourinary: Vagina normal and uterus normal    Genitourinary Comments: The external female genitalia is normal  The bartholin's, uretheral and skenes glands are normal  The urethral meatus is normal  Speculum exam reveals a grossly normal vagina with age-appropriate atrophy  No masses, lesions or bleeding  Bimanual exam notes a normal uterus with no adnexal masses  Musculoskeletal: Normal range of motion  She exhibits no edema, tenderness or deformity  Lymphadenopathy:     She has no cervical adenopathy  Neurological: She is alert and oriented to person, place, and time  She has normal reflexes  No cranial nerve deficit  She exhibits normal muscle tone  Coordination normal    Skin: Skin is warm and dry  No rash noted  She is not diaphoretic  No erythema  No pallor  Psychiatric: She has a normal mood and affect  Her behavior is normal  Judgment and thought content normal       Final Diagnosis   A   Endometrium, biopsy:     - Endometrial adenocarcinoma, endometrioid type, FIGO grade I          Pelvic ultrasound 6/15/2018  FINDINGS:     UTERUS:  The uterus is anteverted in position, measuring 7 6 x 2 7 x 4 6 cm  Contour and echotexture appear normal   The cervix shows no suspicious abnormality      ENDOMETRIUM:    Regional thickness is approximately 10 to 11 mm   There is no hypervascularity      OVARIES/ADNEXA:  Right ovary:  1 5 x 1 1 x 1 3 cm  No suspicious right ovarian abnormality  Doppler flow within normal limits      Left ovary:  1 8 x 1 2 x 1 5 cm  No suspicious left ovarian abnormality  Doppler flow within normal limits      No suspicious adnexal mass or loculated collections  There is no free fluid      IMPRESSION:     1  Endometrial thickness is between 10 and 11 mm   In a postmenopausal patient with vaginal bleeding, tissue sampling is recommended  2  Postmenopausal ovarian size is normal   3  There is no adnexal mass or cyst   There is no free fluid within the pelvis             Scott C Donzetta Severe, MD, PhD, Claudy Graham  Attending Physician, [address] [place] [address]

## 2018-07-30 NOTE — ANESTHESIA PREPROCEDURE EVALUATION
Review of Systems/Medical History    Chart reviewed  No history of anesthetic complications     Cardiovascular  EKG reviewed, Exercise tolerance (METS): >4,  Hyperlipidemia, Hypertension , No angina ,   Comment: (-) stress test 3/18    Echo 3/18  SUMMARY     LEFT VENTRICLE:  Ejection fraction was estimated to be 60 %  There were no regional wall motion abnormalities  There was an increased relative contribution of atrial contraction to ventricular filling      RIGHT VENTRICLE:  Estimated peak pressure was at least 30 mmHg      MITRAL VALVE:  There was moderate annular calcification  There was trace regurgitation      AORTIC VALVE:  There was mild stenosis      TRICUSPID VALVE:  There was trace regurgitation,  Pulmonary  COPD , Asthma , well controlled/ stable Asthma type of rescue: PRN inhaler,        GI/Hepatic    GERD well controlled,             Endo/Other  Diabetes poorly controlled type 2 Oral agent, History of thyroid disease , hypothyroidism,   Comment: POC Glucose 239    GYN       Hematology   Musculoskeletal    Arthritis     Neurology   Psychology           Physical Exam    Airway  Comment: H/o difficult intubation? Mallampati score: I  TM Distance: >3 FB  Neck ROM: full     Dental   No notable dental hx     Cardiovascular      Pulmonary      Other Findings        Anesthesia Plan  ASA Score- 3     Anesthesia Type- general with ASA Monitors  Additional Monitors:   Airway Plan: ETT  Comment: +/- Ute  +/- TAP blocks if needed  Plan Factors-    Induction- intravenous  Postoperative Plan-     Informed Consent- Anesthetic plan and risks discussed with patient  I personally reviewed this patient with the CRNA  Discussed and agreed on the Anesthesia Plan with the CRNA  Judith Hewitt

## 2018-08-15 ENCOUNTER — OFFICE VISIT (OUTPATIENT)
Dept: GYNECOLOGIC ONCOLOGY | Facility: CLINIC | Age: 68
End: 2018-08-15

## 2018-08-15 VITALS
HEART RATE: 80 BPM | WEIGHT: 207 LBS | SYSTOLIC BLOOD PRESSURE: 128 MMHG | RESPIRATION RATE: 16 BRPM | TEMPERATURE: 98.3 F | BODY MASS INDEX: 36.68 KG/M2 | DIASTOLIC BLOOD PRESSURE: 74 MMHG | HEIGHT: 63 IN

## 2018-08-15 DIAGNOSIS — C54.1 ENDOMETRIAL CANCER (HCC): Primary | ICD-10-CM

## 2018-08-15 PROCEDURE — 99024 POSTOP FOLLOW-UP VISIT: CPT | Performed by: OBSTETRICS & GYNECOLOGY

## 2018-08-15 NOTE — PROGRESS NOTES
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  Patient had her surgery on July 30, 2018 consisting of a robotic assisted total laparoscopic hysterectomy, bilateral salpingo-oophorectomy and pelvic lymph node dissection  Patient comes in today for her postoperative visit with no complaints  Endometrial cancer ([facility] [address] )    6/14/2018 Biopsy     FIGO 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  Biopsy performed by Dr Tiffani Dockery  7/30/2018 Surgery     Robotic assisted total laparoscopic hysterectomy, bilateral salpingo-oophorectomy and pelvic lymph node dissection    Final pathology revealed stage IA grade 1 endometrioid adenocarcinoma (3 3 x 1 7 cm, 2/12mm invasion, NO LVSI, negative washings)            Review of Systems    Patient Active Problem List   Diagnosis    Actinic keratosis    Seborrheic keratosis    Screening for skin condition    Diabetes mellitus type 2, controlled, without complications ([address] )    Dyslipidemia, goal LDL below 70    Dysmetabolic syndrome X    Benign essential hypertension    Obesity    Lichen sclerosus    Endometrial cancer ([address] )    Preoperative evaluation to rule out surgical contraindication     Social History     Social History    Marital status:      Spouse name: N/A    Number of children: 3    Years of education: N/A     Occupational History    nurse      full-time     Social History Main Topics    Smoking status: Never Smoker    Smokeless tobacco: Never Used    Alcohol use 1 8 oz/week     3 Glasses of wine per week      Comment: (history) occasional   per week    Drug use: No    Sexual activity: Not Currently     Other Topics Concern    Not on file     Social History Narrative    Active advance directive    DME- freestyle lite blood glucose meter device kit QID         Past Medical History:   Diagnosis Date    Abnormal mammogram     Abnormal weight gain     Achilles tendinitis     Angina pectoris (HCC)     Asthma     [facility] obstructive pulmonary disease) [facility])     Diabetes mellitus ([facility])     Disc degeneration, lumbar     Disease of thyroid gland     hypo    Dyslipidemia     Early satiety     Edema     idiopathic peripheral    Enthesopathy     hip region    Esophagitis, reflux     GERD (gastroesophageal reflux disease)     Hard to intubate     difficulty with intubation and had to be intubated twice    Hypercholesterolemia     Hypertension     IBS (irritable bowel syndrome)     Irritable bowel syndrome     Osteoarthritis     Rosacea     Sacroiliitis ([facility])        Current Outpatient Prescriptions:     albuterol (VENTOLIN HFA) 90 mcg/act inhaler, Inhale 2 puffs 4 (four) times a day as needed, Disp: , Rfl:     albuterol (VENTOLIN HFA) 90 mcg/act inhaler, Inhale 2 puffs every 6 (six) hours as needed for wheezing, Disp: 18 g, Rfl: 2    aspirin (ASPIR-81) 81 mg EC tablet, Take by mouth, Disp: , Rfl:     B Complex Vitamins (B COMPLEX 1 PO), Take by mouth, Disp: , Rfl:     Calcium Carbonate-Vitamin D (CALCIUM 600+D) 600-200 MG-UNIT TABS, Take by mouth, Disp: , Rfl:     cetirizine (ZyrTEC) 10 MG chewable tablet, Chew 10 mg daily, Disp: , Rfl:     clindamycin (CLEOCIN) 300 MG capsule, Take by mouth Before dental procedures 600mg , Disp: , Rfl:     clotrimazole-betamethasone (LOTRISONE) 1-0 05 % cream, Apply topically daily at bedtime as needed (for itching), Disp: 45 g, Rfl: 1    ezetimibe (ZETIA) 10 mg tablet, TAKE 1 TABLET DAILY, Disp: 90 tablet, Rfl: 2    furosemide (LASIX) 20 mg tablet, Take 1 tablet (20 mg total) by mouth daily, Disp: 90 tablet, Rfl: 3    glipiZIDE (GLUCOTROL XL) 2 5 mg 24 hr tablet, Take 1 tablet (2 5 mg total) by mouth daily, Disp: 90 tablet, Rfl: 3    insulin lispro (HUMALOG) 100 units/mL injection, Inject under the skin Only when on steroids or with a sick day , Disp: , Rfl:     KRILL OIL PO, Take by mouth, Disp: , Rfl:     levothyroxine 75 mcg tablet, Take 1 tablet (75 mcg total) by mouth daily, Disp: 90 tablet, Rfl: 3    LINZESS 145 MCG CAPS, TAKE 1 CAPSULE IN THE MORNING AT LEAST 30 MINUTES BEFORE BREAKFAST DAILY, Disp: 90 capsule, Rfl: 3    losartan (COZAAR) 50 mg tablet, Take 1 tablet (50 mg total) by mouth daily, Disp: 90 tablet, Rfl: 3    Magnesium Oxide 400 MG CAPS, Take by mouth, Disp: , Rfl:     meloxicam (MOBIC) 15 mg tablet, Take 1 tablet (15 mg total) by mouth daily as needed (P r n  pain), Disp: 90 tablet, Rfl: 3    metFORMIN (GLUCOPHAGE) 1000 MG tablet, Take 1,000 mg by mouth 2 (two) times a day with meals, Disp: , Rfl:     metFORMIN (GLUCOPHAGE-XR) 500 mg 24 hr tablet, Take 500 mg by mouth daily with breakfast  , Disp: , Rfl:     Omega-3 Fatty Acids (FISH OIL) 1200 MG CAPS, Take by mouth, Disp: , Rfl:     pantoprazole (PROTONIX) 40 mg tablet, Take 1 tablet (40 mg total) by mouth daily, Disp: 90 tablet, Rfl: 3    potassium chloride (MICRO-K) 10 MEQ CR capsule, Take 1 capsule (10 mEq total) by mouth daily (Patient taking differently: Take 20 mEq by mouth daily  ), Disp: 90 capsule, Rfl: 3    rosuvastatin (CRESTOR) 20 MG tablet, TAKE 1 TABLET DAILY, Disp: 90 tablet, Rfl: 2    saccharomyces boulardii (FLORASTOR) 250 mg capsule, Take 250 mg by mouth 3 (three) times a day, Disp: , Rfl:   Allergies   Allergen Reactions    Amoxicillin-Pot Clavulanate Anaphylaxis, Hives, Itching and Facial Swelling    Erythromycin Hives, Itching, Swelling, Vomiting, Throat Swelling, Nasal Congestion and Facial Swelling    Meperidine Anaphylaxis    Morphine Hives, Itching, Swelling, Other (See Comments) and Syncope     hypotension    Nabumetone Hives, Itching and Swelling    Penicillins Anaphylaxis, Itching and Swelling    Darvon [Propoxyphene] Hives    Methocarbamol Other (See Comments)     Double vision    Reglan [Metoclopramide] Anxiety    Sulfa Antibiotics Hives, Itching, Rash and Swelling    Tetracyclines & Related Rash     Vitals:    08/15/18 0902   BP: 128/74   Pulse: 80   Resp: 16   Temp: 98 3 °F (36 8 °C)       Labs:  CMP  Lab Results   Component Value Date     07/14/2018    K 4 4 07/14/2018     07/14/2018    CO2 26 07/14/2018    ANIONGAP 6 07/14/2018    BUN 20 07/14/2018    CREATININE 0 84 07/14/2018    GLUCOSE 92 03/11/2017    GLUF 111 (H) 07/14/2018    CALCIUM 9 0 07/14/2018    AST 40 07/14/2018    ALT 73 07/14/2018    ALKPHOS 63 07/14/2018    PROT 7 5 07/14/2018    BILITOT 0 38 07/14/2018    EGFR 71 07/14/2018       BMP  Lab Results   Component Value Date    GLUCOSE 92 03/11/2017    CALCIUM 9 0 07/14/2018     07/14/2018    K 4 4 07/14/2018    CO2 26 07/14/2018     07/14/2018    BUN 20 07/14/2018    CREATININE 0 84 07/14/2018       Lipids  Lab Results   Component Value Date    CHOL 94 07/14/2018    CHOL 128 02/03/2018    CHOL 115 08/30/2017     Lab Results   Component Value Date    HDL 36 (L) 07/14/2018    HDL 42 02/03/2018    HDL 43 08/30/2017     Lab Results   Component Value Date    LDLCALC 35 07/14/2018    LDLCALC 60 02/03/2018    LDLCALC 50 08/30/2017     Lab Results   Component Value Date    TRIG 114 07/14/2018    TRIG 130 02/03/2018    TRIG 111 08/30/2017     No components found for: CHOLHDL    Hemoglobin A1C  Lab Results   Component Value Date    HGBA1C 6 2 07/14/2018       Fasting Glucose  Lab Results   Component Value Date    GLUF 111 (H) 07/14/2018       Insulin     Thyroid  No results found for: TSH, E3XXEPD, B7SHUCD, THYROIDAB    Hepatic Function Panel  Lab Results   Component Value Date    ALT 73 07/14/2018    AST 40 07/14/2018    ALKPHOS 63 07/14/2018    BILITOT 0 38 07/14/2018       Celiac Disease Antibody Panel  No results found for: ENDOMYSIAL IGA, GLIADIN IGA, GLIADIN IGG, IGA, TISSUE TRANSGLUT AB, TTG IGA   Iron  No results found for: IRON, TIBC, FERRITIN    Other Labs:    Final Diagnosis   Surgical Pathology Cancer Case Summary - Carcinoma of the Endometrium     - Procedure:  - Hysterectomy type:  Robotic laparoscopic total abdominal  hysterectomy with bilateral salpingo-oophorectomy  - Specimen integrity:  Specimen received intact  - Tumor site:  Anterior and posterior endometrium  - Tumor size:  3 3 x 1 7 cm    - Histologic type:  Endometrioid adenocarcinoma  - Histologic grade:  FIGO grade I  - Myometrial invasion:      - Depth of invasion:  2 mm     - Myometrial thickness:  12 mm     - Percentage of myometrial thickness:  17%  - Adenomyosis:  Present and partially involved by carcinoma  - Uterine serosa involvement:  None identified  - Lower uterine segment involvement:  No definite involvement identified  - Cervical stromal involvement:  None identified  - Other tissue/ organ involvement:  Bilateral ovaries, tubal remnants, and parametrial tissues are negative for malignancy  - Peritoneal/ascitic fluid:  Negative for malignancy ([id number])  - Margins:     - Ectocervical/vaginal cuff margin:  Negative for malignancy     - Parametrial/paracervical margin  Negative for malignancy  - Lymphovascular invasion:  None identified  - Regional lymph nodes:     - Pelvic node examination       - Number of pelvic lymph nodes with macrometastasis:  0       - Number of pelvic lymph nodes with micrometastasis:  0       - Number of pelvic lymph nodes with isolated tumor cells:  0       - Laterality of pelvic node(s) with tumor:  Not applicable       - Total number of pelvic lymph nodes examined (sentinel and non-sentinel):  6       - Number of pelvic sentinel nodes examined:  0       - Laterality of pelvic node(s) examined:  Bilateral     - Para-aortic lymph nodes       - No para-aortic nodes submitted or found  - Prognostic stage group (pTNM, AJCC 8th edition): IA - pT1a, pN0, pMX, G1  - FIGO stage (2015 FIGO cancer report):  IA  - Additional pathologic findings:     - Intramural leiomyomata     - Left ovarian fibroma  - Ancillary studies:    - If needed for additional studies, tumor is best represented in block C18   - Clinical history:  Postmenopausal bleeding for approximately 1 month        Diagnoses for Individual Specimens     A  Right pelvic sentinel lymph node:     - Fibrovascular tissue; negative for malignancy      - No lymphoid tissue identified within this entirely submitted specimen      B  Left pelvic sentinel lymph node:     - Fibrovascular tissue; negative for malignancy      - No lymphoid tissue identified within this entirely submitted specimen      C  Uterus with bilateral ovaries and fallopian tubes:     - Uterus:       -- Endometrial adenocarcinoma; endometrioid type, FIGO grade I, with superficial myometrial invasion  -- Adenomyosis partially involved by adenocarcinoma       -- Intramural leiomyomata     - Cervix:  Benign endocervical polyp; negative for malignancy  - Left ovary:  Ovarian fibroma; surface epithelial inclusion glands; negative for malignancy  - Right ovary:  Surface epithelial inclusion glands; negative for malignancy  - Fallopian tubes, bilateral:  Status post ligation; tubal remnants negative for malignancy  - Bilateral parametrial tissues are negative for malignancy      D  Right pelvic lymph nodes:     - Three lymph nodes identified; all negative for malignancy (0/3)      E  Left pelvic lymph nodes:     - Three lymph nodes identified; all negative for malignancy (0/3)  Physical Exam   Constitutional: She is oriented to person, place, and time  No distress  Pulmonary/Chest: Effort normal and breath sounds normal  No respiratory distress  She has no wheezes  She has no rales  She exhibits no tenderness  Abdominal: Soft  Bowel sounds are normal  She exhibits no distension and no mass   There is no tenderness  There is no rebound and no guarding  Well healed incision(s)   Neurological: She is alert and oriented to person, place, and time  Skin: She is not diaphoretic  Assessment      Stage IA 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 currently without evidence of disease  Plan      The pathology report was explained to the patient in detail  The patient understands she will not need adjuvant treatment  Patient will return in 4 weeks for her 2nd postoperative visit  Андрей Avila MD, PhD, [facility]  Attending Physician, [address] [place] [address]

## 2018-08-17 ENCOUNTER — OFFICE VISIT (OUTPATIENT)
Dept: INTERNAL MEDICINE CLINIC | Facility: CLINIC | Age: 68
End: 2018-08-17
Payer: COMMERCIAL

## 2018-08-17 VITALS
SYSTOLIC BLOOD PRESSURE: 138 MMHG | DIASTOLIC BLOOD PRESSURE: 82 MMHG | BODY MASS INDEX: 37.1 KG/M2 | HEART RATE: 76 BPM | OXYGEN SATURATION: 98 % | WEIGHT: 209.4 LBS | HEIGHT: 63 IN

## 2018-08-17 DIAGNOSIS — Z79.4 CONTROLLED TYPE 2 DIABETES MELLITUS WITHOUT COMPLICATION, WITH LONG-TERM CURRENT USE OF INSULIN (HCC): ICD-10-CM

## 2018-08-17 DIAGNOSIS — E11.9 CONTROLLED TYPE 2 DIABETES MELLITUS WITHOUT COMPLICATION, WITH LONG-TERM CURRENT USE OF INSULIN (HCC): ICD-10-CM

## 2018-08-17 DIAGNOSIS — I10 BENIGN ESSENTIAL HYPERTENSION: ICD-10-CM

## 2018-08-17 DIAGNOSIS — E78.2 MIXED HYPERLIPIDEMIA: ICD-10-CM

## 2018-08-17 DIAGNOSIS — C54.1 ENDOMETRIAL CANCER (HCC): Primary | ICD-10-CM

## 2018-08-17 PROCEDURE — 3079F DIAST BP 80-89 MM HG: CPT | Performed by: INTERNAL MEDICINE

## 2018-08-17 PROCEDURE — 3075F SYST BP GE 130 - 139MM HG: CPT | Performed by: INTERNAL MEDICINE

## 2018-08-17 PROCEDURE — 99214 OFFICE O/P EST MOD 30 MIN: CPT | Performed by: INTERNAL MEDICINE

## 2018-08-17 NOTE — PROGRESS NOTES
Assessment/Plan:  Status post hysterectomy  Diabetes  Doing well  See her back in several months  No problem-specific Assessment & Plan notes found for this encounter  Diagnoses and all orders for this visit:    Endometrial cancer ([address] )  -     CBC and differential; Future  -     Comprehensive metabolic panel; Future    Controlled type 2 diabetes mellitus without complication, with long-term current use of insulin (HCC)  -     HEMOGLOBIN A1C W/ EAG ESTIMATION; Future    Benign essential hypertension    Mixed hyperlipidemia  -     Lipid panel; Future         Subjective:  She had a hysterectomy for adenocarcinoma of the uterus  Doing well  2 weeks postop  No chest pain shortness of breath or leg swelling  Patient ID: Royal Barragan is a 77 y o  female  HPI has stage I disease so should do well  Review of Systems   Constitutional: Negative for activity change, appetite change, chills, diaphoresis, fatigue, fever and unexpected weight change  HENT: Negative for congestion, ear pain, hearing loss, mouth sores, nosebleeds, postnasal drip, sinus pain, sinus pressure, sore throat and trouble swallowing  Eyes: Negative for pain, discharge and visual disturbance  Respiratory: Negative for apnea, cough, chest tightness, shortness of breath and wheezing  Cardiovascular: Negative for chest pain, palpitations and leg swelling  Gastrointestinal: Negative for abdominal pain, anal bleeding, blood in stool, constipation, diarrhea, nausea and vomiting  Endocrine: Negative for polydipsia and polyphagia  Genitourinary: Negative for decreased urine volume, dysuria, flank pain, frequency, hematuria and urgency  Musculoskeletal: Negative for arthralgias, back pain, gait problem, joint swelling and myalgias  Skin: Negative for rash and wound  Allergic/Immunologic: Negative for environmental allergies and food allergies     Neurological: Negative for dizziness, tremors, seizures, syncope, speech difficulty, light-headedness, numbness and headaches  Hematological: Negative for adenopathy  Does not bruise/bleed easily  Psychiatric/Behavioral: Negative for agitation, confusion, hallucinations, sleep disturbance and suicidal ideas  The patient is not nervous/anxious  Objective:     Physical Exam   Constitutional: She appears well-developed and well-nourished  No distress  HENT:   Head: Normocephalic  Right Ear: External ear normal    Left Ear: External ear normal    Nose: Nose normal    Mouth/Throat: Oropharynx is clear and moist  No oropharyngeal exudate  Eyes: Conjunctivae and EOM are normal  Pupils are equal, round, and reactive to light  Right eye exhibits no discharge  Left eye exhibits no discharge  Neck: Normal range of motion  Neck supple  No thyromegaly present  Cardiovascular: Normal rate, regular rhythm, normal heart sounds and intact distal pulses  Exam reveals no gallop and no friction rub  No murmur heard  Pulmonary/Chest: Effort normal and breath sounds normal  No respiratory distress  She has no wheezes  She has no rales  Abdominal: Soft  Bowel sounds are normal  She exhibits no distension and no mass  There is no tenderness  There is no rebound and no guarding  Musculoskeletal: Normal range of motion  She exhibits no edema, tenderness or deformity  Lymphadenopathy:     She has no cervical adenopathy  Neurological: She is alert  She has normal reflexes  No cranial nerve deficit  Coordination normal    Skin: Skin is warm and dry  No rash noted  No erythema  Psychiatric: She has a normal mood and affect  Her behavior is normal  Judgment and thought content normal    Nursing note and vitals reviewed  Vitals:    08/17/18 0918   BP: 138/82   BP Location: Left arm   Patient Position: Sitting   Pulse: 76   SpO2: 98%   Weight: 95 kg (209 lb 6 4 oz)   Height: 5' 3 25" (1 607 m)       Transitional Care Management Review:  Yanick Cooper is a 77 y o  female here for TCM follow up       During the TCM phone call patient stated:               Jaime Bentley, DO

## 2018-09-11 ENCOUNTER — OFFICE VISIT (OUTPATIENT)
Dept: GYNECOLOGIC ONCOLOGY | Facility: CLINIC | Age: 68
End: 2018-09-11

## 2018-09-11 VITALS
HEART RATE: 78 BPM | TEMPERATURE: 98.5 F | WEIGHT: 211 LBS | HEIGHT: 63 IN | BODY MASS INDEX: 37.39 KG/M2 | RESPIRATION RATE: 18 BRPM | DIASTOLIC BLOOD PRESSURE: 80 MMHG | SYSTOLIC BLOOD PRESSURE: 130 MMHG

## 2018-09-11 DIAGNOSIS — C54.1 ENDOMETRIAL CANCER (HCC): Primary | ICD-10-CM

## 2018-09-11 PROCEDURE — 99024 POSTOP FOLLOW-UP VISIT: CPT | Performed by: OBSTETRICS & GYNECOLOGY

## 2018-10-09 ENCOUNTER — CLINICAL SUPPORT (OUTPATIENT)
Dept: INTERNAL MEDICINE CLINIC | Facility: CLINIC | Age: 68
End: 2018-10-09
Payer: COMMERCIAL

## 2018-10-09 ENCOUNTER — IMMUNIZATION (OUTPATIENT)
Dept: INTERNAL MEDICINE CLINIC | Facility: CLINIC | Age: 68
End: 2018-10-09
Payer: COMMERCIAL

## 2018-10-09 DIAGNOSIS — Z23 NEED FOR TETANUS, DIPHTHERIA, AND ACELLULAR PERTUSSIS (TDAP) VACCINE: Primary | ICD-10-CM

## 2018-10-09 DIAGNOSIS — Z23 ENCOUNTER FOR IMMUNIZATION: ICD-10-CM

## 2018-10-09 DIAGNOSIS — Z23 NEED FOR TETANUS BOOSTER: Primary | ICD-10-CM

## 2018-10-09 PROCEDURE — 90715 TDAP VACCINE 7 YRS/> IM: CPT

## 2018-10-09 PROCEDURE — 90662 IIV NO PRSV INCREASED AG IM: CPT

## 2018-10-09 PROCEDURE — 90471 IMMUNIZATION ADMIN: CPT

## 2018-10-09 PROCEDURE — 90472 IMMUNIZATION ADMIN EACH ADD: CPT

## 2018-10-19 ENCOUNTER — HOSPITAL ENCOUNTER (OUTPATIENT)
Dept: PULMONOLOGY | Facility: HOSPITAL | Age: 68
Discharge: HOME | End: 2018-10-19
Attending: INTERNAL MEDICINE
Payer: COMMERCIAL

## 2018-10-19 DIAGNOSIS — R06.02 SOB (SHORTNESS OF BREATH): ICD-10-CM

## 2018-10-19 PROCEDURE — 94729 DIFFUSING CAPACITY: CPT

## 2018-10-19 PROCEDURE — 94060 EVALUATION OF WHEEZING: CPT

## 2018-10-19 PROCEDURE — 94760 N-INVAS EAR/PLS OXIMETRY 1: CPT

## 2018-10-19 PROCEDURE — 94727 GAS DIL/WSHOT DETER LNG VOL: CPT

## 2018-10-19 PROCEDURE — 94726 PLETHYSMOGRAPHY LUNG VOLUMES: CPT | Performed by: INTERNAL MEDICINE

## 2018-10-19 PROCEDURE — 94060 EVALUATION OF WHEEZING: CPT | Performed by: INTERNAL MEDICINE

## 2018-10-19 PROCEDURE — 94729 DIFFUSING CAPACITY: CPT | Performed by: INTERNAL MEDICINE

## 2018-10-19 RX ORDER — ALBUTEROL SULFATE 2.5 MG/3ML
2.5 SOLUTION RESPIRATORY (INHALATION) ONCE
Status: COMPLETED | OUTPATIENT
Start: 2018-10-19 | End: 2018-10-19

## 2018-10-19 RX ADMIN — ALBUTEROL SULFATE 2.5 MG: 2.5 SOLUTION RESPIRATORY (INHALATION) at 14:24

## 2018-10-24 ENCOUNTER — OFFICE VISIT (OUTPATIENT)
Dept: PULMONOLOGY | Facility: CLINIC | Age: 68
End: 2018-10-24
Payer: COMMERCIAL

## 2018-10-24 VITALS
WEIGHT: 215 LBS | HEART RATE: 83 BPM | BODY MASS INDEX: 38.09 KG/M2 | DIASTOLIC BLOOD PRESSURE: 76 MMHG | SYSTOLIC BLOOD PRESSURE: 132 MMHG | OXYGEN SATURATION: 96 % | HEIGHT: 63 IN

## 2018-10-24 DIAGNOSIS — J45.30 MILD PERSISTENT ASTHMA WITHOUT COMPLICATION: Primary | ICD-10-CM

## 2018-10-24 DIAGNOSIS — E66.9 OBESITY (BMI 30-39.9): ICD-10-CM

## 2018-10-24 DIAGNOSIS — G47.33 OBSTRUCTIVE SLEEP APNEA: ICD-10-CM

## 2018-10-24 PROCEDURE — 99214 OFFICE O/P EST MOD 30 MIN: CPT | Performed by: INTERNAL MEDICINE

## 2018-10-30 NOTE — PROGRESS NOTES
Assessment/Plan:   Diagnoses and all orders for this visit:    Mild persistent asthma without complication    Obstructive sleep apnea    Obesity (BMI 30-39  9)    Mild persistent asthma without complication  Patient had PFTs in 2015 reviewed from [facility], with mild obstructive ventilatory limitation and response to the bronchodilator, mildly decreased DLCO  She will continue with her current medications, Symbicort 160/4 52 puffs twice daily and Ventolin MDI 2 puffs every 6 hourly when necessary  Obstructive sleep apnea mild not using CPAP,Her prior study demonstrating an RDI of 5, although increased in severity during the REM stage, no weight gain since her prior study in 2015  Could not tolerate the CPAP then  Recommend weight loss  Vaccinations up-to-date  Follow-up in 6 months or p r n  Earlier as needed  Return in about 6 months (around 4/24/2019)  All questions are answered to the patient's satisfaction and understanding  She verbalizes understanding  She is encouraged to call with any further questions or concerns  Portions of the record may have been created with voice recognition software  Occasional wrong word or "sound a like" substitutions may have occurred due to the inherent limitations of voice recognition software  Read the chart carefully and recognize, using context, where substitutions have occurred  Electronically Signed by Chito Valadez MD    ______________________________________________________________________    Chief Complaint: No chief complaint on file  Patient ID: Basil is a 77 y o  y o  female has a past medical history of Abnormal mammogram; Abnormal weight gain; Achilles tendinitis; Angina pectoris ([address] ); Asthma; COPD (chronic obstructive pulmonary disease) ([address] ); Diabetes mellitus ([address] ); Disc degeneration, lumbar; Disease of thyroid gland; Dyslipidemia; Early satiety; Edema; Enthesopathy; Esophagitis, reflux; GERD (gastroesophageal reflux disease);  Hard to intubate; Hypercholesterolemia; Hypertension; IBS (irritable bowel syndrome); Irritable bowel syndrome; Osteoarthritis; Rosacea; and Sacroiliitis ([facility] )  10/24/2018  Patient presents today for follow-up visit  Patient is a very pleasant 64-year-old lady who has never smoked, with history of hypertension, history of asthma mild intermittent 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[employer], currently retired, she was in [address] [place] [address] the past,  She was seen about 3 years ago, for daytime tiredness and fatigue, and she had an polysomnogram done, which demonstrated mild obstructive sleep apnea with increased respiratory events during the REM stage, patient could not tolerate the CPAP then and did not use it  Her asthma has been stable with the long-acting bronchodilator that she has been using  Not having the need to use the rescue inhaler frequently  Review of Systems   Constitutional: Negative for appetite change, chills, diaphoresis, fatigue, fever and unexpected weight change  HENT: Negative for congestion, ear discharge, ear pain, nosebleeds, postnasal drip, rhinorrhea, sinus pain, sore throat and voice change  Eyes: Negative for pain, discharge and visual disturbance  Respiratory: Negative for apnea, cough, choking, chest tightness, shortness of breath, wheezing and stridor  Cardiovascular: Negative for chest pain, palpitations and leg swelling  Gastrointestinal: Negative for abdominal pain, blood in stool, constipation, diarrhea and vomiting  Endocrine: Negative for cold intolerance, heat intolerance, polydipsia, polyphagia and polyuria  Genitourinary: Negative for difficulty urinating and dysuria  Musculoskeletal: Negative for arthralgias and neck pain  Skin: Negative for pallor and rash  Allergic/Immunologic: Negative for environmental allergies and food allergies  Neurological: Negative for dizziness, speech difficulty, weakness and light-headedness     Hematological: Negative for adenopathy  Does not bruise/bleed easily  Psychiatric/Behavioral: Negative for agitation, confusion and sleep disturbance  The patient is not nervous/anxious  Smoking history: She reports that she has never smoked   She has never used smokeless tobacco     The following portions of the patient's history were reviewed and updated as appropriate: allergies, current medications, past family history, past medical history, past social history, past surgical history and problem list     Immunization History   Administered Date(s) Administered    Influenza Split High Dose Preservative Free IM 10/06/2014, 09/30/2015, 10/20/2017    Influenza, high dose seasonal 0 5 mL 10/09/2018    Pneumococcal Conjugate 13-Valent 09/30/2015    Pneumococcal Polysaccharide PPV23 11/17/2008, 10/06/2014, 03/23/2017    Tdap 10/09/2018, 10/12/2018     Current Outpatient Prescriptions   Medication Sig Dispense Refill    albuterol (VENTOLIN HFA) 90 mcg/act inhaler Inhale 2 puffs 4 (four) times a day as needed      aspirin (ASPIR-81) 81 mg EC tablet Take by mouth      B Complex Vitamins (B COMPLEX 1 PO) Take by mouth      BD INSULIN SYRINGE ULTRAFINE 31G X 15/64" 0 3 ML MISC       Calcium Carbonate-Vitamin D (CALCIUM 600+D) 600-200 MG-UNIT TABS Take by mouth      cetirizine (ZyrTEC) 10 MG chewable tablet Chew 10 mg daily      clindamycin (CLEOCIN) 300 MG capsule Take by mouth Before dental procedures 600mg       clotrimazole-betamethasone (LOTRISONE) 1-0 05 % cream Apply topically daily at bedtime as needed (for itching) 45 g 1    ezetimibe (ZETIA) 10 mg tablet TAKE 1 TABLET DAILY 90 tablet 2    furosemide (LASIX) 20 mg tablet Take 1 tablet (20 mg total) by mouth daily 90 tablet 3    glipiZIDE (GLUCOTROL XL) 2 5 mg 24 hr tablet Take 1 tablet (2 5 mg total) by mouth daily 90 tablet 3    insulin lispro (HUMALOG) 100 units/mL injection Inject under the skin Only when on steroids or with a sick day       KRILL OIL PO Take by mouth      levothyroxine 75 mcg tablet Take 1 tablet (75 mcg total) by mouth daily 90 tablet 3    LINZESS 145 MCG CAPS TAKE 1 CAPSULE IN THE MORNING AT LEAST 30 MINUTES BEFORE BREAKFAST DAILY 90 capsule 3    losartan (COZAAR) 50 mg tablet Take 1 tablet (50 mg total) by mouth daily 90 tablet 3    Magnesium Oxide 400 MG CAPS Take by mouth      metFORMIN (GLUCOPHAGE) 1000 MG tablet Take 1,000 mg by mouth 2 (two) times a day with meals      metFORMIN (GLUCOPHAGE-XR) 500 mg 24 hr tablet Take 500 mg by mouth daily with breakfast        Omega-3 Fatty Acids (FISH OIL) 1200 MG CAPS Take by mouth      pantoprazole (PROTONIX) 40 mg tablet Take 1 tablet (40 mg total) by mouth daily 90 tablet 3    potassium chloride (MICRO-K) 10 MEQ CR capsule Take 1 capsule (10 mEq total) by mouth daily (Patient taking differently: Take 20 mEq by mouth daily  ) 90 capsule 3    rosuvastatin (CRESTOR) 20 MG tablet TAKE 1 TABLET DAILY 90 tablet 2    saccharomyces boulardii (FLORASTOR) 250 mg capsule Take 250 mg by mouth 3 (three) times a day      meloxicam (MOBIC) 15 mg tablet Take 1 tablet (15 mg total) by mouth daily as needed (P r n  pain) (Patient not taking: Reported on 10/24/2018 ) 90 tablet 3     No current facility-administered medications for this visit  Allergies: Amoxicillin-pot clavulanate; Erythromycin; Meperidine; Morphine; Nabumetone; Penicillins; Darvon [propoxyphene]; Methocarbamol; Reglan [metoclopramide]; Sulfa antibiotics; and Tetracyclines & related    Objective:  Vitals:    10/24/18 0828   BP: 132/76   Pulse: 83   SpO2: 96%   Weight: 97 5 kg (215 lb)   Height: 5' 3" (1 6 m)   Oxygen Therapy  SpO2: 96 %    Wt Readings from Last 3 Encounters:   10/24/18 97 5 kg (215 lb)   09/11/18 95 7 kg (211 lb)   08/17/18 95 kg (209 lb 6 4 oz)     Body mass index is 38 09 kg/m²  Physical Exam   Constitutional: She is oriented to person, place, and time  She appears well-developed and well-nourished     HENT:   Head: Normocephalic and atraumatic  Crowded oropharyngeal airways, Mallampati score 3   Eyes: Pupils are equal, round, and reactive to light  EOM are normal    Neck: Normal range of motion  Neck supple  Short and wide neck   Cardiovascular: Normal rate, regular rhythm and normal heart sounds  Pulmonary/Chest: Effort normal and breath sounds normal    Abdominal: Soft  Bowel sounds are normal    Musculoskeletal: Normal range of motion  Neurological: She is alert and oriented to person, place, and time  Skin: Skin is warm and dry  Psychiatric: She has a normal mood and affect   Her behavior is normal

## 2018-11-07 DIAGNOSIS — J45.30 MILD PERSISTENT ASTHMA WITHOUT COMPLICATION: Primary | ICD-10-CM

## 2018-11-07 RX ORDER — ALBUTEROL SULFATE 2.5 MG/3ML
2.5 SOLUTION RESPIRATORY (INHALATION) EVERY 6 HOURS PRN
Status: DISCONTINUED | OUTPATIENT
Start: 2018-11-07 | End: 2021-06-28

## 2018-12-07 ENCOUNTER — TELEPHONE (OUTPATIENT)
Dept: INTERNAL MEDICINE CLINIC | Facility: CLINIC | Age: 68
End: 2018-12-07

## 2018-12-10 ENCOUNTER — ANNUAL EXAM (OUTPATIENT)
Dept: OBGYN CLINIC | Age: 68
End: 2018-12-10
Payer: COMMERCIAL

## 2018-12-10 VITALS
SYSTOLIC BLOOD PRESSURE: 124 MMHG | DIASTOLIC BLOOD PRESSURE: 80 MMHG | WEIGHT: 210 LBS | BODY MASS INDEX: 35.85 KG/M2 | HEIGHT: 64 IN

## 2018-12-10 DIAGNOSIS — Z01.419 ENCOUNTER FOR GYNECOLOGICAL EXAMINATION WITHOUT ABNORMAL FINDING: Primary | ICD-10-CM

## 2018-12-10 DIAGNOSIS — C54.1 ENDOMETRIAL CANCER (HCC): ICD-10-CM

## 2018-12-10 DIAGNOSIS — E66.9 OBESITY, UNSPECIFIED CLASSIFICATION, UNSPECIFIED OBESITY TYPE, UNSPECIFIED WHETHER SERIOUS COMORBIDITY PRESENT: ICD-10-CM

## 2018-12-10 DIAGNOSIS — Z12.31 ENCOUNTER FOR SCREENING MAMMOGRAM FOR MALIGNANT NEOPLASM OF BREAST: ICD-10-CM

## 2018-12-10 DIAGNOSIS — L90.0 LICHEN SCLEROSUS: ICD-10-CM

## 2018-12-10 DIAGNOSIS — Z78.0 ASYMPTOMATIC POSTMENOPAUSAL STATUS: ICD-10-CM

## 2018-12-10 PROBLEM — Z01.818 PREOPERATIVE EVALUATION TO RULE OUT SURGICAL CONTRAINDICATION: Status: RESOLVED | Noted: 2018-07-17 | Resolved: 2018-12-10

## 2018-12-10 PROBLEM — Z13.89 SCREENING FOR SKIN CONDITION: Status: RESOLVED | Noted: 2018-03-23 | Resolved: 2018-12-10

## 2018-12-10 PROCEDURE — S0612 ANNUAL GYNECOLOGICAL EXAMINA: HCPCS | Performed by: NURSE PRACTITIONER

## 2018-12-10 NOTE — PROGRESS NOTES
Assessment/Plan:    No problem-specific Assessment & Plan notes found for this encounter  Diagnoses and all orders for this visit:    Encounter for gynecological examination without abnormal finding    Endometrial cancer ([facility] )    Obesity, unspecified classification, unspecified obesity type, unspecified whether serious comorbidity present  -     Ambulatory referral to Weight Management    Asymptomatic postmenopausal status    Encounter for screening mammogram for malignant neoplasm of breast  -     Mammo screening bilateral w 3d & cad; Future      Call as needed, encouraged calcium/vit D in her diet, all questions answered      Subjective:      Patient ID: Raul Valladares is a 77 y o  female  Pleasant 77 y o  postmenopausal female here for annual exam  She denies postmenopausal bleeding  Had Robotic Total Hysterectomy/BSO 2018 with Dr Mario Stevens for early stage endometrial cancer, no chemo, no radiation  No further paps needed  Denies vaginal issues  Denies pelvic pain  Denies postmenopausal issues  Not sexually active  Colonoscopy UTD with dr Sana Stanford  DXA followed by PCP, due 2020  Lichen Sclerosis stable  The following portions of the patient's history were reviewed and updated as appropriate:   She  has a past medical history of Abnormal mammogram; Abnormal weight gain; Achilles tendinitis; Angina pectoris ([facility] ); Asthma; COPD (chronic obstructive pulmonary disease) ([facility] ); Diabetes mellitus ([facility] ); Disc degeneration, lumbar; Disease of thyroid gland; Dyslipidemia; Early satiety; Edema; Enthesopathy; Esophagitis, reflux; GERD (gastroesophageal reflux disease); Hard to intubate; Hypercholesterolemia; Hypertension; IBS (irritable bowel syndrome); Irritable bowel syndrome; Osteoarthritis; Rosacea; and Sacroiliitis ([facility] )    She   Patient Active Problem List    Diagnosis Date Noted    SOB (shortness of breath)     Endometrial cancer ([facility] ) 06/24/2018    Diabetes mellitus type 2, controlled, without complications ([facility] )     Dyslipidemia, goal LDL below 70     Lichen sclerosus     Actinic keratosis 2018    Seborrheic keratosis 2018    Obesity     Dysmetabolic syndrome X     Benign essential hypertension 2012     She  has a past surgical history that includes  section; Foot surgery (Right); SHOULDER ARTHROPLASTY; Sinus surgery; Tonsillectomy; Replacement total knee (Right); Tubal ligation; Wrist surgery; Elbow surgery; Knee arthroscopy w/ meniscal repair (Right); Colonoscopy; Knee arthroscopy; Joint replacement (Left); Fracture surgery; Fracture surgery (Left); Esophagogastroduodenoscopy; and pr laparoscopy w tot hysterectuterus <=250 gram  w tube/ovary (N/A, 2018)  Her family history includes Arthritis in her mother; Brain cancer in her maternal grandmother; Breast cancer in her maternal aunt, maternal grandmother, and mother; Diabetes in her mother; Heart disease in her father and mother; Hiatal hernia in her child, father, mother, and sister; Hypertension in her mother; Irritable bowel syndrome in her daughter and mother; Lung cancer in her sister; An Lab Hypertension in her son; No Known Problems in her brother, maternal grandfather, paternal grandfather, and paternal grandmother; Osteoporosis in her mother; Other in her child, father, mother, and sister; Thyroid disease in her mother and sister; Ulcers in her father; Uterine cancer in her maternal aunt and mother  She  reports that she has never smoked  She has never used smokeless tobacco  She reports that she drinks about 1 8 oz of alcohol per week   She reports that she does not use drugs    Current Outpatient Prescriptions   Medication Sig Dispense Refill    albuterol (VENTOLIN HFA) 90 mcg/act inhaler Inhale 2 puffs 4 (four) times a day as needed      aspirin (ASPIR-81) 81 mg EC tablet Take by mouth      B Complex Vitamins (B COMPLEX 1 PO) Take by mouth      BD INSULIN SYRINGE ULTRAFINE 31G X 15/64" 0 3 ML MISC       Calcium Carbonate-Vitamin D (CALCIUM 600+D) 600-200 MG-UNIT TABS Take by mouth      cetirizine (ZyrTEC) 10 MG chewable tablet Chew 10 mg daily      clindamycin (CLEOCIN) 300 MG capsule Take by mouth Before dental procedures 600mg       clotrimazole-betamethasone (LOTRISONE) 1-0 05 % cream Apply topically daily at bedtime as needed (for itching) 45 g 1    ezetimibe (ZETIA) 10 mg tablet TAKE 1 TABLET DAILY 90 tablet 2    furosemide (LASIX) 20 mg tablet Take 1 tablet (20 mg total) by mouth daily 90 tablet 3    glipiZIDE (GLUCOTROL XL) 2 5 mg 24 hr tablet Take 1 tablet (2 5 mg total) by mouth daily 90 tablet 3    insulin lispro (HUMALOG) 100 units/mL injection Inject under the skin Only when on steroids or with a sick day       KRILL OIL PO Take by mouth      levothyroxine 75 mcg tablet Take 1 tablet (75 mcg total) by mouth daily 90 tablet 3    LINZESS 145 MCG CAPS TAKE 1 CAPSULE IN THE MORNING AT LEAST 30 MINUTES BEFORE BREAKFAST DAILY 90 capsule 3    losartan (COZAAR) 50 mg tablet Take 1 tablet (50 mg total) by mouth daily 90 tablet 3    Magnesium Oxide 400 MG CAPS Take by mouth      meloxicam (MOBIC) 15 mg tablet Take 1 tablet (15 mg total) by mouth daily as needed (P r n  pain) (Patient not taking: Reported on 10/24/2018 ) 90 tablet 3    metFORMIN (GLUCOPHAGE) 1000 MG tablet Take 1,000 mg by mouth 2 (two) times a day with meals      metFORMIN (GLUCOPHAGE-XR) 500 mg 24 hr tablet Take 500 mg by mouth daily with breakfast        Omega-3 Fatty Acids (FISH OIL) 1200 MG CAPS Take by mouth      pantoprazole (PROTONIX) 40 mg tablet Take 1 tablet (40 mg total) by mouth daily 90 tablet 3    potassium chloride (MICRO-K) 10 MEQ CR capsule Take 1 capsule (10 mEq total) by mouth daily (Patient taking differently: Take 20 mEq by mouth daily  ) 90 capsule 3    rosuvastatin (CRESTOR) 20 MG tablet TAKE 1 TABLET DAILY 90 tablet 2    saccharomyces boulardii (FLORASTOR) 250 mg capsule Take 250 mg by mouth 3 (three) times a day       Current Facility-Administered Medications   Medication Dose Route Frequency Provider Last Rate Last Dose    albuterol inhalation solution 2 5 mg  2 5 mg Nebulization Q6H PRN Jennifer Rebolledo MD         She is allergic to amoxicillin-pot clavulanate; erythromycin; meperidine; morphine; nabumetone; penicillins; darvon [propoxyphene]; methocarbamol; reglan [metoclopramide]; sulfa antibiotics; and tetracyclines & related  OB History    Para Term  AB Living   3 3 3     3   SAB TAB Ectopic Multiple Live Births           3      # Outcome Date GA Lbr Brian/2nd Weight Sex Delivery Anes PTL Lv   3 Term            2 Term            1 Term               Obstetric Comments   Menarche 13   Hx bcp   Hx abnormal pap   Hx hormone replacement     Retired nurse  Review of Systems   Constitutional: Negative for activity change, chills, fatigue, fever and unexpected weight change  HENT: Negative for mouth sores and trouble swallowing  Respiratory: Negative for shortness of breath  Gastrointestinal: Negative for anal bleeding, blood in stool, constipation and diarrhea  Genitourinary: Negative for difficulty urinating, dysuria, genital sores and hematuria  Neurological: Negative for weakness  Psychiatric/Behavioral: Negative for confusion and self-injury  Objective:      /80 (BP Location: Right arm, Patient Position: Sitting)   Ht 5' 3 5" (1 613 m)   Wt 95 3 kg (210 lb)   Breastfeeding? No   BMI 36 62 kg/m²          Physical Exam   Constitutional: She appears well-developed and well-nourished  No distress  HENT:   Head: Normocephalic  Neck: Normal range of motion  Neck supple  Pulmonary/Chest: Effort normal  Right breast exhibits no inverted nipple, no mass, no nipple discharge, no skin change and no tenderness   Left breast exhibits no inverted nipple, no mass, no nipple discharge, no skin change and no tenderness  Breasts are symmetrical    Abdominal: Soft  Genitourinary: No breast swelling, tenderness, discharge or bleeding  Pelvic exam was performed with patient supine  No labial fusion  There is no rash, tenderness, lesion or injury on the right labia  There is no rash, tenderness, lesion or injury on the left labia  Right adnexum displays no mass, no tenderness and no fullness  Left adnexum displays no mass, no tenderness and no fullness  No erythema or tenderness in the vagina  No foreign body in the vagina  No signs of injury around the vagina  No vaginal discharge found  Genitourinary Comments: Cx/uterus absent   Lymphadenopathy:        Right: No inguinal adenopathy present  Left: No inguinal adenopathy present

## 2018-12-10 NOTE — PATIENT INSTRUCTIONS
Calcium/Vitamin D Supplement (By mouth)   Calcium (RAVINDER-see-um), Vitamin D (VYE-ta-min D)  Supplies your body with calcium if you need more than you get in your diet  Calcium helps prevent osteoporosis (weak or brittle bones)  Vitamin D helps your body use the calcium  Calcium and vitamin D are minerals that your body needs to work properly  Brand Name(s): Maynor-Citrate, Maynor-Citrate Plus Vitamin D, Calcet Petites, Calcium 600MG+D, Calcium Citrate +D3 Maximum, Caltrate 600 + D, Citracal + D, Citracal Calcium Citrate Petites with Vitamin D, Citracal Petites, Citracal Ultradense Calcium Citrate, Citracal Ultradense Calcium Citrate Petite w/Vit D, Citrus Calcium with Vitamin D, D-1000, D-2000, D3-400IU   There may be other brand names for this medicine  When This Medicine Should Not Be Used: You should not use this medicine if you have had an allergic reaction to calcium or vitamin D (ergocalciferol)  How to Use This Medicine:   Tablet, Long Acting Tablet, Fizzy Tablet, Liquid Filled Capsule, Chewable Tablet  · Your doctor will tell you how much medicine to use  Do not use more than directed  · Follow the instructions on the medicine label if you are using this medicine without a prescription  Ask your pharmacist or health caregiver if you are not sure how much calcium you should take in one day  · Most calcium supplements should be taken with food, but some kinds of calcium (such as calcium citrate) can be taken with or without food  Ask your health care provider or read the label on the bottle to see if you need to take your specific kind of calcium with food  Drink a full glass of water (8 ounces) with each dose  · If you are using the effervescent (fizzy) tablet, dissolve the tablet in about 6 to 8 ounces of water (3/4 cup to 1 cup)  After the tablet is completely dissolved, drink this mixture right away  Do not save any mixture to take later    · Carefully follow your doctor's instructions about any special diet   · If you need to take more than one dose each a day, take each dose at evenly spaced times, unless your doctor has told you otherwise  If a dose is missed:   · Take a dose as soon as you remember  If it is almost time for your next dose, wait until then and take a regular dose  Do not take extra medicine to make up for a missed dose  How to Store and Dispose of This Medicine:   · Store the medicine in a closed container at room temperature, away from heat, moisture, and direct light  If the effervescent (fizzy) tablet comes packaged in foil, do not open the foil until you are ready to use each tablet  · Ask your pharmacist, doctor, or health caregiver about the best way to dispose of any outdated medicine or medicine no longer needed  · Keep all medicine out of the reach of children  Never share your medicine with anyone  Drugs and Foods to Avoid:   Ask your doctor or pharmacist before using any other medicine, including over-the-counter medicines, vitamins, and herbal products  · Make sure your doctor knows if you are also using other supplements or medicines that contain calcium  Tell your doctor if you are also using gallium nitrate (Ganite®), cellulose sodium phosphate (Calcibind®), or etidronate (Didronel®)  · Calcium can change the way other medicines work if you take them at the same time  If you need to use other medicines, take them at least 2 hours before or 2 hours after you take your calcium supplement  This is particularly important if you are also using phenytoin (Dilantin®) or a tetracycline antibiotic to treat an infection (such as doxycycline, minocycline, Vibramycin®)  · Do not take your calcium supplement with a high-fiber meal (such as bran, whole-grain cereal or bread, fresh fruits)  Do not smoke cigarettes or cigars  Do not drink large amounts of alcohol or caffeine (for example, more than about 8 cups of coffee)    Warnings While Using This Medicine:   · Make sure your doctor knows if you are pregnant or breast feeding, or if you have kidney disease or have ever had kidney stones  Tell your doctor if you have had problems with too much calcium (hypercalcemia) or too little calcium in your blood (hypocalcemia)  Some health problems that can cause hypercalcemia are sarcoidosis, or problems with your parathyroid gland  · You should not use certain brands of this medicine if you have kidney disease or are on dialysis, because they may harm your kidneys  Ask your caregiver what brands are best for you  · Some health problems can affect how much calcium you should take  Tell your doctor if you have stomach or digestion problems, such as on-going diarrhea, not absorbing nutrients properly, or not having enough acid in your stomach  · This medicine might contain phenylalanine (aspartame)  This is only a concern if you have a disorder called phenylketonuria (a problem with amino acids)  If you have this condition, talk to your doctor before using this medicine  · If you are using a large amount of calcium or using it for a long time, your doctor might need to check your blood on a regular basis  Be sure to keep all appointments  Possible Side Effects While Using This Medicine:   Call your doctor right away if you notice any of these side effects:  · Headache that will not go away, dry mouth, loss of appetite, severe constipation  If you notice other side effects that you think are caused by this medicine, tell your doctor  Call your doctor for medical advice about side effects  You may report side effects to FDA at 7-904-FDA-5161  © 2017 [employer] Information is for End User's use only and may not be sold, redistributed or otherwise used for commercial purposes  The above information is an  only  It is not intended as medical advice for individual conditions or treatments   Talk to your doctor, nurse or pharmacist before following any medical regimen to see if it is safe and effective for you

## 2019-01-12 ENCOUNTER — APPOINTMENT (OUTPATIENT)
Dept: LAB | Facility: CLINIC | Age: 69
End: 2019-01-12
Payer: COMMERCIAL

## 2019-01-12 DIAGNOSIS — Z79.4 CONTROLLED TYPE 2 DIABETES MELLITUS WITHOUT COMPLICATION, WITH LONG-TERM CURRENT USE OF INSULIN (HCC): ICD-10-CM

## 2019-01-12 DIAGNOSIS — E78.2 MIXED HYPERLIPIDEMIA: ICD-10-CM

## 2019-01-12 DIAGNOSIS — C54.1 ENDOMETRIAL CANCER (HCC): ICD-10-CM

## 2019-01-12 DIAGNOSIS — E11.9 CONTROLLED TYPE 2 DIABETES MELLITUS WITHOUT COMPLICATION, WITH LONG-TERM CURRENT USE OF INSULIN (HCC): ICD-10-CM

## 2019-01-12 LAB
ALBUMIN SERPL BCP-MCNC: 3.8 G/DL (ref 3.5–5)
ALP SERPL-CCNC: 75 U/L (ref 46–116)
ALT SERPL W P-5'-P-CCNC: 69 U/L (ref 12–78)
ANION GAP SERPL CALCULATED.3IONS-SCNC: 5 MMOL/L (ref 4–13)
AST SERPL W P-5'-P-CCNC: 50 U/L (ref 5–45)
BASOPHILS # BLD AUTO: 0.1 THOUSANDS/ΜL (ref 0–0.1)
BASOPHILS NFR BLD AUTO: 1 % (ref 0–1)
BILIRUB SERPL-MCNC: 0.54 MG/DL (ref 0.2–1)
BUN SERPL-MCNC: 18 MG/DL (ref 5–25)
CALCIUM SERPL-MCNC: 8.9 MG/DL (ref 8.3–10.1)
CHLORIDE SERPL-SCNC: 107 MMOL/L (ref 100–108)
CHOLEST SERPL-MCNC: 114 MG/DL (ref 50–200)
CO2 SERPL-SCNC: 25 MMOL/L (ref 21–32)
CREAT SERPL-MCNC: 0.74 MG/DL (ref 0.6–1.3)
EOSINOPHIL # BLD AUTO: 0.2 THOUSAND/ΜL (ref 0–0.61)
EOSINOPHIL NFR BLD AUTO: 2 % (ref 0–6)
ERYTHROCYTE [DISTWIDTH] IN BLOOD BY AUTOMATED COUNT: 13.6 % (ref 11.6–15.1)
EST. AVERAGE GLUCOSE BLD GHB EST-MCNC: 157 MG/DL
GFR SERPL CREATININE-BSD FRML MDRD: 82 ML/MIN/1.73SQ M
GLUCOSE P FAST SERPL-MCNC: 137 MG/DL (ref 65–99)
HBA1C MFR BLD: 7.1 % (ref 4.2–6.3)
HCT VFR BLD AUTO: 39 % (ref 34.8–46.1)
HDLC SERPL-MCNC: 41 MG/DL (ref 40–60)
HGB BLD-MCNC: 12.6 G/DL (ref 11.5–15.4)
IMM GRANULOCYTES # BLD AUTO: 0.03 THOUSAND/UL (ref 0–0.2)
IMM GRANULOCYTES NFR BLD AUTO: 0 % (ref 0–2)
LDLC SERPL CALC-MCNC: 52 MG/DL (ref 0–100)
LYMPHOCYTES # BLD AUTO: 2.9 THOUSANDS/ΜL (ref 0.6–4.47)
LYMPHOCYTES NFR BLD AUTO: 35 % (ref 14–44)
MCH RBC QN AUTO: 28.8 PG (ref 26.8–34.3)
MCHC RBC AUTO-ENTMCNC: 32.3 G/DL (ref 31.4–37.4)
MCV RBC AUTO: 89 FL (ref 82–98)
MONOCYTES # BLD AUTO: 0.56 THOUSAND/ΜL (ref 0.17–1.22)
MONOCYTES NFR BLD AUTO: 7 % (ref 4–12)
NEUTROPHILS # BLD AUTO: 4.61 THOUSANDS/ΜL (ref 1.85–7.62)
NEUTS SEG NFR BLD AUTO: 55 % (ref 43–75)
NONHDLC SERPL-MCNC: 73 MG/DL
NRBC BLD AUTO-RTO: 0 /100 WBCS
PLATELET # BLD AUTO: 309 THOUSANDS/UL (ref 149–390)
PMV BLD AUTO: 9.8 FL (ref 8.9–12.7)
POTASSIUM SERPL-SCNC: 4.3 MMOL/L (ref 3.5–5.3)
PROT SERPL-MCNC: 7 G/DL (ref 6.4–8.2)
RBC # BLD AUTO: 4.38 MILLION/UL (ref 3.81–5.12)
SODIUM SERPL-SCNC: 137 MMOL/L (ref 136–145)
TRIGL SERPL-MCNC: 107 MG/DL
WBC # BLD AUTO: 8.4 THOUSAND/UL (ref 4.31–10.16)

## 2019-01-12 PROCEDURE — 80061 LIPID PANEL: CPT

## 2019-01-12 PROCEDURE — 36415 COLL VENOUS BLD VENIPUNCTURE: CPT

## 2019-01-12 PROCEDURE — 80053 COMPREHEN METABOLIC PANEL: CPT

## 2019-01-12 PROCEDURE — 85025 COMPLETE CBC W/AUTO DIFF WBC: CPT

## 2019-01-12 PROCEDURE — 83036 HEMOGLOBIN GLYCOSYLATED A1C: CPT

## 2019-01-16 ENCOUNTER — OFFICE VISIT (OUTPATIENT)
Dept: INTERNAL MEDICINE CLINIC | Facility: CLINIC | Age: 69
End: 2019-01-16
Payer: COMMERCIAL

## 2019-01-16 VITALS
SYSTOLIC BLOOD PRESSURE: 154 MMHG | HEIGHT: 63 IN | DIASTOLIC BLOOD PRESSURE: 78 MMHG | OXYGEN SATURATION: 96 % | BODY MASS INDEX: 37 KG/M2 | HEART RATE: 91 BPM | WEIGHT: 208.8 LBS

## 2019-01-16 DIAGNOSIS — E66.09 OBESITY DUE TO EXCESS CALORIES, UNSPECIFIED CLASSIFICATION, UNSPECIFIED WHETHER SERIOUS COMORBIDITY PRESENT: ICD-10-CM

## 2019-01-16 DIAGNOSIS — C54.1 ENDOMETRIAL CANCER (HCC): ICD-10-CM

## 2019-01-16 DIAGNOSIS — I10 ESSENTIAL HYPERTENSION: ICD-10-CM

## 2019-01-16 DIAGNOSIS — J45.909 ASTHMA, UNSPECIFIED ASTHMA SEVERITY, UNSPECIFIED WHETHER COMPLICATED, UNSPECIFIED WHETHER PERSISTENT: ICD-10-CM

## 2019-01-16 DIAGNOSIS — K76.0 HEPATIC STEATOSIS: ICD-10-CM

## 2019-01-16 DIAGNOSIS — E88.81 DYSMETABOLIC SYNDROME X: ICD-10-CM

## 2019-01-16 DIAGNOSIS — I10 BENIGN ESSENTIAL HYPERTENSION: Primary | ICD-10-CM

## 2019-01-16 DIAGNOSIS — Z79.4 CONTROLLED TYPE 2 DIABETES MELLITUS WITHOUT COMPLICATION, WITH LONG-TERM CURRENT USE OF INSULIN (HCC): ICD-10-CM

## 2019-01-16 DIAGNOSIS — E11.9 CONTROLLED TYPE 2 DIABETES MELLITUS WITHOUT COMPLICATION, WITH LONG-TERM CURRENT USE OF INSULIN (HCC): ICD-10-CM

## 2019-01-16 PROCEDURE — 99214 OFFICE O/P EST MOD 30 MIN: CPT | Performed by: INTERNAL MEDICINE

## 2019-01-16 PROCEDURE — 4010F ACE/ARB THERAPY RXD/TAKEN: CPT | Performed by: INTERNAL MEDICINE

## 2019-01-16 RX ORDER — ALBUTEROL SULFATE 2.5 MG/3ML
2.5 SOLUTION RESPIRATORY (INHALATION) EVERY 6 HOURS PRN
Qty: 25 VIAL | Refills: 2 | Status: SHIPPED | OUTPATIENT
Start: 2019-01-16 | End: 2019-03-20 | Stop reason: HOSPADM

## 2019-01-16 RX ORDER — INSULIN GLARGINE 100 [IU]/ML
10 INJECTION, SOLUTION SUBCUTANEOUS
Qty: 3000 UNITS | Refills: 5 | Status: SHIPPED | OUTPATIENT
Start: 2019-01-16 | End: 2019-02-28 | Stop reason: ALTCHOICE

## 2019-01-16 RX ORDER — LOSARTAN POTASSIUM 50 MG/1
TABLET ORAL
Qty: 135 TABLET | Refills: 5 | Status: SHIPPED | OUTPATIENT
Start: 2019-01-16 | End: 2019-06-25 | Stop reason: ALTCHOICE

## 2019-01-16 NOTE — PROGRESS NOTES
Assessment/Plan:  Regarding her blood sugars her blood sugars are elevated  She is going to and 8 units of Lantus insulin at bedtime  She is going to discontinue her glipizide  Regarding her blood pressure she is going to increase her losartan to 75 mg per day  Regarding her elevated liver test she is going to do an ultrasound of her liver       I will see her back in 6 weeks      Recent Results (from the past 1008 hour(s))   CBC and differential    Collection Time: 01/12/19  8:26 AM   Result Value Ref Range    WBC 8 40 4 31 - 10 16 Thousand/uL    RBC 4 38 3 81 - 5 12 Million/uL    Hemoglobin 12 6 11 5 - 15 4 g/dL    Hematocrit 39 0 34 8 - 46 1 %    MCV 89 82 - 98 fL    MCH 28 8 26 8 - 34 3 pg    MCHC 32 3 31 4 - 37 4 g/dL    RDW 13 6 11 6 - 15 1 %    MPV 9 8 8 9 - 12 7 fL    Platelets 834 252 - 930 Thousands/uL    nRBC 0 /100 WBCs    Neutrophils Relative 55 43 - 75 %    Immat GRANS % 0 0 - 2 %    Lymphocytes Relative 35 14 - 44 %    Monocytes Relative 7 4 - 12 %    Eosinophils Relative 2 0 - 6 %    Basophils Relative 1 0 - 1 %    Neutrophils Absolute 4 61 1 85 - 7 62 Thousands/µL    Immature Grans Absolute 0 03 0 00 - 0 20 Thousand/uL    Lymphocytes Absolute 2 90 0 60 - 4 47 Thousands/µL    Monocytes Absolute 0 56 0 17 - 1 22 Thousand/µL    Eosinophils Absolute 0 20 0 00 - 0 61 Thousand/µL    Basophils Absolute 0 10 0 00 - 0 10 Thousands/µL   Comprehensive metabolic panel    Collection Time: 01/12/19  8:26 AM   Result Value Ref Range    Sodium 137 136 - 145 mmol/L    Potassium 4 3 3 5 - 5 3 mmol/L    Chloride 107 100 - 108 mmol/L    CO2 25 21 - 32 mmol/L    ANION GAP 5 4 - 13 mmol/L    BUN 18 5 - 25 mg/dL    Creatinine 0 74 0 60 - 1 30 mg/dL    Glucose, Fasting 137 (H) 65 - 99 mg/dL    Calcium 8 9 8 3 - 10 1 mg/dL    AST 50 (H) 5 - 45 U/L    ALT 69 12 - 78 U/L    Alkaline Phosphatase 75 46 - 116 U/L    Total Protein 7 0 6 4 - 8 2 g/dL    Albumin 3 8 3 5 - 5 0 g/dL    Total Bilirubin 0 54 0 20 - 1 00 mg/dL eGFR 82 ml/min/1 73sq m   HEMOGLOBIN A1C W/ EAG ESTIMATION    Collection Time: 01/12/19  8:26 AM   Result Value Ref Range    Hemoglobin A1C 7 1 (H) 4 2 - 6 3 %     mg/dl   Lipid panel    Collection Time: 01/12/19  8:26 AM   Result Value Ref Range    Cholesterol 114 50 - 200 mg/dL    Triglycerides 107 <=150 mg/dL    HDL, Direct 41 40 - 60 mg/dL    LDL Calculated 52 0 - 100 mg/dL    Non-HDL-Chol (CHOL-HDL) 73 mg/dl       1  Benign essential hypertension     2  Controlled type 2 diabetes mellitus without complication, with long-term current use of insulin ([facility])  insulin glargine (LANTUS) 100 units/mL subcutaneous injection   3  Endometrial cancer ([facility] )     4  Obesity due to excess calories, unspecified classification, unspecified whether serious comorbidity present     5  Dysmetabolic syndrome X     6  Asthma, unspecified asthma severity, unspecified whether complicated, unspecified whether persistent  albuterol (2 5 mg/3 mL) 0 083 % nebulizer solution   7  Essential hypertension  losartan (COZAAR) 50 mg tablet       No orders of the defined types were placed in this encounter  Subjective:  Blood sugars are running a little high  A1c is up to 7 1  Positive history of of fatty liver  No chest pain or cardiac complaints  Up-to-date on eye checkups  Patient ID: Raul Fofana is a 77 y o  female  HPI other problems are 1  History of uterine cancer 2  Diabetes 3  GERD 4  Hypertension 5  Hyperlipidemia  The following portions of the patient's history were reviewed and updated as appropriate:   She has a past medical history of Abnormal mammogram; Abnormal weight gain; Achilles tendinitis; Angina pectoris ([facility] ); Asthma; COPD (chronic obstructive pulmonary disease) ([facility] ); Diabetes mellitus ([facility] ); Disc degeneration, lumbar; Disease of thyroid gland; Dyslipidemia; Early satiety; Edema; Enthesopathy; Esophagitis, reflux; GERD (gastroesophageal reflux disease);  Hard to intubate; Hypercholesterolemia; Hypertension; IBS (irritable bowel syndrome); Irritable bowel syndrome; Osteoarthritis; Rosacea; and Sacroiliitis ([facility] [address] )  ,   does not have any pertinent problems on file  ,   has a past surgical history that includes  section; Foot surgery (Right); SHOULDER ARTHROPLASTY; Sinus surgery; Tonsillectomy; Replacement total knee (Right); Tubal ligation; Wrist surgery; Elbow surgery; Knee arthroscopy w/ meniscal repair (Right); Colonoscopy; Knee arthroscopy; Joint replacement (Left); Fracture surgery; Fracture surgery (Left); Esophagogastroduodenoscopy; and pr laparoscopy w tot hysterectuterus <=250 gram  w tube/ovary (N/A, 2018)  ,  family history includes Arthritis in her mother; Brain cancer in her maternal grandmother; Breast cancer in her maternal aunt, maternal grandmother, and mother; Diabetes in her mother; Heart disease in her father and mother; Hiatal hernia in her child, father, mother, and sister; Hypertension in her mother; Irritable bowel syndrome in her daughter and mother; Lung cancer in her sister; Georgianne Devoid Hypertension in her son; No Known Problems in her brother, maternal grandfather, paternal grandfather, and paternal grandmother; Osteoporosis in her mother; Other in her child, father, mother, and sister; Thyroid disease in her mother and sister; Ulcers in her father; Uterine cancer in her maternal aunt and mother  ,   reports that she has never smoked  She has never used smokeless tobacco  She reports that she drinks about 1 8 oz of alcohol per week   She reports that she does not use drugs  ,  is allergic to amoxicillin-pot clavulanate; erythromycin; meperidine; morphine; nabumetone; penicillins; darvon [propoxyphene]; methocarbamol; reglan [metoclopramide]; sulfa antibiotics; and tetracyclines & related       Current Outpatient Prescriptions:     albuterol (VENTOLIN HFA) 90 mcg/act inhaler, Inhale 2 puffs 4 (four) times a day as needed, Disp: , Rfl:     aspirin (ASPIR-81) 81 mg EC tablet, Take by mouth, Disp: , Rfl:     B Complex Vitamins (B COMPLEX 1 PO), Take by mouth, Disp: , Rfl:     BD INSULIN SYRINGE ULTRAFINE 31G X 15/64" 0 3 ML MISC, , Disp: , Rfl:     Calcium Carbonate-Vitamin D (CALCIUM 600+D) 600-200 MG-UNIT TABS, Take by mouth, Disp: , Rfl:     cetirizine (ZyrTEC) 10 MG chewable tablet, Chew 10 mg daily, Disp: , Rfl:     clindamycin (CLEOCIN) 300 MG capsule, Take by mouth Before dental procedures 600mg , Disp: , Rfl:     clotrimazole-betamethasone (LOTRISONE) 1-0 05 % cream, Apply topically daily at bedtime as needed (for itching), Disp: 45 g, Rfl: 1    ezetimibe (ZETIA) 10 mg tablet, TAKE 1 TABLET DAILY, Disp: 90 tablet, Rfl: 2    furosemide (LASIX) 20 mg tablet, Take 1 tablet (20 mg total) by mouth daily, Disp: 90 tablet, Rfl: 3    glipiZIDE (GLUCOTROL XL) 2 5 mg 24 hr tablet, Take 1 tablet (2 5 mg total) by mouth daily, Disp: 90 tablet, Rfl: 3    insulin lispro (HUMALOG) 100 units/mL injection, Inject under the skin Only when on steroids or with a sick day , Disp: , Rfl:     KRILL OIL PO, Take by mouth, Disp: , Rfl:     levothyroxine 75 mcg tablet, Take 1 tablet (75 mcg total) by mouth daily, Disp: 90 tablet, Rfl: 3    LINZESS 145 MCG CAPS, TAKE 1 CAPSULE IN THE MORNING AT LEAST 30 MINUTES BEFORE BREAKFAST DAILY, Disp: 90 capsule, Rfl: 3    losartan (COZAAR) 50 mg tablet, TAKE 1-1/2 TABLETS DAILY, Disp: 135 tablet, Rfl: 5    Magnesium Oxide 400 MG CAPS, Take by mouth, Disp: , Rfl:     meloxicam (MOBIC) 15 mg tablet, Take 1 tablet (15 mg total) by mouth daily as needed (P r n  pain), Disp: 90 tablet, Rfl: 3    metFORMIN (GLUCOPHAGE) 1000 MG tablet, Take 1,000 mg by mouth daily  , Disp: , Rfl:     metFORMIN (GLUCOPHAGE-XR) 500 mg 24 hr tablet, Take 500 mg by mouth daily with breakfast  , Disp: , Rfl:     Omega-3 Fatty Acids (FISH OIL) 1200 MG CAPS, Take by mouth, Disp: , Rfl:     pantoprazole (PROTONIX) 40 mg tablet, Take 1 tablet (40 mg total) by mouth daily, Disp: 90 tablet, Rfl: 3    potassium chloride (MICRO-K) 10 MEQ CR capsule, Take 1 capsule (10 mEq total) by mouth daily, Disp: 90 capsule, Rfl: 3    rosuvastatin (CRESTOR) 20 MG tablet, TAKE 1 TABLET DAILY, Disp: 90 tablet, Rfl: 2    saccharomyces boulardii (FLORASTOR) 250 mg capsule, Take 250 mg by mouth 3 (three) times a day, Disp: , Rfl:     albuterol (2 5 mg/3 mL) 0 083 % nebulizer solution, Take 1 vial (2 5 mg total) by nebulization every 6 (six) hours as needed for wheezing or shortness of breath, Disp: 25 vial, Rfl: 2    insulin glargine (LANTUS) 100 units/mL subcutaneous injection, Inject 10 Units under the skin daily at bedtime, Disp: 3000 Units, Rfl: 5    Current Facility-Administered Medications:     albuterol inhalation solution 2 5 mg, 2 5 mg, Nebulization, Q6H PRN, Chito Valadez MD    Review of Systems   Constitutional: Positive for unexpected weight change  Negative for activity change, appetite change, chills, diaphoresis, fatigue and fever  Weight is problematic at this time she is going to go to the weight loss program   HENT: Negative for congestion, ear pain, hearing loss, mouth sores, nosebleeds, postnasal drip, sinus pain, sinus pressure, sore throat and trouble swallowing  Eyes: Negative for pain, discharge and visual disturbance  Respiratory: Positive for shortness of breath  Negative for apnea, cough, chest tightness and wheezing  She gets occasional bouts of asthma   Cardiovascular: Negative for chest pain, palpitations and leg swelling  Gastrointestinal: Negative for abdominal pain, anal bleeding, blood in stool, constipation, diarrhea, nausea and vomiting  Endocrine: Negative for polydipsia and polyphagia  Blood sugars used to be well controlled now her A1c is 7 1  Genitourinary: Negative for decreased urine volume, dysuria, flank pain, frequency, hematuria and urgency     Musculoskeletal: Negative for arthralgias, back pain, gait problem, joint swelling and myalgias  Skin: Negative for rash and wound  Allergic/Immunologic: Negative for environmental allergies and food allergies  Neurological: Negative for dizziness, tremors, seizures, syncope, speech difficulty, light-headedness, numbness and headaches  Hematological: Negative for adenopathy  Does not bruise/bleed easily  Psychiatric/Behavioral: Negative for agitation, confusion, hallucinations, sleep disturbance and suicidal ideas  The patient is not nervous/anxious  Objective:  /78 (BP Location: Left arm, Patient Position: Sitting)   Pulse 91   Ht 5' 3" (1 6 m)   Wt 94 7 kg (208 lb 12 8 oz)   SpO2 96%   BMI 36 99 kg/m²      Physical Exam   Constitutional: She appears well-developed  No distress  She is overweight  Blood pressure is 130 over 70   HENT:   Head: Normocephalic  Right Ear: External ear normal    Left Ear: External ear normal    Nose: Nose normal    Mouth/Throat: Oropharynx is clear and moist  No oropharyngeal exudate  Eyes: Pupils are equal, round, and reactive to light  Conjunctivae and EOM are normal  Right eye exhibits no discharge  Left eye exhibits no discharge  Neck: Normal range of motion  Neck supple  No thyromegaly present  Cardiovascular: Normal rate, regular rhythm, normal heart sounds and intact distal pulses  Exam reveals no gallop and no friction rub  No murmur heard  Pulmonary/Chest: Effort normal and breath sounds normal  No respiratory distress  She has no wheezes  She has no rales  Lungs completely clear at this time  Abdominal: Soft  Bowel sounds are normal  She exhibits no distension and no mass  There is no tenderness  There is no rebound and no guarding  Abdomen is overweight soft nondistended with no masses  Musculoskeletal: Normal range of motion  She exhibits no edema, tenderness or deformity  Lymphadenopathy:     She has no cervical adenopathy  Neurological: She is alert   She has normal reflexes  No cranial nerve deficit  Coordination normal    Skin: Skin is warm and dry  No rash noted  No erythema  Psychiatric: She has a normal mood and affect  Her behavior is normal  Judgment and thought content normal    Nursing note and vitals reviewed

## 2019-01-17 DIAGNOSIS — E11.9 TYPE 2 DIABETES MELLITUS WITHOUT COMPLICATION, WITH LONG-TERM CURRENT USE OF INSULIN (HCC): Primary | ICD-10-CM

## 2019-01-17 DIAGNOSIS — Z79.4 TYPE 2 DIABETES MELLITUS WITHOUT COMPLICATION, WITH LONG-TERM CURRENT USE OF INSULIN (HCC): Primary | ICD-10-CM

## 2019-01-21 ENCOUNTER — HOSPITAL ENCOUNTER (OUTPATIENT)
Dept: MAMMOGRAPHY | Facility: CLINIC | Age: 69
Discharge: HOME | End: 2019-01-21
Payer: COMMERCIAL

## 2019-01-21 VITALS — BODY MASS INDEX: 36.86 KG/M2 | HEIGHT: 63 IN | WEIGHT: 208 LBS

## 2019-01-21 DIAGNOSIS — Z12.31 ENCOUNTER FOR SCREENING MAMMOGRAM FOR MALIGNANT NEOPLASM OF BREAST: ICD-10-CM

## 2019-01-21 PROCEDURE — 77067 SCR MAMMO BI INCL CAD: CPT

## 2019-01-21 PROCEDURE — 77063 BREAST TOMOSYNTHESIS BI: CPT

## 2019-01-29 ENCOUNTER — OFFICE VISIT (OUTPATIENT)
Dept: BARIATRICS | Facility: CLINIC | Age: 69
End: 2019-01-29
Payer: COMMERCIAL

## 2019-01-29 VITALS
HEART RATE: 72 BPM | SYSTOLIC BLOOD PRESSURE: 132 MMHG | TEMPERATURE: 98.4 F | BODY MASS INDEX: 37.32 KG/M2 | HEIGHT: 63 IN | WEIGHT: 210.6 LBS | DIASTOLIC BLOOD PRESSURE: 78 MMHG

## 2019-01-29 DIAGNOSIS — E66.9 OBESITY, CLASS II, BMI 35-39.9: Primary | ICD-10-CM

## 2019-01-29 DIAGNOSIS — J45.909 ASTHMA, UNSPECIFIED ASTHMA SEVERITY, UNSPECIFIED WHETHER COMPLICATED, UNSPECIFIED WHETHER PERSISTENT: ICD-10-CM

## 2019-01-29 DIAGNOSIS — I10 BENIGN ESSENTIAL HYPERTENSION: ICD-10-CM

## 2019-01-29 DIAGNOSIS — Z91.89 AT RISK FOR SLEEP APNEA: ICD-10-CM

## 2019-01-29 DIAGNOSIS — E78.5 DYSLIPIDEMIA: ICD-10-CM

## 2019-01-29 DIAGNOSIS — Z79.4 CONTROLLED TYPE 2 DIABETES MELLITUS WITHOUT COMPLICATION, WITH LONG-TERM CURRENT USE OF INSULIN (HCC): ICD-10-CM

## 2019-01-29 DIAGNOSIS — R79.89 ELEVATED LIVER FUNCTION TESTS: Primary | ICD-10-CM

## 2019-01-29 DIAGNOSIS — K21.9 GASTROESOPHAGEAL REFLUX DISEASE, ESOPHAGITIS PRESENCE NOT SPECIFIED: ICD-10-CM

## 2019-01-29 DIAGNOSIS — E11.9 CONTROLLED TYPE 2 DIABETES MELLITUS WITHOUT COMPLICATION, WITH LONG-TERM CURRENT USE OF INSULIN (HCC): ICD-10-CM

## 2019-01-29 PROCEDURE — 99243 OFF/OP CNSLTJ NEW/EST LOW 30: CPT | Performed by: PHYSICIAN ASSISTANT

## 2019-01-29 NOTE — ASSESSMENT & PLAN NOTE
Taking cozaar and lasix   -should improve with weight loss, dietary, and lifestyle changes  -continue management with prescribing provider

## 2019-01-29 NOTE — ASSESSMENT & PLAN NOTE
Taking crestor and zetia  -should improve with weight loss, dietary, and lifestyle changes  -continue management with prescribing provider

## 2019-01-29 NOTE — ASSESSMENT & PLAN NOTE
Taking Protonix  -should improve with weight loss, dietary, and lifestyle changes  -continue management with prescribing provider

## 2019-01-29 NOTE — ASSESSMENT & PLAN NOTE
STOP BAN/8  - Sleep medicine referral placed   -Discussed risks of untreated sleep apnea such as sudden cardiac death by arrhythmia, uncontrolled hypertension, difficulty with weight loss, decreased quality sleep, increased insulin resistance, and stroke    -Should improve with dietary and lifestyle changes

## 2019-01-29 NOTE — ASSESSMENT & PLAN NOTE
Lab Results   Component Value Date    HGBA1C 7 1 (H) 01/12/2019       Taking glipizide, lantus, humalog, metformin  -should improve with weight loss, dietary, and lifestyle changes  -continue management with prescribing provider

## 2019-01-29 NOTE — ASSESSMENT & PLAN NOTE
-Discussed options of HealthyCORE-Intensive Lifestyle Intervention Program, Very Low Calorie Diet-VLCD, Conservative Program, Gabriel-En-Y Gastric Bypass and Vertical Sleeve Gastrectomy and the role of weight loss medications   -Initial weight loss goal of 5-10% weight loss for improved health  -Screening labs   Recommend checking lab coverage before having labs drawn   -cmp,lipid,a1c (1/12/19) tsh (7/14/18) reviewed and all within acceptable limits except elevated a1c  - STOP BANG-4/8  -Patient is interested in pursuing Very Low Calorie Diet-VLCD and than healthy core    -not interested in surgery    VLCD Review:  Labs ordered: cmp reviewed from 1/12/19 abd ok to proceed with vlcd   HTN meds addressed: monitor bp -- and adjust as necessary   DM2 meds addressed: will reach out to pcp to see how they would like insulin adjusted with vlcd   VLCD time restriction based on BMI: n/a

## 2019-01-29 NOTE — PROGRESS NOTES
Assessment/Plan: At risk for sleep apnea  STOP BAN/8  - Sleep medicine referral placed   -Discussed risks of untreated sleep apnea such as sudden cardiac death by arrhythmia, uncontrolled hypertension, difficulty with weight loss, decreased quality sleep, increased insulin resistance, and stroke  -Should improve with dietary and lifestyle changes        Obesity, Class II, BMI 35-39 9  -Discussed options of HealthyCORE-Intensive Lifestyle Intervention Program, Very Low Calorie Diet-VLCD, Conservative Program, Gabriel-En-Y Gastric Bypass and Vertical Sleeve Gastrectomy and the role of weight loss medications   -Initial weight loss goal of 5-10% weight loss for improved health  -Screening labs   Recommend checking lab coverage before having labs drawn   -cmp,lipid,a1c (19) tsh (18) reviewed and all within acceptable limits except elevated a1c  - STOP BANG-  -Patient is interested in pursuing Very Low Calorie Diet-VLCD and than healthy core    -not interested in surgery    VLCD Review:  Labs ordered: cmp reviewed from 19 abd ok to proceed with vlcd   HTN meds addressed: monitor bp -- and adjust as necessary   DM2 meds addressed: will reach out to pcp to see how they would like insulin adjusted with vlcd   VLCD time restriction based on BMI: n/a    Dyslipidemia  Taking crestor and zetia  -should improve with weight loss, dietary, and lifestyle changes  -continue management with prescribing provider      Benign essential hypertension  Taking cozaar and lasix   -should improve with weight loss, dietary, and lifestyle changes  -continue management with prescribing provider      Asthma  Taking ventolin and albuterol  -continue management with prescribing provider      Diabetes mellitus type 2, controlled, without complications ([facility] )  Lab Results   Component Value Date    HGBA1C 7 1 (H) 2019       Taking glipizide, lantus, humalog, metformin  -should improve with weight loss, dietary, and lifestyle changes  -continue management with prescribing provider        GERD (gastroesophageal reflux disease)  Taking Protonix  -should improve with weight loss, dietary, and lifestyle changes  -continue management with prescribing provider        Follow up for vlcd start     Diagnoses and all orders for this visit:    Obesity, Class II, BMI 35-39 9  -     Ambulatory referral to Sleep Medicine; Future    Dyslipidemia  -     Ambulatory referral to Sleep Medicine; Future    Benign essential hypertension  -     Ambulatory referral to Sleep Medicine; Future    Asthma, unspecified asthma severity, unspecified whether complicated, unspecified whether persistent  -     Ambulatory referral to Sleep Medicine; Future    Controlled type 2 diabetes mellitus without complication, with long-term current use of insulin ([facility] )  -     Ambulatory referral to Sleep Medicine; Future    Gastroesophageal reflux disease, esophagitis presence not specified  -     Ambulatory referral to Sleep Medicine; Future    At risk for sleep apnea  -     Ambulatory referral to Sleep Medicine; Future          Subjective:   Chief Complaint   Patient presents with    Consult     Pt here for initial MWM consult w/PA  BMI 37 9  Pt diagnosed w/JUICE  but does not use CPAP  Pt tested positive 4 of 8 on stop bang today  Patient ID: Gregoria Gordon  is a 77 y o  female with excess weight/obesity here to pursue weight management      Past Medical History:   Diagnosis Date    Abnormal mammogram     Abnormal weight gain     Achilles tendinitis     Angina pectoris (HCC)     Asthma     COPD (chronic obstructive pulmonary disease) (HCC)     Diabetes mellitus (HCC)     Disc degeneration, lumbar     Disease of thyroid gland     hypo    Dyslipidemia     Early satiety     Edema     idiopathic peripheral    [facility] 06/28/2018    70    Enthesopathy     hip region    Esophagitis, reflux     GERD (gastroesophageal reflux disease)     Hard to intubate     difficulty with intubation and had to be intubated twice    Hypercholesterolemia     Hypertension     IBS (irritable bowel syndrome)     Irritable bowel syndrome     Osteoarthritis     Rosacea     Sacroiliitis (HCC)        HPI:  Obesity/Excess Weight:  Severity: Severe  Onset: For the last 15 years   Modifiers: Diet and Exercise and Commercial Weight Loss Programs-ie  Weight Watchers, Otelia Rough, Nutrisystem, etc   Contributing factors: Poor Food Choices and Insufficient Physical Activity  Associated symptoms: comorbid conditions, increased shortness of breath and endometiral cancer     Goals: 140 lbs   Hydration: 4-5 glasses per day, 2 cups of black coffee, 6 ounces of v8   Alcohol: 2 drinks per week     The following portions of the patient's history were reviewed and updated as appropriate: allergies, current medications, past family history, past medical history, past social history, past surgical history and problem list     Review of Systems   HENT: Negative for sore throat  Respiratory: Positive for shortness of breath (with exertion )  Negative for cough  Cardiovascular: Negative for chest pain and palpitations  Gastrointestinal: Positive for constipation (controlled with meds )  Negative for abdominal pain, diarrhea, nausea and vomiting         + GERD- controlled with meds    Endocrine: Positive for heat intolerance  Negative for cold intolerance  Genitourinary: Negative for dysuria  Musculoskeletal: Positive for arthralgias (knees) and back pain  Skin: Positive for rash (roscea )  Neurological: Negative for headaches  Psychiatric/Behavioral: Negative for suicidal ideas  Sleep disturbance: denies HI          Denies depression and anxiety       Objective:    /78 (BP Location: Right arm, Patient Position: Sitting, Cuff Size: Large)   Pulse 72   Temp 98 4 °F (36 9 °C) (Tympanic)   Ht 5' 2 5" (1 588 m)   Wt 95 5 kg (210 lb 9 6 oz)   BMI 37 91 kg/m² Physical Exam   Nursing note and vitals reviewed  Constitutional   General appearance: Abnormal   well developed and morbidly obese  Eyes No conjunctival pallor  Ears, Nose, Mouth, and Throat Oral mucosa moist    Pulmonary   Respiratory effort: No increased work of breathing or signs of respiratory distress  Auscultation of lungs: Clear to auscultation, equal breath sounds bilaterally, no wheezes, no rales, no rhonci  Cardiovascular   Auscultation of heart: Normal rate and rhythm, normal S1 and S2, without murmurs  Examination of extremities for edema and/or varicosities: Normal   no edema  Abdomen   Abdomen: Abnormal   The abdomen was obese  Bowel sounds were normal  The abdomen was soft and nontender     Musculoskeletal   Gait and station: Normal     Psychiatric   Orientation to person, place and time: Normal     Affect: appropriate

## 2019-02-01 ENCOUNTER — TRANSCRIBE ORDERS (OUTPATIENT)
Dept: SLEEP CENTER | Facility: CLINIC | Age: 69
End: 2019-02-01

## 2019-02-08 ENCOUNTER — OFFICE VISIT (OUTPATIENT)
Dept: BARIATRICS | Facility: CLINIC | Age: 69
End: 2019-02-08

## 2019-02-08 VITALS — WEIGHT: 209.6 LBS | HEIGHT: 63 IN | BODY MASS INDEX: 37.14 KG/M2

## 2019-02-08 DIAGNOSIS — R63.5 ABNORMAL WEIGHT GAIN: ICD-10-CM

## 2019-02-08 PROCEDURE — RECHECK

## 2019-02-08 PROCEDURE — VLCD

## 2019-02-11 ENCOUNTER — TELEPHONE (OUTPATIENT)
Dept: INTERNAL MEDICINE CLINIC | Facility: CLINIC | Age: 69
End: 2019-02-11

## 2019-02-11 ENCOUNTER — TELEPHONE (OUTPATIENT)
Dept: BARIATRICS | Facility: CLINIC | Age: 69
End: 2019-02-11

## 2019-02-11 NOTE — TELEPHONE ENCOUNTER
Ren WATTS,from weight management center  [phone]    Patient will be on a 760-800 calorie diet    Wants Dr Lesia Mckeon to be aware of the change in diet and her insulin would need to be adjusted  Liat Mistry

## 2019-02-11 NOTE — TELEPHONE ENCOUNTER
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

## 2019-02-12 ENCOUNTER — TELEPHONE (OUTPATIENT)
Dept: BARIATRICS | Facility: CLINIC | Age: 69
End: 2019-02-12

## 2019-02-12 DIAGNOSIS — K59.09 OTHER CONSTIPATION: ICD-10-CM

## 2019-02-12 RX ORDER — LINACLOTIDE 145 UG/1
CAPSULE, GELATIN COATED ORAL
Qty: 90 CAPSULE | Refills: 3 | Status: SHIPPED | OUTPATIENT
Start: 2019-02-12 | End: 2019-12-26 | Stop reason: SDUPTHER

## 2019-02-13 ENCOUNTER — TELEPHONE (OUTPATIENT)
Dept: INTERNAL MEDICINE CLINIC | Facility: CLINIC | Age: 69
End: 2019-02-13

## 2019-02-13 ENCOUNTER — TELEPHONE (OUTPATIENT)
Dept: BARIATRICS | Facility: CLINIC | Age: 69
End: 2019-02-13

## 2019-02-13 NOTE — TELEPHONE ENCOUNTER
Cezar from weight management called in regards to Cori's insulin dosage  for the very low calorie diet   Please contact Cezar at  [phone]

## 2019-02-13 NOTE — TELEPHONE ENCOUNTER
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

## 2019-02-13 NOTE — TELEPHONE ENCOUNTER
Jayashree Alejandre called back today  Still waiting on how to adjust her insulin    Please call ASAP  [phone]

## 2019-02-13 NOTE — TELEPHONE ENCOUNTER
Reached out again to pcp  Spoke with adelaida who notes she will send info again   Awaiting call back

## 2019-02-13 NOTE — TELEPHONE ENCOUNTER
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

## 2019-02-14 ENCOUNTER — TELEPHONE (OUTPATIENT)
Dept: BARIATRICS | Facility: CLINIC | Age: 69
End: 2019-02-14

## 2019-02-14 NOTE — TELEPHONE ENCOUNTER
She is returning Dr Consuelo Titus call    She said he can call her back and the girl's at the front will get his call right to her    She will be there all day  Denise Antonio

## 2019-02-14 NOTE — TELEPHONE ENCOUNTER
Spoke to Dr Lina Robison  He wants patient to d/c lantus at night  She can continue on sliding scale humalog, glipizide and metformin  Patient to report numbers to pcp and us  Please call and inform patient of these changes  Dr Lina Robison also called and let her a message regarding these changes

## 2019-02-14 NOTE — TELEPHONE ENCOUNTER
Called pt per Desire Hickey PAc regarding diabetic meds  Desire had spoken with Dr Gabriela Romero (PCP)  Per conversation, patient is to d/c Lantus, and continue on Humalog (sliding scale) and also continue metformin and glipizide    Left message

## 2019-02-22 ENCOUNTER — OFFICE VISIT (OUTPATIENT)
Dept: BARIATRICS | Facility: CLINIC | Age: 69
End: 2019-02-22

## 2019-02-22 VITALS
WEIGHT: 198.6 LBS | SYSTOLIC BLOOD PRESSURE: 132 MMHG | DIASTOLIC BLOOD PRESSURE: 72 MMHG | HEIGHT: 63 IN | BODY MASS INDEX: 35.19 KG/M2

## 2019-02-22 DIAGNOSIS — R63.5 ABNORMAL WEIGHT GAIN: Primary | ICD-10-CM

## 2019-02-22 PROCEDURE — RECHECK

## 2019-02-22 PROCEDURE — VLCD

## 2019-02-22 NOTE — PROGRESS NOTES
Weight Management Medical Nutrition Assessment  Lb Hendricks is here today for her 2 week VLCD follow-up  Today she weighs 198 6 giving her a loss of 11 lbs in the last two weeks  She is drinking 80 oz or more of water and reports that she feels great  She has been tracking her blood sugar and has been adjusting her insulin accordingly per her PCP recommendations  No constipation reported at this time  She did use one emergency meal plan and did has a pickle in the beginning  She plans to continue with VLCD at this time  Product ordered, BP taken and lab order placed  Anthropometric Measurements  Start Weight (lbs):  209 6 lbs  Current Weight (lbs): 198 6 lbs  TBW % Change from start weight:6%  Ideal Body Weight (lbs):112 5 lbs  Goal Weight (lbs):140 lbs    Weight Loss History  Previous weight loss attempts: Commercial Programs (Weight Watchers, [employer], etc )  Self Created Diets (Portion Control, Healthy Food Choices, etc )    Food and Nutrition Related History  Wake up:  6am   Bed Time: 10pm    Food Recall  Breakfast: shake    Snack:  Lunch: shake or pudding   Snack: bar  Dinner: soup  Snack:      Beverages: water  Volume of beverage intake: 80 oz - while on VLCD    Weekends: Same  Cravings: none currently - usually sweets  Trouble area of day:afternoon    Frequency of Eating out: none on VLCD  Food restrictions: carbs while on VLCD  Cooking: self   Food Shopping: self    Physical Activity Intake  Activity:Walking  Frequency:intermittently  Physical limitations/barriers to exercise: none (limited while on VLCD)    Estimated Needs  Energy    [employer] [place] Energy Needs: BMR : 3261   1# loss weekly sedentary:  1158             1-2# loss weekly lightly active: 900 - 1400  Protein: 61 - 76      (1 2-1 5g/kg IBW)  Fluid:  60 oz    (35mL/kg IBW)    Nutrition Diagnosis  Yes;     Overweight/obesity  related to Excess energy intake as evidenced by  BMI more than normative standard for age and sex (obesity-grade II 35-39  9)       Nutrition Intervention    Nutrition Prescription  Calories: 800 calories  Protein: 96 gr  Fluid: 80 oz     Meal Plan  Breakfast:  Shake  Snack:  Lunch: Shake or pudding   Snack:  Bar  Dinner: Soup  Snack:    Nutrition Education:    VLCD guidelines  Calorie controlled menu  Healthy snack options        Nutrition Counseling:  Strategies: meal planning, portion sizes, healthy snack choices, hydration, fiber intake, protein intake, exercise, food journal      Monitoring and Evaluation:  Evaluation criteria:  Energy Intake  Meet protein needs  Maintain adequate hydration  Monitor weekly weight  Meal planning/preparation  Food journal   Decreased portions at mealtimes and snacks  Physical activity     Barriers to learning:none  Readiness to change: Action  Comprehension: good  Expected Compliance: good

## 2019-02-25 DIAGNOSIS — E78.2 MIXED HYPERLIPIDEMIA: ICD-10-CM

## 2019-02-25 RX ORDER — ROSUVASTATIN CALCIUM 20 MG/1
TABLET, COATED ORAL
Qty: 90 TABLET | Refills: 2 | Status: SHIPPED | OUTPATIENT
Start: 2019-02-25 | End: 2019-11-25 | Stop reason: SDUPTHER

## 2019-02-28 ENCOUNTER — OFFICE VISIT (OUTPATIENT)
Dept: INTERNAL MEDICINE CLINIC | Facility: CLINIC | Age: 69
End: 2019-02-28
Payer: COMMERCIAL

## 2019-02-28 VITALS
OXYGEN SATURATION: 96 % | SYSTOLIC BLOOD PRESSURE: 126 MMHG | WEIGHT: 197 LBS | HEIGHT: 63 IN | BODY MASS INDEX: 34.91 KG/M2 | HEART RATE: 84 BPM | DIASTOLIC BLOOD PRESSURE: 80 MMHG

## 2019-02-28 DIAGNOSIS — C54.1 ENDOMETRIAL CANCER (HCC): ICD-10-CM

## 2019-02-28 DIAGNOSIS — M25.50 ARTHRALGIA, UNSPECIFIED JOINT: ICD-10-CM

## 2019-02-28 DIAGNOSIS — E78.5 DYSLIPIDEMIA: ICD-10-CM

## 2019-02-28 DIAGNOSIS — I10 ESSENTIAL HYPERTENSION: ICD-10-CM

## 2019-02-28 DIAGNOSIS — E03.9 ACQUIRED HYPOTHYROIDISM: ICD-10-CM

## 2019-02-28 DIAGNOSIS — Z79.4 CONTROLLED TYPE 2 DIABETES MELLITUS WITHOUT COMPLICATION, WITH LONG-TERM CURRENT USE OF INSULIN (HCC): ICD-10-CM

## 2019-02-28 DIAGNOSIS — E11.9 CONTROLLED TYPE 2 DIABETES MELLITUS WITHOUT COMPLICATION, WITH LONG-TERM CURRENT USE OF INSULIN (HCC): ICD-10-CM

## 2019-02-28 DIAGNOSIS — E66.9 OBESITY, CLASS II, BMI 35-39.9: ICD-10-CM

## 2019-02-28 DIAGNOSIS — K21.9 GASTROESOPHAGEAL REFLUX DISEASE, ESOPHAGITIS PRESENCE NOT SPECIFIED: Primary | ICD-10-CM

## 2019-02-28 DIAGNOSIS — E11.8 TYPE 2 DIABETES MELLITUS WITH COMPLICATION, WITHOUT LONG-TERM CURRENT USE OF INSULIN (HCC): ICD-10-CM

## 2019-02-28 DIAGNOSIS — I10 BENIGN ESSENTIAL HYPERTENSION: ICD-10-CM

## 2019-02-28 PROBLEM — C56.9 CARCINOMA OF OVARY (HCC): Status: ACTIVE | Noted: 2019-02-28

## 2019-02-28 PROCEDURE — 1160F RVW MEDS BY RX/DR IN RCRD: CPT | Performed by: INTERNAL MEDICINE

## 2019-02-28 PROCEDURE — 3079F DIAST BP 80-89 MM HG: CPT | Performed by: INTERNAL MEDICINE

## 2019-02-28 PROCEDURE — 1036F TOBACCO NON-USER: CPT | Performed by: INTERNAL MEDICINE

## 2019-02-28 PROCEDURE — 99214 OFFICE O/P EST MOD 30 MIN: CPT | Performed by: INTERNAL MEDICINE

## 2019-02-28 PROCEDURE — 3074F SYST BP LT 130 MM HG: CPT | Performed by: INTERNAL MEDICINE

## 2019-02-28 RX ORDER — FUROSEMIDE 20 MG/1
TABLET ORAL
Qty: 90 TABLET | Refills: 3 | Status: SHIPPED | OUTPATIENT
Start: 2019-02-28 | End: 2020-02-24

## 2019-02-28 RX ORDER — GLIPIZIDE 2.5 MG/1
TABLET, EXTENDED RELEASE ORAL
Qty: 90 TABLET | Refills: 3 | Status: SHIPPED | OUTPATIENT
Start: 2019-02-28 | End: 2019-02-28 | Stop reason: ALTCHOICE

## 2019-02-28 RX ORDER — PANTOPRAZOLE SODIUM 40 MG/1
40 TABLET, DELAYED RELEASE ORAL DAILY
Qty: 90 TABLET | Refills: 3 | Status: SHIPPED | OUTPATIENT
Start: 2019-02-28 | End: 2020-02-24

## 2019-02-28 RX ORDER — POTASSIUM CHLORIDE 750 MG/1
CAPSULE, EXTENDED RELEASE ORAL
Qty: 90 CAPSULE | Refills: 3 | Status: SHIPPED | OUTPATIENT
Start: 2019-02-28 | End: 2020-02-24

## 2019-02-28 RX ORDER — PANTOPRAZOLE SODIUM 40 MG/1
TABLET, DELAYED RELEASE ORAL
Qty: 90 TABLET | Refills: 3 | Status: SHIPPED | OUTPATIENT
Start: 2019-02-28 | End: 2019-02-28 | Stop reason: SDUPTHER

## 2019-02-28 RX ORDER — LEVOTHYROXINE SODIUM 0.07 MG/1
TABLET ORAL
Qty: 90 TABLET | Refills: 3 | Status: SHIPPED | OUTPATIENT
Start: 2019-02-28 | End: 2020-02-24

## 2019-02-28 NOTE — PROGRESS NOTES
Assessment/Plan:  Regarding diet she is doing well with the loss of 14 lb in 2 weeks  Regarding diabetes she has stopped her Lantus insulin and uses coverage  She continues using her metformin  Blood pressure is stable at this time  No cardiac complaints  Foot exam unremarkable  Overall doing well  Spirits are good  We will see her back here in 4-5 weeks  No results found for this or any previous visit (from the past 1008 hour(s))  1  Gastroesophageal reflux disease, esophagitis presence not specified     2  Arthralgia, unspecified joint  pantoprazole (PROTONIX) 40 mg tablet   3  Benign essential hypertension     4  Controlled type 2 diabetes mellitus without complication, with long-term current use of insulin ([address] )     5  Endometrial cancer ([facility] [address] )     6  Dyslipidemia     7  Obesity, Class II, BMI 35-39 9         No orders of the defined types were placed in this encounter  Subjective:  She is doing great  She has lost 14 lb in 2 weeks on a very low calorie diet  Generally feels well  Had a cut out her Lantus insulin and her oral hypoglycemic agent because of drop of blood sugar  No cardiopulmonary complaints  Patient ID: Vern Quinn is a 77 y o  female  HPI    The following portions of the patient's history were reviewed and updated as appropriate:   She has a past medical history of Abnormal mammogram, Abnormal weight gain, Achilles tendinitis, Angina pectoris ([address] ), Asthma, COPD (chronic obstructive pulmonary disease) ([address] ), Diabetes mellitus ([address] ), Disc degeneration, lumbar, Disease of thyroid gland, Dyslipidemia, Early satiety, Edema, Endometrial cancer ([address] ) (06/28/2018), Enthesopathy, Esophagitis, reflux, GERD (gastroesophageal reflux disease), Hard to intubate, Hypercholesterolemia, Hypertension, IBS (irritable bowel syndrome), Irritable bowel syndrome, Osteoarthritis, Rosacea, and Sacroiliitis ([address] )  ,  does not have any pertinent problems on file  ,   has a past surgical history that includes  section; Foot surgery (Right); SHOULDER ARTHROPLASTY; Sinus surgery; Tonsillectomy; Replacement total knee (Right); Tubal ligation; Wrist surgery; Elbow surgery; Knee arthroscopy w/ meniscal repair (Right); Colonoscopy; Knee arthroscopy; Joint replacement (Left); Fracture surgery; Fracture surgery (Left); Esophagogastroduodenoscopy; pr laparoscopy w tot hysterectuterus <=250 gram  w tube/ovary (N/A, 2018); and Hysterectomy (Bilateral, 2018)  ,  family history includes Arthritis in her mother; Brain cancer in her maternal grandmother; Breast cancer in her maternal aunt and maternal grandmother; Breast cancer (age of onset: 77) in her mother; Diabetes in her mother; Heart disease in her father and mother; Hiatal hernia in her child, father, mother, and sister; Hypertension in her mother; Irritable bowel syndrome in her daughter and mother; Lung cancer in her sister; Gwenevere Matsu Hypertension in her son; No Known Problems in her brother, maternal grandfather, paternal grandfather, and paternal grandmother; Osteoporosis in her mother; Other in her child, father, mother, and sister; Stroke in her maternal aunt and mother; Thyroid disease in her mother and sister; Ulcers in her father; Uterine cancer in her maternal aunt and mother  ,   reports that she has never smoked  She has never used smokeless tobacco  She reports that she drinks about 1 8 oz of alcohol per week  She reports that she does not use drugs  ,  is allergic to amoxicillin-pot clavulanate; erythromycin; meperidine; morphine; nabumetone; penicillins; darvon [propoxyphene]; methocarbamol; reglan [metoclopramide]; sulfa antibiotics; and tetracyclines & related       Current Outpatient Medications:     albuterol (2 5 mg/3 mL) 0 083 % nebulizer solution, Take 1 vial (2 5 mg total) by nebulization every 6 (six) hours as needed for wheezing or shortness of breath, Disp: 25 vial, Rfl: 2    albuterol (VENTOLIN HFA) 90 mcg/act inhaler, Inhale 2 puffs 4 (four) times a day as needed, Disp: , Rfl:     aspirin (ASPIR-81) 81 mg EC tablet, Take by mouth, Disp: , Rfl:     B Complex Vitamins (B COMPLEX 1 PO), Take by mouth, Disp: , Rfl:     BD INSULIN SYRINGE ULTRAFINE 31G X 15/64" 0 3 ML MISC, Inject under the skin daily at bedtime   , Disp: 100 each, Rfl: 3    Calcium Carbonate-Vitamin D (CALCIUM 600+D) 600-200 MG-UNIT TABS, Take by mouth, Disp: , Rfl:     cetirizine (ZyrTEC) 10 MG chewable tablet, Chew 10 mg daily, Disp: , Rfl:     clindamycin (CLEOCIN) 300 MG capsule, Take by mouth Before dental procedures 600mg , Disp: , Rfl:     clotrimazole-betamethasone (LOTRISONE) 1-0 05 % cream, Apply topically daily at bedtime as needed (for itching), Disp: 45 g, Rfl: 1    ezetimibe (ZETIA) 10 mg tablet, TAKE 1 TABLET DAILY, Disp: 90 tablet, Rfl: 2    furosemide (LASIX) 20 mg tablet, TAKE 1 TABLET DAILY, Disp: 90 tablet, Rfl: 3    insulin lispro (HUMALOG) 100 units/mL injection, Inject under the skin Only when on steroids or with a sick day , Disp: , Rfl:     KRILL OIL PO, Take by mouth, Disp: , Rfl:     levothyroxine 75 mcg tablet, TAKE 1 TABLET DAILY, Disp: 90 tablet, Rfl: 3    LINZESS 145 MCG CAPS, TAKE 1 CAPSULE IN THE MORNING AT LEAST 30 MINUTES BEFORE BREAKFAST DAILY, Disp: 90 capsule, Rfl: 3    losartan (COZAAR) 50 mg tablet, TAKE 1-1/2 TABLETS DAILY, Disp: 135 tablet, Rfl: 5    Magnesium Oxide 400 MG CAPS, Take by mouth, Disp: , Rfl:     meloxicam (MOBIC) 15 mg tablet, Take 1 tablet (15 mg total) by mouth daily as needed (P r n  pain), Disp: 90 tablet, Rfl: 3    metFORMIN (GLUCOPHAGE) 1000 MG tablet, Take 1,000 mg by mouth daily  , Disp: , Rfl:     metFORMIN (GLUCOPHAGE-XR) 500 mg 24 hr tablet, Take 500 mg by mouth daily with breakfast  , Disp: , Rfl:     Omega-3 Fatty Acids (FISH OIL) 1200 MG CAPS, Take by mouth, Disp: , Rfl:     pantoprazole (PROTONIX) 40 mg tablet, Take 1 tablet (40 mg total) by mouth daily, Disp: 90 tablet, Rfl: 3    potassium chloride (MICRO-K) 10 MEQ CR capsule, TAKE 1 CAPSULE DAILY, Disp: 90 capsule, Rfl: 3    rosuvastatin (CRESTOR) 20 MG tablet, TAKE 1 TABLET DAILY, Disp: 90 tablet, Rfl: 2    saccharomyces boulardii (FLORASTOR) 250 mg capsule, Take 250 mg by mouth 3 (three) times a day, Disp: , Rfl:     Current Facility-Administered Medications:     albuterol inhalation solution 2 5 mg, 2 5 mg, Nebulization, Q6H PRN, Sonu Ceron MD    Review of Systems   Constitutional: Negative for activity change, appetite change, chills, diaphoresis, fatigue, fever and unexpected weight change  HENT: Negative for congestion, ear pain, hearing loss, mouth sores, nosebleeds, postnasal drip, sinus pressure, sinus pain, sore throat and trouble swallowing  Eyes: Negative for pain, discharge and visual disturbance  Respiratory: Negative for apnea, cough, chest tightness, shortness of breath and wheezing  Cardiovascular: Negative for chest pain, palpitations and leg swelling  Gastrointestinal: Negative for abdominal pain, anal bleeding, blood in stool, constipation, diarrhea, nausea and vomiting  Endocrine: Negative for polydipsia and polyphagia  Genitourinary: Negative for decreased urine volume, dysuria, flank pain, frequency, hematuria and urgency  Musculoskeletal: Negative for arthralgias, back pain, gait problem, joint swelling and myalgias  Skin: Negative for rash and wound  Allergic/Immunologic: Negative for environmental allergies and food allergies  Neurological: Negative for dizziness, tremors, seizures, syncope, speech difficulty, light-headedness, numbness and headaches  Hematological: Negative for adenopathy  Does not bruise/bleed easily  Psychiatric/Behavioral: Negative for agitation, confusion, hallucinations, sleep disturbance and suicidal ideas  The patient is not nervous/anxious            Objective:  /80 (BP Location: Left arm, Patient Position: Sitting)   Pulse 84 Ht 5' 2 5" (1 588 m)   Wt 89 4 kg (197 lb)   SpO2 96%   BMI 35 46 kg/m²      Physical Exam   Constitutional: She appears well-developed  No distress  Remains moderately overweight  HENT:   Head: Normocephalic  Right Ear: External ear normal    Left Ear: External ear normal    Nose: Nose normal    Mouth/Throat: Oropharynx is clear and moist  No oropharyngeal exudate  Eyes: Pupils are equal, round, and reactive to light  Conjunctivae and EOM are normal  Right eye exhibits no discharge  Left eye exhibits no discharge  Neck: Normal range of motion  Neck supple  No thyromegaly present  Cardiovascular: Normal rate, regular rhythm, normal heart sounds and intact distal pulses  Exam reveals no gallop and no friction rub  No murmur heard  Pulses:       Dorsalis pedis pulses are 2+ on the right side, and 2+ on the left side  Posterior tibial pulses are 2+ on the right side, and 2+ on the left side  Pulmonary/Chest: Effort normal and breath sounds normal  No respiratory distress  She has no wheezes  She has no rales  Abdominal: Soft  Bowel sounds are normal  She exhibits no distension and no mass  There is no tenderness  There is no rebound and no guarding  Abdomen is overweight soft nondistended with no masses   Musculoskeletal: Normal range of motion  She exhibits no edema, tenderness or deformity  Feet:   Right Foot:   Skin Integrity: Negative for ulcer, skin breakdown, erythema, warmth, callus or dry skin  Left Foot:   Skin Integrity: Negative for ulcer, skin breakdown, erythema, warmth, callus or dry skin  Lymphadenopathy:     She has no cervical adenopathy  Neurological: She is alert  She has normal reflexes  She displays normal reflexes  No cranial nerve deficit  Coordination normal    Skin: Skin is warm and dry  No rash noted  No erythema  Psychiatric: She has a normal mood and affect   Her behavior is normal  Judgment and thought content normal    Nursing note and vitals reviewed  Patient's shoes and socks removed  Right Foot/Ankle   Right Foot Inspection  Skin Exam: skin normal and skin intact no dry skin, no warmth, no callus, no erythema, no maceration, no abnormal color, no pre-ulcer, no ulcer and no callus                          Toe Exam: ROM and strength within normal limits  Sensory     Proprioception: intact   Monofilament testing: intact  Vascular    The right DP pulse is 2+  The right PT pulse is 2+  Left Foot/Ankle  Left Foot Inspection  Skin Exam: skin normal and skin intactno dry skin, no warmth, no erythema, no maceration, normal color, no pre-ulcer, no ulcer and no callus                         Toe Exam: ROM and strength within normal limits                   Sensory     Proprioception: intact  Monofilament: intact  Vascular    The left DP pulse is 2+  The left PT pulse is 2+     Assign Risk Category:  No deformity present; ;        Risk: 0

## 2019-03-01 ENCOUNTER — HOSPITAL ENCOUNTER (OUTPATIENT)
Dept: ULTRASOUND IMAGING | Facility: HOSPITAL | Age: 69
Discharge: HOME | End: 2019-03-01
Attending: INTERNAL MEDICINE
Payer: COMMERCIAL

## 2019-03-01 ENCOUNTER — APPOINTMENT (OUTPATIENT)
Dept: LAB | Facility: HOSPITAL | Age: 69
End: 2019-03-01
Payer: COMMERCIAL

## 2019-03-01 ENCOUNTER — TELEPHONE (OUTPATIENT)
Dept: BARIATRICS | Facility: CLINIC | Age: 69
End: 2019-03-01

## 2019-03-01 DIAGNOSIS — K76.0 HEPATIC STEATOSIS: ICD-10-CM

## 2019-03-01 DIAGNOSIS — R63.5 ABNORMAL WEIGHT GAIN: ICD-10-CM

## 2019-03-01 LAB
ANION GAP SERPL CALCULATED.3IONS-SCNC: 12 MMOL/L (ref 4–13)
BUN SERPL-MCNC: 25 MG/DL (ref 5–25)
CALCIUM SERPL-MCNC: 9.9 MG/DL (ref 8.3–10.1)
CHLORIDE SERPL-SCNC: 102 MMOL/L (ref 100–108)
CO2 SERPL-SCNC: 28 MMOL/L (ref 21–32)
CREAT SERPL-MCNC: 0.95 MG/DL (ref 0.6–1.3)
GFR SERPL CREATININE-BSD FRML MDRD: 61 ML/MIN/1.73SQ M
GLUCOSE P FAST SERPL-MCNC: 126 MG/DL (ref 65–99)
MAGNESIUM SERPL-MCNC: 2 MG/DL (ref 1.6–2.6)
POTASSIUM SERPL-SCNC: 3.9 MMOL/L (ref 3.5–5.3)
SODIUM SERPL-SCNC: 142 MMOL/L (ref 136–145)

## 2019-03-01 PROCEDURE — 80048 BASIC METABOLIC PNL TOTAL CA: CPT

## 2019-03-01 PROCEDURE — 36415 COLL VENOUS BLD VENIPUNCTURE: CPT

## 2019-03-01 PROCEDURE — 83735 ASSAY OF MAGNESIUM: CPT

## 2019-03-01 PROCEDURE — 76705 ECHO EXAM OF ABDOMEN: CPT

## 2019-03-01 NOTE — TELEPHONE ENCOUNTER
Relayed lab results and Mariano's message to patient, who indicated understanding and was very happy       ----- Message from Carroll Scott PA-C sent at 3/1/2019 10:54 AM EST -----  Please call patient and inform her that her magnesium and bmp are within acceptable range to continue vlcd    Result note: magnesium within acceptable range  cmp-elevated glucose otherwise normal

## 2019-03-05 ENCOUNTER — TELEPHONE (OUTPATIENT)
Dept: INTERNAL MEDICINE CLINIC | Facility: CLINIC | Age: 69
End: 2019-03-05

## 2019-03-05 DIAGNOSIS — E78.2 MIXED HYPERLIPIDEMIA: ICD-10-CM

## 2019-03-05 RX ORDER — EZETIMIBE 10 MG/1
TABLET ORAL
Qty: 90 TABLET | Refills: 2 | Status: SHIPPED | OUTPATIENT
Start: 2019-03-05 | End: 2019-11-30 | Stop reason: SDUPTHER

## 2019-03-05 NOTE — TELEPHONE ENCOUNTER
----- Message from Prachi Hicks DO sent at 3/5/2019  1:09 PM EST -----  Call patient    Ultrasound of liver shows fatty liver as expected

## 2019-03-05 NOTE — TELEPHONE ENCOUNTER
----- Message from Anu Blake DO sent at 3/5/2019  1:09 PM EST -----  Call patient    Ultrasound of liver shows fatty liver as expected

## 2019-03-07 LAB
LEFT EYE DIABETIC RETINOPATHY: NORMAL
RIGHT EYE DIABETIC RETINOPATHY: NORMAL

## 2019-03-07 PROCEDURE — 3072F LOW RISK FOR RETINOPATHY: CPT | Performed by: INTERNAL MEDICINE

## 2019-03-08 NOTE — PROGRESS NOTES
Weight Management Medical Nutrition Assessment  [employer] is here today for her 4 week VLCD follow-up  Today she weighs 191 8 lbs giving her a loss of 6 8 lbs in the last two weeks  She did have one emergency meal and had salad with lean meat  She did have one day where she felt more hungry than normal, so she did have a snack from the list (dill pickle slice)  She is drinking at least 80 oz of water and sometimes is closer to 100 oz  She recently had an appointment with her PCP and he has taken her off all of her diabetic meds except Metformin  She reports that her blood sugar has been ranging between 100 - 120  She reports that she had a little constipation, but is using some of the meal replacements with fiber - that has seemed to work for her  BP taken and WNL  Per PA, her recent labs are WNL  At this time she is continuing on VLCD   ,       Anthropometric Measurements  Start Weight (lbs):  209 6 lbs  Current Weight (lbs): 191 8 lbs  TBW % Change from start weight: 9%  Ideal Body Weight (lbs):112 5 lbs  Goal Weight (lbs):140 lbs     Weight Loss History  Previous weight loss attempts: Commercial Programs (Weight Watchers, [place] [employer], etc )  Self Created Diets (Portion Control, Healthy Food Choices, etc )     Food and Nutrition Related History  Wake up:  6am              Bed Time: 10pm     Food Recall  Breakfast: shake                       Snack:  Lunch: shake or pudding   Snack: bar  Dinner: soup  Snack:        Beverages: water  Volume of beverage intake: 80 oz - while on VLCD     Weekends: Same  Cravings: none currently - usually sweets  Trouble area of day:afternoon     Frequency of Eating out: none on VLCD  Food restrictions: carbs while on VLCD  Cooking: self   Food Shopping: self     Physical Activity Intake  Activity:Walking  Frequency:intermittently  Physical limitations/barriers to exercise: none (limited while on VLCD)     Estimated Needs  Energy     [place] Energy Needs:  BMR : 4362   1# loss weekly sedentary:  1121             1-2# loss weekly lightly active: 629 - 39587  Protein: 61 - 76      (1 2-1 5g/kg IBW)  Fluid:  60 oz    (35mL/kg IBW)     Nutrition Diagnosis  Yes; Overweight/obesity  related to Excess energy intake as evidenced by  BMI more than normative standard for age and sex (obesity-grade II 35-39  9)     Nutrition Intervention     Nutrition Prescription  Calories: 800 calories  Protein: 96 gr  Fluid: 80 oz      Meal Plan  Breakfast:  Shake  Snack:  Lunch: Shake or pudding   Snack:  Bar  Dinner: Soup  Snack:     Nutrition Education:    VLCD guidelines  Calorie controlled menu  Healthy snack options           Nutrition Counseling:  Strategies: meal planning, portion sizes, healthy snack choices, hydration, fiber intake, protein intake, exercise, food journal        Monitoring and Evaluation:  Evaluation criteria:  Energy Intake  Meet protein needs  Maintain adequate hydration  Monitor weekly weight  Meal planning/preparation  Food journal   Decreased portions at mealtimes and snacks  Physical activity      Barriers to learning:none  Readiness to change: Action  Comprehension: good  Expected Compliance: good

## 2019-03-11 ENCOUNTER — OFFICE VISIT (OUTPATIENT)
Dept: BARIATRICS | Facility: CLINIC | Age: 69
End: 2019-03-11

## 2019-03-11 VITALS — HEIGHT: 63 IN | BODY MASS INDEX: 33.98 KG/M2 | WEIGHT: 191.8 LBS

## 2019-03-11 DIAGNOSIS — R63.5 ABNORMAL WEIGHT GAIN: ICD-10-CM

## 2019-03-11 PROCEDURE — VLCD

## 2019-03-11 PROCEDURE — RECHECK

## 2019-03-17 PROBLEM — Z08 ENCOUNTER FOR FOLLOW-UP EXAMINATION AFTER COMPLETED TREATMENT FOR MALIGNANT NEOPLASM: Status: ACTIVE | Noted: 2019-03-17

## 2019-03-17 PROBLEM — Z85.42 HISTORY OF ENDOMETRIAL CANCER: Status: ACTIVE | Noted: 2018-06-24

## 2019-03-20 ENCOUNTER — OFFICE VISIT (OUTPATIENT)
Dept: GYNECOLOGIC ONCOLOGY | Facility: CLINIC | Age: 69
End: 2019-03-20
Payer: COMMERCIAL

## 2019-03-20 VITALS
WEIGHT: 191 LBS | BODY MASS INDEX: 33.84 KG/M2 | DIASTOLIC BLOOD PRESSURE: 78 MMHG | TEMPERATURE: 98 F | HEIGHT: 63 IN | RESPIRATION RATE: 16 BRPM | HEART RATE: 77 BPM | SYSTOLIC BLOOD PRESSURE: 132 MMHG

## 2019-03-20 DIAGNOSIS — Z85.42 HISTORY OF ENDOMETRIAL CANCER: ICD-10-CM

## 2019-03-20 DIAGNOSIS — Z08 ENCOUNTER FOR FOLLOW-UP EXAMINATION AFTER COMPLETED TREATMENT FOR MALIGNANT NEOPLASM: Primary | ICD-10-CM

## 2019-03-20 PROCEDURE — 99212 OFFICE O/P EST SF 10 MIN: CPT | Performed by: OBSTETRICS & GYNECOLOGY

## 2019-03-20 NOTE — PROGRESS NOTES
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 currently without evidence of disease  Plan to follow up for next surveillance visit in 6 months  Encounter for follow-up examination after completed treatment for malignant neoplasm - Primary        CHIEF COMPLAINT: "I am here for follow-up "    Problem:  Cancer Staging  History of endometrial cancer  Staging form: Corpus Uteri - Carcinoma, AJCC 8th Edition  - Clinical stage from 7/30/2018: FIGO Stage IA - Signed by Gil Ballesteros MD PhD on 8/10/2018    Previous therapy:     History of endometrial cancer    6/14/2018 Biopsy     FIGO 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  Biopsy performed by Dr Shanae Davalos  7/30/2018 Surgery     Robotic assisted total laparoscopic hysterectomy, bilateral salpingo-oophorectomy and pelvic lymph node dissection  Final pathology revealed stage IA grade 1 endometrioid adenocarcinoma (3 3 x 1 7 cm, 2/12mm invasion, NO LVSI, negative washings)         7/30/2018 Genetic Testing     Torrez testing negative          Patient ID: Franca Booker is a 77 y o  female  HPI   The patient is a delightful 69-year-old with stage IA 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  Patient had her initial surgery on July 30, 2018  The patient was then presented at our multidisciplinary tumor board conference and the plan was for close clinical follow-up  She has no new complaints today  No vaginal bleeding, abdominal/pelvic pain  Normal bowel and bladder function  No interval change in medical history since last visit  Quality of life is good      The following portions of the patient's history were reviewed and updated as appropriate: allergies, current medications, past family history, past medical history, past social history, past surgical history and problem list     Review of Systems    Current Outpatient Medications   Medication Sig Dispense Refill    aspirin (ASPIR-81) 81 mg EC tablet Take by mouth      B Complex Vitamins (B COMPLEX 1 PO) Take by mouth      Calcium Carbonate-Vitamin D (CALCIUM 600+D) 600-200 MG-UNIT TABS Take by mouth      cetirizine (ZyrTEC) 10 MG chewable tablet Chew 10 mg daily      clindamycin (CLEOCIN) 300 MG capsule Take by mouth Before dental procedures 600mg       clotrimazole-betamethasone (LOTRISONE) 1-0 05 % cream Apply topically daily at bedtime as needed (for itching) 45 g 1    ezetimibe (ZETIA) 10 mg tablet TAKE 1 TABLET DAILY 90 tablet 2    furosemide (LASIX) 20 mg tablet TAKE 1 TABLET DAILY 90 tablet 3    insulin lispro (HUMALOG) 100 units/mL injection Inject under the skin Only when on steroids or with a sick day       KRILL OIL PO Take by mouth      levothyroxine 75 mcg tablet TAKE 1 TABLET DAILY 90 tablet 3    LINZESS 145 MCG CAPS TAKE 1 CAPSULE IN THE MORNING AT LEAST 30 MINUTES BEFORE BREAKFAST DAILY 90 capsule 3    losartan (COZAAR) 50 mg tablet TAKE 1-1/2 TABLETS DAILY 135 tablet 5    Magnesium Oxide 400 MG CAPS Take by mouth      meloxicam (MOBIC) 15 mg tablet Take 1 tablet (15 mg total) by mouth daily as needed (P r n  pain) 90 tablet 3    metFORMIN (GLUCOPHAGE) 1000 MG tablet Take 1,000 mg by mouth daily        metFORMIN (GLUCOPHAGE-XR) 500 mg 24 hr tablet Take 500 mg by mouth daily with breakfast        Omega-3 Fatty Acids (FISH OIL) 1200 MG CAPS Take by mouth      pantoprazole (PROTONIX) 40 mg tablet Take 1 tablet (40 mg total) by mouth daily 90 tablet 3    potassium chloride (MICRO-K) 10 MEQ CR capsule TAKE 1 CAPSULE DAILY 90 capsule 3    rosuvastatin (CRESTOR) 20 MG tablet TAKE 1 TABLET DAILY 90 tablet 2    saccharomyces boulardii (FLORASTOR) 250 mg capsule Take 250 mg by mouth 3 (three) times a day      BD INSULIN SYRINGE ULTRAFINE 31G X 15/64" 0 3 ML MISC Inject under the skin daily at bedtime   (Patient not taking: Reported on 3/20/2019) 100 each 3     Current Facility-Administered Medications   Medication Dose Route Frequency Provider Last Rate Last Dose    albuterol inhalation solution 2 5 mg  2 5 mg Nebulization Q6H PRN Harlan Gomez MD         Objective:    Blood pressure 132/78, pulse 77, temperature 98 °F (36 7 °C), temperature source Oral, resp  rate 16, height 5' 2 5" (1 588 m), weight 86 6 kg (191 lb), not currently breastfeeding  Body mass index is 34 38 kg/m²  Body surface area is 1 89 meters squared  Physical Exam   Constitutional: She is oriented to person, place, and time  She appears well-developed and well-nourished  No distress  HENT:   Head: Normocephalic and atraumatic  Right Ear: External ear normal    Left Ear: External ear normal    Nose: Nose normal    Mouth/Throat: Oropharynx is clear and moist  No oropharyngeal exudate  Eyes: Pupils are equal, round, and reactive to light  Conjunctivae and EOM are normal  Right eye exhibits no discharge  Left eye exhibits no discharge  No scleral icterus  Neck: Normal range of motion  Neck supple  No JVD present  No tracheal deviation present  No thyromegaly present  Cardiovascular: Normal rate, regular rhythm, normal heart sounds and intact distal pulses  Exam reveals no gallop and no friction rub  No murmur heard  Pulmonary/Chest: Effort normal and breath sounds normal  No stridor  No respiratory distress  She has no wheezes  She has no rales  She exhibits no tenderness  Abdominal: Soft  Bowel sounds are normal  She exhibits no distension and no mass  There is no tenderness  There is no rebound and no guarding  Genitourinary: No vaginal discharge found  Genitourinary Comments: -Normal external female genitalia, normal Bartholin's and [place]'s glands  -Normal midline urethral meatus   No lesions notes  -Bladder without fullness mass or tenderness  -Vagina without lesion or discharge No significant cystocele or rectocele noted  -Cervix surgically absent  -Uterus surgically absent  -Adnexae surgically absent  -Anus without fissure of lesion     Musculoskeletal: Normal range of motion  She exhibits no edema, tenderness or deformity  Lymphadenopathy:     She has no cervical adenopathy  Neurological: She is alert and oriented to person, place, and time  She has normal reflexes  She displays normal reflexes  No cranial nerve deficit  She exhibits normal muscle tone  Coordination normal    Skin: Skin is warm and dry  No rash noted  She is not diaphoretic  No erythema  No pallor  Psychiatric: She has a normal mood and affect   Her behavior is normal  Judgment and thought content normal        No results found for:   Lab Results   Component Value Date     11/04/2015    K 3 9 03/01/2019     03/01/2019    CO2 28 03/01/2019    ANIONGAP 4 11/04/2015    BUN 25 03/01/2019    CREATININE 0 95 03/01/2019    GLUCOSE 107 11/04/2015    GLUF 126 (H) 03/01/2019    CALCIUM 9 9 03/01/2019    AST 50 (H) 01/12/2019    ALT 69 01/12/2019    ALKPHOS 75 01/12/2019    PROT 7 0 11/04/2015    BILITOT 0 41 11/04/2015    EGFR 61 03/01/2019     Lab Results   Component Value Date    WBC 8 40 01/12/2019    HGB 12 6 01/12/2019    HCT 39 0 01/12/2019    MCV 89 01/12/2019     01/12/2019     Lab Results   Component Value Date    NEUTROABS 4 61 01/12/2019     Addendum   RESULTS OF IMMUNOHISTOCHEMICAL ANALYSIS FOR MISMATCH REPAIR PROTEIN LOSS      Interpretation:  No loss of nuclear expression of MMR proteins:  Low probability of MSI-H      Note:  Background non-neoplastic tissue and/or internal control with intact nuclear expression      RESULTS:  Antibody                         Clone                 Description                                               Results  MLH1                              [id number]          Mismatch repair protein                            Intact nuclear expression  MSH2                             [id number] Mismatch repair protein                            Intact nuclear expression  MSH6                             40                      Mismatch repair protein                            Intact nuclear expression  PMS2                              MRQ-28             Mismatch repair protein                            Intact nuclear expression     Comment:   A negative control and a positive control for each antibody have been reviewed and accepted      [facility] Specimen ID: [id number]  Evaluator:  ANGELICA Grissom MD     These tests were developed and their performance characteristics determined by [facility]  They may not be cleared or   approved by the [place]  Food and Drug Administration  The FDA determined that such clearance or approval is not necessary  These tests   are used for clinical purposes  They should not be regarded as investigational or for research    This laboratory has been approved by [place] [id number],   designated as a high-complexity laboratory and is qualified to perform these tests       Addendum electronically signed by Hood Shaikh MD on 8/20/2018 at  2:20 PM   Final Diagnosis   Surgical Pathology Cancer Case Summary - Carcinoma of the Endometrium     - Procedure:  - Hysterectomy type:  Robotic laparoscopic total abdominal  hysterectomy with bilateral salpingo-oophorectomy  - Specimen integrity:  Specimen received intact  - Tumor site:  Anterior and posterior endometrium  - Tumor size:  3 3 x 1 7 cm    - Histologic type:  Endometrioid adenocarcinoma  - Histologic grade:  FIGO grade I  - Myometrial invasion:      - Depth of invasion:  2 mm     - Myometrial thickness:  12 mm     - Percentage of myometrial thickness:  17%  - Adenomyosis:  Present and partially involved by carcinoma  - Uterine serosa involvement:  None identified  - Lower uterine segment involvement:  No definite involvement identified  - Cervical stromal involvement:  None identified  - Other tissue/ organ involvement: Bilateral ovaries, tubal remnants, and parametrial tissues are negative for malignancy  - Peritoneal/ascitic fluid:  Negative for malignancy ([id number])  - Margins:     - Ectocervical/vaginal cuff margin:  Negative for malignancy     - Parametrial/paracervical margin  Negative for malignancy  - Lymphovascular invasion:  None identified  - Regional lymph nodes:     - Pelvic node examination       - Number of pelvic lymph nodes with macrometastasis:  0       - Number of pelvic lymph nodes with micrometastasis:  0       - Number of pelvic lymph nodes with isolated tumor cells:  0       - Laterality of pelvic node(s) with tumor:  Not applicable       - Total number of pelvic lymph nodes examined (sentinel and non-sentinel):  6       - Number of pelvic sentinel nodes examined:  0       - Laterality of pelvic node(s) examined:  Bilateral     - Para-aortic lymph nodes       - No para-aortic nodes submitted or found  - Prognostic stage group (pTNM, AJCC 8th edition): IA -  pT1a, pN0, pMX, G1  - FIGO stage (2015 FIGO cancer report):  IA  - Additional pathologic findings:     - Intramural leiomyomata     - Left ovarian fibroma  - Ancillary studies:    - If needed for additional studies, tumor is best represented in block C18   - Clinical history:  Postmenopausal bleeding for approximately 1 month        Diagnoses for Individual Specimens     A  Right pelvic sentinel lymph node:     - Fibrovascular tissue; negative for malignancy      - No lymphoid tissue identified within this entirely submitted specimen      B  Left pelvic sentinel lymph node:     - Fibrovascular tissue; negative for malignancy      - No lymphoid tissue identified within this entirely submitted specimen      C  Uterus with bilateral ovaries and fallopian tubes:     - Uterus:       -- Endometrial adenocarcinoma; endometrioid type, FIGO grade I, with superficial myometrial invasion         -- Adenomyosis partially involved by adenocarcinoma       -- Intramural leiomyomata     - Cervix:  Benign endocervical polyp; negative for malignancy  - Left ovary:  Ovarian fibroma; surface epithelial inclusion glands; negative for malignancy  - Right ovary:  Surface epithelial inclusion glands; negative for malignancy  - Fallopian tubes, bilateral:  Status post ligation; tubal remnants negative for malignancy  - Bilateral parametrial tissues are negative for malignancy      D  Right pelvic lymph nodes:     - Three lymph nodes identified; all negative for malignancy (0/3)      E  Left pelvic lymph nodes:     - Three lymph nodes identified; all negative for malignancy (0/3)      Electronically signed by Heather Schafer MD on 8/7/2018 at  1:27 PM     Андрей Power MD, PhD, [facility]  Attending Physician, [address] [place] [address]

## 2019-03-20 NOTE — ASSESSMENT & PLAN NOTE
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

## 2019-03-25 ENCOUNTER — OFFICE VISIT (OUTPATIENT)
Dept: BARIATRICS | Facility: CLINIC | Age: 69
End: 2019-03-25
Payer: COMMERCIAL

## 2019-03-25 VITALS
TEMPERATURE: 97.9 F | HEART RATE: 74 BPM | SYSTOLIC BLOOD PRESSURE: 110 MMHG | WEIGHT: 188 LBS | DIASTOLIC BLOOD PRESSURE: 74 MMHG | HEIGHT: 63 IN | BODY MASS INDEX: 33.31 KG/M2

## 2019-03-25 DIAGNOSIS — K21.9 GASTROESOPHAGEAL REFLUX DISEASE, ESOPHAGITIS PRESENCE NOT SPECIFIED: ICD-10-CM

## 2019-03-25 DIAGNOSIS — J45.909 ASTHMA, UNSPECIFIED ASTHMA SEVERITY, UNSPECIFIED WHETHER COMPLICATED, UNSPECIFIED WHETHER PERSISTENT: ICD-10-CM

## 2019-03-25 DIAGNOSIS — Z79.4 CONTROLLED TYPE 2 DIABETES MELLITUS WITHOUT COMPLICATION, WITH LONG-TERM CURRENT USE OF INSULIN (HCC): ICD-10-CM

## 2019-03-25 DIAGNOSIS — E66.9 OBESITY, CLASS I, BMI 30-34.9: Primary | ICD-10-CM

## 2019-03-25 DIAGNOSIS — E11.9 CONTROLLED TYPE 2 DIABETES MELLITUS WITHOUT COMPLICATION, WITH LONG-TERM CURRENT USE OF INSULIN (HCC): ICD-10-CM

## 2019-03-25 DIAGNOSIS — I10 BENIGN ESSENTIAL HYPERTENSION: ICD-10-CM

## 2019-03-25 DIAGNOSIS — Z91.89 AT RISK FOR SLEEP APNEA: ICD-10-CM

## 2019-03-25 DIAGNOSIS — E78.5 DYSLIPIDEMIA: ICD-10-CM

## 2019-03-25 DIAGNOSIS — R63.5 ABNORMAL WEIGHT GAIN: ICD-10-CM

## 2019-03-25 PROCEDURE — 99213 OFFICE O/P EST LOW 20 MIN: CPT | Performed by: PHYSICIAN ASSISTANT

## 2019-03-25 NOTE — PROGRESS NOTES
Assessment/Plan:    Obesity, Class I, BMI 30-34 9  -Patient is pursuing Very Low Calorie Diet-VLCD  -Initial weight loss goal of 5-10% weight loss for improved health      Initial: 210 6 lbs  Current: 188 lbs   Change:  Goal:140 lbs    Dyslipidemia  Taking crestor and zetia  -should improve with weight loss, dietary, and lifestyle changes  -continue management with prescribing provider      At risk for sleep apnea  Did she go???????????? Abnormal weight gain  See obesity plan    Benign essential hypertension  Taking cozaar and lasix   -should improve with weight loss, dietary, and lifestyle changes  -continue management with prescribing provider      Asthma  Taking ventolin and albuterol  -continue management with prescribing provider      GERD (gastroesophageal reflux disease)  Taking Protonix  -should improve with weight loss, dietary, and lifestyle changes  -continue management with prescribing provider      Diabetes mellitus type 2, controlled, without complications ([facility] )    Lab Results   Component Value Date    HGBA1C 7 1 (H) 01/12/2019       Taking glipizide, lantus, humalog, metformin  -should improve with weight loss, dietary, and lifestyle changes  -continue management with prescribing provider              Follow up in approximately {FOLLOW UP:84281} with {WM Follow Up:42680}  Diagnoses and all orders for this visit:    Obesity, Class I, BMI 30-34 9    Dyslipidemia    At risk for sleep apnea    Abnormal weight gain    Benign essential hypertension    Asthma, unspecified asthma severity, unspecified whether complicated, unspecified whether persistent    Gastroesophageal reflux disease, esophagitis presence not specified    Controlled type 2 diabetes mellitus without complication, with long-term current use of insulin ([facility])          Subjective:   Chief Complaint   Patient presents with    Follow-up     PT here for six week MWM follow-up w/PA  Patient ID: Serene Kaur  is a 77 y o  female with excess weight/obesity here to pursue weight managment  Patient is pursuing Very Low Calorie Diet-VLCD       HPI    Food logging:  Increased appetite/cravings:  Fruit/Vegetable servings:  Exercise:  Hydration:    {Common ambulatory SmartLinks:82374}    Review of Systems    Objective:    /74 (BP Location: Left arm, Patient Position: Sitting, Cuff Size: Standard)   Pulse 74   Temp 97 9 °F (36 6 °C) (Tympanic)   Ht 5' 2 5" (1 588 m)   Wt 85 3 kg (188 lb)   BMI 33 84 kg/m²      Physical Exam

## 2019-03-25 NOTE — PROGRESS NOTES
Assessment/Plan:    Obesity, Class I, BMI 30-34 9  -Patient is pursuing Very Low Calorie Diet-VLCD  -Initial weight loss goal of 5-10% weight loss for improved health      Initial: 210 6 lbs  Current: 188 lbs   Change:-22 6 lbs   Goal:140 lbs    Dyslipidemia  Taking crestor and zetia  -should improve with weight loss, dietary, and lifestyle changes  -continue management with prescribing provider      At risk for sleep apnea  Saw sleep medicine  Was sent to ENT  Abnormal weight gain  See obesity plan    Benign essential hypertension  Taking cozaar and lasix   -should improve with weight loss, dietary, and lifestyle changes  -continue management with prescribing provider      Asthma  Taking ventolin and albuterol  -continue management with prescribing provider      GERD (gastroesophageal reflux disease)  Taking Protonix  -should improve with weight loss, dietary, and lifestyle changes  -continue management with prescribing provider      Diabetes mellitus type 2, controlled, without complications ([facility] )    Lab Results   Component Value Date    HGBA1C 7 1 (H) 01/12/2019       Taking glipizide, lantus, humalog, metformin  -should improve with weight loss, dietary, and lifestyle changes  -continue management with prescribing provider          Follow up in approximately 2 weeks with Non-Surgical Physician/Advanced Practitioner  Diagnoses and all orders for this visit:    Obesity, Class I, BMI 30-34 9  -     Basic metabolic panel; Future  -     Magnesium; Future    Dyslipidemia  -     Basic metabolic panel; Future  -     Magnesium; Future    At risk for sleep apnea    Abnormal weight gain  -     Basic metabolic panel; Future  -     Magnesium; Future    Benign essential hypertension  -     Basic metabolic panel; Future  -     Magnesium; Future    Asthma, unspecified asthma severity, unspecified whether complicated, unspecified whether persistent  -     Basic metabolic panel;  Future  -     Magnesium; Future    Gastroesophageal reflux disease, esophagitis presence not specified  -     Basic metabolic panel; Future  -     Magnesium; Future    Controlled type 2 diabetes mellitus without complication, with long-term current use of insulin (HCC)  -     Basic metabolic panel; Future  -     Magnesium; Future          Subjective:   Chief Complaint   Patient presents with    Follow-up     PT here for six week MWM follow-up w/PA  Patient ID: Nayeli Brown  is a 77 y o  female with excess weight/obesity here to pursue weight managment  Patient is pursuing Very Low Calorie Diet-VLCD  HPI  Patient notes she is staying compliant to the program  Only had one emergency meal which consisted of grilled tilapia and grilled zucchini and steamed broccoli  Patient notes constipation increased slightly but has it under control with laxative and linzess  Getting 80 ounces of water  And  Two cups of coffee black  drinking two cups of tea with equal  Occasionally having a dill pickle  Denies any hunger or craving  Patient has been taken off glipizide and lantus by Dr Alex Mojica  Has been monitoring her blood sugars  In morning 100-110 and in the evening 90-99  BP is well controlled  Denies dizziness or lightheadedness  Dr Alex Mojica is happy with her BP and does not want to adjust her BP meds  Patient wants to continue  Discussed sounds like she is being compliant not sure why she did not lose the minimum of 3 pounds  Will let her go three more weeks but if she does not lose the 3 pound minimum than she will have to come off  Overall lose over 6 weeks averages out to 3 77 lbs per week  The following portions of the patient's history were reviewed and updated as appropriate: allergies, current medications, past family history, past medical history, past social history, past surgical history and problem list     Review of Systems   HENT: Negative for sore throat  Respiratory: Negative for cough and shortness of breath  Cardiovascular: Negative for chest pain and palpitations  Gastrointestinal: Positive for constipation  Negative for abdominal pain, diarrhea, nausea and vomiting  Denies GERD   Skin: Negative for rash  Psychiatric/Behavioral: Negative for suicidal ideas (denies HI)  Denies depression or anxiety       Objective:    /74 (BP Location: Left arm, Patient Position: Sitting, Cuff Size: Standard)   Pulse 74   Temp 97 9 °F (36 6 °C) (Tympanic)   Ht 5' 2 5" (1 588 m)   Wt 85 3 kg (188 lb)   BMI 33 84 kg/m²      Physical Exam   Nursing note and vitals reviewed  Constitutional   General appearance: Abnormal   well developed and obese  Eyes No conjunctival pallor  Ears, Nose, Mouth, and Throat Oral mucosa moist    Pulmonary   Respiratory effort: No increased work of breathing or signs of respiratory distress  Auscultation of lungs: Clear to auscultation, equal breath sounds bilaterally, no wheezes, no rales, no rhonci  Cardiovascular   Auscultation of heart: Normal rate and rhythm, normal S1 and S2, without murmurs  Examination of extremities for edema and/or varicosities: Normal   no edema  Abdomen   Abdomen: Abnormal   The abdomen was obese  Bowel sounds were normal  The abdomen was soft and nontender     Musculoskeletal   Gait and station: Normal     Psychiatric   Orientation to person, place and time: Normal     Affect: appropriate

## 2019-03-25 NOTE — ASSESSMENT & PLAN NOTE
-Patient is pursuing Very Low Calorie Diet-VLCD  -Initial weight loss goal of 5-10% weight loss for improved health      Initial: 210 6 lbs  Current: 188 lbs   Change:-22 6 lbs   Goal:140 lbs

## 2019-03-27 ENCOUNTER — OFFICE VISIT (OUTPATIENT)
Dept: DERMATOLOGY | Facility: CLINIC | Age: 69
End: 2019-03-27
Payer: COMMERCIAL

## 2019-03-27 DIAGNOSIS — L82.1 SEBORRHEIC KERATOSIS: ICD-10-CM

## 2019-03-27 DIAGNOSIS — L57.0 ACTINIC KERATOSIS: Primary | ICD-10-CM

## 2019-03-27 DIAGNOSIS — Z13.89 SCREENING FOR SKIN CONDITION: ICD-10-CM

## 2019-03-27 PROCEDURE — 17000 DESTRUCT PREMALG LESION: CPT | Performed by: DERMATOLOGY

## 2019-03-27 PROCEDURE — 99213 OFFICE O/P EST LOW 20 MIN: CPT | Performed by: DERMATOLOGY

## 2019-03-27 NOTE — PATIENT INSTRUCTIONS
Actinic Keratosis:  Patient advised lesions are precancers  These should resolve with cryosurgery if not to let us know sun block recommended  Seborrheic Keratosis  Patient reasurred these are normal growths we acquire with age no treatment needed    Screening for Dermatologic Disorders: Nothing else of concern noted on complete exam follow up in 1 year

## 2019-03-27 NOTE — PROGRESS NOTES
[facility] DERMATOLOGY  [address]  [facility] [address]  [phone]  [phone]     MRN: [id number] : 1950  Encounter: [id number]  Patient Information: Ana Bhatia  Chief complaint:  Yearly checkup    History of present illness:  57-year-old female with previous history of rosacea and actinic keratosis presents for overall checkup no specific concerns except 1 lesion on her right cheek    Patient has not really treating the rosacea  Past Medical History:   Diagnosis Date    Abnormal mammogram     Abnormal weight gain     Achilles tendinitis     Angina pectoris (HCC)     Asthma     COPD (chronic obstructive pulmonary disease) ([address] )     Diabetes mellitus (HCC)     Disc degeneration, lumbar     Disease of thyroid gland     hypo    Dyslipidemia     Early satiety     Edema     idiopathic peripheral    Enthesopathy     hip region    Esophagitis, reflux     GERD (gastroesophageal reflux disease)     Hard to intubate     difficulty with intubation and had to be intubated twice    Hypercholesterolemia     Hypertension     IBS (irritable bowel syndrome)     Irritable bowel syndrome     Osteoarthritis     Rosacea     Sacroiliitis (HCC)      Past Surgical History:   Procedure Laterality Date     SECTION      x2    COLONOSCOPY      ELBOW SURGERY      left ulna/radius    ESOPHAGOGASTRODUODENOSCOPY      FOOT SURGERY Right     FRACTURE SURGERY      FRACTURE SURGERY Left     tibia    HYSTERECTOMY Bilateral 2018    JOINT REPLACEMENT Left     shoulder,  right knee    KNEE ARTHROSCOPY      KNEE ARTHROSCOPY W/ MENISCAL REPAIR Right     NH LAPAROSCOPY W TOT HYSTERECTUTERUS <=250 GRAM  W TUBE/OVARY N/A 2018    Procedure: ROBOTIC TOTAL LAPAROSCOPIC HYSTERECTOMY, BILATERAL SALPINGOOPHORECTOMY, BILATERAL PELVIC LYMPHNODE DISSECTION;  Surgeon: Wolf Olivas MD;  Location: [facility];  Service: Gynecology Oncology    REPLACEMENT TOTAL KNEE Right     SHOULDER ARTHROPLASTY      SINUS SURGERY      TONSILLECTOMY      TUBAL LIGATION      WRIST SURGERY      ganglonic cyst     Social History   Social History     Substance and Sexual Activity   Alcohol Use Yes    Alcohol/week: 1 8 oz    Types: 3 Glasses of wine per week    Frequency: Monthly or less    Drinks per session: 1 or 2    Binge frequency: Never     Social History     Substance and Sexual Activity   Drug Use No     Social History     Tobacco Use   Smoking Status Never Smoker   Smokeless Tobacco Never Used     Family History   Problem Relation Age of Onset    Arthritis Mother     Heart disease Mother         cardiac disorder    Diabetes Mother     Hiatal hernia Mother     Hypertension Mother     Irritable bowel syndrome Mother     Breast cancer Mother 77        x2   Doris Riis Uterine cancer Mother     Osteoporosis Mother     Thyroid disease Mother     Other Mother         gastric reflux    Stroke Mother     Heart disease Father         cardiac disorder    Hiatal hernia Father     Ulcers Father     Other Father         gastric reflux    Hiatal hernia Sister     Thyroid disease Sister     Other Sister         gastric reflux    Lung cancer Sister     No Known Problems Brother     Brain cancer Maternal Grandmother     Breast cancer Maternal Grandmother     No Known Problems Maternal Grandfather     No Known Problems Paternal Grandmother     No Known Problems Paternal Grandfather     Hiatal hernia Child     Other Child         gastric reflux    Irritable bowel syndrome Daughter     Breast cancer Maternal Aunt         x3 sis    Uterine cancer Maternal Aunt         x3 sis    Stroke Maternal Aunt     Malig Hypertension Son      Meds/Allergies   Allergies   Allergen Reactions    Amoxicillin-Pot Clavulanate Anaphylaxis, Hives, Itching and Facial Swelling    Erythromycin Hives, Itching, Swelling, Vomiting, Throat Swelling, Nasal Congestion and Facial Swelling    Meperidine Anaphylaxis    Morphine Hives, Itching, Swelling, Other (See Comments) and Syncope     hypotension    Nabumetone Hives, Itching and Swelling    Penicillins Anaphylaxis, Itching and Swelling    Darvon [Propoxyphene] Hives    Methocarbamol Other (See Comments)     Double vision    Reglan [Metoclopramide] Anxiety    Sulfa Antibiotics Hives, Itching, Rash and Swelling    Tetracyclines & Related Rash       Meds:  Prior to Admission medications    Medication Sig Start Date End Date Taking?  Authorizing Provider   aspirin (ASPIR-81) 81 mg EC tablet Take by mouth   Yes Historical Provider, MD   cetirizine (ZyrTEC) 10 MG chewable tablet Chew 10 mg daily   Yes Historical Provider, MD   clindamycin (CLEOCIN) 300 MG capsule Take by mouth Before dental procedures 600mg  4/22/15  Yes Historical Provider, MD   clotrimazole-betamethasone (Nilsa Madeline) 1-0 05 % cream Apply topically daily at bedtime as needed (for itching) 3/27/18  Yes PRESLEY Langston   ezetimibe (ZETIA) 10 mg tablet TAKE 1 TABLET DAILY 3/5/19  Yes Chris Gann DO   furosemide (LASIX) 20 mg tablet TAKE 1 TABLET DAILY 2/28/19  Yes Chris Gann DO   insulin lispro (HUMALOG) 100 units/mL injection Inject under the skin Only when on steroids or with a sick day  10/1/15  Yes Historical Provider, MD   KRILL OIL PO Take by mouth   Yes Historical Provider, MD   levothyroxine 75 mcg tablet TAKE 1 TABLET DAILY 2/28/19  Yes Chris Gann DO   LINZESS 145 MCG CAPS TAKE 1 CAPSULE IN THE MORNING AT LEAST 30 MINUTES BEFORE BREAKFAST DAILY 2/12/19  Yes Ray Wyman MD   losartan (COZAAR) 50 mg tablet TAKE 1-1/2 TABLETS DAILY 1/16/19  Yes Chris Gann DO   Magnesium Oxide 400 MG CAPS Take by mouth   Yes Historical Provider, MD   meloxicam (MOBIC) 15 mg tablet Take 1 tablet (15 mg total) by mouth daily as needed (P r n  pain) 3/28/18  Yes Chris Gann DO   metFORMIN (GLUCOPHAGE) 1000 MG tablet Take 1,000 mg by mouth daily     Yes Historical Provider, MD   metFORMIN (GLUCOPHAGE-XR) 500 mg 24 hr tablet Take 500 mg by mouth daily with breakfast     Yes Historical Provider, MD   Omega-3 Fatty Acids ([address]) 1200 MG CAPS Take by mouth   Yes Historical Provider, MD   pantoprazole (PROTONIX) 40 mg tablet Take 1 tablet (40 mg total) by mouth daily 2/28/19  Yes Lizzndkourtney Yanes, DO   potassium chloride (MICRO-K) 10 MEQ CR capsule TAKE 1 CAPSULE DAILY 2/28/19  Yes Lizzndkourtney Yanes, DO   rosuvastatin (CRESTOR) 20 MG tablet TAKE 1 TABLET DAILY 2/25/19  Yes Alinda Bounds, DO   saccharomyces boulardii (FLORASTOR) 250 mg capsule Take 250 mg by mouth 3 (three) times a day   Yes Historical Provider, MD       Subjective:     Review of Systems:    General: negative for - chills, fatigue, fever,  weight gain or weight loss  Psychological: negative for - anxiety, behavioral disorder, concentration difficulties, decreased libido, depression, irritability, memory difficulties, mood swings, sleep disturbances or suicidal ideation  ENT: negative for - hearing difficulties , nasal congestion, nasal discharge, oral lesions, sinus pain, sneezing, sore throat  Allergy and Immunology: negative for - hives, insect bite sensitivity,  Hematological and Lymphatic: negative for - bleeding problems, blood clots,bruising, swollen lymph nodes  Endocrine: negative for - hair pattern changes, hot flashes, malaise/lethargy, mood swings, palpitations, polydipsia/polyuria, skin changes, temperature intolerance or unexpected weight change  Respiratory: negative for - cough, hemoptysis, orthopnea, shortness of breath, or wheezing  Cardiovascular: negative for - chest pain, dyspnea on exertion, edema,  Gastrointestinal: negative for - abdominal pain, nausea/vomiting  Genito-Urinary: negative for - dysuria, incontinence, irregular/heavy menses or urinary frequency/urgency  Musculoskeletal: negative for - gait disturbance, joint pain, joint stiffness, joint swelling, muscle pain, muscular weakness  Dermatological:  As in HPI  Neurological: negative for confusion, dizziness, headaches, impaired coordination/balance, memory loss, numbness/tingling, seizures, speech problems, tremors or weakness       Objective: There were no vitals taken for this visit  Physical Exam:    General Appearance:    Alert, cooperative, no distress   Head:    Normocephalic, without obvious abnormality, atraumatic           Skin:   A full skin exam was performed including scalp, head scalp, eyes, ears, nose, lips, neck, chest, axilla, abdomen, back, buttocks, bilateral upper extremities, bilateral lower extremities, hands, feet, fingers, toes, fingernails, and toenails scaling erythematous areas noted below normal keratotic papules with greasy stuck on appearance nothing else of concern noted slight facial erythema without real papules or pustules noted  Physical Exam   HENT:   Head:          Cryotherapy Procedure Note    Pre-operative Diagnosis: actinic keratosis    Plan:  1  Instructed to keep the area dry and clean thereafter  Apply petrolatum if area gets crusty  2  Recommended that the patient use acetaminophen  as needed for pain  Locations:  Right cheek    Indications: Destruction of actinic keratosis x1    Patient informed of risks (permanent scarring, infection, light or dark discoloration, bleeding, infection, weakness, numbness and recurrence of the lesion) and benefits of the procedure and verbal informed consent obtained  The areas are treated with liquid nitrogen therapy, frozen until ice ball extended 2 mm beyond lesion, allowed to thaw, and treated again  The patient tolerated procedure well  The patient was instructed on post-op care, warned that there may be blister formation, redness and pain  Recommend OTC analgesia as needed for pain  Condition:  Stable    Complications:  none  Assessment:     1  Actinic keratosis     2  Seborrheic keratosis     3   Screening for skin condition Plan:   Actinic Keratosis:  Patient advised lesions are precancers  These should resolve with cryosurgery if not to let us know sun block recommended  Seborrheic Keratosis  Patient reasurred these are normal growths we acquire with age no treatment needed  Screening for Dermatologic Disorders: Nothing else of concern noted on complete exam follow up in 1 year       Efren Johnson MD  3/27/2019,8:14 AM    Portions of the record may have been created with voice recognition software   Occasional wrong word or "sound a like" substitutions may have occurred due to the inherent limitations of voice recognition software   Read the chart carefully and recognize, using context, where substitutions have occurred

## 2019-04-02 DIAGNOSIS — L90.0 LICHEN SCLEROSUS: ICD-10-CM

## 2019-04-03 RX ORDER — CLOTRIMAZOLE AND BETAMETHASONE DIPROPIONATE 10; .64 MG/G; MG/G
CREAM TOPICAL
Qty: 45 G | Refills: 1 | Status: SHIPPED | OUTPATIENT
Start: 2019-04-03 | End: 2020-06-22 | Stop reason: SDUPTHER

## 2019-04-04 ENCOUNTER — OFFICE VISIT (OUTPATIENT)
Dept: INTERNAL MEDICINE CLINIC | Facility: CLINIC | Age: 69
End: 2019-04-04
Payer: COMMERCIAL

## 2019-04-04 ENCOUNTER — APPOINTMENT (OUTPATIENT)
Dept: LAB | Facility: CLINIC | Age: 69
End: 2019-04-04
Payer: COMMERCIAL

## 2019-04-04 VITALS
SYSTOLIC BLOOD PRESSURE: 138 MMHG | OXYGEN SATURATION: 97 % | HEART RATE: 80 BPM | HEIGHT: 63 IN | DIASTOLIC BLOOD PRESSURE: 70 MMHG | WEIGHT: 184.6 LBS | BODY MASS INDEX: 32.71 KG/M2

## 2019-04-04 DIAGNOSIS — E11.9 CONTROLLED TYPE 2 DIABETES MELLITUS WITHOUT COMPLICATION, WITH LONG-TERM CURRENT USE OF INSULIN (HCC): Primary | ICD-10-CM

## 2019-04-04 DIAGNOSIS — K21.9 GASTROESOPHAGEAL REFLUX DISEASE, ESOPHAGITIS PRESENCE NOT SPECIFIED: ICD-10-CM

## 2019-04-04 DIAGNOSIS — Z79.4 CONTROLLED TYPE 2 DIABETES MELLITUS WITHOUT COMPLICATION, WITH LONG-TERM CURRENT USE OF INSULIN (HCC): ICD-10-CM

## 2019-04-04 DIAGNOSIS — J45.909 ASTHMA, UNSPECIFIED ASTHMA SEVERITY, UNSPECIFIED WHETHER COMPLICATED, UNSPECIFIED WHETHER PERSISTENT: ICD-10-CM

## 2019-04-04 DIAGNOSIS — E78.5 DYSLIPIDEMIA: ICD-10-CM

## 2019-04-04 DIAGNOSIS — E66.9 OBESITY, CLASS I, BMI 30-34.9: ICD-10-CM

## 2019-04-04 DIAGNOSIS — R63.5 ABNORMAL WEIGHT GAIN: ICD-10-CM

## 2019-04-04 DIAGNOSIS — E11.9 CONTROLLED TYPE 2 DIABETES MELLITUS WITHOUT COMPLICATION, WITH LONG-TERM CURRENT USE OF INSULIN (HCC): ICD-10-CM

## 2019-04-04 DIAGNOSIS — I10 BENIGN ESSENTIAL HYPERTENSION: ICD-10-CM

## 2019-04-04 DIAGNOSIS — Z79.4 CONTROLLED TYPE 2 DIABETES MELLITUS WITHOUT COMPLICATION, WITH LONG-TERM CURRENT USE OF INSULIN (HCC): Primary | ICD-10-CM

## 2019-04-04 LAB
ANION GAP SERPL CALCULATED.3IONS-SCNC: 6 MMOL/L (ref 4–13)
BUN SERPL-MCNC: 19 MG/DL (ref 5–25)
CALCIUM SERPL-MCNC: 9.7 MG/DL (ref 8.3–10.1)
CHLORIDE SERPL-SCNC: 103 MMOL/L (ref 100–108)
CO2 SERPL-SCNC: 27 MMOL/L (ref 21–32)
CREAT SERPL-MCNC: 0.94 MG/DL (ref 0.6–1.3)
GFR SERPL CREATININE-BSD FRML MDRD: 62 ML/MIN/1.73SQ M
GLUCOSE SERPL-MCNC: 96 MG/DL (ref 65–140)
MAGNESIUM SERPL-MCNC: 1.9 MG/DL (ref 1.6–2.6)
POTASSIUM SERPL-SCNC: 3.5 MMOL/L (ref 3.5–5.3)
SODIUM SERPL-SCNC: 136 MMOL/L (ref 136–145)

## 2019-04-04 PROCEDURE — 83735 ASSAY OF MAGNESIUM: CPT

## 2019-04-04 PROCEDURE — 3075F SYST BP GE 130 - 139MM HG: CPT | Performed by: INTERNAL MEDICINE

## 2019-04-04 PROCEDURE — 1101F PT FALLS ASSESS-DOCD LE1/YR: CPT | Performed by: INTERNAL MEDICINE

## 2019-04-04 PROCEDURE — 99214 OFFICE O/P EST MOD 30 MIN: CPT | Performed by: INTERNAL MEDICINE

## 2019-04-04 PROCEDURE — 3078F DIAST BP <80 MM HG: CPT | Performed by: INTERNAL MEDICINE

## 2019-04-04 PROCEDURE — 36415 COLL VENOUS BLD VENIPUNCTURE: CPT

## 2019-04-04 PROCEDURE — 1036F TOBACCO NON-USER: CPT | Performed by: INTERNAL MEDICINE

## 2019-04-04 PROCEDURE — 80048 BASIC METABOLIC PNL TOTAL CA: CPT

## 2019-04-05 ENCOUNTER — TELEPHONE (OUTPATIENT)
Dept: BARIATRICS | Facility: CLINIC | Age: 69
End: 2019-04-05

## 2019-04-08 ENCOUNTER — OFFICE VISIT (OUTPATIENT)
Dept: BARIATRICS | Facility: CLINIC | Age: 69
End: 2019-04-08

## 2019-04-08 VITALS
BODY MASS INDEX: 32.28 KG/M2 | WEIGHT: 182.2 LBS | HEIGHT: 63 IN | DIASTOLIC BLOOD PRESSURE: 70 MMHG | SYSTOLIC BLOOD PRESSURE: 122 MMHG

## 2019-04-08 DIAGNOSIS — R63.5 ABNORMAL WEIGHT GAIN: ICD-10-CM

## 2019-04-08 PROCEDURE — RECHECK

## 2019-04-08 PROCEDURE — VLCD

## 2019-04-21 DIAGNOSIS — E11.8 TYPE 2 DIABETES MELLITUS WITH COMPLICATION, WITHOUT LONG-TERM CURRENT USE OF INSULIN (HCC): ICD-10-CM

## 2019-04-22 ENCOUNTER — OFFICE VISIT (OUTPATIENT)
Dept: BARIATRICS | Facility: CLINIC | Age: 69
End: 2019-04-22

## 2019-04-22 VITALS
BODY MASS INDEX: 31.75 KG/M2 | DIASTOLIC BLOOD PRESSURE: 78 MMHG | WEIGHT: 179.2 LBS | HEIGHT: 63 IN | SYSTOLIC BLOOD PRESSURE: 118 MMHG

## 2019-04-22 DIAGNOSIS — R63.5 ABNORMAL WEIGHT GAIN: ICD-10-CM

## 2019-04-22 PROCEDURE — VLCD

## 2019-04-22 PROCEDURE — RECHECK

## 2019-04-22 RX ORDER — METFORMIN HYDROCHLORIDE 500 MG/1
TABLET, EXTENDED RELEASE ORAL
Qty: 270 TABLET | Refills: 3 | Status: SHIPPED | OUTPATIENT
Start: 2019-04-22 | End: 2020-04-16

## 2019-04-29 ENCOUNTER — OFFICE VISIT (OUTPATIENT)
Dept: BARIATRICS | Facility: CLINIC | Age: 69
End: 2019-04-29

## 2019-04-29 VITALS — HEIGHT: 63 IN | BODY MASS INDEX: 31.91 KG/M2 | WEIGHT: 180.1 LBS

## 2019-04-29 DIAGNOSIS — R63.5 ABNORMAL WEIGHT GAIN: ICD-10-CM

## 2019-04-29 PROCEDURE — RECHECK

## 2019-04-29 PROCEDURE — BODSTAT PR BODY STAT

## 2019-05-06 ENCOUNTER — OFFICE VISIT (OUTPATIENT)
Dept: CARDIOLOGY CLINIC | Facility: CLINIC | Age: 69
End: 2019-05-06
Payer: COMMERCIAL

## 2019-05-06 VITALS
OXYGEN SATURATION: 96 % | SYSTOLIC BLOOD PRESSURE: 136 MMHG | DIASTOLIC BLOOD PRESSURE: 72 MMHG | HEART RATE: 84 BPM | HEIGHT: 63 IN | BODY MASS INDEX: 32.6 KG/M2 | WEIGHT: 184 LBS

## 2019-05-06 DIAGNOSIS — E78.5 DYSLIPIDEMIA: ICD-10-CM

## 2019-05-06 DIAGNOSIS — I10 BENIGN ESSENTIAL HYPERTENSION: Primary | ICD-10-CM

## 2019-05-06 PROCEDURE — 99213 OFFICE O/P EST LOW 20 MIN: CPT | Performed by: INTERNAL MEDICINE

## 2019-05-08 ENCOUNTER — TELEPHONE (OUTPATIENT)
Dept: CARDIOLOGY CLINIC | Facility: CLINIC | Age: 69
End: 2019-05-08

## 2019-05-13 ENCOUNTER — OFFICE VISIT (OUTPATIENT)
Dept: BARIATRICS | Facility: CLINIC | Age: 69
End: 2019-05-13

## 2019-05-13 VITALS — WEIGHT: 185 LBS | HEIGHT: 63 IN | BODY MASS INDEX: 32.78 KG/M2

## 2019-05-13 DIAGNOSIS — R63.5 ABNORMAL WEIGHT GAIN: ICD-10-CM

## 2019-05-13 PROCEDURE — DB12PK

## 2019-05-13 PROCEDURE — RECHECK

## 2019-05-30 ENCOUNTER — TELEPHONE (OUTPATIENT)
Dept: CARDIOLOGY CLINIC | Facility: CLINIC | Age: 69
End: 2019-05-30

## 2019-06-05 DIAGNOSIS — E11.9 CONTROLLED TYPE 2 DIABETES MELLITUS WITHOUT COMPLICATION, WITH LONG-TERM CURRENT USE OF INSULIN (HCC): Primary | ICD-10-CM

## 2019-06-05 DIAGNOSIS — J45.909 ASTHMA, UNSPECIFIED ASTHMA SEVERITY, UNSPECIFIED WHETHER COMPLICATED, UNSPECIFIED WHETHER PERSISTENT: ICD-10-CM

## 2019-06-05 DIAGNOSIS — Z85.42 HISTORY OF ENDOMETRIAL CANCER: ICD-10-CM

## 2019-06-05 DIAGNOSIS — E78.5 DYSLIPIDEMIA: ICD-10-CM

## 2019-06-05 DIAGNOSIS — Z79.4 CONTROLLED TYPE 2 DIABETES MELLITUS WITHOUT COMPLICATION, WITH LONG-TERM CURRENT USE OF INSULIN (HCC): Primary | ICD-10-CM

## 2019-06-05 DIAGNOSIS — I10 BENIGN ESSENTIAL HYPERTENSION: ICD-10-CM

## 2019-06-10 ENCOUNTER — OFFICE VISIT (OUTPATIENT)
Dept: BARIATRICS | Facility: CLINIC | Age: 69
End: 2019-06-10

## 2019-06-10 VITALS — HEIGHT: 63 IN | BODY MASS INDEX: 32.39 KG/M2 | WEIGHT: 182.8 LBS

## 2019-06-10 DIAGNOSIS — R63.5 ABNORMAL WEIGHT GAIN: Primary | ICD-10-CM

## 2019-06-10 PROCEDURE — RECHECK

## 2019-06-24 ENCOUNTER — TRANSCRIBE ORDERS (OUTPATIENT)
Dept: LAB | Facility: CLINIC | Age: 69
End: 2019-06-24

## 2019-06-24 ENCOUNTER — OFFICE VISIT (OUTPATIENT)
Dept: BARIATRICS | Facility: CLINIC | Age: 69
End: 2019-06-24

## 2019-06-24 ENCOUNTER — APPOINTMENT (OUTPATIENT)
Dept: LAB | Facility: CLINIC | Age: 69
End: 2019-06-24
Payer: COMMERCIAL

## 2019-06-24 VITALS — BODY MASS INDEX: 32.53 KG/M2 | WEIGHT: 183.6 LBS | HEIGHT: 63 IN

## 2019-06-24 DIAGNOSIS — Z00.00 HEALTHCARE MAINTENANCE: Primary | ICD-10-CM

## 2019-06-24 DIAGNOSIS — E11.9 CONTROLLED TYPE 2 DIABETES MELLITUS WITHOUT COMPLICATION, WITH LONG-TERM CURRENT USE OF INSULIN (HCC): ICD-10-CM

## 2019-06-24 DIAGNOSIS — Z79.4 CONTROLLED TYPE 2 DIABETES MELLITUS WITHOUT COMPLICATION, WITH LONG-TERM CURRENT USE OF INSULIN (HCC): ICD-10-CM

## 2019-06-24 DIAGNOSIS — R63.5 ABNORMAL WEIGHT GAIN: Primary | ICD-10-CM

## 2019-06-24 LAB
ALBUMIN SERPL BCP-MCNC: 4 G/DL (ref 3.5–5)
ALP SERPL-CCNC: 98 U/L (ref 46–116)
ALT SERPL W P-5'-P-CCNC: 23 U/L (ref 12–78)
ANION GAP SERPL CALCULATED.3IONS-SCNC: 5 MMOL/L (ref 4–13)
AST SERPL W P-5'-P-CCNC: 14 U/L (ref 5–45)
BASOPHILS # BLD AUTO: 0.08 THOUSANDS/ΜL (ref 0–0.1)
BASOPHILS NFR BLD AUTO: 1 % (ref 0–1)
BILIRUB SERPL-MCNC: 0.5 MG/DL (ref 0.2–1)
BUN SERPL-MCNC: 25 MG/DL (ref 5–25)
CALCIUM SERPL-MCNC: 9.3 MG/DL (ref 8.3–10.1)
CHLORIDE SERPL-SCNC: 102 MMOL/L (ref 100–108)
CHOLEST SERPL-MCNC: 145 MG/DL (ref 50–200)
CO2 SERPL-SCNC: 29 MMOL/L (ref 21–32)
CREAT SERPL-MCNC: 1.06 MG/DL (ref 0.6–1.3)
CREAT UR-MCNC: 107 MG/DL
EOSINOPHIL # BLD AUTO: 0.2 THOUSAND/ΜL (ref 0–0.61)
EOSINOPHIL NFR BLD AUTO: 3 % (ref 0–6)
ERYTHROCYTE [DISTWIDTH] IN BLOOD BY AUTOMATED COUNT: 14.1 % (ref 11.6–15.1)
EST. AVERAGE GLUCOSE BLD GHB EST-MCNC: 143 MG/DL
GFR SERPL CREATININE-BSD FRML MDRD: 53 ML/MIN/1.73SQ M
GLUCOSE P FAST SERPL-MCNC: 111 MG/DL (ref 65–99)
HBA1C MFR BLD: 6.6 % (ref 4.2–6.3)
HCT VFR BLD AUTO: 36.7 % (ref 34.8–46.1)
HDLC SERPL-MCNC: 49 MG/DL (ref 40–60)
HGB BLD-MCNC: 11.7 G/DL (ref 11.5–15.4)
IMM GRANULOCYTES # BLD AUTO: 0.02 THOUSAND/UL (ref 0–0.2)
IMM GRANULOCYTES NFR BLD AUTO: 0 % (ref 0–2)
LDLC SERPL CALC-MCNC: 70 MG/DL (ref 0–100)
LYMPHOCYTES # BLD AUTO: 2.34 THOUSANDS/ΜL (ref 0.6–4.47)
LYMPHOCYTES NFR BLD AUTO: 32 % (ref 14–44)
MCH RBC QN AUTO: 28.9 PG (ref 26.8–34.3)
MCHC RBC AUTO-ENTMCNC: 31.9 G/DL (ref 31.4–37.4)
MCV RBC AUTO: 91 FL (ref 82–98)
MICROALBUMIN UR-MCNC: 8.9 MG/L (ref 0–20)
MICROALBUMIN/CREAT 24H UR: 8 MG/G CREATININE (ref 0–30)
MONOCYTES # BLD AUTO: 0.54 THOUSAND/ΜL (ref 0.17–1.22)
MONOCYTES NFR BLD AUTO: 7 % (ref 4–12)
NEUTROPHILS # BLD AUTO: 4.24 THOUSANDS/ΜL (ref 1.85–7.62)
NEUTS SEG NFR BLD AUTO: 57 % (ref 43–75)
NONHDLC SERPL-MCNC: 96 MG/DL
NRBC BLD AUTO-RTO: 0 /100 WBCS
PLATELET # BLD AUTO: 318 THOUSANDS/UL (ref 149–390)
PMV BLD AUTO: 9.6 FL (ref 8.9–12.7)
POTASSIUM SERPL-SCNC: 4 MMOL/L (ref 3.5–5.3)
PROT SERPL-MCNC: 7.7 G/DL (ref 6.4–8.2)
RBC # BLD AUTO: 4.05 MILLION/UL (ref 3.81–5.12)
SODIUM SERPL-SCNC: 136 MMOL/L (ref 136–145)
T4 FREE SERPL-MCNC: 1.2 NG/DL (ref 0.76–1.46)
TRIGL SERPL-MCNC: 131 MG/DL
TSH SERPL DL<=0.05 MIU/L-ACNC: 1.57 UIU/ML (ref 0.36–3.74)
WBC # BLD AUTO: 7.42 THOUSAND/UL (ref 4.31–10.16)

## 2019-06-24 PROCEDURE — 83036 HEMOGLOBIN GLYCOSYLATED A1C: CPT

## 2019-06-24 PROCEDURE — 84443 ASSAY THYROID STIM HORMONE: CPT

## 2019-06-24 PROCEDURE — 85025 COMPLETE CBC W/AUTO DIFF WBC: CPT

## 2019-06-24 PROCEDURE — 36415 COLL VENOUS BLD VENIPUNCTURE: CPT

## 2019-06-24 PROCEDURE — 84439 ASSAY OF FREE THYROXINE: CPT

## 2019-06-24 PROCEDURE — 82043 UR ALBUMIN QUANTITATIVE: CPT | Performed by: INTERNAL MEDICINE

## 2019-06-24 PROCEDURE — 80061 LIPID PANEL: CPT

## 2019-06-24 PROCEDURE — 3061F NEG MICROALBUMINURIA REV: CPT | Performed by: INTERNAL MEDICINE

## 2019-06-24 PROCEDURE — 80053 COMPREHEN METABOLIC PANEL: CPT

## 2019-06-24 PROCEDURE — RECHECK

## 2019-06-24 PROCEDURE — 82570 ASSAY OF URINE CREATININE: CPT | Performed by: INTERNAL MEDICINE

## 2019-06-25 ENCOUNTER — OFFICE VISIT (OUTPATIENT)
Dept: INTERNAL MEDICINE CLINIC | Facility: CLINIC | Age: 69
End: 2019-06-25
Payer: COMMERCIAL

## 2019-06-25 VITALS
HEART RATE: 66 BPM | OXYGEN SATURATION: 95 % | SYSTOLIC BLOOD PRESSURE: 146 MMHG | DIASTOLIC BLOOD PRESSURE: 66 MMHG | BODY MASS INDEX: 32.25 KG/M2 | HEIGHT: 63 IN | WEIGHT: 182 LBS

## 2019-06-25 DIAGNOSIS — E78.5 DYSLIPIDEMIA: ICD-10-CM

## 2019-06-25 DIAGNOSIS — Z79.4 CONTROLLED TYPE 2 DIABETES MELLITUS WITHOUT COMPLICATION, WITH LONG-TERM CURRENT USE OF INSULIN (HCC): ICD-10-CM

## 2019-06-25 DIAGNOSIS — K21.9 GASTROESOPHAGEAL REFLUX DISEASE, ESOPHAGITIS PRESENCE NOT SPECIFIED: Primary | ICD-10-CM

## 2019-06-25 DIAGNOSIS — I10 BENIGN ESSENTIAL HYPERTENSION: ICD-10-CM

## 2019-06-25 DIAGNOSIS — J45.909 ASTHMA, UNSPECIFIED ASTHMA SEVERITY, UNSPECIFIED WHETHER COMPLICATED, UNSPECIFIED WHETHER PERSISTENT: ICD-10-CM

## 2019-06-25 DIAGNOSIS — E11.9 CONTROLLED TYPE 2 DIABETES MELLITUS WITHOUT COMPLICATION, WITH LONG-TERM CURRENT USE OF INSULIN (HCC): ICD-10-CM

## 2019-06-25 DIAGNOSIS — Z85.42 HISTORY OF ENDOMETRIAL CANCER: ICD-10-CM

## 2019-06-25 PROCEDURE — 1036F TOBACCO NON-USER: CPT | Performed by: INTERNAL MEDICINE

## 2019-06-25 PROCEDURE — 4010F ACE/ARB THERAPY RXD/TAKEN: CPT | Performed by: INTERNAL MEDICINE

## 2019-06-25 PROCEDURE — 1160F RVW MEDS BY RX/DR IN RCRD: CPT | Performed by: INTERNAL MEDICINE

## 2019-06-25 PROCEDURE — 99214 OFFICE O/P EST MOD 30 MIN: CPT | Performed by: INTERNAL MEDICINE

## 2019-06-25 RX ORDER — ALBUTEROL SULFATE 90 UG/1
AEROSOL, METERED RESPIRATORY (INHALATION)
COMMUNITY
End: 2019-06-25

## 2019-06-25 RX ORDER — ALBUTEROL SULFATE 2.5 MG/3ML
SOLUTION RESPIRATORY (INHALATION)
COMMUNITY
End: 2019-06-25

## 2019-06-25 RX ORDER — FLUTICASONE PROPIONATE 50 MCG
SPRAY, SUSPENSION (ML) NASAL
COMMUNITY
Start: 2019-06-17 | End: 2019-09-18

## 2019-06-25 RX ORDER — OLMESARTAN MEDOXOMIL 20 MG/1
20 TABLET ORAL DAILY
Qty: 90 TABLET | Refills: 3 | Status: SHIPPED | OUTPATIENT
Start: 2019-06-25 | End: 2020-06-01

## 2019-06-25 RX ORDER — CLINDAMYCIN HYDROCHLORIDE 150 MG/1
150 CAPSULE ORAL 4 TIMES DAILY
Refills: 0 | COMMUNITY
Start: 2019-06-17 | End: 2021-08-17

## 2019-06-25 RX ORDER — CALCIUM CARB/VITAMIN D3/VIT K1 500-100-40
TABLET,CHEWABLE ORAL
COMMUNITY

## 2019-06-28 ENCOUNTER — TELEPHONE (OUTPATIENT)
Dept: INTERNAL MEDICINE CLINIC | Facility: CLINIC | Age: 69
End: 2019-06-28

## 2019-06-28 NOTE — TELEPHONE ENCOUNTER
Patient is starting a new job, she needs titer testing to show that she has had MMR & Polio    Please contact patient # [phone]

## 2019-07-01 ENCOUNTER — OFFICE VISIT (OUTPATIENT)
Dept: BARIATRICS | Facility: CLINIC | Age: 69
End: 2019-07-01

## 2019-07-01 ENCOUNTER — TELEPHONE (OUTPATIENT)
Dept: INTERNAL MEDICINE CLINIC | Facility: CLINIC | Age: 69
End: 2019-07-01

## 2019-07-01 VITALS — HEIGHT: 63 IN | BODY MASS INDEX: 32.18 KG/M2 | WEIGHT: 181.6 LBS

## 2019-07-01 DIAGNOSIS — Z00.00 HEALTHCARE MAINTENANCE: Primary | ICD-10-CM

## 2019-07-01 DIAGNOSIS — R63.5 ABNORMAL WEIGHT GAIN: Primary | ICD-10-CM

## 2019-07-01 PROCEDURE — RECHECK

## 2019-07-01 NOTE — PROGRESS NOTES
Weight Management Medical Nutrition Assessment  Basil is here today for her 4th  of 12 RD bundle visits   Today her weight is 181 6 lbs  giving her a loss of 2 lbs in the last 4 weeks  She is reports that she is doing better since her surgery  She is taking 2 online courses for her job, so she has not been walking as much as she would like but in between her course work, she has been walking up and down the stairs in her house  She is still limiting her carbs and blood sugar numbers continue to be WNL        Anthropometric Measurements  Start Weight (lbs):  209 6 lbs  Current Weight (lbs):  181 6  lbs  TBW % Change from start weight:  13 %  Ideal Body Weight (lbs):112 5 lbs  Goal Weight (lbs):140 lbs     Weight Loss History  Previous weight loss attempts: Commercial Programs (Weight Watchers, [employer] [place], etc )  Self Created Diets (Portion Control, Healthy Food Choices, etc )     Food and Nutrition Related History  Wake up:  6am              Bed Time: 10pm     Food Recall  Breakfast: 2 eggs                     Snack: not usually  Lunch:  [employer] [place]  Snack: fruit   Dinner: Cheese, crackers and fruit  Snack: not usually        Beverages: water  Volume of beverage intake: 80 oz     Weekends: Same  Cravings: none currently - usually sweets  Trouble area of day:afternoon     Frequency of Eating out: once per week  Food restrictions: carbs   Cooking: self   Food Shopping: self     Physical Activity Intake  Activity:Walking  Frequency: just started to walk per her doctor  Physical limitations/barriers to exercise: none      Estimated Needs  Energy     [employer] Energy Needs:  BMR : 0391  7# loss weekly sedentary:  1069         1-2# loss weekly lightly active: 1298  Protein: 61 - 76      (1 2-1 5g/kg IBW)  Fluid:  60 oz    (35mL/kg IBW)     Nutrition Diagnosis  Yes;    Overweight/obesity  related to Excess energy intake as evidenced by  BMI more than normative standard for age and sex (obesity-grade II 35-39  9)     Nutrition Intervention     Nutrition Prescription  Calories: 1000 calories  Protein: 70 gr  Fluid: 80 oz      Meal Plan  Breakfast:  Shake  Snack:  Lunch: Shake or pudding   Snack:  Bar  Dinner: Sensible dinner ( lean protein and non starchy veg)  Snack:     Nutrition Education:    Calorie controlled menu  Healthy snack options  Portion sizes  Adequate hydration             Nutrition Counseling:  Strategies: meal planning, portion sizes, healthy snack choices, hydration, fiber intake, protein intake, exercise, food journal        Monitoring and Evaluation:  Evaluation criteria:  Energy Intake  Meet protein needs  Maintain adequate hydration  Monitor weekly weight  Meal planning/preparation  Food journal   Decreased portions at mealtimes and snacks  Physical activity      Barriers to learning:none  Readiness to change: Action  Comprehension: good  Expected Compliance: good

## 2019-07-02 DIAGNOSIS — Z00.00 HEALTHCARE MAINTENANCE: Primary | ICD-10-CM

## 2019-07-02 NOTE — TELEPHONE ENCOUNTER
I put in for the mmr  immunity test but I can't find a polio immunity test   Can you please check with the lab

## 2019-07-02 NOTE — TELEPHONE ENCOUNTER
Spoke with the Lab, they said you will want Poliovirus 1&3 ([id number])    Lab order pended to you for approval

## 2019-07-03 ENCOUNTER — CLINICAL SUPPORT (OUTPATIENT)
Dept: INTERNAL MEDICINE CLINIC | Facility: CLINIC | Age: 69
End: 2019-07-03
Payer: COMMERCIAL

## 2019-07-03 ENCOUNTER — APPOINTMENT (OUTPATIENT)
Dept: LAB | Facility: CLINIC | Age: 69
End: 2019-07-03
Payer: COMMERCIAL

## 2019-07-03 DIAGNOSIS — Z00.00 HEALTHCARE MAINTENANCE: ICD-10-CM

## 2019-07-03 DIAGNOSIS — Z23 ENCOUNTER FOR IMMUNIZATION: ICD-10-CM

## 2019-07-03 DIAGNOSIS — Z23 ENCOUNTER FOR IMMUNIZATION: Primary | ICD-10-CM

## 2019-07-03 LAB
HBV SURFACE AB SER-ACNC: 39.18 MIU/ML
RUBV IGG SERPL IA-ACNC: >175 IU/ML

## 2019-07-03 PROCEDURE — 36415 COLL VENOUS BLD VENIPUNCTURE: CPT

## 2019-07-03 PROCEDURE — 86706 HEP B SURFACE ANTIBODY: CPT

## 2019-07-03 PROCEDURE — 86735 MUMPS ANTIBODY: CPT

## 2019-07-03 PROCEDURE — 86762 RUBELLA ANTIBODY: CPT

## 2019-07-03 PROCEDURE — 86765 RUBEOLA ANTIBODY: CPT

## 2019-07-03 PROCEDURE — 86580 TB INTRADERMAL TEST: CPT

## 2019-07-03 PROCEDURE — 86658 ENTEROVIRUS ANTIBODY: CPT

## 2019-07-04 LAB
MEV IGG SER QL: NORMAL
MUV IGG SER QL: NORMAL

## 2019-07-05 ENCOUNTER — APPOINTMENT (OUTPATIENT)
Dept: LAB | Facility: CLINIC | Age: 69
End: 2019-07-05
Payer: COMMERCIAL

## 2019-07-05 DIAGNOSIS — Z02.0 SCHOOL PHYSICAL EXAM: ICD-10-CM

## 2019-07-05 DIAGNOSIS — Z02.9 ENCOUNTER FOR ADMINISTRATIVE EXAMINATIONS: ICD-10-CM

## 2019-07-05 DIAGNOSIS — Z02.9 ENCOUNTER FOR ADMINISTRATIVE EXAMINATIONS: Primary | ICD-10-CM

## 2019-07-05 LAB
INDURATION: 0 MM
TB SKIN TEST: NEGATIVE

## 2019-07-05 PROCEDURE — 36415 COLL VENOUS BLD VENIPUNCTURE: CPT

## 2019-07-05 PROCEDURE — 86787 VARICELLA-ZOSTER ANTIBODY: CPT

## 2019-07-08 LAB
PV1 NAB TITR SER NT: NORMAL {TITER}
PV3 NAB TITR SER NT: NORMAL {TITER}

## 2019-07-09 LAB — VZV IGG SER IA-ACNC: NORMAL

## 2019-07-29 ENCOUNTER — OFFICE VISIT (OUTPATIENT)
Dept: BARIATRICS | Facility: CLINIC | Age: 69
End: 2019-07-29

## 2019-07-29 DIAGNOSIS — R63.5 ABNORMAL WEIGHT GAIN: Primary | ICD-10-CM

## 2019-07-29 PROCEDURE — RECHECK

## 2019-07-29 NOTE — PROGRESS NOTES
Weight Management Medical Nutrition Assessment  Emelyn Rider is here today for her 6th  of 12 RD bundle visits   Today her weight is 185 3 lbs  giving her a gain of 3 7 lbs in the last 4 weeks   She is currently taking 2 masters coursed in nursing and has admits that she has gotten off track because of stress eating, and her schedule is so busy  She has not been planning her meals so she has just been grazing more than before and not always making the healthiest choices  Given her current schedule, she has not been walking much at all  Discussed planning morning walks even if she only walks for 20 minutes  Also suggested that she put the walks on her calendar so that she is making time for them       Anthropometric Measurements  Start Weight (lbs):  209 6 lbs  Current Weight (lbs):  185  3  lbs  TBW % Change from start weight:  12%  Ideal Body Weight (lbs):112 5 lbs  Goal Weight (lbs):140 lbs     Weight Loss History  Previous weight loss attempts: Commercial Programs (Weight Watchers, Dori Blonder, etc )  Self Created Diets (Portion Control, Healthy Food Choices, etc )     Food and Nutrition Related History  Wake up:  6am              Bed Time: 10pm     Food Recall  Breakfast: 2 eggs with 1 slice toast               Snack: not usually  Lunch: shake  Snack: fruit or cheese stick  Dinner: sandwich or take out   Snack: chips/crackers ( stress eating)        Beverages: water  Volume of beverage intake: 80 oz     Weekends: Same  Cravings: none currently - usually sweets  Trouble area of day:afternoon     Frequency of Eating out: once per week  Food restrictions: carbs   Cooking: self   Food Shopping: self     Physical Activity Intake  Activity:Walking  Frequency: just started to walk per her doctor  Physical limitations/barriers to exercise: none      Estimated Needs  Energy     [employer] Energy Needs:  BMR : 5365  2# loss weekly sedentary:  1090         1-2# loss weekly lightly active: 1321  Protein: 61 - 76      (1 2-1 5g/kg IBW)  Fluid:  60 oz    (35mL/kg IBW)     Nutrition Diagnosis  Yes;    Overweight/obesity  related to Excess energy intake as evidenced by  BMI more than normative standard for age and sex (obesity-grade II 35-39  9)     Nutrition Intervention     Nutrition Prescription  Calories: 1000 calories  Protein: 70 gr  Fluid: 80 oz      Meal Plan  Breakfast:  Shake  Snack:  Lunch: Shake or pudding   Snack:  Bar  Dinner: Sensible dinner ( lean protein and non starchy veg)  Snack:     Nutrition Education:    Calorie controlled menu  Healthy snack options  Portion sizes  Adequate hydration             Nutrition Counseling:  Strategies: meal planning, portion sizes, healthy snack choices, hydration, fiber intake, protein intake, exercise, food journal        Monitoring and Evaluation:  Evaluation criteria:  Energy Intake  Meet protein needs  Maintain adequate hydration  Monitor weekly weight  Meal planning/preparation  Food journal   Decreased portions at mealtimes and snacks  Physical activity      Barriers to learning:none  Readiness to change: Action  Comprehension: good  Expected Compliance: good

## 2019-07-30 DIAGNOSIS — T75.3XXA MOTION SICKNESS, INITIAL ENCOUNTER: Primary | ICD-10-CM

## 2019-07-30 RX ORDER — SCOLOPAMINE TRANSDERMAL SYSTEM 1 MG/1
1 PATCH, EXTENDED RELEASE TRANSDERMAL
Qty: 4 PATCH | Refills: 0 | Status: SHIPPED | OUTPATIENT
Start: 2019-07-30 | End: 2019-08-22 | Stop reason: CLARIF

## 2019-08-15 VITALS — WEIGHT: 185.3 LBS | BODY MASS INDEX: 32.83 KG/M2 | HEIGHT: 63 IN

## 2019-08-22 ENCOUNTER — OFFICE VISIT (OUTPATIENT)
Dept: INTERNAL MEDICINE CLINIC | Facility: CLINIC | Age: 69
End: 2019-08-22
Payer: COMMERCIAL

## 2019-08-22 VITALS
DIASTOLIC BLOOD PRESSURE: 60 MMHG | BODY MASS INDEX: 33.27 KG/M2 | SYSTOLIC BLOOD PRESSURE: 108 MMHG | HEIGHT: 63 IN | WEIGHT: 187.8 LBS | HEART RATE: 80 BPM | RESPIRATION RATE: 12 BRPM | TEMPERATURE: 98.5 F

## 2019-08-22 DIAGNOSIS — J45.909 ASTHMA, UNSPECIFIED ASTHMA SEVERITY, UNSPECIFIED WHETHER COMPLICATED, UNSPECIFIED WHETHER PERSISTENT: ICD-10-CM

## 2019-08-22 DIAGNOSIS — Z85.42 HISTORY OF ENDOMETRIAL CANCER: ICD-10-CM

## 2019-08-22 DIAGNOSIS — E78.5 DYSLIPIDEMIA: ICD-10-CM

## 2019-08-22 DIAGNOSIS — Z79.4 CONTROLLED TYPE 2 DIABETES MELLITUS WITHOUT COMPLICATION, WITH LONG-TERM CURRENT USE OF INSULIN (HCC): ICD-10-CM

## 2019-08-22 DIAGNOSIS — Z00.00 PHYSICAL EXAM: ICD-10-CM

## 2019-08-22 DIAGNOSIS — I10 BENIGN ESSENTIAL HYPERTENSION: ICD-10-CM

## 2019-08-22 DIAGNOSIS — K21.9 GASTROESOPHAGEAL REFLUX DISEASE, ESOPHAGITIS PRESENCE NOT SPECIFIED: Primary | ICD-10-CM

## 2019-08-22 DIAGNOSIS — E11.9 CONTROLLED TYPE 2 DIABETES MELLITUS WITHOUT COMPLICATION, WITH LONG-TERM CURRENT USE OF INSULIN (HCC): ICD-10-CM

## 2019-08-22 PROCEDURE — 99397 PER PM REEVAL EST PAT 65+ YR: CPT | Performed by: PHYSICIAN ASSISTANT

## 2019-08-22 NOTE — PROGRESS NOTES
Assessment/Plan: her physical form is filled out  She stable she is doing very well       Diagnoses and all orders for this visit:    Gastroesophageal reflux disease, esophagitis presence not specified    Controlled type 2 diabetes mellitus without complication, with long-term current use of insulin ([facility])    Asthma, unspecified asthma severity, unspecified whether complicated, unspecified whether persistent    Benign essential hypertension    History of endometrial cancer    Dyslipidemia    Physical exam        No problem-specific Assessment & Plan notes found for this encounter  Subjective:      Patient ID: Porfirio Duane is a 68 y o  female  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  She is active she is feeling very well she has no complaints she has a history of diabetes hypertension well controlled with medication history of uterine cancer which has been removed reflux managed with medication      The following portions of the patient's history were reviewed and updated as appropriate:   She has a past medical history of Abnormal mammogram, Abnormal weight gain, Achilles tendinitis, Angina pectoris ([address] ), Asthma, COPD (chronic obstructive pulmonary disease) ([address] ), Diabetes mellitus ([address] ), Disc degeneration, lumbar, Disease of thyroid gland, Dyslipidemia, Early satiety, Edema, Enthesopathy, Esophagitis, reflux, GERD (gastroesophageal reflux disease), Hard to intubate, Hypercholesterolemia, Hypertension, IBS (irritable bowel syndrome), Irritable bowel syndrome, Osteoarthritis, Rosacea, and Sacroiliitis ([address] )  ,  does not have any pertinent problems on file  ,   has a past surgical history that includes  section; Foot surgery (Right); SHOULDER ARTHROPLASTY; Sinus surgery; Tonsillectomy; Replacement total knee (Right); Tubal ligation; Wrist surgery; Elbow surgery; Knee arthroscopy w/ meniscal repair (Right);  Colonoscopy; Knee arthroscopy; Joint replacement (Left); Fracture surgery; Fracture surgery (Left); Esophagogastroduodenoscopy; pr laparoscopy w tot hysterectuterus <=250 gram  w tube/ovary (N/A, 7/30/2018); and Hysterectomy (Bilateral, 07/28/2018)  ,  family history includes Arthritis in her mother; Brain cancer in her maternal grandmother; Breast cancer in her maternal aunt and maternal grandmother; Breast cancer (age of onset: 77) in her mother; Diabetes in her mother; Heart disease in her father and mother; Hiatal hernia in her child, father, mother, and sister; Hypertension in her mother; Irritable bowel syndrome in her daughter and mother; Lung cancer in her sister; John Pouch Hypertension in her son; No Known Problems in her brother, maternal grandfather, paternal grandfather, and paternal grandmother; Osteoporosis in her mother; Other in her child, father, mother, and sister; Stroke in her maternal aunt and mother; Thyroid disease in her mother and sister; Ulcers in her father; Uterine cancer in her maternal aunt and mother  ,   reports that she has never smoked  She has never used smokeless tobacco  She reports that she drinks about 3 0 standard drinks of alcohol per week  She reports that she does not use drugs  ,  is allergic to amoxicillin-pot clavulanate; erythromycin; meperidine; morphine; nabumetone; penicillins; darvon [propoxyphene]; methocarbamol; reglan [metoclopramide]; sulfa antibiotics; and tetracyclines & related     Current Outpatient Medications   Medication Sig Dispense Refill    aspirin (ASPIR-81) 81 mg EC tablet Take by mouth      cetirizine (ZyrTEC) 10 MG chewable tablet Chew 10 mg daily      clotrimazole-betamethasone (LOTRISONE) 1-0 05 % cream APPLY TOPICALLY DAILY AT BEDTIME AS NEEDED FOR ITCHING 45 g 1    ezetimibe (ZETIA) 10 mg tablet TAKE 1 TABLET DAILY 90 tablet 2    furosemide (LASIX) 20 mg tablet TAKE 1 TABLET DAILY 90 tablet 3    glucose blood test strip FreeStyle Lite Strips      insulin lispro (HUMALOG) 100 units/mL injection Inject under the skin Only when on steroids or with a sick day       Insulin Syringe-Needle U-100 (INSULIN SYRINGE 1CC/31GX5/16") 31G X 5/16" 1 ML MISC BD Veo Insulin Syringe Ultra-Fine 0 3 mL 31 gauge x 15/64"      KRILL OIL PO Take by mouth      levothyroxine 75 mcg tablet TAKE 1 TABLET DAILY 90 tablet 3    LINZESS 145 MCG CAPS TAKE 1 CAPSULE IN THE MORNING AT LEAST 30 MINUTES BEFORE BREAKFAST DAILY 90 capsule 3    Magnesium Oxide 400 MG CAPS Take by mouth      meloxicam (MOBIC) 15 mg tablet Take 1 tablet (15 mg total) by mouth daily as needed (P r n  pain) 90 tablet 3    metFORMIN (GLUCOPHAGE-XR) 500 mg 24 hr tablet TAKE 1 TABLET IN THE MORNING AND 2 TABLETS IN THE EVENING (Patient taking differently: 1,500 mg TAKE 1 TABLET IN THE MORNING AND 2 TABLETS IN THE EVENING) 270 tablet 3    olmesartan (BENICAR) 20 mg tablet Take 1 tablet (20 mg total) by mouth daily 90 tablet 3    Omega-3 Fatty Acids (FISH OIL) 1200 MG CAPS Take by mouth      pantoprazole (PROTONIX) 40 mg tablet Take 1 tablet (40 mg total) by mouth daily 90 tablet 3    potassium chloride (MICRO-K) 10 MEQ CR capsule TAKE 1 CAPSULE DAILY 90 capsule 3    rosuvastatin (CRESTOR) 20 MG tablet TAKE 1 TABLET DAILY 90 tablet 2    saccharomyces boulardii (FLORASTOR) 250 mg capsule Take 250 mg by mouth 3 (three) times a day      clindamycin (CLEOCIN) 150 mg capsule Take 150 mg by mouth 4 (four) times a day  0    fluticasone (FLONASE) 50 mcg/act nasal spray        Current Facility-Administered Medications   Medication Dose Route Frequency Provider Last Rate Last Dose    albuterol inhalation solution 2 5 mg  2 5 mg Nebulization Q6H PRN Joanie Reza MD           Review of Systems   Constitutional: Negative for activity change, appetite change, chills, diaphoresis, fatigue, fever and unexpected weight change     HENT: Negative for congestion, dental problem, drooling, ear discharge, ear pain, facial swelling, hearing loss, nosebleeds, postnasal drip, rhinorrhea, sinus pressure, sinus pain, sneezing, sore throat, tinnitus, trouble swallowing and voice change  Eyes: Negative for photophobia, pain, discharge, redness, itching and visual disturbance  Respiratory: Negative for apnea, cough, choking, chest tightness, shortness of breath, wheezing and stridor  Cardiovascular: Negative for chest pain, palpitations and leg swelling  Gastrointestinal: Negative for abdominal distention, abdominal pain, anal bleeding, blood in stool, constipation, diarrhea, nausea, rectal pain and vomiting  Endocrine: Negative for cold intolerance, heat intolerance, polydipsia, polyphagia and polyuria  Genitourinary: Negative for decreased urine volume, difficulty urinating, dysuria, enuresis, flank pain, frequency, genital sores, hematuria and urgency  Musculoskeletal: Negative for arthralgias, back pain, gait problem, joint swelling, myalgias, neck pain and neck stiffness  Skin: Negative for color change, pallor, rash and wound  Neurological: Negative for dizziness, tremors, seizures, syncope, facial asymmetry, speech difficulty, weakness, light-headedness, numbness and headaches  Hematological: Negative for adenopathy  Does not bruise/bleed easily  Psychiatric/Behavioral: Negative for agitation, behavioral problems, confusion, decreased concentration, dysphoric mood, hallucinations, self-injury, sleep disturbance and suicidal ideas  The patient is not nervous/anxious and is not hyperactive  Objective:  Vitals:    08/22/19 1556   BP: 108/60   BP Location: Left arm   Patient Position: Sitting   Cuff Size: Standard   Pulse: 80   Resp: 12   Temp: 98 5 °F (36 9 °C)   TempSrc: Oral   Weight: 85 2 kg (187 lb 12 8 oz)   Height: 5' 2 5" (1 588 m)     Body mass index is 33 8 kg/m²  Physical Exam   Constitutional: She is oriented to person, place, and time  She appears well-developed  obese   HENT:   Head: Normocephalic     Right Ear: External ear normal  Left Ear: External ear normal    Mouth/Throat: Oropharynx is clear and moist    Eyes: Pupils are equal, round, and reactive to light  Conjunctivae are normal    Neck: Neck supple  No JVD present  No thyromegaly present  Cardiovascular: Normal rate, regular rhythm, normal heart sounds and intact distal pulses  Exam reveals no gallop and no friction rub  No murmur heard  Pulmonary/Chest: Effort normal and breath sounds normal  No stridor  No respiratory distress  She has no wheezes  Abdominal: Soft  Bowel sounds are normal    Musculoskeletal: Normal range of motion  She exhibits no edema  Lymphadenopathy:     She has no cervical adenopathy  Neurological: She is alert and oriented to person, place, and time  She has normal reflexes  Skin: Skin is warm and dry

## 2019-09-18 ENCOUNTER — OFFICE VISIT (OUTPATIENT)
Dept: GYNECOLOGIC ONCOLOGY | Facility: CLINIC | Age: 69
End: 2019-09-18
Payer: COMMERCIAL

## 2019-09-18 ENCOUNTER — OFFICE VISIT (OUTPATIENT)
Dept: BARIATRICS | Facility: CLINIC | Age: 69
End: 2019-09-18

## 2019-09-18 VITALS
DIASTOLIC BLOOD PRESSURE: 72 MMHG | WEIGHT: 191 LBS | HEIGHT: 63 IN | BODY MASS INDEX: 33.84 KG/M2 | RESPIRATION RATE: 18 BRPM | HEART RATE: 63 BPM | TEMPERATURE: 98.9 F | SYSTOLIC BLOOD PRESSURE: 120 MMHG

## 2019-09-18 VITALS — HEIGHT: 63 IN | BODY MASS INDEX: 33.52 KG/M2 | WEIGHT: 189.2 LBS

## 2019-09-18 DIAGNOSIS — R63.5 ABNORMAL WEIGHT GAIN: Primary | ICD-10-CM

## 2019-09-18 DIAGNOSIS — Z85.42 HISTORY OF ENDOMETRIAL CANCER: Primary | ICD-10-CM

## 2019-09-18 PROCEDURE — 99214 OFFICE O/P EST MOD 30 MIN: CPT | Performed by: OBSTETRICS & GYNECOLOGY

## 2019-09-18 PROCEDURE — RECHECK

## 2019-09-18 NOTE — LETTER
September 18, 2019     Muriel Farrell DO  [address] [address]    Patient: Ninoska Britton   YOB: 1950   Date of Visit: 9/18/2019       Dear Dr Kami Tobias: Thank you for referring Akira Brenner to me for evaluation  Below are my notes for this consultation  If you have questions, please do not hesitate to call me  I look forward to following your patient along with you  Sincerely,        Marbin Bowen MD        CC: MD Marbin Ortega MD  9/18/2019  8:55 AM  Incomplete  Assessment/Plan:    Problem List Items Addressed This Visit        Other    History of endometrial cancer - Primary     Patient remains in clinical remission  She has no deficits related to her cancer or treatment  With regard to survivor ship she states that she is coping well with her diagnosis despite multiple family members with cancer  She is up-to-date with screening for other cancers as well as her DEXA scan  The patient will follow up in 6 months for continued surveillance or earlier if symptoms warrant  Patient was given signs and symptoms to watch for  Overall visit took 25 minutes with greater than 50% being dedicated toward                 CHIEF COMPLAINT:  Endometrial cancer surveillance stage IA      Problem:  Cancer Staging  History of endometrial cancer  Staging form: Corpus Uteri - Carcinoma, AJCC 8th Edition  - Clinical stage from 7/30/2018: FIGO Stage IA - Signed by Christoph Yang MD PhD on 8/10/2018        Previous therapy:     History of endometrial cancer    6/14/2018 Biopsy     FIGO 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  Biopsy performed by Dr Negrita Santos  7/30/2018 Surgery     Robotic assisted total laparoscopic hysterectomy, bilateral salpingo-oophorectomy and pelvic lymph node dissection    Final pathology revealed stage IA grade 1 endometrioid adenocarcinoma (3 3 x 1 7 cm, 2/12mm invasion, NO LVSI, negative washings)      7/30/2018 Genetic Testing     Torrez testing negative           Patient ID: Warden Lewis is a 68 y o  female  Patient is a very pleasant 77-year-old white female with a history of stage IA grade 1 endometrioid adenocarcinoma diagnosed in July of 2018  She was treated with surgery alone due to early disease  She is under surveillance schedule of every 6 months for 2 years  Since her last visit the patient has had no complaint  She is up-to-date with screening for breast cancer, her DEXA scan, her colonoscopy is scheduled for December  Today, the patient is doing well  She denies significant abdominal pain, pelvic pain, nausea, vomiting, constipation, diarrhea, fevers, chills, or vaginal bleeding  The following portions of the patient's history were reviewed and updated as appropriate: allergies, current medications, past medical history, past social history, past surgical history and problem list     Review of Systems   Constitutional: Negative  HENT: Negative  Eyes: Negative  Respiratory: Negative  Cardiovascular: Negative  Gastrointestinal: Negative  Endocrine: Negative  Genitourinary: Negative  Musculoskeletal: Negative  Skin: Negative  Neurological: Negative  Hematological: Negative  Psychiatric/Behavioral: Negative          Current Outpatient Medications   Medication Sig Dispense Refill    aspirin (ASPIR-81) 81 mg EC tablet Take by mouth      cetirizine (ZyrTEC) 10 MG chewable tablet Chew 10 mg daily      clindamycin (CLEOCIN) 150 mg capsule Take 150 mg by mouth 4 (four) times a day  0    clotrimazole-betamethasone (LOTRISONE) 1-0 05 % cream APPLY TOPICALLY DAILY AT BEDTIME AS NEEDED FOR ITCHING 45 g 1    ezetimibe (ZETIA) 10 mg tablet TAKE 1 TABLET DAILY 90 tablet 2    furosemide (LASIX) 20 mg tablet TAKE 1 TABLET DAILY 90 tablet 3    glucose blood test strip FreeStyle Lite Strips      insulin lispro (HUMALOG) 100 units/mL injection Inject under the skin Only when on steroids or with a sick day       Insulin Syringe-Needle U-100 (INSULIN SYRINGE 1CC/31GX5/16") 31G X 5/16" 1 ML MISC BD Veo Insulin Syringe Ultra-Fine 0 3 mL 31 gauge x 15/64"      KRILL OIL PO Take by mouth      levothyroxine 75 mcg tablet TAKE 1 TABLET DAILY 90 tablet 3    LINZESS 145 MCG CAPS TAKE 1 CAPSULE IN THE MORNING AT LEAST 30 MINUTES BEFORE BREAKFAST DAILY 90 capsule 3    Magnesium Oxide 400 MG CAPS Take by mouth      meloxicam (MOBIC) 15 mg tablet Take 1 tablet (15 mg total) by mouth daily as needed (P r n  pain) 90 tablet 3    metFORMIN (GLUCOPHAGE-XR) 500 mg 24 hr tablet TAKE 1 TABLET IN THE MORNING AND 2 TABLETS IN THE EVENING (Patient taking differently: 1,500 mg TAKE 1 TABLET IN THE MORNING AND 2 TABLETS IN THE EVENING) 270 tablet 3    olmesartan (BENICAR) 20 mg tablet Take 1 tablet (20 mg total) by mouth daily 90 tablet 3    Omega-3 Fatty Acids (FISH OIL) 1200 MG CAPS Take by mouth      pantoprazole (PROTONIX) 40 mg tablet Take 1 tablet (40 mg total) by mouth daily 90 tablet 3    potassium chloride (MICRO-K) 10 MEQ CR capsule TAKE 1 CAPSULE DAILY 90 capsule 3    rosuvastatin (CRESTOR) 20 MG tablet TAKE 1 TABLET DAILY 90 tablet 2    saccharomyces boulardii (FLORASTOR) 250 mg capsule Take 250 mg by mouth 3 (three) times a day       Current Facility-Administered Medications   Medication Dose Route Frequency Provider Last Rate Last Dose    albuterol inhalation solution 2 5 mg  2 5 mg Nebulization Q6H PRN Carey Serna MD               Objective:    Blood pressure 120/72, pulse 63, temperature 98 9 °F (37 2 °C), resp  rate 18, height 5' 2 5" (1 588 m), weight 86 6 kg (191 lb), not currently breastfeeding  Body mass index is 34 38 kg/m²  Body surface area is 1 89 meters squared  Physical Exam   Constitutional: She is oriented to person, place, and time  She appears well-developed and well-nourished     HENT:   Head: Normocephalic and atraumatic  Eyes: EOM are normal    Neck: Normal range of motion  Neck supple  No thyromegaly present  Cardiovascular: Normal rate, regular rhythm and normal heart sounds  Pulmonary/Chest: Effort normal and breath sounds normal    Abdominal: Soft  Bowel sounds are normal    Well healed laparoscopic incisions  Genitourinary:   Genitourinary Comments: -Normal external female genitalia, normal Bartholin's and [place]'s glands                  -Normal midline urethral meatus  No lesions notes                  -Bladder without fullness mass or tenderness                  -Vagina without lesion or discharge No significant cystocele or rectocele noted                  -Cervix surgically absent                  -Uterus surgically absent                  -Adnexae surgically absent                  - Anus without fissure of lesion     Musculoskeletal: Normal range of motion  Lymphadenopathy:     She has no cervical adenopathy  Neurological: She is alert and oriented to person, place, and time  Skin: Skin is warm and dry  Psychiatric: She has a normal mood and affect   Her behavior is normal        No results found for:   Lab Results   Component Value Date     11/04/2015    K 4 0 06/24/2019     06/24/2019    CO2 29 06/24/2019    ANIONGAP 4 11/04/2015    BUN 25 06/24/2019    CREATININE 1 06 06/24/2019    GLUCOSE 107 11/04/2015    GLUF 111 (H) 06/24/2019    CALCIUM 9 3 06/24/2019    AST 14 06/24/2019    ALT 23 06/24/2019    ALKPHOS 98 06/24/2019    PROT 7 0 11/04/2015    BILITOT 0 41 11/04/2015    EGFR 53 06/24/2019     Lab Results   Component Value Date    WBC 7 42 06/24/2019    HGB 11 7 06/24/2019    HCT 36 7 06/24/2019    MCV 91 06/24/2019     06/24/2019     Lab Results   Component Value Date    NEUTROABS 4 24 06/24/2019              Hemal Licea MD  9/18/2019  8:55 AM  Sign at close encounter  Assessment/Plan:    Problem List Items Addressed This Visit     None            CHIEF COMPLAINT:  Endometrial cancer surveillance stage IA      Problem:  Cancer Staging  History of endometrial cancer  Staging form: Corpus Uteri - Carcinoma, AJCC 8th Edition  - Clinical stage from 7/30/2018: FIGO Stage IA - Signed by Tiny Arzola MD PhD on 8/10/2018        Previous therapy:     History of endometrial cancer    6/14/2018 Biopsy     FIGO 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  Biopsy performed by Dr Raymond Jade  7/30/2018 Surgery     Robotic assisted total laparoscopic hysterectomy, bilateral salpingo-oophorectomy and pelvic lymph node dissection  Final pathology revealed stage IA grade 1 endometrioid adenocarcinoma (3 3 x 1 7 cm, 2/12mm invasion, NO LVSI, negative washings)      7/30/2018 Genetic Testing     Torrez testing negative           Patient ID: Whitney Renee is a 68 y o  female  Patient is a very pleasant 20-year-old white female with a history of stage IA grade 1 endometrioid adenocarcinoma diagnosed in July of 2018  She was treated with surgery alone due to early disease  She is under surveillance schedule of every 6 months for 2 years  Since her last visit the patient has had no complaint  She is up-to-date with screening for breast cancer, her DEXA scan, her colonoscopy is scheduled for December  Today, the patient is doing well  She denies significant abdominal pain, pelvic pain, nausea, vomiting, constipation, diarrhea, fevers, chills, or vaginal bleeding  The following portions of the patient's history were reviewed and updated as appropriate: allergies, current medications, past medical history, past social history, past surgical history and problem list     Review of Systems   Constitutional: Negative  HENT: Negative  Eyes: Negative  Respiratory: Negative  Cardiovascular: Negative  Gastrointestinal: Negative  Endocrine: Negative  Genitourinary: Negative  Musculoskeletal: Negative  Skin: Negative  Neurological: Negative  Hematological: Negative  Psychiatric/Behavioral: Negative          Current Outpatient Medications   Medication Sig Dispense Refill    aspirin (ASPIR-81) 81 mg EC tablet Take by mouth      cetirizine (ZyrTEC) 10 MG chewable tablet Chew 10 mg daily      clindamycin (CLEOCIN) 150 mg capsule Take 150 mg by mouth 4 (four) times a day  0    clotrimazole-betamethasone (LOTRISONE) 1-0 05 % cream APPLY TOPICALLY DAILY AT BEDTIME AS NEEDED FOR ITCHING 45 g 1    ezetimibe (ZETIA) 10 mg tablet TAKE 1 TABLET DAILY 90 tablet 2    furosemide (LASIX) 20 mg tablet TAKE 1 TABLET DAILY 90 tablet 3    glucose blood test strip FreeStyle Lite Strips      insulin lispro (HUMALOG) 100 units/mL injection Inject under the skin Only when on steroids or with a sick day       Insulin Syringe-Needle U-100 (INSULIN SYRINGE 1CC/31GX5/16") 31G X 5/16" 1 ML MISC BD Veo Insulin Syringe Ultra-Fine 0 3 mL 31 gauge x 15/64"      KRILL OIL PO Take by mouth      levothyroxine 75 mcg tablet TAKE 1 TABLET DAILY 90 tablet 3    LINZESS 145 MCG CAPS TAKE 1 CAPSULE IN THE MORNING AT LEAST 30 MINUTES BEFORE BREAKFAST DAILY 90 capsule 3    Magnesium Oxide 400 MG CAPS Take by mouth      meloxicam (MOBIC) 15 mg tablet Take 1 tablet (15 mg total) by mouth daily as needed (P r n  pain) 90 tablet 3    metFORMIN (GLUCOPHAGE-XR) 500 mg 24 hr tablet TAKE 1 TABLET IN THE MORNING AND 2 TABLETS IN THE EVENING (Patient taking differently: 1,500 mg TAKE 1 TABLET IN THE MORNING AND 2 TABLETS IN THE EVENING) 270 tablet 3    olmesartan (BENICAR) 20 mg tablet Take 1 tablet (20 mg total) by mouth daily 90 tablet 3    Omega-3 Fatty Acids (FISH OIL) 1200 MG CAPS Take by mouth      pantoprazole (PROTONIX) 40 mg tablet Take 1 tablet (40 mg total) by mouth daily 90 tablet 3    potassium chloride (MICRO-K) 10 MEQ CR capsule TAKE 1 CAPSULE DAILY 90 capsule 3    rosuvastatin (CRESTOR) 20 MG tablet TAKE 1 TABLET DAILY 90 tablet 2    saccharomyces boulardii (FLORASTOR) 250 mg capsule Take 250 mg by mouth 3 (three) times a day       Current Facility-Administered Medications   Medication Dose Route Frequency Provider Last Rate Last Dose    albuterol inhalation solution 2 5 mg  2 5 mg Nebulization Q6H PRN Vanessa Whiteside MD               Objective:    Blood pressure 120/72, pulse 63, temperature 98 9 °F (37 2 °C), resp  rate 18, height 5' 2 5" (1 588 m), weight 86 6 kg (191 lb), not currently breastfeeding  Body mass index is 34 38 kg/m²  Body surface area is 1 89 meters squared  Physical Exam   Constitutional: She is oriented to person, place, and time  She appears well-developed and well-nourished  HENT:   Head: Normocephalic and atraumatic  Eyes: EOM are normal    Neck: Normal range of motion  Neck supple  No thyromegaly present  Cardiovascular: Normal rate, regular rhythm and normal heart sounds  Pulmonary/Chest: Effort normal and breath sounds normal    Abdominal: Soft  Bowel sounds are normal    Well healed laparoscopic incisions  Genitourinary:   Genitourinary Comments: -Normal external female genitalia, normal Bartholin's and [place]'s glands                  -Normal midline urethral meatus  No lesions notes                  -Bladder without fullness mass or tenderness                  -Vagina without lesion or discharge No significant cystocele or rectocele noted                  -Cervix surgically absent                  -Uterus surgically absent                  -Adnexae surgically absent                  - Anus without fissure of lesion     Musculoskeletal: Normal range of motion  Lymphadenopathy:     She has no cervical adenopathy  Neurological: She is alert and oriented to person, place, and time  Skin: Skin is warm and dry  Psychiatric: She has a normal mood and affect   Her behavior is normal        No results found for:   Lab Results   Component Value Date     11/04/2015    K 4 0 06/24/2019     06/24/2019    CO2 29 06/24/2019    ANIONGAP 4 11/04/2015    BUN 25 06/24/2019    CREATININE 1 06 06/24/2019    GLUCOSE 107 11/04/2015    GLUF 111 (H) 06/24/2019    CALCIUM 9 3 06/24/2019    AST 14 06/24/2019    ALT 23 06/24/2019    ALKPHOS 98 06/24/2019    PROT 7 0 11/04/2015    BILITOT 0 41 11/04/2015    EGFR 53 06/24/2019     Lab Results   Component Value Date    WBC 7 42 06/24/2019    HGB 11 7 06/24/2019    HCT 36 7 06/24/2019    MCV 91 06/24/2019     06/24/2019     Lab Results   Component Value Date    NEUTROABS 4 24 06/24/2019              Virgie Lawson MD  9/18/2019  8:36 AM  Incomplete  Assessment/Plan:    Problem List Items Addressed This Visit     None            CHIEF COMPLAINT:       Problem:  Cancer Staging  History of endometrial cancer  Staging form: Corpus Uteri - Carcinoma, AJCC 8th Edition  - Clinical stage from 7/30/2018: FIGO Stage IA - Signed by Tushar Hodgson MD PhD on 8/10/2018        Previous therapy:     History of endometrial cancer    6/14/2018 Biopsy     FIGO 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  Biopsy performed by Dr Reji Bentley  7/30/2018 Surgery     Robotic assisted total laparoscopic hysterectomy, bilateral salpingo-oophorectomy and pelvic lymph node dissection    Final pathology revealed stage IA grade 1 endometrioid adenocarcinoma (3 3 x 1 7 cm, 2/12mm invasion, NO LVSI, negative washings)      7/30/2018 Genetic Testing     Torrez testing negative           Patient ID: Chata Fontenot is a 68 y o  female  HPI    The following portions of the patient's history were reviewed and updated as appropriate: allergies, current medications, past family history, past medical history, past social history, past surgical history and problem list     Review of Systems    Current Outpatient Medications   Medication Sig Dispense Refill    aspirin (ASPIR-81) 81 mg EC tablet Take by mouth      cetirizine (ZyrTEC) 10 MG chewable tablet Chew 10 mg daily  clindamycin (CLEOCIN) 150 mg capsule Take 150 mg by mouth 4 (four) times a day  0    clotrimazole-betamethasone (LOTRISONE) 1-0 05 % cream APPLY TOPICALLY DAILY AT BEDTIME AS NEEDED FOR ITCHING 45 g 1    ezetimibe (ZETIA) 10 mg tablet TAKE 1 TABLET DAILY 90 tablet 2    furosemide (LASIX) 20 mg tablet TAKE 1 TABLET DAILY 90 tablet 3    glucose blood test strip FreeStyle Lite Strips      insulin lispro (HUMALOG) 100 units/mL injection Inject under the skin Only when on steroids or with a sick day       Insulin Syringe-Needle U-100 (INSULIN SYRINGE 1CC/31GX5/16") 31G X 5/16" 1 ML MISC BD Veo Insulin Syringe Ultra-Fine 0 3 mL 31 gauge x 15/64"      KRILL OIL PO Take by mouth      levothyroxine 75 mcg tablet TAKE 1 TABLET DAILY 90 tablet 3    LINZESS 145 MCG CAPS TAKE 1 CAPSULE IN THE MORNING AT LEAST 30 MINUTES BEFORE BREAKFAST DAILY 90 capsule 3    Magnesium Oxide 400 MG CAPS Take by mouth      meloxicam (MOBIC) 15 mg tablet Take 1 tablet (15 mg total) by mouth daily as needed (P r n  pain) 90 tablet 3    metFORMIN (GLUCOPHAGE-XR) 500 mg 24 hr tablet TAKE 1 TABLET IN THE MORNING AND 2 TABLETS IN THE EVENING (Patient taking differently: 1,500 mg TAKE 1 TABLET IN THE MORNING AND 2 TABLETS IN THE EVENING) 270 tablet 3    olmesartan (BENICAR) 20 mg tablet Take 1 tablet (20 mg total) by mouth daily 90 tablet 3    Omega-3 Fatty Acids (FISH OIL) 1200 MG CAPS Take by mouth      pantoprazole (PROTONIX) 40 mg tablet Take 1 tablet (40 mg total) by mouth daily 90 tablet 3    potassium chloride (MICRO-K) 10 MEQ CR capsule TAKE 1 CAPSULE DAILY 90 capsule 3    rosuvastatin (CRESTOR) 20 MG tablet TAKE 1 TABLET DAILY 90 tablet 2    saccharomyces boulardii (FLORASTOR) 250 mg capsule Take 250 mg by mouth 3 (three) times a day       Current Facility-Administered Medications   Medication Dose Route Frequency Provider Last Rate Last Dose    albuterol inhalation solution 2 5 mg  2 5 mg Nebulization Q6H PRN Kori Mo Macdonald MD               Objective:    Blood pressure 120/72, pulse 63, temperature 98 9 °F (37 2 °C), resp  rate 18, height 5' 2 5" (1 588 m), weight 86 6 kg (191 lb), not currently breastfeeding  Body mass index is 34 38 kg/m²  Body surface area is 1 89 meters squared      Physical Exam    No results found for:   Lab Results   Component Value Date     11/04/2015    K 4 0 06/24/2019     06/24/2019    CO2 29 06/24/2019    ANIONGAP 4 11/04/2015    BUN 25 06/24/2019    CREATININE 1 06 06/24/2019    GLUCOSE 107 11/04/2015    GLUF 111 (H) 06/24/2019    CALCIUM 9 3 06/24/2019    AST 14 06/24/2019    ALT 23 06/24/2019    ALKPHOS 98 06/24/2019    PROT 7 0 11/04/2015    BILITOT 0 41 11/04/2015    EGFR 53 06/24/2019     Lab Results   Component Value Date    WBC 7 42 06/24/2019    HGB 11 7 06/24/2019    HCT 36 7 06/24/2019    MCV 91 06/24/2019     06/24/2019     Lab Results   Component Value Date    NEUTROABS 4 24 06/24/2019

## 2019-09-18 NOTE — PROGRESS NOTES
Assessment/Plan:    Problem List Items Addressed This Visit        Other    History of endometrial cancer - Primary     Patient remains in clinical remission  She has no deficits related to her cancer or treatment  With regard to survivor ship she states that she is coping well with her diagnosis despite multiple family members with cancer  She is up-to-date with screening for other cancers as well as her DEXA scan  The patient will follow up in 6 months for continued surveillance or earlier if symptoms warrant  Patient was given signs and symptoms to watch for  Overall visit took 25 minutes with greater than 50% being dedicated toward                 CHIEF COMPLAINT:  Endometrial cancer surveillance stage IA      Problem:  Cancer Staging  History of endometrial cancer  Staging form: Corpus Uteri - Carcinoma, AJCC 8th Edition  - Clinical stage from 7/30/2018: FIGO Stage IA - Signed by Ramu Santos MD PhD on 8/10/2018        Previous therapy:     History of endometrial cancer    6/14/2018 Biopsy     FIGO 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  Biopsy performed by Dr Shaun Estrada  7/30/2018 Surgery     Robotic assisted total laparoscopic hysterectomy, bilateral salpingo-oophorectomy and pelvic lymph node dissection  Final pathology revealed stage IA grade 1 endometrioid adenocarcinoma (3 3 x 1 7 cm, 2/12mm invasion, NO LVSI, negative washings)      7/30/2018 Genetic Testing     Torrez testing negative           Patient ID: Lobo Hancock is a 68 y o  female  Patient is a very pleasant 70-year-old white female with a history of stage IA grade 1 endometrioid adenocarcinoma diagnosed in July of 2018  She was treated with surgery alone due to early disease  She is under surveillance schedule of every 6 months for 2 years  Since her last visit the patient has had no complaint    She is up-to-date with screening for breast cancer, her DEXA scan, her colonoscopy is scheduled for December  Today, the patient is doing well  She denies significant abdominal pain, pelvic pain, nausea, vomiting, constipation, diarrhea, fevers, chills, or vaginal bleeding  The following portions of the patient's history were reviewed and updated as appropriate: allergies, current medications, past medical history, past social history, past surgical history and problem list     Review of Systems   Constitutional: Negative  HENT: Negative  Eyes: Negative  Respiratory: Negative  Cardiovascular: Negative  Gastrointestinal: Negative  Endocrine: Negative  Genitourinary: Negative  Musculoskeletal: Negative  Skin: Negative  Neurological: Negative  Hematological: Negative  Psychiatric/Behavioral: Negative          Current Outpatient Medications   Medication Sig Dispense Refill    aspirin (ASPIR-81) 81 mg EC tablet Take by mouth      cetirizine (ZyrTEC) 10 MG chewable tablet Chew 10 mg daily      clindamycin (CLEOCIN) 150 mg capsule Take 150 mg by mouth 4 (four) times a day  0    clotrimazole-betamethasone (LOTRISONE) 1-0 05 % cream APPLY TOPICALLY DAILY AT BEDTIME AS NEEDED FOR ITCHING 45 g 1    ezetimibe (ZETIA) 10 mg tablet TAKE 1 TABLET DAILY 90 tablet 2    furosemide (LASIX) 20 mg tablet TAKE 1 TABLET DAILY 90 tablet 3    glucose blood test strip FreeStyle Lite Strips      insulin lispro (HUMALOG) 100 units/mL injection Inject under the skin Only when on steroids or with a sick day       Insulin Syringe-Needle U-100 (INSULIN SYRINGE 1CC/31GX5/16") 31G X 5/16" 1 ML MISC BD Veo Insulin Syringe Ultra-Fine 0 3 mL 31 gauge x 15/64"      KRILL OIL PO Take by mouth      levothyroxine 75 mcg tablet TAKE 1 TABLET DAILY 90 tablet 3    LINZESS 145 MCG CAPS TAKE 1 CAPSULE IN THE MORNING AT LEAST 30 MINUTES BEFORE BREAKFAST DAILY 90 capsule 3    Magnesium Oxide 400 MG CAPS Take by mouth      meloxicam (MOBIC) 15 mg tablet Take 1 tablet (15 mg total) by mouth daily as needed (P r n  pain) 90 tablet 3    metFORMIN (GLUCOPHAGE-XR) 500 mg 24 hr tablet TAKE 1 TABLET IN THE MORNING AND 2 TABLETS IN THE EVENING (Patient taking differently: 1,500 mg TAKE 1 TABLET IN THE MORNING AND 2 TABLETS IN THE EVENING) 270 tablet 3    olmesartan (BENICAR) 20 mg tablet Take 1 tablet (20 mg total) by mouth daily 90 tablet 3    Omega-3 Fatty Acids (FISH OIL) 1200 MG CAPS Take by mouth      pantoprazole (PROTONIX) 40 mg tablet Take 1 tablet (40 mg total) by mouth daily 90 tablet 3    potassium chloride (MICRO-K) 10 MEQ CR capsule TAKE 1 CAPSULE DAILY 90 capsule 3    rosuvastatin (CRESTOR) 20 MG tablet TAKE 1 TABLET DAILY 90 tablet 2    saccharomyces boulardii (FLORASTOR) 250 mg capsule Take 250 mg by mouth 3 (three) times a day       Current Facility-Administered Medications   Medication Dose Route Frequency Provider Last Rate Last Dose    albuterol inhalation solution 2 5 mg  2 5 mg Nebulization Q6H PRN Austen Zarate MD               Objective:    Blood pressure 120/72, pulse 63, temperature 98 9 °F (37 2 °C), resp  rate 18, height 5' 2 5" (1 588 m), weight 86 6 kg (191 lb), not currently breastfeeding  Body mass index is 34 38 kg/m²  Body surface area is 1 89 meters squared  Physical Exam   Constitutional: She is oriented to person, place, and time  She appears well-developed and well-nourished  HENT:   Head: Normocephalic and atraumatic  Eyes: EOM are normal    Neck: Normal range of motion  Neck supple  No thyromegaly present  Cardiovascular: Normal rate, regular rhythm and normal heart sounds  Pulmonary/Chest: Effort normal and breath sounds normal    Abdominal: Soft  Bowel sounds are normal    Well healed laparoscopic incisions  Genitourinary:   Genitourinary Comments: -Normal external female genitalia, normal Bartholin's and Satilla's glands                  -Normal midline urethral meatus   No lesions notes                  -Bladder without fullness mass or tenderness                  -Vagina without lesion or discharge No significant cystocele or rectocele noted                  -Cervix surgically absent                  -Uterus surgically absent                  -Adnexae surgically absent                  - Anus without fissure of lesion     Musculoskeletal: Normal range of motion  Lymphadenopathy:     She has no cervical adenopathy  Neurological: She is alert and oriented to person, place, and time  Skin: Skin is warm and dry  Psychiatric: She has a normal mood and affect   Her behavior is normal        No results found for:   Lab Results   Component Value Date     11/04/2015    K 4 0 06/24/2019     06/24/2019    CO2 29 06/24/2019    ANIONGAP 4 11/04/2015    BUN 25 06/24/2019    CREATININE 1 06 06/24/2019    GLUCOSE 107 11/04/2015    GLUF 111 (H) 06/24/2019    CALCIUM 9 3 06/24/2019    AST 14 06/24/2019    ALT 23 06/24/2019    ALKPHOS 98 06/24/2019    PROT 7 0 11/04/2015    BILITOT 0 41 11/04/2015    EGFR 53 06/24/2019     Lab Results   Component Value Date    WBC 7 42 06/24/2019    HGB 11 7 06/24/2019    HCT 36 7 06/24/2019    MCV 91 06/24/2019     06/24/2019     Lab Results   Component Value Date    NEUTROABS 4 24 06/24/2019

## 2019-09-18 NOTE — PROGRESS NOTES
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  She has not been exercising or tracking her food  Since she is back at work, she has been skipping lunch and then eating more at night when she is home  Discussed the importance of not skipping lunch and making sure that she has an afternoon snack as well  After discussing her busy schedule and trying to fit meals/snacks in, she plans to go back to a partial meal plan  She plans to have a low carb breakfast (since breakfast is not an issue for her at this time), a meal replacement for lunch and 1 for dinner, and a bar for her late afternoon snack  She will also start to track her food again       Anthropometric Measurements  Start Weight (lbs):  209 6 lbs  Current Weight (lbs):  189 2 lbs  TBW % Change from start weight:  10%  Ideal Body Weight (lbs):112 5 lbs  Goal Weight (lbs):140 lbs     Weight Loss History  Previous weight loss attempts: Commercial Programs (Weight Watchers, [facility] [place], etc )  Self Created Diets (Portion Control, Healthy Food Choices, etc )     Food and Nutrition Related History  Wake up:  6am              Bed Time: 10pm     Food Recall  Breakfast: protein shake or eggs              Snack: not usually  Lunch: skips  Snack: hard boiled egg   Dinner: salmon with spinach  Snack: pumpkin pie      Beverages: water  Volume of beverage intake: 80 oz     Weekends: Same  Cravings: none currently - usually sweets  Trouble area of day:afternoon     Frequency of Eating out: once per week  Food restrictions: carbs   Cooking: self   Food Shopping: self     Physical Activity Intake  Activity: none currently  Frequency: none  Physical limitations/barriers to exercise: none      Estimated Needs  Energy     [address]  [facility] [id number] Needs: BMR : 5601  2# loss weekly sedentary:  1101      1-2# loss weekly lightly active: 1335  Protein: 61 - 76      (1 2-1 5g/kg IBW)  Fluid:  60 oz    (35mL/kg IBW)     Nutrition Diagnosis  Yes;    Overweight/obesity  related to Excess energy intake as evidenced by  BMI more than normative standard for age and sex (obesity-grade II 35-39  9)     Nutrition Intervention     Nutrition Prescription  Calories: 1000 calories  Protein: 70 gr  Fluid: 80 oz      Meal Plan  Breakfast:  eggs, spinach  Snack:  Lunch: Shake or pudding   Snack: hard boiled egg or string chees  Dinner: shake  Snack: bar     Nutrition Education:    Calorie controlled menu  Healthy snack options  Portion sizes  Adequate hydration             Nutrition Counseling:  Strategies: meal planning, portion sizes, healthy snack choices, hydration, fiber intake, protein intake, exercise, food journal        Monitoring and Evaluation:  Evaluation criteria:  Energy Intake  Meet protein needs  Maintain adequate hydration  Monitor weekly weight  Meal planning/preparation  Food journal   Decreased portions at mealtimes and snacks  Physical activity      Barriers to learning:none  Readiness to change: Action  Comprehension: good  Expected Compliance: good

## 2019-09-18 NOTE — ASSESSMENT & PLAN NOTE
Patient remains in clinical remission  She has no deficits related to her cancer or treatment  With regard to survivor ship she states that she is coping well with her diagnosis despite multiple family members with cancer  She is up-to-date with screening for other cancers as well as her DEXA scan  The patient will follow up in 6 months for continued surveillance or earlier if symptoms warrant  Patient was given signs and symptoms to watch for    Overall visit took 25 minutes with greater than 50% being dedicated toward

## 2019-10-21 ENCOUNTER — OFFICE VISIT (OUTPATIENT)
Dept: BARIATRICS | Facility: CLINIC | Age: 69
End: 2019-10-21

## 2019-10-21 DIAGNOSIS — R63.5 ABNORMAL WEIGHT GAIN: Primary | ICD-10-CM

## 2019-10-21 PROCEDURE — RECHECK

## 2019-10-21 NOTE — PROGRESS NOTES
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/school schedule  She is not exercising and is not tracking her food  She admits that taking classes is stressful and she graizes on chips, cookies, cheese etc instead of taking the time to fix a meal   Discuss the importance of meal planning/prepping and having healthy snacks available instead of having chips and cookies in the house       Anthropometric Measurements  Start Weight (lbs):  209 6 lbs  Current Weight (lbs):  183 4 lbs  TBW % Change from start weight:  8%  Ideal Body Weight (lbs):112 5 lbs  Goal Weight (lbs):140 lbs     Weight Loss History  Previous weight loss attempts: Commercial Programs (Weight Watchers, Jimena Armor, etc )  Self Created Diets (Portion Control, Healthy Food Choices, etc )     Food and Nutrition Related History  Wake up:  6am              Bed Time: 10pm     Food Recall  Breakfast: eggs and toast             Snack: not usually  Lunch: protein bar  Snack: sandwich  Dinner: chicken noodle soup (home made) 2 bowls  Snack:      Beverages: water, 3 cups coffee  Volume of beverage intake: 24 oz      Weekends: Same  Cravings: none currently - usually sweets  Trouble area of day:afternoon     Frequency of Eating out: once per week  Food restrictions: carbs   Cooking: self   Food Shopping: self     Physical Activity Intake  Activity: none currently  Frequency: none  Physical limitations/barriers to exercise: none      Estimated Needs  Energy     [employer] Energy Needs:  BMR : 3743  3# loss weekly sedentary:  1119      1-2# loss weekly lightly active: 1355  Protein: 61 - 76      (1 2-1 5g/kg IBW)  Fluid:  60 oz    (35mL/kg IBW)     Nutrition Diagnosis  Yes;    Overweight/obesity  related to Excess energy intake as evidenced by  BMI more than normative standard for age and sex (obesity-grade II 32-38  9)     Nutrition Intervention     Nutrition Prescription  Calories: 1000 calories  Protein: 70 gr  Fluid: 80 oz      Meal Plan  Breakfast:  eggs, spinach  Snack:  Lunch: Shake or pudding   Snack: hard boiled egg or string chees  Dinner: shake  Snack: bar     Nutrition Education:    Calorie controlled menu  Healthy snack options  Portion sizes  Adequate hydration             Nutrition Counseling:  Strategies: meal planning, portion sizes, healthy snack choices, hydration, fiber intake, protein intake, exercise, food journal        Monitoring and Evaluation:  Evaluation criteria:  Energy Intake  Meet protein needs  Maintain adequate hydration  Monitor weekly weight  Meal planning/preparation  Food journal   Decreased portions at mealtimes and snacks  Physical activity      Barriers to learning:none  Readiness to change: Action  Comprehension: good  Expected Compliance: good

## 2019-11-22 ENCOUNTER — TELEPHONE (OUTPATIENT)
Dept: INTERNAL MEDICINE CLINIC | Facility: CLINIC | Age: 69
End: 2019-11-22

## 2019-11-25 DIAGNOSIS — E78.2 MIXED HYPERLIPIDEMIA: ICD-10-CM

## 2019-11-25 RX ORDER — ROSUVASTATIN CALCIUM 20 MG/1
TABLET, COATED ORAL
Qty: 90 TABLET | Refills: 4 | Status: SHIPPED | OUTPATIENT
Start: 2019-11-25 | End: 2021-02-18 | Stop reason: SDUPTHER

## 2019-11-30 DIAGNOSIS — E78.2 MIXED HYPERLIPIDEMIA: ICD-10-CM

## 2019-11-30 RX ORDER — EZETIMIBE 10 MG/1
TABLET ORAL
Qty: 15 TABLET | Refills: 0 | Status: SHIPPED | OUTPATIENT
Start: 2019-11-30 | End: 2019-12-24 | Stop reason: SDUPTHER

## 2019-12-16 ENCOUNTER — TELEPHONE (OUTPATIENT)
Dept: GASTROENTEROLOGY | Facility: CLINIC | Age: 69
End: 2019-12-16

## 2019-12-20 ENCOUNTER — OFFICE VISIT (OUTPATIENT)
Dept: BARIATRICS | Facility: CLINIC | Age: 69
End: 2019-12-20

## 2019-12-20 VITALS — WEIGHT: 199.6 LBS | BODY MASS INDEX: 35.37 KG/M2 | HEIGHT: 63 IN

## 2019-12-20 DIAGNOSIS — R63.5 ABNORMAL WEIGHT GAIN: Primary | ICD-10-CM

## 2019-12-20 PROCEDURE — RECHECK

## 2019-12-20 NOTE — PROGRESS NOTES
Weight Management Medical Nutrition Assessment  Acosta Edgar is here today for her 8th  of 12 RD bundle visits  Today her weight is 199 6 lbs  giving her a gain of 10 2 lbs in the last 8 weeks   She admits that she is struggling and has gone completely off track  She has not been tracking her food at all and has been overeating on carbs  Discussed portion sizes, and carb counting  She also has decided that she wants to go back to a partial meal plan  She will have 2 meal replacements, a bar, a low carb snack and one sensible meal       Anthropometric Measurements  Start Weight (lbs):  209 6 lbs  Current Weight (lbs):  199 6 lbs  TBW % Change from start weight:  5%  Ideal Body Weight (lbs):112 5 lbs  Goal Weight (lbs):140 lbs     Weight Loss History  Previous weight loss attempts: Commercial Programs (Weight Watchers, [place] [place], etc )  Self Created Diets (Portion Control, Healthy Food Choices, etc )     Food and Nutrition Related History  Wake up:  6am              Bed Time: 10pm     Food Recall  Breakfast: eggs and toast             Snack: not usually  Lunch: protein bar  Snack: sandwich  Dinner: chicken noodle soup (home made) 2 bowls  Snack: cookies     Beverages: water, 3 cups coffee  Volume of beverage intake: 24 oz      Weekends: Same  Cravings: none currently - usually sweets  Trouble area of day:afternoon     Frequency of Eating out: once per week  Food restrictions: carbs   Cooking: self   Food Shopping: self     Physical Activity Intake  Activity: none currently  Frequency: none  Physical limitations/barriers to exercise: none      Estimated Needs  Energy     [place] [place] Energy Needs:  BMR : 1507  9# loss weekly sedentary:  1158      1-2# loss weekly lightly active: 900 - 1400  Protein: 61 - 76      (1 2-1 5g/kg IBW)  Fluid:  60 oz    (35mL/kg IBW)     Nutrition Diagnosis  Yes;    Overweight/obesity  related to Excess energy intake as evidenced by  BMI more than normative standard for age and sex (obesity-grade II 35-39  9)     Nutrition Intervention     Nutrition Prescription  Calories: 1000 calories  Protein: 70 gr  Fluid: 80 oz      Meal Plan  Breakfast:  eggs, spinach  Snack:  Lunch: Shake or pudding   Snack: hard boiled egg or string chees  Dinner: shake  Snack: bar     Nutrition Education:    Calorie controlled menu  Healthy snack options  Portion sizes  Adequate hydration             Nutrition Counseling:  Strategies: meal planning, portion sizes, healthy snack choices, hydration, fiber intake, protein intake, exercise, food journal        Monitoring and Evaluation:  Evaluation criteria:  Energy Intake  Meet protein needs  Maintain adequate hydration  Monitor weekly weight  Meal planning/preparation  Food journal   Decreased portions at mealtimes and snacks  Physical activity      Barriers to learning:none  Readiness to change: Action  Comprehension: good  Expected Compliance: good

## 2019-12-24 DIAGNOSIS — E78.2 MIXED HYPERLIPIDEMIA: ICD-10-CM

## 2019-12-24 NOTE — TELEPHONE ENCOUNTER
Chief Complaint   Patient presents with   • Psychiatric Problem     ALLERGIES:   Allergen Reactions   • Dander RASH       Current Outpatient Medications   Medication Sig Dispense Refill   • escitalopram (LEXAPRO) 20 MG tablet Take 1 tablet by mouth daily. 90 tablet 0   • traZODone (DESYREL) 50 MG tablet Take 1-3 tablets by mouth nightly. Take 1 hour before bed time. 90 tablet 1     No current facility-administered medications for this visit.        Patient Active Problem List   Diagnosis   • Depression   • Body mass index (BMI) of 37.0-37.9 in adult       Past Medical History:   Diagnosis Date   • Depression 3/23/2018   • Depressive disorder        No past surgical history on file.    Social History     Socioeconomic History   • Marital status: Single     Spouse name: Not on file   • Number of children: Not on file   • Years of education: Not on file   • Highest education level: Not on file   Occupational History   • Not on file   Social Needs   • Financial resource strain: Not on file   • Food insecurity:     Worry: Not on file     Inability: Not on file   • Transportation needs:     Medical: Not on file     Non-medical: Not on file   Tobacco Use   • Smoking status: Never Smoker   • Smokeless tobacco: Never Used   • Tobacco comment: No longer Vapes   Substance and Sexual Activity   • Alcohol use: Yes     Comment: 3 peer week   • Drug use: No   • Sexual activity: Not Currently     Partners: Female   Lifestyle   • Physical activity:     Days per week: Not on file     Minutes per session: Not on file   • Stress: Not on file   Relationships   • Social connections:     Talks on phone: Not on file     Gets together: Not on file     Attends Ten[employer] service: Not on file     Active member of club or organization: Not on file     Attends meetings of clubs or organizations: Not on file     Relationship status: Not on file   • Intimate partner violence:     Fear of current or ex partner: Not on file     Emotionally abused:  lmovm to confirm colon 12/31/19 at [facility] Not on file     Physically abused: Not on file     Forced sexual activity: Not on file   Other Topics Concern   • Not on file   Social History Narrative   • Not on file       family history includes Asthma in his mother; Cancer, Lung in his maternal grandfather and maternal grandmother; Diabetes in his maternal uncle; Hypertension in his maternal uncle.    History of Present Illness:  Note 6/22/2019:  Patient presents to Clinic today for Depression  Note 8/11/2018:  Patient is present in clinic today 1 month f/u. See depression below  Note 7/7/2018:  Patient is present in clinic today for 3 month f/u.  1. Injections   2. Change of medication or dose change.  Note 3/23/2018:  Patient is present in clinic today for Establish care and ingrown toe nail and depression.  Toe nails bothering for quite some time.  Has had ingrown toenails before.    * Depression  Note 8/11/2018: The patient is feeling dramatically better on the 20 mg of Lexapro. Is also sleeping very well on the Desyrel and getting at least 8 hours nightly. He feels refreshed when awakened and has a good attitude about this today. Work is going well. He is much more relaxed.  Note 7/7/2018: This improved significantly for the first couple weeks on the 10 mg of Lexapro. However the effect began to fade and even though he is better than when he started Lexapro is not as well as the first couple weeks when on it. It seems reasonable to bump up the dose. Furthermore is having trouble sleeping and will try trazodone for this.  Note 3/23/2018:   No previous dx of depression or treatment of same.  Has been going on since Carlos year of HS.  Some suicidal ideation but never any follow thru because he wouldn't do that to his brothers.  Last time it was severe it was 6 months ago when considered cutting his wrist but never did it.  Mother with depression that has been bad in the past but better recently.  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  Going to work  keeps him going.  Sleeps poorly for a very long time.  Much difficulty going to sleep or oversleeping.  No trouble staying asleep when finally gets to sleep.  Appetite is decreased recently.  Did start a diet to lose weight 2 weeks ago and losing a bit of weight with that.  Concentration is ok.  Energy is avg.  Some irritability.  Has a long history of being withdrawn and difficulty maintaining friendships and romantic relationships.  Did well in HS until his senior year when no longer cared.  Nonsmoker and no illicit drugs.  Rare alcohol.      Review of Systems:  See History of Present Illness:    EXAM:  There were no vitals filed for this visit.  Well-developed well-nourished gentleman who is alert and oriented ×3 and in no acute distress. Mood and affect are normal, stream of speech and thought content are good. Memory judgment and insight are good.    Exam 7/7/18:  Alert, oriented x3/3 and in NAD (no acute distress).  Mood and affect are slightly depressed, stream of speech/thought content/memory/judgement/insight are all quite good.    Neck w/o (without) masses, lymph increase (supraclavicular or cervical) or thyromegaly. Trachea midline.  Chest clear to auscultation and unlabored breathing.  Heart rr (regular rate) w/o (without) mgr (murmur, gallop, rub).  Abdomen soft, nontender, nondistended, no hepatosplenomegaly or masses.  Extremities w/o (without) edema.      Most Recent Labs:  Office Visit on 03/23/2018   Component Date Value Ref Range Status   • WBC 03/23/2018 5.2  4.2 - 11.0 K/mcL Final   • RBC 03/23/2018 5.53  4.50 - 5.90 mil/mcL Final   • HGB 03/23/2018 16.3  13.0 - 17.0 g/dL Final   • HCT 03/23/2018 49.4  39.0 - 51.0 % Final   • MCV 03/23/2018 89.3  78.0 - 100.0 fl Final   • MCH 03/23/2018 29.5  26.0 - 34.0 pg Final   • MCHC 03/23/2018 33.0  32.0 - 36.5 g/dL Final   • RDW-CV 03/23/2018 13.0  11.0 - 15.0 % Final   • PLT 03/23/2018 233  140 - 450 K/mcL Final   • NRBC 03/23/2018 0  0 % Final   • DIFF  TYPE 03/23/2018 AUTOMATED DIFFERENTIAL   Final   • Neutrophil 03/23/2018 49  % Final   • LYMPH 03/23/2018 37  % Final   • MONO 03/23/2018 8  % Final   • EOSIN 03/23/2018 4  % Final   • BASO 03/23/2018 2  % Final   • Percent Immature Granuloctyes 03/23/2018 0  % Final   • Absolute Neutrophil 03/23/2018 2.6  1.8 - 7.7 K/mcL Final   • Absolute Lymph 03/23/2018 1.9  1.0 - 4.8 K/mcL Final   • Absolute Mono 03/23/2018 0.4  0.3 - 0.9 K/mcL Final   • Absolute Eos 03/23/2018 0.2  0.1 - 0.5 K/mcL Final   • Absolute Baso 03/23/2018 0.1  0.0 - 0.3 K/mcL Final   • Absolute Immature Granulocytes 03/23/2018 0.0  0 - 0.2 K/mcl Final   • Fasting Status 03/23/2018 12  hrs Final   • Sodium 03/23/2018 144  135 - 145 mmol/L Final   • Potassium 03/23/2018 3.9  3.4 - 5.1 mmol/L Final   • Chloride 03/23/2018 106  98 - 107 mmol/L Final   • Carbon Dioxide 03/23/2018 29  21 - 32 mmol/L Final   • Anion Gap 03/23/2018 13  10 - 20 mmol/L Final   • Glucose 03/23/2018 95  65 - 99 mg/dL Final   • BUN 03/23/2018 14  6 - 20 mg/dL Final   • Creatinine 03/23/2018 0.93  0.67 - 1.17 mg/dL Final   • GFR Estimate,  03/23/2018 >90   Final    eGFR results = or >90 mL/min/1.73m2 = Normal kidney function.   • GFR Estimate, Non  03/23/2018 >90   Final    eGFR results = or >90 mL/min/1.73m2 = Normal kidney function.   • BUN/Creatinine Ratio 03/23/2018 15  7 - 25 Final   • CALCIUM 03/23/2018 9.3  8.4 - 10.2 mg/dL Final   • TOTAL BILIRUBIN 03/23/2018 0.6  0.2 - 1.0 mg/dL Final   • AST/SGOT 03/23/2018 29  <38 Units/L Final   • ALT/SGPT 03/23/2018 77  <79 Units/L Final   • ALK PHOSPHATASE 03/23/2018 89  45 - 117 Units/L Final   • TOTAL PROTEIN 03/23/2018 7.5  6.4 - 8.2 g/dL Final   • Albumin 03/23/2018 4.4  3.6 - 5.1 g/dL Final   • GLOBULIN 03/23/2018 3.1  2.0 - 4.0 g/dL Final   • A/G Ratio, Serum 03/23/2018 1.4  1.0 - 2.4 Final   • FASTING STATUS 03/23/2018 12  hrs Final   • CHOLESTEROL 03/23/2018 142  <190 mg/dL Final    Comment:     Desirable            <190  Borderline High      190 to 224  High                 >=225        • CALCULATED LDL 03/23/2018 81  <120 mg/dL Final    Comment: Desirable         <120  Borderline High   120 to 159  High              >=160     • HDL 03/23/2018 34* >45 mg/dL Final    Comment: Low            <40  Borderline Low 40 to 45  Optimal        >45     • TRIGLYCERIDE 03/23/2018 135* <115 mg/dL Final    Comment: Desirable                <115  Borderline High          115 to 149  High                     >=150     • CALCULATED NON HDL 03/23/2018 108  <150 mg/dL Final    Comment: Desirable          <150  Borderline High    150 to 189  High               >=190     • CHOL/HDL 03/23/2018 4.2  <4.5 Final   • TSH 03/23/2018 1.788  0.350 - 5.000 mcUnits/mL Final   • T4, FREE 03/23/2018 0.8  0.8 - 1.5 ng/dL Final       Diagnoses at this visit include:  No diagnosis found.  Please see the diagnoses for this visit, medications ordered and continued, instructions, other orders and all planned follow up.   Medications prescribed and Orders from this visit:  No orders of the defined types were placed in this encounter.        There are no Patient Instructions on file for this visit.    Follow Up:  No follow-ups on file.

## 2019-12-26 DIAGNOSIS — K59.09 OTHER CONSTIPATION: ICD-10-CM

## 2019-12-26 RX ORDER — LINACLOTIDE 145 UG/1
1 CAPSULE, GELATIN COATED ORAL
Qty: 30 CAPSULE | Refills: 0 | Status: SHIPPED | OUTPATIENT
Start: 2019-12-26 | End: 2019-12-27 | Stop reason: SDUPTHER

## 2019-12-26 RX ORDER — EZETIMIBE 10 MG/1
TABLET ORAL
Qty: 15 TABLET | Refills: 24 | Status: SHIPPED | OUTPATIENT
Start: 2019-12-26 | End: 2020-03-26 | Stop reason: SDUPTHER

## 2019-12-26 RX ORDER — LINACLOTIDE 145 UG/1
CAPSULE, GELATIN COATED ORAL
Qty: 90 CAPSULE | Refills: 4 | OUTPATIENT
Start: 2019-12-26

## 2019-12-27 DIAGNOSIS — K59.09 OTHER CONSTIPATION: ICD-10-CM

## 2019-12-27 RX ORDER — LINACLOTIDE 145 UG/1
1 CAPSULE, GELATIN COATED ORAL
Qty: 90 CAPSULE | Refills: 2 | Status: SHIPPED | OUTPATIENT
Start: 2019-12-27 | End: 2020-11-29

## 2019-12-28 ENCOUNTER — APPOINTMENT (OUTPATIENT)
Dept: LAB | Facility: CLINIC | Age: 69
End: 2019-12-28
Payer: COMMERCIAL

## 2019-12-28 DIAGNOSIS — Z79.4 CONTROLLED TYPE 2 DIABETES MELLITUS WITHOUT COMPLICATION, WITH LONG-TERM CURRENT USE OF INSULIN (HCC): ICD-10-CM

## 2019-12-28 DIAGNOSIS — E11.9 CONTROLLED TYPE 2 DIABETES MELLITUS WITHOUT COMPLICATION, WITH LONG-TERM CURRENT USE OF INSULIN (HCC): ICD-10-CM

## 2019-12-28 DIAGNOSIS — E78.5 DYSLIPIDEMIA: ICD-10-CM

## 2019-12-28 LAB
ALBUMIN SERPL BCP-MCNC: 3.7 G/DL (ref 3.5–5)
ALP SERPL-CCNC: 63 U/L (ref 46–116)
ALT SERPL W P-5'-P-CCNC: 34 U/L (ref 12–78)
ANION GAP SERPL CALCULATED.3IONS-SCNC: 5 MMOL/L (ref 4–13)
AST SERPL W P-5'-P-CCNC: 18 U/L (ref 5–45)
BASOPHILS # BLD AUTO: 0.06 THOUSANDS/ΜL (ref 0–0.1)
BASOPHILS NFR BLD AUTO: 1 % (ref 0–1)
BILIRUB SERPL-MCNC: 0.43 MG/DL (ref 0.2–1)
BUN SERPL-MCNC: 20 MG/DL (ref 5–25)
CALCIUM SERPL-MCNC: 9.2 MG/DL (ref 8.3–10.1)
CHLORIDE SERPL-SCNC: 108 MMOL/L (ref 100–108)
CHOLEST SERPL-MCNC: 158 MG/DL (ref 50–200)
CO2 SERPL-SCNC: 28 MMOL/L (ref 21–32)
CREAT SERPL-MCNC: 0.97 MG/DL (ref 0.6–1.3)
EOSINOPHIL # BLD AUTO: 0.14 THOUSAND/ΜL (ref 0–0.61)
EOSINOPHIL NFR BLD AUTO: 2 % (ref 0–6)
ERYTHROCYTE [DISTWIDTH] IN BLOOD BY AUTOMATED COUNT: 13.2 % (ref 11.6–15.1)
EST. AVERAGE GLUCOSE BLD GHB EST-MCNC: 128 MG/DL
GFR SERPL CREATININE-BSD FRML MDRD: 59 ML/MIN/1.73SQ M
GLUCOSE P FAST SERPL-MCNC: 122 MG/DL (ref 65–99)
HBA1C MFR BLD: 6.1 % (ref 4.2–6.3)
HCT VFR BLD AUTO: 39.7 % (ref 34.8–46.1)
HDLC SERPL-MCNC: 59 MG/DL
HGB BLD-MCNC: 12.7 G/DL (ref 11.5–15.4)
IMM GRANULOCYTES # BLD AUTO: 0.01 THOUSAND/UL (ref 0–0.2)
IMM GRANULOCYTES NFR BLD AUTO: 0 % (ref 0–2)
LDLC SERPL CALC-MCNC: 64 MG/DL (ref 0–100)
LYMPHOCYTES # BLD AUTO: 3.17 THOUSANDS/ΜL (ref 0.6–4.47)
LYMPHOCYTES NFR BLD AUTO: 43 % (ref 14–44)
MCH RBC QN AUTO: 28.9 PG (ref 26.8–34.3)
MCHC RBC AUTO-ENTMCNC: 32 G/DL (ref 31.4–37.4)
MCV RBC AUTO: 90 FL (ref 82–98)
MONOCYTES # BLD AUTO: 0.43 THOUSAND/ΜL (ref 0.17–1.22)
MONOCYTES NFR BLD AUTO: 6 % (ref 4–12)
NEUTROPHILS # BLD AUTO: 3.6 THOUSANDS/ΜL (ref 1.85–7.62)
NEUTS SEG NFR BLD AUTO: 48 % (ref 43–75)
NONHDLC SERPL-MCNC: 99 MG/DL
NRBC BLD AUTO-RTO: 0 /100 WBCS
PLATELET # BLD AUTO: 269 THOUSANDS/UL (ref 149–390)
PMV BLD AUTO: 9.7 FL (ref 8.9–12.7)
POTASSIUM SERPL-SCNC: 4 MMOL/L (ref 3.5–5.3)
PROT SERPL-MCNC: 7.3 G/DL (ref 6.4–8.2)
RBC # BLD AUTO: 4.39 MILLION/UL (ref 3.81–5.12)
SODIUM SERPL-SCNC: 141 MMOL/L (ref 136–145)
TRIGL SERPL-MCNC: 175 MG/DL
WBC # BLD AUTO: 7.41 THOUSAND/UL (ref 4.31–10.16)

## 2019-12-28 PROCEDURE — 83036 HEMOGLOBIN GLYCOSYLATED A1C: CPT

## 2019-12-28 PROCEDURE — 80061 LIPID PANEL: CPT

## 2019-12-28 PROCEDURE — 80053 COMPREHEN METABOLIC PANEL: CPT

## 2019-12-28 PROCEDURE — 36415 COLL VENOUS BLD VENIPUNCTURE: CPT

## 2019-12-28 PROCEDURE — 85025 COMPLETE CBC W/AUTO DIFF WBC: CPT

## 2019-12-30 ENCOUNTER — OFFICE VISIT (OUTPATIENT)
Dept: BARIATRICS | Facility: CLINIC | Age: 69
End: 2019-12-30

## 2019-12-30 ENCOUNTER — OFFICE VISIT (OUTPATIENT)
Dept: INTERNAL MEDICINE CLINIC | Facility: CLINIC | Age: 69
End: 2019-12-30
Payer: COMMERCIAL

## 2019-12-30 ENCOUNTER — ANESTHESIA EVENT (OUTPATIENT)
Dept: GASTROENTEROLOGY | Facility: HOSPITAL | Age: 69
End: 2019-12-30

## 2019-12-30 VITALS
HEIGHT: 63 IN | SYSTOLIC BLOOD PRESSURE: 174 MMHG | DIASTOLIC BLOOD PRESSURE: 88 MMHG | BODY MASS INDEX: 35.19 KG/M2 | HEART RATE: 85 BPM | WEIGHT: 198.6 LBS | OXYGEN SATURATION: 98 %

## 2019-12-30 DIAGNOSIS — R63.5 ABNORMAL WEIGHT GAIN: Primary | ICD-10-CM

## 2019-12-30 DIAGNOSIS — E11.9 CONTROLLED TYPE 2 DIABETES MELLITUS WITHOUT COMPLICATION, WITH LONG-TERM CURRENT USE OF INSULIN (HCC): ICD-10-CM

## 2019-12-30 DIAGNOSIS — I10 BENIGN ESSENTIAL HYPERTENSION: ICD-10-CM

## 2019-12-30 DIAGNOSIS — K21.9 GASTROESOPHAGEAL REFLUX DISEASE, ESOPHAGITIS PRESENCE NOT SPECIFIED: Primary | ICD-10-CM

## 2019-12-30 DIAGNOSIS — J45.909 ASTHMA, UNSPECIFIED ASTHMA SEVERITY, UNSPECIFIED WHETHER COMPLICATED, UNSPECIFIED WHETHER PERSISTENT: ICD-10-CM

## 2019-12-30 DIAGNOSIS — E03.9 ACQUIRED HYPOTHYROIDISM: ICD-10-CM

## 2019-12-30 DIAGNOSIS — Z79.4 CONTROLLED TYPE 2 DIABETES MELLITUS WITHOUT COMPLICATION, WITH LONG-TERM CURRENT USE OF INSULIN (HCC): ICD-10-CM

## 2019-12-30 DIAGNOSIS — Z12.39 SCREENING FOR BREAST CANCER: ICD-10-CM

## 2019-12-30 PROCEDURE — 99214 OFFICE O/P EST MOD 30 MIN: CPT | Performed by: INTERNAL MEDICINE

## 2019-12-30 PROCEDURE — 3008F BODY MASS INDEX DOCD: CPT | Performed by: INTERNAL MEDICINE

## 2019-12-30 PROCEDURE — RECHECK

## 2019-12-30 PROCEDURE — 1160F RVW MEDS BY RX/DR IN RCRD: CPT | Performed by: INTERNAL MEDICINE

## 2019-12-30 NOTE — PROGRESS NOTES
Weight Management Medical Nutrition Assessment  Yaakov Jimenez is here today for her 9th  of 12 RD bundle visits  Today her weight is 199 6 lbs which is exactly the same as she was 2 weeks ago  She continues to struggle with trying to get back on track  She is still taking classes and working full time so she is having a hard time with fitting exercise and meal prep into her schedule  Provided her with a healthy snack list, and she did order product so that she can have a shake for breakfast, and one at dinner - she is able to pack a healthy lunch to take to work  Anthropometric Measurements  Start Weight (lbs):  209 6 lbs  Current Weight (lbs):  199 6 lbs  TBW % Change from start weight:  5%  Ideal Body Weight (lbs):112 5 lbs  Goal Weight (lbs):140 lbs     Weight Loss History  Previous weight loss attempts: Commercial Programs (Weight Watchers, [employer], etc )  Self Created Diets (Portion Control, Healthy Food Choices, etc )     Food and Nutrition Related History  Wake up:  6am              Bed Time: 10pm     Food Recall  Breakfast: eggs and toast             Snack: not usually  Lunch: protein bar  Snack: sandwich  Dinner: chicken noodle soup (home made) 2 bowls  Snack: cookies     Beverages: water, 3 cups coffee  Volume of beverage intake: 24 oz      Weekends: Same  Cravings: none currently - usually sweets  Trouble area of day:afternoon     Frequency of Eating out: once per week  Food restrictions: carbs   Cooking: self   Food Shopping: self     Physical Activity Intake  Activity: none currently  Frequency: none  Physical limitations/barriers to exercise: none      Estimated Needs  Energy     Bear Jamar Energy Needs:  BMR : 4890  6# loss weekly sedentary:  1158      1-2# loss weekly lightly active: 900 - 1400  Protein: 61 - 76      (1 2-1 5g/kg IBW)  Fluid:  60 oz    (35mL/kg IBW)     Nutrition Diagnosis  Yes;    Overweight/obesity  related to Excess energy intake as evidenced by  BMI more than normative standard for age and sex (obesity-grade II 32-38  9)     Nutrition Intervention     Nutrition Prescription  Calories: 1000 calories  Protein: 70 gr  Fluid: 80 oz      Meal Plan  Breakfast:  eggs, spinach  Snack:  Lunch: Shake or pudding   Snack: hard boiled egg or string chees  Dinner: shake  Snack: bar     Nutrition Education:    Calorie controlled menu  Healthy snack options  Portion sizes  Adequate hydration             Nutrition Counseling:  Strategies: meal planning, portion sizes, healthy snack choices, hydration, fiber intake, protein intake, exercise, food journal        Monitoring and Evaluation:  Evaluation criteria:  Energy Intake  Meet protein needs  Maintain adequate hydration  Monitor weekly weight  Meal planning/preparation  Food journal   Decreased portions at mealtimes and snacks  Physical activity      Barriers to learning:none  Readiness to change: Action  Comprehension: good  Expected Compliance: good

## 2019-12-30 NOTE — PROGRESS NOTES
Assessment/Plan:  Regarding diabetes is well controlled with an A1c of 6 1  Renal function normal with a creatinine is 0 97  Triglycerides up a little bit at 170s 5 but LDL cholesterol 64  No cardiac complaints  She is scheduled for her colonoscopy tomorrow  She will follow up with gynecology and get a mammogram     Malissa Barnettmckayla rojas in 4 months  1  Gastroesophageal reflux disease, esophagitis presence not specified     2  Controlled type 2 diabetes mellitus without complication, with long-term current use of insulin ([facility])  Microalbumin / creatinine urine ratio    Comprehensive metabolic panel    HEMOGLOBIN A1C W/ EAG ESTIMATION   3  Asthma, unspecified asthma severity, unspecified whether complicated, unspecified whether persistent     4  Benign essential hypertension  Lipid panel   5  Screening for breast cancer  Mammo screening bilateral w 3d & cad   6  Acquired hypothyroidism  T4, free    TSH, 3rd generation       Orders Placed This Encounter   Procedures    Mammo screening bilateral w 3d & cad    Microalbumin / creatinine urine ratio    Comprehensive metabolic panel    HEMOGLOBIN A1C W/ EAG ESTIMATION    Lipid panel    T4, free    TSH, 3rd generation         Subjective:  She is generally feeling well  No cardiopulmonary complaints  Blood sugars under control with an A1c of 6 1  Up-to-date on health maintenance issues  Colonoscopy due tomorrow  Positive history of uterine cancer  Is followed by a gynecology Oncology  Patient ID: Suad Goodman is a 68 y o  female      HPI    The following portions of the patient's history were reviewed and updated as appropriate:   She has a past medical history of Abnormal mammogram, Abnormal weight gain, Achilles tendinitis, Angina pectoris ([address] ), Asthma, COPD (chronic obstructive pulmonary disease) ([address] ), Diabetes mellitus ([address] ), Disc degeneration, lumbar, Disease of thyroid gland, Dyslipidemia, Early satiety, Edema, Enthesopathy, Esophagitis, reflux, GERD (gastroesophageal reflux disease), Hard to intubate, Hypercholesterolemia, Hypertension, IBS (irritable bowel syndrome), Irritable bowel syndrome, Osteoarthritis, Rosacea, and Sacroiliitis ([facility] [address] )  ,  does not have any pertinent problems on file  ,   has a past surgical history that includes  section; Foot surgery (Right); SHOULDER ARTHROPLASTY; Sinus surgery; Tonsillectomy; Replacement total knee (Right); Tubal ligation; Wrist surgery; Elbow surgery; Knee arthroscopy w/ meniscal repair (Right); Colonoscopy; Knee arthroscopy; Joint replacement (Left); Fracture surgery; Fracture surgery (Left); Esophagogastroduodenoscopy; pr laparoscopy w tot hysterectuterus <=250 gram  w tube/ovary (N/A, 2018); and Hysterectomy (Bilateral, 2018)  ,  family history includes Arthritis in her mother; Brain cancer in her maternal grandmother; Breast cancer in her maternal aunt and maternal grandmother; Breast cancer (age of onset: 77) in her mother; Diabetes in her mother; Heart disease in her father and mother; Hiatal hernia in her child, father, mother, and sister; Hypertension in her mother; Irritable bowel syndrome in her daughter and mother; Lung cancer in her sister; Salome Higginsi Hypertension in her son; No Known Problems in her brother, maternal grandfather, paternal grandfather, and paternal grandmother; Osteoporosis in her mother; Other in her child, father, mother, and sister; Stroke in her maternal aunt and mother; Thyroid disease in her mother and sister; Ulcers in her father; Uterine cancer in her maternal aunt and mother  ,   reports that she has never smoked  She has never used smokeless tobacco  She reports that she drinks about 3 0 standard drinks of alcohol per week  She reports that she does not use drugs  ,  is allergic to amoxicillin-pot clavulanate; erythromycin; meperidine; morphine; nabumetone; penicillins; darvon [propoxyphene]; methocarbamol; reglan [metoclopramide]; sulfa antibiotics; and tetracyclines & related       Current Outpatient Medications:     aspirin (ASPIR-81) 81 mg EC tablet, Take by mouth, Disp: , Rfl:     cetirizine (ZyrTEC) 10 MG chewable tablet, Chew 10 mg daily, Disp: , Rfl:     clindamycin (CLEOCIN) 150 mg capsule, Take 150 mg by mouth 4 (four) times a day, Disp: , Rfl: 0    clotrimazole-betamethasone (LOTRISONE) 1-0 05 % cream, APPLY TOPICALLY DAILY AT BEDTIME AS NEEDED FOR ITCHING, Disp: 45 g, Rfl: 1    ezetimibe (ZETIA) 10 mg tablet, TAKE 1 TABLET DAILY, Disp: 15 tablet, Rfl: 24    furosemide (LASIX) 20 mg tablet, TAKE 1 TABLET DAILY, Disp: 90 tablet, Rfl: 3    glucose blood test strip, FreeStyle Lite Strips, Disp: , Rfl:     insulin lispro (HUMALOG) 100 units/mL injection, Inject under the skin Only when on steroids or with a sick day , Disp: , Rfl:     Insulin Syringe-Needle U-100 (INSULIN SYRINGE 1CC/31GX5/16") 31G X 5/16" 1 ML MISC, BD Veo Insulin Syringe Ultra-Fine 0 3 mL 31 gauge x 15/64", Disp: , Rfl:     KRILL OIL PO, Take by mouth, Disp: , Rfl:     levothyroxine 75 mcg tablet, TAKE 1 TABLET DAILY, Disp: 90 tablet, Rfl: 3    LINZESS 145 MCG CAPS, Take 1 capsule (145 mcg total) by mouth daily before breakfast Take 30 minutes before, Disp: 90 capsule, Rfl: 2    Magnesium Oxide 400 MG CAPS, Take by mouth, Disp: , Rfl:     meloxicam (MOBIC) 15 mg tablet, Take 1 tablet (15 mg total) by mouth daily as needed (P r n  pain), Disp: 90 tablet, Rfl: 3    metFORMIN (GLUCOPHAGE-XR) 500 mg 24 hr tablet, TAKE 1 TABLET IN THE MORNING AND 2 TABLETS IN THE EVENING (Patient taking differently: 1,500 mg TAKE 1 TABLET IN THE MORNING AND 2 TABLETS IN THE EVENING), Disp: 270 tablet, Rfl: 3    olmesartan (BENICAR) 20 mg tablet, Take 1 tablet (20 mg total) by mouth daily, Disp: 90 tablet, Rfl: 3    Omega-3 Fatty Acids (FISH OIL) 1200 MG CAPS, Take by mouth, Disp: , Rfl:     pantoprazole (PROTONIX) 40 mg tablet, Take 1 tablet (40 mg total) by mouth daily, Disp: 90 tablet, Rfl: 3    potassium chloride (MICRO-K) 10 MEQ CR capsule, TAKE 1 CAPSULE DAILY, Disp: 90 capsule, Rfl: 3    rosuvastatin (CRESTOR) 20 MG tablet, TAKE 1 TABLET DAILY, Disp: 90 tablet, Rfl: 4    saccharomyces boulardii (FLORASTOR) 250 mg capsule, Take 250 mg by mouth 3 (three) times a day, Disp: , Rfl:     Current Facility-Administered Medications:     albuterol inhalation solution 2 5 mg, 2 5 mg, Nebulization, Q6H PRN, Dandre Bhakta MD    Review of Systems   Constitutional: Positive for unexpected weight change  Negative for activity change, appetite change, chills, diaphoresis, fatigue and fever  Moderately overweight  HENT: Negative for congestion, ear pain, hearing loss, mouth sores, nosebleeds, postnasal drip, sinus pressure, sinus pain, sore throat and trouble swallowing  Eyes: Negative for pain, discharge and visual disturbance  Respiratory: Negative for apnea, cough, chest tightness, shortness of breath and wheezing  Asthma is under good control  Cardiovascular: Negative for chest pain, palpitations and leg swelling  Gastrointestinal: Negative for abdominal pain, anal bleeding, blood in stool, constipation, diarrhea, nausea and vomiting  Endocrine: Negative for polydipsia and polyphagia  Genitourinary: Negative for decreased urine volume, dysuria, flank pain, frequency, hematuria and urgency  History of endometrial cancer  Musculoskeletal: Negative for arthralgias, back pain, gait problem, joint swelling and myalgias  Skin: Negative for rash and wound  Allergic/Immunologic: Negative for environmental allergies and food allergies  Neurological: Negative for dizziness, tremors, seizures, syncope, speech difficulty, light-headedness, numbness and headaches  Hematological: Negative for adenopathy  Does not bruise/bleed easily  Psychiatric/Behavioral: Negative for agitation, confusion, hallucinations, sleep disturbance and suicidal ideas   The patient is not nervous/anxious  Objective:  BP (!) 174/88 (BP Location: Left arm, Patient Position: Sitting, Cuff Size: Standard)   Pulse 85   Ht 5' 2 5" (1 588 m)   Wt 90 1 kg (198 lb 9 6 oz)   SpO2 98%   BMI 35 75 kg/m²      Physical Exam   Constitutional: She appears well-developed  No distress  Moderately overweight  Blood pressure is 118/76  Heart rhythm is regular  Rate is 18  HENT:   Head: Normocephalic  Right Ear: External ear normal    Left Ear: External ear normal    Nose: Nose normal    Mouth/Throat: Oropharynx is clear and moist  No oropharyngeal exudate  Eyes: Pupils are equal, round, and reactive to light  Conjunctivae and EOM are normal  Right eye exhibits no discharge  Left eye exhibits no discharge  Neck: Normal range of motion  Neck supple  No thyromegaly present  Cardiovascular: Normal rate, regular rhythm, normal heart sounds and intact distal pulses  Exam reveals no gallop and no friction rub  Pulses are no weak pulses  No murmur heard  Pulses:       Dorsalis pedis pulses are 2+ on the right side, and 2+ on the left side  Posterior tibial pulses are 2+ on the left side  Pulmonary/Chest: Effort normal and breath sounds normal  No respiratory distress  She has no wheezes  She has no rales  Abdominal: Soft  Bowel sounds are normal  She exhibits no distension and no mass  There is no tenderness  There is no rebound and no guarding  Abdomen is moderately overweight  Musculoskeletal: Normal range of motion  She exhibits no edema, tenderness or deformity  Feet:   Right Foot:   Skin Integrity: Negative for ulcer, skin breakdown, erythema, warmth, callus or dry skin  Left Foot:   Skin Integrity: Negative for ulcer, skin breakdown, erythema, warmth, callus or dry skin  Lymphadenopathy:     She has no cervical adenopathy  Neurological: She is alert  She has normal reflexes  She displays normal reflexes  No cranial nerve deficit   Coordination normal    Skin: Skin is warm and dry  No rash noted  No erythema  Psychiatric: She has a normal mood and affect  Her behavior is normal  Judgment and thought content normal    Nursing note and vitals reviewed  Patient's shoes and socks removed  Right Foot/Ankle   Right Foot Inspection  Skin Exam: skin normal and skin intact no dry skin, no warmth, no callus, no erythema, no maceration, no abnormal color, no pre-ulcer, no ulcer and no callus                          Toe Exam: ROM and strength within normal limits  Sensory     Proprioception: intact   Monofilament testing: intact  Vascular    The right DP pulse is 2+  Left Foot/Ankle  Left Foot Inspection  Skin Exam: skin normal and skin intactno dry skin, no warmth, no erythema, no maceration, normal color, no pre-ulcer, no ulcer and no callus                         Toe Exam: ROM and strength within normal limits                   Sensory     Proprioception: intact  Monofilament: intact  Vascular    The left DP pulse is 2+  The left PT pulse is 2+  Assign Risk Category:  No deformity present; No loss of protective sensation;  No weak pulses       Risk: 0      Recent Results (from the past 1008 hour(s))   CBC and differential    Collection Time: 12/28/19  9:41 AM   Result Value Ref Range    WBC 7 41 4 31 - 10 16 Thousand/uL    RBC 4 39 3 81 - 5 12 Million/uL    Hemoglobin 12 7 11 5 - 15 4 g/dL    Hematocrit 39 7 34 8 - 46 1 %    MCV 90 82 - 98 fL    MCH 28 9 26 8 - 34 3 pg    MCHC 32 0 31 4 - 37 4 g/dL    RDW 13 2 11 6 - 15 1 %    MPV 9 7 8 9 - 12 7 fL    Platelets 583 931 - 449 Thousands/uL    nRBC 0 /100 WBCs    Neutrophils Relative 48 43 - 75 %    Immat GRANS % 0 0 - 2 %    Lymphocytes Relative 43 14 - 44 %    Monocytes Relative 6 4 - 12 %    Eosinophils Relative 2 0 - 6 %    Basophils Relative 1 0 - 1 %    Neutrophils Absolute 3 60 1 85 - 7 62 Thousands/µL    Immature Grans Absolute 0 01 0 00 - 0 20 Thousand/uL    Lymphocytes Absolute 3 17 0 60 - 4 47 Thousands/µL    Monocytes Absolute 0 43 0 17 - 1 22 Thousand/µL    Eosinophils Absolute 0 14 0 00 - 0 61 Thousand/µL    Basophils Absolute 0 06 0 00 - 0 10 Thousands/µL   Comprehensive metabolic panel    Collection Time: 12/28/19  9:41 AM   Result Value Ref Range    Sodium 141 136 - 145 mmol/L    Potassium 4 0 3 5 - 5 3 mmol/L    Chloride 108 100 - 108 mmol/L    CO2 28 21 - 32 mmol/L    ANION GAP 5 4 - 13 mmol/L    BUN 20 5 - 25 mg/dL    Creatinine 0 97 0 60 - 1 30 mg/dL    Glucose, Fasting 122 (H) 65 - 99 mg/dL    Calcium 9 2 8 3 - 10 1 mg/dL    AST 18 5 - 45 U/L    ALT 34 12 - 78 U/L    Alkaline Phosphatase 63 46 - 116 U/L    Total Protein 7 3 6 4 - 8 2 g/dL    Albumin 3 7 3 5 - 5 0 g/dL    Total Bilirubin 0 43 0 20 - 1 00 mg/dL    eGFR 59 ml/min/1 73sq m   HEMOGLOBIN A1C W/ EAG ESTIMATION    Collection Time: 12/28/19  9:41 AM   Result Value Ref Range    Hemoglobin A1C 6 1 4 2 - 6 3 %     mg/dl   Lipid panel    Collection Time: 12/28/19  9:41 AM   Result Value Ref Range    Cholesterol 158 50 - 200 mg/dL    Triglycerides 175 (H) <=150 mg/dL    HDL, Direct 59 >=40 mg/dL    LDL Calculated 64 0 - 100 mg/dL    Non-HDL-Chol (CHOL-HDL) 99 mg/dl

## 2019-12-31 ENCOUNTER — ANESTHESIA (OUTPATIENT)
Dept: GASTROENTEROLOGY | Facility: HOSPITAL | Age: 69
End: 2019-12-31

## 2019-12-31 ENCOUNTER — HOSPITAL ENCOUNTER (OUTPATIENT)
Dept: GASTROENTEROLOGY | Facility: HOSPITAL | Age: 69
Setting detail: OUTPATIENT SURGERY
Discharge: HOME | End: 2019-12-31
Attending: INTERNAL MEDICINE
Payer: COMMERCIAL

## 2019-12-31 VITALS
RESPIRATION RATE: 20 BRPM | HEIGHT: 63 IN | DIASTOLIC BLOOD PRESSURE: 69 MMHG | BODY MASS INDEX: 34.53 KG/M2 | WEIGHT: 194.89 LBS | SYSTOLIC BLOOD PRESSURE: 109 MMHG | HEART RATE: 75 BPM | OXYGEN SATURATION: 96 %

## 2019-12-31 DIAGNOSIS — Z12.11 SCREEN FOR COLON CANCER: ICD-10-CM

## 2019-12-31 LAB — GLUCOSE SERPL-MCNC: 148 MG/DL (ref 65–140)

## 2019-12-31 PROCEDURE — 82948 REAGENT STRIP/BLOOD GLUCOSE: CPT

## 2019-12-31 PROCEDURE — G0105 COLORECTAL SCRN; HI RISK IND: HCPCS | Performed by: INTERNAL MEDICINE

## 2019-12-31 RX ORDER — SODIUM CHLORIDE, SODIUM LACTATE, POTASSIUM CHLORIDE, CALCIUM CHLORIDE 600; 310; 30; 20 MG/100ML; MG/100ML; MG/100ML; MG/100ML
125 INJECTION, SOLUTION INTRAVENOUS CONTINUOUS
Status: DISCONTINUED | OUTPATIENT
Start: 2019-12-31 | End: 2020-01-04 | Stop reason: HOSPADM

## 2019-12-31 RX ORDER — PROPOFOL 10 MG/ML
INJECTION, EMULSION INTRAVENOUS AS NEEDED
Status: DISCONTINUED | OUTPATIENT
Start: 2019-12-31 | End: 2019-12-31 | Stop reason: SURG

## 2019-12-31 RX ADMIN — PROPOFOL 30 MG: 10 INJECTION, EMULSION INTRAVENOUS at 07:25

## 2019-12-31 RX ADMIN — PROPOFOL 50 MG: 10 INJECTION, EMULSION INTRAVENOUS at 07:21

## 2019-12-31 RX ADMIN — SODIUM CHLORIDE, SODIUM LACTATE, POTASSIUM CHLORIDE, AND CALCIUM CHLORIDE: .6; .31; .03; .02 INJECTION, SOLUTION INTRAVENOUS at 06:56

## 2019-12-31 RX ADMIN — PROPOFOL 100 MG: 10 INJECTION, EMULSION INTRAVENOUS at 07:18

## 2019-12-31 NOTE — ANESTHESIA PREPROCEDURE EVALUATION
Review of Systems/Medical History  Patient summary reviewed  Chart reviewed  History of anesthetic complications difficult airway    Cardiovascular  Hyperlipidemia, Hypertension controlled, Angina with exertion,    Pulmonary  COPD , Asthma , well controlled/ stable , Shortness of breath,        GI/Hepatic    GERD ,        Negative  ROS        Endo/Other  Diabetes well controlled type 1 Insulin, History of thyroid disease , hypothyroidism,      GYN  Negative gynecology ROS          Hematology  Negative hematology ROS      Musculoskeletal    Arthritis     Neurology  Negative neurology ROS      Psychology   Negative psychology ROS              Physical Exam    Airway    Mallampati score: III  TM Distance: <3 FB  Neck ROM: limited     Dental   No notable dental hx     Cardiovascular  Rhythm: regular, Rate: normal, Cardiovascular exam normal    Pulmonary  Pulmonary exam normal Breath sounds clear to auscultation,     Other Findings        Anesthesia Plan  ASA Score- 3     Anesthesia Type- IV sedation with anesthesia with ASA Monitors  Additional Monitors:   Airway Plan:         Plan Factors-    Induction- intravenous  Postoperative Plan- Plan for postoperative opioid use  Informed Consent- Anesthetic plan and risks discussed with patient and daughter  I personally reviewed this patient with the CRNA  Discussed and agreed on the Anesthesia Plan with the CRNA  Gus Godwin

## 2019-12-31 NOTE — DISCHARGE INSTRUCTIONS
Colonoscopy   WHAT YOU NEED TO KNOW:   A colonoscopy is a procedure to examine the inside of your colon (intestine) with a scope  Polyps or tissue growths may have been removed during your colonoscopy  It is normal to feel bloated and to have some abdominal discomfort  You should be passing gas  If you have hemorrhoids or you had polyps removed, you may have a small amount of bleeding  DISCHARGE INSTRUCTIONS:   Seek care immediately if:   · You have a large amount of bright red blood in your bowel movements  · Your abdomen is hard and firm and you have severe pain  · You have sudden trouble breathing  Contact your healthcare provider if:   · You develop a rash or hives  · You have a fever within 24 hours of your procedure       · You have not had a bowel movement for 3 days after your procedure  · You have questions or concerns about your condition or care  Activity:   · Do not lift, strain, or run  for 3 days after your procedure  · Rest after your procedure  You have been given medicine to relax you  Do not  drive or make important decisions until the day after your procedure  Return to your normal activity as directed  · Relieve gas and discomfort from bloating  by lying on your right side with a heating pad on your abdomen  You may need to take short walks to help the gas move out  Eat small meals until bloating is relieved  If you had polyps removed: For 7 days after your procedure:  · Do not  take aspirin  · Do not  go on long car rides  Follow up with your healthcare provider as directed:  Write down your questions so you remember to ask them during your visits  © 2017 [employer] is for End User's use only and may not be sold, redistributed or otherwise used for commercial purposes  All illustrations and images included in CareNotes® are the copyrighted property of [employer]  or [employer]    The above information is an  only  It is not intended as medical advice for individual conditions or treatments  Talk to your doctor, nurse or pharmacist before following any medical regimen to see if it is safe and effective for you

## 2019-12-31 NOTE — H&P
History and Physical -  [facility]  Umang Adams 68 y o  female MRN: [id number]                  HPI: Umang Adams is a 68y o  year old female who presents for colonoscopy for history of colon polyps  Last colonoscopy 5 years ago  REVIEW OF SYSTEMS: Per the HPI, and otherwise unremarkable      Historical Information   Past Medical History:   Diagnosis Date    Abnormal mammogram     Abnormal weight gain     Achilles tendinitis     Asthma     Diabetes mellitus ([address] )     Disc degeneration, lumbar     Disease of thyroid gland     hypo    Dyslipidemia     Early satiety     Edema     idiopathic peripheral    Enthesopathy     hip region    Esophagitis, reflux     GERD (gastroesophageal reflux disease)     Hard to intubate     difficulty with intubation and had to be intubated twice    Hypercholesterolemia     Hypertension     IBS (irritable bowel syndrome)     Irritable bowel syndrome     Osteoarthritis     Rosacea     Sacroiliitis (HCC)      Past Surgical History:   Procedure Laterality Date     SECTION      x2    COLONOSCOPY      ELBOW SURGERY      left ulna/radius    ESOPHAGOGASTRODUODENOSCOPY      FOOT SURGERY Right     FRACTURE SURGERY      FRACTURE SURGERY Left     tibia    HYSTERECTOMY Bilateral 2018    JOINT REPLACEMENT Left     shoulder,  right knee    KNEE ARTHROSCOPY      KNEE ARTHROSCOPY W/ MENISCAL REPAIR Right     OK LAPAROSCOPY W TOT HYSTERECTUTERUS <=250 GRAM  W TUBE/OVARY N/A 2018    Procedure: ROBOTIC TOTAL LAPAROSCOPIC HYSTERECTOMY, BILATERAL SALPINGOOPHORECTOMY, BILATERAL PELVIC LYMPHNODE DISSECTION;  Surgeon: Heather Hubbard MD;  Location: [facility];  Service: Gynecology Oncology    REPLACEMENT TOTAL KNEE Right     SHOULDER ARTHROPLASTY      SINUS SURGERY      TONSILLECTOMY      TUBAL LIGATION      WRIST SURGERY      ganglonic cyst     Social History   Social History     Substance and Sexual Activity Alcohol Use Yes    Alcohol/week: 3 0 standard drinks    Types: 3 Glasses of wine per week    Frequency: Monthly or less    Drinks per session: 1 or 2    Binge frequency: Never     Social History     Substance and Sexual Activity   Drug Use No     Social History     Tobacco Use   Smoking Status Never Smoker   Smokeless Tobacco Never Used     Family History   Problem Relation Age of Onset    Arthritis Mother     Heart disease Mother         cardiac disorder    Diabetes Mother     Hiatal hernia Mother     Hypertension Mother     Irritable bowel syndrome Mother     Breast cancer Mother 77        x2   [place] Uterine cancer Mother     Osteoporosis Mother     Thyroid disease Mother     Other Mother         gastric reflux    Stroke Mother     Heart disease Father         cardiac disorder    Hiatal hernia Father     Ulcers Father     Other Father         gastric reflux    Hiatal hernia Sister     Thyroid disease Sister     Other Sister         gastric reflux    Lung cancer Sister     No Known Problems Brother     Brain cancer Maternal Grandmother     Breast cancer Maternal Grandmother     No Known Problems Maternal Grandfather     No Known Problems Paternal Grandmother     No Known Problems Paternal Grandfather     Hiatal hernia Child     Other Child         gastric reflux    Irritable bowel syndrome Daughter     Breast cancer Maternal Aunt         x3 sis    Uterine cancer Maternal Aunt         x3 sis    Stroke Maternal Aunt     [place] Hypertension Son        Meds/Allergies       (Not in a hospital admission)    Allergies   Allergen Reactions    Amoxicillin-Pot Clavulanate Anaphylaxis, Hives, Itching and Facial Swelling    Erythromycin Hives, Itching, Swelling, Vomiting, Throat Swelling, Nasal Congestion and Facial Swelling    Meperidine Anaphylaxis    Morphine Hives, Itching, Swelling, Other (See Comments) and Syncope     hypotension    Penicillins Anaphylaxis, Itching and Swelling    Relafen [Nabumetone] Hives, Itching and Swelling    Darvon [Propoxyphene] Hives    Methocarbamol Other (See Comments)     Double vision    Reglan [Metoclopramide] Anxiety    Sulfa Antibiotics Hives, Itching, Rash and Swelling    Tetracyclines & Related Rash       Objective     Blood pressure 113/72, pulse 97, resp  rate 19, height 5' 2 5" (1 588 m), weight 88 4 kg (194 lb 14 2 oz), SpO2 97 %, not currently breastfeeding        PHYSICAL EXAM    Gen: NAD  CV: RRR  CHEST: Clear  ABD: soft, NT/ND  EXT: no edema  Neuro: AAO      ASSESSMENT/PLAN:  This is a 68y o  year old female here for history of colon polyps    PLAN:   Procedure:  Colonoscopy

## 2019-12-31 NOTE — ANESTHESIA POSTPROCEDURE EVALUATION
Post-Op Assessment Note    CV Status:  Stable  Pain Score: 0    Pain management: adequate     Mental Status:  Alert and awake   Hydration Status:  Stable   PONV Controlled:  None   Airway Patency:  Patent and adequate   Post Op Vitals Reviewed: Yes      Staff: Anesthesiologist, CRNA           BP   120/60   Temp      Pulse  70   Resp   18   SpO2   99%

## 2020-01-30 VITALS — BODY MASS INDEX: 34.27 KG/M2 | WEIGHT: 193.4 LBS | HEIGHT: 63 IN

## 2020-02-23 DIAGNOSIS — E03.9 ACQUIRED HYPOTHYROIDISM: ICD-10-CM

## 2020-02-23 DIAGNOSIS — I10 ESSENTIAL HYPERTENSION: ICD-10-CM

## 2020-02-23 DIAGNOSIS — M25.50 ARTHRALGIA, UNSPECIFIED JOINT: ICD-10-CM

## 2020-02-24 RX ORDER — PANTOPRAZOLE SODIUM 40 MG/1
TABLET, DELAYED RELEASE ORAL
Qty: 90 TABLET | Refills: 4 | Status: SHIPPED | OUTPATIENT
Start: 2020-02-24 | End: 2021-05-19 | Stop reason: SDUPTHER

## 2020-02-24 RX ORDER — LEVOTHYROXINE SODIUM 0.07 MG/1
TABLET ORAL
Qty: 90 TABLET | Refills: 4 | Status: SHIPPED | OUTPATIENT
Start: 2020-02-24 | End: 2021-05-19 | Stop reason: SDUPTHER

## 2020-02-24 RX ORDER — POTASSIUM CHLORIDE 750 MG/1
CAPSULE, EXTENDED RELEASE ORAL
Qty: 90 CAPSULE | Refills: 4 | Status: SHIPPED | OUTPATIENT
Start: 2020-02-24 | End: 2021-05-19 | Stop reason: SDUPTHER

## 2020-02-24 RX ORDER — FUROSEMIDE 20 MG/1
TABLET ORAL
Qty: 90 TABLET | Refills: 4 | Status: SHIPPED | OUTPATIENT
Start: 2020-02-24 | End: 2021-05-19 | Stop reason: SDUPTHER

## 2020-03-11 LAB
LEFT EYE DIABETIC RETINOPATHY: NORMAL
RIGHT EYE DIABETIC RETINOPATHY: NORMAL

## 2020-03-18 ENCOUNTER — OFFICE VISIT (OUTPATIENT)
Dept: GYNECOLOGIC ONCOLOGY | Facility: CLINIC | Age: 70
End: 2020-03-18
Payer: COMMERCIAL

## 2020-03-18 VITALS
BODY MASS INDEX: 36.14 KG/M2 | TEMPERATURE: 97.9 F | SYSTOLIC BLOOD PRESSURE: 114 MMHG | HEART RATE: 67 BPM | RESPIRATION RATE: 18 BRPM | WEIGHT: 204 LBS | HEIGHT: 63 IN | DIASTOLIC BLOOD PRESSURE: 72 MMHG

## 2020-03-18 DIAGNOSIS — Z85.42 HISTORY OF ENDOMETRIAL CANCER: Primary | ICD-10-CM

## 2020-03-18 PROCEDURE — 1160F RVW MEDS BY RX/DR IN RCRD: CPT | Performed by: OBSTETRICS & GYNECOLOGY

## 2020-03-18 PROCEDURE — 99213 OFFICE O/P EST LOW 20 MIN: CPT | Performed by: OBSTETRICS & GYNECOLOGY

## 2020-03-18 PROCEDURE — 1036F TOBACCO NON-USER: CPT | Performed by: OBSTETRICS & GYNECOLOGY

## 2020-03-18 PROCEDURE — 2022F DILAT RTA XM EVC RTNOPTHY: CPT | Performed by: OBSTETRICS & GYNECOLOGY

## 2020-03-18 PROCEDURE — 4040F PNEUMOC VAC/ADMIN/RCVD: CPT | Performed by: OBSTETRICS & GYNECOLOGY

## 2020-03-18 PROCEDURE — 3008F BODY MASS INDEX DOCD: CPT | Performed by: OBSTETRICS & GYNECOLOGY

## 2020-03-18 PROCEDURE — 3078F DIAST BP <80 MM HG: CPT | Performed by: OBSTETRICS & GYNECOLOGY

## 2020-03-18 PROCEDURE — 3074F SYST BP LT 130 MM HG: CPT | Performed by: OBSTETRICS & GYNECOLOGY

## 2020-03-18 NOTE — PROGRESS NOTES
Assessment/Plan:    Problem List Items Addressed This Visit        Other    History of endometrial cancer - Primary     Patient is a very pleasant 78-year-old female with a history of grade 1 stage I endometrial cancer  She is not invasive disease  She is over a year and a half out since her diagnosis  She has no evidence of recurrence of disease  She will follow up in 6 months for final follow-up and then back for annual exams with Dr Danniel Kocher office  CHIEF COMPLAINT:  Surveillance endometrial cancer    Problem:  Cancer Staging  History of endometrial cancer  Staging form: Corpus Uteri - Carcinoma, AJCC 8th Edition  - Clinical stage from 7/30/2018: FIGO Stage IA - Signed by Jorden Funes MD PhD on 8/10/2018        Previous therapy:     History of endometrial cancer    6/14/2018 Biopsy     FIGO 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  Biopsy performed by Dr Ayde Edwards  7/30/2018 Surgery     Robotic assisted total laparoscopic hysterectomy, bilateral salpingo-oophorectomy and pelvic lymph node dissection  Final pathology revealed stage IA grade 1 endometrioid adenocarcinoma (3 3 x 1 7 cm, 2/12mm invasion, NO LVSI, negative washings)      7/30/2018 Genetic Testing     Torrez testing negative           Patient ID: Braden Cobb is a 68 y o  female  Patient presents today for surveillance for history of 1 a non invasive grade 1 endometrial cancer  She underwent surgical therapy in 2018  She continues to do well and has no changes since her last visit  Today, the patient is doing well  She denies significant abdominal pain, pelvic pain, nausea, vomiting, constipation, diarrhea, fevers, chills, or vaginal bleeding        The following portions of the patient's history were reviewed and updated as appropriate: allergies, current medications, past medical history, past social history, past surgical history and problem list     Review of Systems   Constitutional: Negative  HENT: Negative  Eyes: Negative  Respiratory: Negative  Cardiovascular: Negative  Gastrointestinal: Negative  Endocrine: Negative  Genitourinary: Negative  Musculoskeletal: Negative  Skin: Negative  Neurological: Negative  Hematological: Negative  Psychiatric/Behavioral: Negative          Current Outpatient Medications   Medication Sig Dispense Refill    aspirin (ASPIR-81) 81 mg EC tablet Take by mouth      cetirizine (ZyrTEC) 10 MG chewable tablet Chew 10 mg daily      clotrimazole-betamethasone (LOTRISONE) 1-0 05 % cream APPLY TOPICALLY DAILY AT BEDTIME AS NEEDED FOR ITCHING 45 g 1    ezetimibe (ZETIA) 10 mg tablet TAKE 1 TABLET DAILY 15 tablet 24    furosemide (LASIX) 20 mg tablet TAKE 1 TABLET DAILY 90 tablet 4    glucose blood test strip FreeStyle Lite Strips      insulin lispro (HUMALOG) 100 units/mL injection Inject under the skin Only when on steroids or with a sick day       Insulin Syringe-Needle U-100 (INSULIN SYRINGE 1CC/31GX5/16") 31G X 5/16" 1 ML MISC BD Veo Insulin Syringe Ultra-Fine 0 3 mL 31 gauge x 15/64"      KRILL OIL PO Take by mouth      levothyroxine 75 mcg tablet TAKE 1 TABLET DAILY 90 tablet 4    LINZESS 145 MCG CAPS Take 1 capsule (145 mcg total) by mouth daily before breakfast Take 30 minutes before 90 capsule 2    Magnesium Oxide 400 MG CAPS Take by mouth      metFORMIN (GLUCOPHAGE-XR) 500 mg 24 hr tablet TAKE 1 TABLET IN THE MORNING AND 2 TABLETS IN THE EVENING (Patient taking differently: 1,500 mg TAKE 1 TABLET IN THE MORNING AND 2 TABLETS IN THE EVENING) 270 tablet 3    olmesartan (BENICAR) 20 mg tablet Take 1 tablet (20 mg total) by mouth daily 90 tablet 3    Omega-3 Fatty Acids (FISH OIL) 1200 MG CAPS Take by mouth      pantoprazole (PROTONIX) 40 mg tablet TAKE 1 TABLET DAILY 90 tablet 4    potassium chloride (MICRO-K) 10 MEQ CR capsule TAKE 1 CAPSULE DAILY 90 capsule 4    rosuvastatin (CRESTOR) 20 MG tablet TAKE 1 TABLET DAILY 90 tablet 4    saccharomyces boulardii (FLORASTOR) 250 mg capsule Take 250 mg by mouth 3 (three) times a day      clindamycin (CLEOCIN) 150 mg capsule Take 150 mg by mouth 4 (four) times a day  0     Current Facility-Administered Medications   Medication Dose Route Frequency Provider Last Rate Last Dose    albuterol inhalation solution 2 5 mg  2 5 mg Nebulization Q6H PRN Faiza Shah MD               Objective:    Blood pressure 114/72, pulse 67, temperature 97 9 °F (36 6 °C), resp  rate 18, height 5' 2 5" (1 588 m), weight 92 5 kg (204 lb), not currently breastfeeding  Body mass index is 36 72 kg/m²  Body surface area is 1 94 meters squared  Physical Exam   Constitutional: She is oriented to person, place, and time  She appears well-developed and well-nourished  HENT:   Head: Normocephalic and atraumatic  Eyes: EOM are normal    Neck: Normal range of motion  Neck supple  No thyromegaly present  Cardiovascular: Normal rate, regular rhythm and normal heart sounds  Pulmonary/Chest: Effort normal and breath sounds normal    Abdominal: Soft  Bowel sounds are normal    Well healed laparoscopic incisions  Genitourinary:   Genitourinary Comments: -Normal external female genitalia, normal Bartholin's and [place]'s glands                  -Normal midline urethral meatus  No lesions notes                  -Bladder without fullness mass or tenderness                  -Vagina without lesion or discharge No significant cystocele or rectocele noted                  -Cervix surgically absent                  -Uterus surgically absent                  -Adnexae surgically absent                  - Anus without fissure of lesion     Musculoskeletal: Normal range of motion  Lymphadenopathy:     She has no cervical adenopathy  Neurological: She is alert and oriented to person, place, and time  Skin: Skin is warm and dry  Psychiatric: She has a normal mood and affect   Her behavior is normal        No results found for:   Lab Results   Component Value Date     11/04/2015    K 4 0 12/28/2019     12/28/2019    CO2 28 12/28/2019    ANIONGAP 4 11/04/2015    BUN 20 12/28/2019    CREATININE 0 97 12/28/2019    GLUCOSE 107 11/04/2015    GLUF 122 (H) 12/28/2019    CALCIUM 9 2 12/28/2019    AST 18 12/28/2019    ALT 34 12/28/2019    ALKPHOS 63 12/28/2019    PROT 7 0 11/04/2015    BILITOT 0 41 11/04/2015    EGFR 59 12/28/2019     Lab Results   Component Value Date    WBC 7 41 12/28/2019    HGB 12 7 12/28/2019    HCT 39 7 12/28/2019    MCV 90 12/28/2019     12/28/2019     Lab Results   Component Value Date    NEUTROABS 3 60 12/28/2019

## 2020-03-18 NOTE — ASSESSMENT & PLAN NOTE
Patient is a very pleasant 73-year-old female with a history of grade 1 stage I endometrial cancer  She is not invasive disease  She is over a year and a half out since her diagnosis  She has no evidence of recurrence of disease  She will follow up in 6 months for final follow-up and then back for annual exams with Dr Lucy Hughes office

## 2020-03-18 NOTE — LETTER
March 18, 2020     Anthony Tapia MD  [address] [place] [address]    Patient: Sharon Mast   YOB: 1950   Date of Visit: 3/18/2020       Dear Dr Servin Chol: Thank you for referring Fransisco Elkins to me for evaluation  Below are my notes for this consultation  If you have questions, please do not hesitate to call me  I look forward to following your patient along with you  Sincerely,        Sumi Gallagher MD        CC: Alanis Morales MD  3/18/2020  8:45 AM  Sign at close encounter  Assessment/Plan:    Problem List Items Addressed This Visit        Other    History of endometrial cancer - Primary     Patient is a very pleasant 26-year-old female with a history of grade 1 stage I endometrial cancer  She is not invasive disease  She is over a year and a half out since her diagnosis  She has no evidence of recurrence of disease  She will follow up in 6 months for final follow-up and then back for annual exams with Dr Lory Encinas office  CHIEF COMPLAINT:  Surveillance endometrial cancer    Problem:  Cancer Staging  History of endometrial cancer  Staging form: Corpus Uteri - Carcinoma, AJCC 8th Edition  - Clinical stage from 7/30/2018: FIGO Stage IA - Signed by Sloaen Manzano MD PhD on 8/10/2018        Previous therapy:     History of endometrial cancer    6/14/2018 Biopsy     FIGO 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  Biopsy performed by Dr Anthony Tapia  7/30/2018 Surgery     Robotic assisted total laparoscopic hysterectomy, bilateral salpingo-oophorectomy and pelvic lymph node dissection    Final pathology revealed stage IA grade 1 endometrioid adenocarcinoma (3 3 x 1 7 cm, 2/12mm invasion, NO LVSI, negative washings)      7/30/2018 Genetic Testing     Torrez testing negative           Patient ID: Sharon Mast is a 68 y o  female  Patient presents today for surveillance for history of 1 a non invasive grade 1 endometrial cancer  She underwent surgical therapy in 2018  She continues to do well and has no changes since her last visit  Today, the patient is doing well  She denies significant abdominal pain, pelvic pain, nausea, vomiting, constipation, diarrhea, fevers, chills, or vaginal bleeding  The following portions of the patient's history were reviewed and updated as appropriate: allergies, current medications, past medical history, past social history, past surgical history and problem list     Review of Systems   Constitutional: Negative  HENT: Negative  Eyes: Negative  Respiratory: Negative  Cardiovascular: Negative  Gastrointestinal: Negative  Endocrine: Negative  Genitourinary: Negative  Musculoskeletal: Negative  Skin: Negative  Neurological: Negative  Hematological: Negative  Psychiatric/Behavioral: Negative          Current Outpatient Medications   Medication Sig Dispense Refill    aspirin (ASPIR-81) 81 mg EC tablet Take by mouth      cetirizine (ZyrTEC) 10 MG chewable tablet Chew 10 mg daily      clotrimazole-betamethasone (LOTRISONE) 1-0 05 % cream APPLY TOPICALLY DAILY AT BEDTIME AS NEEDED FOR ITCHING 45 g 1    ezetimibe (ZETIA) 10 mg tablet TAKE 1 TABLET DAILY 15 tablet 24    furosemide (LASIX) 20 mg tablet TAKE 1 TABLET DAILY 90 tablet 4    glucose blood test strip FreeStyle Lite Strips      insulin lispro (HUMALOG) 100 units/mL injection Inject under the skin Only when on steroids or with a sick day       Insulin Syringe-Needle U-100 (INSULIN SYRINGE 1CC/31GX5/16") 31G X 5/16" 1 ML MISC BD Veo Insulin Syringe Ultra-Fine 0 3 mL 31 gauge x 15/64"      KRILL OIL PO Take by mouth      levothyroxine 75 mcg tablet TAKE 1 TABLET DAILY 90 tablet 4    LINZESS 145 MCG CAPS Take 1 capsule (145 mcg total) by mouth daily before breakfast Take 30 minutes before 90 capsule 2    Magnesium Oxide 400 MG CAPS Take by mouth      metFORMIN (GLUCOPHAGE-XR) 500 mg 24 hr tablet TAKE 1 TABLET IN THE MORNING AND 2 TABLETS IN THE EVENING (Patient taking differently: 1,500 mg TAKE 1 TABLET IN THE MORNING AND 2 TABLETS IN THE EVENING) 270 tablet 3    olmesartan (BENICAR) 20 mg tablet Take 1 tablet (20 mg total) by mouth daily 90 tablet 3    Omega-3 Fatty Acids (FISH OIL) 1200 MG CAPS Take by mouth      pantoprazole (PROTONIX) 40 mg tablet TAKE 1 TABLET DAILY 90 tablet 4    potassium chloride (MICRO-K) 10 MEQ CR capsule TAKE 1 CAPSULE DAILY 90 capsule 4    rosuvastatin (CRESTOR) 20 MG tablet TAKE 1 TABLET DAILY 90 tablet 4    saccharomyces boulardii (FLORASTOR) 250 mg capsule Take 250 mg by mouth 3 (three) times a day      clindamycin (CLEOCIN) 150 mg capsule Take 150 mg by mouth 4 (four) times a day  0     Current Facility-Administered Medications   Medication Dose Route Frequency Provider Last Rate Last Dose    albuterol inhalation solution 2 5 mg  2 5 mg Nebulization Q6H PRN Judge Jessica MD               Objective:    Blood pressure 114/72, pulse 67, temperature 97 9 °F (36 6 °C), resp  rate 18, height 5' 2 5" (1 588 m), weight 92 5 kg (204 lb), not currently breastfeeding  Body mass index is 36 72 kg/m²  Body surface area is 1 94 meters squared  Physical Exam   Constitutional: She is oriented to person, place, and time  She appears well-developed and well-nourished  HENT:   Head: Normocephalic and atraumatic  Eyes: EOM are normal    Neck: Normal range of motion  Neck supple  No thyromegaly present  Cardiovascular: Normal rate, regular rhythm and normal heart sounds  Pulmonary/Chest: Effort normal and breath sounds normal    Abdominal: Soft  Bowel sounds are normal    Well healed laparoscopic incisions  Genitourinary:   Genitourinary Comments: -Normal external female genitalia, normal Bartholin's and [place]'s glands                  -Normal midline urethral meatus   No lesions notes                  -Bladder without fullness mass or tenderness                  -Vagina without lesion or discharge No significant cystocele or rectocele noted                  -Cervix surgically absent                  -Uterus surgically absent                  -Adnexae surgically absent                  - Anus without fissure of lesion     Musculoskeletal: Normal range of motion  Lymphadenopathy:     She has no cervical adenopathy  Neurological: She is alert and oriented to person, place, and time  Skin: Skin is warm and dry  Psychiatric: She has a normal mood and affect   Her behavior is normal        No results found for:   Lab Results   Component Value Date     11/04/2015    K 4 0 12/28/2019     12/28/2019    CO2 28 12/28/2019    ANIONGAP 4 11/04/2015    BUN 20 12/28/2019    CREATININE 0 97 12/28/2019    GLUCOSE 107 11/04/2015    GLUF 122 (H) 12/28/2019    CALCIUM 9 2 12/28/2019    AST 18 12/28/2019    ALT 34 12/28/2019    ALKPHOS 63 12/28/2019    PROT 7 0 11/04/2015    BILITOT 0 41 11/04/2015    EGFR 59 12/28/2019     Lab Results   Component Value Date    WBC 7 41 12/28/2019    HGB 12 7 12/28/2019    HCT 39 7 12/28/2019    MCV 90 12/28/2019     12/28/2019     Lab Results   Component Value Date    NEUTROABS 3 60 12/28/2019

## 2020-03-26 DIAGNOSIS — E78.2 MIXED HYPERLIPIDEMIA: ICD-10-CM

## 2020-03-26 RX ORDER — EZETIMIBE 10 MG/1
10 TABLET ORAL DAILY
Qty: 90 TABLET | Refills: 3 | Status: SHIPPED | OUTPATIENT
Start: 2020-03-26 | End: 2021-03-15 | Stop reason: SDUPTHER

## 2020-03-30 ENCOUNTER — TELEPHONE (OUTPATIENT)
Dept: INTERNAL MEDICINE CLINIC | Facility: CLINIC | Age: 70
End: 2020-03-30

## 2020-03-30 ENCOUNTER — TELEMEDICINE (OUTPATIENT)
Dept: INTERNAL MEDICINE CLINIC | Facility: CLINIC | Age: 70
End: 2020-03-30
Payer: COMMERCIAL

## 2020-03-30 DIAGNOSIS — Z20.828 EXPOSURE TO SARS-ASSOCIATED CORONAVIRUS: ICD-10-CM

## 2020-03-30 DIAGNOSIS — Z20.828 EXPOSURE TO SARS-ASSOCIATED CORONAVIRUS: Primary | ICD-10-CM

## 2020-03-30 PROCEDURE — U0002 COVID-19 LAB TEST NON-CDC: HCPCS

## 2020-03-30 PROCEDURE — 1160F RVW MEDS BY RX/DR IN RCRD: CPT | Performed by: INTERNAL MEDICINE

## 2020-03-30 PROCEDURE — 99212 OFFICE O/P EST SF 10 MIN: CPT | Performed by: INTERNAL MEDICINE

## 2020-03-30 PROCEDURE — 87635 SARS-COV-2 COVID-19 AMP PRB: CPT

## 2020-03-30 NOTE — PROGRESS NOTES
Virtual Regular Visit fever cough and flu-like symptoms  Problem List Items Addressed This Visit     None      Visit Diagnoses     Exposure to SARS-associated coronavirus    -  Primary    Relevant Orders    MISC COVID-19 TEST- Collected at [facility] or [facility]               Reason for visit is fever and cough  Encounter provider Johann Vann DO    Provider located at [address] [place] [address]      Recent Visits  No visits were found meeting these conditions  Showing recent visits within past 7 days and meeting all other requirements     Today's Visits  Date Type Provider Dept   03/30/20 Telemedicine Lenny Pineda   03/30/20 Telephone [facility]   03/30/20 Telephone Jordon [facility] 7 today's visits and meeting all other requirements     Future Appointments  Date Type Provider Dept   03/30/20 Telephone [facility]   03/30/20 Telephone [facility]   03/30/20 Erlinda Enciso, [facility]   Showing future appointments within next 150 days and meeting all other requirements        The patient was identified by name and date of birth  Umang Adams was informed that this is a telemedicine visit and that the visit is being conducted through "[employer] [place] [address] and patient was informed that this is not a secure, HIPAA-complaint platform  she agrees to proceed     My office door was closed  No one else was in the room  She acknowledged consent and understanding of privacy and security of the video platform  The patient has agreed to participate and understands they can discontinue the visit at any time  Patient is aware this is a billable service       Subjective  Umang Adams is a 68 y o  female will with fever cough and chills  She started becoming ill last week  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 up until about 10 days ago  In the last 3-4 days she has had fever to 100 2  She has had chills and cough  The cough is rather dry  She has had headache but no neck stiffness  She has had some nausea but no vomiting  She has no dysuria hematuria or urinary frequency  She has no abdominal pain         Past Medical History:   Diagnosis Date    Abnormal mammogram     Abnormal weight gain     Achilles tendinitis     Asthma     Diabetes mellitus ([address] )     Disc degeneration, lumbar     Disease of thyroid gland     hypo    Dyslipidemia     Early satiety     Edema     idiopathic peripheral    Enthesopathy     hip region    Esophagitis, reflux     GERD (gastroesophageal reflux disease)     Hard to intubate     difficulty with intubation and had to be intubated twice    Hypercholesterolemia     Hypertension     IBS (irritable bowel syndrome)     Irritable bowel syndrome     Osteoarthritis     Rosacea     Sacroiliitis (HCC)        Past Surgical History:   Procedure Laterality Date     SECTION      x2    COLONOSCOPY      ELBOW SURGERY      left ulna/radius    ESOPHAGOGASTRODUODENOSCOPY      FOOT SURGERY Right     FRACTURE SURGERY      FRACTURE SURGERY Left     tibia    HYSTERECTOMY Bilateral 2018    JOINT REPLACEMENT Left     shoulder,  right knee    KNEE ARTHROSCOPY      KNEE ARTHROSCOPY W/ MENISCAL REPAIR Right     TN LAPAROSCOPY W TOT HYSTERECTUTERUS <=250 GRAM  W TUBE/OVARY N/A 2018    Procedure: ROBOTIC TOTAL LAPAROSCOPIC HYSTERECTOMY, BILATERAL SALPINGOOPHORECTOMY, BILATERAL PELVIC LYMPHNODE DISSECTION;  Surgeon: Leobardo Urbina MD;  [facility]: [facility];  Service: Gynecology Oncology    REPLACEMENT TOTAL KNEE Right     SHOULDER ARTHROPLASTY      SINUS SURGERY      TONSILLECTOMY      TUBAL LIGATION      WRIST SURGERY      ganglonic cyst Current Outpatient Medications   Medication Sig Dispense Refill    aspirin (ASPIR-81) 81 mg EC tablet Take by mouth      cetirizine (ZyrTEC) 10 MG chewable tablet Chew 10 mg daily      clindamycin (CLEOCIN) 150 mg capsule Take 150 mg by mouth 4 (four) times a day  0    clotrimazole-betamethasone (LOTRISONE) 1-0 05 % cream APPLY TOPICALLY DAILY AT BEDTIME AS NEEDED FOR ITCHING 45 g 1    ezetimibe (ZETIA) 10 mg tablet Take 1 tablet (10 mg total) by mouth daily 90 tablet 3    furosemide (LASIX) 20 mg tablet TAKE 1 TABLET DAILY 90 tablet 4    glucose blood test strip FreeStyle Lite Strips      insulin lispro (HUMALOG) 100 units/mL injection Inject under the skin Only when on steroids or with a sick day       Insulin Syringe-Needle U-100 (INSULIN SYRINGE 1CC/31GX5/16") 31G X 5/16" 1 ML MISC BD Veo Insulin Syringe Ultra-Fine 0 3 mL 31 gauge x 15/64"      KRILL OIL PO Take by mouth      levothyroxine 75 mcg tablet TAKE 1 TABLET DAILY 90 tablet 4    LINZESS 145 MCG CAPS Take 1 capsule (145 mcg total) by mouth daily before breakfast Take 30 minutes before 90 capsule 2    Magnesium Oxide 400 MG CAPS Take by mouth      metFORMIN (GLUCOPHAGE-XR) 500 mg 24 hr tablet TAKE 1 TABLET IN THE MORNING AND 2 TABLETS IN THE EVENING (Patient taking differently: 1,500 mg TAKE 1 TABLET IN THE MORNING AND 2 TABLETS IN THE EVENING) 270 tablet 3    olmesartan (BENICAR) 20 mg tablet Take 1 tablet (20 mg total) by mouth daily 90 tablet 3    Omega-3 Fatty Acids (FISH OIL) 1200 MG CAPS Take by mouth      pantoprazole (PROTONIX) 40 mg tablet TAKE 1 TABLET DAILY 90 tablet 4    potassium chloride (MICRO-K) 10 MEQ CR capsule TAKE 1 CAPSULE DAILY 90 capsule 4    rosuvastatin (CRESTOR) 20 MG tablet TAKE 1 TABLET DAILY 90 tablet 4    saccharomyces boulardii (FLORASTOR) 250 mg capsule Take 250 mg by mouth 3 (three) times a day       Current Facility-Administered Medications   Medication Dose Route Frequency Provider Last Rate Last Dose    albuterol inhalation solution 2 5 mg  2 5 mg Nebulization Q6H PRN Rafi Villeda MD            Allergies   Allergen Reactions    Amoxicillin-Pot Clavulanate Anaphylaxis, Hives, Itching and Facial Swelling    Erythromycin Hives, Itching, Swelling, Vomiting, Throat Swelling, Nasal Congestion and Facial Swelling    Meperidine Anaphylaxis    Morphine Hives, Itching, Swelling, Other (See Comments) and Syncope     hypotension    Penicillins Anaphylaxis, Itching and Swelling    Relafen [Nabumetone] Hives, Itching and Swelling    Darvon [Propoxyphene] Hives    Methocarbamol Other (See Comments)     Double vision    Reglan [Metoclopramide] Anxiety    Sulfa Antibiotics Hives, Itching, Rash and Swelling    Tetracyclines & Related Rash       Review of Systems as above  She has fever cough  She has slight sore throat  No rhinitis  No ear pain  She has chills  She has temperature to 100 2  She has no vomiting  She has some nausea  She has some headache  She has no dysuria hematuria or urinary frequency  Physical Exam she is visualized through face time  She is awake alert  She appears in no acute distress  Respiratory rate appears to be about 20  She is not dyspneic  She is not complaining of any abdominal pain  She is not in any acute distress  I spent 15 minutes with the patient during this visit  She is at risk for COVID-19  She is a school nurse  She has symptoms suggestive of that  She is going to be tested for  She did call the insurance nurse who gave her prescription for Levaquin  She will hold off on that  She will call in 48 hours for the results of her COVID 19 test     She was warned that if she develops any shortness of breath or high fever she is to go to the emergency room

## 2020-03-30 NOTE — TELEPHONE ENCOUNTER
----- Message from Vandanatrung Marinelli sent at 3/30/2020  8:14 AM EDT -----  Regarding: Visit Follow-Up Question  Contact: [phone]  Just quickly, Had just a fever a week ago this past Thursday, no other symptoms  This past Thursday and Friday low grade fever, highest 100 2 with intermittent cough and chest soreness, all starting around 1500   a little more tired than usual   Called your office and they sent me to your COVID 19 hotline  Spoke with someone who said a nurse would call me back  No one ever did  Did a virtual visit with group recommended by [facility]  He ordered Levaquin in case I had gotten secondary infection and dx with presumptive COVID19  Because of age and medical hx he said I could be tested  Thought I would ask you if I should be and if so where to go  Also wanted to ask about the Levaquin, not my favorite drug with the tendon issues as that is a chronic problem for me, but order was called in last night to [facility] so am waiting to hear from them  Home phone [phone] or will check for a message from kera Pan

## 2020-04-02 ENCOUNTER — TELEPHONE (OUTPATIENT)
Dept: INTERNAL MEDICINE CLINIC | Facility: CLINIC | Age: 70
End: 2020-04-02

## 2020-04-03 ENCOUNTER — TELEPHONE (OUTPATIENT)
Dept: INTERNAL MEDICINE CLINIC | Facility: CLINIC | Age: 70
End: 2020-04-03

## 2020-04-03 DIAGNOSIS — E11.9 TYPE 2 DIABETES MELLITUS WITHOUT COMPLICATION, WITHOUT LONG-TERM CURRENT USE OF INSULIN (HCC): Primary | ICD-10-CM

## 2020-04-03 LAB — SARS-COV-2 RNA SPEC QL NAA+PROBE: NOT DETECTED

## 2020-04-03 RX ORDER — GLIPIZIDE 2.5 MG/1
TABLET, EXTENDED RELEASE ORAL
Qty: 90 TABLET | Refills: 3 | Status: SHIPPED | OUTPATIENT
Start: 2020-04-03 | End: 2021-03-18 | Stop reason: SDUPTHER

## 2020-04-16 DIAGNOSIS — E11.8 TYPE 2 DIABETES MELLITUS WITH COMPLICATION, WITHOUT LONG-TERM CURRENT USE OF INSULIN (HCC): ICD-10-CM

## 2020-04-16 RX ORDER — METFORMIN HYDROCHLORIDE 500 MG/1
TABLET, EXTENDED RELEASE ORAL
Qty: 270 TABLET | Refills: 3 | Status: SHIPPED | OUTPATIENT
Start: 2020-04-16 | End: 2021-07-15 | Stop reason: SDUPTHER

## 2020-04-27 DIAGNOSIS — E11.8 TYPE 2 DIABETES MELLITUS WITH COMPLICATION, WITHOUT LONG-TERM CURRENT USE OF INSULIN (HCC): Primary | ICD-10-CM

## 2020-05-11 ENCOUNTER — HOSPITAL ENCOUNTER (OUTPATIENT)
Dept: MAMMOGRAPHY | Facility: CLINIC | Age: 70
Discharge: HOME | End: 2020-05-11
Payer: COMMERCIAL

## 2020-05-11 VITALS — WEIGHT: 204 LBS | BODY MASS INDEX: 37.54 KG/M2 | HEIGHT: 62 IN

## 2020-05-11 DIAGNOSIS — Z12.39 SCREENING FOR BREAST CANCER: ICD-10-CM

## 2020-05-11 PROCEDURE — 77067 SCR MAMMO BI INCL CAD: CPT

## 2020-05-11 PROCEDURE — 77063 BREAST TOMOSYNTHESIS BI: CPT

## 2020-06-01 DIAGNOSIS — I10 BENIGN ESSENTIAL HYPERTENSION: ICD-10-CM

## 2020-06-01 PROCEDURE — 4010F ACE/ARB THERAPY RXD/TAKEN: CPT | Performed by: OBSTETRICS & GYNECOLOGY

## 2020-06-01 RX ORDER — OLMESARTAN MEDOXOMIL 20 MG/1
TABLET ORAL
Qty: 90 TABLET | Refills: 3 | Status: SHIPPED | OUTPATIENT
Start: 2020-06-01 | End: 2021-05-25 | Stop reason: SDUPTHER

## 2020-06-10 LAB
LEFT EYE DIABETIC RETINOPATHY: NORMAL
RIGHT EYE DIABETIC RETINOPATHY: NORMAL

## 2020-06-15 DIAGNOSIS — Z79.4 TYPE 2 DIABETES MELLITUS WITHOUT COMPLICATION, WITH LONG-TERM CURRENT USE OF INSULIN (HCC): ICD-10-CM

## 2020-06-15 DIAGNOSIS — E11.9 TYPE 2 DIABETES MELLITUS WITHOUT COMPLICATION, WITH LONG-TERM CURRENT USE OF INSULIN (HCC): ICD-10-CM

## 2020-06-15 RX ORDER — SYRING-NEEDL,DISP,INSUL,0.3 ML 31GX15/64"
SYRINGE, EMPTY DISPOSABLE MISCELLANEOUS
Qty: 100 EACH | Refills: 10 | Status: SHIPPED | OUTPATIENT
Start: 2020-06-15 | End: 2021-08-17

## 2020-06-22 ENCOUNTER — ANNUAL EXAM (OUTPATIENT)
Dept: OBGYN CLINIC | Facility: CLINIC | Age: 70
End: 2020-06-22
Payer: COMMERCIAL

## 2020-06-22 ENCOUNTER — APPOINTMENT (OUTPATIENT)
Dept: LAB | Facility: CLINIC | Age: 70
End: 2020-06-22
Payer: COMMERCIAL

## 2020-06-22 VITALS
HEIGHT: 62 IN | WEIGHT: 214 LBS | BODY MASS INDEX: 39.38 KG/M2 | SYSTOLIC BLOOD PRESSURE: 140 MMHG | DIASTOLIC BLOOD PRESSURE: 64 MMHG

## 2020-06-22 DIAGNOSIS — Z01.419 ENCOUNTER FOR GYNECOLOGICAL EXAMINATION WITHOUT ABNORMAL FINDING: Primary | ICD-10-CM

## 2020-06-22 DIAGNOSIS — Z13.820 SCREENING FOR OSTEOPOROSIS: ICD-10-CM

## 2020-06-22 DIAGNOSIS — E11.9 CONTROLLED TYPE 2 DIABETES MELLITUS WITHOUT COMPLICATION, WITH LONG-TERM CURRENT USE OF INSULIN (HCC): ICD-10-CM

## 2020-06-22 DIAGNOSIS — Z79.4 CONTROLLED TYPE 2 DIABETES MELLITUS WITHOUT COMPLICATION, WITH LONG-TERM CURRENT USE OF INSULIN (HCC): ICD-10-CM

## 2020-06-22 DIAGNOSIS — Z78.0 ASYMPTOMATIC POSTMENOPAUSAL STATUS: ICD-10-CM

## 2020-06-22 DIAGNOSIS — L90.0 LICHEN SCLEROSUS: ICD-10-CM

## 2020-06-22 DIAGNOSIS — I10 BENIGN ESSENTIAL HYPERTENSION: ICD-10-CM

## 2020-06-22 DIAGNOSIS — E03.9 ACQUIRED HYPOTHYROIDISM: ICD-10-CM

## 2020-06-22 DIAGNOSIS — Z12.31 ENCOUNTER FOR SCREENING MAMMOGRAM FOR MALIGNANT NEOPLASM OF BREAST: ICD-10-CM

## 2020-06-22 LAB
ALBUMIN SERPL BCP-MCNC: 4.1 G/DL (ref 3.5–5)
ALP SERPL-CCNC: 78 U/L (ref 46–116)
ALT SERPL W P-5'-P-CCNC: 50 U/L (ref 12–78)
ANION GAP SERPL CALCULATED.3IONS-SCNC: 7 MMOL/L (ref 4–13)
AST SERPL W P-5'-P-CCNC: 29 U/L (ref 5–45)
BILIRUB SERPL-MCNC: 0.6 MG/DL (ref 0.2–1)
BUN SERPL-MCNC: 36 MG/DL (ref 5–25)
CALCIUM SERPL-MCNC: 10 MG/DL (ref 8.3–10.1)
CHLORIDE SERPL-SCNC: 106 MMOL/L (ref 100–108)
CHOLEST SERPL-MCNC: 129 MG/DL (ref 50–200)
CO2 SERPL-SCNC: 23 MMOL/L (ref 21–32)
CREAT SERPL-MCNC: 1.13 MG/DL (ref 0.6–1.3)
CREAT UR-MCNC: 129 MG/DL
EST. AVERAGE GLUCOSE BLD GHB EST-MCNC: 137 MG/DL
GFR SERPL CREATININE-BSD FRML MDRD: 49 ML/MIN/1.73SQ M
GLUCOSE P FAST SERPL-MCNC: 139 MG/DL (ref 65–99)
HBA1C MFR BLD: 6.4 %
HDLC SERPL-MCNC: 38 MG/DL
LDLC SERPL CALC-MCNC: 56 MG/DL (ref 0–100)
MICROALBUMIN UR-MCNC: 8.6 MG/L (ref 0–20)
MICROALBUMIN/CREAT 24H UR: 7 MG/G CREATININE (ref 0–30)
NONHDLC SERPL-MCNC: 91 MG/DL
POTASSIUM SERPL-SCNC: 4.1 MMOL/L (ref 3.5–5.3)
PROT SERPL-MCNC: 7.7 G/DL (ref 6.4–8.2)
SODIUM SERPL-SCNC: 136 MMOL/L (ref 136–145)
T4 FREE SERPL-MCNC: 1.22 NG/DL (ref 0.76–1.46)
TRIGL SERPL-MCNC: 173 MG/DL
TSH SERPL DL<=0.05 MIU/L-ACNC: 2.37 UIU/ML (ref 0.36–3.74)

## 2020-06-22 PROCEDURE — 84439 ASSAY OF FREE THYROXINE: CPT

## 2020-06-22 PROCEDURE — 82043 UR ALBUMIN QUANTITATIVE: CPT

## 2020-06-22 PROCEDURE — 2022F DILAT RTA XM EVC RTNOPTHY: CPT | Performed by: NURSE PRACTITIONER

## 2020-06-22 PROCEDURE — 84443 ASSAY THYROID STIM HORMONE: CPT

## 2020-06-22 PROCEDURE — 83036 HEMOGLOBIN GLYCOSYLATED A1C: CPT

## 2020-06-22 PROCEDURE — 1160F RVW MEDS BY RX/DR IN RCRD: CPT | Performed by: NURSE PRACTITIONER

## 2020-06-22 PROCEDURE — 80061 LIPID PANEL: CPT

## 2020-06-22 PROCEDURE — 3078F DIAST BP <80 MM HG: CPT | Performed by: NURSE PRACTITIONER

## 2020-06-22 PROCEDURE — 3061F NEG MICROALBUMINURIA REV: CPT | Performed by: OBSTETRICS & GYNECOLOGY

## 2020-06-22 PROCEDURE — 3044F HG A1C LEVEL LT 7.0%: CPT | Performed by: OBSTETRICS & GYNECOLOGY

## 2020-06-22 PROCEDURE — 4040F PNEUMOC VAC/ADMIN/RCVD: CPT | Performed by: NURSE PRACTITIONER

## 2020-06-22 PROCEDURE — S0612 ANNUAL GYNECOLOGICAL EXAMINA: HCPCS | Performed by: NURSE PRACTITIONER

## 2020-06-22 PROCEDURE — 3077F SYST BP >= 140 MM HG: CPT | Performed by: NURSE PRACTITIONER

## 2020-06-22 PROCEDURE — 82570 ASSAY OF URINE CREATININE: CPT

## 2020-06-22 PROCEDURE — 80053 COMPREHEN METABOLIC PANEL: CPT

## 2020-06-22 PROCEDURE — 3008F BODY MASS INDEX DOCD: CPT | Performed by: NURSE PRACTITIONER

## 2020-06-22 PROCEDURE — 36415 COLL VENOUS BLD VENIPUNCTURE: CPT

## 2020-06-22 RX ORDER — CLOTRIMAZOLE AND BETAMETHASONE DIPROPIONATE 10; .64 MG/G; MG/G
CREAM TOPICAL
Qty: 45 G | Refills: 3 | Status: SHIPPED | OUTPATIENT
Start: 2020-06-22 | End: 2021-06-23 | Stop reason: SDUPTHER

## 2020-06-24 ENCOUNTER — TELEPHONE (OUTPATIENT)
Dept: INTERNAL MEDICINE CLINIC | Facility: CLINIC | Age: 70
End: 2020-06-24

## 2020-06-25 DIAGNOSIS — E11.8 TYPE 2 DIABETES MELLITUS WITH COMPLICATION, WITHOUT LONG-TERM CURRENT USE OF INSULIN (HCC): ICD-10-CM

## 2020-06-30 ENCOUNTER — TELEPHONE (OUTPATIENT)
Dept: INTERNAL MEDICINE CLINIC | Facility: CLINIC | Age: 70
End: 2020-06-30

## 2020-07-01 ENCOUNTER — TELEPHONE (OUTPATIENT)
Dept: ADMINISTRATIVE | Facility: OTHER | Age: 70
End: 2020-07-01

## 2020-07-01 ENCOUNTER — OFFICE VISIT (OUTPATIENT)
Dept: INTERNAL MEDICINE CLINIC | Facility: CLINIC | Age: 70
End: 2020-07-01
Payer: COMMERCIAL

## 2020-07-01 VITALS
SYSTOLIC BLOOD PRESSURE: 140 MMHG | BODY MASS INDEX: 39.49 KG/M2 | WEIGHT: 214.6 LBS | RESPIRATION RATE: 12 BRPM | HEART RATE: 76 BPM | DIASTOLIC BLOOD PRESSURE: 76 MMHG | HEIGHT: 62 IN | TEMPERATURE: 97.4 F

## 2020-07-01 DIAGNOSIS — E03.9 ACQUIRED HYPOTHYROIDISM: Primary | ICD-10-CM

## 2020-07-01 DIAGNOSIS — E88.81 DYSMETABOLIC SYNDROME X: ICD-10-CM

## 2020-07-01 DIAGNOSIS — R63.5 ABNORMAL WEIGHT GAIN: ICD-10-CM

## 2020-07-01 DIAGNOSIS — I10 BENIGN ESSENTIAL HYPERTENSION: ICD-10-CM

## 2020-07-01 DIAGNOSIS — E11.9 CONTROLLED TYPE 2 DIABETES MELLITUS WITHOUT COMPLICATION, WITH LONG-TERM CURRENT USE OF INSULIN (HCC): ICD-10-CM

## 2020-07-01 DIAGNOSIS — Z79.4 CONTROLLED TYPE 2 DIABETES MELLITUS WITHOUT COMPLICATION, WITH LONG-TERM CURRENT USE OF INSULIN (HCC): ICD-10-CM

## 2020-07-01 PROCEDURE — 1036F TOBACCO NON-USER: CPT | Performed by: INTERNAL MEDICINE

## 2020-07-01 PROCEDURE — 1101F PT FALLS ASSESS-DOCD LE1/YR: CPT | Performed by: INTERNAL MEDICINE

## 2020-07-01 PROCEDURE — 1160F RVW MEDS BY RX/DR IN RCRD: CPT | Performed by: INTERNAL MEDICINE

## 2020-07-01 PROCEDURE — 99214 OFFICE O/P EST MOD 30 MIN: CPT | Performed by: INTERNAL MEDICINE

## 2020-07-01 PROCEDURE — 3044F HG A1C LEVEL LT 7.0%: CPT | Performed by: INTERNAL MEDICINE

## 2020-07-01 PROCEDURE — 4040F PNEUMOC VAC/ADMIN/RCVD: CPT | Performed by: INTERNAL MEDICINE

## 2020-07-01 PROCEDURE — 3078F DIAST BP <80 MM HG: CPT | Performed by: INTERNAL MEDICINE

## 2020-07-01 PROCEDURE — 3008F BODY MASS INDEX DOCD: CPT | Performed by: INTERNAL MEDICINE

## 2020-07-01 PROCEDURE — 3288F FALL RISK ASSESSMENT DOCD: CPT | Performed by: INTERNAL MEDICINE

## 2020-07-01 PROCEDURE — 2022F DILAT RTA XM EVC RTNOPTHY: CPT | Performed by: INTERNAL MEDICINE

## 2020-07-01 PROCEDURE — 3077F SYST BP >= 140 MM HG: CPT | Performed by: INTERNAL MEDICINE

## 2020-07-01 NOTE — LETTER
Diabetic Eye Exam Form    Date Requested: 20  Patient: Raul Valladares  Patient : 1950   Referring Provider: Sravanthi Deleon,     Dilated Retinal Exam, Optomap-Iris Exam, or Fundus Photography Done         Yes ([place] one above)         No     Date of Diabetic Eye Exam ___________3/2020___________________  Left Eye      Exam did show retinopathy    Exam did not show retinopathy         Mild       Moderate       None       Proliferative       Severe     Right Eye     Exam did show retinopathy    Exam did not show retinopathy         Mild       Moderate       None       Proliferative       Severe     Comments __________________________________________________________    Practice Providing Exam ______________________________________________    Exam Performed By (print name) _______________________________________      Provider Signature ___________________________________________________      These reports are needed for  compliance    Please fax this completed form and a copy of the Diabetic Eye Exam report to our office located at [place] [address] as soon as possible to [phone] attention Jaxon: Phone [phone]    We thank you for your assistance in treating our mutual patient

## 2020-07-01 NOTE — PROGRESS NOTES
Assessment/Plan:  Regarding her weight gain she is going to go on a strip nutritional plan  Her A1c was stable at 6 4  Cholesterol is 129 with a triglycerides of 173  Erica Blend She really needs to lose weight  She is going to go on a program   She is going to see me in 2 months to see how she is doing  1  Acquired hypothyroidism     2  Benign essential hypertension     3  Dysmetabolic syndrome X     4  Abnormal weight gain     5  Controlled type 2 diabetes mellitus without complication, with long-term current use of insulin ([address] )         No orders of the defined types were placed in this encounter  Subjective:  She comes in for follow-up  Blood sugars are under good control but she has gained a lot a weight  She is up 16 lb  Blood pressure is good and she has no cardiac complaints  Up-to-date on eye checkups  Patient ID: Maureen Becerra is a 65 y o  female  HPI    The following portions of the patient's history were reviewed and updated as appropriate:   She has a past medical history of Abnormal mammogram, Abnormal weight gain, Achilles tendinitis, Asthma, Diabetes mellitus ([address] ), Disc degeneration, lumbar, Disease of thyroid gland, Dyslipidemia, Early satiety, Edema, Endometrial cancer ([address] ) (2018), Enthesopathy, Esophagitis, reflux, GERD (gastroesophageal reflux disease), Hard to intubate, Hypercholesterolemia, Hypertension, IBS (irritable bowel syndrome), Irritable bowel syndrome, Osteoarthritis, Rosacea, and Sacroiliitis ([address] )  ,  does not have any pertinent problems on file  ,   has a past surgical history that includes  section; Foot surgery (Right); SHOULDER ARTHROPLASTY; Sinus surgery; Tonsillectomy; Replacement total knee (Right); Tubal ligation; Wrist surgery; Elbow surgery; Knee arthroscopy w/ meniscal repair (Right); Colonoscopy; Knee arthroscopy; Joint replacement (Left); Fracture surgery; Fracture surgery (Left);  Esophagogastroduodenoscopy; pr laparoscopy w tot hysterectuterus <=250 gram  w tube/ovary (N/A, 7/30/2018); and Hysterectomy (Bilateral, 07/28/2018)  ,  family history includes Arthritis in her mother; Brain cancer in her maternal grandmother; Breast cancer in her maternal grandmother; Breast cancer (age of onset: 54) in her maternal aunt and maternal aunt; Breast cancer (age of onset: 72) in her maternal aunt; Breast cancer (age of onset: 77) in her mother; Diabetes in her mother; Heart disease in her father and mother; Hiatal hernia in her child, father, mother, and sister; Hypertension in her mother; Irritable bowel syndrome in her daughter and mother; Lung cancer (age of onset: 68) in her sister; Feura Bush Melena Hypertension in her son; No Known Problems in her brother, maternal grandfather, paternal aunt, paternal grandfather, and paternal grandmother; Osteoporosis in her mother; Other in her child, father, mother, and sister; Stroke in her maternal aunt and mother; Thyroid disease in her mother and sister; Ulcers in her father; Uterine cancer in her maternal aunt and mother  ,   reports that she has never smoked  She has never used smokeless tobacco  She reports that she drinks about 3 0 standard drinks of alcohol per week  She reports that she does not use drugs  ,  is allergic to amoxicillin-pot clavulanate; erythromycin; meperidine; morphine; penicillins; relafen [nabumetone]; darvon [propoxyphene]; methocarbamol; reglan [metoclopramide]; sulfa antibiotics; and tetracyclines & related       Current Outpatient Medications:     aspirin (ASPIR-81) 81 mg EC tablet, Take by mouth, Disp: , Rfl:     BD VEO INSULIN SYRINGE U/F 31G X 15/64" 0 3 ML MISC, INJECT UNDER THE SKIN DAILY AT BEDTIME AND AS NEEDED ACCORDING TO SLIDING SCALE, Disp: 100 each, Rfl: 10    cetirizine (ZyrTEC) 10 MG chewable tablet, Chew 10 mg daily, Disp: , Rfl:     clindamycin (CLEOCIN) 150 mg capsule, Take 150 mg by mouth 4 (four) times a day, Disp: , Rfl: 0    clotrimazole-betamethasone (LOTRISONE) 1-0 05 % cream, Apply topically QHS as needed, Disp: 45 g, Rfl: 3    ezetimibe (ZETIA) 10 mg tablet, Take 1 tablet (10 mg total) by mouth daily, Disp: 90 tablet, Rfl: 3    furosemide (LASIX) 20 mg tablet, TAKE 1 TABLET DAILY, Disp: 90 tablet, Rfl: 4    glipiZIDE (GLUCOTROL XL) 2 5 mg 24 hr tablet, TAKE 1 TABLET DAILY, Disp: 90 tablet, Rfl: 3    glucose blood test strip, 1 each by Other route 3 (three) times a day Use as instructed, Disp: 300 each, Rfl: 3    insulin lispro (HumaLOG) 100 units/mL injection, Only when on steroids or with a sick day, Disp: 10 mL, Rfl: 3    Insulin Syringe-Needle U-100 (INSULIN SYRINGE 1CC/31GX5/16") 31G X 5/16" 1 ML MISC, BD Veo Insulin Syringe Ultra-Fine 0 3 mL 31 gauge x 15/64", Disp: , Rfl:     KRILL OIL PO, Take by mouth, Disp: , Rfl:     levothyroxine 75 mcg tablet, TAKE 1 TABLET DAILY, Disp: 90 tablet, Rfl: 4    LINZESS 145 MCG CAPS, Take 1 capsule (145 mcg total) by mouth daily before breakfast Take 30 minutes before, Disp: 90 capsule, Rfl: 2    Magnesium Oxide 400 MG CAPS, Take by mouth, Disp: , Rfl:     metFORMIN (GLUCOPHAGE-XR) 500 mg 24 hr tablet, TAKE 1 TABLET IN THE MORNING AND 2 TABLETS IN THE EVENING, Disp: 270 tablet, Rfl: 3    olmesartan (BENICAR) 20 mg tablet, TAKE 1 TABLET DAILY, Disp: 90 tablet, Rfl: 3    Omega-3 Fatty Acids (FISH OIL) 1200 MG CAPS, Take by mouth, Disp: , Rfl:     pantoprazole (PROTONIX) 40 mg tablet, TAKE 1 TABLET DAILY, Disp: 90 tablet, Rfl: 4    potassium chloride (MICRO-K) 10 MEQ CR capsule, TAKE 1 CAPSULE DAILY, Disp: 90 capsule, Rfl: 4    rosuvastatin (CRESTOR) 20 MG tablet, TAKE 1 TABLET DAILY, Disp: 90 tablet, Rfl: 4    saccharomyces boulardii (FLORASTOR) 250 mg capsule, Take 250 mg by mouth 3 (three) times a day, Disp: , Rfl:     Current Facility-Administered Medications:     albuterol inhalation solution 2 5 mg, 2 5 mg, Nebulization, Q6H PRN, Ty Nelson MD    Review of Systems   Constitutional: Positive for appetite change and unexpected weight change  Negative for activity change, chills, diaphoresis, fatigue and fever  HENT: Negative for congestion, ear pain, hearing loss, mouth sores, nosebleeds, postnasal drip, sinus pressure, sinus pain, sore throat and trouble swallowing  Eyes: Positive for visual disturbance  Negative for pain and discharge  Respiratory: Negative for apnea, cough, chest tightness, shortness of breath and wheezing  Cardiovascular: Negative for chest pain, palpitations and leg swelling  Gastrointestinal: Negative for abdominal pain, anal bleeding, blood in stool, constipation, diarrhea, nausea and vomiting  Endocrine: Negative for polydipsia and polyphagia  Type 2 diabetes  A1c stable at 6 4   Genitourinary: Negative for decreased urine volume, dysuria, flank pain, frequency, hematuria and urgency  Musculoskeletal: Negative for arthralgias, back pain, gait problem, joint swelling and myalgias  Skin: Negative for rash and wound  Allergic/Immunologic: Negative for environmental allergies and food allergies  Neurological: Negative for dizziness, tremors, seizures, syncope, speech difficulty, light-headedness, numbness and headaches  Hematological: Negative for adenopathy  Does not bruise/bleed easily  Psychiatric/Behavioral: Negative for agitation, confusion, hallucinations, sleep disturbance and suicidal ideas  The patient is not nervous/anxious  Objective:  /76 (BP Location: Left arm, Patient Position: Sitting)   Pulse 76   Temp (!) 97 4 °F (36 3 °C) (Tympanic)   Resp 12   Ht 5' 2" (1 575 m)   Wt 97 3 kg (214 lb 9 6 oz)   BMI 39 25 kg/m²      Physical Exam   Constitutional: She appears well-developed  No distress  Weight is up to 214 lb  Blood pressure is 130/84  Heart rhythm is regular  Rate is 80  HENT:   Head: Normocephalic     Right Ear: External ear normal    Left Ear: External ear normal    Nose: Nose normal    Mouth/Throat: Oropharynx is clear and moist  No oropharyngeal exudate  Eyes: Pupils are equal, round, and reactive to light  Conjunctivae and EOM are normal  Right eye exhibits no discharge  Left eye exhibits no discharge  Neck: Normal range of motion  Neck supple  No thyromegaly present  Cardiovascular: Normal rate, regular rhythm, normal heart sounds and intact distal pulses  Exam reveals no gallop and no friction rub  Pulses are no weak pulses  No murmur heard  Pulses:       Dorsalis pedis pulses are 2+ on the right side, and 2+ on the left side  Posterior tibial pulses are 2+ on the right side, and 2+ on the left side  Pulmonary/Chest: Effort normal and breath sounds normal  No respiratory distress  She has no wheezes  She has no rales  Abdominal: Soft  Bowel sounds are normal  She exhibits no distension and no mass  There is no tenderness  There is no rebound and no guarding  Musculoskeletal: Normal range of motion  She exhibits no edema, tenderness or deformity  Feet:   Right Foot:   Skin Integrity: Positive for callus and dry skin  Negative for ulcer, skin breakdown, erythema or warmth  Left Foot:   Skin Integrity: Positive for callus and dry skin  Negative for ulcer, skin breakdown, erythema or warmth  Lymphadenopathy:     She has no cervical adenopathy  Neurological: She is alert  She has normal reflexes  She displays normal reflexes  No cranial nerve deficit  Coordination normal    Skin: Skin is warm and dry  No rash noted  No erythema  Psychiatric: She has a normal mood and affect  Her behavior is normal  Judgment and thought content normal    Nursing note and vitals reviewed  Right Foot/Ankle   Right Foot Inspection  Skin Exam: skin normal, skin intact, dry skin, callus and callus no warmth, no erythema, no maceration, no abnormal color, no pre-ulcer and no ulcer                            Sensory   Vibration: intact  Proprioception: intact     Vascular    The right DP pulse is 2+   The right PT pulse is 2+  Left Foot/Ankle  Left Foot Inspection  Skin Exam: skin normal, skin intact, dry skin and callusno warmth, no erythema, no maceration, normal color, no pre-ulcer and no ulcer                                         Sensory   Vibration: intact  Proprioception: intact    Vascular    The left DP pulse is 2+  The left PT pulse is 2+  Assign Risk Category:  No deformity present; No loss of protective sensation; No weak pulses       Risk: 0      Recent Results (from the past 1008 hour(s))   Microalbumin / creatinine urine ratio    Collection Time: 06/22/20  8:17 AM   Result Value Ref Range    Creatinine, Ur 129 0 mg/dL    Microalbum  ,U,Random 8 6 0 0 - 20 0 mg/L    Microalb Creat Ratio 7 0 - 30 mg/g creatinine   Comprehensive metabolic panel    Collection Time: 06/22/20  8:17 AM   Result Value Ref Range    Sodium 136 136 - 145 mmol/L    Potassium 4 1 3 5 - 5 3 mmol/L    Chloride 106 100 - 108 mmol/L    CO2 23 21 - 32 mmol/L    ANION GAP 7 4 - 13 mmol/L    BUN 36 (H) 5 - 25 mg/dL    Creatinine 1 13 0 60 - 1 30 mg/dL    Glucose, Fasting 139 (H) 65 - 99 mg/dL    Calcium 10 0 8 3 - 10 1 mg/dL    AST 29 5 - 45 U/L    ALT 50 12 - 78 U/L    Alkaline Phosphatase 78 46 - 116 U/L    Total Protein 7 7 6 4 - 8 2 g/dL    Albumin 4 1 3 5 - 5 0 g/dL    Total Bilirubin 0 60 0 20 - 1 00 mg/dL    eGFR 49 ml/min/1 73sq m   HEMOGLOBIN A1C W/ EAG ESTIMATION    Collection Time: 06/22/20  8:17 AM   Result Value Ref Range    Hemoglobin A1C 6 4 (H) Normal 3 8-5 6%; PreDiabetic 5 7-6 4%;  Diabetic >=6 5%; Glycemic control for adults with diabetes <7 0% %     mg/dl   Lipid panel    Collection Time: 06/22/20  8:17 AM   Result Value Ref Range    Cholesterol 129 50 - 200 mg/dL    Triglycerides 173 (H) <=150 mg/dL    HDL, Direct 38 (L) >=40 mg/dL    LDL Calculated 56 0 - 100 mg/dL    Non-HDL-Chol (CHOL-HDL) 91 mg/dl   T4, free    Collection Time: 06/22/20  8:17 AM   Result Value Ref Range    Free T4 1 22 0 76 - 1 46 ng/dL   TSH, 3rd generation    Collection Time: 06/22/20  8:17 AM   Result Value Ref Range    TSH 3RD GENERATON 2 370 0 358 - 3 740 uIU/mL

## 2020-07-01 NOTE — TELEPHONE ENCOUNTER
----- Message from Camila Nguyen sent at 7/1/2020 11:55 AM EDT -----  Regarding: Eye Exam [facility]  Contact: [phone]  07/01/20 11:55 AM    Hello, our patient Cesilia Whitman has had Diabetic Eye Exam completed/performed  Please assist in updating the patient chart by making an External outreach to Dr Nyla Hi facility located in [place]  The date of service is 3/2020      Thank you,  Camila [address] 62

## 2020-07-01 NOTE — TELEPHONE ENCOUNTER
Upon review of the In Basket request and the patient's chart, initial outreach has been made via fax, please see Contacts section for details  A second outreach attempt will be made within 5 business days      Thank you  Nino Bentley

## 2020-07-06 NOTE — TELEPHONE ENCOUNTER
Upon review of the In Basket request we were able to locate, review, and update the patient chart as requested for Diabetic Eye Exam     Any additional questions or concerns should be emailed to the Practice Liaisons via [email or web address] email, please do not reply via In Basket      Thank you  Haresh Perez

## 2020-08-12 ENCOUNTER — OFFICE VISIT (OUTPATIENT)
Dept: GYNECOLOGIC ONCOLOGY | Facility: CLINIC | Age: 70
End: 2020-08-12
Payer: COMMERCIAL

## 2020-08-12 VITALS
SYSTOLIC BLOOD PRESSURE: 128 MMHG | TEMPERATURE: 97.8 F | WEIGHT: 215 LBS | HEART RATE: 76 BPM | BODY MASS INDEX: 39.56 KG/M2 | DIASTOLIC BLOOD PRESSURE: 72 MMHG | HEIGHT: 62 IN | RESPIRATION RATE: 18 BRPM

## 2020-08-12 DIAGNOSIS — L90.0 LICHEN SCLEROSUS: ICD-10-CM

## 2020-08-12 DIAGNOSIS — Z85.42 HISTORY OF ENDOMETRIAL CANCER: Primary | ICD-10-CM

## 2020-08-12 PROCEDURE — 1036F TOBACCO NON-USER: CPT | Performed by: OBSTETRICS & GYNECOLOGY

## 2020-08-12 PROCEDURE — 3044F HG A1C LEVEL LT 7.0%: CPT | Performed by: OBSTETRICS & GYNECOLOGY

## 2020-08-12 PROCEDURE — 1160F RVW MEDS BY RX/DR IN RCRD: CPT | Performed by: OBSTETRICS & GYNECOLOGY

## 2020-08-12 PROCEDURE — 3078F DIAST BP <80 MM HG: CPT | Performed by: OBSTETRICS & GYNECOLOGY

## 2020-08-12 PROCEDURE — 2022F DILAT RTA XM EVC RTNOPTHY: CPT | Performed by: OBSTETRICS & GYNECOLOGY

## 2020-08-12 PROCEDURE — 3008F BODY MASS INDEX DOCD: CPT | Performed by: OBSTETRICS & GYNECOLOGY

## 2020-08-12 PROCEDURE — 99213 OFFICE O/P EST LOW 20 MIN: CPT | Performed by: OBSTETRICS & GYNECOLOGY

## 2020-08-12 PROCEDURE — 4040F PNEUMOC VAC/ADMIN/RCVD: CPT | Performed by: OBSTETRICS & GYNECOLOGY

## 2020-08-12 PROCEDURE — 3074F SYST BP LT 130 MM HG: CPT | Performed by: OBSTETRICS & GYNECOLOGY

## 2020-08-12 NOTE — ASSESSMENT & PLAN NOTE
Patient has a flare of lichen sclerosis  She use steroid cream as she has been on an as-needed basis    We have recommended twice per day for 2 weeks then reduce

## 2020-08-12 NOTE — PROGRESS NOTES
Assessment/Plan:    Problem List Items Addressed This Visit        Musculoskeletal and Integument    Lichen sclerosus     Patient has a flare of lichen sclerosis  She use steroid cream as she has been on an as-needed basis  We have recommended twice per day for 2 weeks then reduce            Other    History of endometrial cancer - Primary     Patient has a history of stage IA minimally invasive grade 1 endometrial cancer treated with surgery alone 2 years ago  She has no evidence of recurrence at this time  Current recommendations state that annual exams can now be performed  We have therefore recommended the patient return for annual exams with Yunior Sanford  As her recurrence rate is in the 1 or 2% range  The patient was notified that if she has any vaginal bleeding she can call us for repeat evaluation at that time  Patient will follow-up in 1 year with her general OBGYN provider  CHIEF COMPLAINT:  Surveillance endometrial cancer      Problem:  Cancer Staging  History of endometrial cancer  Staging form: Corpus Uteri - Carcinoma, AJCC 8th Edition  - Clinical stage from 7/30/2018: FIGO Stage IA - Signed by Jus Marti MD PhD on 8/10/2018        Previous therapy:  Oncology History   History of endometrial cancer   6/14/2018 Biopsy    FIGO 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  Biopsy performed by Dr Charlie Diaz  7/30/2018 Surgery    Robotic assisted total laparoscopic hysterectomy, bilateral salpingo-oophorectomy and pelvic lymph node dissection  Final pathology revealed stage IA grade 1 endometrioid adenocarcinoma (3 3 x 1 7 cm, 2/12mm invasion, NO LVSI, negative washings)     7/30/2018 Genetic Testing    Torrez testing negative           Patient ID: Radha Ibrahim is a 65 y o  female  Patient is very pleasant 77-year-old female with history of stage IA endometrial adenocarcinoma treated with surgery alone in 2018  She presents for follow-up    She was last seen 6 months ago and had no evidence of recurrence  In the interim she has had no interval change  Today, the patient is doing well  She denies significant abdominal pain, pelvic pain, nausea, vomiting, constipation, diarrhea, fevers, chills, or vaginal bleeding  She has had a small flare of her lichen sclerosis but does have steroid at home to place on this for control  She also sees Mac Soni for annual care  She is up-to-date with breast exams  The following portions of the patient's history were reviewed and updated as appropriate: allergies, current medications, past medical history, past social history, past surgical history and problem list     Review of Systems   Constitutional: Negative  HENT: Negative  Eyes: Negative  Respiratory: Negative  Cardiovascular: Negative  Gastrointestinal: Negative  Endocrine: Negative  Genitourinary: Negative  Musculoskeletal: Negative  Skin: Negative  Neurological: Negative  Hematological: Negative  Psychiatric/Behavioral: Negative          Current Outpatient Medications   Medication Sig Dispense Refill    aspirin (ASPIR-81) 81 mg EC tablet Take by mouth      BD VEO INSULIN SYRINGE U/F 31G X 15/64" 0 3 ML MISC INJECT UNDER THE SKIN DAILY AT BEDTIME AND AS NEEDED ACCORDING TO SLIDING SCALE 100 each 10    cetirizine (ZyrTEC) 10 MG chewable tablet Chew 10 mg daily      clindamycin (CLEOCIN) 150 mg capsule Take 150 mg by mouth 4 (four) times a day  0    clotrimazole-betamethasone (LOTRISONE) 1-0 05 % cream Apply topically QHS as needed 45 g 3    ezetimibe (ZETIA) 10 mg tablet Take 1 tablet (10 mg total) by mouth daily 90 tablet 3    furosemide (LASIX) 20 mg tablet TAKE 1 TABLET DAILY 90 tablet 4    glipiZIDE (GLUCOTROL XL) 2 5 mg 24 hr tablet TAKE 1 TABLET DAILY 90 tablet 3    glucose blood test strip 1 each by Other route 3 (three) times a day Use as instructed 300 each 3    insulin lispro (HumaLOG) 100 units/mL injection Only when on steroids or with a sick day 10 mL 3    Insulin Syringe-Needle U-100 (INSULIN SYRINGE 1CC/31GX5/16") 31G X 5/16" 1 ML MISC BD Veo Insulin Syringe Ultra-Fine 0 3 mL 31 gauge x 15/64"      KRILL OIL PO Take by mouth      levothyroxine 75 mcg tablet TAKE 1 TABLET DAILY 90 tablet 4    LINZESS 145 MCG CAPS Take 1 capsule (145 mcg total) by mouth daily before breakfast Take 30 minutes before 90 capsule 2    Magnesium Oxide 400 MG CAPS Take by mouth      metFORMIN (GLUCOPHAGE-XR) 500 mg 24 hr tablet TAKE 1 TABLET IN THE MORNING AND 2 TABLETS IN THE EVENING 270 tablet 3    olmesartan (BENICAR) 20 mg tablet TAKE 1 TABLET DAILY 90 tablet 3    Omega-3 Fatty Acids (FISH OIL) 1200 MG CAPS Take by mouth      pantoprazole (PROTONIX) 40 mg tablet TAKE 1 TABLET DAILY 90 tablet 4    potassium chloride (MICRO-K) 10 MEQ CR capsule TAKE 1 CAPSULE DAILY 90 capsule 4    rosuvastatin (CRESTOR) 20 MG tablet TAKE 1 TABLET DAILY 90 tablet 4    saccharomyces boulardii (FLORASTOR) 250 mg capsule Take 250 mg by mouth 3 (three) times a day       Current Facility-Administered Medications   Medication Dose Route Frequency Provider Last Rate Last Dose    albuterol inhalation solution 2 5 mg  2 5 mg Nebulization Q6H PRN Solomon García MD               Objective:    Blood pressure 128/72, pulse 76, temperature 97 8 °F (36 6 °C), resp  rate 18, height 5' 2" (1 575 m), weight 97 5 kg (215 lb), not currently breastfeeding  Body mass index is 39 32 kg/m²  Body surface area is 1 97 meters squared  Physical Exam  Constitutional:       Appearance: She is well-developed  HENT:      Head: Normocephalic and atraumatic  Neck:      Musculoskeletal: Normal range of motion and neck supple  Thyroid: No thyromegaly  Cardiovascular:      Rate and Rhythm: Normal rate and regular rhythm  Heart sounds: Normal heart sounds     Pulmonary:      Effort: Pulmonary effort is normal       Breath sounds: Normal breath sounds  Abdominal:      General: Bowel sounds are normal       Palpations: Abdomen is soft  Comments: Well healed laparoscopic incisions  Genitourinary:     Comments: -Normal external female genitalia, normal Bartholin's and Placedo's glands                  -Normal midline urethral meatus  No lesions notes                  -Bladder without fullness mass or tenderness                  -Vagina without lesion or discharge No significant cystocele or rectocele noted                  -Cervix surgically absent                  -Uterus surgically absent                  -Adnexae surgically absent                  - Anus without fissure of lesion    Musculoskeletal: Normal range of motion  Lymphadenopathy:      Cervical: No cervical adenopathy  Skin:     General: Skin is warm and dry  Neurological:      Mental Status: She is alert and oriented to person, place, and time     Psychiatric:         Behavior: Behavior normal

## 2020-08-12 NOTE — ASSESSMENT & PLAN NOTE
Patient has a history of stage IA minimally invasive grade 1 endometrial cancer treated with surgery alone 2 years ago  She has no evidence of recurrence at this time  Current recommendations state that annual exams can now be performed  We have therefore recommended the patient return for annual exams with Bautista Boning  As her recurrence rate is in the 1 or 2% range  The patient was notified that if she has any vaginal bleeding she can call us for repeat evaluation at that time  Patient will follow-up in 1 year with her general OBGYN provider

## 2020-08-12 NOTE — LETTER
August 12, 2020     Karen Jj DO  [address] [facility] [id number]    Patient: Raven Mahan   YOB: 1950   Date of Visit: 8/12/2020       Dear Dr Lina Robison: Thank you for referring Virgen Cintron to me for evaluation  Below are my notes for this consultation  If you have questions, please do not hesitate to call me  I look forward to following your patient along with you  Sincerely,        Eric Salas MD        CC: PRESLEY Cameron MD  8/12/2020  8:51 AM  Sign when Signing Visit  Assessment/Plan:    Problem List Items Addressed This Visit        Musculoskeletal and Integument    Lichen sclerosus     Patient has a flare of lichen sclerosis  She use steroid cream as she has been on an as-needed basis  We have recommended twice per day for 2 weeks then reduce            Other    History of endometrial cancer - Primary     Patient has a history of stage IA minimally invasive grade 1 endometrial cancer treated with surgery alone 2 years ago  She has no evidence of recurrence at this time  Current recommendations state that annual exams can now be performed  We have therefore recommended the patient return for annual exams with [facility] Grey  As her recurrence rate is in the 1 or 2% range  The patient was notified that if she has any vaginal bleeding she can call us for repeat evaluation at that time  Patient will follow-up in 1 year with her general OBGYN provider  CHIEF COMPLAINT:  Surveillance endometrial cancer      Problem:  Cancer Staging  History of endometrial cancer  Staging form: Corpus Uteri - Carcinoma, AJCC 8th Edition  - Clinical stage from 7/30/2018: FIGO Stage IA - Signed by Karen Ramirez MD PhD on 8/10/2018        Previous therapy:  Oncology History   History of endometrial cancer   6/14/2018 Biopsy    FIGO 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    Biopsy performed by Dr Radha Dutta Gui Merino  7/30/2018 Surgery    Robotic assisted total laparoscopic hysterectomy, bilateral salpingo-oophorectomy and pelvic lymph node dissection  Final pathology revealed stage IA grade 1 endometrioid adenocarcinoma (3 3 x 1 7 cm, 2/12mm invasion, NO LVSI, negative washings)     7/30/2018 Genetic Testing    Torrez testing negative           Patient ID: Etienne Kraft is a 65 y o  female  Patient is very pleasant 68-year-old female with history of stage IA endometrial adenocarcinoma treated with surgery alone in 2018  She presents for follow-up  She was last seen 6 months ago and had no evidence of recurrence  In the interim she has had no interval change  Today, the patient is doing well  She denies significant abdominal pain, pelvic pain, nausea, vomiting, constipation, diarrhea, fevers, chills, or vaginal bleeding  She has had a small flare of her lichen sclerosis but does have steroid at home to place on this for control  She also sees Liam Morris for annual care  She is up-to-date with breast exams  The following portions of the patient's history were reviewed and updated as appropriate: allergies, current medications, past medical history, past social history, past surgical history and problem list     Review of Systems   Constitutional: Negative  HENT: Negative  Eyes: Negative  Respiratory: Negative  Cardiovascular: Negative  Gastrointestinal: Negative  Endocrine: Negative  Genitourinary: Negative  Musculoskeletal: Negative  Skin: Negative  Neurological: Negative  Hematological: Negative  Psychiatric/Behavioral: Negative          Current Outpatient Medications   Medication Sig Dispense Refill    aspirin (ASPIR-81) 81 mg EC tablet Take by mouth      BD VEO INSULIN SYRINGE U/F 31G X 15/64" 0 3 ML MISC INJECT UNDER THE SKIN DAILY AT BEDTIME AND AS NEEDED ACCORDING TO SLIDING SCALE 100 each 10    cetirizine (ZyrTEC) 10 MG chewable tablet Chew 10 mg daily      clindamycin (CLEOCIN) 150 mg capsule Take 150 mg by mouth 4 (four) times a day  0    clotrimazole-betamethasone (LOTRISONE) 1-0 05 % cream Apply topically QHS as needed 45 g 3    ezetimibe (ZETIA) 10 mg tablet Take 1 tablet (10 mg total) by mouth daily 90 tablet 3    furosemide (LASIX) 20 mg tablet TAKE 1 TABLET DAILY 90 tablet 4    glipiZIDE (GLUCOTROL XL) 2 5 mg 24 hr tablet TAKE 1 TABLET DAILY 90 tablet 3    glucose blood test strip 1 each by Other route 3 (three) times a day Use as instructed 300 each 3    insulin lispro (HumaLOG) 100 units/mL injection Only when on steroids or with a sick day 10 mL 3    Insulin Syringe-Needle U-100 (INSULIN SYRINGE 1CC/31GX5/16") 31G X 5/16" 1 ML MISC BD Veo Insulin Syringe Ultra-Fine 0 3 mL 31 gauge x 15/64"      KRILL OIL PO Take by mouth      levothyroxine 75 mcg tablet TAKE 1 TABLET DAILY 90 tablet 4    LINZESS 145 MCG CAPS Take 1 capsule (145 mcg total) by mouth daily before breakfast Take 30 minutes before 90 capsule 2    Magnesium Oxide 400 MG CAPS Take by mouth      metFORMIN (GLUCOPHAGE-XR) 500 mg 24 hr tablet TAKE 1 TABLET IN THE MORNING AND 2 TABLETS IN THE EVENING 270 tablet 3    olmesartan (BENICAR) 20 mg tablet TAKE 1 TABLET DAILY 90 tablet 3    Omega-3 Fatty Acids (FISH OIL) 1200 MG CAPS Take by mouth      pantoprazole (PROTONIX) 40 mg tablet TAKE 1 TABLET DAILY 90 tablet 4    potassium chloride (MICRO-K) 10 MEQ CR capsule TAKE 1 CAPSULE DAILY 90 capsule 4    rosuvastatin (CRESTOR) 20 MG tablet TAKE 1 TABLET DAILY 90 tablet 4    saccharomyces boulardii (FLORASTOR) 250 mg capsule Take 250 mg by mouth 3 (three) times a day       Current Facility-Administered Medications   Medication Dose Route Frequency Provider Last Rate Last Dose    albuterol inhalation solution 2 5 mg  2 5 mg Nebulization Q6H PRN Antionette Asencio MD               Objective:    Blood pressure 128/72, pulse 76, temperature 97 8 °F (36 6 °C), resp   rate 18, height 5' 2" (1 575 m), weight 97 5 kg (215 lb), not currently breastfeeding  Body mass index is 39 32 kg/m²  Body surface area is 1 97 meters squared  Physical Exam  Constitutional:       Appearance: She is well-developed  HENT:      Head: Normocephalic and atraumatic  Neck:      Musculoskeletal: Normal range of motion and neck supple  Thyroid: No thyromegaly  Cardiovascular:      Rate and Rhythm: Normal rate and regular rhythm  Heart sounds: Normal heart sounds  Pulmonary:      Effort: Pulmonary effort is normal       Breath sounds: Normal breath sounds  Abdominal:      General: Bowel sounds are normal       Palpations: Abdomen is soft  Comments: Well healed laparoscopic incisions  Genitourinary:     Comments: -Normal external female genitalia, normal Bartholin's and Pacheco's glands                  -Normal midline urethral meatus  No lesions notes                  -Bladder without fullness mass or tenderness                  -Vagina without lesion or discharge No significant cystocele or rectocele noted                  -Cervix surgically absent                  -Uterus surgically absent                  -Adnexae surgically absent                  - Anus without fissure of lesion    Musculoskeletal: Normal range of motion  Lymphadenopathy:      Cervical: No cervical adenopathy  Skin:     General: Skin is warm and dry  Neurological:      Mental Status: She is alert and oriented to person, place, and time     Psychiatric:         Behavior: Behavior normal

## 2020-09-30 ENCOUNTER — OFFICE VISIT (OUTPATIENT)
Dept: INTERNAL MEDICINE CLINIC | Facility: CLINIC | Age: 70
End: 2020-09-30
Payer: COMMERCIAL

## 2020-09-30 VITALS
DIASTOLIC BLOOD PRESSURE: 80 MMHG | OXYGEN SATURATION: 97 % | TEMPERATURE: 97.1 F | HEIGHT: 62 IN | BODY MASS INDEX: 38.64 KG/M2 | HEART RATE: 98 BPM | WEIGHT: 210 LBS | SYSTOLIC BLOOD PRESSURE: 158 MMHG

## 2020-09-30 DIAGNOSIS — E78.5 DYSLIPIDEMIA, GOAL LDL BELOW 70: ICD-10-CM

## 2020-09-30 DIAGNOSIS — Z23 ENCOUNTER FOR IMMUNIZATION: Primary | ICD-10-CM

## 2020-09-30 DIAGNOSIS — I10 BENIGN ESSENTIAL HYPERTENSION: ICD-10-CM

## 2020-09-30 DIAGNOSIS — E11.9 CONTROLLED TYPE 2 DIABETES MELLITUS WITHOUT COMPLICATION, WITH LONG-TERM CURRENT USE OF INSULIN (HCC): ICD-10-CM

## 2020-09-30 DIAGNOSIS — Z79.4 CONTROLLED TYPE 2 DIABETES MELLITUS WITHOUT COMPLICATION, WITH LONG-TERM CURRENT USE OF INSULIN (HCC): ICD-10-CM

## 2020-09-30 DIAGNOSIS — E03.9 ACQUIRED HYPOTHYROIDISM: ICD-10-CM

## 2020-09-30 PROCEDURE — 90715 TDAP VACCINE 7 YRS/> IM: CPT | Performed by: INTERNAL MEDICINE

## 2020-09-30 PROCEDURE — 3079F DIAST BP 80-89 MM HG: CPT | Performed by: INTERNAL MEDICINE

## 2020-09-30 PROCEDURE — 90472 IMMUNIZATION ADMIN EACH ADD: CPT | Performed by: INTERNAL MEDICINE

## 2020-09-30 PROCEDURE — 90471 IMMUNIZATION ADMIN: CPT | Performed by: INTERNAL MEDICINE

## 2020-09-30 PROCEDURE — 1036F TOBACCO NON-USER: CPT | Performed by: INTERNAL MEDICINE

## 2020-09-30 PROCEDURE — 1160F RVW MEDS BY RX/DR IN RCRD: CPT | Performed by: INTERNAL MEDICINE

## 2020-09-30 PROCEDURE — 90662 IIV NO PRSV INCREASED AG IM: CPT | Performed by: INTERNAL MEDICINE

## 2020-09-30 PROCEDURE — 99214 OFFICE O/P EST MOD 30 MIN: CPT | Performed by: INTERNAL MEDICINE

## 2020-09-30 PROCEDURE — 3077F SYST BP >= 140 MM HG: CPT | Performed by: INTERNAL MEDICINE

## 2020-11-02 ENCOUNTER — OFFICE VISIT (OUTPATIENT)
Dept: INTERNAL MEDICINE CLINIC | Facility: CLINIC | Age: 70
End: 2020-11-02
Payer: COMMERCIAL

## 2020-11-02 ENCOUNTER — OFFICE VISIT (OUTPATIENT)
Dept: DERMATOLOGY | Facility: CLINIC | Age: 70
End: 2020-11-02
Payer: COMMERCIAL

## 2020-11-02 VITALS
TEMPERATURE: 97.6 F | HEART RATE: 90 BPM | WEIGHT: 210.4 LBS | HEIGHT: 62 IN | BODY MASS INDEX: 38.72 KG/M2 | SYSTOLIC BLOOD PRESSURE: 158 MMHG | DIASTOLIC BLOOD PRESSURE: 78 MMHG | OXYGEN SATURATION: 97 %

## 2020-11-02 VITALS — TEMPERATURE: 98.2 F

## 2020-11-02 DIAGNOSIS — Z79.4 CONTROLLED TYPE 2 DIABETES MELLITUS WITHOUT COMPLICATION, WITH LONG-TERM CURRENT USE OF INSULIN (HCC): Primary | ICD-10-CM

## 2020-11-02 DIAGNOSIS — I10 BENIGN ESSENTIAL HYPERTENSION: ICD-10-CM

## 2020-11-02 DIAGNOSIS — Z13.89 SCREENING FOR SKIN CONDITION: ICD-10-CM

## 2020-11-02 DIAGNOSIS — L82.1 SEBORRHEIC KERATOSIS: Primary | ICD-10-CM

## 2020-11-02 DIAGNOSIS — Z11.59 NEED FOR HEPATITIS C SCREENING TEST: ICD-10-CM

## 2020-11-02 DIAGNOSIS — E78.5 DYSLIPIDEMIA: ICD-10-CM

## 2020-11-02 DIAGNOSIS — E11.9 CONTROLLED TYPE 2 DIABETES MELLITUS WITHOUT COMPLICATION, WITH LONG-TERM CURRENT USE OF INSULIN (HCC): Primary | ICD-10-CM

## 2020-11-02 DIAGNOSIS — E66.9 OBESITY (BMI 30-39.9): ICD-10-CM

## 2020-11-02 DIAGNOSIS — K21.9 GASTROESOPHAGEAL REFLUX DISEASE WITHOUT ESOPHAGITIS: ICD-10-CM

## 2020-11-02 PROBLEM — R63.5 ABNORMAL WEIGHT GAIN: Status: RESOLVED | Noted: 2019-03-25 | Resolved: 2020-11-02

## 2020-11-02 PROBLEM — Z01.419 ENCOUNTER FOR GYNECOLOGICAL EXAMINATION WITHOUT ABNORMAL FINDING: Status: RESOLVED | Noted: 2020-06-22 | Resolved: 2020-11-02

## 2020-11-02 PROBLEM — Z08 ENCOUNTER FOR FOLLOW-UP EXAMINATION AFTER COMPLETED TREATMENT FOR MALIGNANT NEOPLASM: Status: RESOLVED | Noted: 2019-03-17 | Resolved: 2020-11-02

## 2020-11-02 PROCEDURE — 1160F RVW MEDS BY RX/DR IN RCRD: CPT | Performed by: INTERNAL MEDICINE

## 2020-11-02 PROCEDURE — 1036F TOBACCO NON-USER: CPT | Performed by: DERMATOLOGY

## 2020-11-02 PROCEDURE — 3077F SYST BP >= 140 MM HG: CPT | Performed by: INTERNAL MEDICINE

## 2020-11-02 PROCEDURE — 3008F BODY MASS INDEX DOCD: CPT | Performed by: INTERNAL MEDICINE

## 2020-11-02 PROCEDURE — 3078F DIAST BP <80 MM HG: CPT | Performed by: INTERNAL MEDICINE

## 2020-11-02 PROCEDURE — 99214 OFFICE O/P EST MOD 30 MIN: CPT | Performed by: INTERNAL MEDICINE

## 2020-11-02 PROCEDURE — 99213 OFFICE O/P EST LOW 20 MIN: CPT | Performed by: DERMATOLOGY

## 2020-11-28 DIAGNOSIS — K59.09 OTHER CONSTIPATION: ICD-10-CM

## 2020-11-29 RX ORDER — LINACLOTIDE 145 UG/1
CAPSULE, GELATIN COATED ORAL
Qty: 90 CAPSULE | Refills: 3 | Status: SHIPPED | OUTPATIENT
Start: 2020-11-29 | End: 2021-06-02 | Stop reason: SDUPTHER

## 2021-01-05 ENCOUNTER — IMMUNIZATIONS (OUTPATIENT)
Dept: FAMILY MEDICINE CLINIC | Facility: HOSPITAL | Age: 71
End: 2021-01-05

## 2021-01-05 DIAGNOSIS — Z23 ENCOUNTER FOR IMMUNIZATION: ICD-10-CM

## 2021-01-05 PROCEDURE — 91301 SARS-COV-2 / COVID-19 MRNA VACCINE (MODERNA) 100 MCG: CPT

## 2021-01-05 PROCEDURE — 0011A SARS-COV-2 / COVID-19 MRNA VACCINE (MODERNA) 100 MCG: CPT

## 2021-02-04 ENCOUNTER — IMMUNIZATIONS (OUTPATIENT)
Dept: FAMILY MEDICINE CLINIC | Facility: HOSPITAL | Age: 71
End: 2021-02-04

## 2021-02-04 DIAGNOSIS — Z23 ENCOUNTER FOR IMMUNIZATION: Primary | ICD-10-CM

## 2021-02-04 PROCEDURE — 91301 SARS-COV-2 / COVID-19 MRNA VACCINE (MODERNA) 100 MCG: CPT

## 2021-02-04 PROCEDURE — 0012A SARS-COV-2 / COVID-19 MRNA VACCINE (MODERNA) 100 MCG: CPT

## 2021-02-10 ENCOUNTER — HOSPITAL ENCOUNTER (OUTPATIENT)
Dept: MAMMOGRAPHY | Facility: CLINIC | Age: 71
Discharge: HOME | End: 2021-02-10
Payer: COMMERCIAL

## 2021-02-10 DIAGNOSIS — Z13.820 SCREENING FOR OSTEOPOROSIS: ICD-10-CM

## 2021-02-10 DIAGNOSIS — Z78.0 ASYMPTOMATIC POSTMENOPAUSAL STATUS: ICD-10-CM

## 2021-02-10 PROCEDURE — 77080 DXA BONE DENSITY AXIAL: CPT

## 2021-02-18 DIAGNOSIS — E78.2 MIXED HYPERLIPIDEMIA: ICD-10-CM

## 2021-02-18 RX ORDER — ROSUVASTATIN CALCIUM 20 MG/1
20 TABLET, COATED ORAL DAILY
Qty: 90 TABLET | Refills: 4 | Status: SHIPPED | OUTPATIENT
Start: 2021-02-18 | End: 2022-05-13

## 2021-03-08 ENCOUNTER — LAB (OUTPATIENT)
Dept: LAB | Facility: CLINIC | Age: 71
End: 2021-03-08
Payer: COMMERCIAL

## 2021-03-08 DIAGNOSIS — E11.9 CONTROLLED TYPE 2 DIABETES MELLITUS WITHOUT COMPLICATION, WITH LONG-TERM CURRENT USE OF INSULIN (HCC): ICD-10-CM

## 2021-03-08 DIAGNOSIS — Z11.59 NEED FOR HEPATITIS C SCREENING TEST: ICD-10-CM

## 2021-03-08 DIAGNOSIS — Z79.4 CONTROLLED TYPE 2 DIABETES MELLITUS WITHOUT COMPLICATION, WITH LONG-TERM CURRENT USE OF INSULIN (HCC): ICD-10-CM

## 2021-03-08 LAB
ALBUMIN SERPL BCP-MCNC: 4.2 G/DL (ref 3.5–5)
ALP SERPL-CCNC: 74 U/L (ref 46–116)
ALT SERPL W P-5'-P-CCNC: 41 U/L (ref 12–78)
ANION GAP SERPL CALCULATED.3IONS-SCNC: 7 MMOL/L (ref 4–13)
AST SERPL W P-5'-P-CCNC: 30 U/L (ref 5–45)
BASOPHILS # BLD AUTO: 0.06 THOUSANDS/ΜL (ref 0–0.1)
BASOPHILS NFR BLD AUTO: 1 % (ref 0–1)
BILIRUB SERPL-MCNC: 0.41 MG/DL (ref 0.2–1)
BUN SERPL-MCNC: 21 MG/DL (ref 5–25)
CALCIUM SERPL-MCNC: 9.9 MG/DL (ref 8.3–10.1)
CHLORIDE SERPL-SCNC: 107 MMOL/L (ref 100–108)
CHOLEST SERPL-MCNC: 115 MG/DL (ref 50–200)
CO2 SERPL-SCNC: 26 MMOL/L (ref 21–32)
CREAT SERPL-MCNC: 1.06 MG/DL (ref 0.6–1.3)
CREAT UR-MCNC: 106 MG/DL
EOSINOPHIL # BLD AUTO: 0.18 THOUSAND/ΜL (ref 0–0.61)
EOSINOPHIL NFR BLD AUTO: 2 % (ref 0–6)
ERYTHROCYTE [DISTWIDTH] IN BLOOD BY AUTOMATED COUNT: 13.6 % (ref 11.6–15.1)
EST. AVERAGE GLUCOSE BLD GHB EST-MCNC: 128 MG/DL
GFR SERPL CREATININE-BSD FRML MDRD: 53 ML/MIN/1.73SQ M
GLUCOSE P FAST SERPL-MCNC: 129 MG/DL (ref 65–99)
HBA1C MFR BLD: 6.1 %
HCT VFR BLD AUTO: 41.5 % (ref 34.8–46.1)
HCV AB SER QL: NORMAL
HDLC SERPL-MCNC: 37 MG/DL
HGB BLD-MCNC: 13.4 G/DL (ref 11.5–15.4)
IMM GRANULOCYTES # BLD AUTO: 0.03 THOUSAND/UL (ref 0–0.2)
IMM GRANULOCYTES NFR BLD AUTO: 0 % (ref 0–2)
LDLC SERPL CALC-MCNC: 47 MG/DL (ref 0–100)
LYMPHOCYTES # BLD AUTO: 3.29 THOUSANDS/ΜL (ref 0.6–4.47)
LYMPHOCYTES NFR BLD AUTO: 41 % (ref 14–44)
MCH RBC QN AUTO: 28.8 PG (ref 26.8–34.3)
MCHC RBC AUTO-ENTMCNC: 32.3 G/DL (ref 31.4–37.4)
MCV RBC AUTO: 89 FL (ref 82–98)
MICROALBUMIN UR-MCNC: 6.5 MG/L (ref 0–20)
MICROALBUMIN/CREAT 24H UR: 6 MG/G CREATININE (ref 0–30)
MONOCYTES # BLD AUTO: 0.56 THOUSAND/ΜL (ref 0.17–1.22)
MONOCYTES NFR BLD AUTO: 7 % (ref 4–12)
NEUTROPHILS # BLD AUTO: 4 THOUSANDS/ΜL (ref 1.85–7.62)
NEUTS SEG NFR BLD AUTO: 49 % (ref 43–75)
NONHDLC SERPL-MCNC: 78 MG/DL
NRBC BLD AUTO-RTO: 0 /100 WBCS
PLATELET # BLD AUTO: 323 THOUSANDS/UL (ref 149–390)
PMV BLD AUTO: 9.7 FL (ref 8.9–12.7)
POTASSIUM SERPL-SCNC: 4.2 MMOL/L (ref 3.5–5.3)
PROT SERPL-MCNC: 8 G/DL (ref 6.4–8.2)
RBC # BLD AUTO: 4.66 MILLION/UL (ref 3.81–5.12)
SODIUM SERPL-SCNC: 140 MMOL/L (ref 136–145)
TRIGL SERPL-MCNC: 153 MG/DL
TSH SERPL DL<=0.05 MIU/L-ACNC: 2.31 UIU/ML (ref 0.36–3.74)
WBC # BLD AUTO: 8.12 THOUSAND/UL (ref 4.31–10.16)

## 2021-03-08 PROCEDURE — 85025 COMPLETE CBC W/AUTO DIFF WBC: CPT

## 2021-03-08 PROCEDURE — 82043 UR ALBUMIN QUANTITATIVE: CPT

## 2021-03-08 PROCEDURE — 86803 HEPATITIS C AB TEST: CPT

## 2021-03-08 PROCEDURE — 80053 COMPREHEN METABOLIC PANEL: CPT

## 2021-03-08 PROCEDURE — 80061 LIPID PANEL: CPT

## 2021-03-08 PROCEDURE — 83036 HEMOGLOBIN GLYCOSYLATED A1C: CPT

## 2021-03-08 PROCEDURE — 3044F HG A1C LEVEL LT 7.0%: CPT | Performed by: INTERNAL MEDICINE

## 2021-03-08 PROCEDURE — 3061F NEG MICROALBUMINURIA REV: CPT | Performed by: INTERNAL MEDICINE

## 2021-03-08 PROCEDURE — 82570 ASSAY OF URINE CREATININE: CPT

## 2021-03-08 PROCEDURE — 36415 COLL VENOUS BLD VENIPUNCTURE: CPT

## 2021-03-08 PROCEDURE — 84443 ASSAY THYROID STIM HORMONE: CPT

## 2021-03-15 DIAGNOSIS — E78.2 MIXED HYPERLIPIDEMIA: ICD-10-CM

## 2021-03-15 RX ORDER — EZETIMIBE 10 MG/1
10 TABLET ORAL DAILY
Qty: 90 TABLET | Refills: 3 | Status: SHIPPED | OUTPATIENT
Start: 2021-03-15 | End: 2022-02-14

## 2021-03-18 DIAGNOSIS — E11.9 TYPE 2 DIABETES MELLITUS WITHOUT COMPLICATION, WITHOUT LONG-TERM CURRENT USE OF INSULIN (HCC): ICD-10-CM

## 2021-03-19 RX ORDER — GLIPIZIDE 2.5 MG/1
2.5 TABLET, EXTENDED RELEASE ORAL DAILY
Qty: 90 TABLET | Refills: 1 | Status: SHIPPED | OUTPATIENT
Start: 2021-03-19 | End: 2021-09-02

## 2021-03-23 ENCOUNTER — APPOINTMENT (EMERGENCY)
Dept: CT IMAGING | Facility: HOSPITAL | Age: 71
End: 2021-03-23
Payer: COMMERCIAL

## 2021-03-23 ENCOUNTER — HOSPITAL ENCOUNTER (EMERGENCY)
Facility: HOSPITAL | Age: 71
Discharge: HOME | End: 2021-03-24
Attending: EMERGENCY MEDICINE
Payer: COMMERCIAL

## 2021-03-23 VITALS
HEART RATE: 65 BPM | OXYGEN SATURATION: 95 % | RESPIRATION RATE: 18 BRPM | TEMPERATURE: 97.7 F | DIASTOLIC BLOOD PRESSURE: 58 MMHG | SYSTOLIC BLOOD PRESSURE: 112 MMHG

## 2021-03-23 DIAGNOSIS — R10.9 ABDOMINAL PAIN: Primary | ICD-10-CM

## 2021-03-23 LAB
ALBUMIN SERPL BCP-MCNC: 3.6 G/DL (ref 3.5–5)
ALP SERPL-CCNC: 67 U/L (ref 46–116)
ALT SERPL W P-5'-P-CCNC: 40 U/L (ref 12–78)
ANION GAP SERPL CALCULATED.3IONS-SCNC: 10 MMOL/L (ref 4–13)
AST SERPL W P-5'-P-CCNC: 22 U/L (ref 5–45)
BASOPHILS # BLD AUTO: 0.08 THOUSANDS/ΜL (ref 0–0.1)
BASOPHILS NFR BLD AUTO: 1 % (ref 0–1)
BILIRUB SERPL-MCNC: 0.33 MG/DL (ref 0.2–1)
BUN SERPL-MCNC: 22 MG/DL (ref 5–25)
CALCIUM SERPL-MCNC: 9.1 MG/DL (ref 8.3–10.1)
CHLORIDE SERPL-SCNC: 105 MMOL/L (ref 100–108)
CO2 SERPL-SCNC: 26 MMOL/L (ref 21–32)
CREAT SERPL-MCNC: 1.05 MG/DL (ref 0.6–1.3)
EOSINOPHIL # BLD AUTO: 0.16 THOUSAND/ΜL (ref 0–0.61)
EOSINOPHIL NFR BLD AUTO: 2 % (ref 0–6)
ERYTHROCYTE [DISTWIDTH] IN BLOOD BY AUTOMATED COUNT: 13.2 % (ref 11.6–15.1)
GFR SERPL CREATININE-BSD FRML MDRD: 53 ML/MIN/1.73SQ M
GLUCOSE SERPL-MCNC: 113 MG/DL (ref 65–140)
HCT VFR BLD AUTO: 36.8 % (ref 34.8–46.1)
HGB BLD-MCNC: 11.9 G/DL (ref 11.5–15.4)
IMM GRANULOCYTES # BLD AUTO: 0.03 THOUSAND/UL (ref 0–0.2)
IMM GRANULOCYTES NFR BLD AUTO: 0 % (ref 0–2)
LACTATE SERPL-SCNC: 1 MMOL/L (ref 0.5–2)
LIPASE SERPL-CCNC: 176 U/L (ref 73–393)
LYMPHOCYTES # BLD AUTO: 3.25 THOUSANDS/ΜL (ref 0.6–4.47)
LYMPHOCYTES NFR BLD AUTO: 32 % (ref 14–44)
MCH RBC QN AUTO: 28.5 PG (ref 26.8–34.3)
MCHC RBC AUTO-ENTMCNC: 32.3 G/DL (ref 31.4–37.4)
MCV RBC AUTO: 88 FL (ref 82–98)
MONOCYTES # BLD AUTO: 0.82 THOUSAND/ΜL (ref 0.17–1.22)
MONOCYTES NFR BLD AUTO: 8 % (ref 4–12)
NEUTROPHILS # BLD AUTO: 5.84 THOUSANDS/ΜL (ref 1.85–7.62)
NEUTS SEG NFR BLD AUTO: 57 % (ref 43–75)
NRBC BLD AUTO-RTO: 0 /100 WBCS
PLATELET # BLD AUTO: 284 THOUSANDS/UL (ref 149–390)
PMV BLD AUTO: 9.2 FL (ref 8.9–12.7)
POTASSIUM SERPL-SCNC: 4.3 MMOL/L (ref 3.5–5.3)
PROT SERPL-MCNC: 7.5 G/DL (ref 6.4–8.2)
RBC # BLD AUTO: 4.17 MILLION/UL (ref 3.81–5.12)
SODIUM SERPL-SCNC: 141 MMOL/L (ref 136–145)
WBC # BLD AUTO: 10.18 THOUSAND/UL (ref 4.31–10.16)

## 2021-03-23 PROCEDURE — 99284 EMERGENCY DEPT VISIT MOD MDM: CPT | Performed by: EMERGENCY MEDICINE

## 2021-03-23 PROCEDURE — 83605 ASSAY OF LACTIC ACID: CPT | Performed by: EMERGENCY MEDICINE

## 2021-03-23 PROCEDURE — 96374 THER/PROPH/DIAG INJ IV PUSH: CPT

## 2021-03-23 PROCEDURE — 80053 COMPREHEN METABOLIC PANEL: CPT | Performed by: EMERGENCY MEDICINE

## 2021-03-23 PROCEDURE — 36415 COLL VENOUS BLD VENIPUNCTURE: CPT | Performed by: EMERGENCY MEDICINE

## 2021-03-23 PROCEDURE — 83690 ASSAY OF LIPASE: CPT | Performed by: EMERGENCY MEDICINE

## 2021-03-23 PROCEDURE — 99284 EMERGENCY DEPT VISIT MOD MDM: CPT

## 2021-03-23 PROCEDURE — 85025 COMPLETE CBC W/AUTO DIFF WBC: CPT | Performed by: EMERGENCY MEDICINE

## 2021-03-23 PROCEDURE — 74177 CT ABD & PELVIS W/CONTRAST: CPT

## 2021-03-23 RX ORDER — KETOROLAC TROMETHAMINE 30 MG/ML
15 INJECTION, SOLUTION INTRAMUSCULAR; INTRAVENOUS ONCE
Status: COMPLETED | OUTPATIENT
Start: 2021-03-23 | End: 2021-03-23

## 2021-03-23 RX ORDER — DICYCLOMINE HCL 20 MG
20 TABLET ORAL ONCE
Status: COMPLETED | OUTPATIENT
Start: 2021-03-23 | End: 2021-03-24

## 2021-03-23 RX ORDER — DICYCLOMINE HCL 20 MG
20 TABLET ORAL 2 TIMES DAILY PRN
Qty: 20 TABLET | Refills: 0 | Status: SHIPPED | OUTPATIENT
Start: 2021-03-23 | End: 2021-08-17

## 2021-03-23 RX ADMIN — IOHEXOL 100 ML: 350 INJECTION, SOLUTION INTRAVENOUS at 22:25

## 2021-03-23 RX ADMIN — KETOROLAC TROMETHAMINE 15 MG: 30 INJECTION, SOLUTION INTRAMUSCULAR at 22:47

## 2021-03-24 ENCOUNTER — RA CDI HCC (OUTPATIENT)
Dept: OTHER | Facility: HOSPITAL | Age: 71
End: 2021-03-24

## 2021-03-24 RX ADMIN — DICYCLOMINE HYDROCHLORIDE 20 MG: 20 TABLET ORAL at 00:06

## 2021-03-24 NOTE — DISCHARGE INSTRUCTIONS
CT IMPRESSION:     1  Mild focal inflammatory change adjacent to the distal descending colon with adjacent outlined locules of fat which may be due to epiploic appendagitis versus focal colitis  No significant diverticula are seen to suggest diverticulitis  2   Hepatic steatosis  3   Dense mitral annular calcification

## 2021-03-24 NOTE — ED PROVIDER NOTES
History  Chief Complaint   Patient presents with    Abdominal Pain     Pt started last night with LLQ pain  Hx of IBS but states her pain is getting worse and feels like a stabbing pain      65 y o  female presents with 2 days of left lower quadrant abdominal pain without radiation  Patient describes severe stabbing that came on gradually, worsening and continues in the ER  Patient states movement aggravates the pain and nothing alleviates it  Patient has a history of able bowel syndrome typically takes Linzess  Patient ran out of medication for short period of time the restarted 3 days ago and has returned to her typical bowel regimen  Patient notes 3 loose stools today, which is typical for her when she takes the medication  Patient deneis vomiting  Patient denies diarrhea but notes the loose stools that are typical     Patient denies vaginal bleeding or discharge  Patient notes episodes of frequent urination but denies any other urinary symptoms; denies hematuria or dysuria  ROS: Patient associates  and denies fever/chills, dyspnea, chest pain, constipation, diaphoresis, groin pain, dysuria, hematuria, melena, or back/neck pain  All other systems reviewed and negative  Patient denies contacts with similar symptoms  Patient took a single dose of clindamycin the other day that she takes when she has dental procedures due to prior joint replacements; denies international travel or trauma  Patient notes history of hysterectomy 2 years ago for an adenoma and 2 prior  sections  Patient notes history of IBS  Focused Objective Exam:  Abdomen:  Inspection of an obese abdomen with previous abdominal surgical incisions noted without erythema, rashes or ecchymosis noted  No abdominal pulsations noted  Normal bowel sounds with no bruit auscultated  Soft abdomen    Palpitation noted tenderness discretely in the left lower quadrant with some tenderness in the left upper quadrant though this was mild compared to the lower; tenderness not over McBurney's point  No masses or pulsatile aorta noted on examination  No rebound or guarding noted on examination, non-peritoneal exam   Negative psoas, obturator, and Rovsing's signs  Negative Chapman's sign  Back:  No rash or signs of herpes zoster  Skin:  No ecchymosis of the umbilicus (negative Evan's sign) or flank (negative Grey Linares's sign)  Warm and dry  Medical Decision Making  Abdominal pain with a broad differential   Will establish IV access and make patient NPO considering possibility of surgical intervention required  Patient declining analgesia over symptomatic management at this time  Patient took ibuprofen prior to coming to the emergency room  Differential includes significant likelihood of intra-abdominal pathology, including concerns for potential diverticulitis  Patient denies any blood in her stool  Patient does not have fever  Patient denies any history of diverticulitis and notes regular colonoscopies, last in 2019  Will obtain CBC to evaluate for anemia and leukocytosis  Will obtain CMP to evaluate electrolytes, renal, biliary, and hepatic function  Will obtain lipase to evaluate for potential pancreatitis  Will obtain lactate to evaluate for mesenteric ischemia and sepsis  Based on patient's age, history, physical exam and presenting complaint, will obtain contrast enhanced CT imaging of patient's abdomen and pelvis to further evaluate for possible intraabdominal pathology  History provided by:  Patient  Abdominal Pain  Pain location:  LLQ  Pain quality: stabbing    Pain radiates to:  Does not radiate  Pain severity:  Severe  Onset quality:  Gradual  Timing:  Constant  Progression:  Worsening  Relieved by:  Nothing  Worsened by:   Movement and position changes  Associated symptoms: no chest pain, no chills, no constipation, no cough, no diarrhea, no dysuria, no fatigue, no fever, no hematemesis, no hematochezia, no hematuria, no melena, no nausea, no shortness of breath, no sore throat, no vaginal bleeding, no vaginal discharge and no vomiting        Prior to Admission Medications   Prescriptions Last Dose Informant Patient Reported? Taking? BD VEO INSULIN SYRINGE U/F 31G X 15/64" 0 3 ML MISC  Self No No   Sig: INJECT UNDER THE SKIN DAILY AT BEDTIME AND AS NEEDED ACCORDING TO SLIDING SCALE   Insulin Syringe-Needle U-100 (INSULIN SYRINGE 1CC/31GX5/16") 31G X 5/16" 1 ML MISC  Self Yes No   Sig: BD Veo Insulin Syringe Ultra-Fine 0 3 mL 31 gauge x 15/64"   KRILL OIL PO  Self Yes No   Sig: Take by mouth   Linzess 145 MCG CAPS   No No   Sig: TAKE 1 CAPSULE DAILY 30 MINUTES BEFORE BREAKFAST     Magnesium Oxide 400 MG CAPS  Self Yes No   Sig: Take by mouth   Omega-3 Fatty Acids (FISH OIL) 1200 MG CAPS  Self Yes No   Sig: Take by mouth   cetirizine (ZyrTEC) 10 MG chewable tablet  Self Yes No   Sig: Chew 10 mg daily   clindamycin (CLEOCIN) 150 mg capsule  Self Yes No   Sig: Take 150 mg by mouth 4 (four) times a day   clotrimazole-betamethasone (LOTRISONE) 1-0 05 % cream  Self No No   Sig: Apply topically QHS as needed   ezetimibe (ZETIA) 10 mg tablet   No No   Sig: Take 1 tablet (10 mg total) by mouth daily   furosemide (LASIX) 20 mg tablet  Self No No   Sig: TAKE 1 TABLET DAILY   glipiZIDE (GLUCOTROL XL) 2 5 mg 24 hr tablet   No No   Sig: Take 1 tablet (2 5 mg total) by mouth daily   glucose blood test strip  Self No No   Si each by Other route 3 (three) times a day Use as instructed   insulin lispro (HumaLOG) 100 units/mL injection  Self No No   Sig: Only when on steroids or with a sick day   levothyroxine 75 mcg tablet  Self No No   Sig: TAKE 1 TABLET DAILY   metFORMIN (GLUCOPHAGE-XR) 500 mg 24 hr tablet  Self No No   Sig: TAKE 1 TABLET IN THE MORNING AND 2 TABLETS IN THE EVENING   olmesartan (BENICAR) 20 mg tablet  Self No No   Sig: TAKE 1 TABLET DAILY   pantoprazole (PROTONIX) 40 mg tablet  Self No No Sig: TAKE 1 TABLET DAILY   potassium chloride (MICRO-K) 10 MEQ CR capsule  Self No No   Sig: TAKE 1 CAPSULE DAILY   rosuvastatin (CRESTOR) 20 MG tablet   No No   Sig: Take 1 tablet (20 mg total) by mouth daily   saccharomyces boulardii (FLORASTOR) 250 mg capsule  Self Yes No   Sig: Take 250 mg by mouth 3 (three) times a day      Facility-Administered Medications Last Administration Doses Remaining   albuterol inhalation solution 2 5 mg None recorded           Past Medical History:   Diagnosis Date    Abnormal mammogram     Abnormal weight gain     Achilles tendinitis     Asthma     Diabetes mellitus [facility] [address] )     Disc degeneration, lumbar     Disease of thyroid gland     hypo    Dyslipidemia     Dyslipidemia, goal LDL below 70 3/27/2018    Early satiety     Edema     idiopathic peripheral    Encounter for follow-up examination after completed treatment for malignant neoplasm 3/17/2019    Encounter for gynecological examination without abnormal finding 2020    Endometrial cancer ([facility] [address] ) 2018    Enthesopathy     hip region    Esophagitis, reflux     GERD (gastroesophageal reflux disease)     Hard to intubate     difficulty with intubation and had to be intubated twice    Hypercholesterolemia     Hypertension     IBS (irritable bowel syndrome)     Irritable bowel syndrome     Obesity, Class I, BMI 30-34 9 2015    Osteoarthritis     Rosacea     Sacroiliitis (HCC)        Past Surgical History:   Procedure Laterality Date     SECTION      x2    COLONOSCOPY      ELBOW SURGERY      left ulna/radius    ESOPHAGOGASTRODUODENOSCOPY      FOOT SURGERY Right     FRACTURE SURGERY      FRACTURE SURGERY Left     tibia    HYSTERECTOMY Bilateral 2018    JOINT REPLACEMENT Left     shoulder,  right knee    KNEE ARTHROSCOPY      KNEE ARTHROSCOPY W/ MENISCAL REPAIR Right     NV LAPAROSCOPY W TOT HYSTERECTUTERUS <=250 GRAM  W TUBE/OVARY N/A 2018    Procedure: ROBOTIC TOTAL LAPAROSCOPIC HYSTERECTOMY, BILATERAL SALPINGOOPHORECTOMY, BILATERAL PELVIC LYMPHNODE DISSECTION;  Surgeon: Yanick Mcgraw MD;  [facility]: [facility];  Service: Gynecology Oncology    REPLACEMENT TOTAL KNEE Right     SHOULDER ARTHROPLASTY      SINUS SURGERY      TONSILLECTOMY      TUBAL LIGATION      WRIST SURGERY      ganglonic cyst       Family History   Problem Relation Age of Onset    Arthritis Mother     Heart disease Mother         cardiac disorder    Diabetes Mother     Hiatal hernia Mother     Hypertension Mother     Irritable bowel syndrome Mother     Breast cancer Mother 77        x2   Angela Mullet Uterine cancer Mother     Osteoporosis Mother     Thyroid disease Mother     Other Mother         gastric reflux    Stroke Mother     Heart disease Father         cardiac disorder    Hiatal hernia Father     Ulcers Father     Other Father         gastric reflux    Hiatal hernia Sister     Thyroid disease Sister     Other Sister         gastric reflux    Lung cancer Sister 68    No Known Problems Brother     Brain cancer Maternal Grandmother     Breast cancer Maternal Grandmother         [place]    No Known Problems Maternal Grandfather     No Known Problems Paternal Grandmother     No Known Problems Paternal Grandfather     Hiatal hernia Child     Other Child         gastric reflux    Irritable bowel syndrome Daughter     Breast cancer Maternal Aunt 72    Uterine cancer Maternal Aunt         x3 sis    Stroke Maternal Aunt     Malig Hypertension Son     Breast cancer Maternal Aunt 54    Breast cancer Maternal Aunt 54    No Known Problems Paternal Aunt     Colon cancer Neg Hx     Ovarian cancer Neg Hx     Cervical cancer Neg Hx      I have reviewed and agree with the history as documented      E-Cigarette/Vaping    E-Cigarette Use Never User      E-Cigarette/Vaping Substances    Nicotine No     THC No     CBD No     Flavoring No     Other No     Unknown No      Social History Tobacco Use    Smoking status: Never Smoker    Smokeless tobacco: Never Used   Substance Use Topics    Alcohol use: Yes     Alcohol/week: 3 0 standard drinks     Types: 3 Glasses of wine per week     Frequency: Monthly or less     Drinks per session: 1 or 2     Binge frequency: Never    Drug use: No       Review of Systems   Constitutional: Negative for chills, fatigue and fever  HENT: Negative for sore throat  Respiratory: Negative for cough and shortness of breath  Cardiovascular: Negative for chest pain  Gastrointestinal: Positive for abdominal pain  Negative for constipation, diarrhea, hematemesis, hematochezia, melena, nausea and vomiting  Genitourinary: Negative for dysuria, hematuria, vaginal bleeding and vaginal discharge  All other systems reviewed and are negative  Physical Exam  Physical Exam  Vitals signs reviewed  Constitutional:       Appearance: She is well-developed  She is obese  HENT:      Head: Atraumatic  Eyes:      General: No scleral icterus  Pupils: Pupils are equal, round, and reactive to light  Neck:      Musculoskeletal: Neck supple  Cardiovascular:      Rate and Rhythm: Normal rate  Pulmonary:      Effort: Pulmonary effort is normal    Abdominal:      General: Bowel sounds are normal  There is no distension  Palpations: Abdomen is soft  Tenderness: There is abdominal tenderness in the left lower quadrant  There is no guarding or rebound  Musculoskeletal:         General: No deformity  Skin:     General: Skin is warm and dry  Neurological:      General: No focal deficit present  Mental Status: She is alert     Psychiatric:         Mood and Affect: Mood normal          Vital Signs  ED Triage Vitals   Temperature Pulse Respirations Blood Pressure SpO2   03/23/21 1954 03/23/21 1954 03/23/21 1954 03/23/21 1954 03/23/21 1954   97 7 °F (36 5 °C) 86 16 (!) 182/85 95 %      Temp Source Heart Rate Source Patient Position - Orthostatic VS BP Location FiO2 (%)   03/23/21 1954 03/23/21 1954 03/23/21 1954 03/23/21 1954 --   Oral Monitor Sitting Left arm       Pain Score       03/23/21 2247       6           Vitals:    03/23/21 1954 03/23/21 2145 03/23/21 2249   BP: (!) 182/85 113/56 112/58   Pulse: 86 68 65   Patient Position - Orthostatic VS: Sitting Lying Lying         Visual Acuity      ED Medications  Medications   iohexol (OMNIPAQUE) 350 MG/ML injection (SINGLE-DOSE) 100 mL (100 mL Intravenous Given 3/23/21 2225)   ketorolac (TORADOL) injection 15 mg (15 mg Intravenous Given 3/23/21 2247)   dicyclomine (BENTYL) tablet 20 mg (20 mg Oral Given 3/24/21 0006)       Diagnostic Studies  Results Reviewed     Procedure Component Value Units Date/Time    Lactic acid, plasma [[id number]]  (Normal) Collected: 03/23/21 2139    Lab Status: Final result Specimen: Blood from Arm, Left Updated: 03/23/21 2204     LACTIC ACID 1 0 mmol/L     Narrative:      Result may be elevated if tourniquet was used during collection      CMP [[id number]] Collected: 03/23/21 2139    Lab Status: Final result Specimen: Blood from Arm, Left Updated: 03/23/21 2203     Sodium 141 mmol/L      Potassium 4 3 mmol/L      Chloride 105 mmol/L      CO2 26 mmol/L      ANION GAP 10 mmol/L      BUN 22 mg/dL      Creatinine 1 05 mg/dL      Glucose 113 mg/dL      Calcium 9 1 mg/dL      AST 22 U/L      ALT 40 U/L      Alkaline Phosphatase 67 U/L      Total Protein 7 5 g/dL      Albumin 3 6 g/dL      Total Bilirubin 0 33 mg/dL      eGFR 53 ml/min/1 73sq m     Narrative:      [facility] (CKD):     Stage 1 with normal or high GFR (GFR > 90 mL/min/1 73 square meters)    Stage 2 Mild CKD (GFR = 60-89 mL/min/1 73 square meters)    Stage 3A Moderate CKD (GFR = 45-59 mL/min/1 73 square meters)    Stage 3B Moderate CKD (GFR = 30-44 mL/min/1 73 square meters)    Stage 4 Severe CKD (GFR = 15-29 mL/min/1 73 square meters)    Stage 5 End Stage CKD (GFR <15 mL/min/1 73 square meters)  Note: GFR calculation is accurate only with a steady state creatinine    Lipase [[id number]]  (Normal) Collected: 03/23/21 2139    Lab Status: Final result Specimen: Blood from Arm, Left Updated: 03/23/21 2203     Lipase 176 u/L     CBC and differential [[id number]]  (Abnormal) Collected: 03/23/21 2139    Lab Status: Final result Specimen: Blood from Arm, Left Updated: 03/23/21 2147     WBC 10 18 Thousand/uL      RBC 4 17 Million/uL      Hemoglobin 11 9 g/dL      Hematocrit 36 8 %      MCV 88 fL      MCH 28 5 pg      MCHC 32 3 g/dL      RDW 13 2 %      MPV 9 2 fL      Platelets 990 Thousands/uL      nRBC 0 /100 WBCs      Neutrophils Relative 57 %      Immat GRANS % 0 %      Lymphocytes Relative 32 %      Monocytes Relative 8 %      Eosinophils Relative 2 %      Basophils Relative 1 %      Neutrophils Absolute 5 84 Thousands/µL      Immature Grans Absolute 0 03 Thousand/uL      Lymphocytes Absolute 3 25 Thousands/µL      Monocytes Absolute 0 82 Thousand/µL      Eosinophils Absolute 0 16 Thousand/µL      Basophils Absolute 0 08 Thousands/µL                  CT abdomen pelvis with contrast   Final Result by Bin Adam MD (03/23 2316)      1  Mild focal inflammatory change adjacent to the distal descending colon with adjacent outlined locules of fat which may be due to epiploic appendagitis versus focal colitis  No significant diverticula are seen to suggest diverticulitis  2   Hepatic steatosis  3   Dense mitral annular calcification  Workstation performed: [id number]                    Procedures  Procedures         ED Course  ED Course as of Mar 25 0431   Tue Mar 23, 2021   223 Patient has reported allergy to nabumetone but states she can take ketorolac which is the only medication she feels can help her pain  Patient does not want narcotic pain medications    Patient's creatinine normal at baseline but GFR borderline and she understands risks of NSAID use, will administer single dose in ER though cautioned against continued use of NSAIDs at home  2343 Discussed findings with the patient  Patient's CT with findings consistent with epiploic appendagitis versus mild colitis  Patient has persistent pain, discussed treatment dicyclomine which she states she has taken before and has been helpful  Will provide a prescription for the patient  Discussed diagnostic uncertainty regarding these etiologies need for continued follow-up with Gastroenterology  No signs of diverticulitis that would warrant antibiotic treatment  Discussed symptomatic management in detail  Discussed and provided information on follow-up  Discussed emphasized return precautions  SBIRT 20yo+      Most Recent Value   SBIRT (24 yo +)   In order to provide better care to our patients, we are screening all of our patients for alcohol and drug use  Would it be okay to ask you these screening questions? Yes Filed at: 03/23/2021 2115   Initial Alcohol Screen: US AUDIT-C    1  How often do you have a drink containing alcohol?  0 Filed at: 03/23/2021 2115   2  How many drinks containing alcohol do you have on a typical day you are drinking? 0 Filed at: 03/23/2021 2115   3a  Male UNDER 65: How often do you have five or more drinks on one occasion? 0 Filed at: 03/23/2021 2115   3b  FEMALE Any Age, or MALE 65+: How often do you have 4 or more drinks on one occassion? 0 Filed at: 03/23/2021 2115   Audit-C Score  0 Filed at: 03/23/2021 2115   MICHAEL: How many times in the past year have you    Used an illegal drug or used a prescription medication for non-medical reasons?   Never Filed at: 03/23/2021 2115                    MDM    Disposition  Final diagnoses:   Abdominal pain     Time reflects when diagnosis was documented in both MDM as applicable and the Disposition within this note     Time User Action Codes Description Comment    3/23/2021 11:36 PM Danelle Lucia Add [R10 9] Abdominal pain       ED Disposition     ED Disposition Condition Date/Time Comment    Discharge Stable Tue Mar 23, 2021 11:36 PM Semaj Membreno discharge to home/self care  Follow-up Information     Follow up With Specialties Details Why Contact Info Additional [address] [facility] Emergency Department Emergency Medicine Go to  If symptoms worsen [address] [facility] [place] [id number] [facility] Emergency Department, [address] [facility],[address] [place], [address]    Kenneth Cullen MD Gastroenterology Schedule an appointment as soon as possible for a visit  Follow-up with gastroenterology for reassessment  [address] [facility] [address] [phone]             Discharge Medication List as of 3/23/2021 11:43 PM      START taking these medications    Details   dicyclomine (BENTYL) 20 mg tablet Take 1 tablet (20 mg total) by mouth 2 (two) times a day as needed (Abdominal pain), Starting Tue 3/23/2021, Print         CONTINUE these medications which have NOT CHANGED    Details   BD VEO INSULIN SYRINGE U/F 31G X 15/64" 0 3 ML MISC INJECT UNDER THE SKIN DAILY AT BEDTIME AND AS NEEDED ACCORDING TO SLIDING SCALE, Normal      cetirizine (ZyrTEC) 10 MG chewable tablet Chew 10 mg daily, Historical Med      clindamycin (CLEOCIN) 150 mg capsule Take 150 mg by mouth 4 (four) times a day, Starting Mon 6/17/2019, Historical Med      clotrimazole-betamethasone (LOTRISONE) 1-0 05 % cream Apply topically QHS as needed, Normal      ezetimibe (ZETIA) 10 mg tablet Take 1 tablet (10 mg total) by mouth daily, Starting Mon 3/15/2021, Normal      furosemide (LASIX) 20 mg tablet TAKE 1 TABLET DAILY, Normal      glipiZIDE (GLUCOTROL XL) 2 5 mg 24 hr tablet Take 1 tablet (2 5 mg total) by mouth daily, Starting Fri 3/19/2021, Normal      glucose blood test strip 1 each by Other route 3 (three) times a day Use as instructed, Starting u 6/25/2020, Normal      insulin lispro (HumaLOG) 100 units/mL injection Only when on steroids or with a sick day, Normal      Insulin Syringe-Needle U-100 (INSULIN SYRINGE 1CC/31GX5/16") 31G X 5/16" 1 ML MISC BD Veo Insulin Syringe Ultra-Fine 0 3 mL 31 gauge x 15/64", Historical Med      KRILL OIL PO Take by mouth, Historical Med      levothyroxine 75 mcg tablet TAKE 1 TABLET DAILY, Normal      Linzess 145 MCG CAPS TAKE 1 CAPSULE DAILY 30 MINUTES BEFORE BREAKFAST , Normal      Magnesium Oxide 400 MG CAPS Take by mouth, Historical Med      metFORMIN (GLUCOPHAGE-XR) 500 mg 24 hr tablet TAKE 1 TABLET IN THE MORNING AND 2 TABLETS IN THE EVENING, Normal      olmesartan (BENICAR) 20 mg tablet TAKE 1 TABLET DAILY, Normal      Omega-3 Fatty Acids (FISH OIL) 1200 MG CAPS Take by mouth, Historical Med      pantoprazole (PROTONIX) 40 mg tablet TAKE 1 TABLET DAILY, Normal      potassium chloride (MICRO-K) 10 MEQ CR capsule TAKE 1 CAPSULE DAILY, Normal      rosuvastatin (CRESTOR) 20 MG tablet Take 1 tablet (20 mg total) by mouth daily, Starting Thu 2/18/2021, Normal      saccharomyces boulardii (FLORASTOR) 250 mg capsule Take 250 mg by mouth 3 (three) times a day, Historical Med           No discharge procedures on file      PDMP Review     None          ED Provider  Electronically Signed by           Ana Maria Perez MD  03/25/21 [id number]

## 2021-03-24 NOTE — PROGRESS NOTES
[place] [address]  coding oppertunities             Chart reviewed, (number of) suggestions sent to provider: 2     Problem listed updated   Provider Accepted, (number of) suggestions accepted: 2              Re: Osker Bolus    Based on clinical documentation indicated in your record, it appears that the patient may have the following conditions:    E66 01 Morbid (severe) obesity (BMI 38 48 with diabetes, GERD, htn)    E11 9 Type 2 diabetes     If this is correct, please document and assess at your next visit March 31st  [place] [address]  coding oppertunities             Chart reviewed, (number of) suggestions sent to provider: 2

## 2021-03-26 PROBLEM — E66.01 MORBID OBESITY (HCC): Status: ACTIVE | Noted: 2020-11-02

## 2021-03-31 ENCOUNTER — OFFICE VISIT (OUTPATIENT)
Dept: INTERNAL MEDICINE CLINIC | Facility: CLINIC | Age: 71
End: 2021-03-31
Payer: COMMERCIAL

## 2021-03-31 ENCOUNTER — OFFICE VISIT (OUTPATIENT)
Dept: GASTROENTEROLOGY | Facility: CLINIC | Age: 71
End: 2021-03-31
Payer: COMMERCIAL

## 2021-03-31 VITALS
TEMPERATURE: 98.1 F | SYSTOLIC BLOOD PRESSURE: 144 MMHG | HEIGHT: 62 IN | BODY MASS INDEX: 37.47 KG/M2 | HEART RATE: 88 BPM | DIASTOLIC BLOOD PRESSURE: 76 MMHG | WEIGHT: 203.6 LBS | OXYGEN SATURATION: 96 %

## 2021-03-31 VITALS
HEART RATE: 90 BPM | HEIGHT: 62 IN | WEIGHT: 203.6 LBS | SYSTOLIC BLOOD PRESSURE: 132 MMHG | RESPIRATION RATE: 18 BRPM | TEMPERATURE: 96.3 F | DIASTOLIC BLOOD PRESSURE: 70 MMHG | BODY MASS INDEX: 37.47 KG/M2

## 2021-03-31 DIAGNOSIS — E11.9 CONTROLLED TYPE 2 DIABETES MELLITUS WITHOUT COMPLICATION, WITH LONG-TERM CURRENT USE OF INSULIN (HCC): Primary | ICD-10-CM

## 2021-03-31 DIAGNOSIS — K59.09 OTHER CONSTIPATION: Primary | ICD-10-CM

## 2021-03-31 DIAGNOSIS — R10.32 LLQ PAIN: ICD-10-CM

## 2021-03-31 DIAGNOSIS — I10 BENIGN ESSENTIAL HYPERTENSION: ICD-10-CM

## 2021-03-31 DIAGNOSIS — M54.50 CHRONIC BILATERAL LOW BACK PAIN WITHOUT SCIATICA: ICD-10-CM

## 2021-03-31 DIAGNOSIS — E66.9 OBESITY (BMI 30-39.9): ICD-10-CM

## 2021-03-31 DIAGNOSIS — E03.9 ACQUIRED HYPOTHYROIDISM: ICD-10-CM

## 2021-03-31 DIAGNOSIS — Z79.4 CONTROLLED TYPE 2 DIABETES MELLITUS WITHOUT COMPLICATION, WITH LONG-TERM CURRENT USE OF INSULIN (HCC): Primary | ICD-10-CM

## 2021-03-31 DIAGNOSIS — K58.1 IRRITABLE BOWEL SYNDROME WITH CONSTIPATION: ICD-10-CM

## 2021-03-31 DIAGNOSIS — R06.02 SOB (SHORTNESS OF BREATH): ICD-10-CM

## 2021-03-31 DIAGNOSIS — G89.29 CHRONIC BILATERAL LOW BACK PAIN WITHOUT SCIATICA: ICD-10-CM

## 2021-03-31 DIAGNOSIS — E78.5 DYSLIPIDEMIA: ICD-10-CM

## 2021-03-31 PROBLEM — E66.01 MORBID OBESITY (HCC): Status: RESOLVED | Noted: 2020-11-02 | Resolved: 2021-03-31

## 2021-03-31 PROBLEM — L82.1 SEBORRHEIC KERATOSIS: Status: RESOLVED | Noted: 2018-03-23 | Resolved: 2021-03-31

## 2021-03-31 PROBLEM — L57.0 ACTINIC KERATOSIS: Status: RESOLVED | Noted: 2018-03-23 | Resolved: 2021-03-31

## 2021-03-31 PROCEDURE — 1160F RVW MEDS BY RX/DR IN RCRD: CPT | Performed by: INTERNAL MEDICINE

## 2021-03-31 PROCEDURE — 1036F TOBACCO NON-USER: CPT | Performed by: INTERNAL MEDICINE

## 2021-03-31 PROCEDURE — 99214 OFFICE O/P EST MOD 30 MIN: CPT | Performed by: INTERNAL MEDICINE

## 2021-03-31 PROCEDURE — 3288F FALL RISK ASSESSMENT DOCD: CPT | Performed by: INTERNAL MEDICINE

## 2021-03-31 PROCEDURE — 3725F SCREEN DEPRESSION PERFORMED: CPT | Performed by: INTERNAL MEDICINE

## 2021-03-31 PROCEDURE — 3008F BODY MASS INDEX DOCD: CPT | Performed by: INTERNAL MEDICINE

## 2021-03-31 PROCEDURE — 1101F PT FALLS ASSESS-DOCD LE1/YR: CPT | Performed by: INTERNAL MEDICINE

## 2021-03-31 PROCEDURE — 99213 OFFICE O/P EST LOW 20 MIN: CPT | Performed by: PHYSICIAN ASSISTANT

## 2021-03-31 RX ORDER — LINACLOTIDE 72 UG/1
72 CAPSULE, GELATIN COATED ORAL DAILY
Qty: 90 CAPSULE | Refills: 3 | Status: SHIPPED | OUTPATIENT
Start: 2021-03-31 | End: 2021-06-02

## 2021-03-31 NOTE — PROGRESS NOTES
INTERNAL MEDICINE OFFICE VISIT  [facility] [address], [address], [address] [facility]  Tel: [phone]      NAME: Semaj Membreno  AGE: 65 y o  SEX: female  : 1950   MRN: [id number]    DATE: 3/31/2021  TIME: 6:41 PM      Assessment and Plan:  1  Controlled type 2 diabetes mellitus without complication, with long-term current use of insulin ([address] )   continue present medication  - CBC and differential; Future  - Comprehensive metabolic panel; Future  - Lipid panel; Future  - TSH, 3rd generation; Future  - Hemoglobin A1C; Future  - Microalbumin / creatinine urine ratio; Future    2  Benign essential hypertension   continue low-salt diet    3  Dyslipidemia   continue low-fat diet    4  Acquired hypothyroidism   continue levothyroxine at the same dose    5  SOB (shortness of breath)   was advised to follow-up with her cardiologist    6  Chronic bilateral low back pain without sciatica    - Ambulatory referral to Physical Therapy; Future    7  Obesity (BMI 30-39  9)  BMI Counseling: Body mass index is 37 24 kg/m²  The BMI is above normal  Nutrition recommendations include decreasing portion sizes, encouraging healthy choices of fruits and vegetables and moderation in carbohydrate intake  Exercise recommendations include moderate physical activity 150 minutes/week  No pharmacotherapy was ordered  - Counseling Documentation: patient was counseled regarding: diagnostic results, instructions for management, risk factor reductions, prognosis, patient and family education, risks and benefits of treatment options and importance of compliance with treatment  - Medication Side Effects: Adverse side effects of medications were reviewed with the patient/guardian today  Return for follow up visit in  6 months or earlier, if needed        Chief Complaint:  Chief Complaint   Patient presents with    Follow-up         History of Present Illness:    type 2 diabetes -very well controlled on present medications  Hypertension -does not take any medication and blood pressure is controlled  Dyslipidemia - follows a low-fat diet  Hypothyroidism-TSH is stable   Shortness of breath -has been having shortness of breath but denies any chest pain  She has been told by the pulmonologist that it is not a lung issue   Back pain - has been having low back pain and wants to do some physical therapy  Obesity -was told to lose weight      Active Problem List:  Patient Active Problem List   Diagnosis    Diabetes mellitus type 2, controlled, without complications ([address] )    Dysmetabolic syndrome X    Benign essential hypertension    Lichen sclerosus    History of endometrial cancer    SOB (shortness of breath)    Asymptomatic postmenopausal status    At risk for sleep apnea    Dyslipidemia    Asthma    GERD (gastroesophageal reflux disease)    Acquired hypothyroidism    Status post total right knee replacement    Spondylosis of lumbosacral region    Irritable bowel syndrome without diarrhea    Coronary atherosclerosis due to calcified coronary lesion    Obesity (BMI 30-39  9)         Past Medical History:  Past Medical History:   Diagnosis Date    Abnormal mammogram     Abnormal weight gain     Achilles tendinitis     Asthma     Diabetes mellitus ([address] )     Disc degeneration, lumbar     Disease of thyroid gland     hypo    Dyslipidemia     Dyslipidemia, goal LDL below 70 3/27/2018    Early satiety     Edema     idiopathic peripheral    Encounter for follow-up examination after completed treatment for malignant neoplasm 3/17/2019    Encounter for gynecological examination without abnormal finding 6/22/2020    Endometrial cancer ([address] ) 2018    Enthesopathy     hip region    Esophagitis, reflux     GERD (gastroesophageal reflux disease)     Hard to intubate     difficulty with intubation and had to be intubated twice    Hypercholesterolemia     Hypertension  IBS (irritable bowel syndrome)     Irritable bowel syndrome     Obesity, Class I, BMI 30-34 9 2015    Osteoarthritis     Rosacea     Sacroiliitis (HCC)          Past Surgical History:  Past Surgical History:   Procedure Laterality Date     SECTION      x2    COLONOSCOPY      ELBOW SURGERY      left ulna/radius    ESOPHAGOGASTRODUODENOSCOPY      FOOT SURGERY Right     FRACTURE SURGERY      FRACTURE SURGERY Left     tibia    HYSTERECTOMY Bilateral 2018    JOINT REPLACEMENT Left     shoulder,  right knee    KNEE ARTHROSCOPY      KNEE ARTHROSCOPY W/ MENISCAL REPAIR Right     NH LAPAROSCOPY W TOT HYSTERECTUTERUS <=250 GRAM  W TUBE/OVARY N/A 2018    Procedure: ROBOTIC TOTAL LAPAROSCOPIC HYSTERECTOMY, BILATERAL SALPINGOOPHORECTOMY, BILATERAL PELVIC LYMPHNODE DISSECTION;  Surgeon: Eliazar Yu MD;  Location: [facility];  Service: Gynecology Oncology    REPLACEMENT TOTAL KNEE Right     SHOULDER ARTHROPLASTY      SINUS SURGERY      TONSILLECTOMY      TUBAL LIGATION      WRIST SURGERY      ganglonic cyst         Family History:  Family History   Problem Relation Age of Onset    Arthritis Mother     Heart disease Mother         cardiac disorder    Diabetes Mother     Hiatal hernia Mother     Hypertension Mother     Irritable bowel syndrome Mother    Luke Moose Breast cancer Mother 77        x2   Luke Moose Uterine cancer Mother     Osteoporosis Mother     Thyroid disease Mother     Other Mother         gastric reflux    Stroke Mother     Heart disease Father         cardiac disorder    Hiatal hernia Father     Ulcers Father     Other Father         gastric reflux    Hiatal hernia Sister     Thyroid disease Sister     Other Sister         gastric reflux    Lung cancer Sister 68    No Known Problems Brother     Brain cancer Maternal Grandmother     Breast cancer Maternal Grandmother         [place]    No Known Problems Maternal Grandfather     No Known Problems Paternal Grandmother     No Known Problems Paternal Grandfather     Hiatal hernia Child     Other Child         gastric reflux    Irritable bowel syndrome Daughter     Breast cancer Maternal Aunt 72    Uterine cancer Maternal Aunt         x3 sis    Stroke Maternal Aunt     Malig Hypertension Son     Breast cancer Maternal Aunt 54    Breast cancer Maternal Aunt 54    No Known Problems Paternal Aunt     Colon cancer Neg Hx     Ovarian cancer Neg Hx     Cervical cancer Neg Hx          Social History:  Social History     Socioeconomic History    Marital status:      Spouse name: None    Number of children: 3    Years of education: None    Highest education level: None   Occupational History    Occupation: nurse     Comment: full-time   Social Needs    Financial resource strain: None    Food insecurity     Worry: None     Inability: None    Transportation needs     Medical: None     Non-medical: None   Tobacco Use    Smoking status: Never Smoker    Smokeless tobacco: Never Used   Substance and Sexual Activity    Alcohol use:  Yes     Alcohol/week: 3 0 standard drinks     Types: 3 Glasses of wine per week     Frequency: Monthly or less     Drinks per session: 1 or 2     Binge frequency: Never    Drug use: No    Sexual activity: Not Currently     Birth control/protection: Surgical   Lifestyle    Physical activity     Days per week: 2 days     Minutes per session: 30 min    Stress: Not at all   Relationships    Social connections     Talks on phone: None     Gets together: None     Attends [employer] service: None     Active member of club or organization: None     Attends meetings of clubs or organizations: None     Relationship status: None    Intimate partner violence     Fear of current or ex partner: None     Emotionally abused: None     Physically abused: None     Forced sexual activity: None   Other Topics Concern    None   Social History Narrative    Active advance directive    DME- freestyle lite blood glucose meter device kit QID         Allergies:   Allergies   Allergen Reactions    Amoxicillin-Pot Clavulanate Anaphylaxis, Hives, Itching and Facial Swelling    Erythromycin Hives, Itching, Swelling, Vomiting, Throat Swelling, Nasal Congestion and Facial Swelling    Meperidine Anaphylaxis    Morphine Hives, Itching, Swelling, Other (See Comments) and Syncope     hypotension    Penicillins Anaphylaxis, Itching and Swelling    Relafen [Nabumetone] Hives, Itching and Swelling    Darvon [Propoxyphene] Hives    Methocarbamol Other (See Comments)     Double vision    Reglan [Metoclopramide] Anxiety    Sulfa Antibiotics Hives, Itching, Rash and Swelling    Tetracyclines & Related Rash         Medications:    Current Outpatient Medications:     BD VEO INSULIN SYRINGE U/F 31G X 15/64" 0 3 ML MISC, INJECT UNDER THE SKIN DAILY AT BEDTIME AND AS NEEDED ACCORDING TO SLIDING SCALE, Disp: 100 each, Rfl: 10    cetirizine (ZyrTEC) 10 MG chewable tablet, Chew 10 mg daily, Disp: , Rfl:     clindamycin (CLEOCIN) 150 mg capsule, Take 150 mg by mouth 4 (four) times a day, Disp: , Rfl: 0    clotrimazole-betamethasone (LOTRISONE) 1-0 05 % cream, Apply topically QHS as needed, Disp: 45 g, Rfl: 3    dicyclomine (BENTYL) 20 mg tablet, Take 1 tablet (20 mg total) by mouth 2 (two) times a day as needed (Abdominal pain), Disp: 20 tablet, Rfl: 0    ezetimibe (ZETIA) 10 mg tablet, Take 1 tablet (10 mg total) by mouth daily, Disp: 90 tablet, Rfl: 3    furosemide (LASIX) 20 mg tablet, TAKE 1 TABLET DAILY, Disp: 90 tablet, Rfl: 4    glipiZIDE (GLUCOTROL XL) 2 5 mg 24 hr tablet, Take 1 tablet (2 5 mg total) by mouth daily, Disp: 90 tablet, Rfl: 1    glucose blood test strip, 1 each by Other route 3 (three) times a day Use as instructed, Disp: 300 each, Rfl: 3    insulin lispro (HumaLOG) 100 units/mL injection, Only when on steroids or with a sick day, Disp: 10 mL, Rfl: 3    Insulin Syringe-Needle U-100 (INSULIN SYRINGE 1CC/31GX5/16") 31G X 5/16" 1 ML MISC, BD Veo Insulin Syringe Ultra-Fine 0 3 mL 31 gauge x 15/64", Disp: , Rfl:     KRILL OIL PO, Take by mouth, Disp: , Rfl:     levothyroxine 75 mcg tablet, TAKE 1 TABLET DAILY, Disp: 90 tablet, Rfl: 4    linaCLOtide (Linzess) 72 MCG CAPS, Take 72 mcg by mouth daily, Disp: 90 capsule, Rfl: 3    Linzess 145 MCG CAPS, TAKE 1 CAPSULE DAILY 30 MINUTES BEFORE BREAKFAST , Disp: 90 capsule, Rfl: 3    Magnesium Oxide 400 MG CAPS, Take by mouth, Disp: , Rfl:     metFORMIN (GLUCOPHAGE-XR) 500 mg 24 hr tablet, TAKE 1 TABLET IN THE MORNING AND 2 TABLETS IN THE EVENING, Disp: 270 tablet, Rfl: 3    olmesartan (BENICAR) 20 mg tablet, TAKE 1 TABLET DAILY, Disp: 90 tablet, Rfl: 3    Omega-3 Fatty Acids (FISH OIL) 1200 MG CAPS, Take by mouth, Disp: , Rfl:     pantoprazole (PROTONIX) 40 mg tablet, TAKE 1 TABLET DAILY, Disp: 90 tablet, Rfl: 4    potassium chloride (MICRO-K) 10 MEQ CR capsule, TAKE 1 CAPSULE DAILY, Disp: 90 capsule, Rfl: 4    rosuvastatin (CRESTOR) 20 MG tablet, Take 1 tablet (20 mg total) by mouth daily, Disp: 90 tablet, Rfl: 4    saccharomyces boulardii (FLORASTOR) 250 mg capsule, Take 250 mg by mouth 3 (three) times a day, Disp: , Rfl:     Current Facility-Administered Medications:     albuterol inhalation solution 2 5 mg, 2 5 mg, Nebulization, Q6H PRN, Ashlyn Ribera MD      The following portions of the patient's history were reviewed and updated as appropriate: past medical history, past surgical history, family history, social history, allergies, current medications and active problem list       Review of Systems:  Constitutional: Denies fever, chills, weight gain, weight loss, fatigue  Eyes: Denies eye redness, eye discharge, double vision, change in visual acuity  ENT: Denies hearing loss, tinnitus, sneezing, nasal congestion, nasal discharge, sore throat   Respiratory: Denies cough, expectoration, hemoptysis, complains of shortness of breath, wheezing  Cardiovascular: Denies chest pain, palpitations, lower extremity swelling, orthopnea, PND  Gastrointestinal: Denies abdominal pain, heartburn, nausea, vomiting, hematemesis, diarrhea, bloody stools  Genito-Urinary: Denies dysuria, frequency, difficulty in micturition, nocturia, incontinence  Musculoskeletal:  Complains of back pain, denies joint pain, muscle pain  Neurologic: Denies confusion, lightheadedness, syncope, headache, focal weakness, sensory changes, seizures  Endocrine: Denies polyuria, polydipsia, temperature intolerance  Allergy and Immunology: Denies hives, insect bite sensitivity  Hematological and Lymphatic: Denies bleeding problems, swollen glands   Psychological: Denies depression, suicidal ideation, anxiety, panic, mood swings  Dermatological: Denies pruritus, rash, skin lesion changes      Vitals:  Vitals:    03/31/21 1608   BP: 144/76   Pulse: 88   Temp: 98 1 °F (36 7 °C)   SpO2: 96%       Body mass index is 37 24 kg/m²  Weight (last 2 days)     Date/Time   Weight    03/31/21 1608   92 4 (203 6)                Physical Examination:  General: Patient is not in acute distress  Awake, alert, responding to commands  No weight gain or loss  Head: Normocephalic  Atraumatic  Eyes: Conjunctiva and lids with no swelling, erythema or discharge  Both pupils normal sized, round and reactive to light  Sclera nonicteric  ENT: External examination of nose and ear normal  Otoscopic examination shows translucent tympanic membranes with patent canals without erythema  Oropharynx moist with no erythema, edema, exudate or lesions  Neck: Supple  JVP not raised  Trachea midline  No masses  No thyromegaly  Lungs: No signs of increased work of breathing or respiratory distress  Bilateral bronchovascular breath sounds with no crackles or rhonchi  Chest wall: No tenderness  Cardiovascular: Normal PMI  No thrills  Regular rate and rhythm   S1 and S2 normal  No murmur, rub or gallop  Gastrointestinal: Abdomen soft, nontender  No guarding or rigidity  Liver and spleen not palpable  Bowel sounds present  Neurologic: Cranial nerves II-XII intact   Cortical functions normal  Motor system - Reflexes 2+ and symmetrical  Sensations normal  Musculoskeletal: Gait normal  No joint tenderness  Integumentary: Skin normal with no rash or lesions  Lymphatic: No palpable lymph nodes in neck, axilla or groin  Extremities: No clubbing, cyanosis, edema or varicosities  Psychological: Judgement and insight normal  Mood and affect normal      Laboratory Results:  CBC with diff:   Lab Results   Component Value Date    WBC 10 18 (H) 03/23/2021    WBC 9 23 11/04/2015    RBC 4 17 03/23/2021    RBC 4 49 11/04/2015    HGB 11 9 03/23/2021    HGB 12 3 11/04/2015    HCT 36 8 03/23/2021    HCT 37 4 11/04/2015    MCV 88 03/23/2021    MCV 83 11/04/2015    MCH 28 5 03/23/2021    MCH 27 4 11/04/2015    RDW 13 2 03/23/2021    RDW 15 7 (H) 11/04/2015     03/23/2021     (H) 11/04/2015       CMP:  Lab Results   Component Value Date    CREATININE 1 05 03/23/2021    CREATININE 0 91 11/04/2015    BUN 22 03/23/2021    BUN 26 (H) 11/04/2015     11/04/2015    K 4 3 03/23/2021    K 4 2 11/04/2015     03/23/2021     (H) 11/04/2015    CO2 26 03/23/2021    CO2 26 11/04/2015    GLUCOSE 107 11/04/2015    PROT 7 0 11/04/2015    ALKPHOS 67 03/23/2021    ALKPHOS 71 11/04/2015    ALT 40 03/23/2021    ALT 30 11/04/2015    AST 22 03/23/2021    AST 20 11/04/2015    BILIDIR 0 10 06/30/2015       Lab Results   Component Value Date    HGBA1C 6 1 (H) 03/08/2021    HGBA1C 6 6 (H) 11/04/2015    MG 1 9 04/04/2019       No results found for: TROPONINI, CKMB, CKTOTAL    Lipid Profile:   Lab Results   Component Value Date    CHOL 129 11/04/2015    CHOL 102 06/25/2015     Lab Results   Component Value Date    HDL 37 (L) 03/08/2021    HDL 38 (L) 06/22/2020     Lab Results   Component Value Date    LDLCALC 47 03/08/2021    [facility] 56 06/22/2020     Lab Results   Component Value Date    TRIG 153 (H) 03/08/2021    TRIG 173 (H) 06/22/2020       Imaging Results:  CT abdomen pelvis with contrast  Narrative: CT ABDOMEN AND PELVIS WITH IV CONTRAST    INDICATION:   Left lower quadrant pain  Diverticulitis evaluation  COMPARISON:  None  TECHNIQUE:  CT examination of the abdomen and pelvis was performed  Axial, sagittal, and coronal 2D reformatted images were created from the source data and submitted for interpretation  Radiation dose length product (DLP) for this visit:  1162 33 mGy-cm   This examination, like all CT scans performed in the [facility], was performed utilizing techniques to minimize radiation dose exposure, including the use of   iterative reconstruction and automated exposure control  IV Contrast:  100 mL of iohexol (OMNIPAQUE)  was administered intravenously without immediate adverse reaction  Enteric Contrast:  Enteric contrast was not administered  FINDINGS:    ABDOMEN    LOWER CHEST:  Dense mitral annular calcification  LIVER/BILIARY TREE:  There are one or more hepatic simple cyst(s) present  No CT evidence of suspicious solid hepatic mass  Normal hepatic contours  No biliary dilatation  Hepatic steatosis  GALLBLADDER:  No calcified gallstones  No pericholecystic inflammatory change  SPLEEN:  Unremarkable  PANCREAS:  Unremarkable  ADRENAL GLANDS:  Unremarkable  KIDNEYS/URETERS:  Unremarkable  No hydronephrosis  STOMACH AND BOWEL:  There is mild focal inflammatory change adjacent to the distal descending colon (series 602, image 53)  There are outlined locules of fat adjacent to this region (series 601, image 60)  No significant diverticula are seen  No bowel   obstruction  APPENDIX:  A normal appendix was visualized  ABDOMINOPELVIC CAVITY:  No ascites  No pneumoperitoneum  No lymphadenopathy  VESSELS:  Unremarkable for patient's age      PELVIS    REPRODUCTIVE ORGANS:  Surgical changes of prior hysterectomy  URINARY BLADDER:  Unremarkable  ABDOMINAL WALL/INGUINAL REGIONS:  Unremarkable  OSSEOUS STRUCTURES:  No acute fracture or destructive osseous lesion  Impression: 1  Mild focal inflammatory change adjacent to the distal descending colon with adjacent outlined locules of fat which may be due to epiploic appendagitis versus focal colitis  No significant diverticula are seen to suggest diverticulitis  2   Hepatic steatosis  3   Dense mitral annular calcification      Workstation performed: [id number]       Health Maintenance:  Health Maintenance   Topic Date Due    Annual Physical  08/22/2020    MAMMOGRAM  05/11/2021    Depression Screening PHQ  07/01/2021    HEMOGLOBIN A1C  09/08/2021    Diabetic Foot Exam  09/30/2021    BMI: Followup Plan  11/02/2021    Fall Risk  03/31/2022    BMI: Adult  03/31/2022    DM Eye Exam  06/10/2022    DXA SCAN  02/10/2024    Colonoscopy Surveillance  12/31/2024    Colorectal Cancer Screening  12/31/2029    DTaP,Tdap,and Td Vaccines (4 - Td) 09/30/2030    Hepatitis C Screening  Completed    Pneumococcal Vaccine: 65+ Years  Completed    Influenza Vaccine  Completed    COVID-19 Vaccine  Completed    HIB Vaccine  Aged Out    Hepatitis B Vaccine  Aged Out    IPV Vaccine  Aged Out    Hepatitis A Vaccine  Aged Out    Meningococcal ACWY Vaccine  Aged Out    HPV Vaccine  Aged Out     Immunization History   Administered Date(s) Administered    H1N1, All Formulations 11/05/2009    Influenza Split High Dose Preservative Free IM 10/06/2014, 09/30/2015, 10/20/2017    Influenza, high dose seasonal 0 7 mL 10/09/2018, 09/30/2020    Pneumococcal Conjugate 13-Valent 09/30/2015    Pneumococcal Polysaccharide PPV23 11/17/2008, 10/06/2014, 03/23/2017    SARS-CoV-2 / COVID-19 mRNA IM (Moderna) 01/05/2021, 02/04/2021    Tdap 10/09/2018, 10/12/2018, 09/30/2020    Tuberculin Skin Test-PPD Intradermal 07/03/2019    Zoster 10/25/2017    influenza, trivalent, adjuvanted 09/28/2019         Jordan Oates MD  2/49/9828,8:43 PM

## 2021-03-31 NOTE — PROGRESS NOTES
[facility] - Outpatient Follow-up Note  Umang Adams 65 y o  female MRN: [id number]  Encounter: [id number]          ASSESSMENT AND PLAN:      1  Irritable bowel syndrome with constipation  2  LLQ pain    Patient was recently in the ER for acute LLQ  CT A/P showed epiploic appendagitis  Her abdominal discomfort has since subsided  She feels much better  She had a recent colonoscopy about a year ago with Dr Sury Toro which was normal   She also has a long history of IBS-C and is on Linzess 145mcg po daily which alleviates her constipation but does cause chronic loose stools  Will decrease Linzess to 72mcg po daily  Continue the probiotics  If symptoms return or new symptoms develop, rectal bleeding, etc - patient was instructed to call and would then plan for repeat colonoscopy  Otherwise routine follow up in 1 year recommended     ______________________________________________________________________    SUBJECTIVE:  Patient is a pleasant 65year old female who presents to the office for follow up from the ER  She developed acute LLQ pain and went to the ER  CT A/P showed epiploic appendagitis  Her abdominal discomfort has subsided since that time  She feels much better  She had a recent colonoscopy about a year ago which was normal   She also has a long history of IBS-C and is on Linzess 145mcg po daily which alleviates her constipation but does cause chronic loose stools  She denies rectal bleeding  She denies vomiting  No unintentional weight loss  REVIEW OF SYSTEMS IS OTHERWISE NEGATIVE        Historical Information   Past Medical History:   Diagnosis Date    Abnormal mammogram     Abnormal weight gain     Achilles tendinitis     Asthma     Diabetes mellitus ([address] )     Disc degeneration, lumbar     Disease of thyroid gland     hypo    Dyslipidemia     Dyslipidemia, goal LDL below 70 3/27/2018    Early satiety     Edema     idiopathic peripheral    Encounter for follow-up examination after completed treatment for malignant neoplasm 3/17/2019    Encounter for gynecological examination without abnormal finding 2020    Endometrial cancer ([facility] [address] ) 2018    Enthesopathy     hip region    Esophagitis, reflux     GERD (gastroesophageal reflux disease)     Hard to intubate     difficulty with intubation and had to be intubated twice    Hypercholesterolemia     Hypertension     IBS (irritable bowel syndrome)     Irritable bowel syndrome     Obesity, Class I, BMI 30-34 9 2015    Osteoarthritis     Rosacea     Sacroiliitis (HCC)      Past Surgical History:   Procedure Laterality Date     SECTION      x2    COLONOSCOPY      ELBOW SURGERY      left ulna/radius    ESOPHAGOGASTRODUODENOSCOPY      FOOT SURGERY Right     FRACTURE SURGERY      FRACTURE SURGERY Left     tibia    HYSTERECTOMY Bilateral 2018    JOINT REPLACEMENT Left     shoulder,  right knee    KNEE ARTHROSCOPY      KNEE ARTHROSCOPY W/ MENISCAL REPAIR Right     WY LAPAROSCOPY W TOT HYSTERECTUTERUS <=250 GRAM  W TUBE/OVARY N/A 2018    Procedure: ROBOTIC TOTAL LAPAROSCOPIC HYSTERECTOMY, BILATERAL SALPINGOOPHORECTOMY, BILATERAL PELVIC LYMPHNODE DISSECTION;  Surgeon: Justice Rhodes MD;  Location: [facility];  Service: Gynecology Oncology    REPLACEMENT TOTAL KNEE Right     SHOULDER ARTHROPLASTY      SINUS SURGERY      TONSILLECTOMY      TUBAL LIGATION      WRIST SURGERY      ganglonic cyst     Social History   Social History     Substance and Sexual Activity   Alcohol Use Yes    Alcohol/week: 3 0 standard drinks    Types: 3 Glasses of wine per week    Frequency: Monthly or less    Drinks per session: 1 or 2    Binge frequency: Never     Social History     Substance and Sexual Activity   Drug Use No     Social History     Tobacco Use   Smoking Status Never Smoker   Smokeless Tobacco Never Used     Family History   Problem Relation Age of Onset    Arthritis Mother     Heart disease Mother         cardiac disorder    Diabetes Mother     Hiatal hernia Mother     Hypertension Mother     Irritable bowel syndrome Mother     Breast cancer Mother 77        x2   Delmy Splinter Uterine cancer Mother     Osteoporosis Mother     Thyroid disease Mother     Other Mother         gastric reflux    Stroke Mother     Heart disease Father         cardiac disorder    Hiatal hernia Father     Ulcers Father     Other Father         gastric reflux    Hiatal hernia Sister     Thyroid disease Sister     Other Sister         gastric reflux    Lung cancer Sister 68    No Known Problems Brother     Brain cancer Maternal Grandmother     Breast cancer Maternal Grandmother         [place]    No Known Problems Maternal Grandfather     No Known Problems Paternal Grandmother     No Known Problems Paternal Grandfather     Hiatal hernia Child     Other Child         gastric reflux    Irritable bowel syndrome Daughter     Breast cancer Maternal Aunt 72    Uterine cancer Maternal Aunt         x3 sis    Stroke Maternal Aunt     Malig Hypertension Son     Breast cancer Maternal Aunt 54    Breast cancer Maternal Aunt 54    No Known Problems Paternal Aunt     Colon cancer Neg Hx     Ovarian cancer Neg Hx     Cervical cancer Neg Hx        Meds/Allergies       Current Outpatient Medications:     BD VEO INSULIN SYRINGE U/F 31G X 15/64" 0 3 ML MISC    cetirizine (ZyrTEC) 10 MG chewable tablet    clindamycin (CLEOCIN) 150 mg capsule    clotrimazole-betamethasone (LOTRISONE) 1-0 05 % cream    dicyclomine (BENTYL) 20 mg tablet    ezetimibe (ZETIA) 10 mg tablet    furosemide (LASIX) 20 mg tablet    glipiZIDE (GLUCOTROL XL) 2 5 mg 24 hr tablet    glucose blood test strip    insulin lispro (HumaLOG) 100 units/mL injection    Insulin Syringe-Needle U-100 (INSULIN SYRINGE 1CC/31GX5/16") 31G X 5/16" 1 ML MISC    KRILL OIL PO    levothyroxine 75 mcg tablet    Linzess 145 MCG CAPS    Magnesium Oxide 400 MG CAPS    metFORMIN (GLUCOPHAGE-XR) 500 mg 24 hr tablet    olmesartan (BENICAR) 20 mg tablet    Omega-3 Fatty Acids (FISH OIL) 1200 MG CAPS    pantoprazole (PROTONIX) 40 mg tablet    potassium chloride (MICRO-K) 10 MEQ CR capsule    rosuvastatin (CRESTOR) 20 MG tablet    saccharomyces boulardii (FLORASTOR) 250 mg capsule    linaCLOtide (Linzess) 72 MCG CAPS    Current Facility-Administered Medications:     albuterol inhalation solution 2 5 mg, 2 5 mg, Nebulization, Q6H PRN    Allergies   Allergen Reactions    Amoxicillin-Pot Clavulanate Anaphylaxis, Hives, Itching and Facial Swelling    Erythromycin Hives, Itching, Swelling, Vomiting, Throat Swelling, Nasal Congestion and Facial Swelling    Meperidine Anaphylaxis    Morphine Hives, Itching, Swelling, Other (See Comments) and Syncope     hypotension    Penicillins Anaphylaxis, Itching and Swelling    Relafen [Nabumetone] Hives, Itching and Swelling    Darvon [Propoxyphene] Hives    Methocarbamol Other (See Comments)     Double vision    Reglan [Metoclopramide] Anxiety    Sulfa Antibiotics Hives, Itching, Rash and Swelling    Tetracyclines & Related Rash           Objective     Blood pressure 132/70, pulse 90, temperature (!) 96 3 °F (35 7 °C), resp  rate 18, height 5' 2" (1 575 m), weight 92 4 kg (203 lb 9 6 oz), not currently breastfeeding  Body mass index is 37 24 kg/m²  PHYSICAL EXAM:      General Appearance:   Alert, cooperative, no distress   HEENT:   Normocephalic, atraumatic, anicteric   Neck:  Supple, symmetrical, trachea midline   Lungs:   Clear to auscultation bilaterally; no rales, rhonchi or wheezing; respirations unlabored    Heart[de-identified]   Regular rate and rhythm; no murmur, rub, or gallop     Abdomen:   Soft, non-tender, non-distended; normal bowel sounds; no masses, no organomegaly    Genitalia:   Deferred    Rectal:   Deferred    Extremities:  No cyanosis, clubbing or edema    Pulses:  2+ and symmetric    Skin:  No jaundice, rashes, or lesions    Lymph nodes:  No palpable cervical lymphadenopathy        Lab Results:   No visits with results within 1 Day(s) from this visit  Latest known visit with results is:   Admission on 03/23/2021, Discharged on 03/24/2021   Component Date Value    WBC 03/23/2021 10 18*    RBC 03/23/2021 4 17     Hemoglobin 03/23/2021 11 9     Hematocrit 03/23/2021 36 8     MCV 03/23/2021 88     MCH 03/23/2021 28 5     MCHC 03/23/2021 32 3     RDW 03/23/2021 13 2     MPV 03/23/2021 9 2     Platelets 13/64/0614 284     nRBC 03/23/2021 0     Neutrophils Relative 03/23/2021 57     Immat GRANS % 03/23/2021 0     Lymphocytes Relative 03/23/2021 32     Monocytes Relative 03/23/2021 8     Eosinophils Relative 03/23/2021 2     Basophils Relative 03/23/2021 1     Neutrophils Absolute 03/23/2021 5 84     Immature Grans Absolute 03/23/2021 0 03     Lymphocytes Absolute 03/23/2021 3 25     Monocytes Absolute 03/23/2021 0 82     Eosinophils Absolute 03/23/2021 0 16     Basophils Absolute 03/23/2021 0 08     Sodium 03/23/2021 141     Potassium 03/23/2021 4 3     Chloride 03/23/2021 105     CO2 03/23/2021 26     ANION GAP 03/23/2021 10     BUN 03/23/2021 22     Creatinine 03/23/2021 1 05     Glucose 03/23/2021 113     Calcium 03/23/2021 9 1     AST 03/23/2021 22     ALT 03/23/2021 40     Alkaline Phosphatase 03/23/2021 67     Total Protein 03/23/2021 7 5     Albumin 03/23/2021 3 6     Total Bilirubin 03/23/2021 0 33     eGFR 03/23/2021 53     Lipase 03/23/2021 176     LACTIC ACID 03/23/2021 1 0          Radiology Results:   Ct Abdomen Pelvis With Contrast    Result Date: 3/23/2021  Narrative: CT ABDOMEN AND PELVIS WITH IV CONTRAST INDICATION:   Left lower quadrant pain  Diverticulitis evaluation  COMPARISON:  None  TECHNIQUE:  CT examination of the abdomen and pelvis was performed   Axial, sagittal, and coronal 2D reformatted images were created from the source data and submitted for interpretation  Radiation dose length product (DLP) for this visit:  1162 33 mGy-cm   This examination, like all CT scans performed in the [facility], was performed utilizing techniques to minimize radiation dose exposure, including the use of iterative reconstruction and automated exposure control  IV Contrast:  100 mL of iohexol (OMNIPAQUE)  was administered intravenously without immediate adverse reaction  Enteric Contrast:  Enteric contrast was not administered  FINDINGS: ABDOMEN LOWER CHEST:  Dense mitral annular calcification  LIVER/BILIARY TREE:  There are one or more hepatic simple cyst(s) present  No CT evidence of suspicious solid hepatic mass  Normal hepatic contours  No biliary dilatation  Hepatic steatosis  GALLBLADDER:  No calcified gallstones  No pericholecystic inflammatory change  SPLEEN:  Unremarkable  PANCREAS:  Unremarkable  ADRENAL GLANDS:  Unremarkable  KIDNEYS/URETERS:  Unremarkable  No hydronephrosis  STOMACH AND BOWEL:  There is mild focal inflammatory change adjacent to the distal descending colon (series 602, image 53)  There are outlined locules of fat adjacent to this region (series 601, image 60)  No significant diverticula are seen  No bowel  obstruction  APPENDIX:  A normal appendix was visualized  ABDOMINOPELVIC CAVITY:  No ascites  No pneumoperitoneum  No lymphadenopathy  VESSELS:  Unremarkable for patient's age  PELVIS REPRODUCTIVE ORGANS:  Surgical changes of prior hysterectomy  URINARY BLADDER:  Unremarkable  ABDOMINAL WALL/INGUINAL REGIONS:  Unremarkable  OSSEOUS STRUCTURES:  No acute fracture or destructive osseous lesion  Impression: 1  Mild focal inflammatory change adjacent to the distal descending colon with adjacent outlined locules of fat which may be due to epiploic appendagitis versus focal colitis  No significant diverticula are seen to suggest diverticulitis  2   Hepatic steatosis   3   Dense mitral annular calcification   Workstation performed: [id number]

## 2021-04-01 ENCOUNTER — TELEPHONE (OUTPATIENT)
Dept: ADMINISTRATIVE | Facility: OTHER | Age: 71
End: 2021-04-01

## 2021-04-01 NOTE — LETTER
Diabetic Eye Exam Form    Date Requested: 21  Patient: Jon Bethea  Patient : [id number]   Referring Provider: Adama Sandhu MD    Dilated Retinal Exam, Optomap-Iris Exam, or Fundus Photography Done         Yes (Andreafski one above)         No     Date of Diabetic Eye Exam ______________________________  Left Eye      Exam did show retinopathy    Exam did not show retinopathy         Mild       Moderate       None       Proliferative       Severe     Right Eye     Exam did show retinopathy    Exam did not show retinopathy         Mild       Moderate       None       Proliferative       Severe     Comments __________________________________________________________    Practice Providing Exam ______________________________________________    Exam Performed By (print name) _______________________________________      Provider Signature ___________________________________________________      These reports are needed for  compliance    Please fax this completed form and a copy of the Diabetic Eye Exam report to our office located at [place] [address] as soon as possible to [phone] attention [place]: Phone [phone]    We thank you for your assistance in treating our mutual patient     (sent to [facility])

## 2021-04-01 NOTE — TELEPHONE ENCOUNTER
----- Message from Rod Solis sent at 3/31/2021  4:21 PM EDT -----  Regarding: diabetic eye report>Dr Willis Souza, ALDO -[facility]  03/31/21 4:21 PM    Anahi, our patient Nayeli Brown has had Diabetic Eye Exam completed/performed  Please assist in updating the patient chart by making an External outreach to [place] [address] facility located in [place], [place]  The date of service is 3/15/21      Thank you,  Barney Roche 31

## 2021-04-02 NOTE — TELEPHONE ENCOUNTER
Upon review of the In Basket request and the patient's chart, initial outreach has been made via fax, please see Contacts section for details       Thank you  Bhumi Duenas MA

## 2021-04-05 NOTE — TELEPHONE ENCOUNTER
Upon review of the In Basket request we were able to locate, review, and update the patient chart as requested for Diabetic Eye Exam  Most Recent DOS 3-    Any additional questions or concerns should be emailed to the Practice Liaisons via [email or web address] email, please do not reply via In Basket      Thank you  Jimmy Samuels MA

## 2021-05-05 ENCOUNTER — OFFICE VISIT (OUTPATIENT)
Dept: CARDIOLOGY CLINIC | Facility: CLINIC | Age: 71
End: 2021-05-05
Payer: COMMERCIAL

## 2021-05-05 VITALS
HEIGHT: 62 IN | DIASTOLIC BLOOD PRESSURE: 66 MMHG | BODY MASS INDEX: 37.36 KG/M2 | SYSTOLIC BLOOD PRESSURE: 128 MMHG | HEART RATE: 84 BPM | OXYGEN SATURATION: 97 % | WEIGHT: 203 LBS

## 2021-05-05 DIAGNOSIS — I10 BENIGN ESSENTIAL HYPERTENSION: ICD-10-CM

## 2021-05-05 DIAGNOSIS — R06.02 SHORTNESS OF BREATH: Primary | ICD-10-CM

## 2021-05-05 DIAGNOSIS — E78.5 DYSLIPIDEMIA: ICD-10-CM

## 2021-05-05 PROCEDURE — 3078F DIAST BP <80 MM HG: CPT | Performed by: INTERNAL MEDICINE

## 2021-05-05 PROCEDURE — 3008F BODY MASS INDEX DOCD: CPT | Performed by: INTERNAL MEDICINE

## 2021-05-05 PROCEDURE — 99214 OFFICE O/P EST MOD 30 MIN: CPT | Performed by: INTERNAL MEDICINE

## 2021-05-05 PROCEDURE — 1160F RVW MEDS BY RX/DR IN RCRD: CPT | Performed by: INTERNAL MEDICINE

## 2021-05-05 PROCEDURE — 1036F TOBACCO NON-USER: CPT | Performed by: INTERNAL MEDICINE

## 2021-05-05 PROCEDURE — 93000 ELECTROCARDIOGRAM COMPLETE: CPT | Performed by: INTERNAL MEDICINE

## 2021-05-05 PROCEDURE — 3074F SYST BP LT 130 MM HG: CPT | Performed by: INTERNAL MEDICINE

## 2021-05-06 NOTE — PROGRESS NOTES
[facility] [place]  [address] [facility] [address]  Cardiology Follow Up    Evans Lobo  1950  [id number]      1  Shortness of breath  Echo complete with contrast if indicated    POCT ECG   2  Benign essential hypertension     3  Dyslipidemia         Chief Complaint   Patient presents with    Follow-up    Shortness of Breath     on exertion    Results       Interval History:   Patient presents for follow-up visit  Patient denies any chest pain  Patient does have some shortness of breath with exertion  No recent cardiac evaluation  Patient did have cardiac catheterization a few years ago no significant coronary artery disease was noted  Patient denies any history of leg edema more than usual   No history of orthopnea PND  No history of presyncope syncope  She states that she has been compliant with all her present medications  She has been trying to lose weight  Patient Active Problem List   Diagnosis    Diabetes mellitus type 2, controlled, without complications ([address] )    Dysmetabolic syndrome X    Benign essential hypertension    Lichen sclerosus    History of endometrial cancer    SOB (shortness of breath)    Asymptomatic postmenopausal status    At risk for sleep apnea    Dyslipidemia    Asthma    GERD (gastroesophageal reflux disease)    Acquired hypothyroidism    Status post total right knee replacement    Spondylosis of lumbosacral region    Irritable bowel syndrome without diarrhea    Coronary atherosclerosis due to calcified coronary lesion    Obesity (BMI 30-39  9)     Past Medical History:   Diagnosis Date    Abnormal mammogram     Abnormal weight gain     Achilles tendinitis     Asthma     Diabetes mellitus ([address] )     Disc degeneration, lumbar     Disease of thyroid gland     hypo    Dyslipidemia     Dyslipidemia, goal LDL below 70 3/27/2018    Early satiety     Edema     idiopathic peripheral    Encounter for follow-up examination after completed treatment for malignant neoplasm 3/17/2019    Encounter for gynecological examination without abnormal finding 6/22/2020    Endometrial cancer ([facility] ) 2018    Enthesopathy     hip region    Esophagitis, reflux     GERD (gastroesophageal reflux disease)     Hard to intubate     difficulty with intubation and had to be intubated twice    Hypercholesterolemia     Hypertension     IBS (irritable bowel syndrome)     Irritable bowel syndrome     Obesity, Class I, BMI 30-34 9 1/7/2015    Osteoarthritis     Rosacea     Sacroiliitis ([facility] )      Social History     Socioeconomic History    Marital status:      Spouse name: Not on file    Number of children: 3    Years of education: Not on file    Highest education level: Not on file   Occupational History    Occupation: nurse     Comment: full-time   Social Needs    Financial resource strain: Not on file    Food insecurity     Worry: Not on file     Inability: Not on file   [employer] needs     Medical: Not on file     Non-medical: Not on file   Tobacco Use    Smoking status: Never Smoker    Smokeless tobacco: Never Used   Substance and Sexual Activity    Alcohol use:  Yes     Alcohol/week: 3 0 standard drinks     Types: 3 Glasses of wine per week     Frequency: Monthly or less     Drinks per session: 1 or 2     Binge frequency: Never    Drug use: No    Sexual activity: Not Currently     Birth control/protection: Surgical   Lifestyle    Physical activity     Days per week: 2 days     Minutes per session: 30 min    Stress: Not at all   Relationships    Social connections     Talks on phone: Not on file     Gets together: Not on file     Attends [employer] service: Not on file     Active member of club or organization: Not on file     Attends meetings of clubs or organizations: Not on file     Relationship status: Not on file    Intimate partner violence     Fear of current or ex partner: Not on file Emotionally abused: Not on file     Physically abused: Not on file     Forced sexual activity: Not on file   Other Topics Concern    Not on file   Social History Narrative    Active advance directive    DME- freestyle lite blood glucose meter device kit QID      Family History   Problem Relation Age of Onset    Arthritis Mother     Heart disease Mother         cardiac disorder    Diabetes Mother     Hiatal hernia Mother     Hypertension Mother     Irritable bowel syndrome Mother     Breast cancer Mother 77        x2   Violette Bartolome Uterine cancer Mother     Osteoporosis Mother     Thyroid disease Mother     Other Mother         gastric reflux    Stroke Mother     Heart disease Father         cardiac disorder    Hiatal hernia Father     Ulcers Father     Other Father         gastric reflux    Hiatal hernia Sister     Thyroid disease Sister     Other Sister         gastric reflux    Lung cancer Sister 68    No Known Problems Brother     Brain cancer Maternal Grandmother     Breast cancer Maternal Grandmother         [place]    No Known Problems Maternal Grandfather     No Known Problems Paternal Grandmother     No Known Problems Paternal Grandfather     Hiatal hernia Child     Other Child         gastric reflux    Irritable bowel syndrome Daughter     Breast cancer Maternal Aunt 72    Uterine cancer Maternal Aunt         x3 sis    Stroke Maternal Aunt     Malig Hypertension Son     Breast cancer Maternal Aunt 54    Breast cancer Maternal Aunt 54    No Known Problems Paternal Aunt     Colon cancer Neg Hx     Ovarian cancer Neg Hx     Cervical cancer Neg Hx      Past Surgical History:   Procedure Laterality Date     SECTION      x2    COLONOSCOPY      ELBOW SURGERY      left ulna/radius    ESOPHAGOGASTRODUODENOSCOPY      FOOT SURGERY Right     FRACTURE SURGERY      FRACTURE SURGERY Left     tibia    HYSTERECTOMY Bilateral 2018    JOINT REPLACEMENT Left     shoulder, right knee    KNEE ARTHROSCOPY      KNEE ARTHROSCOPY W/ MENISCAL REPAIR Right     AL LAPAROSCOPY W TOT HYSTERECTUTERUS <=250 GRAM  W TUBE/OVARY N/A 7/30/2018    Procedure: ROBOTIC TOTAL LAPAROSCOPIC HYSTERECTOMY, BILATERAL SALPINGOOPHORECTOMY, BILATERAL PELVIC LYMPHNODE DISSECTION;  Surgeon: Rukhsana Roberts MD;  Location: [facility];  Service: Gynecology Oncology    REPLACEMENT TOTAL KNEE Right     SHOULDER ARTHROPLASTY      SINUS SURGERY      TONSILLECTOMY      TUBAL LIGATION      WRIST SURGERY      ganglonic cyst       Current Outpatient Medications:     BD VEO INSULIN SYRINGE U/F 31G X 15/64" 0 3 ML MISC, INJECT UNDER THE SKIN DAILY AT BEDTIME AND AS NEEDED ACCORDING TO SLIDING SCALE, Disp: 100 each, Rfl: 10    cetirizine (ZyrTEC) 10 MG chewable tablet, Chew 10 mg daily, Disp: , Rfl:     clindamycin (CLEOCIN) 150 mg capsule, Take 150 mg by mouth 4 (four) times a day, Disp: , Rfl: 0    clotrimazole-betamethasone (LOTRISONE) 1-0 05 % cream, Apply topically QHS as needed, Disp: 45 g, Rfl: 3    dicyclomine (BENTYL) 20 mg tablet, Take 1 tablet (20 mg total) by mouth 2 (two) times a day as needed (Abdominal pain), Disp: 20 tablet, Rfl: 0    ezetimibe (ZETIA) 10 mg tablet, Take 1 tablet (10 mg total) by mouth daily, Disp: 90 tablet, Rfl: 3    furosemide (LASIX) 20 mg tablet, TAKE 1 TABLET DAILY, Disp: 90 tablet, Rfl: 4    glipiZIDE (GLUCOTROL XL) 2 5 mg 24 hr tablet, Take 1 tablet (2 5 mg total) by mouth daily, Disp: 90 tablet, Rfl: 1    glucose blood test strip, 1 each by Other route 3 (three) times a day Use as instructed, Disp: 300 each, Rfl: 3    insulin lispro (HumaLOG) 100 units/mL injection, Only when on steroids or with a sick day, Disp: 10 mL, Rfl: 3    Insulin Syringe-Needle U-100 (INSULIN SYRINGE 1CC/31GX5/16") 31G X 5/16" 1 ML MISC, BD Veo Insulin Syringe Ultra-Fine 0 3 mL 31 gauge x 15/64", Disp: , Rfl:     KRILL OIL PO, Take by mouth, Disp: , Rfl:     levothyroxine 75 mcg tablet, TAKE 1 TABLET DAILY, Disp: 90 tablet, Rfl: 4    Linzess 145 MCG CAPS, TAKE 1 CAPSULE DAILY 30 MINUTES BEFORE BREAKFAST , Disp: 90 capsule, Rfl: 3    Magnesium Oxide 400 MG CAPS, Take by mouth, Disp: , Rfl:     metFORMIN (GLUCOPHAGE-XR) 500 mg 24 hr tablet, TAKE 1 TABLET IN THE MORNING AND 2 TABLETS IN THE EVENING, Disp: 270 tablet, Rfl: 3    olmesartan (BENICAR) 20 mg tablet, TAKE 1 TABLET DAILY, Disp: 90 tablet, Rfl: 3    Omega-3 Fatty Acids (FISH OIL) 1200 MG CAPS, Take by mouth, Disp: , Rfl:     pantoprazole (PROTONIX) 40 mg tablet, TAKE 1 TABLET DAILY, Disp: 90 tablet, Rfl: 4    potassium chloride (MICRO-K) 10 MEQ CR capsule, TAKE 1 CAPSULE DAILY, Disp: 90 capsule, Rfl: 4    rosuvastatin (CRESTOR) 20 MG tablet, Take 1 tablet (20 mg total) by mouth daily, Disp: 90 tablet, Rfl: 4    saccharomyces boulardii (FLORASTOR) 250 mg capsule, Take 250 mg by mouth 3 (three) times a day, Disp: , Rfl:     linaCLOtide (Linzess) 72 MCG CAPS, Take 72 mcg by mouth daily (Patient not taking: Reported on 5/5/2021), Disp: 90 capsule, Rfl: 3    Current Facility-Administered Medications:     albuterol inhalation solution 2 5 mg, 2 5 mg, Nebulization, Q6H PRN, Glory Mcburney, MD  Allergies   Allergen Reactions    Amoxicillin-Pot Clavulanate Anaphylaxis, Hives, Itching and Facial Swelling    Erythromycin Hives, Itching, Swelling, Vomiting, Throat Swelling, Nasal Congestion and Facial Swelling    Meperidine Anaphylaxis    Morphine Hives, Itching, Swelling, Other (See Comments) and Syncope     hypotension    Penicillins Anaphylaxis, Itching and Swelling    Relafen [Nabumetone] Hives, Itching and Swelling    Darvon [Propoxyphene] Hives    Methocarbamol Other (See Comments)     Double vision    Reglan [Metoclopramide] Anxiety    Sulfa Antibiotics Hives, Itching, Rash and Swelling    Tetracyclines & Related Rash       Labs:  Telephone on 04/01/2021   Component Date Value    Right Eye Diabetic Retin* 03/11/2020 None     Left Eye Diabetic Retino* 03/11/2020 None    Admission on 03/23/2021, Discharged on 03/24/2021   Component Date Value    WBC 03/23/2021 10 18*    RBC 03/23/2021 4 17     Hemoglobin 03/23/2021 11 9     Hematocrit 03/23/2021 36 8     MCV 03/23/2021 88     MCH 03/23/2021 28 5     MCHC 03/23/2021 32 3     RDW 03/23/2021 13 2     MPV 03/23/2021 9 2     Platelets 93/51/9078 284     nRBC 03/23/2021 0     Neutrophils Relative 03/23/2021 57     Immat GRANS % 03/23/2021 0     Lymphocytes Relative 03/23/2021 32     Monocytes Relative 03/23/2021 8     Eosinophils Relative 03/23/2021 2     Basophils Relative 03/23/2021 1     Neutrophils Absolute 03/23/2021 5 84     Immature Grans Absolute 03/23/2021 0 03     Lymphocytes Absolute 03/23/2021 3 25     Monocytes Absolute 03/23/2021 0 82     Eosinophils Absolute 03/23/2021 0 16     Basophils Absolute 03/23/2021 0 08     Sodium 03/23/2021 141     Potassium 03/23/2021 4 3     Chloride 03/23/2021 105     CO2 03/23/2021 26     ANION GAP 03/23/2021 10     BUN 03/23/2021 22     Creatinine 03/23/2021 1 05     Glucose 03/23/2021 113     Calcium 03/23/2021 9 1     AST 03/23/2021 22     ALT 03/23/2021 40     Alkaline Phosphatase 03/23/2021 67     Total Protein 03/23/2021 7 5     Albumin 03/23/2021 3 6     Total Bilirubin 03/23/2021 0 33     eGFR 03/23/2021 53     Lipase 03/23/2021 176     LACTIC ACID 03/23/2021 1 0    Lab on 03/08/2021   Component Date Value    Hepatitis C Ab 03/08/2021 Non-reactive     WBC 03/08/2021 8 12     RBC 03/08/2021 4 66     Hemoglobin 03/08/2021 13 4     Hematocrit 03/08/2021 41 5     MCV 03/08/2021 89     MCH 03/08/2021 28 8     MCHC 03/08/2021 32 3     RDW 03/08/2021 13 6     MPV 03/08/2021 9 7     Platelets 32/21/6005 323     nRBC 03/08/2021 0     Neutrophils Relative 03/08/2021 49     Immat GRANS % 03/08/2021 0     Lymphocytes Relative 03/08/2021 41     Monocytes Relative 03/08/2021 7     Eosinophils Relative 03/08/2021 2     Basophils Relative 03/08/2021 1     Neutrophils Absolute 03/08/2021 4 00     Immature Grans Absolute 03/08/2021 0 03     Lymphocytes Absolute 03/08/2021 3 29     Monocytes Absolute 03/08/2021 0 56     Eosinophils Absolute 03/08/2021 0 18     Basophils Absolute 03/08/2021 0 06     Sodium 03/08/2021 140     Potassium 03/08/2021 4 2     Chloride 03/08/2021 107     CO2 03/08/2021 26     ANION GAP 03/08/2021 7     BUN 03/08/2021 21     Creatinine 03/08/2021 1 06     Glucose, Fasting 03/08/2021 129*    Calcium 03/08/2021 9 9     AST 03/08/2021 30     ALT 03/08/2021 41     Alkaline Phosphatase 03/08/2021 74     Total Protein 03/08/2021 8 0     Albumin 03/08/2021 4 2     Total Bilirubin 03/08/2021 0 41     eGFR 03/08/2021 53     Cholesterol 03/08/2021 115     Triglycerides 03/08/2021 153*    HDL, Direct 03/08/2021 37*    LDL Calculated 03/08/2021 47     Non-HDL-Chol (CHOL-HDL) 03/08/2021 78     TSH 3RD GENERATON 03/08/2021 2 310     Hemoglobin A1C 03/08/2021 6 1*    EAG 03/08/2021 128     Creatinine, Ur 03/08/2021 106 0     Microalbum  ,U,Random 03/08/2021 6 5     Microalb Creat Ratio 03/08/2021 6      Imaging: No results found      Review of Systems:  Review of Systems     REVIEW OF SYSTEMS:  Constitutional:  Denies fever or chills   Eyes:  Denies change in visual acuity   HENT:  Denies nasal congestion or sore throat   Respiratory:   shortness of breath   Cardiovascular:  Denies chest pain or edema   GI:  Denies abdominal pain, nausea, vomiting, bloody stools or diarrhea   :  Denies dysuria, frequency, difficulty in micturition and nocturia  Musculoskeletal:  Denies back pain or joint pain   Neurologic:  Denies headache, focal weakness or sensory changes   Endocrine:  Denies polyuria or polydipsia   Lymphatic:  Denies swollen glands   Psychiatric:  Denies depression or anxiety     Physical Exam:    /66   Pulse 84   Ht 5' 2" (1 575 m)   Wt 92 1 kg (203 lb)   SpO2 97%   BMI 37 13 kg/m²     Physical Exam   PHYSICAL EXAM:  General:  Patient is not in acute distress   Head: Normocephalic, Atraumatic  HEENT:  Both pupils normal-size atraumatic, normocephalic, nonicteric  Neck:  JVP not raised  Trachea central  No carotid bruit  Respiratory:  normal breath sounds no crackles  no rhonchi  Cardiovascular:  Regular rate and rhythm no S3  2/6 systolic ejection murmur in the right sternal border  GI:  Abdomen soft nontender  No organomegaly  Lymphatic:  No cervical or inguinal lymphadenopathy  Neurologic:  Patient is awake alert, oriented   Grossly nonfocal    Extremities no edema    Discussion/Summary:   Patient with multiple medical problems who seems to be doing reasonably well from cardiac standpoint  Previous studies reviewed with patient  Medications reviewed and possible side effects discussed  concepts of cardiovascular disease , signs and symptoms of heart disease  Dietary and risk factor modification reinforced  All questions answered  Safety measures reviewed  Patient advised to report any problems prompting medical attention  given symptoms of shortness of breath with exertion and cardiac murmur, patient will be scheduled for an echocardiogram to evaluate ejection fraction valves and rule out any evidence of a pulmonary hypertension  Symptoms to watch out from cardiac standpoint which would indicate the need for further cardiac evaluation also discussed  Dietary and risk factor modification to continue  Follow-up in 6 to 12 months or earlier as needed  Patient is agreeable with the plan of care

## 2021-05-19 DIAGNOSIS — E03.9 ACQUIRED HYPOTHYROIDISM: ICD-10-CM

## 2021-05-19 DIAGNOSIS — I10 ESSENTIAL HYPERTENSION: ICD-10-CM

## 2021-05-19 DIAGNOSIS — M25.50 ARTHRALGIA, UNSPECIFIED JOINT: ICD-10-CM

## 2021-05-20 DIAGNOSIS — I10 ESSENTIAL HYPERTENSION: ICD-10-CM

## 2021-05-20 DIAGNOSIS — M25.50 ARTHRALGIA, UNSPECIFIED JOINT: ICD-10-CM

## 2021-05-20 DIAGNOSIS — E03.9 ACQUIRED HYPOTHYROIDISM: ICD-10-CM

## 2021-05-20 RX ORDER — LEVOTHYROXINE SODIUM 0.07 MG/1
75 TABLET ORAL DAILY
Qty: 90 TABLET | Refills: 3 | Status: SHIPPED | OUTPATIENT
Start: 2021-05-20 | End: 2021-05-20 | Stop reason: SDUPTHER

## 2021-05-20 RX ORDER — PANTOPRAZOLE SODIUM 40 MG/1
40 TABLET, DELAYED RELEASE ORAL DAILY
Qty: 90 TABLET | Refills: 3 | Status: SHIPPED | OUTPATIENT
Start: 2021-05-20 | End: 2021-05-20 | Stop reason: SDUPTHER

## 2021-05-20 RX ORDER — FUROSEMIDE 20 MG/1
20 TABLET ORAL DAILY
Qty: 90 TABLET | Refills: 3 | Status: SHIPPED | OUTPATIENT
Start: 2021-05-20 | End: 2021-05-20 | Stop reason: SDUPTHER

## 2021-05-20 RX ORDER — POTASSIUM CHLORIDE 750 MG/1
10 CAPSULE, EXTENDED RELEASE ORAL DAILY
Qty: 90 CAPSULE | Refills: 3 | Status: SHIPPED | OUTPATIENT
Start: 2021-05-20 | End: 2022-05-13

## 2021-05-20 RX ORDER — LEVOTHYROXINE SODIUM 0.07 MG/1
75 TABLET ORAL DAILY
Qty: 90 TABLET | Refills: 3 | Status: SHIPPED | OUTPATIENT
Start: 2021-05-20 | End: 2022-05-13

## 2021-05-20 RX ORDER — POTASSIUM CHLORIDE 750 MG/1
10 CAPSULE, EXTENDED RELEASE ORAL DAILY
Qty: 90 CAPSULE | Refills: 3 | Status: SHIPPED | OUTPATIENT
Start: 2021-05-20 | End: 2021-05-20 | Stop reason: SDUPTHER

## 2021-05-20 RX ORDER — PANTOPRAZOLE SODIUM 40 MG/1
40 TABLET, DELAYED RELEASE ORAL DAILY
Qty: 90 TABLET | Refills: 3 | Status: SHIPPED | OUTPATIENT
Start: 2021-05-20 | End: 2022-05-13

## 2021-05-20 RX ORDER — FUROSEMIDE 20 MG/1
20 TABLET ORAL DAILY
Qty: 90 TABLET | Refills: 3 | Status: SHIPPED | OUTPATIENT
Start: 2021-05-20 | End: 2022-05-13

## 2021-05-20 NOTE — TELEPHONE ENCOUNTER
----- Message from Morena Love sent at 5/20/2021 10:32 AM EDT -----  Regarding: Prescription Question  Contact: [phone]  [facility] is sending requests for prescription renewals but the order is going through to [facility] pharmacy  I must get all of my ongoing meds from [facility] and they just be ordered for a 3 month period of time  Can you please get this corrected  Thank you so much  A-T Advancement Flap Text: The defect edges were debeveled with a #15 scalpel blade.  Given the location of the defect, shape of the defect and the proximity to free margins an A-T advancement flap was deemed most appropriate.  Using a sterile surgical marker, an appropriate advancement flap was drawn incorporating the defect and placing the expected incisions within the relaxed skin tension lines where possible.    The area thus outlined was incised deep to adipose tissue with a #15 scalpel blade.  The skin margins were undermined to an appropriate distance in all directions utilizing iris scissors.

## 2021-05-25 DIAGNOSIS — I10 BENIGN ESSENTIAL HYPERTENSION: ICD-10-CM

## 2021-05-25 PROCEDURE — 4010F ACE/ARB THERAPY RXD/TAKEN: CPT | Performed by: INTERNAL MEDICINE

## 2021-05-25 RX ORDER — OLMESARTAN MEDOXOMIL 20 MG/1
20 TABLET ORAL DAILY
Qty: 90 TABLET | Refills: 3 | Status: SHIPPED | OUTPATIENT
Start: 2021-05-25 | End: 2022-05-19

## 2021-05-27 ENCOUNTER — HOSPITAL ENCOUNTER (OUTPATIENT)
Dept: MAMMOGRAPHY | Facility: CLINIC | Age: 71
Discharge: HOME | End: 2021-05-27
Payer: COMMERCIAL

## 2021-05-27 VITALS — BODY MASS INDEX: 37.36 KG/M2 | WEIGHT: 203 LBS | HEIGHT: 62 IN

## 2021-05-27 DIAGNOSIS — Z12.31 ENCOUNTER FOR SCREENING MAMMOGRAM FOR MALIGNANT NEOPLASM OF BREAST: ICD-10-CM

## 2021-05-27 PROCEDURE — 77063 BREAST TOMOSYNTHESIS BI: CPT

## 2021-05-27 PROCEDURE — 77067 SCR MAMMO BI INCL CAD: CPT

## 2021-06-02 DIAGNOSIS — K59.09 OTHER CONSTIPATION: ICD-10-CM

## 2021-06-02 DIAGNOSIS — K59.04 CHRONIC IDIOPATHIC CONSTIPATION: Primary | ICD-10-CM

## 2021-06-02 RX ORDER — LINACLOTIDE 145 UG/1
1 CAPSULE, GELATIN COATED ORAL
Qty: 90 CAPSULE | Refills: 3 | Status: SHIPPED | OUTPATIENT
Start: 2021-06-02 | End: 2022-06-16

## 2021-06-02 NOTE — TELEPHONE ENCOUNTER
CURT: Spoke with patient  History of IBS-C, LLQ pain    Patient would like her linzess increased to 145mcg as this worked better for her  She is requesting [facility] month supply [facility]  She also c/o a sharp pain on her RUQ 3 weeks ago which is not just an ache when she moves  Denies nausea, vomiting, fever, chills, SOB, weakness, abdominal pain, black/bloody stools  She will continue to monitor her symptom if pain returns

## 2021-06-02 NOTE — TELEPHONE ENCOUNTER
Ptn is experiencing ABD pain and constipation  Reena Guadalupe did lower her linzess and she feels it might needs to be raised again   Please advise

## 2021-06-09 ENCOUNTER — HOSPITAL ENCOUNTER (OUTPATIENT)
Dept: NON INVASIVE DIAGNOSTICS | Facility: CLINIC | Age: 71
Discharge: HOME | End: 2021-06-09
Payer: COMMERCIAL

## 2021-06-09 DIAGNOSIS — R06.02 SHORTNESS OF BREATH: ICD-10-CM

## 2021-06-09 PROCEDURE — 93306 TTE W/DOPPLER COMPLETE: CPT

## 2021-06-09 PROCEDURE — 93306 TTE W/DOPPLER COMPLETE: CPT | Performed by: INTERNAL MEDICINE

## 2021-06-10 DIAGNOSIS — R91.1 INCIDENTAL LUNG NODULE, GREATER THAN OR EQUAL TO 8MM: Primary | ICD-10-CM

## 2021-06-22 ENCOUNTER — OFFICE VISIT (OUTPATIENT)
Dept: OBGYN CLINIC | Facility: CLINIC | Age: 71
End: 2021-06-22
Payer: COMMERCIAL

## 2021-06-22 ENCOUNTER — APPOINTMENT (OUTPATIENT)
Dept: RADIOLOGY | Facility: CLINIC | Age: 71
End: 2021-06-22
Payer: COMMERCIAL

## 2021-06-22 VITALS
BODY MASS INDEX: 37.47 KG/M2 | HEART RATE: 75 BPM | HEIGHT: 62 IN | WEIGHT: 203.6 LBS | SYSTOLIC BLOOD PRESSURE: 145 MMHG | DIASTOLIC BLOOD PRESSURE: 81 MMHG

## 2021-06-22 DIAGNOSIS — M25.562 CHRONIC PAIN OF LEFT KNEE: ICD-10-CM

## 2021-06-22 DIAGNOSIS — M17.12 PRIMARY OSTEOARTHRITIS OF LEFT KNEE: ICD-10-CM

## 2021-06-22 DIAGNOSIS — M25.562 CHRONIC PAIN OF BOTH KNEES: Primary | ICD-10-CM

## 2021-06-22 DIAGNOSIS — G89.29 CHRONIC PAIN OF BOTH KNEES: Primary | ICD-10-CM

## 2021-06-22 DIAGNOSIS — M54.50 LUMBAR PAIN: ICD-10-CM

## 2021-06-22 DIAGNOSIS — M25.561 CHRONIC PAIN OF BOTH KNEES: Primary | ICD-10-CM

## 2021-06-22 DIAGNOSIS — G89.29 CHRONIC PAIN OF LEFT KNEE: ICD-10-CM

## 2021-06-22 PROCEDURE — 99203 OFFICE O/P NEW LOW 30 MIN: CPT | Performed by: ORTHOPAEDIC SURGERY

## 2021-06-22 PROCEDURE — 20610 DRAIN/INJ JOINT/BURSA W/O US: CPT | Performed by: ORTHOPAEDIC SURGERY

## 2021-06-22 PROCEDURE — 73562 X-RAY EXAM OF KNEE 3: CPT

## 2021-06-22 RX ORDER — BUPIVACAINE HYDROCHLORIDE 2.5 MG/ML
2 INJECTION, SOLUTION INFILTRATION; PERINEURAL
Status: COMPLETED | OUTPATIENT
Start: 2021-06-22 | End: 2021-06-22

## 2021-06-22 RX ORDER — KETOROLAC TROMETHAMINE 30 MG/ML
60 INJECTION, SOLUTION INTRAMUSCULAR; INTRAVENOUS
Status: COMPLETED | OUTPATIENT
Start: 2021-06-22 | End: 2021-06-22

## 2021-06-22 RX ADMIN — BUPIVACAINE HYDROCHLORIDE 2 ML: 2.5 INJECTION, SOLUTION INFILTRATION; PERINEURAL at 09:15

## 2021-06-22 RX ADMIN — KETOROLAC TROMETHAMINE 60 MG: 30 INJECTION, SOLUTION INTRAMUSCULAR; INTRAVENOUS at 09:15
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
## 2021-06-23 ENCOUNTER — ANNUAL EXAM (OUTPATIENT)
Dept: OBGYN CLINIC | Facility: CLINIC | Age: 71
End: 2021-06-23
Payer: COMMERCIAL

## 2021-06-23 VITALS
SYSTOLIC BLOOD PRESSURE: 122 MMHG | BODY MASS INDEX: 37.36 KG/M2 | WEIGHT: 203 LBS | DIASTOLIC BLOOD PRESSURE: 82 MMHG | HEIGHT: 62 IN

## 2021-06-23 DIAGNOSIS — Z78.0 ASYMPTOMATIC POSTMENOPAUSAL STATUS: ICD-10-CM

## 2021-06-23 DIAGNOSIS — L90.0 LICHEN SCLEROSUS: ICD-10-CM

## 2021-06-23 DIAGNOSIS — Z12.31 SCREENING MAMMOGRAM, ENCOUNTER FOR: ICD-10-CM

## 2021-06-23 DIAGNOSIS — Z01.419 ENCOUNTER FOR GYNECOLOGICAL EXAMINATION WITHOUT ABNORMAL FINDING: Primary | ICD-10-CM

## 2021-06-23 PROCEDURE — S0612 ANNUAL GYNECOLOGICAL EXAMINA: HCPCS | Performed by: NURSE PRACTITIONER

## 2021-06-23 RX ORDER — CLOTRIMAZOLE AND BETAMETHASONE DIPROPIONATE 10; .64 MG/G; MG/G
CREAM TOPICAL
Qty: 45 G | Refills: 3 | Status: SHIPPED | OUTPATIENT
Start: 2021-06-23 | End: 2021-08-17

## 2021-06-23 NOTE — PATIENT INSTRUCTIONS
Dermatitis   WHAT YOU NEED TO KNOW:   Dermatitis is skin inflammation  You may have an itchy rash, redness, or swelling  You may also have bumps or blisters that crust over or ooze clear fluid  Dermatitis can be caused by allergens such as dust mites, pet dander, pollen, and certain foods  It can also develop when something touches your skin and irritates it or causes an allergic reaction  Examples include soaps, chemicals, latex, and poison ivy  DISCHARGE INSTRUCTIONS:   Call 911 if you have any of the following symptoms of anaphylaxis:   · Sudden trouble breathing    · Throat swelling and tightness    · Dizziness, lightheadedness, fainting, or confusion    Return to the emergency department if:   · You develop a fever or have red streaks going up your arm or leg  · Your rash gets more swollen, red, or hot  Contact your healthcare provider if:   · Your skin blisters, oozes white or yellow pus, or has a foul-smelling discharge  · Your rash spreads or does not get better, even after treatment  · You have questions or concerns about your condition or care  Medicines:   · Medicines  help decrease itching and inflammation, or treat a bacterial infection  They may be given as a topical cream, shot, or a pill  · Take your medicine as directed  Contact your healthcare provider if you think your medicine is not helping or if you have side effects  Tell him of her if you are allergic to any medicine  Keep a list of the medicines, vitamins, and herbs you take  Include the amounts, and when and why you take them  Bring the list or the pill bottles to follow-up visits  Carry your medicine list with you in case of an emergency  Apply a cool compress to your rash: This will help soothe your skin  Keep your skin moist:  Rub unscented cream or lotion on your skin to prevent dryness and itching  Do this right after a lukewarm bath or shower when your skin is still damp    Avoid skin irritants:  Do not use skin irritants, such as makeup, hair products, soaps, and cleansers  Use products that do not contain fragrances or dye  Follow up with your healthcare provider as directed:  Write down your questions so you remember to ask them during your visits  © Copyright [address] Information is for End User's use only and may not be sold, redistributed or otherwise used for commercial purposes  All illustrations and images included in CareNotes® are the copyrighted property of A D A M , Inc  or [facility] Madeline Loving   The above information is an  only  It is not intended as medical advice for individual conditions or treatments  Talk to your doctor, nurse or pharmacist before following any medical regimen to see if it is safe and effective for you

## 2021-06-23 NOTE — PROGRESS NOTES
Assessment/Plan:    No problem-specific Assessment & Plan notes found for this encounter  Diagnoses and all orders for this visit:    Encounter for gynecological examination without abnormal finding    Lichen sclerosus  -     clotrimazole-betamethasone (LOTRISONE) 1-0 05 % cream; Apply topically QHS as needed    Asymptomatic postmenopausal status      Call as needed, encouraged calcium/vit D in her diet, all questions answered      Subjective:      Patient ID: Lety Scott is a 66 y o  female  Pleasant 66 y o  postmenopausal female here for annual exam  She denies postmenopausal bleeding  Had Robotic Total Hysterectomy/BSO 2018 with Dr Elder Suazo for early stage endometrial cancer, no chemo, no radiation  No further paps needed  Denies vaginal issues but does have more itching and dry skin relieved by lotrisone  Agrees to return for a vulvar biopsy to confirm diagnosis  Denies pelvic pain  Denies postmenopausal issues  Not sexually active x 30 yrs  Colonoscopy UTD with dr Vianey Hilliard WAS 12/2019, due in  5yrs  DXA due 2023, nml  Presumed Lichen Sclerosis stable  Gynecologic Exam  Associated symptoms include constipation  Pertinent negatives include no chills, diarrhea, dysuria, fever or hematuria         The following portions of the patient's history were reviewed and updated as appropriate:   She  has a past medical history of Abnormal mammogram, Abnormal weight gain, Achilles tendinitis, Asthma, Diabetes mellitus ([address] ), Disc degeneration, lumbar, Disease of thyroid gland, Dyslipidemia, Dyslipidemia, goal LDL below 70 (3/27/2018), Early satiety, Edema, Encounter for follow-up examination after completed treatment for malignant neoplasm (3/17/2019), Encounter for gynecological examination without abnormal finding (6/22/2020), Endometrial cancer ([address] ) (2018), Enthesopathy, Esophagitis, reflux, GERD (gastroesophageal reflux disease), Hard to intubate, Hypercholesterolemia, Hypertension, IBS (irritable bowel syndrome), Irritable bowel syndrome, Obesity, Class I, BMI 30-34 9 (2015), Osteoarthritis, Rosacea, and Sacroiliitis ([facility] )  She   Patient Active Problem List    Diagnosis Date Noted    Chronic pain of both knees 2021    Obesity (BMI 30-39 9) 2021    Acquired hypothyroidism 2019    At risk for sleep apnea 2019    Dyslipidemia 2019    Asthma 2019    GERD (gastroesophageal reflux disease) 2019    Asymptomatic postmenopausal status 12/10/2018    SOB (shortness of breath)     History of endometrial cancer 2018    Diabetes mellitus type 2, controlled, without complications ([facility] )     Lichen sclerosus     Status post total right knee replacement 2016    Spondylosis of lumbosacral region 2016    Irritable bowel syndrome without diarrhea 2016    Coronary atherosclerosis due to calcified coronary lesion     Dysmetabolic syndrome X     Benign essential hypertension 2012     She  has a past surgical history that includes  section; Foot surgery (Right); SHOULDER ARTHROPLASTY; Sinus surgery; Tonsillectomy; Replacement total knee (Right); Tubal ligation; Wrist surgery; Elbow surgery; Knee arthroscopy w/ meniscal repair (Right); Colonoscopy; Knee arthroscopy; Joint replacement (Left); Fracture surgery; Fracture surgery (Left); Esophagogastroduodenoscopy; pr laparoscopy w tot hysterectuterus <=250 gram  w tube/ovary (N/A, 2018); and Hysterectomy (Bilateral, 2018)    Her family history includes Arthritis in her mother; Brain cancer in her maternal grandmother; Breast cancer in her maternal grandmother; Breast cancer (age of onset: 54) in her maternal aunt and maternal aunt; Breast cancer (age of onset: 72) in her maternal aunt; Breast cancer (age of onset: 77) in her mother; Diabetes in her mother; Heart disease in her father and mother; Hiatal hernia in her child, father, mother, and sister; Hypertension in her mother; Irritable bowel syndrome in her daughter and mother; Lung cancer (age of onset: 68) in her sister; Ralf Matter Hypertension in her son; No Known Problems in her brother, maternal grandfather, paternal aunt, paternal grandfather, and paternal grandmother; Osteoporosis in her mother; Other in her child, father, mother, and sister; Stroke in her maternal aunt and mother; Thyroid disease in her mother and sister; Ulcers in her father; Uterine cancer in her maternal aunt and mother  She  reports that she has never smoked  She has never used smokeless tobacco  She reports current alcohol use of about 3 0 standard drinks of alcohol per week  She reports that she does not use drugs    Current Outpatient Medications   Medication Sig Dispense Refill    cetirizine (ZyrTEC) 10 MG chewable tablet Chew 10 mg daily      clindamycin (CLEOCIN) 150 mg capsule Take 150 mg by mouth 4 (four) times a day  0    clotrimazole-betamethasone (LOTRISONE) 1-0 05 % cream Apply topically QHS as needed 45 g 3    dicyclomine (BENTYL) 20 mg tablet Take 1 tablet (20 mg total) by mouth 2 (two) times a day as needed (Abdominal pain) 20 tablet 0    ezetimibe (ZETIA) 10 mg tablet Take 1 tablet (10 mg total) by mouth daily 90 tablet 3    furosemide (LASIX) 20 mg tablet Take 1 tablet (20 mg total) by mouth daily 90 tablet 3    glipiZIDE (GLUCOTROL XL) 2 5 mg 24 hr tablet Take 1 tablet (2 5 mg total) by mouth daily 90 tablet 1    glucose blood test strip 1 each by Other route 3 (three) times a day Use as instructed 300 each 3    insulin lispro (HumaLOG) 100 units/mL injection Only when on steroids or with a sick day 10 mL 3    Insulin Syringe-Needle U-100 (INSULIN SYRINGE 1CC/31GX5/16") 31G X 5/16" 1 ML MISC BD Veo Insulin Syringe Ultra-Fine 0 3 mL 31 gauge x 15/64"      KRILL OIL PO Take by mouth      levothyroxine 75 mcg tablet Take 1 tablet (75 mcg total) by mouth daily 90 tablet 3    Linzess 145 MCG CAPS Take 1 capsule (145 mcg total) by mouth daily before breakfast Take 30 minutes prior to breakfast 90 capsule 3    Magnesium Oxide 400 MG CAPS Take by mouth      metFORMIN (GLUCOPHAGE-XR) 500 mg 24 hr tablet TAKE 1 TABLET IN THE MORNING AND 2 TABLETS IN THE EVENING 270 tablet 3    olmesartan (BENICAR) 20 mg tablet Take 1 tablet (20 mg total) by mouth daily 90 tablet 3    Omega-3 Fatty Acids (FISH OIL) 1200 MG CAPS Take by mouth      pantoprazole (PROTONIX) 40 mg tablet Take 1 tablet (40 mg total) by mouth daily 90 tablet 3    potassium chloride (MICRO-K) 10 MEQ CR capsule Take 1 capsule (10 mEq total) by mouth daily 90 capsule 3    rosuvastatin (CRESTOR) 20 MG tablet Take 1 tablet (20 mg total) by mouth daily 90 tablet 4    saccharomyces boulardii (FLORASTOR) 250 mg capsule Take 250 mg by mouth 3 (three) times a day      BD VEO INSULIN SYRINGE U/F 31G X 15/64" 0 3 ML MISC INJECT UNDER THE SKIN DAILY AT BEDTIME AND AS NEEDED ACCORDING TO SLIDING SCALE 100 each 10     Current Facility-Administered Medications   Medication Dose Route Frequency Provider Last Rate Last Admin    albuterol inhalation solution 2 5 mg  2 5 mg Nebulization Q6H PRN Harshad Sabillon MD         She is allergic to amoxicillin-pot clavulanate, erythromycin, meperidine, morphine, penicillins, relafen [nabumetone], darvon [propoxyphene], methocarbamol, reglan [metoclopramide], sulfa antibiotics, and tetracyclines & related  OB History    Para Term  AB Living   3 3 3     3   SAB TAB Ectopic Multiple Live Births           3      # Outcome Date GA Lbr Brian/2nd Weight Sex Delivery Anes PTL Lv   3 Term            2 Term            1 Term               Obstetric Comments   Menarche 13   Hx bcp   Hx abnormal pap   Hx hormone replacement     [employer]  Review of Systems   Constitutional: Negative for activity change, chills, fatigue, fever and unexpected weight change     HENT: Negative for mouth sores and trouble swallowing  Respiratory: Negative for shortness of breath  Gastrointestinal: Positive for constipation  Negative for anal bleeding, blood in stool and diarrhea  +IBS relieved with linzess   Genitourinary: Negative for difficulty urinating, dysuria, genital sores and hematuria  Neurological: Negative for weakness  Psychiatric/Behavioral: Negative for confusion and self-injury  Objective:      /82 (BP Location: Left arm, Patient Position: Sitting)   Ht 5' 2" (1 575 m)   Wt 92 1 kg (203 lb)   BMI 37 13 kg/m²          Physical Exam  Constitutional:       General: She is not in acute distress  Appearance: She is well-developed  HENT:      Head: Normocephalic  Pulmonary:      Effort: Pulmonary effort is normal    Chest:      Breasts: Breasts are symmetrical          Right: No inverted nipple, mass, nipple discharge, skin change or tenderness  Left: No inverted nipple, mass, nipple discharge, skin change or tenderness  Abdominal:      Palpations: Abdomen is soft  Genitourinary:     Exam position: Supine  Labia:         Right: No rash, tenderness, lesion or injury  Left: No rash, tenderness, lesion or injury  Vagina: No signs of injury and foreign body  No vaginal discharge, erythema or tenderness  Adnexa:         Right: No mass, tenderness or fullness  Left: No mass, tenderness or fullness  Comments: Cx/uterus absent  Musculoskeletal:      Cervical back: Normal range of motion and neck supple  Lymphadenopathy:      Lower Body: No right inguinal adenopathy  No left inguinal adenopathy  LS noted from perineum to clitoral grayson, mild redness and whitening noted

## 2021-06-25 ENCOUNTER — HOSPITAL ENCOUNTER (OUTPATIENT)
Dept: RADIOLOGY | Age: 71
Discharge: HOME | End: 2021-06-25
Payer: COMMERCIAL

## 2021-06-25 DIAGNOSIS — R91.1 INCIDENTAL LUNG NODULE, GREATER THAN OR EQUAL TO 8MM: ICD-10-CM

## 2021-06-25 LAB — GLUCOSE SERPL-MCNC: 107 MG/DL (ref 65–140)

## 2021-06-25 PROCEDURE — G1004 CDSM NDSC: HCPCS

## 2021-06-25 PROCEDURE — 82948 REAGENT STRIP/BLOOD GLUCOSE: CPT

## 2021-06-25 PROCEDURE — 78815 PET IMAGE W/CT SKULL-THIGH: CPT

## 2021-06-25 PROCEDURE — A9552 F18 FDG: HCPCS

## 2021-06-28 ENCOUNTER — CONSULT (OUTPATIENT)
Dept: PAIN MEDICINE | Facility: CLINIC | Age: 71
End: 2021-06-28
Payer: COMMERCIAL

## 2021-06-28 ENCOUNTER — APPOINTMENT (OUTPATIENT)
Dept: RADIOLOGY | Facility: CLINIC | Age: 71
End: 2021-06-28
Payer: COMMERCIAL

## 2021-06-28 ENCOUNTER — TELEPHONE (OUTPATIENT)
Dept: INTERNAL MEDICINE CLINIC | Facility: CLINIC | Age: 71
End: 2021-06-28

## 2021-06-28 ENCOUNTER — OFFICE VISIT (OUTPATIENT)
Dept: PULMONOLOGY | Facility: CLINIC | Age: 71
End: 2021-06-28
Payer: COMMERCIAL

## 2021-06-28 VITALS
SYSTOLIC BLOOD PRESSURE: 153 MMHG | HEART RATE: 78 BPM | HEIGHT: 62 IN | DIASTOLIC BLOOD PRESSURE: 88 MMHG | WEIGHT: 202.4 LBS | RESPIRATION RATE: 18 BRPM | BODY MASS INDEX: 37.25 KG/M2

## 2021-06-28 VITALS
OXYGEN SATURATION: 97 % | HEIGHT: 62 IN | DIASTOLIC BLOOD PRESSURE: 88 MMHG | BODY MASS INDEX: 37.13 KG/M2 | TEMPERATURE: 98.7 F | WEIGHT: 201.8 LBS | SYSTOLIC BLOOD PRESSURE: 153 MMHG | HEART RATE: 96 BPM

## 2021-06-28 DIAGNOSIS — R91.1 LUNG NODULE: Primary | ICD-10-CM

## 2021-06-28 DIAGNOSIS — M54.50 LUMBAR PAIN: ICD-10-CM

## 2021-06-28 DIAGNOSIS — N73.9 PID (PELVIC INFLAMMATORY DISEASE): Primary | ICD-10-CM

## 2021-06-28 DIAGNOSIS — M54.16 LUMBAR RADICULOPATHY: Primary | ICD-10-CM

## 2021-06-28 DIAGNOSIS — E66.9 OBESITY (BMI 30-39.9): ICD-10-CM

## 2021-06-28 DIAGNOSIS — J45.20 MILD INTERMITTENT ASTHMA WITHOUT COMPLICATION: ICD-10-CM

## 2021-06-28 DIAGNOSIS — M53.3 SACROILIAC PAIN: ICD-10-CM

## 2021-06-28 DIAGNOSIS — Z91.89 AT RISK FOR SLEEP APNEA: ICD-10-CM

## 2021-06-28 PROCEDURE — 99204 OFFICE O/P NEW MOD 45 MIN: CPT | Performed by: STUDENT IN AN ORGANIZED HEALTH CARE EDUCATION/TRAINING PROGRAM

## 2021-06-28 PROCEDURE — 3079F DIAST BP 80-89 MM HG: CPT | Performed by: STUDENT IN AN ORGANIZED HEALTH CARE EDUCATION/TRAINING PROGRAM

## 2021-06-28 PROCEDURE — 3077F SYST BP >= 140 MM HG: CPT | Performed by: STUDENT IN AN ORGANIZED HEALTH CARE EDUCATION/TRAINING PROGRAM

## 2021-06-28 PROCEDURE — 72110 X-RAY EXAM L-2 SPINE 4/>VWS: CPT

## 2021-06-28 PROCEDURE — 99214 OFFICE O/P EST MOD 30 MIN: CPT | Performed by: PHYSICIAN ASSISTANT

## 2021-06-28 RX ORDER — ALBUTEROL SULFATE 90 UG/1
2 AEROSOL, METERED RESPIRATORY (INHALATION) EVERY 6 HOURS PRN
COMMUNITY
End: 2021-06-28 | Stop reason: SDUPTHER

## 2021-06-28 RX ORDER — ALBUTEROL SULFATE 90 UG/1
2 AEROSOL, METERED RESPIRATORY (INHALATION) EVERY 6 HOURS PRN
Qty: 18 G | Refills: 2 | Status: SHIPPED | OUTPATIENT
Start: 2021-06-28

## 2021-06-28 NOTE — PATIENT INSTRUCTIONS
Sacroiliitis   WHAT YOU NEED TO KNOW:   Sacroiliitis is a painful swelling of one or both of your sacroiliac joints that lasts at least 3 months  The sacroiliac joint connects your pelvis to the base of your spine  DISCHARGE INSTRUCTIONS:   Medicines:  Ask for more information about these and other medicines you may need to treat sacroiliitis:  · Pain medicine: You may be given medicine to take away or decrease pain  Do not wait until the pain is severe before you take your medicine  You may be given the medicine as a pill to swallow or as a lotion that you put on the painful area  · NSAIDs  help decrease swelling and pain or fever  This medicine is available with or without a doctor's order  NSAIDs can cause stomach bleeding or kidney problems in certain people  If you take blood thinner medicine, always ask your healthcare provider if NSAIDs are safe for you  Always read the medicine label and follow directions  · Muscle relaxers  help decrease pain and muscle spasms  · Take your medicine as directed  Contact your healthcare provider if you think your medicine is not helping or if you have side effects  Tell him of her if you are allergic to any medicine  Keep a list of the medicines, vitamins, and herbs you take  Include the amounts, and when and why you take them  Bring the list or the pill bottles to follow-up visits  Carry your medicine list with you in case of an emergency  Physical therapy:  Your healthcare provider may suggest physical therapy  Your physical therapist may teach you exercises to improve posture (the way you stand and sit), flexibility, and strength in your lower back  He may also teach you how to remain safely active and avoid further injury  Follow the exercise plan given to you by your physical therapist   Rest:  Follow your healthcare provider's instructions about how much rest you should get  Avoid activity that worsens your pain    Ice or heat packs:  Use ice or heat packs on the sore area of your body to decrease the pain and swelling  Put ice in a plastic bag covered with a towel on your low back  Cover heated items with a towel to avoid burns  Use ice and heat as directed  Follow up with your healthcare provider or spine specialist within 1 to 2 weeks:  Write down your questions so you remember to ask them during your visits  Contact your healthcare provider or spine specialist if:   · Your pain makes it hard for you to do your daily activities, such as work or school  · Your pain does not go away after treatment  · You feel depressed or anxious  · You have questions about your condition or care  Return to the emergency department if:   · You have a fever  · Your pain is worse than before  · Your pain prevents you from sleeping  © Copyright [address] Information is for End User's use only and may not be sold, redistributed or otherwise used for commercial purposes  All illustrations and images included in CareNotes® are the copyrighted property of A D A M [employer]  or [address] [place], [place] [address]   The above information is an  only  It is not intended as medical advice for individual conditions or treatments  Talk to your doctor, nurse or pharmacist before following any medical regimen to see if it is safe and effective for you

## 2021-06-28 NOTE — TELEPHONE ENCOUNTER
----- Message from Lawyer Maliha MD sent at 6/28/2021  5:19 PM EDT -----   I see that the pulmonologist has already ordered a CT of the chest to be done in December to follow up on the nodule seen on the PET scan  There is also some inflammation seen in the pelvic area  Please inform the patient to get pelvic ultrasound for that

## 2021-06-28 NOTE — PROGRESS NOTES
Assessment  1  Lumbar radiculopathy    2  Lumbar pain    3  Sacroiliac pain        Plan  This is a 70-year-old female who presents to our office with chief complaint of bilateral low back pain and right lower extremity radiculopathy  Her major issue is bilateral low back pain  She has tenderness to palpation over the bilateral sacroiliac joints with pain with provocative maneuvers as noted below  Given the patient's symptoms, examination findings and imaging results noted above, I discussed the utility of proceeding with a bilateral sacroiliac joint injection with steroid and local anesthetic under fluoroscopic guidance  Using an anatomical model, the procedure, as well as its potential risks, benefits, and reasonable alternatives were discussed in detail  Discussed risks of the procedure included but are not limited to bleeding, infection, allergic reaction, nerve damage, hematoma fomation, abscess formation, failure of the pain to improve and potential worsening of the pain  Since Ms Celestine Nazario has failed at least 6 weeks of conservative measures including over-the-counter pain medications and prescription medications it is reasonable to proceed with the above injection  The patient verbalized understanding to the potential risks, benefits, and reasonable alternatives to the above injection and wishes to proceed  Her response will help to further determine a treatment plan  She was also supposed to start physical therapy  I believe rather than land-based therapy, she would benefit from aquatherapy  Referral was provided today  Lastly, will obtain x-ray of the lumbar spine to assess for advanced degenerative disease  She notably failed radiofrequency ablation back in 2014  After her course of physical therapy, if she continues to have notable radiculopathy symptoms, would likely have to order MRI  lumbar spine to assess for significant compressive pathology      [place] Prescription Drug Monitoring Program report was reviewed and was appropriate     My impressions and treatment recommendations were discussed in detail with the patient who verbalized understanding and had no further questions  Discharge instructions were provided  I personally saw and examined the patient and I agree with the above discussed plan of care  Orders Placed This Encounter   Procedures    FL spine and pain procedure     Standing Status:   Future     Standing Expiration Date:   6/28/2025     Order Specific Question:   Reason for Exam:     Answer:   b/l SI joint injection     Order Specific Question:   Anticoagulant hold needed? Answer:   no    X-ray lumbar spine complete 4+ views     Assessing for extent of lumbar facet and disc degeneration     Standing Status:   Future     Standing Expiration Date:   6/28/2025     Scheduling Instructions:      Bring along any outside films relating to this procedure   Ambulatory referral to Physical Therapy     Standing Status:   Future     Standing Expiration Date:   6/28/2022     Referral Priority:   Routine     Referral Type:   Physical Therapy     Referral Reason:   Specialty Services Required     Requested Specialty:   Physical Therapy     Number of Visits Requested:   1     Expiration Date:   6/28/2022     New Medications Ordered This Visit   Medications    Multiple Vitamins-Minerals (CENTRUM SILVER 50+WOMEN PO)     Sig: Take 1 tablet by mouth daily    albuterol (PROVENTIL HFA,VENTOLIN HFA) 90 mcg/act inhaler     Sig: Inhale 2 puffs every 6 (six) hours as needed for wheezing       History of Present Illness    Referring Provider: Luna Holguin MD    Valeriy Coronel is a 66 y o  female who presents with a chief complaint of LOW BACK PAIN AND NUMBNESS, AND WIDESPREAD SOFT TISSUE/JOINT PAIN  These are chronic issues for over 20 years  She reports worsening of her symptoms since 2014    In the past she did see spine and pain in [id number] and more recently a provider named Dr Hamida Gardner from [facility] in 2014  Has undergone multiple epidurals and radiofrequency ablation in the past       Reports worst of pain is across the lower back with a twisting and burning pain  Reports radiation down the right lateral thigh and right lateral lower leg below the knee  Reports consistent numbness in the lateral right toes, but attributes this to foot surgery  In less than 30 minutes, prolonged standing or walking increases gets tightening in back and then gets sharp with radiculopathy symptoms slowly after that  Reports occasionally tripping over right foot  Over the past month, intensity the pain has been moderate to severe  Current pain is 3/10  She reports interference with daily activities  She reports having pain intermittently  Pain is worse with walking or standing greater than 20 minutes  Described as burning, cramping, numbness, pressure-like  Denies weakness in bilateral lower extremities  No bowel bladder incontinence or saddle anesthesia  Pain is increased with standing, walking  Decreased with lying down, bending, sitting  Reports having CT head scan done 2014  No recent lumbar imaging  Past medical history includes asthma, diabetes, possible fibromyalgia, GERD, high cholesterol, hypertension, thyroid disease, arthritis of the spine/hip/knees, endometrial cancer  She reports no relief in the past with nerve injection, PT, moderate relief with exercise, and moderately from heat/ice therapy  Specifically had undergone RFA  Was given PT referral in March but did not go because of failure with it in the past      Does not smoke tobacco or marijuana  Drinks alcohol 1 to 2 times a week  Is currently taking fish oil  Not allergic to latex or contrast dye  Past medications include tramadol with little relief, diclofenac    In the past has also been on ibuprofen and meloxicam with relief reportedly was taken off meloxicam due to inset using kidney dysfunction  Currently using acetaminophen as well  Has allergy to nabumetone  In the past has used Flexeril but this caused urinary retention but did help with some of her symptoms  Also was on Robaxin the past       Past medications also include Cymbalta, will corticosteroids, and gabapentin  Gabapentin made her groggy  I have personally reviewed and/or updated the patient's past medical history, past surgical history, family history, social history, current medications, allergies, and vital signs today  Review of Systems   Constitutional: Negative for fever and unexpected weight change  HENT: Negative for trouble swallowing  Eyes: Negative for visual disturbance  Respiratory: Positive for shortness of breath  Negative for wheezing  Cardiovascular: Positive for leg swelling  Negative for chest pain and palpitations  Gastrointestinal: Negative for constipation, diarrhea, nausea and vomiting  Endocrine: Negative for cold intolerance, heat intolerance and polydipsia  Genitourinary: Negative for difficulty urinating and frequency  Musculoskeletal: Positive for arthralgias, joint swelling and myalgias  Negative for gait problem  Skin: Negative for rash  Neurological: Positive for numbness  Negative for dizziness, seizures, syncope, weakness and headaches  Hematological: Does not bruise/bleed easily  Psychiatric/Behavioral: Negative for dysphoric mood  All other systems reviewed and are negative        Patient Active Problem List   Diagnosis    Diabetes mellitus type 2, controlled, without complications ([facility] [address] )    Dysmetabolic syndrome X    Benign essential hypertension    Lichen sclerosus    History of endometrial cancer    SOB (shortness of breath)    Asymptomatic postmenopausal status    At risk for sleep apnea    Dyslipidemia    Asthma    GERD (gastroesophageal reflux disease)    Acquired hypothyroidism    Status post total right knee replacement    Spondylosis of lumbosacral region    Irritable bowel syndrome without diarrhea    Coronary atherosclerosis due to calcified coronary lesion    Obesity (BMI 30-39  9)    Chronic pain of both knees       Past Medical History:   Diagnosis Date    Abnormal mammogram     Abnormal weight gain     Achilles tendinitis     Asthma     Diabetes mellitus ([facility] [address] )     Disc degeneration, lumbar     Disease of thyroid gland     hypo    Dyslipidemia     Dyslipidemia, goal LDL below 70 3/27/2018    Early satiety     Edema     idiopathic peripheral    Encounter for follow-up examination after completed treatment for malignant neoplasm 3/17/2019    Encounter for gynecological examination without abnormal finding 2020    Endometrial cancer ([facility] [address] ) 2018    Enthesopathy     hip region    Esophagitis, reflux     GERD (gastroesophageal reflux disease)     Hard to intubate     difficulty with intubation and had to be intubated twice    Hypercholesterolemia     Hypertension     IBS (irritable bowel syndrome)     Irritable bowel syndrome     Obesity, Class I, BMI 30-34 9 2015    Osteoarthritis     Rosacea     Sacroiliitis (HCC)        Past Surgical History:   Procedure Laterality Date     SECTION      x2    COLONOSCOPY      ELBOW SURGERY      left ulna/radius    ESOPHAGOGASTRODUODENOSCOPY      FOOT SURGERY Right     FRACTURE SURGERY      FRACTURE SURGERY Left     tibia    HYSTERECTOMY Bilateral 2018    JOINT REPLACEMENT Left     shoulder,  right knee    KNEE ARTHROSCOPY      KNEE ARTHROSCOPY W/ MENISCAL REPAIR Right     MO LAPAROSCOPY W TOT HYSTERECTUTERUS <=250 GRAM  W TUBE/OVARY N/A 2018    Procedure: ROBOTIC TOTAL LAPAROSCOPIC HYSTERECTOMY, BILATERAL SALPINGOOPHORECTOMY, BILATERAL PELVIC LYMPHNODE DISSECTION;  Surgeon: Wolf Olivas MD;  Location: [facility];  Service: Gynecology Oncology    REPLACEMENT TOTAL KNEE Right     SHOULDER ARTHROPLASTY      SINUS SURGERY      TONSILLECTOMY      TUBAL LIGATION      WRIST SURGERY      ganglonic cyst       Family History   Problem Relation Age of Onset    Arthritis Mother     Heart disease Mother         cardiac disorder    Diabetes Mother     Hiatal hernia Mother     Hypertension Mother     Irritable bowel syndrome Mother     Breast cancer Mother 77        x2   Jaclyn Rhody Uterine cancer Mother     Osteoporosis Mother     Thyroid disease Mother     Other Mother         gastric reflux    Stroke Mother     Heart disease Father         cardiac disorder    Hiatal hernia Father     Ulcers Father     Other Father         gastric reflux    Hiatal hernia Sister     Thyroid disease Sister     Other Sister         gastric reflux    Lung cancer Sister 68    No Known Problems Brother     Brain cancer Maternal Grandmother     Breast cancer Maternal Grandmother         [place]    No Known Problems Maternal Grandfather     No Known Problems Paternal Grandmother     No Known Problems Paternal Grandfather     Hiatal hernia Child     Other Child         gastric reflux    Irritable bowel syndrome Daughter     Breast cancer Maternal Aunt 72    Uterine cancer Maternal Aunt         x3 sis    Stroke Maternal Aunt     Malig Hypertension Son     Breast cancer Maternal Aunt 54    Breast cancer Maternal Aunt 54    No Known Problems Paternal Aunt     Colon cancer Neg Hx     Ovarian cancer Neg Hx     Cervical cancer Neg Hx        Social History     Occupational History    Occupation: nurse     Comment: full-time   Tobacco Use    Smoking status: Never Smoker    Smokeless tobacco: Never Used   Vaping Use    Vaping Use: Never used   Substance and Sexual Activity    Alcohol use:  Yes     Alcohol/week: 3 0 standard drinks     Types: 3 Glasses of wine per week    Drug use: No    Sexual activity: Not Currently     Birth control/protection: Surgical       Current Outpatient Medications on File Prior to Visit   Medication Sig    albuterol (PROVENTIL HFA,VENTOLIN HFA) 90 mcg/act inhaler Inhale 2 puffs every 6 (six) hours as needed for wheezing    cetirizine (ZyrTEC) 10 MG chewable tablet Chew 10 mg daily    ezetimibe (ZETIA) 10 mg tablet Take 1 tablet (10 mg total) by mouth daily    furosemide (LASIX) 20 mg tablet Take 1 tablet (20 mg total) by mouth daily    glipiZIDE (GLUCOTROL XL) 2 5 mg 24 hr tablet Take 1 tablet (2 5 mg total) by mouth daily    glucose blood test strip 1 each by Other route 3 (three) times a day Use as instructed    KRILL OIL PO Take by mouth    levothyroxine 75 mcg tablet Take 1 tablet (75 mcg total) by mouth daily    Linzess 145 MCG CAPS Take 1 capsule (145 mcg total) by mouth daily before breakfast Take 30 minutes prior to breakfast    Magnesium Oxide 400 MG CAPS Take by mouth    metFORMIN (GLUCOPHAGE-XR) 500 mg 24 hr tablet TAKE 1 TABLET IN THE MORNING AND 2 TABLETS IN THE EVENING    Omega-3 Fatty Acids (FISH OIL) 1200 MG CAPS Take by mouth    pantoprazole (PROTONIX) 40 mg tablet Take 1 tablet (40 mg total) by mouth daily    potassium chloride (MICRO-K) 10 MEQ CR capsule Take 1 capsule (10 mEq total) by mouth daily    rosuvastatin (CRESTOR) 20 MG tablet Take 1 tablet (20 mg total) by mouth daily    saccharomyces boulardii (FLORASTOR) 250 mg capsule Take 250 mg by mouth 3 (three) times a day    BD VEO INSULIN SYRINGE U/F 31G X 15/64" 0 3 ML MISC INJECT UNDER THE SKIN DAILY AT BEDTIME AND AS NEEDED ACCORDING TO SLIDING SCALE (Patient not taking: Reported on 6/28/2021)    clindamycin (CLEOCIN) 150 mg capsule Take 150 mg by mouth 4 (four) times a day (Patient not taking: Reported on 6/28/2021)    clotrimazole-betamethasone (LOTRISONE) 1-0 05 % cream Apply topically QHS as needed (Patient not taking: Reported on 6/28/2021)    dicyclomine (BENTYL) 20 mg tablet Take 1 tablet (20 mg total) by mouth 2 (two) times a day as needed (Abdominal pain) (Patient not taking: Reported on 6/28/2021)    insulin lispro (HumaLOG) 100 units/mL injection Only when on steroids or with a sick day (Patient not taking: Reported on 6/28/2021)    Insulin Syringe-Needle U-100 (INSULIN SYRINGE 1CC/31GX5/16") 31G X 5/16" 1 ML MISC BD Veo Insulin Syringe Ultra-Fine 0 3 mL 31 gauge x 15/64"    Multiple Vitamins-Minerals (CENTRUM SILVER 50+WOMEN PO) Take 1 tablet by mouth daily    olmesartan (BENICAR) 20 mg tablet Take 1 tablet (20 mg total) by mouth daily (Patient not taking: Reported on 6/28/2021)     Current Facility-Administered Medications on File Prior to Visit   Medication    albuterol inhalation solution 2 5 mg       Allergies   Allergen Reactions    Amoxicillin-Pot Clavulanate Anaphylaxis, Hives, Itching and Facial Swelling    Erythromycin Hives, Itching, Swelling, Vomiting, Throat Swelling, Nasal Congestion and Facial Swelling    Meperidine Anaphylaxis    Morphine Hives, Itching, Swelling, Other (See Comments) and Syncope     hypotension    Penicillins Anaphylaxis, Itching and Swelling    Relafen [Nabumetone] Hives, Itching and Swelling    Darvon [Propoxyphene] Hives    Methocarbamol Other (See Comments)     Double vision    Reglan [Metoclopramide] Anxiety    Sulfa Antibiotics Hives, Itching, Rash and Swelling    Tetracyclines & Related Rash       Physical Exam    /88   Pulse 78   Resp 18   Ht 5' 2" (1 575 m)   Wt 91 8 kg (202 lb 6 4 oz)   BMI 37 02 kg/m²     Constitutional: normal, well developed, well nourished, alert, in no distress and non-toxic and no overt pain behavior    Eyes: anicteric  HEENT: grossly intact  Neck: supple, symmetric, trachea midline and no masses   Pulmonary:even and unlabored  Cardiovascular:No edema or pitting edema present  Skin:Normal without rashes or lesions and well hydrated  Psychiatric:Mood and affect appropriate  Neurologic:Cranial Nerves II-XII grossly intact  Musculoskeletal:normal    Lumbar Spine Exam    Appearance:  Normal lordosis  Palpation/Tenderness:  left sacroiliac joint tenderness  right sacroiliac joint tenderness  left piriformis tenderness  right piriformis tenderness  bilateral lumbar paraspinal hypertonicity  Sensory:  no sensory deficits noted except: decreased sensation light touch, lateral pinky, lateral right calf  Range of Motion:  Flexion:  No limitation  without pain  Extension:  Minimally limited  with pain  Lateral Flexion - Left:  No limitation  without pain  Lateral Flexion - Right:  Minimally limited  with pain  Rotation - Left:  Moderately limited  with pain  Rotation - Right:  Minimally limited  without pain  Motor Strength:  Left hip flexion:  5/5  Left hip extension:  5/5  Right hip flexion:  5/5  Right hip extension:  5/5  Left knee flexion:  5/5  Left knee extension:  5/5  Right knee flexion:  5/5  Right knee extension:  5/5  Left foot dorsiflexion:  5/5  Left foot plantar flexion:  5/5  Right foot dorsiflexion:  5/5  Right foot plantar flexion:  5/5  Reflexes:  Left Patellar:  2+   Right Patellar:  2+   Left Achilles:  2+   Right Achilles:  2+   Special Tests:  Left Straight Leg Test:  negative  Right Straight Leg Test:  negative  Left Hernesto's Maneuver:  positive  Right Hernesto's Maneuver:  positive  Left Gaenslen's Test:  positive  Right Gaenslen's Test:  positive  Left Pelvic Distraction Test:  positive  Right Pelvic Distraction Test:  positive      DIAGNOSTIC IMAGING AND TEST RESULTS:  No recent pertinent imaging
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
## 2021-06-30 ENCOUNTER — HOSPITAL ENCOUNTER (OUTPATIENT)
Dept: SLEEP CENTER | Facility: CLINIC | Age: 71
Discharge: HOME | End: 2021-06-30
Payer: COMMERCIAL

## 2021-06-30 DIAGNOSIS — Z91.89 AT RISK FOR SLEEP APNEA: ICD-10-CM

## 2021-06-30 PROCEDURE — G0399 HOME SLEEP TEST/TYPE 3 PORTA: HCPCS

## 2021-07-01 NOTE — PROGRESS NOTES
Home Sleep Study Documentation    Pre-Sleep Home Study:    Set-up and instructions performed by: Ryne Keenan     Technician performed demonstration for Patient: yes    Return demonstration performed by Patient: yes    Written instructions provided to Patient: yes    Patient signed consent form: yes        Post-Sleep Home Study:    Additional comments by Patient:         Home Sleep Study Failed:no:    Failure reason: N/A    Reported or Detected: N/A Patient name misspelled during programing of unit  Corrected by score technician  Scored by: GLORIA Escobar

## 2021-07-06 ENCOUNTER — HOSPITAL ENCOUNTER (OUTPATIENT)
Dept: ULTRASOUND IMAGING | Facility: CLINIC | Age: 71
Discharge: HOME | End: 2021-07-06
Payer: COMMERCIAL

## 2021-07-06 ENCOUNTER — EVALUATION (OUTPATIENT)
Dept: PHYSICAL THERAPY | Age: 71
End: 2021-07-06
Payer: COMMERCIAL

## 2021-07-06 DIAGNOSIS — N73.9 PID (PELVIC INFLAMMATORY DISEASE): ICD-10-CM

## 2021-07-06 DIAGNOSIS — M53.3 SACROILIAC PAIN: ICD-10-CM

## 2021-07-06 DIAGNOSIS — M54.50 LUMBAR PAIN: Primary | ICD-10-CM

## 2021-07-06 PROCEDURE — 97162 PT EVAL MOD COMPLEX 30 MIN: CPT | Performed by: PHYSICAL THERAPIST

## 2021-07-06 PROCEDURE — 76856 US EXAM PELVIC COMPLETE: CPT

## 2021-07-06 NOTE — PROGRESS NOTES
PT Evaluation     Today's date: 2021  Patient name: Brandon Hubbard  : 1950  MRN: [id number]  Referring provider: Paula Coles MD  Dx:   Encounter Diagnosis     ICD-10-CM    1  Lumbar pain  M54 5 Ambulatory referral to Physical Therapy   2  Sacroiliac pain  M53 3 Ambulatory referral to Physical Therapy                  Assessment  Assessment details: Pt reports to PT with cc of lumbar pain that has been present >5 years  Pt has symptoms consistent with lumbar instability and flexion bias  Pt will benefit from skilled PT in order to decrease difficulty with ADLs     Impairments: abnormal coordination, abnormal gait, abnormal muscle firing, abnormal muscle tone, abnormal or restricted ROM, abnormal movement, activity intolerance, impaired physical strength, lacks appropriate home exercise program, pain with function, weight-bearing intolerance and poor body mechanics    Goals  In 4 weeks pt will:  -Be independent with phase I of HEP  -Increase LE strength by 1/2 grade  -Increase Lumbar ROM by 25%    By discharge pt will:  -Be independent with Phase II of HEP  -Demonstrate full LE strength  -Demonstrate full Lumbar ROM  -Report minimal pain with ADLs    Plan  Patient would benefit from: skilled physical therapy  Planned therapy interventions: activity modification, abdominal trunk stabilization, joint mobilization, manual therapy, aquatic therapy, motor coordination training, muscle pump exercises, neuromuscular re-education, balance/weight bearing training, body mechanics training, patient education, strengthening, stretching, therapeutic activities, fine motor coordination training, flexibility, functional ROM exercises, therapeutic exercise and home exercise program  Frequency: 2x week  Duration in weeks: 6  Plan of Care beginning date: 2021  Plan of Care expiration date: 2021  Treatment plan discussed with: patient and PTA        Subjective Evaluation    History of Present Illness  Mechanism of injury: Pt reports to PT with cc of lumbar pain that has been present >5 years  Pt states she has N/T into RLE with prolonged walking/standing  Symptoms decrease with sitting     Pain  Current pain ratin  At best pain ratin  At worst pain ratin    Patient Goals  Patient goals for therapy: decreased pain, improved balance, increased motion, increased strength, independence with ADLs/IADLs and return to sport/leisure activities          Objective     Active Range of Motion     Additional Active Range of Motion Details  Lumbar ROM as % of normal ROM    Flex: 50  Ext: 75  R ROT:50  L ROT:50      Strength/Myotome Testing     Left Hip   Planes of Motion   Flexion: 4  Abduction: 3+    Right Hip   Planes of Motion   Flexion: 4  Abduction: 3+    Left Knee   Extension: 4    Right Knee   Extension: 4    Left Ankle/Foot   Dorsiflexion: 3+  Plantar flexion: 3+    Right Ankle/Foot   Dorsiflexion: 3+  Plantar flexion: 3+             Precautions: DM, asthma, history of cancer      Manuals   FOTO   FOTO                                                           Neuro Re-Ed                                                                                                        Ther Ex             VO/CD 5 laps             HS/piriformis stretch             SKTC             Calf stretch-wall             Trial quad stretch             Standing hip abd/flex             Step ups forward/lateral             Bike-2 floats                                                                              Ther Activity                                       Gait Training                                       Modalities

## 2021-07-07 ENCOUNTER — TELEPHONE (OUTPATIENT)
Dept: RADIOLOGY | Facility: CLINIC | Age: 71
End: 2021-07-07

## 2021-07-07 ENCOUNTER — OFFICE VISIT (OUTPATIENT)
Dept: OBGYN CLINIC | Facility: CLINIC | Age: 71
End: 2021-07-07
Payer: COMMERCIAL

## 2021-07-07 ENCOUNTER — APPOINTMENT (OUTPATIENT)
Dept: RADIOLOGY | Facility: CLINIC | Age: 71
End: 2021-07-07
Payer: COMMERCIAL

## 2021-07-07 VITALS
WEIGHT: 207.2 LBS | SYSTOLIC BLOOD PRESSURE: 123 MMHG | DIASTOLIC BLOOD PRESSURE: 74 MMHG | HEIGHT: 62 IN | HEART RATE: 74 BPM | BODY MASS INDEX: 38.13 KG/M2

## 2021-07-07 DIAGNOSIS — Z96.651 HISTORY OF TOTAL KNEE ARTHROPLASTY, RIGHT: ICD-10-CM

## 2021-07-07 DIAGNOSIS — M25.561 CHRONIC PAIN OF BOTH KNEES: ICD-10-CM

## 2021-07-07 DIAGNOSIS — M25.561 RIGHT KNEE PAIN, UNSPECIFIED CHRONICITY: ICD-10-CM

## 2021-07-07 DIAGNOSIS — M25.561 RIGHT KNEE PAIN, UNSPECIFIED CHRONICITY: Primary | ICD-10-CM

## 2021-07-07 DIAGNOSIS — M17.12 PRIMARY OSTEOARTHRITIS OF LEFT KNEE: ICD-10-CM

## 2021-07-07 DIAGNOSIS — G89.29 CHRONIC PAIN OF BOTH KNEES: ICD-10-CM

## 2021-07-07 DIAGNOSIS — M25.562 CHRONIC PAIN OF BOTH KNEES: ICD-10-CM

## 2021-07-07 PROCEDURE — 1160F RVW MEDS BY RX/DR IN RCRD: CPT | Performed by: ORTHOPAEDIC SURGERY

## 2021-07-07 PROCEDURE — 73564 X-RAY EXAM KNEE 4 OR MORE: CPT

## 2021-07-07 PROCEDURE — 1036F TOBACCO NON-USER: CPT | Performed by: ORTHOPAEDIC SURGERY

## 2021-07-07 PROCEDURE — 73560 X-RAY EXAM OF KNEE 1 OR 2: CPT

## 2021-07-07 PROCEDURE — 99213 OFFICE O/P EST LOW 20 MIN: CPT | Performed by: ORTHOPAEDIC SURGERY

## 2021-07-07 NOTE — PROGRESS NOTES
Chief Complaint   Patient presents with    Right Knee - Pain    Left Knee - Pain              SUBJECTIVE: Patient is a 66y o  year old female presenting with right knee pain   Patient states she had a right total knee arthroplasty in 2015 by Dr Larry Gómez at [facility] ([facility])  She states she never got any relief from the surgery  She states at that time she had ruptured her tendons in her right foot/ankle and was limited in rehabbing for her right knee after surgery  She did have right posterior tibia tendon surgery in 2016  She feels her knee is stable but states her range of motion is limited and has constant tightness  She has trouble with going down steps and walking  She feels her gait is off when walking  She is taking OTC NSAIDS as needed for pain  She denies any chills or fevers, chest tightness or shortness of breath  Patient offers no additional complaints or concerns at this time  She is treating with pain management for lumbar spine and is currently in aqua therapy  She does have occasional numbness down the right lower extremity  Review of Systems:  General:   Negative for fever, chills and weight loss  Gastrointestinal:  No abdominal pain, no nausea, vomiting  Respiratory:   Negative for cough and shortness of breath  Endocrine:  No frequent urination  Musculoskeletal: See HPI  Cardiovascular:   Negative for chest pain and palpitations      Neurological:   Negative for dizziness, and numbness  All other Review of systems negative unless otherwise specified in HPI      Past Medical History:   Diagnosis Date    Abnormal mammogram     Abnormal weight gain     Achilles tendinitis     Asthma     Diabetes mellitus ([address] )     Disc degeneration, lumbar     Disease of thyroid gland     hypo    Dyslipidemia     Dyslipidemia, goal LDL below 70 3/27/2018    Early satiety     Edema     idiopathic peripheral    Encounter for follow-up examination after completed treatment for malignant neoplasm 3/17/2019    Encounter for gynecological examination without abnormal finding 2020    Endometrial cancer ([facility] [address] ) 2018    Enthesopathy     hip region    Esophagitis, reflux     GERD (gastroesophageal reflux disease)     Hard to intubate     difficulty with intubation and had to be intubated twice    Hypercholesterolemia     Hypertension     IBS (irritable bowel syndrome)     Irritable bowel syndrome     Obesity, Class I, BMI 30-34 9 2015    Osteoarthritis     Rosacea     Sacroiliitis (HCC)          Social History     Tobacco Use    Smoking status: Never Smoker    Smokeless tobacco: Never Used   Vaping Use    Vaping Use: Never used   Substance Use Topics    Alcohol use:  Yes     Alcohol/week: 3 0 standard drinks     Types: 3 Glasses of wine per week    Drug use: No       Past Surgical History:   Procedure Laterality Date     SECTION      x2    COLONOSCOPY      ELBOW SURGERY      left ulna/radius    ESOPHAGOGASTRODUODENOSCOPY      FOOT SURGERY Right     FRACTURE SURGERY      FRACTURE SURGERY Left     tibia    HYSTERECTOMY Bilateral 2018    JOINT REPLACEMENT Left     shoulder,  right knee    KNEE ARTHROSCOPY      KNEE ARTHROSCOPY W/ MENISCAL REPAIR Right     VT LAPAROSCOPY W TOT HYSTERECTUTERUS <=250 GRAM  W TUBE/OVARY N/A 2018    Procedure: ROBOTIC TOTAL LAPAROSCOPIC HYSTERECTOMY, BILATERAL SALPINGOOPHORECTOMY, BILATERAL PELVIC LYMPHNODE DISSECTION;  Surgeon: Larissa Li MD;  Location: [facility];  Service: Gynecology Oncology    REPLACEMENT TOTAL KNEE Right     SHOULDER ARTHROPLASTY      SINUS SURGERY      TONSILLECTOMY      TUBAL LIGATION      WRIST SURGERY      ganglonic cyst         Current Outpatient Medications on File Prior to Visit   Medication Sig Dispense Refill    albuterol (PROVENTIL HFA,VENTOLIN HFA) 90 mcg/act inhaler Inhale 2 puffs every 6 (six) hours as needed for wheezing 18 g 2    cetirizine (ZyrTEC) 10 MG chewable tablet Chew 10 mg daily      ezetimibe (ZETIA) 10 mg tablet Take 1 tablet (10 mg total) by mouth daily 90 tablet 3    furosemide (LASIX) 20 mg tablet Take 1 tablet (20 mg total) by mouth daily 90 tablet 3    glipiZIDE (GLUCOTROL XL) 2 5 mg 24 hr tablet Take 1 tablet (2 5 mg total) by mouth daily 90 tablet 1    glucose blood test strip 1 each by Other route 3 (three) times a day Use as instructed 300 each 3    Insulin Syringe-Needle U-100 (INSULIN SYRINGE 1CC/31GX5/16") 31G X 5/16" 1 ML MISC BD Veo Insulin Syringe Ultra-Fine 0 3 mL 31 gauge x 15/64"      KRILL OIL PO Take by mouth      levothyroxine 75 mcg tablet Take 1 tablet (75 mcg total) by mouth daily 90 tablet 3    Linzess 145 MCG CAPS Take 1 capsule (145 mcg total) by mouth daily before breakfast Take 30 minutes prior to breakfast 90 capsule 3    Magnesium Oxide 400 MG CAPS Take by mouth      metFORMIN (GLUCOPHAGE-XR) 500 mg 24 hr tablet TAKE 1 TABLET IN THE MORNING AND 2 TABLETS IN THE EVENING 270 tablet 3    Multiple Vitamins-Minerals (CENTRUM SILVER 50+WOMEN PO) Take 1 tablet by mouth daily      Omega-3 Fatty Acids (FISH OIL) 1200 MG CAPS Take by mouth      pantoprazole (PROTONIX) 40 mg tablet Take 1 tablet (40 mg total) by mouth daily 90 tablet 3    potassium chloride (MICRO-K) 10 MEQ CR capsule Take 1 capsule (10 mEq total) by mouth daily 90 capsule 3    rosuvastatin (CRESTOR) 20 MG tablet Take 1 tablet (20 mg total) by mouth daily 90 tablet 4    saccharomyces boulardii (FLORASTOR) 250 mg capsule Take 250 mg by mouth 3 (three) times a day      BD VEO INSULIN SYRINGE U/F 31G X 15/64" 0 3 ML MISC INJECT UNDER THE SKIN DAILY AT BEDTIME AND AS NEEDED ACCORDING TO SLIDING SCALE (Patient not taking: Reported on 6/28/2021) 100 each 10    clindamycin (CLEOCIN) 150 mg capsule Take 150 mg by mouth 4 (four) times a day (Patient not taking: Reported on 6/28/2021)  0    clotrimazole-betamethasone (LOTRISONE) 1-0 05 % cream Apply topically QHS as needed (Patient not taking: Reported on 6/28/2021) 45 g 3    dicyclomine (BENTYL) 20 mg tablet Take 1 tablet (20 mg total) by mouth 2 (two) times a day as needed (Abdominal pain) (Patient not taking: Reported on 6/28/2021) 20 tablet 0    insulin lispro (HumaLOG) 100 units/mL injection Only when on steroids or with a sick day (Patient not taking: Reported on 6/28/2021) 10 mL 3    olmesartan (BENICAR) 20 mg tablet Take 1 tablet (20 mg total) by mouth daily (Patient not taking: Reported on 6/28/2021) 90 tablet 3     No current facility-administered medications on file prior to visit  Physical Exam:    Vitals:    07/07/21 0804   BP: 123/74   Pulse: 74   Weight: 94 kg (207 lb 3 2 oz)   Height: 5' 2" (1 575 m)       General Appearance:  Alert, cooperative, no distress, appears stated age   Lungs:   respirations unlabored   Heart:  Normal heart rate noted   Abdomen:   Soft, non-tender,  no masses   Extremities: Extremities normal, atraumatic, no cyanosis or edema   Pulses: 2+ and symmetric   Skin: Skin color, texture, turgor normal, no rashes or lesions   Neurologic: Normal         Right Knee Exam     Tenderness   The patient is experiencing no tenderness  Range of Motion   Extension: 0   Flexion: 100     Tests   Varus: negative Valgus: negative    Other   Erythema: absent  Scars: present (Anterior scar well healed )  Sensation: normal  Pulse: present  Swelling: none  Effusion: no effusion present            Imaging Studies: The following imaging studies were reviewed in office today  My findings are noted  Xrays taken today of the right knee   demonstrates s/p TKA with good alignment and rotation of implant, No evidence of loosening  ASSESSMENT:    Hien Merida was seen today for pain and pain  Diagnoses and all orders for this visit:    Right knee pain, unspecified chronicity  -     XR knee 4+ vw right injury; Future  -     Ambulatory referral to Physical Therapy;  Future    Chronic pain of both knees  -     Ambulatory referral to Orthopedic Surgery    Primary osteoarthritis of left knee  -     Ambulatory referral to Orthopedic Surgery    History of total knee arthroplasty, right  -     Ambulatory referral to Physical Therapy; Future  -     Sedimentation rate, automated; Future  -     C-reactive protein; Future  -     CBC and differential; Future            PLAN:    X-ray of the right knee reviewed in the office today which demonstrated s/p TKA adequate alignment of implant    Will be ordering Blood work CRP, ESR, and CBC diff to rule out infection, although I have low suspicion as knee exam is benign  She lacks a little flexion but active flexion is at least 100 degrees so this should not affect her walking gait  Will see if her gait can improve with PT  Suspect that this may be related to spine pathology   Aqua therapy order was given out today for the right knee   Discussed with patient the discomfort she is having when walking may be back related     Continue taking OTC NSADIS as needed for pain     Follow up in 6 weeks           Scribe Attestation    I,:  Zoe Razo am acting as a scribe while in the presence of the attending physician :       I,:  Jumana Bang MD personally performed the services described in this documentation    as scribed in my presence :

## 2021-07-08 ENCOUNTER — OFFICE VISIT (OUTPATIENT)
Dept: PHYSICAL THERAPY | Age: 71
End: 2021-07-08
Payer: COMMERCIAL

## 2021-07-08 DIAGNOSIS — M54.50 LUMBAR PAIN: Primary | ICD-10-CM

## 2021-07-08 DIAGNOSIS — M53.3 SACROILIAC PAIN: ICD-10-CM

## 2021-07-08 PROCEDURE — 97113 AQUATIC THERAPY/EXERCISES: CPT

## 2021-07-08 NOTE — PROGRESS NOTES
Daily Note     Today's date: 2021  Patient name: Cristin Chapman  : 1950  MRN: [id number]  Referring provider: Christy Sin MD  Dx:   Encounter Diagnosis     ICD-10-CM    1  Lumbar pain  M54 5    2  Sacroiliac pain  M53 3                   Subjective: Patient reports no new complaints at this time  Objective: See treatment diary below      Assessment: Patient reports anterior upper RLE numbness with step ups  Progress as pt is able to tolerate  Plan: Continue per plan of care        Precautions: DM, asthma, history of cancer      Manuals   FOTO   FOTO                                                           Neuro Re-Ed                                                                                                        Ther Ex             VO/CD 5 laps x5             HS/piriformis stretch 30"x3 ea            SKTC declined            Calf stretch-wall 30"x3            Trial quad stretch NV            Standing hip abd/flex/ext 2x10 ea            Step ups forward/lateral x10             Bike-2 floats 5 min                                                                             Ther Activity                                       Gait Training                                       Modalities

## 2021-07-09 PROCEDURE — 95806 SLEEP STUDY UNATT&RESP EFFT: CPT | Performed by: INTERNAL MEDICINE

## 2021-07-13 ENCOUNTER — TELEPHONE (OUTPATIENT)
Dept: RADIOLOGY | Facility: CLINIC | Age: 71
End: 2021-07-13

## 2021-07-13 NOTE — TELEPHONE ENCOUNTER
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

## 2021-07-13 NOTE — TELEPHONE ENCOUNTER
----- Message from Shawna Tripathi MD sent at 7/13/2021 12:59 PM EDT -----  SI joint injection would not help her numbness  Therefore she can hold off on this for now  If therapist feels that she is unable to continue due to worsening of her symptoms, we can reevaluate and see if she needs any further diagnostic workup which may include MRI of the lumbar spine  In the meantime we can order EMG study of right lower extremity to help determine cause of numbness (lumbar vs peripheral neuropathy)  ----- Message -----  From: Mae Ivey RN  Sent: 7/12/2021  10:20 AM EDT  To: Shawna Tripathi MD    S/w pt, reviewed results  Pt said she had to stop aqua therapy because she went to her first session and after about 20 minutes her leg went numb, pt said the therapist made her proceed which caused numbness in her leg for 2 days following  Pt said she wants to know if this SIJ will fix/help the numbness  Pt said the numbness is what she came to our office for and she wants to know before proceeding with an injection if it will help  Pt said if not, then there's no point in getting it done because she has had injections in the past with no relief (MBB/RFA) and she is also diabetic so she knows her blood sugar will elevate  Pt repeated that it is not that she can not do the exercises being provided by PT but they make her leg go numb which inhibits her function  Please advise    ----- Message -----  From: Shawna Tripathi MD  Sent: 7/9/2021   4:40 PM EDT  To: [facility]    Patient has degenerative changes in the lumbar spine  She was recommended SI joint injection to start  Patient reports a  issue  I am okay with doing injection without transport so long as we monitor the patient for brief period Prior to discharge

## 2021-07-14 DIAGNOSIS — E11.8 TYPE 2 DIABETES MELLITUS WITH COMPLICATION, WITHOUT LONG-TERM CURRENT USE OF INSULIN (HCC): ICD-10-CM

## 2021-07-14 RX ORDER — INSULIN LISPRO 100 [IU]/ML
5 INJECTION, SOLUTION INTRAVENOUS; SUBCUTANEOUS 2 TIMES DAILY WITH MEALS
Qty: 15 ML | Refills: 3 | Status: SHIPPED | OUTPATIENT
Start: 2021-07-14 | End: 2022-08-04

## 2021-07-15 DIAGNOSIS — E11.8 TYPE 2 DIABETES MELLITUS WITH COMPLICATION, WITHOUT LONG-TERM CURRENT USE OF INSULIN (HCC): ICD-10-CM

## 2021-07-15 RX ORDER — METFORMIN HYDROCHLORIDE 500 MG/1
TABLET, EXTENDED RELEASE ORAL
Qty: 270 TABLET | Refills: 3 | Status: SHIPPED | OUTPATIENT
Start: 2021-07-15

## 2021-07-16 ENCOUNTER — TELEPHONE (OUTPATIENT)
Dept: INTERNAL MEDICINE CLINIC | Facility: CLINIC | Age: 71
End: 2021-07-16

## 2021-07-16 NOTE — TELEPHONE ENCOUNTER
----- Message from Ismael Garcia MD sent at 7/16/2021 10:45 AM EDT -----   Ultrasound does not show any abnormalities

## 2021-07-19 ENCOUNTER — OFFICE VISIT (OUTPATIENT)
Dept: PAIN MEDICINE | Facility: CLINIC | Age: 71
End: 2021-07-19
Payer: COMMERCIAL

## 2021-07-19 VITALS — HEIGHT: 62 IN | BODY MASS INDEX: 37.9 KG/M2

## 2021-07-19 DIAGNOSIS — R20.0 RIGHT LEG NUMBNESS: ICD-10-CM

## 2021-07-19 DIAGNOSIS — M54.16 LUMBAR RADICULOPATHY: Primary | ICD-10-CM

## 2021-07-19 PROCEDURE — 99214 OFFICE O/P EST MOD 30 MIN: CPT | Performed by: STUDENT IN AN ORGANIZED HEALTH CARE EDUCATION/TRAINING PROGRAM

## 2021-07-19 PROCEDURE — 1036F TOBACCO NON-USER: CPT | Performed by: STUDENT IN AN ORGANIZED HEALTH CARE EDUCATION/TRAINING PROGRAM

## 2021-07-19 PROCEDURE — 1160F RVW MEDS BY RX/DR IN RCRD: CPT | Performed by: STUDENT IN AN ORGANIZED HEALTH CARE EDUCATION/TRAINING PROGRAM

## 2021-07-19 NOTE — PROGRESS NOTES
Pain Medicine Follow-Up Note    Assessment:  1  Lumbar radiculopathy    2  Right leg numbness        Plan:  Orders Placed This Encounter   Procedures    MRI lumbar spine wo contrast     lumbar radiculopathy  right lateral thigh and lateral lower leg numbness below the knee  Standing Status:   Future     Standing Expiration Date:   7/19/2025     Scheduling Instructions: There is no preparation for this test  Please leave your jewelry and valuables at home, wedding rings are the exception  Magnetic nail polish must be removed prior to arrival for your test  Please bring your insurance cards, a form of photo ID and a list of your medications with you  Arrive 15       minutes prior to your appointment time in order to register  Please bring any prior CT or MRI studies of this area that were not performed at a [facility]  To schedule this appointment, please contact Central Scheduling at [phone]  Prior to your appointment, please make sure you complete the MRI Screening Form when you e-Check in for your appointment  This will be available starting 7 days before your appointment in [address]  You may receive an e-mail with an activation code if you do not have a [employer] account  If you do not       have access to a device, we will complete your screening at your appointment  Order Specific Question:   What is the patient's sedation requirement? Answer:   No Sedation     Order Specific Question:   Release to patient through [employer]     Answer:   Immediate     Order Specific Question:   Is order priority selected as STAT? Answer:   No     Order Specific Question:   Reason for Exam (FREE TEXT)     Answer:   lumbar radiculopathy  right lateral thigh and lateral lower leg numbness below the knee  No orders of the defined types were placed in this encounter        My impressions and treatment recommendations were discussed in detail with the patient who verbalized understanding and had no further questions  This is a 66year old female with chief complaint of right lateral leg numbness that worsens with movement and lumbar straightening and improved with flexion  She has stopped aqua therapy due to worsening of her right leg numbness and radiculopathy symptoms  Given patient's inability to tolerate PT due to symptom worsening, will stop PT and order MRI lumbar spine to assess for compressive pathology  This will help determine next course of treatment which may include lumbar epidural steroid injection  Given that radiculopathy symptoms are the most notable, will hold off on b/l SI joint injection which was previously ordered  [place] Prescription Drug Monitoring Program report was reviewed and was appropriate     Complete risks and benefits including bleeding, infection, tissue reaction, nerve injury and allergic reaction were discussed  The approach was demonstrated using models and literature was provided  Verbal and written consent was obtained  Discharge instructions were provided  I personally saw and examined the patient and I agree with the above discussed plan of care  History of Present Illness:    Jose Angel Oscar is a 66 y o  female who presents to [facility] for interval re-evaluation of the above stated pain complaints  The patient has a past medical and chronic pain history as outlined in the assessment section  She was last seen on 06/28/2021 at which time she was ordered bilateral SI joint injection and referred to aqua therapy  She was also ordered x-ray of the lumbar spine showing degenerative disease of the lumbar spine  She reports increased leg numbness with physical therapy  Current pain score is 4-8/10  Pain is again the low back radiating to the right lower extremity      She also reports pain in the bilateral trapezius regions, bilateral thoracic paraspinal regions, bilateral armpits, lower back, bilateral hips  She is taking Tylenol 500 mg once daily which does help with some of her symptoms  Reports that aqua therapy was  "waste of time"  She was offered basic exercises that she felt wasn't worth the time and effort  Reports over an hour drive one way  Only went for 1 session  Reports that when she was performing step exercises her right leg went completely numb and then lasted for two days  Reports numbness anterolateral thigh and anterolateral lower leg below the knee  Other than as stated above, the patient denies any interval changes in medications, medical condition, mental condition, symptoms, or allergies since the last office visit  Review of Systems:    Review of Systems   Respiratory: Negative for shortness of breath  Cardiovascular: Negative for chest pain  Gastrointestinal: Negative for constipation, diarrhea, nausea and vomiting  Musculoskeletal: Positive for back pain, gait problem and myalgias  Negative for arthralgias and joint swelling  Skin: Negative for rash  Neurological: Negative for dizziness, seizures and weakness  All other systems reviewed and are negative  Patient Active Problem List   Diagnosis    [facility] mellitus type 2, controlled, [facility] complications [facility]    Dysmetabolic syndrome X    Benign essential hypertension    Lichen sclerosus    History of endometrial cancer    SOB (shortness of breath)    Asymptomatic postmenopausal status    At risk for sleep apnea    Dyslipidemia    Asthma    GERD (gastroesophageal reflux disease)    Acquired hypothyroidism    Status post total right knee replacement    Spondylosis of lumbosacral region    Irritable bowel syndrome without diarrhea    Coronary atherosclerosis due to calcified coronary lesion    Obesity (BMI 30-39  9)    Chronic pain of both knees    Obstructive sleep apnea       Past Medical History:   Diagnosis Date    Abnormal mammogram     Abnormal weight gain     Achilles tendinitis     Asthma     [facility]     Disc degeneration, lumbar     Disease of thyroid gland     hypo    Dyslipidemia     Dyslipidemia, goal LDL below 70 3/27/2018    Early satiety     Edema     idiopathic peripheral    Encounter for follow-up examination after completed treatment for malignant neoplasm 3/17/2019    Encounter for gynecological examination without abnormal finding 2020    Endometrial cancer ([facility] ) 2018    Enthesopathy     hip region    Esophagitis, reflux     GERD (gastroesophageal reflux disease)     Hard to intubate     difficulty with intubation and had to be intubated twice    Hypercholesterolemia     Hypertension     IBS (irritable bowel syndrome)     Irritable bowel syndrome     Obesity, Class I, BMI 30-34 9 2015    Osteoarthritis     Rosacea     Sacroiliitis (HCC)        Past Surgical History:   Procedure Laterality Date     SECTION      x2    COLONOSCOPY      ELBOW SURGERY      left ulna/radius    ESOPHAGOGASTRODUODENOSCOPY      FOOT SURGERY Right     FRACTURE SURGERY      FRACTURE SURGERY Left     tibia    HYSTERECTOMY Bilateral 2018    JOINT REPLACEMENT Left     shoulder,  right knee    KNEE ARTHROSCOPY      KNEE ARTHROSCOPY W/ MENISCAL REPAIR Right     CA LAPAROSCOPY W TOT HYSTERECTUTERUS <=250 GRAM  W TUBE/OVARY N/A 2018    Procedure: ROBOTIC TOTAL LAPAROSCOPIC HYSTERECTOMY, BILATERAL SALPINGOOPHORECTOMY, BILATERAL PELVIC LYMPHNODE DISSECTION;  Surgeon: Alis Loco MD;  [facility]: [facility];  Service: Gynecology Oncology    REPLACEMENT TOTAL KNEE Right     SHOULDER ARTHROPLASTY      SINUS SURGERY      TONSILLECTOMY      TUBAL LIGATION      WRIST SURGERY      ganglonic cyst       Family History   Problem Relation Age of Onset    Arthritis Mother     Heart disease Mother         cardiac disorder    Diabetes Mother     Hiatal hernia Mother     Hypertension Mother     Irritable bowel syndrome Mother     Breast cancer Mother 77        x2   Mayme Neha Uterine cancer Mother     Osteoporosis Mother     Thyroid disease Mother     Other Mother         gastric reflux    Stroke Mother     Heart disease Father         cardiac disorder    Hiatal hernia Father     Ulcers Father     Other Father         gastric reflux    Hiatal hernia Sister     Thyroid disease Sister     Other Sister         gastric reflux    Lung cancer Sister 68    No Known Problems Brother     Brain cancer Maternal Grandmother     Breast cancer Maternal Grandmother         [place]    No Known Problems Maternal Grandfather     No Known Problems Paternal Grandmother     No Known Problems Paternal Grandfather     Hiatal hernia Child     Other Child         gastric reflux    Irritable bowel syndrome Daughter     Breast cancer Maternal Aunt 72    Uterine cancer Maternal Aunt         x3 sis    Stroke Maternal Aunt     Malig Hypertension Son     Breast cancer Maternal Aunt 54    Breast cancer Maternal Aunt 54    No Known Problems Paternal Aunt     Colon cancer Neg Hx     Ovarian cancer Neg Hx     Cervical cancer Neg Hx        Social History     Occupational History    Occupation: nurse     Comment: full-time   Tobacco Use    Smoking status: Never Smoker    Smokeless tobacco: Never Used   Vaping Use    Vaping Use: Never used   Substance and Sexual Activity    Alcohol use:  Yes     Alcohol/week: 3 0 standard drinks     Types: 3 Glasses of wine per week    Drug use: No    Sexual activity: Not Currently     Birth control/protection: Surgical         Current Outpatient Medications:     albuterol (PROVENTIL HFA,VENTOLIN HFA) 90 mcg/act inhaler, Inhale 2 puffs every 6 (six) hours as needed for wheezing, Disp: 18 g, Rfl: 2    cetirizine (ZyrTEC) 10 MG chewable tablet, Chew 10 mg daily, Disp: , Rfl:     ezetimibe (ZETIA) 10 mg tablet, Take 1 tablet (10 mg total) by mouth daily, Disp: 90 tablet, Rfl: 3    furosemide (LASIX) 20 mg tablet, Take 1 tablet (20 mg total) by mouth daily, Disp: 90 tablet, Rfl: 3    glipiZIDE (GLUCOTROL XL) 2 5 mg 24 hr tablet, Take 1 tablet (2 5 mg total) by mouth daily, Disp: 90 tablet, Rfl: 1    glucose blood test strip, 1 each by Other route 3 (three) times a day Use as instructed, Disp: 300 each, Rfl: 3    Insulin Lispro (HumaLOG) 100 units/mL cartridge for injection, Inject 5 Units under the skin 2 (two) times a day with meals, Disp: 15 mL, Rfl: 3    KRILL OIL PO, Take by mouth, Disp: , Rfl:     levothyroxine 75 mcg tablet, Take 1 tablet (75 mcg total) by mouth daily, Disp: 90 tablet, Rfl: 3    Linzess 145 MCG CAPS, Take 1 capsule (145 mcg total) by mouth daily before breakfast Take 30 minutes prior to breakfast, Disp: 90 capsule, Rfl: 3    Magnesium Oxide 400 MG CAPS, Take by mouth, Disp: , Rfl:     metFORMIN (GLUCOPHAGE-XR) 500 mg 24 hr tablet, TAKE 1 TABLET IN THE MORNING AND 2 TABLETS IN THE EVENING, Disp: 270 tablet, Rfl: 3    pantoprazole (PROTONIX) 40 mg tablet, Take 1 tablet (40 mg total) by mouth daily, Disp: 90 tablet, Rfl: 3    potassium chloride (MICRO-K) 10 MEQ CR capsule, Take 1 capsule (10 mEq total) by mouth daily, Disp: 90 capsule, Rfl: 3    rosuvastatin (CRESTOR) 20 MG tablet, Take 1 tablet (20 mg total) by mouth daily, Disp: 90 tablet, Rfl: 4    saccharomyces boulardii (FLORASTOR) 250 mg capsule, Take 250 mg by mouth 3 (three) times a day, Disp: , Rfl:     BD VEO INSULIN SYRINGE U/F 31G X 15/64" 0 3 ML MISC, INJECT UNDER THE SKIN DAILY AT BEDTIME AND AS NEEDED ACCORDING TO SLIDING SCALE (Patient not taking: Reported on 6/28/2021), Disp: 100 each, Rfl: 10    clindamycin (CLEOCIN) 150 mg capsule, Take 150 mg by mouth 4 (four) times a day (Patient not taking: Reported on 6/28/2021), Disp: , Rfl: 0    clotrimazole-betamethasone (LOTRISONE) 1-0 05 % cream, Apply topically QHS as needed (Patient not taking: Reported on 6/28/2021), Disp: 45 g, Rfl: 3    dicyclomine (BENTYL) 20 mg tablet, Take 1 tablet (20 mg total) by mouth 2 (two) times a day as needed (Abdominal pain) (Patient not taking: Reported on 6/28/2021), Disp: 20 tablet, Rfl: 0    Insulin Syringe-Needle U-100 (INSULIN SYRINGE 1CC/31GX5/16") 31G X 5/16" 1 ML MISC, BD Veo Insulin Syringe Ultra-Fine 0 3 mL 31 gauge x 15/64", Disp: , Rfl:     Multiple Vitamins-Minerals (CENTRUM SILVER 50+WOMEN PO), Take 1 tablet by mouth daily (Patient not taking: Reported on 7/19/2021), Disp: , Rfl:     olmesartan (BENICAR) 20 mg tablet, Take 1 tablet (20 mg total) by mouth daily (Patient not taking: Reported on 6/28/2021), Disp: 90 tablet, Rfl: 3    Omega-3 Fatty Acids (FISH OIL) 1200 MG CAPS, Take by mouth, Disp: , Rfl:     Allergies   Allergen Reactions    Amoxicillin-Pot Clavulanate Anaphylaxis, Hives, Itching and Facial Swelling    Erythromycin Hives, Itching, Swelling, Vomiting, Throat Swelling, Nasal Congestion and Facial Swelling    Meperidine Anaphylaxis    Morphine Hives, Itching, Swelling, Other (See Comments) and Syncope     hypotension    Penicillins Anaphylaxis, Itching and Swelling    Relafen [Nabumetone] Hives, Itching and Swelling    Darvon [Propoxyphene] Hives    Methocarbamol Other (See Comments)     Double vision    Reglan [Metoclopramide] Anxiety    Sulfa Antibiotics Hives, Itching, Rash and Swelling    Tetracyclines & Related Rash       Physical Exam:    Ht 5' 2" (1 575 m)   Breastfeeding No   BMI 37 90 kg/m²     Constitutional:normal, well developed, well nourished, alert, in no distress and non-toxic and no overt pain behavior    Eyes:anicteric  HEENT:grossly intact  Neck:supple, symmetric, trachea midline and no masses   Pulmonary:even and unlabored  Cardiovascular:No edema or pitting edema present  Skin:Normal without rashes or lesions and well hydrated  Psychiatric:Mood and affect appropriate  Neurologic:Cranial Nerves II-XII grossly intact  Musculoskeletal:normal      Imaging  MRI lumbar spine wo contrast    (Results Pending)         Orders Placed This Encounter   Procedures    MRI lumbar spine wo contrast   RIGHT KNEE     INDICATION:  Follow up surgery      COMPARISON: None     VIEWS:  AP, patellar sunrise, PA flexion and lateral     IMAGES:  4     FINDINGS:     Total knee arthroplasty appears in satisfactory position  No evidence of hardware failure      There is no acute fracture or dislocation      There is no joint effusion      No lytic or blastic lesions are seen      There are atherosclerotic calcifications  Soft tissues are otherwise unremarkable      IMPRESSION:     Unremarkable appearance of total knee arthroplasty  LEFT KNEE     INDICATION:   M25 561: Pain in right knee      COMPARISON:  None     VIEWS:  XR KNEE 1 OR 2 VW LEFT         FINDINGS:     There is no acute fracture or dislocation      Joint effusion cannot be reliably evaluated without lateral view      Moderate tricompartmental osteoarthritis as evidenced by joint space narrowing, osteophyte formation and subchondral sclerosis      No lytic or blastic osseous lesion      Soft tissues are unremarkable      IMPRESSION:     No acute osseous abnormality      Degenerative changes as described  PELVIC ULTRASOUND, COMPLETE     INDICATION:  66years old  N73 9: Female pelvic inflammatory disease, unspecified      COMPARISON:  None     TECHNIQUE:   Transabdominal pelvic ultrasound was performed in sagittal and transverse planes with a curvilinear transducer  Imaging included volumetric sweeps as well as traditional still imaging technique      FINDINGS:     UTERUS:  Status post hysterectomy  Grossly unremarkable vaginal cuff      OVARIES/ADNEXA:  Status post bilateral salpingo-oophorectomy        No suspicious adnexal mass or loculated collections  There is no free fluid      IMPRESSION:     Unremarkable pelvic ultrasound status post total abdominal hysterectomy and bilateral salpingo-oophorectomy

## 2021-08-10 ENCOUNTER — RA CDI HCC (OUTPATIENT)
Dept: OTHER | Facility: HOSPITAL | Age: 71
End: 2021-08-10

## 2021-08-10 NOTE — PROGRESS NOTES
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    Diagnoses documented in the 6/10/2020 diabetic eye exam note (incorrectly scanned encounter date 7/6/2020) include:    Cortical age-related cataract of both eyes (H25 013)  Combined forms of age-related cataract of both eyes (H25 813)    This condition suggestion is based on ICD-10-CM coding guidelines of an assumed causal relationship between diabetes and cataract (any type), unless specified as not related  If this is correct, please document, assess, and code at your next visit on 8/17/2021    [facility]  coding opportunities             Chart reviewed, (number of) suggestions sent to provider: 1                  Patients insurance company: [employer]velin Fuentes (Medicare Advantage and [employer])

## 2021-08-12 ENCOUNTER — HOSPITAL ENCOUNTER (OUTPATIENT)
Dept: MRI IMAGING | Facility: CLINIC | Age: 71
Discharge: HOME | End: 2021-08-12
Payer: COMMERCIAL

## 2021-08-12 DIAGNOSIS — M54.16 LUMBAR RADICULOPATHY: ICD-10-CM

## 2021-08-12 DIAGNOSIS — R20.0 RIGHT LEG NUMBNESS: ICD-10-CM

## 2021-08-12 PROCEDURE — 72148 MRI LUMBAR SPINE W/O DYE: CPT

## 2021-08-16 ENCOUNTER — LAB (OUTPATIENT)
Dept: LAB | Facility: CLINIC | Age: 71
End: 2021-08-16
Payer: COMMERCIAL

## 2021-08-16 DIAGNOSIS — Z79.4 CONTROLLED TYPE 2 DIABETES MELLITUS WITHOUT COMPLICATION, WITH LONG-TERM CURRENT USE OF INSULIN (HCC): ICD-10-CM

## 2021-08-16 DIAGNOSIS — Z96.651 HISTORY OF TOTAL KNEE ARTHROPLASTY, RIGHT: ICD-10-CM

## 2021-08-16 DIAGNOSIS — E11.9 CONTROLLED TYPE 2 DIABETES MELLITUS WITHOUT COMPLICATION, WITH LONG-TERM CURRENT USE OF INSULIN (HCC): ICD-10-CM

## 2021-08-16 LAB
ALBUMIN SERPL BCP-MCNC: 3.8 G/DL (ref 3.5–5)
ALP SERPL-CCNC: 71 U/L (ref 46–116)
ALT SERPL W P-5'-P-CCNC: 42 U/L (ref 12–78)
ANION GAP SERPL CALCULATED.3IONS-SCNC: 5 MMOL/L (ref 4–13)
AST SERPL W P-5'-P-CCNC: 34 U/L (ref 5–45)
BASOPHILS # BLD AUTO: 0.07 THOUSANDS/ΜL (ref 0–0.1)
BASOPHILS NFR BLD AUTO: 1 % (ref 0–1)
BILIRUB SERPL-MCNC: 0.36 MG/DL (ref 0.2–1)
BUN SERPL-MCNC: 19 MG/DL (ref 5–25)
CALCIUM SERPL-MCNC: 9.3 MG/DL (ref 8.3–10.1)
CHLORIDE SERPL-SCNC: 108 MMOL/L (ref 100–108)
CHOLEST SERPL-MCNC: 128 MG/DL (ref 50–200)
CO2 SERPL-SCNC: 27 MMOL/L (ref 21–32)
CREAT SERPL-MCNC: 0.83 MG/DL (ref 0.6–1.3)
CREAT UR-MCNC: 128 MG/DL
CRP SERPL QL: 4.6 MG/L
EOSINOPHIL # BLD AUTO: 0.15 THOUSAND/ΜL (ref 0–0.61)
EOSINOPHIL NFR BLD AUTO: 2 % (ref 0–6)
ERYTHROCYTE [DISTWIDTH] IN BLOOD BY AUTOMATED COUNT: 14.2 % (ref 11.6–15.1)
ERYTHROCYTE [SEDIMENTATION RATE] IN BLOOD: 14 MM/HOUR (ref 0–29)
EST. AVERAGE GLUCOSE BLD GHB EST-MCNC: 131 MG/DL
GFR SERPL CREATININE-BSD FRML MDRD: 70 ML/MIN/1.73SQ M
GLUCOSE P FAST SERPL-MCNC: 119 MG/DL (ref 65–99)
HBA1C MFR BLD: 6.2 %
HCT VFR BLD AUTO: 40.5 % (ref 34.8–46.1)
HDLC SERPL-MCNC: 47 MG/DL
HGB BLD-MCNC: 12.5 G/DL (ref 11.5–15.4)
IMM GRANULOCYTES # BLD AUTO: 0.02 THOUSAND/UL (ref 0–0.2)
IMM GRANULOCYTES NFR BLD AUTO: 0 % (ref 0–2)
LDLC SERPL CALC-MCNC: 49 MG/DL (ref 0–100)
LYMPHOCYTES # BLD AUTO: 2.75 THOUSANDS/ΜL (ref 0.6–4.47)
LYMPHOCYTES NFR BLD AUTO: 35 % (ref 14–44)
MCH RBC QN AUTO: 29.1 PG (ref 26.8–34.3)
MCHC RBC AUTO-ENTMCNC: 30.9 G/DL (ref 31.4–37.4)
MCV RBC AUTO: 94 FL (ref 82–98)
MICROALBUMIN UR-MCNC: 17.7 MG/L (ref 0–20)
MICROALBUMIN/CREAT 24H UR: 14 MG/G CREATININE (ref 0–30)
MONOCYTES # BLD AUTO: 0.55 THOUSAND/ΜL (ref 0.17–1.22)
MONOCYTES NFR BLD AUTO: 7 % (ref 4–12)
NEUTROPHILS # BLD AUTO: 4.23 THOUSANDS/ΜL (ref 1.85–7.62)
NEUTS SEG NFR BLD AUTO: 55 % (ref 43–75)
NONHDLC SERPL-MCNC: 81 MG/DL
NRBC BLD AUTO-RTO: 0 /100 WBCS
PLATELET # BLD AUTO: 295 THOUSANDS/UL (ref 149–390)
PMV BLD AUTO: 9.7 FL (ref 8.9–12.7)
POTASSIUM SERPL-SCNC: 4.4 MMOL/L (ref 3.5–5.3)
PROT SERPL-MCNC: 7.5 G/DL (ref 6.4–8.2)
RBC # BLD AUTO: 4.3 MILLION/UL (ref 3.81–5.12)
SODIUM SERPL-SCNC: 140 MMOL/L (ref 136–145)
TRIGL SERPL-MCNC: 159 MG/DL
TSH SERPL DL<=0.05 MIU/L-ACNC: 3.34 UIU/ML (ref 0.36–3.74)
WBC # BLD AUTO: 7.77 THOUSAND/UL (ref 4.31–10.16)

## 2021-08-16 PROCEDURE — 82043 UR ALBUMIN QUANTITATIVE: CPT

## 2021-08-16 PROCEDURE — 36415 COLL VENOUS BLD VENIPUNCTURE: CPT

## 2021-08-16 PROCEDURE — 3061F NEG MICROALBUMINURIA REV: CPT | Performed by: INTERNAL MEDICINE

## 2021-08-16 PROCEDURE — 80061 LIPID PANEL: CPT

## 2021-08-16 PROCEDURE — 3044F HG A1C LEVEL LT 7.0%: CPT | Performed by: INTERNAL MEDICINE

## 2021-08-16 PROCEDURE — 84443 ASSAY THYROID STIM HORMONE: CPT

## 2021-08-16 PROCEDURE — 86140 C-REACTIVE PROTEIN: CPT

## 2021-08-16 PROCEDURE — 82570 ASSAY OF URINE CREATININE: CPT

## 2021-08-16 PROCEDURE — 85025 COMPLETE CBC W/AUTO DIFF WBC: CPT

## 2021-08-16 PROCEDURE — 85652 RBC SED RATE AUTOMATED: CPT

## 2021-08-16 PROCEDURE — 83036 HEMOGLOBIN GLYCOSYLATED A1C: CPT

## 2021-08-16 PROCEDURE — 80053 COMPREHEN METABOLIC PANEL: CPT

## 2021-08-17 ENCOUNTER — OFFICE VISIT (OUTPATIENT)
Dept: INTERNAL MEDICINE CLINIC | Facility: CLINIC | Age: 71
End: 2021-08-17
Payer: COMMERCIAL

## 2021-08-17 ENCOUNTER — TELEPHONE (OUTPATIENT)
Dept: INTERNAL MEDICINE CLINIC | Facility: CLINIC | Age: 71
End: 2021-08-17

## 2021-08-17 VITALS
TEMPERATURE: 98.8 F | HEIGHT: 62 IN | HEART RATE: 75 BPM | BODY MASS INDEX: 39.01 KG/M2 | DIASTOLIC BLOOD PRESSURE: 78 MMHG | SYSTOLIC BLOOD PRESSURE: 148 MMHG | WEIGHT: 212 LBS | OXYGEN SATURATION: 96 %

## 2021-08-17 DIAGNOSIS — K21.9 GASTROESOPHAGEAL REFLUX DISEASE WITHOUT ESOPHAGITIS: ICD-10-CM

## 2021-08-17 DIAGNOSIS — Z79.4 CONTROLLED TYPE 2 DIABETES MELLITUS WITHOUT COMPLICATION, WITH LONG-TERM CURRENT USE OF INSULIN (HCC): Primary | ICD-10-CM

## 2021-08-17 DIAGNOSIS — I25.10 CORONARY ATHEROSCLEROSIS DUE TO CALCIFIED CORONARY LESION: ICD-10-CM

## 2021-08-17 DIAGNOSIS — E03.9 ACQUIRED HYPOTHYROIDISM: ICD-10-CM

## 2021-08-17 DIAGNOSIS — M47.27 OSTEOARTHRITIS OF SPINE WITH RADICULOPATHY, LUMBOSACRAL REGION: ICD-10-CM

## 2021-08-17 DIAGNOSIS — E11.9 CONTROLLED TYPE 2 DIABETES MELLITUS WITHOUT COMPLICATION, WITH LONG-TERM CURRENT USE OF INSULIN (HCC): Primary | ICD-10-CM

## 2021-08-17 DIAGNOSIS — J45.20 MILD INTERMITTENT ASTHMA WITHOUT COMPLICATION: ICD-10-CM

## 2021-08-17 DIAGNOSIS — I25.84 CORONARY ATHEROSCLEROSIS DUE TO CALCIFIED CORONARY LESION: ICD-10-CM

## 2021-08-17 DIAGNOSIS — E78.5 DYSLIPIDEMIA: ICD-10-CM

## 2021-08-17 DIAGNOSIS — I10 BENIGN ESSENTIAL HYPERTENSION: ICD-10-CM

## 2021-08-17 DIAGNOSIS — C54.1 ENDOMETRIAL CANCER (HCC): ICD-10-CM

## 2021-08-17 PROCEDURE — 3725F SCREEN DEPRESSION PERFORMED: CPT | Performed by: INTERNAL MEDICINE

## 2021-08-17 PROCEDURE — 3008F BODY MASS INDEX DOCD: CPT | Performed by: INTERNAL MEDICINE

## 2021-08-17 PROCEDURE — 3077F SYST BP >= 140 MM HG: CPT | Performed by: INTERNAL MEDICINE

## 2021-08-17 PROCEDURE — 1160F RVW MEDS BY RX/DR IN RCRD: CPT | Performed by: INTERNAL MEDICINE

## 2021-08-17 PROCEDURE — 3078F DIAST BP <80 MM HG: CPT | Performed by: INTERNAL MEDICINE

## 2021-08-17 PROCEDURE — 99214 OFFICE O/P EST MOD 30 MIN: CPT | Performed by: INTERNAL MEDICINE

## 2021-08-17 PROCEDURE — 1036F TOBACCO NON-USER: CPT | Performed by: INTERNAL MEDICINE

## 2021-08-17 NOTE — PROGRESS NOTES
INTERNAL MEDICINE OFFICE VISIT  [facility] [address], [place], [address] [facility]  Tel: [phone]      NAME: Krystyna Garcia  AGE: 66 y o  SEX: female  : 1950   MRN: [id number]    DATE: 2021  TIME: 8:33 AM      Assessment and Plan:  1  Controlled type 2 diabetes mellitus without complication, with long-term current use of insulin (HCC)  Continue present medications  - CBC and differential; Future  - Comprehensive metabolic panel; Future  - Lipid panel; Future  - TSH, 3rd generation; Future  - Hemoglobin A1C; Future    2  Benign essential hypertension  Continue medications    3  Dyslipidemia  Continue Crestor    4  Acquired hypothyroidism  Continue levothyroxine    5  Mild intermittent asthma without complication  Continue inhalers    6  Coronary atherosclerosis due to calcified coronary lesion  FU with cardiology    7  Gastroesophageal reflux disease without esophagitis  Stable    8  Endometrial cancer (HCC)  Stable    9  Osteoarthritis of spine with radiculopathy, lumbosacral region  Continue medications      - Counseling Documentation: patient was counseled regarding: diagnostic results, instructions for management, risk factor reductions, prognosis, patient and family education, risks and benefits of treatment options and importance of compliance with treatment  - Medication Side Effects: Adverse side effects of medications were reviewed with the patient/guardian today  Return for follow up visit in 4 months or earlier, if needed        Chief Complaint:  Chief Complaint   Patient presents with    Follow-up     4 month review labs and MRI         History of Present Illness:   Type 2 diabetes well controlled  BP and cholesterol is stable  TSH is stable on medication  Asthma is controlled on inhalers  Stable on pantoprazole      Active Problem List:  Patient Active Problem List   Diagnosis    Diabetes mellitus type 2, controlled, without complications ([address] )    Dysmetabolic syndrome X    Benign essential hypertension    Lichen sclerosus    History of endometrial cancer    SOB (shortness of breath)    Asymptomatic postmenopausal status    At risk for sleep apnea    Dyslipidemia    Asthma    GERD (gastroesophageal reflux disease)    Acquired hypothyroidism    Status post total right knee replacement    Spondylosis of lumbosacral region    Irritable bowel syndrome without diarrhea    Coronary atherosclerosis due to calcified coronary lesion    Obesity (BMI 30-39  9)    Chronic pain of both knees    Obstructive sleep apnea    Endometrial cancer [facility])         Past Medical History:  Past Medical History:   Diagnosis Date    Abnormal mammogram     Abnormal weight gain     Achilles tendinitis     Allergic     since childhood    Arthritis     Asthma     Cancer ([address] ) 2018    Endometrial adenoma    Diabetes mellitus ([address] )     Disc degeneration, lumbar     Disease of thyroid gland     hypo    Dyslipidemia     Dyslipidemia, goal LDL below 70 3/27/2018    Early satiety     Edema     idiopathic peripheral    Encounter for follow-up examination after completed treatment for malignant neoplasm 3/17/2019    Encounter for gynecological examination without abnormal finding 2020    Endometrial cancer ([facility] ) 2018    Enthesopathy     hip region    Esophagitis, reflux     GERD (gastroesophageal reflux disease)     Hard to intubate     difficulty with intubation and had to be intubated twice    Heart murmur 2018    Hypercholesterolemia     Hypertension     IBS (irritable bowel syndrome)     Irritable bowel syndrome     Obesity 1998    Obesity, Class I, BMI 30-34 9 2015    Osteoarthritis     Pneumonia     Rosacea     Sacroiliitis ([facility] )     Shingles          Past Surgical History:  Past Surgical History:   Procedure Laterality Date     SECTION      x2    COLONOSCOPY      ELBOW SURGERY      left ulna/radius    ESOPHAGOGASTRODUODENOSCOPY      FOOT SURGERY Right     FRACTURE SURGERY      FRACTURE SURGERY Left     tibia    HYSTERECTOMY Bilateral 07/28/2018    JOINT REPLACEMENT Left     shoulder,  right knee    KNEE ARTHROSCOPY      KNEE ARTHROSCOPY W/ MENISCAL REPAIR Right     AR LAPAROSCOPY W TOT HYSTERECTUTERUS <=250 GRAM  W TUBE/OVARY N/A 7/30/2018    Procedure: ROBOTIC TOTAL LAPAROSCOPIC HYSTERECTOMY, BILATERAL SALPINGOOPHORECTOMY, BILATERAL PELVIC LYMPHNODE DISSECTION;  Surgeon: Larissa Li MD;  [facility]: [facility];  Service: Gynecology Oncology    REPLACEMENT TOTAL KNEE Right     SHOULDER ARTHROPLASTY      SINUS SURGERY      TONSILLECTOMY      TUBAL LIGATION      WRIST SURGERY      ganglonic cyst         Family History:  Family History   Problem Relation Age of Onset    Arthritis Mother     Heart disease Mother         cardiac disorder    Diabetes Mother     Hiatal hernia Mother     Hypertension Mother     Irritable bowel syndrome Mother    Reesa Reichmann Breast cancer Mother 77        x2   Reesa Reichmann Uterine cancer Mother     Osteoporosis Mother     Thyroid disease Mother     Other Mother         gastric reflux    Stroke Mother     Hyperlipidemia Mother     Cancer Mother         ENDOMETRIAL    Heart disease Father         cardiac disorder    Hiatal hernia Father     Ulcers Father     Other Father         gastric reflux    Coronary artery disease Father     Hearing loss Father     Hiatal hernia Sister     Thyroid disease Sister     Other Sister         gastric reflux    Lung cancer Sister 68    Cancer Sister         lung adenocarcinoma, mets to meninges    No Known Problems Brother     Brain cancer Maternal Grandmother     Breast cancer Maternal Grandmother         [place]    No Known Problems Maternal Grandfather     No Known Problems Paternal Grandmother     No Known Problems Paternal Grandfather     Hiatal hernia Child     Other Child         gastric reflux    Irritable bowel syndrome Daughter     Breast cancer Maternal Aunt 72    Uterine cancer Maternal Aunt         x3 sis    Stroke Maternal Aunt     Cancer Maternal Aunt     Malig Hypertension Son     Breast cancer Maternal Aunt 54    Cancer Maternal Aunt     Breast cancer Maternal Aunt 54    Cancer Maternal Aunt     No Known Problems Paternal Aunt     Hyperlipidemia Daughter     Colon cancer Neg Hx     Ovarian cancer Neg Hx     Cervical cancer Neg Hx          Social History:  Social History     Socioeconomic History    Marital status:      Spouse name: Not on file    Number of children: 3    Years of education: Not on file    Highest education level: Not on file   Occupational History    Occupation: nurse     Comment: full-time   Tobacco Use    Smoking status: Never Smoker    Smokeless tobacco: Never Used   Vaping Use    Vaping Use: Never used   Substance and Sexual Activity    Alcohol use: Yes     Alcohol/week: 4 0 standard drinks     Types: 2 Glasses of wine, 2 Standard drinks or equivalent per week    Drug use: No    Sexual activity: Not Currently     Partners: Female     Birth control/protection: Post-menopausal, Surgical, Female Sterilization   Other Topics Concern    Not on file   Social History Narrative    Active advance directive    DME- freestyle lite blood glucose meter device kit QID     Social Determinants of Health     Financial Resource Strain:     Difficulty of Paying Living Expenses:    Food Insecurity:     Worried About Running Out of Food in the Last Year:     Ran Out of Food in the Last Year:    Transportation Needs:     Lack of Transportation (Medical):      Lack of Transportation (Non-Medical):    Physical Activity: Insufficiently Active    Days of Exercise per Week: 2 days    Minutes of Exercise per Session: 30 min   Stress: No Stress Concern Present    Feeling of Stress : Not at all   Social Connections:     Frequency of Communication with Friends and Family:     Frequency of Social Gatherings with Friends and Family:     Attends Worship Services:     Active Member of Clubs or Organizations:     Attends Club or Organization Meetings:     Marital Status:    Intimate Partner Violence:     Fear of Current or Ex-Partner:     Emotionally Abused:     Physically Abused:     Sexually Abused: Allergies:   Allergies   Allergen Reactions    Amoxicillin-Pot Clavulanate Anaphylaxis, Hives, Itching and Facial Swelling    Erythromycin Hives, Itching, Swelling, Vomiting, Throat Swelling, Nasal Congestion and Facial Swelling    Meperidine Anaphylaxis    Morphine Hives, Itching, Swelling, Other (See Comments) and Syncope     hypotension    Penicillins Anaphylaxis, Itching and Swelling    Relafen [Nabumetone] Hives, Itching and Swelling    Darvon [Propoxyphene] Hives    Methocarbamol Other (See Comments)     Double vision    Reglan [Metoclopramide] Anxiety    Sulfa Antibiotics Hives, Itching, Rash and Swelling    Tetracyclines & Related Rash         Medications:    Current Outpatient Medications:     olmesartan (BENICAR) 20 mg tablet, Take 1 tablet (20 mg total) by mouth daily, Disp: 90 tablet, Rfl: 3    albuterol (PROVENTIL HFA,VENTOLIN HFA) 90 mcg/act inhaler, Inhale 2 puffs every 6 (six) hours as needed for wheezing, Disp: 18 g, Rfl: 2    cetirizine (ZyrTEC) 10 MG chewable tablet, Chew 10 mg daily, Disp: , Rfl:     ezetimibe (ZETIA) 10 mg tablet, Take 1 tablet (10 mg total) by mouth daily, Disp: 90 tablet, Rfl: 3    furosemide (LASIX) 20 mg tablet, Take 1 tablet (20 mg total) by mouth daily, Disp: 90 tablet, Rfl: 3    glipiZIDE (GLUCOTROL XL) 2 5 mg 24 hr tablet, Take 1 tablet (2 5 mg total) by mouth daily, Disp: 90 tablet, Rfl: 1    glucose blood test strip, 1 each by Other route 3 (three) times a day Use as instructed, Disp: 300 each, Rfl: 3    Insulin Lispro (HumaLOG) 100 units/mL cartridge for injection, Inject 5 Units under the skin 2 (two) times a day with meals, Disp: 15 mL, Rfl: 3    Insulin Syringe-Needle U-100 (INSULIN SYRINGE 1CC/31GX5/16") 31G X 5/16" 1 ML MISC, BD Veo Insulin Syringe Ultra-Fine 0 3 mL 31 gauge x 15/64", Disp: , Rfl:     KRILL OIL PO, Take by mouth, Disp: , Rfl:     levothyroxine 75 mcg tablet, Take 1 tablet (75 mcg total) by mouth daily, Disp: 90 tablet, Rfl: 3    Linzess 145 MCG CAPS, Take 1 capsule (145 mcg total) by mouth daily before breakfast Take 30 minutes prior to breakfast, Disp: 90 capsule, Rfl: 3    Magnesium Oxide 400 MG CAPS, Take by mouth, Disp: , Rfl:     metFORMIN (GLUCOPHAGE-XR) 500 mg 24 hr tablet, TAKE 1 TABLET IN THE MORNING AND 2 TABLETS IN THE EVENING, Disp: 270 tablet, Rfl: 3    Omega-3 Fatty Acids (FISH OIL) 1200 MG CAPS, Take by mouth, Disp: , Rfl:     pantoprazole (PROTONIX) 40 mg tablet, Take 1 tablet (40 mg total) by mouth daily, Disp: 90 tablet, Rfl: 3    potassium chloride (MICRO-K) 10 MEQ CR capsule, Take 1 capsule (10 mEq total) by mouth daily, Disp: 90 capsule, Rfl: 3    rosuvastatin (CRESTOR) 20 MG tablet, Take 1 tablet (20 mg total) by mouth daily, Disp: 90 tablet, Rfl: 4    saccharomyces boulardii (FLORASTOR) 250 mg capsule, Take 250 mg by mouth 3 (three) times a day, Disp: , Rfl:       The following portions of the patient's history were reviewed and updated as appropriate: past medical history, past surgical history, family history, social history, allergies, current medications and active problem list       Review of Systems:  Constitutional: Denies fever, chills, weight gain, weight loss, fatigue  Eyes: Denies eye redness, eye discharge, double vision, change in visual acuity  ENT: Denies hearing loss, tinnitus, sneezing, nasal congestion, nasal discharge, sore throat   Respiratory: Denies cough, expectoration, hemoptysis, shortness of breath, wheezing  Cardiovascular: Denies chest pain, palpitations, lower extremity swelling, orthopnea, PND  Gastrointestinal: Denies abdominal pain, heartburn, nausea, vomiting, hematemesis, diarrhea, bloody stools  Genito-Urinary: Denies dysuria, frequency, difficulty in micturition, nocturia, incontinence  Musculoskeletal: Denies back pain, joint pain, muscle pain  Neurologic: Denies confusion, lightheadedness, syncope, headache, focal weakness, sensory changes, seizures  Endocrine: Denies polyuria, polydipsia, temperature intolerance  Allergy and Immunology: Denies hives, insect bite sensitivity  Hematological and Lymphatic: Denies bleeding problems, swollen glands   Psychological: Denies depression, suicidal ideation, anxiety, panic, mood swings  Dermatological: Denies pruritus, rash, skin lesion changes      Vitals:  Vitals:    08/17/21 0805   BP: 148/78   Pulse: 75   Temp: 98 8 °F (37 1 °C)   SpO2: 96%       Body mass index is 38 78 kg/m²  Weight (last 2 days)     Date/Time   Weight    08/17/21 0805   96 2 (212)                Physical Examination:  General: Patient is not in acute distress  Awake, alert, responding to commands  No weight gain or loss  Head: Normocephalic  Atraumatic  Eyes: Conjunctiva and lids with no swelling, erythema or discharge  Both pupils normal sized, round and reactive to light  Sclera nonicteric  ENT: External examination of nose and ear normal  Otoscopic examination shows translucent tympanic membranes with patent canals without erythema  Oropharynx moist with no erythema, edema, exudate or lesions  Neck: Supple  JVP not raised  Trachea midline  No masses  No thyromegaly  Lungs: No signs of increased work of breathing or respiratory distress  Bilateral bronchovascular breath sounds with no crackles or rhonchi  Chest wall: No tenderness  Cardiovascular: Normal PMI  No thrills  Regular rate and rhythm  S1 and S2 normal  No murmur, rub or gallop  Gastrointestinal: Abdomen soft, nontender  No guarding or rigidity  Liver and spleen not palpable  Bowel sounds present  Neurologic: Cranial nerves II-XII intact   Cortical functions normal  Motor system - Reflexes 2+ and symmetrical  Sensations normal  Musculoskeletal: Gait normal  No joint tenderness  Integumentary: Skin normal with no rash or lesions  Lymphatic: No palpable lymph nodes in neck, axilla or groin  Extremities: No clubbing, cyanosis, edema or varicosities  Psychological: Judgement and insight normal  Mood and affect normal    Patient's shoes and socks removed  Right Foot/Ankle   Right Foot Inspection  Skin Exam: skin normal and skin intact no dry skin, no warmth, no callus, no erythema, no maceration, no abnormal color, no pre-ulcer, no ulcer and no callus                          Toe Exam: ROM and strength within normal limits  Sensory     Proprioception: diminished and intact   Monofilament testing: intact  Vascular  Capillary refills: < 3 seconds  The right DP pulse is 2+  The right PT pulse is 2+  Left Foot/Ankle  Left Foot Inspection  Skin Exam: skin normal and skin intactno dry skin, no warmth, no erythema, no maceration, normal color, no pre-ulcer, no ulcer and no callus                         Toe Exam: ROM and strength within normal limits                   Sensory     Proprioception: intact  Monofilament: intact  Vascular  Capillary refills: < 3 seconds  The left DP pulse is 2+  The left PT pulse is 2+  Assign Risk Category:  No deformity present; No loss of protective sensation;  No weak pulses       Risk: 0    Laboratory Results:  CBC with diff:   Lab Results   Component Value Date    WBC 7 77 08/16/2021    WBC 9 23 11/04/2015    RBC 4 30 08/16/2021    RBC 4 49 11/04/2015    HGB 12 5 08/16/2021    HGB 12 3 11/04/2015    HCT 40 5 08/16/2021    HCT 37 4 11/04/2015    MCV 94 08/16/2021    MCV 83 11/04/2015    MCH 29 1 08/16/2021    MCH 27 4 11/04/2015    RDW 14 2 08/16/2021    RDW 15 7 (H) 11/04/2015     08/16/2021     (H) 11/04/2015       CMP:  Lab Results   Component Value Date    CREATININE 0 83 08/16/2021    CREATININE 0 91 11/04/2015    BUN 19 08/16/2021    BUN 26 (H) 11/04/2015     11/04/2015    K 4 4 08/16/2021    K 4 2 11/04/2015     08/16/2021     (H) 11/04/2015    CO2 27 08/16/2021    CO2 26 11/04/2015    GLUCOSE 107 11/04/2015    PROT 7 0 11/04/2015    ALKPHOS 71 08/16/2021    ALKPHOS 71 11/04/2015    ALT 42 08/16/2021    ALT 30 11/04/2015    AST 34 08/16/2021    AST 20 11/04/2015    BILIDIR 0 10 06/30/2015       Lab Results   Component Value Date    HGBA1C 6 2 (H) 08/16/2021    HGBA1C 6 6 (H) 11/04/2015    MG 1 9 04/04/2019       No results found for: TROPONINI, CKMB, CKTOTAL    Lipid Profile:   Lab Results   Component Value Date    CHOL 129 11/04/2015    CHOL 102 06/25/2015     Lab Results   Component Value Date    HDL 47 08/16/2021    HDL 37 (L) 03/08/2021     Lab Results   Component Value Date    LDLCALC 49 08/16/2021    LDLCALC 47 03/08/2021     Lab Results   Component Value Date    TRIG 159 (H) 08/16/2021    TRIG 153 (H) 03/08/2021       Imaging Results:  MRI lumbar spine wo contrast  Narrative: MRI LUMBAR SPINE WITHOUT CONTRAST    INDICATION: M54 16: Radiculopathy, lumbar region  R20 0: Anesthesia of skin  COMPARISON:  X-rays dated 6/28/2021  TECHNIQUE:  Sagittal T1, sagittal T2, sagittal inversion recovery, axial T1 and axial T2, coronal T2     IMAGE QUALITY:  Diagnostic    FINDINGS:    VERTEBRAL BODIES:  There are 5 lumbar type vertebral bodies  Slight anterior spondylolisthesis of L3 in relation to L2 and L5 in relation to S1  No spondylolysis  No compression fracture  No scoliosis  Normal marrow signal is identified within the   visualized bony structures  No discrete marrow lesion  SACRUM:  Normal signal within the sacrum  No evidence of insufficiency or stress fracture  DISTAL CORD AND CONUS:  Normal size and signal within the distal cord and conus  PARASPINAL SOFT TISSUES:  Paraspinal soft tissues are unremarkable      LOWER THORACIC DISC SPACES:  Normal disc height and signal   No disc herniation, canal stenosis or foraminal narrowing  LUMBAR DISC SPACES:    L1-L2:  Normal     L2-L3:  Normal     L3-L4:  No disc herniation  No canal stenosis or foraminal narrowing  L4-L5:  Normal disc height  No disc herniation  There is moderate right and mild left facet degenerative change  No canal stenosis or foraminal narrowing  L5-S1:  Minimal anterior spondylolisthesis on the basis of facet degenerative change  No disc herniation, canal stenosis or foraminal narrowing  Impression: Minor noncompressive degenerative change within the lumbar spine  The canal is widely patent  No nerve impingement      Workstation performed: [id number]       Health Maintenance:  Health Maintenance   Topic Date Due    Annual Physical  08/22/2020    PT PLAN OF CARE  08/05/2021    Influenza Vaccine (1) 09/01/2021    Diabetic Foot Exam  09/30/2021    HEMOGLOBIN A1C  02/16/2022    BMI: Followup Plan  03/31/2022    Breast Cancer Screening: Mammogram  05/27/2022    DM Eye Exam  06/10/2022    Fall Risk  07/06/2022    BMI: Adult  08/12/2022    Depression Screening PHQ  08/17/2022    DXA SCAN  02/10/2024    Colorectal Cancer Screening  12/31/2024    DTaP,Tdap,and Td Vaccines (4 - Td or Tdap) 09/30/2030    Hepatitis C Screening  Completed    Pneumococcal Vaccine: 65+ Years  Completed    COVID-19 Vaccine  Completed    HIB Vaccine  Aged Out    Hepatitis B Vaccine  Aged Out    IPV Vaccine  Aged Out    Hepatitis A Vaccine  Aged Out    Meningococcal ACWY Vaccine  Aged Out    HPV Vaccine  Aged [employer] History   Administered Date(s) Administered    H1N1, All Formulations 11/05/2009    Influenza Split High Dose Preservative Free IM 10/06/2014, 09/30/2015, 10/20/2017    Influenza, high dose seasonal 0 7 mL 10/09/2018, 09/30/2020    Pneumococcal Conjugate 13-Valent 09/30/2015    Pneumococcal Polysaccharide PPV23 11/17/2008, 10/06/2014, 03/23/2017    SARS-CoV-2 / COVID-19 mRNA IM (Tessa Hammonds) 01/05/2021, 02/04/2021    Tdap 10/09/2018, 10/12/2018, 09/30/2020    Tuberculin Skin Test-PPD Intradermal 07/03/2019    Zoster 10/25/2017    influenza, trivalent, adjuvanted 09/28/2019         Ruthy Funk MD  8/17/2021,8:33 AM

## 2021-08-17 NOTE — TELEPHONE ENCOUNTER
----- Message from Cecy Bethea MD sent at 8/17/2021  1:18 PM EDT -----  Labs ok, please call and inform patient

## 2021-08-18 PROBLEM — E11.36 TYPE 2 DIABETES MELLITUS WITH DIABETIC CATARACT (HCC): Status: ACTIVE | Noted: 2021-08-18

## 2021-08-18 PROBLEM — H25.813 COMBINED FORMS OF AGE-RELATED CATARACT OF BOTH EYES: Status: ACTIVE | Noted: 2021-08-18

## 2021-08-18 PROBLEM — H25.013 CORTICAL AGE-RELATED CATARACT OF BOTH EYES: Status: ACTIVE | Noted: 2021-08-18

## 2021-08-19 ENCOUNTER — TELEPHONE (OUTPATIENT)
Dept: RADIOLOGY | Facility: CLINIC | Age: 71
End: 2021-08-19

## 2021-08-19 NOTE — TELEPHONE ENCOUNTER
----- Message from Addie López MD sent at 8/18/2021  5:15 PM EDT -----  Patient MRI shows degenerative changes in the joints in the lower lumbar spine  However there is no nerve impingement or stenosis  Reason for her right leg numbness is unclear    May need to order EMG of the right lower extremity to further investigate

## 2021-08-20 ENCOUNTER — TELEPHONE (OUTPATIENT)
Dept: INTERNAL MEDICINE CLINIC | Facility: CLINIC | Age: 71
End: 2021-08-20

## 2021-08-20 ENCOUNTER — DOCUMENTATION (OUTPATIENT)
Dept: PULMONOLOGY | Facility: CLINIC | Age: 71
End: 2021-08-20

## 2021-08-20 NOTE — TELEPHONE ENCOUNTER
----- Message from Dennis Fraga MD sent at 8/17/2021  5:12 PM EDT -----  Regarding: FW: Prescription Question  Contact: [phone]   The script was on my desk, she will pick it up  ----- Message -----  From: Nithya Arzola  Sent: 8/17/2021   2:46 PM EDT  To: Dennis Fraga MD  Subject: FW: Prescription Question                          ----- Message -----  From: Cooper Becerra  Sent: 8/17/2021   9:27 AM EDT  To: [facility]  Subject: Prescription Question                            Forgot to get massage therapy for back prescription  Shall I pick it up at the office?

## 2021-08-26 NOTE — TELEPHONE ENCOUNTER
S/W pt and advised of results and plan  Pt is not up to having EMG as she has had this before a long time ago and will not go through that again  At this point pt is doing massage therapy and going to Chiropractor, which does seem to help  Pt state she experiences the leg pain when LB goes into spasm  Had tried Muscle relaxers in the past, but Flexeril cause urine retention and Robaxin caused double vision  Pt ok with trying a different one that may not be as strong  Pt is questioning whether she should move forward with Anthony SIJ injections since she does still have the LBP  She is aware this will not hekp the leg symptoms      Please advise

## 2021-08-26 NOTE — TELEPHONE ENCOUNTER
I have sent Skelaxin to use twice a day as needed  She can try this    If still having increased low back pain going across the belt line, can schedule for SI injection

## 2021-09-02 DIAGNOSIS — E11.9 TYPE 2 DIABETES MELLITUS WITHOUT COMPLICATION, WITHOUT LONG-TERM CURRENT USE OF INSULIN (HCC): ICD-10-CM

## 2021-09-02 RX ORDER — GLIPIZIDE 2.5 MG/1
TABLET, EXTENDED RELEASE ORAL
Qty: 90 TABLET | Refills: 3 | Status: SHIPPED | OUTPATIENT
Start: 2021-09-02 | End: 2022-07-25 | Stop reason: SDUPTHER

## 2021-09-20 ENCOUNTER — TELEPHONE (OUTPATIENT)
Dept: PULMONOLOGY | Facility: CLINIC | Age: 71
End: 2021-09-20

## 2021-09-28 ENCOUNTER — TELEPHONE (OUTPATIENT)
Dept: PULMONOLOGY | Facility: CLINIC | Age: 71
End: 2021-09-28

## 2021-10-11 ENCOUNTER — OFFICE VISIT (OUTPATIENT)
Dept: INTERNAL MEDICINE CLINIC | Facility: CLINIC | Age: 71
End: 2021-10-11
Payer: COMMERCIAL

## 2021-10-11 VITALS
SYSTOLIC BLOOD PRESSURE: 128 MMHG | WEIGHT: 210 LBS | OXYGEN SATURATION: 95 % | TEMPERATURE: 97.5 F | RESPIRATION RATE: 17 BRPM | DIASTOLIC BLOOD PRESSURE: 82 MMHG | HEIGHT: 62 IN | HEART RATE: 81 BPM | BODY MASS INDEX: 38.64 KG/M2

## 2021-10-11 DIAGNOSIS — Z00.00 ANNUAL PHYSICAL EXAM: Primary | ICD-10-CM

## 2021-10-11 PROCEDURE — 1160F RVW MEDS BY RX/DR IN RCRD: CPT | Performed by: NURSE PRACTITIONER

## 2021-10-11 PROCEDURE — 3074F SYST BP LT 130 MM HG: CPT | Performed by: NURSE PRACTITIONER

## 2021-10-11 PROCEDURE — 3079F DIAST BP 80-89 MM HG: CPT | Performed by: NURSE PRACTITIONER

## 2021-10-11 PROCEDURE — 99397 PER PM REEVAL EST PAT 65+ YR: CPT | Performed by: NURSE PRACTITIONER

## 2021-10-11 PROCEDURE — 3008F BODY MASS INDEX DOCD: CPT | Performed by: NURSE PRACTITIONER

## 2021-10-11 PROCEDURE — 1036F TOBACCO NON-USER: CPT | Performed by: NURSE PRACTITIONER

## 2021-11-15 ENCOUNTER — OFFICE VISIT (OUTPATIENT)
Dept: DERMATOLOGY | Facility: CLINIC | Age: 71
End: 2021-11-15
Payer: COMMERCIAL

## 2021-11-15 DIAGNOSIS — L82.1 SEBORRHEIC KERATOSIS: Primary | ICD-10-CM

## 2021-11-15 DIAGNOSIS — Z13.89 SCREENING FOR SKIN CONDITION: ICD-10-CM

## 2021-11-15 PROCEDURE — 99213 OFFICE O/P EST LOW 20 MIN: CPT | Performed by: DERMATOLOGY

## 2021-11-15 PROCEDURE — 1036F TOBACCO NON-USER: CPT | Performed by: DERMATOLOGY

## 2021-11-15 PROCEDURE — 1160F RVW MEDS BY RX/DR IN RCRD: CPT | Performed by: DERMATOLOGY

## 2021-12-27 ENCOUNTER — HOSPITAL ENCOUNTER (OUTPATIENT)
Dept: CT IMAGING | Facility: CLINIC | Age: 71
Discharge: HOME | End: 2021-12-27
Payer: COMMERCIAL

## 2021-12-27 DIAGNOSIS — R91.1 LUNG NODULE: ICD-10-CM

## 2021-12-27 PROCEDURE — 71250 CT THORAX DX C-: CPT

## 2021-12-27 PROCEDURE — G1004 CDSM NDSC: HCPCS

## 2022-01-17 DIAGNOSIS — R06.02 SOB (SHORTNESS OF BREATH): Primary | ICD-10-CM

## 2022-01-18 ENCOUNTER — TELEPHONE (OUTPATIENT)
Dept: PULMONOLOGY | Facility: CLINIC | Age: 72
End: 2022-01-18

## 2022-01-18 NOTE — TELEPHONE ENCOUNTER
----- Message from Laura Pickett MD sent at 1/17/2022  4:12 PM EST -----  Regarding: FW: Latest chest ct  Darryl gregory called the patient and discussed the CT scan results, the PET scan in 2018 also did demonstrate left upper lobe scarring that was documented not the right upper lobe, but the recent CT chest in [facility] in   June of 2021 new reporting in the upper lobe scarring and again the recent CT chest demonstrating bilateral upper lobe scarring which I did discuss with her that is probably all old and has been present for a long time, and she wanted to know if thi  s scarring in the upper lobes is the cause for her shortness of breath which I doubt I did ask her to come back into the office for further evaluation for other causesAlso discussed to get the CT chest from June from [facility] and drop t  he disc so that we can uploaded to sent Hong Tavares please call her for the follow-up appointmentThank you    ----- Message -----  From: Jess Monahan RN  Sent: 1/17/2022   8:28 AM EST  To: Laura Pickett MD  Subject: FW: Latest chest ct                                ----- Message -----  From: Maureen Becerra  Sent: 1/16/2022  12:33 PM EST  To: [facility]  Subject: Latest chest ct                                  This was read differently than both by CT scan done at [facility] in June of this year and the follow-up PET scan  This finds no nodule and sees parenchymal scarring in the left and right apex where the nodule was only in the left  Please clarify and is this scarring the cause of exertional dyspnea I've been complaining about for years when my pfts were read as normal? Today is 1/16/2022 and I would have expected to hear from you by now  Is the scarring worsening, what is the exxplanation?

## 2022-01-24 ENCOUNTER — TELEPHONE (OUTPATIENT)
Dept: PULMONOLOGY | Facility: CLINIC | Age: 72
End: 2022-01-24

## 2022-01-24 NOTE — TELEPHONE ENCOUNTER
----- Message from Benedicto Vazquez MD sent at 1/24/2022  8:44 AM EST -----  Regarding: FW: Latest chest ct  Please see above message, please give a fu appt to see me in the next 2-3 weeks  thanks    ----- Message -----  From: Melanie Danielson RN  Sent: 1/24/2022   8:26 AM EST  To: Benedicto Vazquez MD  Subject: FW: Latest chest ct                                ----- Message -----  From: Juhi Salcido  Sent: 1/22/2022  10:09 PM EST  To: [facility]  Subject: Latest chest ct                                  Dropped the disc off on Monday, have not heard from anyone

## 2022-02-14 DIAGNOSIS — E78.2 MIXED HYPERLIPIDEMIA: ICD-10-CM

## 2022-02-14 RX ORDER — EZETIMIBE 10 MG/1
TABLET ORAL
Qty: 90 TABLET | Refills: 3 | Status: SHIPPED | OUTPATIENT
Start: 2022-02-14

## 2022-03-15 ENCOUNTER — OFFICE VISIT (OUTPATIENT)
Dept: CARDIOLOGY CLINIC | Facility: CLINIC | Age: 72
End: 2022-03-15
Payer: COMMERCIAL

## 2022-03-15 VITALS
DIASTOLIC BLOOD PRESSURE: 80 MMHG | HEIGHT: 62 IN | WEIGHT: 205.6 LBS | HEART RATE: 78 BPM | BODY MASS INDEX: 37.84 KG/M2 | SYSTOLIC BLOOD PRESSURE: 132 MMHG | OXYGEN SATURATION: 97 %

## 2022-03-15 DIAGNOSIS — E78.5 DYSLIPIDEMIA: ICD-10-CM

## 2022-03-15 DIAGNOSIS — R06.02 SOB (SHORTNESS OF BREATH): ICD-10-CM

## 2022-03-15 DIAGNOSIS — E11.8 TYPE 2 DIABETES MELLITUS WITH COMPLICATION, WITHOUT LONG-TERM CURRENT USE OF INSULIN (HCC): ICD-10-CM

## 2022-03-15 DIAGNOSIS — I10 BENIGN ESSENTIAL HYPERTENSION: Primary | ICD-10-CM

## 2022-03-15 PROCEDURE — 1160F RVW MEDS BY RX/DR IN RCRD: CPT | Performed by: INTERNAL MEDICINE

## 2022-03-15 PROCEDURE — 99214 OFFICE O/P EST MOD 30 MIN: CPT | Performed by: INTERNAL MEDICINE

## 2022-03-15 PROCEDURE — 3008F BODY MASS INDEX DOCD: CPT | Performed by: INTERNAL MEDICINE

## 2022-03-15 PROCEDURE — 3079F DIAST BP 80-89 MM HG: CPT | Performed by: INTERNAL MEDICINE

## 2022-03-15 PROCEDURE — 1036F TOBACCO NON-USER: CPT | Performed by: INTERNAL MEDICINE

## 2022-03-15 PROCEDURE — 3075F SYST BP GE 130 - 139MM HG: CPT | Performed by: INTERNAL MEDICINE

## 2022-03-15 RX ORDER — EMPAGLIFLOZIN 25 MG/1
25 TABLET, FILM COATED ORAL EVERY MORNING
COMMUNITY
Start: 2022-02-21 | End: 2022-06-21

## 2022-03-15 NOTE — PROGRESS NOTES
[facility] [address] [address]  Cardiology Follow Up    [facility]  1950  [id number]      1  Benign essential hypertension     2  SOB (shortness of breath)  Ambulatory Referral to Cardiology   3  Dyslipidemia         Chief Complaint   Patient presents with    Follow-up   Dyspnea on exertion    Interval History:  Patient presents for follow-up visit  Patient does have history of diabetes, hypertension as well as hyperlipidemia  Patient does have shortness of breath with exertion  Patient had cardiac catheterization approximately 10 years ago which did not show any significant coronary artery disease  Patient does have history of mild mitral stenosis by echocardiogram last summer  Patient had a pulmonary evaluation  CT of the chest showed apical parenchymal scarring  Patient also had gained weight but is trying to lose  Patient denies any history of leg edema orthopnea PND  She is trying to watch her diet and was evaluated by medical bariatric clinic  Patient Active Problem List   Diagnosis    Diabetes mellitus type 2, controlled, without complications ([facility] )    Dysmetabolic syndrome X    Benign essential hypertension    Lichen sclerosus    History of endometrial cancer    SOB (shortness of breath)    Asymptomatic postmenopausal status    At risk for sleep apnea    Dyslipidemia    Asthma    GERD (gastroesophageal reflux disease)    Acquired hypothyroidism    Status post total right knee replacement    Spondylosis of lumbosacral region    Irritable bowel syndrome without diarrhea    Coronary atherosclerosis due to calcified coronary lesion    Obesity (BMI 30-39  9)    Chronic pain of both knees    Obstructive sleep apnea    Endometrial cancer (HCC)    Type 2 diabetes mellitus with diabetic cataract ([address] )    Cortical age-related cataract of both eyes    Combined forms of age-related cataract of both eyes     Past Medical History:   Diagnosis Date    Abnormal mammogram     Abnormal weight gain     Achilles tendinitis     Allergic 1952    since childhood    Arthritis 1988    Asthma     Cancer ([facility] ) 2018    Endometrial adenoma    Diabetes mellitus ([facility] )     Disc degeneration, lumbar     Disease of thyroid gland     hypo    Dyslipidemia     Dyslipidemia, goal LDL below 70 3/27/2018    Early satiety     Edema     idiopathic peripheral    Encounter for follow-up examination after completed treatment for malignant neoplasm 3/17/2019    Encounter for gynecological examination without abnormal finding 6/22/2020    Endometrial cancer ([facility] ) 2018    Enthesopathy     hip region    Esophagitis, reflux     GERD (gastroesophageal reflux disease)     Hard to intubate     difficulty with intubation and had to be intubated twice    Heart murmur 2018    Hypercholesterolemia     Hypertension     IBS (irritable bowel syndrome)     Irritable bowel syndrome     Obesity 1998    Obesity, Class I, BMI 30-34 9 1/7/2015    Osteoarthritis     Pneumonia     Rosacea     Sacroiliitis (HCC)     Shingles      Social History     Socioeconomic History    Marital status:      Spouse name: Not on file    Number of children: 3    Years of education: Not on file    Highest education level: Not on file   Occupational History    Occupation: nurse     Comment: full-time   Tobacco Use    Smoking status: Never Smoker    Smokeless tobacco: Never Used   Vaping Use    Vaping Use: Never used   Substance and Sexual Activity    Alcohol use:  Yes     Alcohol/week: 4 0 standard drinks     Types: 2 Glasses of wine, 2 Standard drinks or equivalent per week    Drug use: No    Sexual activity: Not Currently     Partners: Female     Birth control/protection: Post-menopausal, Surgical, Female Sterilization   Other Topics Concern    Not on file   Social History Narrative    Active advance directive    DME- freestyle lite blood glucose meter device kit QID     Social Determinants of Health     Financial Resource Strain: Not on file   Food Insecurity: Not on file   Transportation Needs: Not on file   Physical Activity: Inactive    Days of Exercise per Week: 0 days    Minutes of Exercise per Session: 0 min   Stress: No Stress Concern Present    Feeling of Stress :  Only a little   Social Connections: Not on file   Intimate Partner Violence: Not on file   Housing Stability: Not on file      Family History   Problem Relation Age of Onset    Arthritis Mother     Heart disease Mother         cardiac disorder    Diabetes Mother     Hiatal hernia Mother     Hypertension Mother     Irritable bowel syndrome Mother    Earna Liz Breast cancer Mother 77        x2   Earna Liz Uterine cancer Mother     Osteoporosis Mother     Thyroid disease Mother     Other Mother         gastric reflux    Stroke Mother     Hyperlipidemia Mother     Cancer Mother         ENDOMETRIAL    Heart disease Father         cardiac disorder    Hiatal hernia Father     Ulcers Father     Other Father         gastric reflux    Coronary artery disease Father     Hearing loss Father     Hiatal hernia Sister     Thyroid disease Sister     Other Sister         gastric reflux    Lung cancer Sister 68    Cancer Sister         lung adenocarcinoma, mets to meninges    No Known Problems Brother     Brain cancer Maternal Grandmother     Breast cancer Maternal Grandmother         [place]    No Known Problems Maternal Grandfather     No Known Problems Paternal Grandmother     No Known Problems Paternal Grandfather     Hiatal hernia Child     Other Child         gastric reflux    Irritable bowel syndrome Daughter     Breast cancer Maternal Aunt 72    Uterine cancer Maternal Aunt         x3 sis    Stroke Maternal Aunt     Cancer Maternal Aunt     Malig Hypertension Son     Breast cancer Maternal Aunt 54    Cancer Maternal Aunt     Breast cancer Maternal Aunt 54    Cancer Maternal Aunt     No Known Problems Paternal Aunt     Hyperlipidemia Daughter     Colon cancer Neg Hx     Ovarian cancer Neg Hx     Cervical cancer Neg Hx      Past Surgical History:   Procedure Laterality Date     SECTION      x2    COLONOSCOPY      ELBOW SURGERY      left ulna/radius    ESOPHAGOGASTRODUODENOSCOPY      FOOT SURGERY Right     FRACTURE SURGERY      FRACTURE SURGERY Left     tibia    HYSTERECTOMY Bilateral 2018    JOINT REPLACEMENT Left     shoulder,  right knee    KNEE ARTHROSCOPY      KNEE ARTHROSCOPY W/ MENISCAL REPAIR Right     UT LAPAROSCOPY W TOT HYSTERECTUTERUS <=250 GRAM  W TUBE/OVARY N/A 2018    Procedure: ROBOTIC TOTAL LAPAROSCOPIC HYSTERECTOMY, BILATERAL SALPINGOOPHORECTOMY, BILATERAL PELVIC LYMPHNODE DISSECTION;  Surgeon: Zo Carlson MD;  Location: [facility];  Service: Gynecology Oncology    REPLACEMENT TOTAL KNEE Right     SHOULDER ARTHROPLASTY      SINUS SURGERY      TONSILLECTOMY      TUBAL LIGATION      WRIST SURGERY      ganglonic cyst       Current Outpatient Medications:     albuterol (PROVENTIL HFA,VENTOLIN HFA) 90 mcg/act inhaler, Inhale 2 puffs every 6 (six) hours as needed for wheezing, Disp: 18 g, Rfl: 2    Calcium-Magnesium-Vitamin D (CALCIUM 1200+D3 PO), , Disp: , Rfl:     cetirizine (ZyrTEC) 10 MG chewable tablet, Chew 10 mg daily, Disp: , Rfl:     ezetimibe (ZETIA) 10 mg tablet, TAKE 1 TABLET DAILY, Disp: 90 tablet, Rfl: 3    furosemide (LASIX) 20 mg tablet, Take 1 tablet (20 mg total) by mouth daily, Disp: 90 tablet, Rfl: 3    glipiZIDE (GLUCOTROL XL) 2 5 mg 24 hr tablet, TAKE 1 TABLET DAILY, Disp: 90 tablet, Rfl: 3    glucose blood test strip, 1 each by Other route 3 (three) times a day Use as instructed, Disp: 300 each, Rfl: 3    Insulin Lispro (HumaLOG) 100 units/mL cartridge for injection, Inject 5 Units under the skin 2 (two) times a day with meals (Patient taking differently: Inject 5 Units under the skin 1 unit of insulin for 30 mg over 130 ), Disp: 15 mL, Rfl: 3    Insulin Syringe-Needle U-100 (INSULIN SYRINGE 1CC/31GX5/16") 31G X 5/16" 1 ML MISC, BD Veo Insulin Syringe Ultra-Fine 0 3 mL 31 gauge x 15/64", Disp: , Rfl:     Jardiance 10 MG TABS, Take 10 mg by mouth every morning, Disp: , Rfl:     KRILL OIL PO, Take by mouth, Disp: , Rfl:     levothyroxine 75 mcg tablet, Take 1 tablet (75 mcg total) by mouth daily, Disp: 90 tablet, Rfl: 3    Linzess 145 MCG CAPS, Take 1 capsule (145 mcg total) by mouth daily before breakfast Take 30 minutes prior to breakfast, Disp: 90 capsule, Rfl: 3    LOW-DOSE ASPIRIN PO, , Disp: , Rfl:     Magnesium Oxide 400 MG CAPS, Take by mouth, Disp: , Rfl:     metFORMIN (GLUCOPHAGE-XR) 500 mg 24 hr tablet, TAKE 1 TABLET IN THE MORNING AND 2 TABLETS IN THE EVENING, Disp: 270 tablet, Rfl: 3    olmesartan (BENICAR) 20 mg tablet, Take 1 tablet (20 mg total) by mouth daily, Disp: 90 tablet, Rfl: 3    Omega-3 Fatty Acids (FISH OIL) 1200 MG CAPS, Take by mouth, Disp: , Rfl:     pantoprazole (PROTONIX) 40 mg tablet, Take 1 tablet (40 mg total) by mouth daily, Disp: 90 tablet, Rfl: 3    potassium chloride (MICRO-K) 10 MEQ CR capsule, Take 1 capsule (10 mEq total) by mouth daily, Disp: 90 capsule, Rfl: 3    rosuvastatin (CRESTOR) 20 MG tablet, Take 1 tablet (20 mg total) by mouth daily, Disp: 90 tablet, Rfl: 4    saccharomyces boulardii (FLORASTOR) 250 mg capsule, Take 250 mg by mouth daily  , Disp: , Rfl:   Allergies   Allergen Reactions    Amoxicillin-Pot Clavulanate Anaphylaxis, Hives, Itching and Facial Swelling    Erythromycin Hives, Itching, Swelling, Vomiting, Throat Swelling, Nasal Congestion and Facial Swelling    Meperidine Anaphylaxis    Morphine Hives, Itching, Swelling, Other (See Comments) and Syncope     hypotension    Penicillins Anaphylaxis, Itching and Swelling    Relafen [Nabumetone] Hives, Itching and Swelling    Darvon [Propoxyphene] Hives    Methocarbamol Other (See Comments)     Double vision    Reglan [Metoclopramide] Anxiety    Sulfa Antibiotics Hives, Itching, Rash and Swelling    Tetracyclines & Related Rash       Labs:  No visits with results within 2 Month(s) from this visit  Latest known visit with results is:   Lab on 08/16/2021   Component Date Value    WBC 08/16/2021 7 77     RBC 08/16/2021 4 30     Hemoglobin 08/16/2021 12 5     Hematocrit 08/16/2021 40 5     MCV 08/16/2021 94     MCH 08/16/2021 29 1     MCHC 08/16/2021 30 9*    RDW 08/16/2021 14 2     MPV 08/16/2021 9 7     Platelets 45/35/8716 295     nRBC 08/16/2021 0     Neutrophils Relative 08/16/2021 55     Immat GRANS % 08/16/2021 0     Lymphocytes Relative 08/16/2021 35     Monocytes Relative 08/16/2021 7     Eosinophils Relative 08/16/2021 2     Basophils Relative 08/16/2021 1     Neutrophils Absolute 08/16/2021 4 23     Immature Grans Absolute 08/16/2021 0 02     Lymphocytes Absolute 08/16/2021 2 75     Monocytes Absolute 08/16/2021 0 55     Eosinophils Absolute 08/16/2021 0 15     Basophils Absolute 08/16/2021 0 07     Sodium 08/16/2021 140     Potassium 08/16/2021 4 4     Chloride 08/16/2021 108     CO2 08/16/2021 27     ANION GAP 08/16/2021 5     BUN 08/16/2021 19     Creatinine 08/16/2021 0 83     Glucose, Fasting 08/16/2021 119*    Calcium 08/16/2021 9 3     AST 08/16/2021 34     ALT 08/16/2021 42     Alkaline Phosphatase 08/16/2021 71     Total Protein 08/16/2021 7 5     Albumin 08/16/2021 3 8     Total Bilirubin 08/16/2021 0 36     eGFR 08/16/2021 70     Cholesterol 08/16/2021 128     Triglycerides 08/16/2021 159*    HDL, Direct 08/16/2021 47     LDL Calculated 08/16/2021 49     Non-HDL-Chol (CHOL-HDL) 08/16/2021 81     TSH 3RD GENERATON 08/16/2021 3 340     Hemoglobin A1C 08/16/2021 6 2*    EAG 08/16/2021 131     Creatinine, Ur 08/16/2021 128 0     Microalbum  ,U,Random 08/16/2021 17 7     Microalb Creat Ratio 08/16/2021 14     Sed Rate 08/16/2021 14     CRP 08/16/2021 4 6*     Imaging: No results found  Review of Systems:  Review of Systems   REVIEW OF SYSTEMS:  Constitutional:  Denies fever or chills   Eyes:  Denies change in visual acuity   HENT:  Denies nasal congestion or sore throat   Respiratory:   shortness of breath   Cardiovascular:  Denies chest pain or edema   GI:  Denies abdominal pain, nausea, vomiting, bloody stools or diarrhea   :  Denies dysuria, frequency, difficulty in micturition and nocturia  Musculoskeletal:  Denies back pain or joint pain   Neurologic:  Denies headache, focal weakness or sensory changes   Endocrine:  Denies polyuria or polydipsia   Lymphatic:  Denies swollen glands   Psychiatric:  Denies depression or anxiety     Physical Exam:    /80 (BP Location: Left arm, Patient Position: Sitting, Cuff Size: Standard)   Pulse 78   Ht 5' 2" (1 575 m)   Wt 93 3 kg (205 lb 9 6 oz)   SpO2 97%   BMI 37 60 kg/m²     Physical Exam   PHYSICAL EXAM:  General:  Patient is not in acute distress   Head: Normocephalic, Atraumatic  HEENT:  Both pupils normal-size atraumatic, normocephalic, nonicteric  Neck:  JVP not raised  Trachea central  No carotid bruit  Respiratory:  normal breath sounds no crackles  no rhonchi  Cardiovascular:  Regular rate and rhythm no S3 no murmurs  GI:  Abdomen soft nontender  No organomegaly  Lymphatic:  No cervical or inguinal lymphadenopathy  Neurologic:  Patient is awake alert, oriented   Grossly nonfocal  Extremities no edema    EKG last summer showed sinus rhythm with poor R-wave progression    Discussion/Summary:  Patient has symptoms of shortness of breath with exertion which could represent angina equivalent especially given history of diabetes mellitus  Patient has other risk factors for coronary artery disease including age, hypertension, hyperlipidemia  Patient will be scheduled for a stress echocardiogram to evaluate for exercise capacity as well as ischemia  Sensitivity and specificity of stress test discussed at length with patient  Symptoms to watch out from cardiac standpoint which would indicate the need for further cardiac evaluation including consideration for cardiac catheterization also discussed  Patient will continue to try to lose weight to reduce cardiovascular morbidity and mortality  Follow-up with primary care physician as well as pulmonary  Follow-up in 3 months or earlier as needed  Patient is agreeable with the plan of care  Medications reviewed

## 2022-03-21 LAB
LEFT EYE DIABETIC RETINOPATHY: NORMAL
RIGHT EYE DIABETIC RETINOPATHY: NORMAL

## 2022-04-08 ENCOUNTER — RA CDI HCC (OUTPATIENT)
Dept: OTHER | Facility: HOSPITAL | Age: 72
End: 2022-04-08

## 2022-04-08 PROBLEM — E66.01 MORBID (SEVERE) OBESITY DUE TO EXCESS CALORIES (HCC): Status: ACTIVE | Noted: 2022-04-08

## 2022-04-08 NOTE — PROGRESS NOTES
[facility]  coding opportunities          Chart Reviewed number of suggestions sent to Provider: 2     E11 36 Type 2 diabetes mellitus with diabetic cataract ([facility] [address] )   *Needs recertification     E61 41 : Morbid (severe) obesity due to excess calories ([facility] )    *Per CMS/ICD 10 coding guidelines, to be used when BMI > 35 & <40 with one or more comorbidity     If this is correct, please document and assess at your next visit,4/15/2022   Patients Insurance        Commercial Insurance: [employer]

## 2022-04-14 ENCOUNTER — APPOINTMENT (OUTPATIENT)
Dept: LAB | Facility: CLINIC | Age: 72
End: 2022-04-14
Payer: COMMERCIAL

## 2022-04-14 DIAGNOSIS — Z79.4 CONTROLLED TYPE 2 DIABETES MELLITUS WITHOUT COMPLICATION, WITH LONG-TERM CURRENT USE OF INSULIN (HCC): ICD-10-CM

## 2022-04-14 DIAGNOSIS — E11.9 CONTROLLED TYPE 2 DIABETES MELLITUS WITHOUT COMPLICATION, WITH LONG-TERM CURRENT USE OF INSULIN (HCC): ICD-10-CM

## 2022-04-14 LAB
ALBUMIN SERPL BCP-MCNC: 4.1 G/DL (ref 3.5–5)
ALP SERPL-CCNC: 64 U/L (ref 46–116)
ALT SERPL W P-5'-P-CCNC: 41 U/L (ref 12–78)
ANION GAP SERPL CALCULATED.3IONS-SCNC: 7 MMOL/L (ref 4–13)
AST SERPL W P-5'-P-CCNC: 28 U/L (ref 5–45)
BASOPHILS # BLD AUTO: 0.07 THOUSANDS/ΜL (ref 0–0.1)
BASOPHILS NFR BLD AUTO: 1 % (ref 0–1)
BILIRUB SERPL-MCNC: 0.38 MG/DL (ref 0.2–1)
BUN SERPL-MCNC: 32 MG/DL (ref 5–25)
CALCIUM SERPL-MCNC: 9.7 MG/DL (ref 8.3–10.1)
CHLORIDE SERPL-SCNC: 108 MMOL/L (ref 100–108)
CHOLEST SERPL-MCNC: 122 MG/DL
CO2 SERPL-SCNC: 24 MMOL/L (ref 21–32)
CREAT SERPL-MCNC: 1.07 MG/DL (ref 0.6–1.3)
EOSINOPHIL # BLD AUTO: 0.16 THOUSAND/ΜL (ref 0–0.61)
EOSINOPHIL NFR BLD AUTO: 2 % (ref 0–6)
ERYTHROCYTE [DISTWIDTH] IN BLOOD BY AUTOMATED COUNT: 13.5 % (ref 11.6–15.1)
EST. AVERAGE GLUCOSE BLD GHB EST-MCNC: 140 MG/DL
GFR SERPL CREATININE-BSD FRML MDRD: 51 ML/MIN/1.73SQ M
GLUCOSE P FAST SERPL-MCNC: 122 MG/DL (ref 65–99)
HBA1C MFR BLD: 6.5 %
HCT VFR BLD AUTO: 42.2 % (ref 34.8–46.1)
HDLC SERPL-MCNC: 41 MG/DL
HGB BLD-MCNC: 13.4 G/DL (ref 11.5–15.4)
IMM GRANULOCYTES # BLD AUTO: 0.03 THOUSAND/UL (ref 0–0.2)
IMM GRANULOCYTES NFR BLD AUTO: 0 % (ref 0–2)
LDLC SERPL CALC-MCNC: 43 MG/DL (ref 0–100)
LYMPHOCYTES # BLD AUTO: 3.07 THOUSANDS/ΜL (ref 0.6–4.47)
LYMPHOCYTES NFR BLD AUTO: 39 % (ref 14–44)
MCH RBC QN AUTO: 29.2 PG (ref 26.8–34.3)
MCHC RBC AUTO-ENTMCNC: 31.8 G/DL (ref 31.4–37.4)
MCV RBC AUTO: 92 FL (ref 82–98)
MONOCYTES # BLD AUTO: 0.52 THOUSAND/ΜL (ref 0.17–1.22)
MONOCYTES NFR BLD AUTO: 7 % (ref 4–12)
NEUTROPHILS # BLD AUTO: 3.96 THOUSANDS/ΜL (ref 1.85–7.62)
NEUTS SEG NFR BLD AUTO: 51 % (ref 43–75)
NONHDLC SERPL-MCNC: 81 MG/DL
NRBC BLD AUTO-RTO: 0 /100 WBCS
PLATELET # BLD AUTO: 318 THOUSANDS/UL (ref 149–390)
PMV BLD AUTO: 9.3 FL (ref 8.9–12.7)
POTASSIUM SERPL-SCNC: 4.3 MMOL/L (ref 3.5–5.3)
PROT SERPL-MCNC: 7.8 G/DL (ref 6.4–8.2)
RBC # BLD AUTO: 4.59 MILLION/UL (ref 3.81–5.12)
SODIUM SERPL-SCNC: 139 MMOL/L (ref 136–145)
TRIGL SERPL-MCNC: 188 MG/DL
TSH SERPL DL<=0.05 MIU/L-ACNC: 1.63 UIU/ML (ref 0.45–4.5)
WBC # BLD AUTO: 7.81 THOUSAND/UL (ref 4.31–10.16)

## 2022-04-14 PROCEDURE — 3044F HG A1C LEVEL LT 7.0%: CPT | Performed by: INTERNAL MEDICINE

## 2022-04-14 PROCEDURE — 85025 COMPLETE CBC W/AUTO DIFF WBC: CPT

## 2022-04-14 PROCEDURE — 84443 ASSAY THYROID STIM HORMONE: CPT

## 2022-04-14 PROCEDURE — 83036 HEMOGLOBIN GLYCOSYLATED A1C: CPT

## 2022-04-14 PROCEDURE — 36415 COLL VENOUS BLD VENIPUNCTURE: CPT

## 2022-04-14 PROCEDURE — 80061 LIPID PANEL: CPT

## 2022-04-14 PROCEDURE — 80053 COMPREHEN METABOLIC PANEL: CPT

## 2022-04-15 ENCOUNTER — OFFICE VISIT (OUTPATIENT)
Dept: INTERNAL MEDICINE CLINIC | Facility: CLINIC | Age: 72
End: 2022-04-15
Payer: COMMERCIAL

## 2022-04-15 ENCOUNTER — OFFICE VISIT (OUTPATIENT)
Dept: PULMONOLOGY | Facility: CLINIC | Age: 72
End: 2022-04-15
Payer: COMMERCIAL

## 2022-04-15 VITALS
HEIGHT: 62 IN | BODY MASS INDEX: 37.91 KG/M2 | OXYGEN SATURATION: 97 % | WEIGHT: 206 LBS | HEART RATE: 85 BPM | TEMPERATURE: 97.6 F | SYSTOLIC BLOOD PRESSURE: 120 MMHG | DIASTOLIC BLOOD PRESSURE: 84 MMHG

## 2022-04-15 VITALS
HEIGHT: 62 IN | OXYGEN SATURATION: 97 % | DIASTOLIC BLOOD PRESSURE: 78 MMHG | BODY MASS INDEX: 37.97 KG/M2 | WEIGHT: 206.3 LBS | TEMPERATURE: 98.2 F | HEART RATE: 85 BPM | RESPIRATION RATE: 18 BRPM | SYSTOLIC BLOOD PRESSURE: 138 MMHG

## 2022-04-15 DIAGNOSIS — Z79.4 CONTROLLED TYPE 2 DIABETES MELLITUS WITHOUT COMPLICATION, WITH LONG-TERM CURRENT USE OF INSULIN (HCC): Primary | ICD-10-CM

## 2022-04-15 DIAGNOSIS — E78.5 DYSLIPIDEMIA: ICD-10-CM

## 2022-04-15 DIAGNOSIS — R06.02 SOB (SHORTNESS OF BREATH): Primary | ICD-10-CM

## 2022-04-15 DIAGNOSIS — E66.9 OBESITY (BMI 30-39.9): ICD-10-CM

## 2022-04-15 DIAGNOSIS — C54.1 ENDOMETRIAL CANCER (HCC): ICD-10-CM

## 2022-04-15 DIAGNOSIS — I10 BENIGN ESSENTIAL HYPERTENSION: ICD-10-CM

## 2022-04-15 DIAGNOSIS — I25.10 CORONARY ATHEROSCLEROSIS DUE TO CALCIFIED CORONARY LESION: ICD-10-CM

## 2022-04-15 DIAGNOSIS — E11.9 CONTROLLED TYPE 2 DIABETES MELLITUS WITHOUT COMPLICATION, WITH LONG-TERM CURRENT USE OF INSULIN (HCC): Primary | ICD-10-CM

## 2022-04-15 DIAGNOSIS — I25.84 CORONARY ATHEROSCLEROSIS DUE TO CALCIFIED CORONARY LESION: ICD-10-CM

## 2022-04-15 DIAGNOSIS — J45.20 MILD INTERMITTENT ASTHMA WITHOUT COMPLICATION: ICD-10-CM

## 2022-04-15 DIAGNOSIS — G47.33 OSA (OBSTRUCTIVE SLEEP APNEA): ICD-10-CM

## 2022-04-15 PROBLEM — E66.01 MORBID (SEVERE) OBESITY DUE TO EXCESS CALORIES (HCC): Status: RESOLVED | Noted: 2022-04-08 | Resolved: 2022-04-15

## 2022-04-15 PROCEDURE — 1160F RVW MEDS BY RX/DR IN RCRD: CPT | Performed by: INTERNAL MEDICINE

## 2022-04-15 PROCEDURE — 3075F SYST BP GE 130 - 139MM HG: CPT | Performed by: INTERNAL MEDICINE

## 2022-04-15 PROCEDURE — 3078F DIAST BP <80 MM HG: CPT | Performed by: INTERNAL MEDICINE

## 2022-04-15 PROCEDURE — 3008F BODY MASS INDEX DOCD: CPT | Performed by: INTERNAL MEDICINE

## 2022-04-15 PROCEDURE — 1036F TOBACCO NON-USER: CPT | Performed by: INTERNAL MEDICINE

## 2022-04-15 PROCEDURE — 99214 OFFICE O/P EST MOD 30 MIN: CPT | Performed by: INTERNAL MEDICINE

## 2022-04-15 NOTE — PROGRESS NOTES
INTERNAL MEDICINE OFFICE VISIT  [facility]  [facility], [facility], [address] [place] [address]  Tel: [phone]      NAME: Cesilia George  AGE: 66 y o  SEX: female  : 1950   MRN: [id number]    DATE: 4/15/2022  TIME: 2:39 PM      Assessment and Plan:  1  Controlled type 2 diabetes mellitus without complication, with long-term current use of insulin ([facility] )   continue the present medications for now  Was advised to eat a low-carbohydrate diet and repeat labs in about 3 months  - CBC and differential; Future  - Comprehensive metabolic panel; Future  - Lipid panel; Future  - TSH, 3rd generation; Future  - Hemoglobin A1C; Future    2  Benign essential hypertension   continue losartan    3  Dyslipidemia   continue Crestor and Zetia    4  Coronary atherosclerosis due to calcified coronary lesion   follow-up with cardiology    5  Endometrial cancer ([facility] )   stable    6  Obesity (BMI 30-39  9)  BMI Counseling: Body mass index is 37 73 kg/m²  The BMI is above normal  Nutrition recommendations include decreasing portion sizes, encouraging healthy choices of fruits and vegetables and moderation in carbohydrate intake  Exercise recommendations include moderate physical activity 150 minutes/week  Rationale for BMI follow-up plan is due to patient being overweight or obese  - Counseling Documentation: patient was counseled regarding: diagnostic results, instructions for management, risk factor reductions, prognosis, patient and family education, risks and benefits of treatment options and importance of compliance with treatment  - Medication Side Effects: Adverse side effects of medications were reviewed with the patient/guardian today  Return for follow up visit in  3 months or earlier, if needed        Chief Complaint:  Chief Complaint   Patient presents with    Follow-up     w labs          History of Present Illness:    patient has been trying to lose weight and she is presently working with  Weight management and is on a low-carbohydrate, high-protein diet  She was also started on the Jardiance and they wanted her to stop taking the glipizide  She is still taking the glipizide 2 5 mg daily and the Jardiance 25 mg daily  Her hemoglobin A1c is actually higher than before at 6 5  Also her creatinine is higher , which may be due to a high protein diet  Active Problem List:  Patient Active Problem List   Diagnosis    Diabetes mellitus type 2, controlled, without complications ([address] )    Dysmetabolic syndrome X    Benign essential hypertension    Lichen sclerosus    History of endometrial cancer    SOB (shortness of breath)    Asymptomatic postmenopausal status    At risk for sleep apnea    Dyslipidemia    Asthma    GERD (gastroesophageal reflux disease)    Acquired hypothyroidism    Status post total right knee replacement    Spondylosis of lumbosacral region    Irritable bowel syndrome without diarrhea    Coronary atherosclerosis due to calcified coronary lesion    Obesity (BMI 30-39  9)    Chronic pain of both knees    Obstructive sleep apnea    Endometrial cancer (HCC)    Type 2 diabetes mellitus with diabetic cataract ([address] )    Cortical age-related cataract of both eyes    Combined forms of age-related cataract of both eyes         Past Medical History:  Past Medical History:   Diagnosis Date    Abnormal mammogram     Abnormal weight gain     Achilles tendinitis     Allergic 1952    since childhood    Arthritis 1988    Asthma     Cancer ([address] ) 2018    Endometrial adenoma    Diabetes mellitus ([address] )     Disc degeneration, lumbar     Disease of thyroid gland     hypo    Dyslipidemia     Dyslipidemia, goal LDL below 70 3/27/2018    Early satiety     Edema     idiopathic peripheral    Encounter for follow-up examination after completed treatment for malignant neoplasm 3/17/2019    Encounter for gynecological examination without abnormal finding 2020    Endometrial cancer ([facility] ) 2018    Enthesopathy     hip region    Esophagitis, reflux     GERD (gastroesophageal reflux disease)     Hard to intubate     difficulty with intubation and had to be intubated twice    Heart murmur 2018    Hypercholesterolemia     Hypertension     IBS (irritable bowel syndrome)     Irritable bowel syndrome     Morbid (severe) obesity due to excess calories ([facility] ) 2022    As per CMS ICD10 guidelines:Provider accepted    Obesity 1998    Obesity, Class I, BMI 30-34 9 2015    Osteoarthritis     Pneumonia     Rosacea     Sacroiliitis (HCC)     Shingles     Sleep apnea, obstructive          Past Surgical History:  Past Surgical History:   Procedure Laterality Date     SECTION      x2    COLONOSCOPY      ELBOW SURGERY      left ulna/radius    ESOPHAGOGASTRODUODENOSCOPY      FOOT SURGERY Right     FRACTURE SURGERY      FRACTURE SURGERY Left     tibia    HYSTERECTOMY Bilateral 2018    JOINT REPLACEMENT Left     shoulder,  right knee    KNEE ARTHROSCOPY      KNEE ARTHROSCOPY W/ MENISCAL REPAIR Right     DC LAPAROSCOPY W TOT HYSTERECTUTERUS <=250 GRAM  W TUBE/OVARY N/A 2018    Procedure: ROBOTIC TOTAL LAPAROSCOPIC HYSTERECTOMY, BILATERAL SALPINGOOPHORECTOMY, BILATERAL PELVIC LYMPHNODE DISSECTION;  Surgeon: Bahman Sheikh MD;  Location: [facility];  Service: Gynecology Oncology    REPLACEMENT TOTAL KNEE Right     SHOULDER ARTHROPLASTY      SINUS SURGERY      TONSILLECTOMY      TUBAL LIGATION      WRIST SURGERY      ganglonic cyst         Family History:  Family History   Problem Relation Age of Onset    Arthritis Mother     Heart disease Mother         cardiac disorder    Diabetes Mother     Hiatal hernia Mother     Hypertension Mother     Irritable bowel syndrome Mother    Karma Seller Breast cancer Mother 77        x2    Uterine cancer Mother     Osteoporosis Mother     Thyroid disease Mother    Karma Seller Other Mother gastric reflux    Stroke Mother     Hyperlipidemia Mother     Cancer Mother         ENDOMETRIAL    Heart disease Father         cardiac disorder    Hiatal hernia Father     Ulcers Father     Other Father         gastric reflux    Coronary artery disease Father     Hearing loss Father     Hiatal hernia Sister     Thyroid disease Sister     Other Sister         gastric reflux    Lung cancer Sister 68    Cancer Sister         lung adenocarcinoma, mets to meninges    No Known Problems Brother     Brain cancer Maternal Grandmother     Breast cancer Maternal Grandmother         [place]    No Known Problems Maternal Grandfather     No Known Problems Paternal Grandmother     No Known Problems Paternal Grandfather     Hiatal hernia Child     Other Child         gastric reflux    Irritable bowel syndrome Daughter     Breast cancer Maternal Aunt 72    Uterine cancer Maternal Aunt         x3 sis    Stroke Maternal Aunt     Cancer Maternal Aunt     Malig Hypertension Son     Breast cancer Maternal Aunt 54    Cancer Maternal Aunt     Breast cancer Maternal Aunt 54    Cancer Maternal Aunt     No Known Problems Paternal Aunt     Hyperlipidemia Daughter     Colon cancer Neg Hx     Ovarian cancer Neg Hx     Cervical cancer Neg Hx          Social History:  Social History     Socioeconomic History    Marital status:      Spouse name: None    Number of children: 3    Years of education: None    Highest education level: None   Occupational History    Occupation: nurse     Comment: full-time   Tobacco Use    Smoking status: Never Smoker    Smokeless tobacco: Never Used   Vaping Use    Vaping Use: Never used   Substance and Sexual Activity    Alcohol use:  Yes     Alcohol/week: 4 0 standard drinks     Types: 2 Glasses of wine, 2 Standard drinks or equivalent per week    Drug use: No    Sexual activity: Not Currently     Partners: Female     Birth control/protection: Post-menopausal, Surgical, Female Sterilization   Other Topics Concern    None   Social History Narrative    Active advance directive    DME- freestyle lite blood glucose meter device kit QID     Social Determinants of Health     Financial Resource Strain: Not on file   Food Insecurity: Not on file   Transportation Needs: Not on file   Physical Activity: Inactive    Days of Exercise per Week: 0 days    Minutes of Exercise per Session: 0 min   Stress: No Stress Concern Present    Feeling of Stress : Only a little   Social Connections: Not on file   Intimate Partner Violence: Not on file   Housing Stability: Not on file         Allergies:   Allergies   Allergen Reactions    Amoxicillin-Pot Clavulanate Anaphylaxis, Hives, Itching and Facial Swelling    Erythromycin Hives, Itching, Swelling, Vomiting, Throat Swelling, Nasal Congestion and Facial Swelling    Meperidine Anaphylaxis    Morphine Hives, Itching, Swelling, Other (See Comments) and Syncope     hypotension    Penicillins Anaphylaxis, Itching and Swelling    Relafen [Nabumetone] Hives, Itching and Swelling    Darvon [Propoxyphene] Hives    Methocarbamol Other (See Comments)     Double vision    Reglan [Metoclopramide] Anxiety    Sulfa Antibiotics Hives, Itching, Rash and Swelling    Tetracyclines & Related Rash         Medications:    Current Outpatient Medications:     albuterol (PROVENTIL HFA,VENTOLIN HFA) 90 mcg/act inhaler, Inhale 2 puffs every 6 (six) hours as needed for wheezing, Disp: 18 g, Rfl: 2    Calcium-Magnesium-Vitamin D (CALCIUM 1200+D3 PO), , Disp: , Rfl:     cetirizine (ZyrTEC) 10 MG chewable tablet, Chew 10 mg daily, Disp: , Rfl:     ezetimibe (ZETIA) 10 mg tablet, TAKE 1 TABLET DAILY, Disp: 90 tablet, Rfl: 3    furosemide (LASIX) 20 mg tablet, Take 1 tablet (20 mg total) by mouth daily, Disp: 90 tablet, Rfl: 3    glipiZIDE (GLUCOTROL XL) 2 5 mg 24 hr tablet, TAKE 1 TABLET DAILY, Disp: 90 tablet, Rfl: 3    glucose blood test strip, 1 each by Other route 3 (three) times a day Use as instructed, Disp: 300 each, Rfl: 3    Insulin Lispro (HumaLOG) 100 units/mL cartridge for injection, Inject 5 Units under the skin 2 (two) times a day with meals (Patient taking differently: Inject 5 Units under the skin 1 unit of insulin for 30 mg over 130 ), Disp: 15 mL, Rfl: 3    Insulin Syringe-Needle U-100 (INSULIN SYRINGE 1CC/31GX5/16") 31G X 5/16" 1 ML MISC, BD Veo Insulin Syringe Ultra-Fine 0 3 mL 31 gauge x 15/64", Disp: , Rfl:     Jardiance 25 MG TABS, Take 25 mg by mouth every morning 25 mg every morning , Disp: , Rfl:     KRILL OIL PO, Take by mouth, Disp: , Rfl:     levothyroxine 75 mcg tablet, Take 1 tablet (75 mcg total) by mouth daily, Disp: 90 tablet, Rfl: 3    Linzess 145 MCG CAPS, Take 1 capsule (145 mcg total) by mouth daily before breakfast Take 30 minutes prior to breakfast, Disp: 90 capsule, Rfl: 3    LOW-DOSE ASPIRIN PO, , Disp: , Rfl:     Magnesium Oxide 400 MG CAPS, Take by mouth, Disp: , Rfl:     metFORMIN (GLUCOPHAGE-XR) 500 mg 24 hr tablet, TAKE 1 TABLET IN THE MORNING AND 2 TABLETS IN THE EVENING, Disp: 270 tablet, Rfl: 3    olmesartan (BENICAR) 20 mg tablet, Take 1 tablet (20 mg total) by mouth daily, Disp: 90 tablet, Rfl: 3    Omega-3 Fatty Acids (FISH OIL) 1200 MG CAPS, Take by mouth, Disp: , Rfl:     pantoprazole (PROTONIX) 40 mg tablet, Take 1 tablet (40 mg total) by mouth daily, Disp: 90 tablet, Rfl: 3    potassium chloride (MICRO-K) 10 MEQ CR capsule, Take 1 capsule (10 mEq total) by mouth daily, Disp: 90 capsule, Rfl: 3    rosuvastatin (CRESTOR) 20 MG tablet, Take 1 tablet (20 mg total) by mouth daily, Disp: 90 tablet, Rfl: 4    saccharomyces boulardii (FLORASTOR) 250 mg capsule, Take 250 mg by mouth daily  , Disp: , Rfl:       The following portions of the patient's history were reviewed and updated as appropriate: past medical history, past surgical history, family history, social history, allergies, current medications and active problem list       Review of Systems:  Constitutional: Denies fever, chills, weight gain, weight loss, fatigue  Eyes: Denies eye redness, eye discharge, double vision, change in visual acuity  ENT: Denies hearing loss, tinnitus, sneezing, nasal congestion, nasal discharge, sore throat   Respiratory: Denies cough, expectoration, hemoptysis, shortness of breath, wheezing  Cardiovascular: Denies chest pain, palpitations, lower extremity swelling, orthopnea, PND  Gastrointestinal: Denies abdominal pain, heartburn, nausea, vomiting, hematemesis, diarrhea, bloody stools  Genito-Urinary: Denies dysuria, frequency, difficulty in micturition, nocturia, incontinence  Musculoskeletal: Denies back pain, joint pain, muscle pain  Neurologic: Denies confusion, lightheadedness, syncope, headache, focal weakness, sensory changes, seizures  Endocrine: Denies polyuria, polydipsia, temperature intolerance  Allergy and Immunology: Denies hives, insect bite sensitivity  Hematological and Lymphatic: Denies bleeding problems, swollen glands   Psychological: Denies depression, suicidal ideation, anxiety, panic, mood swings  Dermatological: Denies pruritus, rash, skin lesion changes      Vitals:  Vitals:    04/15/22 1439   BP: 138/78   Pulse:    Resp:    Temp:    SpO2:        Body mass index is 37 73 kg/m²  Weight (last 2 days)     Date/Time Weight    04/15/22 1403 93 6 (206 3)            Physical Examination:  General: Patient is not in acute distress  Awake, alert, responding to commands  No weight gain or loss  Head: Normocephalic  Atraumatic  Eyes: Conjunctiva and lids with no swelling, erythema or discharge  Both pupils normal sized, round and reactive to light  Sclera nonicteric  ENT: External examination of nose and ear normal  Otoscopic examination shows translucent tympanic membranes with patent canals without erythema  Oropharynx moist with no erythema, edema, exudate or lesions  Neck: Supple   JVP not raised  Trachea midline  No masses  No thyromegaly  Lungs: No signs of increased work of breathing or respiratory distress  Bilateral bronchovascular breath sounds with no crackles or rhonchi  Chest wall: No tenderness  Cardiovascular: Normal PMI  No thrills  Regular rate and rhythm  S1 and S2 normal  No murmur, rub or gallop  Gastrointestinal: Abdomen soft, nontender  No guarding or rigidity  Liver and spleen not palpable  Bowel sounds present  Neurologic: Cranial nerves II-XII intact  Cortical functions normal  Motor system - Reflexes 2+ and symmetrical  Sensations normal  Musculoskeletal: Gait normal  No joint tenderness  Integumentary: Skin normal with no rash or lesions  Lymphatic: No palpable lymph nodes in neck, axilla or groin  Extremities: No clubbing, cyanosis, edema or varicosities  Psychological: Judgement and insight normal  Mood and affect normal    Patient's shoes and socks removed  Right Foot/Ankle   Right Foot Inspection  Skin Exam: skin normal and skin intact  No dry skin, no warmth, no callus, no erythema, no maceration, no abnormal color, no pre-ulcer, no ulcer and no callus  Toe Exam: ROM and strength within normal limits  Sensory   Proprioception: diminished and intact  Monofilament testing: intact    Vascular  Capillary refills: < 3 seconds  The right DP pulse is 2+  The right PT pulse is 2+  Left Foot/Ankle  Left Foot Inspection  Skin Exam: skin normal and skin intact  No dry skin, no warmth, no erythema, no maceration, normal color, no pre-ulcer, no ulcer and no callus  Toe Exam: ROM and strength within normal limits  Sensory   Proprioception: intact  Monofilament testing: intact    Vascular  Capillary refills: < 3 seconds  The left DP pulse is 2+  The left PT pulse is 2+       Assign Risk Category  No deformity present  No loss of protective sensation  No weak pulses  Risk: 0    Laboratory Results:  CBC with diff:   Lab Results   Component Value Date    WBC 7 81 04/14/2022    WBC 9 23 11/04/2015    RBC 4 59 04/14/2022    RBC 4 49 11/04/2015    HGB 13 4 04/14/2022    HGB 12 3 11/04/2015    HCT 42 2 04/14/2022    HCT 37 4 11/04/2015    MCV 92 04/14/2022    MCV 83 11/04/2015    MCH 29 2 04/14/2022    MCH 27 4 11/04/2015    RDW 13 5 04/14/2022    RDW 15 7 (H) 11/04/2015     04/14/2022     (H) 11/04/2015       CMP:  Lab Results   Component Value Date    CREATININE 1 07 04/14/2022    CREATININE 0 91 11/04/2015    BUN 32 (H) 04/14/2022    BUN 26 (H) 11/04/2015     11/04/2015    K 4 3 04/14/2022    K 4 2 11/04/2015     04/14/2022     (H) 11/04/2015    CO2 24 04/14/2022    CO2 26 11/04/2015    GLUCOSE 107 11/04/2015    PROT 7 0 11/04/2015    ALKPHOS 64 04/14/2022    ALKPHOS 71 11/04/2015    ALT 41 04/14/2022    ALT 30 11/04/2015    AST 28 04/14/2022    AST 20 11/04/2015    BILIDIR 0 10 06/30/2015       Lab Results   Component Value Date    HGBA1C 6 5 (H) 04/14/2022    HGBA1C 6 6 (H) 11/04/2015    MG 1 9 04/04/2019       No results found for: TROPONINI, CKMB, CKTOTAL    Lipid Profile:   Lab Results   Component Value Date    CHOL 129 11/04/2015    CHOL 102 06/25/2015     Lab Results   Component Value Date    HDL 41 (L) 04/14/2022    HDL 47 08/16/2021     Lab Results   Component Value Date    LDLCALC 43 04/14/2022    LDLCALC 49 08/16/2021     Lab Results   Component Value Date    TRIG 188 (H) 04/14/2022    TRIG 159 (H) 08/16/2021       Imaging Results:  CT chest wo contrast  Narrative: CT CHEST WITHOUT IV CONTRAST    INDICATION:   R91 1: Solitary pulmonary nodule  COMPARISON:  1/20/2015  Prior outside CT chest dated 6/9/2021 with report only  TECHNIQUE: CT examination of the chest was performed without intravenous contrast   Axial, sagittal, and coronal 2D reformatted images were created from the source data and submitted for interpretation  Radiation dose length product (DLP) for this visit:  172 mGy-cm     This examination, like all CT scans performed in the [facility], was performed utilizing techniques to minimize radiation dose exposure, including the use of iterative   reconstruction and automated exposure control  FINDINGS:    LUNGS:  Left greater than right apical pleural parenchymal scarring noted, stable  Lungs are otherwise clear  No pulmonary nodule identified  PLEURA:  Unremarkable  HEART/GREAT VESSELS: Heart is unremarkable for patient's age  No thoracic aortic aneurysm  MEDIASTINUM AND SHARRI:  Unremarkable  CHEST WALL AND LOWER NECK:   Unremarkable  VISUALIZED STRUCTURES IN THE UPPER ABDOMEN:  Enlarged fatty liver noted       OSSEOUS STRUCTURES:  Left shoulder prosthesis noted  Impression: Stable left greater than right biapical pleural parenchymal scarring without nodule identified as described on prior outside report  Images from that examination are provided, they'll be reviewed and an addendum placed on this report      Workstation performed: [id number]       Health Maintenance:  Health Maintenance   Topic Date Due    PT PLAN OF CARE  08/05/2021    BMI: Followup Plan  03/31/2022    Breast Cancer Screening: Mammogram  05/27/2022    DM Eye Exam  06/10/2022    Fall Risk  07/06/2022    Depression Screening  08/17/2022    Diabetic Foot Exam  08/17/2022    Annual Physical  10/11/2022    HEMOGLOBIN A1C  10/14/2022    BMI: Adult  04/15/2023    DXA SCAN  02/10/2024    Colorectal Cancer Screening  12/31/2024    DTaP,Tdap,and Td Vaccines (4 - Td or Tdap) 09/30/2030    Hepatitis C Screening  Completed    Osteoporosis Screening  Completed    Pneumococcal Vaccine: 65+ Years  Completed    Influenza Vaccine  Completed    COVID-19 Vaccine  Completed    HIB Vaccine  Aged Out    Hepatitis B Vaccine  Aged Out    IPV Vaccine  Aged Out    Hepatitis A Vaccine  Aged Out    Meningococcal ACWY Vaccine  Aged Out    HPV Vaccine  Aged [employer] History   Administered Date(s) Administered   Jeral Hose COVID-19 MODERNA VACC 0 5 ML IM 01/05/2021, 02/04/2021, 10/25/2021    H1N1, All Formulations 11/05/2009    INFLUENZA 09/26/2021    Influenza Split High Dose Preservative Free IM 10/06/2014, 09/30/2015, 10/20/2017    Influenza, high dose seasonal 0 7 mL 10/09/2018, 09/30/2020    Pneumococcal Conjugate 13-Valent 09/30/2015    Pneumococcal Polysaccharide PPV23 11/17/2008, 10/06/2014, 03/23/2017    Tdap 10/09/2018, 10/12/2018, 09/30/2020    Tuberculin Skin Test-PPD Intradermal 07/03/2019    Zoster 10/25/2017    influenza, trivalent, adjuvanted 09/28/2019         Jordan Oates MD  4/15/2022,2:39 PM
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
## 2022-04-19 ENCOUNTER — HOSPITAL ENCOUNTER (OUTPATIENT)
Dept: NON INVASIVE DIAGNOSTICS | Facility: CLINIC | Age: 72
Discharge: HOME | End: 2022-04-19
Payer: COMMERCIAL

## 2022-04-19 DIAGNOSIS — R06.02 SOB (SHORTNESS OF BREATH): ICD-10-CM

## 2022-04-19 LAB
MAX HR PERCENT: 110 %
RATE PRESSURE PRODUCT: NORMAL
SL CV STRESS RECOVERY BP: NORMAL MMHG
SL CV STRESS RECOVERY HR: 88 BPM
SL CV STRESS STAGE REACHED: 2
STRESS ANGINA INDEX: 0
STRESS BASELINE BP: NORMAL MMHG
STRESS BASELINE HR: 85 BPM
STRESS O2 SAT REST: 95 %
STRESS PEAK HR: 162 BPM
STRESS PERCENT HR: 110 %
STRESS POST ESTIMATED WORKLOAD: 7 METS
STRESS POST EXERCISE DUR MIN: 5 MIN
STRESS POST EXERCISE DUR SEC: 0 SEC
STRESS POST O2 SAT PEAK: 94 %
STRESS POST PEAK BP: 186 MMHG
STRESS TARGET HR: 162 BPM

## 2022-04-19 PROCEDURE — 93350 STRESS TTE ONLY: CPT

## 2022-04-19 PROCEDURE — 93351 STRESS TTE COMPLETE: CPT | Performed by: INTERNAL MEDICINE

## 2022-04-20 LAB
CHEST PAIN STATEMENT: NORMAL
MAX DIASTOLIC BP: 70 MMHG
MAX HEART RATE: 162 BPM
MAX PREDICTED HEART RATE: 147 BPM
MAX. SYSTOLIC BP: 192 MMHG
PROTOCOL NAME: NORMAL
REASON FOR TERMINATION: NORMAL
TARGET HR FORMULA: NORMAL
TEST INDICATION: NORMAL
TIME IN EXERCISE PHASE: NORMAL

## 2022-04-26 ENCOUNTER — TELEPHONE (OUTPATIENT)
Dept: CARDIOLOGY CLINIC | Facility: CLINIC | Age: 72
End: 2022-04-26

## 2022-04-26 NOTE — TELEPHONE ENCOUNTER
Spoke with patient and informed her Stress echocardiogram overall good with normal ejection fraction  Patient to continue present medications and report any symptoms out of the ordinary  Patient understood results and instructions

## 2022-04-26 NOTE — TELEPHONE ENCOUNTER
----- Message from Raimundo Street MD sent at 4/26/2022  3:26 PM EDT -----  Please call the patient  Stress echocardiogram overall good with normal ejection fraction  Patient to continue present medications and report any symptoms out of the ordinary

## 2022-05-13 DIAGNOSIS — M25.50 ARTHRALGIA, UNSPECIFIED JOINT: ICD-10-CM

## 2022-05-13 DIAGNOSIS — E03.9 ACQUIRED HYPOTHYROIDISM: ICD-10-CM

## 2022-05-13 DIAGNOSIS — I10 ESSENTIAL HYPERTENSION: ICD-10-CM

## 2022-05-13 DIAGNOSIS — E78.2 MIXED HYPERLIPIDEMIA: ICD-10-CM

## 2022-05-13 RX ORDER — LEVOTHYROXINE SODIUM 0.07 MG/1
TABLET ORAL
Qty: 90 TABLET | Refills: 3 | Status: SHIPPED | OUTPATIENT
Start: 2022-05-13

## 2022-05-13 RX ORDER — FUROSEMIDE 20 MG/1
TABLET ORAL
Qty: 90 TABLET | Refills: 3 | Status: SHIPPED | OUTPATIENT
Start: 2022-05-13

## 2022-05-13 RX ORDER — PANTOPRAZOLE SODIUM 40 MG/1
TABLET, DELAYED RELEASE ORAL
Qty: 90 TABLET | Refills: 3 | Status: SHIPPED | OUTPATIENT
Start: 2022-05-13

## 2022-05-13 RX ORDER — POTASSIUM CHLORIDE 750 MG/1
CAPSULE, EXTENDED RELEASE ORAL
Qty: 90 CAPSULE | Refills: 3 | Status: SHIPPED | OUTPATIENT
Start: 2022-05-13

## 2022-05-13 RX ORDER — ROSUVASTATIN CALCIUM 20 MG/1
TABLET, COATED ORAL
Qty: 90 TABLET | Refills: 3 | Status: SHIPPED | OUTPATIENT
Start: 2022-05-13

## 2022-05-19 DIAGNOSIS — I10 BENIGN ESSENTIAL HYPERTENSION: ICD-10-CM

## 2022-05-19 PROCEDURE — 4010F ACE/ARB THERAPY RXD/TAKEN: CPT | Performed by: INTERNAL MEDICINE

## 2022-05-19 RX ORDER — OLMESARTAN MEDOXOMIL 20 MG/1
TABLET ORAL
Qty: 90 TABLET | Refills: 3 | Status: SHIPPED | OUTPATIENT
Start: 2022-05-19 | End: 2022-06-21 | Stop reason: SDUPTHER

## 2022-06-16 DIAGNOSIS — K59.04 CHRONIC IDIOPATHIC CONSTIPATION: ICD-10-CM

## 2022-06-16 DIAGNOSIS — K59.09 OTHER CONSTIPATION: ICD-10-CM

## 2022-06-16 RX ORDER — LINACLOTIDE 145 UG/1
CAPSULE, GELATIN COATED ORAL
Qty: 90 CAPSULE | Refills: 3 | Status: SHIPPED | OUTPATIENT
Start: 2022-06-16

## 2022-06-21 ENCOUNTER — OFFICE VISIT (OUTPATIENT)
Dept: CARDIOLOGY CLINIC | Facility: CLINIC | Age: 72
End: 2022-06-21
Payer: COMMERCIAL

## 2022-06-21 VITALS
SYSTOLIC BLOOD PRESSURE: 152 MMHG | DIASTOLIC BLOOD PRESSURE: 88 MMHG | HEART RATE: 80 BPM | HEIGHT: 62 IN | BODY MASS INDEX: 38.53 KG/M2 | WEIGHT: 209.4 LBS | OXYGEN SATURATION: 96 %

## 2022-06-21 DIAGNOSIS — R06.02 SOB (SHORTNESS OF BREATH): ICD-10-CM

## 2022-06-21 DIAGNOSIS — E78.5 DYSLIPIDEMIA: Primary | ICD-10-CM

## 2022-06-21 DIAGNOSIS — I10 BENIGN ESSENTIAL HYPERTENSION: ICD-10-CM

## 2022-06-21 PROCEDURE — 99213 OFFICE O/P EST LOW 20 MIN: CPT | Performed by: INTERNAL MEDICINE

## 2022-06-21 PROCEDURE — 3077F SYST BP >= 140 MM HG: CPT | Performed by: INTERNAL MEDICINE

## 2022-06-21 PROCEDURE — 3079F DIAST BP 80-89 MM HG: CPT | Performed by: INTERNAL MEDICINE

## 2022-06-21 PROCEDURE — 4010F ACE/ARB THERAPY RXD/TAKEN: CPT | Performed by: INTERNAL MEDICINE

## 2022-06-21 PROCEDURE — 1036F TOBACCO NON-USER: CPT | Performed by: INTERNAL MEDICINE

## 2022-06-21 PROCEDURE — 3008F BODY MASS INDEX DOCD: CPT | Performed by: INTERNAL MEDICINE

## 2022-06-21 PROCEDURE — 1160F RVW MEDS BY RX/DR IN RCRD: CPT | Performed by: INTERNAL MEDICINE

## 2022-06-21 RX ORDER — OLMESARTAN MEDOXOMIL 40 MG/1
40 TABLET ORAL DAILY
Qty: 90 TABLET | Refills: 3 | Status: SHIPPED | OUTPATIENT
Start: 2022-06-21

## 2022-06-21 NOTE — PROGRESS NOTES
[facility] [address] [id number]  Cardiology Follow Up    Braden Cobb  1950  [id number]      1  Dyslipidemia     2  Benign essential hypertension  olmesartan (BENICAR) 40 mg tablet   3  SOB (shortness of breath)         Chief Complaint   Patient presents with    Follow-up     3 month follow up       Interval History:  Patient presents for follow-up visit  Patient denies any history of chest pain shortness of breath  Patient denies any history of leg edema or orthopnea PND  No history of presyncope syncope  Patient states compliance with the present list of medications  Patient Active Problem List   Diagnosis    Diabetes mellitus type 2, controlled, without complications ([address] )    Dysmetabolic syndrome X    Benign essential hypertension    Lichen sclerosus    History of endometrial cancer    SOB (shortness of breath)    Asymptomatic postmenopausal status    At risk for sleep apnea    Dyslipidemia    Asthma    GERD (gastroesophageal reflux disease)    Acquired hypothyroidism    Status post total right knee replacement    Spondylosis of lumbosacral region    Irritable bowel syndrome without diarrhea    Coronary atherosclerosis due to calcified coronary lesion    Obesity (BMI 30-39  9)    Chronic pain of both knees    Obstructive sleep apnea    Endometrial cancer (HCC)    Type 2 diabetes mellitus with diabetic cataract ([address] )    Cortical age-related cataract of both eyes    Combined forms of age-related cataract of both eyes     Past Medical History:   Diagnosis Date    Abnormal mammogram     Abnormal weight gain     Achilles tendinitis     Allergic 1952    since childhood    Arthritis 1988    Asthma     Cancer ([address] ) 2018    Endometrial adenoma    Diabetes mellitus ([address] )     Disc degeneration, lumbar     Disease of thyroid gland     hypo    Dyslipidemia     Dyslipidemia, goal LDL below 70 3/27/2018    Early satiety     Edema     idiopathic peripheral    Encounter for follow-up examination after completed treatment for malignant neoplasm 3/17/2019    Encounter for gynecological examination without abnormal finding 6/22/2020    [facility]omet[facility] cancer [facility] ) 2018    Enthesopathy     hip region    Esophagitis, reflux     GERD (gastroesophageal reflux disease)     Hard to intubate     difficulty with intubation and had to be intubated twice    Heart murmur 2018    Hypercholesterolemia     Hypertension     IBS (irritable bowel syndrome)     Irritable bowel syndrome     Morbid (severe) obesity due to excess calories ([facility] ) 4/8/2022    As per CMS ICD10 guidelines:Provider accepted    Obesity 1998    Obesity, Class I, BMI 30-34 9 1/7/2015    Osteoarthritis     Pneumonia     Rosacea     Sacroiliitis (HCC)     Shingles     Sleep apnea, obstructive      Social History     Socioeconomic History    Marital status:      Spouse name: Not on file    Number of children: 3    Years of education: Not on file    Highest education level: Not on file   Occupational History    Occupation: nurse     Comment: full-time   Tobacco Use    Smoking status: Never Smoker    Smokeless tobacco: Never Used   Vaping Use    Vaping Use: Never used   Substance and Sexual Activity    Alcohol use:  Yes     Alcohol/week: 4 0 standard drinks     Types: 2 Glasses of wine, 2 Standard drinks or equivalent per week    Drug use: No    Sexual activity: Not Currently     Partners: Female     Birth control/protection: Post-menopausal, Surgical, Female Sterilization   Other Topics Concern    Not on file   Social History Narrative    Active advance directive    DME- freestyle lite blood glucose meter device kit QID     Social Determinants of Health     Financial Resource Strain: Not on file   Food Insecurity: Not on file   Transportation Needs: Not on file   Physical Activity: Inactive    Days of Exercise per Week: 0 days   [employer] Exercise per Session: 0 min   Stress: No Stress Concern Present    Feeling of Stress :  Only a little   Social Connections: Not on file   Intimate Partner Violence: Not on file   Housing Stability: Not on file      Family History   Problem Relation Age of Onset    Arthritis Mother     Heart disease Mother         cardiac disorder    Diabetes Mother     Hiatal hernia Mother     Hypertension Mother     Irritable bowel syndrome Mother     Breast cancer Mother 77        x2   Emaline Folds Uterine cancer Mother     Osteoporosis Mother     Thyroid disease Mother     Other Mother         gastric reflux    Stroke Mother     Hyperlipidemia Mother     Cancer Mother         ENDOMETRIAL    Heart disease Father         cardiac disorder    Hiatal hernia Father     Ulcers Father     Other Father         gastric reflux    Coronary artery disease Father     Hearing loss Father     Hiatal hernia Sister     Thyroid disease Sister     Other Sister         gastric reflux    Lung cancer Sister 68    Cancer Sister         lung adenocarcinoma, mets to meninges    No Known Problems Brother     Brain cancer Maternal Grandmother     Breast cancer Maternal Grandmother         [place]    No Known Problems Maternal Grandfather     No Known Problems Paternal Grandmother     No Known Problems Paternal Grandfather     Hiatal hernia Child     Other Child         gastric reflux    Irritable bowel syndrome Daughter     Breast cancer Maternal Aunt 72    Uterine cancer Maternal Aunt         x3 sis    Stroke Maternal Aunt     Cancer Maternal Aunt     Malig Hypertension Son     Breast cancer Maternal Aunt 54    Cancer Maternal Aunt     Breast cancer Maternal Aunt 54    Cancer Maternal Aunt     No Known Problems Paternal Aunt     Hyperlipidemia Daughter     Colon cancer Neg Hx     Ovarian cancer Neg Hx     Cervical cancer Neg Hx      Past Surgical History:   Procedure Laterality Date     SECTION      x2    COLONOSCOPY      ELBOW SURGERY      left ulna/radius    ESOPHAGOGASTRODUODENOSCOPY      FOOT SURGERY Right     FRACTURE SURGERY      FRACTURE SURGERY Left     tibia    HYSTERECTOMY Bilateral 07/28/2018    JOINT REPLACEMENT Left     shoulder,  right knee    KNEE ARTHROSCOPY      KNEE ARTHROSCOPY W/ MENISCAL REPAIR Right     [place] LAPAROSCOPY W TOT HYSTERECTUTERUS <=250 GRAM  W TUBE/OVARY N/A 7/30/2018    Procedure: ROBOTIC TOTAL LAPAROSCOPIC HYSTERECTOMY, BILATERAL SALPINGOOPHORECTOMY, BILATERAL PELVIC LYMPHNODE DISSECTION;  Surgeon: Eliazar Yu MD;  Location: [facility];  Service: Gynecology Oncology    REPLACEMENT TOTAL KNEE Right     SHOULDER ARTHROPLASTY      SINUS SURGERY      TONSILLECTOMY      TUBAL LIGATION      WRIST SURGERY      ganglonic cyst       Current Outpatient Medications:     albuterol (PROVENTIL HFA,VENTOLIN HFA) 90 mcg/act inhaler, Inhale 2 puffs every 6 (six) hours as needed for wheezing, Disp: 18 g, Rfl: 2    Calcium-Magnesium-Vitamin D (CALCIUM 1200+D3 PO), , Disp: , Rfl:     ezetimibe (ZETIA) 10 mg tablet, TAKE 1 TABLET DAILY, Disp: 90 tablet, Rfl: 3    furosemide (LASIX) 20 mg tablet, TAKE 1 TABLET DAILY, Disp: 90 tablet, Rfl: 3    glipiZIDE (GLUCOTROL XL) 2 5 mg 24 hr tablet, TAKE 1 TABLET DAILY, Disp: 90 tablet, Rfl: 3    glucose blood test strip, 1 each by Other route 3 (three) times a day Use as instructed, Disp: 300 each, Rfl: 3    Insulin Lispro (HumaLOG) 100 units/mL cartridge for injection, Inject 5 Units under the skin 2 (two) times a day with meals (Patient taking differently: Inject 5 Units under the skin 1 unit of insulin for 30 mg over 130), Disp: 15 mL, Rfl: 3    Insulin Syringe-Needle U-100 (INSULIN SYRINGE 1CC/31GX5/16") 31G X 5/16" 1 ML MISC, BD Veo Insulin Syringe Ultra-Fine 0 3 mL 31 gauge x 15/64", Disp: , Rfl:     KRILL OIL PO, Take by mouth, Disp: , Rfl:     levothyroxine 75 mcg tablet, TAKE 1 TABLET DAILY, Disp: 90 tablet, Rfl: 3   Linzess 145 MCG CAPS, TAKE 1 CAPSULE DAILY 30 MINUTES PRIOR TO BREAKFAST, Disp: 90 capsule, Rfl: 3    LOW-DOSE ASPIRIN PO, , Disp: , Rfl:     Magnesium Oxide 400 MG CAPS, Take by mouth, Disp: , Rfl:     metFORMIN (GLUCOPHAGE-XR) 500 mg 24 hr tablet, TAKE 1 TABLET IN THE MORNING AND 2 TABLETS IN THE EVENING, Disp: 270 tablet, Rfl: 3    olmesartan (BENICAR) 40 mg tablet, Take 1 tablet (40 mg total) by mouth daily, Disp: 90 tablet, Rfl: 3    Omega-3 Fatty Acids (FISH OIL) 1200 MG CAPS, Take by mouth, Disp: , Rfl:     pantoprazole (PROTONIX) 40 mg tablet, TAKE 1 TABLET DAILY, Disp: 90 tablet, Rfl: 3    potassium chloride (MICRO-K) 10 MEQ CR capsule, TAKE 1 CAPSULE DAILY, Disp: 90 capsule, Rfl: 3    rosuvastatin (CRESTOR) 20 MG tablet, TAKE 1 TABLET DAILY, Disp: 90 tablet, Rfl: 3    saccharomyces boulardii (FLORASTOR) 250 mg capsule, Take 250 mg by mouth daily  , Disp: , Rfl:   Allergies   Allergen Reactions    Amoxicillin-Pot Clavulanate Anaphylaxis, Hives, Itching and Facial Swelling    Erythromycin Hives, Itching, Swelling, Vomiting, Throat Swelling, Nasal Congestion and Facial Swelling    Meperidine Anaphylaxis    Morphine Hives, Itching, Swelling, Other (See Comments) and Syncope     hypotension    Penicillins Anaphylaxis, Itching and Swelling    Relafen [Nabumetone] Hives, Itching and Swelling    Darvon [Propoxyphene] Hives    Methocarbamol Other (See Comments)     Double vision    Reglan [Metoclopramide] Anxiety    Sulfa Antibiotics Hives, Itching, Rash and Swelling    Tetracyclines & Related Rash       Labs:  No visits with results within 2 Month(s) from this visit     Latest known visit with results is:   Hospital Outpatient Visit on 04/19/2022   Component Date Value    Baseline HR 04/19/2022 85     Baseline BP 04/19/2022 138/80     O2 sat rest 04/19/2022 95     Stress peak HR 04/19/2022 162     Post peak BP 04/19/2022 186     Rate Pressure Product 04/19/2022 30,132 0     O2 sat peak 04/19/2022 94     Recovery HR 04/19/2022 88     Recovery BP 04/19/2022 146/76     Target HR 04/19/2022 162     Percent HR 04/19/2022 110     Exercise duration (min) 04/19/2022 5     Exercise duration (sec) 04/19/2022 0     Estimated workload 04/19/2022 7 0     Angina Index 04/19/2022 0     Stress Stage Reached 04/19/2022 2 0     Max HR Percent 04/19/2022 110     Protocol Name 04/19/2022 MAAME     Time In Exercise Phase 04/19/2022 00:05:00     MAX  SYSTOLIC BP 29/90/6108 603     Max Diastolic Bp 35/90/9130 70     Max Heart Rate 04/19/2022 162     Max Predicted Heart Rate 04/19/2022 147     Reason for Termination 04/19/2022 Maximal exercise (symptom limited)     Test Indication 04/19/2022 SOB     Target Hr Formular 04/19/2022 (220 - Age)*85%     Chest Pain Statement 04/19/2022 none      Imaging: No results found  Review of Systems:  Review of Systems   REVIEW OF SYSTEMS:  Constitutional:  Denies fever or chills   Eyes:  Denies change in visual acuity   HENT:  Denies nasal congestion or sore throat   Respiratory:  Denies cough or shortness of breath   Cardiovascular:  Denies chest pain or edema   GI:  Denies abdominal pain, nausea, vomiting, bloody stools or diarrhea   :  Denies dysuria, frequency, difficulty in micturition and nocturia  Musculoskeletal:  Denies back pain or joint pain   Neurologic:  Denies headache, focal weakness or sensory changes   Endocrine:  Denies polyuria or polydipsia   Lymphatic:  Denies swollen glands   Psychiatric:  Denies depression or anxiety       Physical Exam:    /88 (BP Location: Left arm, Patient Position: Sitting, Cuff Size: Standard)   Pulse 80   Ht 5' 2" (1 575 m)   Wt 95 kg (209 lb 6 4 oz)   SpO2 96%   BMI 38 30 kg/m²     Physical Exam  PHYSICAL EXAM:  General:  Patient is not in acute distress   Head: Normocephalic, Atraumatic  HEENT:  Both pupils normal-size atraumatic, normocephalic, nonicteric  Neck:  JVP not raised   Trachea central  No carotid bruit  Respiratory:  normal breath sounds no crackles  no rhonchi  Cardiovascular:  Regular rate and rhythm no S3 no murmurs  GI:  Abdomen soft nontender  No organomegaly  Lymphatic:  No cervical or inguinal lymphadenopathy  Neurologic:  Patient is awake alert, oriented   Grossly nonfocal  Extremities no edema    Discussion/Summary:  Patient with multiple medical problems who seems to be doing reasonably well from cardiac standpoint  Previous studies reviewed with patient  Medications reviewed and possible side effects discussed  concepts of cardiovascular disease , signs and symptoms of heart disease  Dietary and risk factor modification reinforced  All questions answered  Safety measures reviewed  Patient advised to report any problems prompting medical attention  Patient's blood pressures are elevated  Will increase the dosage of Benicar to 40 mg p o  Daily for better blood pressure control  Could consider switching Lasix to chlorthalidone or hydrochlorothiazide if the blood pressures are still elevated  Patient has repeat blood work pending followed by appointment with primary care physician Dr Reynolds Most  Follow-up in 6 months or earlier as needed  Patient had a stress echocardiogram which showed good functional be capacity with no evidence of ischemia  Normal ejection fraction was noted

## 2022-06-22 ENCOUNTER — HOSPITAL ENCOUNTER (OUTPATIENT)
Dept: MAMMOGRAPHY | Facility: CLINIC | Age: 72
Discharge: HOME | End: 2022-06-22
Payer: COMMERCIAL

## 2022-06-22 DIAGNOSIS — Z12.31 SCREENING MAMMOGRAM, ENCOUNTER FOR: ICD-10-CM

## 2022-06-22 PROCEDURE — 77067 SCR MAMMO BI INCL CAD: CPT

## 2022-06-22 PROCEDURE — 77063 BREAST TOMOSYNTHESIS BI: CPT

## 2022-06-27 DIAGNOSIS — E11.8 TYPE 2 DIABETES MELLITUS WITH COMPLICATION, WITHOUT LONG-TERM CURRENT USE OF INSULIN (HCC): ICD-10-CM

## 2022-07-12 ENCOUNTER — ANNUAL EXAM (OUTPATIENT)
Dept: OBGYN CLINIC | Age: 72
End: 2022-07-12
Payer: COMMERCIAL

## 2022-07-12 VITALS
BODY MASS INDEX: 37.73 KG/M2 | HEIGHT: 62 IN | DIASTOLIC BLOOD PRESSURE: 78 MMHG | WEIGHT: 205 LBS | SYSTOLIC BLOOD PRESSURE: 116 MMHG

## 2022-07-12 DIAGNOSIS — E11.9 CONTROLLED TYPE 2 DIABETES MELLITUS WITHOUT COMPLICATION, UNSPECIFIED WHETHER LONG TERM INSULIN USE (HCC): ICD-10-CM

## 2022-07-12 DIAGNOSIS — L30.9 DERMATITIS: Primary | ICD-10-CM

## 2022-07-12 DIAGNOSIS — N89.8 VAGINAL DRYNESS: ICD-10-CM

## 2022-07-12 PROCEDURE — 88305 TISSUE EXAM BY PATHOLOGIST: CPT | Performed by: PATHOLOGY

## 2022-07-12 PROCEDURE — 88312 SPECIAL STAINS GROUP 1: CPT | Performed by: PATHOLOGY

## 2022-07-12 PROCEDURE — 88342 IMHCHEM/IMCYTCHM 1ST ANTB: CPT | Performed by: PATHOLOGY

## 2022-07-12 PROCEDURE — 56605 BIOPSY OF VULVA/PERINEUM: CPT | Performed by: NURSE PRACTITIONER

## 2022-07-12 RX ORDER — CLOTRIMAZOLE AND BETAMETHASONE DIPROPIONATE 10; .64 MG/G; MG/G
CREAM TOPICAL 2 TIMES DAILY
Qty: 45 G | Refills: 1 | Status: SHIPPED | OUTPATIENT
Start: 2022-07-12 | End: 2022-07-14 | Stop reason: SDUPTHER

## 2022-07-12 NOTE — PROGRESS NOTES
Patient presents for: yearly    Menarche- 11  Last Pap Smear- 08/21/2017  Hx of abnormal paps-yes  Birth control- Tubal/ Hysterectomy     Mammogram- 06/22/2022  DXA- 2/10/21  Colonoscopy- 12/31/19    Smoking status- never  Last sexual activity- not surrently  Family history of uterine, ovarian, cervical or breast cancer-  Maternal Aunts had Breast Cancer Maternal Grandmother had breast cancer her mother had breast cancer as well   Concerns-  Was diagnosed with Iichens and wanted to know if bx was going to be done   She does state that it has been bothering her

## 2022-07-12 NOTE — PROGRESS NOTES
Biopsy    Date/Time: 7/12/2022 9:46 AM  Performed by: PRESLEY Murray  Authorized by: PRESLEY Murray   Universal Protocol:  Consent: Written consent obtained  Timeout called at: 7/12/2022 9:46 AM   Patient understanding: patient states understanding of the procedure being performed  Patient identity confirmed: verbally with patient      Procedure Details - Skin Biopsy:     Biopsy method: punch biopsy      Initial size (mm):  5    Malignancy: malignancy unknown       Areas on labia cleansed with betadine (2 PHOTOS SCANNED INTO CHART BUT THEY DID NOT UPLOAD)  1-2 mm area of superficial skin removed with 3 mm punch biopsy tool from the upper and lower labial hypopigmented areas  Hemostasis acquired with silver nitrate x 2  No complications  2 biopsies sent to the lab from left upper inner labia and left lower outer labia  RV for annual in 4 weeks  Lotrisone refilled and she would like to try vaginal Premarin for her vaginal dryness and irritation

## 2022-07-12 NOTE — ADDENDUM NOTE
Addended by: Claudy Kumar on: 7/12/2022 10:30 AM     Modules accepted: Orders Problem List Items Addressed This Visit     None      Visit Diagnoses     Venous insufficiency, peripheral    -  Primary    Relevant Orders    Compression Stocking    Peripheral edema        Relevant Orders    Compression Stocking    CBC    Comprehensive metabolic panel    TSH, 3rd generation with Free T4 reflex    Magnesium    Screening for cardiovascular condition        Relevant Orders    Lipid panel    Tendinitis, de Quervain's        Anxiety        Relevant Medications    busPIRone (BUSPAR) 5 mg tablet        Discussed her peripheral edema  Consistent with venous insufficiency  Advised low salt diet, leg elevation, and compression stockings  Will obtain labs as outlined  Will monitor  Discussed screening lipid proflie as well  Discussed treatment for de quervain's  Agree on using a thumb spica splint which she has at home  Agreed on steroid injeciton today  Felt relief of pain after the procedure  Discussed generalized anxiety  Discussed treatment options  Advised therapy which she does not want to do right now  Discussed medications  Advised and agreed on trial of buspar three times a day  To f/u in 4 weeks for reevaluation  To call our office if any concerns/questions at [phone]  Chief Complaint   Patient presents with   [facility] Establish Care    Leg Swelling     Bilateral - ankle area    Wrist Pain     Right        Here to establish care with the following concerns  1  Has had bilateral leg swelling for a while  This was never addressed in the past  No orthopnea, no sob,no pnd, no coughing  She works on her feet/standing at least 8-10hours a day  Sometimes doing double shifts  The swelling is better in the morning and worse at night  Has not had labs in a while  2  Has right wrist pain  Several weeks ago a frozen box at work fell and hit her hand  Pain somewhat improved but cotninues with pain  The pain is at her right wrsit just below the thumb   Pain on movement  Pain is sharp  Alleviated by some rest  Has been using some aleve with some benefit  3  Has had anxiety for a long time  Over the past several months and worse over the last few weeks  Lots of stressors at work  Has had increase anxiouness and nervousness, irritable, some mild panic attacks  She has not had any treatments in the past  Has not been to therapy or has had medications  Review of Systems   Constitutional: Negative  Negative for activity change, appetite change, chills, diaphoresis, fatigue and fever  HENT: Negative for congestion, facial swelling and sore throat  Respiratory: Negative  Negative for apnea, cough, choking, chest tightness, shortness of breath, wheezing and stridor  Cardiovascular: Positive for leg swelling  Negative for chest pain and palpitations  Gastrointestinal: Negative  Negative for abdominal distention, abdominal pain, blood in stool, constipation, diarrhea and nausea  Endocrine: Negative for cold intolerance, heat intolerance, polydipsia, polyphagia and polyuria  Genitourinary: Negative  Negative for difficulty urinating, dysuria, flank pain and frequency  Musculoskeletal: Positive for joint swelling  Neurological: Negative for dizziness, tremors, seizures, syncope, facial asymmetry, weakness and numbness  Social History     Social History    Marital status: Single     Spouse name: N/A    Number of children: N/A    Years of education: N/A     Occupational History    Not on file  Social History Main Topics    Smoking status: Current Every Day Smoker     Types: Cigarettes    Smokeless tobacco: Never Used    Alcohol use Yes      Comment: Occasionally    Drug use: No    Sexual activity: Not on file     Other Topics Concern    Not on file     Social History Narrative    No narrative on file       History reviewed  No pertinent family history      Past Medical History:   Diagnosis Date    Depression            No Known Allergies        Vitals:    07/20/18 1153   BP: 128/88   Pulse: 80   Temp: 98 8 °F (37 1 °C)     Body mass index is 46 59 kg/m²  Physical Exam   Constitutional: She appears well-developed and well-nourished  HENT:   Head: Normocephalic and atraumatic  Eyes: Conjunctivae and EOM are normal  Pupils are equal, round, and reactive to light  Neck: Normal range of motion  Neck supple  Cardiovascular: Normal rate, regular rhythm and normal heart sounds  Pulmonary/Chest: Effort normal and breath sounds normal    Abdominal: Soft  Bowel sounds are normal    Musculoskeletal: She exhibits edema  Right wrist: She exhibits tenderness  She exhibits normal range of motion, no bony tenderness, no swelling, no effusion, no crepitus and no deformity  Finkelstein's (+)  No swelling  Full rom  Skin: Skin is warm and dry  Psychiatric: She has a normal mood and affect  Nursing note and vitals reviewed  Return in about 4 weeks (around 8/17/2018)

## 2022-07-12 NOTE — PATIENT INSTRUCTIONS
Vaginitis   AMBULATORY CARE:   Vaginitis  is an inflammation or infection of your vagina  Vaginitis is commonly caused by a bacterial or fungal infection  Other causes include a foreign object or exposure to a chemical irritant  You may be given medicines to treat an infection caused by bacteria or a fungus  Medicines may be given as a pill, or as a cream, gel, or tablet you insert into your vagina  Common signs and symptoms:   Pain, itching, redness, burning, or swelling in your vagina    An odor from your vagina that may be foul or smell like fish    Thick, curd-like discharge    Thin, gray-white discharge    Small skin tears or chafing    Painful sexual intercourse    Pain when you urinate    Call your doctor if:   You have unusual vaginal bleeding  You have severe abdominal pain  You have a fever  Your symptoms get worse, even after treatment  Your symptoms return  You have questions or concerns about your condition or care  Manage your symptoms:   Take a sitz bath  Fill a bathtub with 4 to 6 inches of warm water  You may also use a sitz bath pan that fits inside a toilet bowl  Sit in the sitz bath for 15 minutes  Do this 3 times a day, and after each bowel movement  The warm water can help decrease pain and swelling  Use over-the-counter creams or ointments as directed  Examples include petroleum jelly, zinc creams, or hydrocortisone creams  These will help decrease itching and inflammation  Do not wear tight-fitting clothes or undergarments  These can make your symptoms worse  Do not use douches other irritating products in your vagina  Examples include bubble baths and perfumed soaps  The vagina is delicate and easily irritated  Ask your healthcare provider if it is okay to use tampons during your monthly periods  You may need to use pads instead until your symptoms go away  Do not have sex until your symptoms go away  When you have sex, always use a condom   Condoms can help protect you from contact with fluids from your partner that may be causing your vaginitis  Prevent infection:   Wash your hands  with soap and water after you use the toilet  Wipe from front to back  Do this after you urinate or have a bowel movement  This will prevent germs from getting into your vagina  Wash your vagina each day  Use mild, unscented soap  Let the area air dry  Follow up with your doctor as directed: You may need to return within 3 months for more testing to make sure the infection has not returned  Write down your questions so you remember to ask them during your visits  © Copyright [employer] 2022 Information is for End User's use only and may not be sold, redistributed or otherwise used for commercial purposes  All illustrations and images included in CareNotes® are the copyrighted property of [employer]  or [employer]  The above information is an  only  It is not intended as medical advice for individual conditions or treatments  Talk to your doctor, nurse or pharmacist before following any medical regimen to see if it is safe and effective for you

## 2022-07-13 ENCOUNTER — TELEPHONE (OUTPATIENT)
Dept: INTERNAL MEDICINE CLINIC | Facility: CLINIC | Age: 72
End: 2022-07-13

## 2022-07-14 ENCOUNTER — RA CDI HCC (OUTPATIENT)
Dept: OTHER | Facility: HOSPITAL | Age: 72
End: 2022-07-14

## 2022-07-14 DIAGNOSIS — N89.8 VAGINAL DRYNESS: ICD-10-CM

## 2022-07-14 DIAGNOSIS — L30.9 DERMATITIS: ICD-10-CM

## 2022-07-14 RX ORDER — CLOTRIMAZOLE AND BETAMETHASONE DIPROPIONATE 10; .64 MG/G; MG/G
CREAM TOPICAL 2 TIMES DAILY
Qty: 45 G | Refills: 1 | Status: SHIPPED | OUTPATIENT
Start: 2022-07-14

## 2022-07-14 NOTE — PROGRESS NOTES
[facility] 75  coding opportunities          Chart Reviewed number of suggestions sent to Provider: 2   [id number] [phone]  [id number] [phone]    Patients Insurance        Commercial Insurance: [employer]

## 2022-07-15 ENCOUNTER — APPOINTMENT (OUTPATIENT)
Dept: LAB | Facility: CLINIC | Age: 72
End: 2022-07-15
Payer: COMMERCIAL

## 2022-07-15 DIAGNOSIS — E11.9 CONTROLLED TYPE 2 DIABETES MELLITUS WITHOUT COMPLICATION, WITH LONG-TERM CURRENT USE OF INSULIN (HCC): ICD-10-CM

## 2022-07-15 DIAGNOSIS — Z79.4 CONTROLLED TYPE 2 DIABETES MELLITUS WITHOUT COMPLICATION, WITH LONG-TERM CURRENT USE OF INSULIN (HCC): ICD-10-CM

## 2022-07-15 PROBLEM — E66.01 MORBID (SEVERE) OBESITY DUE TO EXCESS CALORIES (HCC): Status: ACTIVE | Noted: 2021-03-31

## 2022-07-15 LAB
ALBUMIN SERPL BCP-MCNC: 4 G/DL (ref 3.5–5)
ALP SERPL-CCNC: 60 U/L (ref 46–116)
ALT SERPL W P-5'-P-CCNC: 46 U/L (ref 12–78)
ANION GAP SERPL CALCULATED.3IONS-SCNC: 4 MMOL/L (ref 4–13)
AST SERPL W P-5'-P-CCNC: 35 U/L (ref 5–45)
BASOPHILS # BLD AUTO: 0.08 THOUSANDS/ΜL (ref 0–0.1)
BASOPHILS NFR BLD AUTO: 1 % (ref 0–1)
BILIRUB SERPL-MCNC: 0.32 MG/DL (ref 0.2–1)
BUN SERPL-MCNC: 24 MG/DL (ref 5–25)
CALCIUM SERPL-MCNC: 9.7 MG/DL (ref 8.3–10.1)
CHLORIDE SERPL-SCNC: 107 MMOL/L (ref 100–108)
CHOLEST SERPL-MCNC: 118 MG/DL
CO2 SERPL-SCNC: 26 MMOL/L (ref 21–32)
CREAT SERPL-MCNC: 0.91 MG/DL (ref 0.6–1.3)
EOSINOPHIL # BLD AUTO: 0.17 THOUSAND/ΜL (ref 0–0.61)
EOSINOPHIL NFR BLD AUTO: 2 % (ref 0–6)
ERYTHROCYTE [DISTWIDTH] IN BLOOD BY AUTOMATED COUNT: 13.7 % (ref 11.6–15.1)
EST. AVERAGE GLUCOSE BLD GHB EST-MCNC: 146 MG/DL
GFR SERPL CREATININE-BSD FRML MDRD: 62 ML/MIN/1.73SQ M
GLUCOSE P FAST SERPL-MCNC: 129 MG/DL (ref 65–99)
HBA1C MFR BLD: 6.7 %
HCT VFR BLD AUTO: 42 % (ref 34.8–46.1)
HDLC SERPL-MCNC: 36 MG/DL
HGB BLD-MCNC: 13.4 G/DL (ref 11.5–15.4)
IMM GRANULOCYTES # BLD AUTO: 0.03 THOUSAND/UL (ref 0–0.2)
IMM GRANULOCYTES NFR BLD AUTO: 0 % (ref 0–2)
LDLC SERPL CALC-MCNC: 50 MG/DL (ref 0–100)
LYMPHOCYTES # BLD AUTO: 3.14 THOUSANDS/ΜL (ref 0.6–4.47)
LYMPHOCYTES NFR BLD AUTO: 40 % (ref 14–44)
MCH RBC QN AUTO: 28.9 PG (ref 26.8–34.3)
MCHC RBC AUTO-ENTMCNC: 31.9 G/DL (ref 31.4–37.4)
MCV RBC AUTO: 91 FL (ref 82–98)
MONOCYTES # BLD AUTO: 0.52 THOUSAND/ΜL (ref 0.17–1.22)
MONOCYTES NFR BLD AUTO: 7 % (ref 4–12)
NEUTROPHILS # BLD AUTO: 3.9 THOUSANDS/ΜL (ref 1.85–7.62)
NEUTS SEG NFR BLD AUTO: 50 % (ref 43–75)
NONHDLC SERPL-MCNC: 82 MG/DL
NRBC BLD AUTO-RTO: 0 /100 WBCS
PLATELET # BLD AUTO: 302 THOUSANDS/UL (ref 149–390)
PMV BLD AUTO: 10 FL (ref 8.9–12.7)
POTASSIUM SERPL-SCNC: 4.5 MMOL/L (ref 3.5–5.3)
PROT SERPL-MCNC: 7.6 G/DL (ref 6.4–8.2)
RBC # BLD AUTO: 4.63 MILLION/UL (ref 3.81–5.12)
SODIUM SERPL-SCNC: 137 MMOL/L (ref 136–145)
TRIGL SERPL-MCNC: 160 MG/DL
TSH SERPL DL<=0.05 MIU/L-ACNC: 1.86 UIU/ML (ref 0.45–4.5)
WBC # BLD AUTO: 7.84 THOUSAND/UL (ref 4.31–10.16)

## 2022-07-15 PROCEDURE — 83036 HEMOGLOBIN GLYCOSYLATED A1C: CPT

## 2022-07-15 PROCEDURE — 80061 LIPID PANEL: CPT

## 2022-07-15 PROCEDURE — 36415 COLL VENOUS BLD VENIPUNCTURE: CPT

## 2022-07-15 PROCEDURE — 85025 COMPLETE CBC W/AUTO DIFF WBC: CPT

## 2022-07-15 PROCEDURE — 80053 COMPREHEN METABOLIC PANEL: CPT

## 2022-07-15 PROCEDURE — 3044F HG A1C LEVEL LT 7.0%: CPT | Performed by: INTERNAL MEDICINE

## 2022-07-15 PROCEDURE — 84443 ASSAY THYROID STIM HORMONE: CPT

## 2022-07-20 ENCOUNTER — OFFICE VISIT (OUTPATIENT)
Dept: INTERNAL MEDICINE CLINIC | Facility: CLINIC | Age: 72
End: 2022-07-20
Payer: COMMERCIAL

## 2022-07-20 VITALS
DIASTOLIC BLOOD PRESSURE: 80 MMHG | WEIGHT: 207.2 LBS | RESPIRATION RATE: 16 BRPM | SYSTOLIC BLOOD PRESSURE: 130 MMHG | TEMPERATURE: 97.8 F | HEIGHT: 62 IN | BODY MASS INDEX: 38.13 KG/M2 | OXYGEN SATURATION: 96 % | HEART RATE: 71 BPM

## 2022-07-20 DIAGNOSIS — M25.551 BILATERAL HIP PAIN: ICD-10-CM

## 2022-07-20 DIAGNOSIS — E03.9 ACQUIRED HYPOTHYROIDISM: ICD-10-CM

## 2022-07-20 DIAGNOSIS — I10 BENIGN ESSENTIAL HYPERTENSION: ICD-10-CM

## 2022-07-20 DIAGNOSIS — M25.552 BILATERAL HIP PAIN: ICD-10-CM

## 2022-07-20 DIAGNOSIS — K21.9 GASTROESOPHAGEAL REFLUX DISEASE WITHOUT ESOPHAGITIS: ICD-10-CM

## 2022-07-20 DIAGNOSIS — Z79.4 CONTROLLED TYPE 2 DIABETES MELLITUS WITHOUT COMPLICATION, WITH LONG-TERM CURRENT USE OF INSULIN (HCC): Primary | ICD-10-CM

## 2022-07-20 DIAGNOSIS — E78.5 DYSLIPIDEMIA: ICD-10-CM

## 2022-07-20 DIAGNOSIS — E11.9 CONTROLLED TYPE 2 DIABETES MELLITUS WITHOUT COMPLICATION, WITH LONG-TERM CURRENT USE OF INSULIN (HCC): Primary | ICD-10-CM

## 2022-07-20 PROCEDURE — 99214 OFFICE O/P EST MOD 30 MIN: CPT | Performed by: INTERNAL MEDICINE

## 2022-07-20 PROCEDURE — 1160F RVW MEDS BY RX/DR IN RCRD: CPT | Performed by: INTERNAL MEDICINE

## 2022-07-20 PROCEDURE — 3725F SCREEN DEPRESSION PERFORMED: CPT | Performed by: INTERNAL MEDICINE

## 2022-07-20 PROCEDURE — 1101F PT FALLS ASSESS-DOCD LE1/YR: CPT | Performed by: INTERNAL MEDICINE

## 2022-07-20 PROCEDURE — 3288F FALL RISK ASSESSMENT DOCD: CPT | Performed by: INTERNAL MEDICINE

## 2022-07-20 NOTE — PROGRESS NOTES
INTERNAL MEDICINE OFFICE VISIT  [facility]  [address], [address], [address] [facility] [place] [facility]  Tel: [phone]      NAME: Lety Scott  AGE: 74 y o  SEX: female  : 1950   MRN: [id number]    DATE: 2022  TIME: 11:49 AM      Assessment and Plan:  1  Controlled type 2 diabetes mellitus without complication, with long-term current use of insulin ([facility] [address] )   continue present medication  - Hemoglobin A1C; Future  - CBC; Future  - Comprehensive metabolic panel; Future  - Lipid panel; Future  - TSH, 3rd generation; Future    2  Benign essential hypertension   continue olmesartan, was recently increased to 40 mg by the cardiologist     3  Dyslipidemia    Continue Zetia and Crestor    4  Acquired hypothyroidism   continue levothyroxine at the same dose    5  Gastroesophageal reflux disease without esophagitis   continue pantoprazole    6  Bilateral hip pain    - XR hip/pelv 2-3 vws right if performed; Future  - XR hip/pelv 2-3 vws left if performed; Future      - Counseling Documentation: patient was counseled regarding: diagnostic results, instructions for management, risk factor reductions, prognosis, patient and family education, risks and benefits of treatment options and importance of compliance with treatment  - Medication Side Effects: Adverse side effects of medications were reviewed with the patient/guardian today  Return for follow up visit in  4 months or earlier, if needed        Chief Complaint:  Chief Complaint   Patient presents with    Follow-up     3 month w labs          History of Present Illness:    type 2 diabetes is fairly well controlled   Blood pressure is stable, better than before since the dose of the losartan was increased   Cholesterol and TSH are stable   GERD is stable on medication  She complains of pain in both her hips and does not want to see orthopedics before doing the x-ray      Active Problem List:  Patient Active Problem List Diagnosis    Diabetes mellitus type 2, controlled, without complications ([facility] [address] )    Dysmetabolic syndrome X    Benign essential hypertension    Lichen sclerosus    History of endometrial cancer    SOB (shortness of breath)    Asymptomatic postmenopausal status    At risk for sleep apnea    Dyslipidemia    Asthma    GERD (gastroesophageal reflux disease)    Acquired hypothyroidism    Status post total right knee replacement    Spondylosis of lumbosacral region    Irritable bowel syndrome without diarrhea    Coronary atherosclerosis due to calcified coronary lesion    Morbid (severe) obesity due to excess calories (HCC)    Chronic pain of both knees    Obstructive sleep apnea    Endometrial cancer (HCC)    Type 2 diabetes mellitus with diabetic cataract ([facility] [address] )    Cortical age-related cataract of both eyes    Combined forms of age-related cataract of both eyes    Bilateral hip pain         Past Medical History:  Past Medical History:   Diagnosis Date    Abnormal mammogram     Abnormal weight gain     Achilles tendinitis     Allergic 1952    since childhood    Arthritis 1988    Asthma     Cancer ([facility] [address] ) 2018    Endometrial adenoma    Diabetes mellitus ([address] )     Disc degeneration, lumbar     Disease of thyroid gland     hypo    Dyslipidemia     Dyslipidemia, goal LDL below 70 3/27/2018    Early satiety     Edema     idiopathic peripheral    Encounter for follow-up examination after completed treatment for malignant neoplasm 3/17/2019    Encounter for gynecological examination without abnormal finding 6/22/2020    Endometrial cancer ([facility] [address] ) 2018    Enthesopathy     hip region    Esophagitis, reflux     GERD (gastroesophageal reflux disease)     Hard to intubate     difficulty with intubation and had to be intubated twice    Heart murmur 2018    Hypercholesterolemia     Hypertension     IBS (irritable bowel syndrome)     Irritable bowel syndrome     Morbid (severe) obesity due to excess calories ([facility] [address] ) 2022    As per CMS ICD10 guidelines:Provider accepted    Obesity 1998    Obesity, Class I, BMI 30-34 9 2015    Osteoarthritis     Pneumonia     Rosacea     Sacroiliitis (HCC)     Shingles     Sleep apnea, obstructive          Past Surgical History:  Past Surgical History:   Procedure Laterality Date     SECTION      x2    COLONOSCOPY      ELBOW SURGERY      left ulna/radius    ESOPHAGOGASTRODUODENOSCOPY      FOOT SURGERY Right     FRACTURE SURGERY      FRACTURE SURGERY Left     tibia    HYSTERECTOMY Bilateral 2018    JOINT REPLACEMENT Left     shoulder,  right knee    KNEE ARTHROSCOPY      KNEE ARTHROSCOPY W/ MENISCAL REPAIR Right     NC LAPAROSCOPY W TOT HYSTERECTUTERUS <=250 GRAM  W TUBE/OVARY N/A 2018    Procedure: ROBOTIC TOTAL LAPAROSCOPIC HYSTERECTOMY, BILATERAL SALPINGOOPHORECTOMY, BILATERAL PELVIC LYMPHNODE DISSECTION;  Surgeon: Ritu Redd MD;  Location: [facility];  Service: Gynecology Oncology    REPLACEMENT TOTAL KNEE Right     SHOULDER ARTHROPLASTY      SINUS SURGERY      TONSILLECTOMY      TUBAL LIGATION      WRIST SURGERY      ganglonic cyst         Family History:  Family History   Problem Relation Age of Onset    Arthritis Mother     Heart disease Mother         cardiac disorder    Diabetes Mother     Hiatal hernia Mother     Hypertension Mother     Irritable bowel syndrome Mother    Anaamarilis Chavarrialiff Breast cancer Mother 77        x2   Ana Chavarrialiff Uterine cancer Mother     Osteoporosis Mother     Thyroid disease Mother     Other Mother         gastric reflux    Stroke Mother     Hyperlipidemia Mother     Cancer Mother         ENDOMETRIAL    Heart disease Father         cardiac disorder    Hiatal hernia Father     Ulcers Father     Other Father         gastric reflux    Coronary artery disease Father     Hearing loss Father     Hiatal hernia Sister     Thyroid disease Sister     Other Sister         gastric reflux    Lung cancer Sister 68    Cancer Sister         lung adenocarcinoma, mets to meninges    Irritable bowel syndrome Daughter     Hyperlipidemia Daughter     Brain cancer Maternal Grandmother     Breast cancer Maternal Grandmother         [place]    No Known Problems Maternal Grandfather     No Known Problems Paternal Grandmother     No Known Problems Paternal Grandfather     No Known Problems Brother     [address] Hypertension Son     Hiatal hernia Child     Other Child         gastric reflux    Breast cancer Maternal Aunt 72    Uterine cancer Maternal Aunt         x3 sis    Stroke Maternal Aunt     Cancer Maternal Aunt     Breast cancer Maternal Aunt 54    Cancer Maternal Aunt     Breast cancer Maternal Aunt 54    Cancer Maternal Aunt     No Known Problems Paternal Aunt     Colon cancer Neg Hx     Ovarian cancer Neg Hx     Cervical cancer Neg Hx          Social History:  Social History     Socioeconomic History    Marital status:      Spouse name: None    Number of children: 3    Years of education: None    Highest education level: None   Occupational History    Occupation: nurse     Comment: full-time   Tobacco Use    Smoking status: Never Smoker    Smokeless tobacco: Never Used   Vaping Use    Vaping Use: Never used   Substance and Sexual Activity    Alcohol use:  Yes     Alcohol/week: 4 0 standard drinks     Types: 2 Glasses of wine, 2 Standard drinks or equivalent per week    Drug use: No    Sexual activity: Not Currently     Partners: Female     Birth control/protection: Post-menopausal, Surgical, Female Sterilization   Other Topics Concern    None   Social History Narrative    Active advance directive    DME- freestyle lite blood glucose meter device kit QID     Social Determinants of Health     Financial Resource Strain: Not on file   Food Insecurity: Not on file   Transportation Needs: Not on file   Physical Activity: Inactive    Days of Exercise per Week: 0 days   Alfonso Reyes Minutes of Exercise per Session: 0 min   Stress: No Stress Concern Present    Feeling of Stress : Only a little   Social Connections: Not on file   Intimate Partner Violence: Not on file   Housing Stability: Not on file         Allergies:   Allergies   Allergen Reactions    Amoxicillin-Pot Clavulanate Anaphylaxis, Hives, Itching and Facial Swelling    Erythromycin Hives, Itching, Swelling, Vomiting, Throat Swelling, Nasal Congestion and Facial Swelling    Meperidine Anaphylaxis    Morphine Hives, Itching, Swelling, Other (See Comments) and Syncope     hypotension    Penicillins Anaphylaxis, Itching and Swelling    Relafen [Nabumetone] Hives, Itching and Swelling    Darvon [Propoxyphene] Hives    Methocarbamol Other (See Comments)     Double vision    Reglan [Metoclopramide] Anxiety    Sulfa Antibiotics Hives, Itching, Rash and Swelling    Tetracyclines & Related Rash         Medications:    Current Outpatient Medications:     albuterol (PROVENTIL HFA,VENTOLIN HFA) 90 mcg/act inhaler, Inhale 2 puffs every 6 (six) hours as needed for wheezing, Disp: 18 g, Rfl: 2    Calcium-Magnesium-Vitamin D (CALCIUM 1200+D3 PO), , Disp: , Rfl:     clotrimazole-betamethasone (LOTRISONE) 1-0 05 % cream, Apply topically 2 (two) times a day As needed, Disp: 45 g, Rfl: 1    conjugated estrogens (PREMARIN) vaginal cream, Apply pea-sized amount to the lower vaginal area 1-2 times per week, Disp: 42 5 g, Rfl: 1    ezetimibe (ZETIA) 10 mg tablet, TAKE 1 TABLET DAILY, Disp: 90 tablet, Rfl: 3    furosemide (LASIX) 20 mg tablet, TAKE 1 TABLET DAILY, Disp: 90 tablet, Rfl: 3    glipiZIDE (GLUCOTROL XL) 2 5 mg 24 hr tablet, TAKE 1 TABLET DAILY, Disp: 90 tablet, Rfl: 3    glucose blood test strip, Use 1 each 3 (three) times a day Use as instructed, Disp: 300 each, Rfl: 3    Insulin Lispro (HumaLOG) 100 units/mL cartridge for injection, Inject 5 Units under the skin 2 (two) times a day with meals (Patient taking differently: Inject 5 Units under the skin 1 unit of insulin for 30 mg over 130), Disp: 15 mL, Rfl: 3    Insulin Syringe-Needle U-100 (INSULIN SYRINGE 1CC/31GX5/16") 31G X 5/16" 1 ML MISC, BD Veo Insulin Syringe Ultra-Fine 0 3 mL 31 gauge x 15/64", Disp: , Rfl:     KRILL OIL PO, Take by mouth, Disp: , Rfl:     levothyroxine 75 mcg tablet, TAKE 1 TABLET DAILY, Disp: 90 tablet, Rfl: 3    Linzess 145 MCG CAPS, TAKE 1 CAPSULE DAILY 30 MINUTES PRIOR TO BREAKFAST, Disp: 90 capsule, Rfl: 3    LOW-DOSE ASPIRIN PO, , Disp: , Rfl:     Magnesium Oxide 400 MG CAPS, Take by mouth, Disp: , Rfl:     metFORMIN (GLUCOPHAGE-XR) 500 mg 24 hr tablet, TAKE 1 TABLET IN THE MORNING AND 2 TABLETS IN THE EVENING, Disp: 270 tablet, Rfl: 3    olmesartan (BENICAR) 40 mg tablet, Take 1 tablet (40 mg total) by mouth daily, Disp: 90 tablet, Rfl: 3    Omega-3 Fatty Acids (FISH OIL) 1200 MG CAPS, Take by mouth, Disp: , Rfl:     pantoprazole (PROTONIX) 40 mg tablet, TAKE 1 TABLET DAILY, Disp: 90 tablet, Rfl: 3    potassium chloride (MICRO-K) 10 MEQ CR capsule, TAKE 1 CAPSULE DAILY, Disp: 90 capsule, Rfl: 3    rosuvastatin (CRESTOR) 20 MG tablet, TAKE 1 TABLET DAILY, Disp: 90 tablet, Rfl: 3    saccharomyces boulardii (FLORASTOR) 250 mg capsule, Take 250 mg by mouth daily  , Disp: , Rfl:       The following portions of the patient's history were reviewed and updated as appropriate: past medical history, past surgical history, family history, social history, allergies, current medications and active problem list       Review of Systems:  Constitutional: Denies fever, chills, weight gain, weight loss, fatigue  Eyes: Denies eye redness, eye discharge, double vision, change in visual acuity  ENT: Denies hearing loss, tinnitus, sneezing, nasal congestion, nasal discharge, sore throat   Respiratory: Denies cough, expectoration, hemoptysis, shortness of breath, wheezing  Cardiovascular: Denies chest pain, palpitations, lower extremity swelling, orthopnea, PND  Gastrointestinal: Denies abdominal pain, heartburn, nausea, vomiting, hematemesis, diarrhea, bloody stools  Genito-Urinary: Denies dysuria, frequency, difficulty in micturition, nocturia, incontinence  Musculoskeletal: Denies back pain, has bilateral hip joint pain, muscle pain  Neurologic: Denies confusion, lightheadedness, syncope, headache, focal weakness, sensory changes, seizures  Endocrine: Denies polyuria, polydipsia, temperature intolerance  Allergy and Immunology: Denies hives, insect bite sensitivity  Hematological and Lymphatic: Denies bleeding problems, swollen glands   Psychological: Denies depression, suicidal ideation, anxiety, panic, mood swings  Dermatological: Denies pruritus, rash, skin lesion changes      Vitals:  Vitals:    07/20/22 1131   BP: 144/82   Pulse: 71   Resp: 16   Temp: 97 8 °F (36 6 °C)   SpO2: 96%       Body mass index is 37 9 kg/m²  Weight (last 2 days)     Date/Time Weight    07/20/22 1131 94 (207 2)            Physical Examination:  General: Patient is not in acute distress  Awake, alert, responding to commands  No weight gain or loss  Head: Normocephalic  Atraumatic  Eyes: Conjunctiva and lids with no swelling, erythema or discharge  Both pupils normal sized, round and reactive to light  Sclera nonicteric  ENT: External examination of nose and ear normal  Otoscopic examination shows translucent tympanic membranes with patent canals without erythema  Oropharynx moist with no erythema, edema, exudate or lesions  Neck: Supple  JVP not raised  Trachea midline  No masses  No thyromegaly  Lungs: No signs of increased work of breathing or respiratory distress  Bilateral bronchovascular breath sounds with no crackles or rhonchi  Chest wall: No tenderness  Cardiovascular: Normal PMI  No thrills  Regular rate and rhythm  S1 and S2 normal  No murmur, rub or gallop  Gastrointestinal: Abdomen soft, nontender  No guarding or rigidity  Liver and spleen not palpable   Bowel sounds present  Neurologic: Cranial nerves II-XII intact   Cortical functions normal  Motor system - Reflexes 2+ and symmetrical  Sensations normal  Musculoskeletal: Gait normal   Has bilateral hip joint tenderness  Integumentary: Skin normal with no rash or lesions  Lymphatic: No palpable lymph nodes in neck, axilla or groin  Extremities: No clubbing, cyanosis, edema or varicosities  Psychological: Judgement and insight normal  Mood and affect normal      Laboratory Results:  CBC with diff:   Lab Results   Component Value Date    WBC 7 84 07/15/2022    WBC 9 23 11/04/2015    RBC 4 63 07/15/2022    RBC 4 49 11/04/2015    HGB 13 4 07/15/2022    HGB 12 3 11/04/2015    HCT 42 0 07/15/2022    HCT 37 4 11/04/2015    MCV 91 07/15/2022    MCV 83 11/04/2015    MCH 28 9 07/15/2022    MCH 27 4 11/04/2015    RDW 13 7 07/15/2022    RDW 15 7 (H) 11/04/2015     07/15/2022     (H) 11/04/2015       CMP:  Lab Results   Component Value Date    CREATININE 0 91 07/15/2022    CREATININE 0 91 11/04/2015    BUN 24 07/15/2022    BUN 26 (H) 11/04/2015     11/04/2015    K 4 5 07/15/2022    K 4 2 11/04/2015     07/15/2022     (H) 11/04/2015    CO2 26 07/15/2022    CO2 26 11/04/2015    GLUCOSE 107 11/04/2015    PROT 7 0 11/04/2015    ALKPHOS 60 07/15/2022    ALKPHOS 71 11/04/2015    ALT 46 07/15/2022    ALT 30 11/04/2015    AST 35 07/15/2022    AST 20 11/04/2015    BILIDIR 0 10 06/30/2015       Lab Results   Component Value Date    HGBA1C 6 7 (H) 07/15/2022    HGBA1C 6 6 (H) 11/04/2015    MG 1 9 04/04/2019       No results found for: TROPONINI, CKMB, CKTOTAL    Lipid Profile:   Lab Results   Component Value Date    CHOL 129 11/04/2015    CHOL 102 06/25/2015     Lab Results   Component Value Date    HDL 36 (L) 07/15/2022    HDL 41 (L) 04/14/2022     Lab Results   Component Value Date    LDLCALC 50 07/15/2022    LDLCALC 43 04/14/2022     Lab Results   Component Value Date    TRIG 160 (H) 07/15/2022    TRIG 188 (H) 04/14/2022       Imaging Results:  Mammo screening bilateral w 3d & cad  Narrative: DIAGNOSIS: Screening mammogram, encounter for     TECHNIQUE:  Digital screening mammography was performed  Computer Aided Detection   (CAD) analyzed all applicable images  COMPARISONS: Prior breast imaging dated: 05/27/2021, 05/11/2020,   01/21/2019, 01/19/2018, 01/19/2018, and 01/08/2018    RELEVANT HISTORY:   Family Breast Cancer History: History of breast cancer in Mother, Maternal   Grandmother, Maternal Aunt, Maternal Aunt, Maternal Aunt  Family Medical History: Family medical history includes breast cancer in 5   relatives (maternal aunt, maternal aunt, maternal aunt, maternal   grandmother, mother)  Personal History: Hormone history includes combination hormone replacement   therapy and birth control  Surgical history includes hysterectomy  Medical   history includes endometrial cancer  The patient is scheduled in a reminder system for screening mammography  8-10% of cancers will be missed on mammography  Management of a palpable   abnormality must be based on clinical grounds  Patients will be notified   of their results via letter from our facility  Accredited by [employer] of Radiology and FDA  RISK ASSESSMENT:   5 Year Tyrer-Cuzick: 4 66 %  10 Year Tyrer-Cuzick: 9 69 %  Lifetime Tyrer-Cuzick: 10 73 %    TISSUE DENSITY:   The breasts are almost entirely fatty  INDICATION: Etienne Kraft is a 74 y o  female presenting for   screening mammography  FINDINGS:   Bilateral  There are no suspicious masses, grouped microcalcifications or areas of   unexplained architectural distortion  The skin and nipple areolar complex   are unremarkable  There are scattered calcifications present  Impression: No mammographic evidence of malignancy      ASSESSMENT/BI-RADS CATEGORY:  Left: 2 - Benign  Right: 2 - Benign  Overall: 2 - Benign    RECOMMENDATION:       - Routine screening mammogram in 1 year for both breasts      Workstation ID: [id number]       Health Maintenance:  Health Maintenance   Topic Date Due    PT PLAN OF CARE  08/05/2021    COVID-19 Vaccine (4 - Booster for Moderna series) 02/25/2022    DM Eye Exam  06/10/2022    Fall Risk  07/06/2022    Influenza Vaccine (1) 09/01/2022    Annual Physical  10/11/2022    HEMOGLOBIN A1C  01/15/2023    BMI: Followup Plan  04/15/2023    Diabetic Foot Exam  04/15/2023    Breast Cancer Screening: Mammogram  06/22/2023    BMI: Adult  07/12/2023    Depression Screening  07/20/2023    DXA SCAN  02/10/2024    Colorectal Cancer Screening  12/31/2024    DTaP,Tdap,and Td Vaccines (4 - Td or Tdap) 09/30/2030    Hepatitis C Screening  Completed    Osteoporosis Screening  Completed    Pneumococcal Vaccine: 65+ Years  Completed    HIB Vaccine  Aged Out    Hepatitis B Vaccine  Aged Out    IPV Vaccine  Aged Out    Hepatitis A Vaccine  Aged Out    Meningococcal ACWY Vaccine  Aged Out    HPV Vaccine  Aged Out     Immunization History   Administered Date(s) Administered    COVID-19 MODERNA VACC 0 5 ML IM 01/05/2021, 02/04/2021, 10/25/2021    H1N1, All Formulations 11/05/2009    INFLUENZA 09/26/2021    Influenza Split High Dose Preservative Free IM 10/06/2014, 09/30/2015, 10/20/2017    Influenza, high dose seasonal 0 7 mL 10/09/2018, 09/30/2020    Pneumococcal Conjugate 13-Valent 09/30/2015    Pneumococcal Polysaccharide PPV23 11/17/2008, 10/06/2014, 03/23/2017    Tdap 10/09/2018, 10/12/2018, 09/30/2020    Tuberculin Skin Test-PPD Intradermal 07/03/2019    Zoster 10/25/2017    influenza, trivalent, adjuvanted 09/28/2019         Judith Jordan MD  7/20/2022,11:49 AM

## 2022-07-25 ENCOUNTER — TELEPHONE (OUTPATIENT)
Dept: OBGYN CLINIC | Facility: CLINIC | Age: 72
End: 2022-07-25

## 2022-07-25 DIAGNOSIS — E11.9 TYPE 2 DIABETES MELLITUS WITHOUT COMPLICATION, WITHOUT LONG-TERM CURRENT USE OF INSULIN (HCC): ICD-10-CM

## 2022-07-25 RX ORDER — GLIPIZIDE 5 MG/1
5 TABLET, FILM COATED, EXTENDED RELEASE ORAL DAILY
Qty: 90 TABLET | Refills: 3 | Status: SHIPPED | OUTPATIENT
Start: 2022-07-25 | End: 2022-07-27 | Stop reason: SDUPTHER

## 2022-07-27 DIAGNOSIS — E11.9 TYPE 2 DIABETES MELLITUS WITHOUT COMPLICATION, WITHOUT LONG-TERM CURRENT USE OF INSULIN (HCC): ICD-10-CM

## 2022-07-27 RX ORDER — GLIPIZIDE 5 MG/1
5 TABLET, FILM COATED, EXTENDED RELEASE ORAL DAILY
Qty: 90 TABLET | Refills: 3 | Status: SHIPPED | OUTPATIENT
Start: 2022-07-27

## 2022-08-04 DIAGNOSIS — E11.8 TYPE 2 DIABETES MELLITUS WITH COMPLICATION, WITHOUT LONG-TERM CURRENT USE OF INSULIN (HCC): ICD-10-CM

## 2022-08-04 RX ORDER — INSULIN LISPRO 100 [IU]/ML
5 INJECTION, SOLUTION INTRAVENOUS; SUBCUTANEOUS
Qty: 15 ML | Refills: 3 | Status: SHIPPED | OUTPATIENT
Start: 2022-08-04

## 2022-08-25 ENCOUNTER — ANNUAL EXAM (OUTPATIENT)
Dept: OBGYN CLINIC | Age: 72
End: 2022-08-25
Payer: COMMERCIAL

## 2022-08-25 VITALS
DIASTOLIC BLOOD PRESSURE: 84 MMHG | HEIGHT: 62 IN | BODY MASS INDEX: 37.17 KG/M2 | SYSTOLIC BLOOD PRESSURE: 130 MMHG | WEIGHT: 202 LBS

## 2022-08-25 DIAGNOSIS — L90.0 LICHEN SCLEROSUS: ICD-10-CM

## 2022-08-25 DIAGNOSIS — Z12.31 SCREENING MAMMOGRAM, ENCOUNTER FOR: ICD-10-CM

## 2022-08-25 DIAGNOSIS — N89.8 VAGINAL DRYNESS: ICD-10-CM

## 2022-08-25 DIAGNOSIS — Z78.0 ASYMPTOMATIC POSTMENOPAUSAL ESTROGEN DEFICIENCY: ICD-10-CM

## 2022-08-25 DIAGNOSIS — Z01.419 ENCOUNTER FOR GYNECOLOGICAL EXAMINATION WITHOUT ABNORMAL FINDING: Primary | ICD-10-CM

## 2022-08-25 PROCEDURE — S0612 ANNUAL GYNECOLOGICAL EXAMINA: HCPCS | Performed by: NURSE PRACTITIONER

## 2022-08-25 NOTE — PATIENT INSTRUCTIONS
Breast Self Exam for Women   AMBULATORY CARE:   A breast self-exam (BSE)  is a way to check your breasts for lumps and other changes  Regular BSEs can help you know how your breasts normally look and feel  Most breast lumps or changes are not cancer, but you should always have them checked by a healthcare provider  Why you should do a BSE:  Breast cancer is the most common type of cancer in women  Even if you have mammograms, you may still want to do a BSE regularly  If you know how your breasts normally feel and look, it may help you know when to contact your healthcare provider  Mammograms can miss some cancers  You may find a lump during a BSE that did not show up on a mammogram   When you should do a BSE:  If you have periods, you may want to do your BSE 1 week after your period ends  This is the time when your breasts may be the least swollen, lumpy, or tender  You can do regular BSEs even if you are breastfeeding or have breast implants  Call your doctor if:   You find any lumps or changes in your breasts  You have breast pain or fluid coming from your nipples  You have questions or concerns about your condition or care  How to do a BSE:       Look at your breasts in a mirror  Look at the size and shape of each breast and nipple  Check for swelling, lumps, dimpling, scaly skin, or other skin changes  Look for nipple changes, such as a nipple that is painful or beginning to pull inward  Gently squeeze both nipples and check to see if fluid (that is not breast milk) comes out of them  If you find any of these or other breast changes, contact your healthcare provider  Check your breasts while you sit or  the following 3 positions:    [place] your arms down at your sides  Raise your hands and join them behind your head  Put firm pressure with your hands on your hips  Bend slightly forward while you look at your breasts in the mirror  Lie down and feel your breasts    When you lie down, your breast tissue spreads out evenly over your chest  This makes it easier for you to feel for lumps and anything that may not be normal for your breasts  Do a BSE on one breast at a time  Place a small pillow or towel under your left shoulder  Put your left arm behind your head  Use the 3 middle fingers of your right hand  Use your fingertip pads, on the top of your fingers  Your fingertip pad is the most sensitive part of your finger  Use small circles to feel your breast tissue  Use your fingertip pads to make dime-sized, overlapping circles on your breast and armpits  Use light, medium, and firm pressure  First, press lightly  Second, press with medium pressure to feel a little deeper into the breast  Last, use firm pressure to feel deep within your breast     Examine your entire breast area  Examine the breast area from above the breast to below the breast where you feel only ribs  Make small circles with your fingertips, starting in the middle of your armpit  Make circles going up and down the breast area  Continue toward your breast and all the way across it  Examine the area from your armpit all the way over to the middle of your chest (breastbone)  Stop at the middle of your chest     Move the pillow or towel to your right shoulder, and put your right arm behind your head  Use the 3 fingertip pads of your left hand, and repeat the above steps to do a BSE on your right breast     What else you can do to check for breast problems or cancer:  Talk to your healthcare provider about mammograms  A mammogram is an x-ray of your breasts to screen for breast cancer or other problems  Your provider can tell you the benefits and risks of mammograms  The first mammogram is usually at age 39 or 48  Your provider may recommend you start at 36 or younger if your risk for breast cancer is high  Mammograms usually continue every 1 to 2 years until age 76         Follow up with your doctor as directed:  Write down your questions so you remember to ask them during your visits  © Copyright [employer] 2022 Information is for End User's use only and may not be sold, redistributed or otherwise used for commercial purposes  All illustrations and images included in CareNotes® are the copyrighted property of [employer]  or [employer] [place]  The above information is an  only  It is not intended as medical advice for individual conditions or treatments  Talk to your doctor, nurse or pharmacist before following any medical regimen to see if it is safe and effective for you

## 2022-08-25 NOTE — PROGRESS NOTES
Assessment/Plan:    No problem-specific Assessment & Plan notes found for this encounter  Diagnoses and all orders for this visit:    Encounter for gynecological examination without abnormal finding    Asymptomatic postmenopausal estrogen deficiency  -     DXA bone density spine hip and pelvis; Future    Vaginal dryness    Lichen sclerosus       -      Start vaginal premarin and continue with Lotrisone prn     Screening mammogram, encounter for  -     Mammo screening bilateral w 3d & cad; Future      Call as needed, encouraged calcium/vit D in her diet, all questions answered      Subjective:      Patient ID: Eagle Wiggins is a 74 y o  female  Pleasant 66 y o  postmenopausal female here for annual exam  She denies postmenopausal bleeding  Had Robotic Total Hysterectomy/BSO 2018 with Dr Rod Kaur for early stage endometrial cancer, no chemo, no radiation  No further paps needed  Denies vaginal issues but does have  itching and dry skin relieved by lotrisone  Vulvar biopsy confirmed lichen sclerosis 1011  Denies pelvic pain  Denies postmenopausal issues  Not sexually active x 30 yrs  Colonoscopy UTD with Dr Hilda Elliott, LAST ONE WAS 12/2019, due in 5yrs  DXA due 2023, nml  Mammogram 6/2022 neg  Strong family history of breast cancer with her mom and maternal aunts  Gynecologic Exam  Associated symptoms include constipation  Pertinent negatives include no chills, diarrhea, dysuria, fever or hematuria         The following portions of the patient's history were reviewed and updated as appropriate:   She  has a past medical history of Abnormal mammogram, Abnormal weight gain, Achilles tendinitis, Allergic (1952), Arthritis (1988), Asthma, Cancer ([address] ) (2018), Diabetes mellitus ([facility] ), Disc degeneration, lumbar, Disease of thyroid gland, Dyslipidemia, Dyslipidemia, goal LDL below 70 (3/27/2018), Early satiety, Edema, Encounter for follow-up examination after completed treatment for malignant neoplasm (3/17/2019), Encounter for gynecological examination without abnormal finding (2020), Endometrial cancer ([address] ) (), Enthesopathy, Esophagitis, reflux, GERD (gastroesophageal reflux disease), Hard to intubate, Heart murmur (2018), Hypercholesterolemia, Hypertension, IBS (irritable bowel syndrome), Irritable bowel syndrome, Morbid (severe) obesity due to excess calories ([address] ) (2022), Obesity (), Obesity, Class I, BMI 30-34 9 (2015), Osteoarthritis, Pneumonia, Rosacea, Sacroiliitis ([address] ), Shingles, and Sleep apnea, obstructive  She   Patient Active Problem List    Diagnosis Date Noted    Bilateral hip pain 2022    Type 2 diabetes mellitus with diabetic cataract ([address] ) 2021    Cortical age-related cataract of both eyes 2021    Combined forms of age-related cataract of both eyes 2021    Endometrial cancer ([address] ) 2021    Obstructive sleep apnea     Chronic pain of both knees 2021    Morbid (severe) obesity due to excess calories ([address] ) 2021    Acquired hypothyroidism 2019    At risk for sleep apnea 2019    Dyslipidemia 2019    Asthma 2019    GERD (gastroesophageal reflux disease) 2019    Asymptomatic postmenopausal status 12/10/2018    SOB (shortness of breath)     History of endometrial cancer 2018    Diabetes mellitus type 2, controlled, without complications ([address] )     Lichen sclerosus     Status post total right knee replacement 2016    Spondylosis of lumbosacral region 2016    Irritable bowel syndrome without diarrhea 2016    Coronary atherosclerosis due to calcified coronary lesion     Dysmetabolic syndrome X     Benign essential hypertension 2012     She  has a past surgical history that includes  section; Foot surgery (Right); SHOULDER ARTHROPLASTY; Sinus surgery; Tonsillectomy; Replacement total knee (Right);  Tubal ligation; Wrist surgery; Elbow surgery; Knee arthroscopy w/ meniscal repair (Right); Colonoscopy; Knee arthroscopy; Joint replacement (Left); Fracture surgery; Fracture surgery (Left); Esophagogastroduodenoscopy; pr laparoscopy w tot hysterectuterus <=250 gram  w tube/ovary (N/A, 7/30/2018); and Hysterectomy (Bilateral, 07/28/2018)  Her family history includes Arthritis in her mother; Brain cancer in her maternal grandmother; Breast cancer in her maternal grandmother; Breast cancer (age of onset: 54) in her maternal aunt and maternal aunt; Breast cancer (age of onset: 72) in her maternal aunt; Breast cancer (age of onset: 77) in her mother; Cancer in her maternal aunt, maternal aunt, maternal aunt, mother, and sister; Coronary artery disease in her father; Diabetes in her mother; Hearing loss in her father; Heart disease in her father and mother; Hiatal hernia in her child, father, mother, and sister; Hyperlipidemia in her daughter and mother; Hypertension in her mother; Irritable bowel syndrome in her daughter and mother; Lung cancer (age of onset: 68) in her sister; Venus Asencio Hypertension in her son; No Known Problems in her brother, maternal grandfather, paternal aunt, paternal grandfather, and paternal grandmother; Osteoporosis in her mother; Other in her child, father, mother, and sister; Stroke in her maternal aunt and mother; Thyroid disease in her mother and sister; Ulcers in her father; Uterine cancer in her maternal aunt and mother  She  reports that she has never smoked  She has never used smokeless tobacco  She reports current alcohol use of about 4 0 standard drinks of alcohol per week  She reports that she does not use drugs    Current Outpatient Medications   Medication Sig Dispense Refill    albuterol (PROVENTIL HFA,VENTOLIN HFA) 90 mcg/act inhaler Inhale 2 puffs every 6 (six) hours as needed for wheezing 18 g 2    Calcium-Magnesium-Vitamin D (CALCIUM 1200+D3 PO)       clotrimazole-betamethasone (LOTRISONE) 1-0 05 % cream Apply topically 2 (two) times a day As needed 45 g 1    conjugated estrogens (PREMARIN) vaginal cream Apply pea-sized amount to the lower vaginal area 1-2 times per week 42 5 g 1    ezetimibe (ZETIA) 10 mg tablet TAKE 1 TABLET DAILY 90 tablet 3    furosemide (LASIX) 20 mg tablet TAKE 1 TABLET DAILY 90 tablet 3    glipiZIDE (GLUCOTROL XL) 5 mg 24 hr tablet Take 1 tablet (5 mg total) by mouth daily 90 tablet 3    glucose blood test strip Use 1 each 3 (three) times a day Use as instructed 300 each 3    insulin lispro (HumaLOG) 100 units/mL injection Inject 5 Units under the skin 3 (three) times a day with meals 1 unit of insulin for 30 mg over 130 15 mL 3    Insulin Syringe-Needle U-100 (INSULIN SYRINGE 1CC/31GX5/16") 31G X 5/16" 1 ML MISC BD Veo Insulin Syringe Ultra-Fine 0 3 mL 31 gauge x 15/64"      KRILL OIL PO Take by mouth      levothyroxine 75 mcg tablet TAKE 1 TABLET DAILY 90 tablet 3    Linzess 145 MCG CAPS TAKE 1 CAPSULE DAILY 30 MINUTES PRIOR TO BREAKFAST 90 capsule 3    LOW-DOSE ASPIRIN PO       Magnesium Oxide 400 MG CAPS Take by mouth      metFORMIN (GLUCOPHAGE-XR) 500 mg 24 hr tablet TAKE 1 TABLET IN THE MORNING AND 2 TABLETS IN THE EVENING 270 tablet 3    olmesartan (BENICAR) 40 mg tablet Take 1 tablet (40 mg total) by mouth daily 90 tablet 3    Omega-3 Fatty Acids (FISH OIL) 1200 MG CAPS Take by mouth      pantoprazole (PROTONIX) 40 mg tablet TAKE 1 TABLET DAILY 90 tablet 3    potassium chloride (MICRO-K) 10 MEQ CR capsule TAKE 1 CAPSULE DAILY 90 capsule 3    rosuvastatin (CRESTOR) 20 MG tablet TAKE 1 TABLET DAILY 90 tablet 3    saccharomyces boulardii (FLORASTOR) 250 mg capsule Take 250 mg by mouth daily         No current facility-administered medications for this visit       She is allergic to amoxicillin-pot clavulanate, erythromycin, meperidine, morphine, penicillins, relafen [nabumetone], darvon [propoxyphene], methocarbamol, reglan [metoclopramide], sulfa antibiotics, and tetracyclines & related  OB History    Para Term  AB Living   3 3 3     3   SAB IAB Ectopic Multiple Live Births           3      # Outcome Date GA Lbr Brian/2nd Weight Sex Delivery Anes PTL Lv   3 Term            2 Term            1 Term               Obstetric Comments   Menarche 13   Hx bcp   Hx abnormal pap   Hx hormone replacement     [employer]  Review of Systems   Constitutional: Negative for activity change, chills, fatigue, fever and unexpected weight change  HENT: Negative for mouth sores and trouble swallowing  Respiratory: Negative for shortness of breath  Gastrointestinal: Positive for constipation  Negative for anal bleeding, blood in stool and diarrhea  +IBS relieved with linzess   Genitourinary: Negative for difficulty urinating, dysuria, genital sores and hematuria  Neurological: Negative for weakness  Psychiatric/Behavioral: Negative for confusion and self-injury  Objective:      /84   Ht 5' 2" (1 575 m)   Wt 91 6 kg (202 lb)   LMP  (LMP Unknown)   Breastfeeding No   BMI 36 95 kg/m²          Physical Exam  Constitutional:       General: She is not in acute distress  Appearance: She is well-developed  HENT:      Head: Normocephalic  Pulmonary:      Effort: Pulmonary effort is normal    Chest:   Breasts: Breasts are symmetrical       Right: No inverted nipple, mass, nipple discharge, skin change or tenderness  Left: No inverted nipple, mass, nipple discharge, skin change or tenderness  Abdominal:      Palpations: Abdomen is soft  Genitourinary:     Exam position: Supine  Labia:         Right: No rash, tenderness, lesion or injury  Left: No rash, tenderness, lesion or injury  Vagina: No signs of injury and foreign body  No vaginal discharge, erythema or tenderness  Adnexa:         Right: No mass, tenderness or fullness  Left: No mass, tenderness or fullness  Comments: Cx/uterus absent  Musculoskeletal:      Cervical back: Normal range of motion and neck supple  Lymphadenopathy:      Lower Body: No right inguinal adenopathy  No left inguinal adenopathy  LS noted from perineum to clitoral grayson, mild redness and whitening noted  3 Pics scanned today bc they did not save from last visit

## 2022-08-25 NOTE — PROGRESS NOTES
Patient presents for:    Menarche- 13  Last Pap Smear- 08/21/2017 neg   Hx of abnormal paps-yes endometrial adenoma  Birth control-HSYT    Mammogram- 06/22/2022   DXA-2/10/21 wnl   Colonoscopy-12/16/19 due 2024    Smoking status-denies  Last sexual activity-no   Family history of uterine, ovarian, cervical or breast cancer-  Mother Mat Aunt-Uterine and Breast Cancer     Concerns-denies

## 2022-09-12 DIAGNOSIS — E11.8 TYPE 2 DIABETES MELLITUS WITH COMPLICATION, WITHOUT LONG-TERM CURRENT USE OF INSULIN (HCC): ICD-10-CM

## 2022-09-12 RX ORDER — METFORMIN HYDROCHLORIDE 500 MG/1
TABLET, EXTENDED RELEASE ORAL
Qty: 270 TABLET | Refills: 3 | Status: SHIPPED | OUTPATIENT
Start: 2022-09-12

## 2022-09-28 ENCOUNTER — TELEPHONE (OUTPATIENT)
Dept: ADMINISTRATIVE | Facility: OTHER | Age: 72
End: 2022-09-28

## 2022-09-28 NOTE — TELEPHONE ENCOUNTER
----- Message from Oneyda Abdiel sent at 9/28/2022 11:27 AM EDT -----  Regarding: diabetc eye  09/28/22 11:27 AM    Hello, our patient Nicolsa Kitchen has had Diabetic Eye Exam completed/performed  Please assist in updating the patient chart by pulling the document from the Media Tab  The date of service is 03/21/22       Thank you,  Charlette 49

## 2022-09-29 NOTE — TELEPHONE ENCOUNTER
Upon review of the In Basket request we were able to locate, review, and update the patient chart as requested for Diabetic Eye Exam and Diabetic Foot Exam     Any additional questions or concerns should be emailed to the Practice Liaisons via [email or web address] email, please do not reply via In Basket      Thank you  Viviana Oates

## 2022-10-29 ENCOUNTER — HOSPITAL ENCOUNTER (OUTPATIENT)
Dept: RADIOLOGY | Facility: HOSPITAL | Age: 72
Discharge: HOME | End: 2022-10-29

## 2022-10-29 DIAGNOSIS — M25.552 BILATERAL HIP PAIN: ICD-10-CM

## 2022-10-29 DIAGNOSIS — M25.551 BILATERAL HIP PAIN: ICD-10-CM

## 2022-11-04 ENCOUNTER — TELEPHONE (OUTPATIENT)
Dept: INTERNAL MEDICINE CLINIC | Facility: CLINIC | Age: 72
End: 2022-11-04

## 2022-11-04 NOTE — TELEPHONE ENCOUNTER
----- Message from Yvonne Torres MD sent at 11/3/2022  5:36 PM EDT -----   X-ray shows mild arthritis of the hips

## 2022-11-08 ENCOUNTER — RA CDI HCC (OUTPATIENT)
Dept: OTHER | Facility: HOSPITAL | Age: 72
End: 2022-11-08

## 2022-11-08 NOTE — PROGRESS NOTES
[facility]  coding opportunities          Chart Reviewed number of suggestions sent to Provider: 2    [id number] [phone]  [id number]    Patients Insurance        Commercial Insurance: [employer]

## 2022-11-15 ENCOUNTER — OFFICE VISIT (OUTPATIENT)
Dept: DERMATOLOGY | Facility: CLINIC | Age: 72
End: 2022-11-15

## 2022-11-15 VITALS — BODY MASS INDEX: 37.17 KG/M2 | HEIGHT: 62 IN | TEMPERATURE: 97.5 F | WEIGHT: 202 LBS

## 2022-11-15 DIAGNOSIS — Z13.89 SCREENING FOR SKIN CONDITION: ICD-10-CM

## 2022-11-15 DIAGNOSIS — L82.1 SEBORRHEIC KERATOSIS: ICD-10-CM

## 2022-11-15 DIAGNOSIS — L90.0 LICHEN SCLEROSUS: Primary | ICD-10-CM

## 2022-11-15 RX ORDER — CLOBETASOL PROPIONATE 0.5 MG/G
OINTMENT TOPICAL 2 TIMES DAILY
Qty: 30 G | Refills: 1 | Status: SHIPPED | OUTPATIENT
Start: 2022-11-15

## 2022-11-15 NOTE — PATIENT INSTRUCTIONS
Lichen sclerosis advised patient the importance of treatment of this process twice a day until completely improved at which point backing off for to see what controls this in the long-term will recheck in 3 months  Seborrheic Keratosis  Patient reasurred these are normal growths we acquire with age no treatment needed    Screening for Dermatologic Disorders: Nothing else of concern noted on complete exam follow up in 1 year

## 2022-11-15 NOTE — PROGRESS NOTES
[facility] DERMATOLOGY  [address] [address]  [phone]  [phone]     MRN: [id number] : 1950  Encounter: [id number]  Patient Information: Radha Ibrahim  Chief complaint: yearly check up    History of present illness:  74-year-old female presents for overall skin check no previous history of skin cancer no specific concerns or changes noted    Patient also biopsied for lichen sclerosis which was confirmed patient has been using clobetasol just for symptomatic relief  Past Medical History:   Diagnosis Date   • Abnormal mammogram    • Abnormal weight gain    • Achilles tendinitis    • Allergic     since childhood   • Arthritis    • Asthma    • Cancer ([facility] ) 2018    Endometrial adenoma   • Diabetes mellitus ([facility] [address] )    • Disc degeneration, lumbar    • Disease of thyroid gland     hypo   • Dyslipidemia    • Dyslipidemia, goal LDL below 70 3/27/2018   • Early satiety    • Edema     idiopathic peripheral   • Encounter for follow-up examination after completed treatment for malignant neoplasm 3/17/2019   • Encounter for gynecological examination without abnormal finding 2020   • Endometrial cancer ([facility] ) 2018   • Enthesopathy     hip region   • Esophagitis, reflux    • GERD (gastroesophageal reflux disease)    • Hard to intubate     difficulty with intubation and had to be intubated twice   • Heart murmur 2018   • Hypercholesterolemia    • Hypertension    • IBS (irritable bowel syndrome)    • Irritable bowel syndrome    • Morbid (severe) obesity due to excess calories ([facility] [address] ) 2022    As per CMS ICD10 guidelines:Provider accepted   • Obesity    • Obesity, Class I, BMI 30-34 9 2015   • Osteoarthritis    • Pneumonia    • Rosacea    • Sacroiliitis (HCC)    • Shingles    • Sleep apnea, obstructive      Past Surgical History:   Procedure Laterality Date   •  SECTION      x2   • COLONOSCOPY     • ELBOW SURGERY      left ulna/radius   • ESOPHAGOGASTRODUODENOSCOPY     • FOOT SURGERY Right    • FRACTURE SURGERY     • FRACTURE SURGERY Left     tibia   • HYSTERECTOMY Bilateral 07/28/2018   • JOINT REPLACEMENT Left     shoulder,  right knee   • KNEE ARTHROSCOPY     • KNEE ARTHROSCOPY W/ MENISCAL REPAIR Right    • ID LAPAROSCOPY W TOT HYSTERECTUTERUS <=250 GRAM  W TUBE/OVARY N/A 7/30/2018    Procedure: ROBOTIC TOTAL LAPAROSCOPIC HYSTERECTOMY, BILATERAL SALPINGOOPHORECTOMY, BILATERAL PELVIC LYMPHNODE DISSECTION;  Surgeon: Beatriz Parada MD;  Location: [facility];  Service: Gynecology Oncology   • REPLACEMENT TOTAL KNEE Right    • SHOULDER ARTHROPLASTY     • SINUS SURGERY     • TONSILLECTOMY     • TUBAL LIGATION     • WRIST SURGERY      ganglonic cyst     Social History   Social History     Substance and Sexual Activity   Alcohol Use Yes   • Alcohol/week: 4 0 standard drinks   • Types: 2 Glasses of wine, 2 Standard drinks or equivalent per week     Social History     Substance and Sexual Activity   Drug Use No     Social History     Tobacco Use   Smoking Status Never Smoker   Smokeless Tobacco Never Used     Family History   Problem Relation Age of Onset   • Arthritis Mother    • Heart disease Mother         cardiac disorder   • Diabetes Mother    • Hiatal hernia Mother    • Hypertension Mother    • Irritable bowel syndrome Mother    • Breast cancer Mother 77        x2   • Uterine cancer Mother    • Osteoporosis Mother    • Thyroid disease Mother    • Other Mother         gastric reflux   • Stroke Mother    • Hyperlipidemia Mother    • Cancer Mother         ENDOMETRIAL   • Heart disease Father         cardiac disorder   • Hiatal hernia Father    • Ulcers Father    • Other Father         gastric reflux   • Coronary artery disease Father    • Hearing loss Father    • Hiatal hernia Sister    • Thyroid disease Sister    • Other Sister         gastric reflux   • Lung cancer Sister 68   • Cancer Sister         lung adenocarcinoma, mets to meninges   • Irritable bowel syndrome Daughter    • Hyperlipidemia Daughter    • Brain cancer Maternal Grandmother    • Breast cancer Maternal Grandmother         [place]nown   • No Known Problems Maternal Grandfather    • No Known Problems Paternal Grandmother    • No Known Problems Paternal Grandfather    • No Known Problems Brother    • Malig Hypertension Son    • Hiatal hernia Child    • Other Child         gastric reflux   • Breast cancer Maternal Aunt 65   • Uterine cancer Maternal Aunt         x3 sis   • Stroke Maternal Aunt    • Cancer Maternal Aunt    • Breast cancer Maternal Aunt 55   • Cancer Maternal Aunt    • Breast cancer Maternal Aunt 55   • Cancer Maternal Aunt    • No Known Problems Paternal Aunt    • Colon cancer Neg Hx    • Ovarian cancer Neg Hx    • Cervical cancer Neg Hx      Meds/Allergies   Allergies   Allergen Reactions   • Amoxicillin-Pot Clavulanate Anaphylaxis, Hives, Itching and Facial Swelling   • Erythromycin Hives, Itching, Swelling, Vomiting, Throat Swelling, Nasal Congestion and Facial Swelling   • Meperidine Anaphylaxis   • Morphine Hives, Itching, Swelling, Other (See Comments) and Syncope     hypotension   • Penicillins Anaphylaxis, Itching and Swelling   • Relafen [Nabumetone] Hives, Itching and Swelling   • Darvon [Propoxyphene] Hives   • Methocarbamol Other (See Comments)     Double vision   • Reglan [Metoclopramide] Anxiety   • Sulfa Antibiotics Hives, Itching, Rash and Swelling   • Tetracyclines & Related Rash       Meds:  Prior to Admission medications    Medication Sig Start Date End Date Taking?  Authorizing Provider   albuterol (PROVENTIL HFA,VENTOLIN HFA) 90 mcg/act inhaler Inhale 2 puffs every 6 (six) hours as needed for wheezing 6/28/21  Yes Darryl Vidal PA-C   Calcium-Magnesium-Vitamin D (CALCIUM 1200+D3 PO)  9/17/1990  Yes Historical Provider, MD   clotrimazole-betamethasone (LOTRISONE) 1-0 05 % cream Apply topically 2 (two) times a day As needed 7/14/22  Yes Franco Bauman PRESLEY Kaur   conjugated estrogens (PREMARIN) vaginal cream Apply pea-sized amount to the lower vaginal area 1-2 times per week 7/14/22  Yes PRESLEY Langston   ezetimibe (ZETIA) 10 mg tablet TAKE 1 TABLET DAILY 2/14/22  Yes Hayden Rai DO   furosemide (LASIX) 20 mg tablet TAKE 1 TABLET DAILY 5/13/22  Yes Luke Wilson MD   glipiZIDE (GLUCOTROL XL) 5 mg 24 hr tablet Take 1 tablet (5 mg total) by mouth daily 7/27/22  Yes Luke Wilson MD   glucose blood test strip Use 1 each 3 (three) times a day Use as instructed 6/27/22  Yes Luke Wilson MD   insulin lispro (HumaLOG) 100 units/mL injection Inject 5 Units under the skin 3 (three) times a day with meals 1 unit of insulin for 30 mg over 130 8/4/22  Yes Luke Wilson MD   Insulin Syringe-Needle U-100 (INSULIN SYRINGE 1CC/31GX5/16") 31G X 5/16" 1 ML MISC BD Veo Insulin Syringe Ultra-Fine 0 3 mL 31 gauge x 15/64"   Yes Historical Provider, MD   KRILL OIL PO Take by mouth   Yes Historical Provider, MD   levothyroxine 75 mcg tablet TAKE 1 TABLET DAILY 5/13/22  Yes Luke Wilson MD   Linzess 145 MCG CAPS TAKE 1 CAPSULE DAILY 30 MINUTES PRIOR TO BREAKFAST 6/16/22  Yes Brain Paredes MD   LOW-DOSE ASPIRIN PO  9/17/10  Yes Historical Provider, MD   Magnesium Oxide 400 MG CAPS Take by mouth   Yes Historical Provider, MD   metFORMIN (GLUCOPHAGE-XR) 500 mg 24 hr tablet TAKE 1 TABLET IN THE MORNING AND 2 TABLETS IN THE EVENING 9/12/22  Yes Luke Wilson MD   olmesartan (BENICAR) 40 mg tablet Take 1 tablet (40 mg total) by mouth daily 6/21/22  Yes Beltran Amaro MD   Omega-3 Fatty Acids (FISH OIL) 1200 MG CAPS Take by mouth   Yes Historical Provider, MD   pantoprazole (PROTONIX) 40 mg tablet TAKE 1 TABLET DAILY 5/13/22  Yes Luke Wilson MD   potassium chloride (MICRO-K) 10 MEQ CR capsule TAKE 1 CAPSULE DAILY 5/13/22  Yes Luke Wilson MD   rosuvastatin (CRESTOR) 20 MG tablet TAKE 1 TABLET DAILY 5/13/22  Yes MD abner Barnes (FLORASTOR) 250 mg capsule Take 250 mg by mouth daily     Yes Historical Provider, MD       Subjective:     Review of Systems:    General: negative for - chills, fatigue, fever,  weight gain or weight loss  Psychological: negative for - anxiety, behavioral disorder, concentration difficulties, decreased libido, depression, irritability, memory difficulties, mood swings, sleep disturbances or suicidal ideation  ENT: negative for - hearing difficulties , nasal congestion, nasal discharge, oral lesions, sinus pain, sneezing, sore throat  Allergy and Immunology: negative for - hives, insect bite sensitivity,  Hematological and Lymphatic: negative for - bleeding problems, blood clots,bruising, swollen lymph nodes  Endocrine: negative for - hair pattern changes, hot flashes, malaise/lethargy, mood swings, palpitations, polydipsia/polyuria, skin changes, temperature intolerance or unexpected weight change  Respiratory: negative for - cough, hemoptysis, orthopnea, shortness of breath, or wheezing  Cardiovascular: negative for - chest pain, dyspnea on exertion, edema,  Gastrointestinal: negative for - abdominal pain, nausea/vomiting  Genito-Urinary: negative for - dysuria, incontinence, irregular/heavy menses or urinary frequency/urgency  Musculoskeletal: negative for - gait disturbance, joint pain, joint stiffness, joint swelling, muscle pain, muscular weakness  Dermatological:  As in HPI  Neurological: negative for confusion, dizziness, headaches, impaired coordination/balance, memory loss, numbness/tingling, seizures, speech problems, tremors or weakness       Objective:   Temp 97 5 °F (36 4 °C) (Temporal)   Ht 5' 2" (1 575 m)   Wt 91 6 kg (202 lb)   LMP  (LMP Unknown)   BMI 36 95 kg/m²     Physical Exam:    General Appearance:    Alert, cooperative, no distress   Head:    Normocephalic, without obvious abnormality, atraumatic           Skin:   A full skin exam was performed including scalp, head scalp, eyes, ears, nose, lips, neck, chest, axilla, abdomen, back, buttocks, bilateral upper extremities, bilateral lower extremities, hands, feet, fingers, toes, fingernails, and toenails hypopigmented area noted on the right side of her labia with some sclerotic areas noted  Normal keratotic papules greasy stuck on appearance nothing else remarkable noted on complete exam     Assessment:     1  Lichen sclerosus     2  Seborrheic keratosis     3  Screening for skin condition           Plan:   Lichen sclerosis advised patient the importance of treatment of this process twice a day until completely improved at which point backing off for to see what controls this in the long-term will recheck in 3 months  Seborrheic Keratosis  Patient reasurred these are normal growths we acquire with age no treatment needed  Screening for Dermatologic Disorders: Nothing else of concern noted on complete exam follow up in 1 year       Aleah Suarez MD  11/15/2022,8:50 AM    Portions of the record may have been created with voice recognition software   Occasional wrong word or "sound a like" substitutions may have occurred due to the inherent limitations of voice recognition software   Read the chart carefully and recognize, using context, where substitutions have occurred

## 2022-12-01 DIAGNOSIS — L90.0 LICHEN SCLEROSUS: ICD-10-CM

## 2022-12-01 RX ORDER — CLOBETASOL PROPIONATE 0.5 MG/G
OINTMENT TOPICAL 2 TIMES DAILY
Qty: 30 G | Refills: 1 | Status: SHIPPED | OUTPATIENT
Start: 2022-12-01

## 2022-12-01 NOTE — TELEPHONE ENCOUNTER
Patient called stating that [facility] never got the prescription if we could please send it over again

## 2022-12-06 DIAGNOSIS — R05.1 ACUTE COUGH: Primary | ICD-10-CM

## 2022-12-06 RX ORDER — BENZONATATE 100 MG/1
100 CAPSULE ORAL 3 TIMES DAILY PRN
Qty: 30 CAPSULE | Refills: 0 | Status: SHIPPED | OUTPATIENT
Start: 2022-12-06

## 2022-12-12 DIAGNOSIS — K59.04 CHRONIC IDIOPATHIC CONSTIPATION: ICD-10-CM

## 2022-12-12 DIAGNOSIS — M25.50 ARTHRALGIA, UNSPECIFIED JOINT: ICD-10-CM

## 2022-12-12 DIAGNOSIS — E11.9 TYPE 2 DIABETES MELLITUS WITHOUT COMPLICATION, WITHOUT LONG-TERM CURRENT USE OF INSULIN (HCC): ICD-10-CM

## 2022-12-12 DIAGNOSIS — E78.2 MIXED HYPERLIPIDEMIA: ICD-10-CM

## 2022-12-12 DIAGNOSIS — K59.09 OTHER CONSTIPATION: ICD-10-CM

## 2022-12-12 DIAGNOSIS — I10 BENIGN ESSENTIAL HYPERTENSION: ICD-10-CM

## 2022-12-12 DIAGNOSIS — E03.9 ACQUIRED HYPOTHYROIDISM: ICD-10-CM

## 2022-12-12 DIAGNOSIS — I10 ESSENTIAL HYPERTENSION: ICD-10-CM

## 2022-12-12 RX ORDER — POTASSIUM CHLORIDE 750 MG/1
10 CAPSULE, EXTENDED RELEASE ORAL DAILY
Qty: 90 CAPSULE | Refills: 0 | Status: SHIPPED | OUTPATIENT
Start: 2022-12-12

## 2022-12-12 RX ORDER — LINACLOTIDE 145 UG/1
1 CAPSULE, GELATIN COATED ORAL DAILY
Qty: 90 CAPSULE | Refills: 0 | Status: SHIPPED | OUTPATIENT
Start: 2022-12-12

## 2022-12-12 RX ORDER — EZETIMIBE 10 MG/1
10 TABLET ORAL DAILY
Qty: 90 TABLET | Refills: 0 | Status: SHIPPED | OUTPATIENT
Start: 2022-12-12

## 2022-12-12 RX ORDER — FUROSEMIDE 20 MG/1
20 TABLET ORAL DAILY
Qty: 90 TABLET | Refills: 0 | Status: SHIPPED | OUTPATIENT
Start: 2022-12-12

## 2022-12-12 RX ORDER — PANTOPRAZOLE SODIUM 40 MG/1
40 TABLET, DELAYED RELEASE ORAL DAILY
Qty: 90 TABLET | Refills: 0 | Status: SHIPPED | OUTPATIENT
Start: 2022-12-12

## 2022-12-12 RX ORDER — GLIPIZIDE 5 MG/1
5 TABLET, FILM COATED, EXTENDED RELEASE ORAL DAILY
Qty: 90 TABLET | Refills: 0 | Status: SHIPPED | OUTPATIENT
Start: 2022-12-12

## 2022-12-12 RX ORDER — ROSUVASTATIN CALCIUM 20 MG/1
20 TABLET, COATED ORAL DAILY
Qty: 90 TABLET | Refills: 0 | Status: SHIPPED | OUTPATIENT
Start: 2022-12-12

## 2022-12-12 RX ORDER — OLMESARTAN MEDOXOMIL 40 MG/1
40 TABLET ORAL DAILY
Qty: 90 TABLET | Refills: 0 | Status: SHIPPED | OUTPATIENT
Start: 2022-12-12

## 2022-12-12 RX ORDER — LEVOTHYROXINE SODIUM 0.07 MG/1
75 TABLET ORAL DAILY
Qty: 90 TABLET | Refills: 0 | Status: SHIPPED | OUTPATIENT
Start: 2022-12-12

## 2022-12-26 ENCOUNTER — APPOINTMENT (OUTPATIENT)
Dept: LAB | Facility: HOSPITAL | Age: 72
End: 2022-12-26

## 2022-12-26 DIAGNOSIS — Z79.4 CONTROLLED TYPE 2 DIABETES MELLITUS WITHOUT COMPLICATION, WITH LONG-TERM CURRENT USE OF INSULIN (HCC): ICD-10-CM

## 2022-12-26 DIAGNOSIS — E11.9 CONTROLLED TYPE 2 DIABETES MELLITUS WITHOUT COMPLICATION, WITH LONG-TERM CURRENT USE OF INSULIN (HCC): ICD-10-CM

## 2022-12-26 LAB
ALBUMIN SERPL BCP-MCNC: 3.9 G/DL (ref 3.5–5)
ALP SERPL-CCNC: 61 U/L (ref 46–116)
ALT SERPL W P-5'-P-CCNC: 43 U/L (ref 12–78)
ANION GAP SERPL CALCULATED.3IONS-SCNC: 5 MMOL/L (ref 4–13)
AST SERPL W P-5'-P-CCNC: 34 U/L (ref 5–45)
BILIRUB SERPL-MCNC: 0.44 MG/DL (ref 0.2–1)
BUN SERPL-MCNC: 15 MG/DL (ref 5–25)
CALCIUM SERPL-MCNC: 9.2 MG/DL (ref 8.3–10.1)
CHLORIDE SERPL-SCNC: 108 MMOL/L (ref 96–108)
CHOLEST SERPL-MCNC: 124 MG/DL
CO2 SERPL-SCNC: 27 MMOL/L (ref 21–32)
CREAT SERPL-MCNC: 0.91 MG/DL (ref 0.6–1.3)
ERYTHROCYTE [DISTWIDTH] IN BLOOD BY AUTOMATED COUNT: 13.2 % (ref 11.6–15.1)
EST. AVERAGE GLUCOSE BLD GHB EST-MCNC: 137 MG/DL
GFR SERPL CREATININE-BSD FRML MDRD: 62 ML/MIN/1.73SQ M
GLUCOSE P FAST SERPL-MCNC: 139 MG/DL (ref 65–99)
HBA1C MFR BLD: 6.4 %
HCT VFR BLD AUTO: 40.6 % (ref 34.8–46.1)
HDLC SERPL-MCNC: 51 MG/DL
HGB BLD-MCNC: 13.3 G/DL (ref 11.5–15.4)
LDLC SERPL CALC-MCNC: 47 MG/DL (ref 0–100)
MCH RBC QN AUTO: 29 PG (ref 26.8–34.3)
MCHC RBC AUTO-ENTMCNC: 32.8 G/DL (ref 31.4–37.4)
MCV RBC AUTO: 89 FL (ref 82–98)
NONHDLC SERPL-MCNC: 73 MG/DL
PLATELET # BLD AUTO: 299 THOUSANDS/UL (ref 149–390)
PMV BLD AUTO: 9 FL (ref 8.9–12.7)
POTASSIUM SERPL-SCNC: 4.6 MMOL/L (ref 3.5–5.3)
PROT SERPL-MCNC: 7.2 G/DL (ref 6.4–8.4)
RBC # BLD AUTO: 4.58 MILLION/UL (ref 3.81–5.12)
SODIUM SERPL-SCNC: 140 MMOL/L (ref 135–147)
TRIGL SERPL-MCNC: 128 MG/DL
TSH SERPL DL<=0.05 MIU/L-ACNC: 1.37 UIU/ML (ref 0.45–4.5)
WBC # BLD AUTO: 8.15 THOUSAND/UL (ref 4.31–10.16)

## 2022-12-27 ENCOUNTER — OFFICE VISIT (OUTPATIENT)
Dept: INTERNAL MEDICINE CLINIC | Facility: CLINIC | Age: 72
End: 2022-12-27

## 2022-12-27 VITALS
DIASTOLIC BLOOD PRESSURE: 60 MMHG | HEIGHT: 62 IN | TEMPERATURE: 98 F | BODY MASS INDEX: 37.73 KG/M2 | WEIGHT: 205 LBS | HEART RATE: 73 BPM | OXYGEN SATURATION: 97 % | SYSTOLIC BLOOD PRESSURE: 152 MMHG

## 2022-12-27 DIAGNOSIS — R09.82 PND (POST-NASAL DRIP): ICD-10-CM

## 2022-12-27 DIAGNOSIS — K21.9 GASTROESOPHAGEAL REFLUX DISEASE WITHOUT ESOPHAGITIS: ICD-10-CM

## 2022-12-27 DIAGNOSIS — M19.90 ARTHRITIS: ICD-10-CM

## 2022-12-27 DIAGNOSIS — I10 BENIGN ESSENTIAL HYPERTENSION: ICD-10-CM

## 2022-12-27 DIAGNOSIS — E11.9 CONTROLLED TYPE 2 DIABETES MELLITUS WITHOUT COMPLICATION, WITH LONG-TERM CURRENT USE OF INSULIN (HCC): Primary | ICD-10-CM

## 2022-12-27 DIAGNOSIS — E78.5 DYSLIPIDEMIA: ICD-10-CM

## 2022-12-27 DIAGNOSIS — Z79.4 CONTROLLED TYPE 2 DIABETES MELLITUS WITHOUT COMPLICATION, WITH LONG-TERM CURRENT USE OF INSULIN (HCC): Primary | ICD-10-CM

## 2022-12-27 DIAGNOSIS — E03.9 ACQUIRED HYPOTHYROIDISM: ICD-10-CM

## 2022-12-27 DIAGNOSIS — J45.20 MILD INTERMITTENT ASTHMA WITHOUT COMPLICATION: ICD-10-CM

## 2022-12-27 DIAGNOSIS — H91.91 HEARING LOSS OF RIGHT EAR, UNSPECIFIED HEARING LOSS TYPE: ICD-10-CM

## 2022-12-27 PROBLEM — H25.813 COMBINED FORMS OF AGE-RELATED CATARACT OF BOTH EYES: Status: RESOLVED | Noted: 2021-08-18 | Resolved: 2022-12-27

## 2023-01-25 ENCOUNTER — OFFICE VISIT (OUTPATIENT)
Dept: CARDIOLOGY CLINIC | Facility: CLINIC | Age: 73
End: 2023-01-25

## 2023-01-25 VITALS
OXYGEN SATURATION: 98 % | WEIGHT: 203 LBS | HEART RATE: 76 BPM | RESPIRATION RATE: 16 BRPM | BODY MASS INDEX: 37.36 KG/M2 | SYSTOLIC BLOOD PRESSURE: 138 MMHG | HEIGHT: 62 IN | DIASTOLIC BLOOD PRESSURE: 70 MMHG

## 2023-01-25 DIAGNOSIS — E78.5 DYSLIPIDEMIA: ICD-10-CM

## 2023-01-25 DIAGNOSIS — I10 BENIGN ESSENTIAL HYPERTENSION: Primary | ICD-10-CM

## 2023-01-25 NOTE — PROGRESS NOTES
[facility] [address] [address]  Cardiology Follow Up    Victoriano Proctor  1950  [id number]      1  Benign essential hypertension        2  Dyslipidemia            Chief Complaint   Patient presents with   • Follow-up       Interval History: Patient presents for follow-up visit  Patient denies any history of chest pain shortness of breath  Patient denies any history of leg edema or orthopnea PND  No history of presyncope syncope  Patient states compliance with the present list of medications  Patient has been checking her blood pressures regularly at home  She notices that her blood pressures are high when she gets up before taking medications and the blood pressure numbers are better after a few hours      Patient Active Problem List   Diagnosis   • Diabetes mellitus type 2, controlled, without complications ([facility] )   • Dysmetabolic syndrome X   • Benign essential hypertension   • Lichen sclerosus   • History of endometrial cancer   • SOB (shortness of breath)   • Asymptomatic postmenopausal status   • At risk for sleep apnea   • Dyslipidemia   • Asthma   • GERD (gastroesophageal reflux disease)   • Acquired hypothyroidism   • Status post total right knee replacement   • Spondylosis of lumbosacral region   • Irritable bowel syndrome without diarrhea   • Coronary atherosclerosis due to calcified coronary lesion   • Morbid (severe) obesity due to excess calories (HCC)   • Chronic pain of both knees   • Obstructive sleep apnea   • Endometrial cancer (HCC)   • Type 2 diabetes mellitus with diabetic cataract ([facility])   • Cortical age-related cataract of both eyes   • Bilateral hip pain   • PND (post-nasal drip)   • Hearing loss of right ear     Past Medical History:   Diagnosis Date   • Abnormal mammogram    • Abnormal weight gain    • Achilles tendinitis    • Allergic 1952    since childhood   • Arthritis 1988   • Asthma    • Cancer ([facility] ) 2018 Endometrial adenoma   • Combined forms of age-related cataract of both eyes 8/18/2021    Added per ICD-10-CM coding guidelines - provider accepted   • Diabetes mellitus ([facility] )    • Disc degeneration, lumbar    • Disease of thyroid gland     hypo   • Dyslipidemia    • Dyslipidemia, goal LDL below 70 3/27/2018   • Early satiety    • Edema     idiopathic peripheral   • Encounter for follow-up examination after completed treatment for malignant neoplasm 3/17/2019   • Encounter for gynecological examination without abnormal finding 6/22/2020   • Endometrial cancer ([facility] ) 2018   • Enthesopathy     hip region   • Esophagitis, reflux    • GERD (gastroesophageal reflux disease)    • Hard to intubate     difficulty with intubation and had to be intubated twice   • Heart murmur 2018   • Hypercholesterolemia    • Hypertension    • IBS (irritable bowel syndrome)    • Irritable bowel syndrome    • Morbid (severe) obesity due to excess calories ([facility] ) 4/8/2022    As per CMS ICD10 guidelines:Provider accepted   • Obesity 1998   • Obesity, Class I, BMI 30-34 9 1/7/2015   • Osteoarthritis    • Pneumonia    • Rosacea    • Sacroiliitis (HCC)    • Shingles    • Sleep apnea, obstructive      Social History     Socioeconomic History   • Marital status:      Spouse name: Not on file   • Number of children: 3   • Years of education: Not on file   • Highest education level: Not on file   Occupational History   • Occupation: nurse     Comment: full-time   Tobacco Use   • Smoking status: Never   • Smokeless tobacco: Never   Vaping Use   • Vaping Use: Never used   Substance and Sexual Activity   • Alcohol use:  Yes     Alcohol/week: 4 0 standard drinks     Types: 2 Glasses of wine, 2 Standard drinks or equivalent per week   • Drug use: No   • Sexual activity: Not Currently     Partners: Female     Birth control/protection: Post-menopausal, Surgical, Female Sterilization   Other Topics Concern   • Not on file   Social History Narrative Active advance directive    DME- freestyle lite blood glucose meter device kit QID     Social Determinants of Health     Financial Resource Strain: Not on file   Food Insecurity: Not on file   Transportation Needs: Not on file   Physical Activity: Not on file   Stress: Not on file   Social Connections: Not on file   Intimate Partner Violence: Not on file   Housing Stability: Not on file      Family History   Problem Relation Age of Onset   • Arthritis Mother    • Heart disease Mother         cardiac disorder   • Diabetes Mother    • Hiatal hernia Mother    • Hypertension Mother    • Irritable bowel syndrome Mother    • Breast cancer Mother 77        x2   • Uterine cancer Mother    • Osteoporosis Mother    • Thyroid disease Mother    • Other Mother         gastric reflux   • Stroke Mother    • Hyperlipidemia Mother    • Cancer Mother         ENDOMETRIAL   • Heart disease Father         cardiac disorder   • Hiatal hernia Father    • Ulcers Father    • Other Father         gastric reflux   • Coronary artery disease Father    • Hearing loss Father    • Hiatal hernia Sister    • Thyroid disease Sister    • Other Sister         gastric reflux   • Lung cancer Sister 68   • Cancer Sister         lung adenocarcinoma, mets to meninges   • Irritable bowel syndrome Daughter    • Hyperlipidemia Daughter    • Brain cancer Maternal Grandmother    • Breast cancer Maternal Grandmother         [place]own   • No Known Problems Maternal Grandfather    • No Known Problems Paternal Grandmother    • No Known Problems Paternal Grandfather    • No Known Problems Brother    • [address] Hypertension Son    • Hiatal hernia Child    • Other Child         gastric reflux   • Breast cancer Maternal Aunt 65   • Uterine cancer Maternal Aunt         x3 sis   • Stroke Maternal Aunt    • Cancer Maternal Aunt    • Breast cancer Maternal Aunt 55   • Cancer Maternal Aunt    • Breast cancer Maternal Aunt 55   • Cancer Maternal Aunt    • No Known Problems Paternal Aunt    • Colon cancer Neg Hx    • Ovarian cancer Neg Hx    • Cervical cancer Neg Hx      Past Surgical History:   Procedure Laterality Date   •  SECTION      x2   • COLONOSCOPY     • ELBOW SURGERY      left ulna/radius   • ESOPHAGOGASTRODUODENOSCOPY     • FOOT SURGERY Right    • FRACTURE SURGERY     • FRACTURE SURGERY Left     tibia   • HYSTERECTOMY Bilateral 2018   • JOINT REPLACEMENT Left     shoulder,  right knee   • KNEE ARTHROSCOPY     • KNEE ARTHROSCOPY W/ MENISCAL REPAIR Right    • KS LAPS TOTAL HYSTERECT 250 GM/< W/RMVL TUBE/OVARY N/A 2018    Procedure: ROBOTIC TOTAL LAPAROSCOPIC HYSTERECTOMY, BILATERAL SALPINGOOPHORECTOMY, BILATERAL PELVIC LYMPHNODE DISSECTION;  Surgeon: Elvin Meyers MD;  Location: [facility];  Service: Gynecology Oncology   • REPLACEMENT TOTAL KNEE Right    • SHOULDER ARTHROPLASTY     • SINUS SURGERY     • TONSILLECTOMY     • TUBAL LIGATION     • WRIST SURGERY      ganglonic cyst       Current Outpatient Medications:   •  albuterol (PROVENTIL HFA,VENTOLIN HFA) 90 mcg/act inhaler, Inhale 2 puffs every 6 (six) hours as needed for wheezing, Disp: 18 g, Rfl: 2  •  Calcium-Magnesium-Vitamin D (CALCIUM 1200+D3 PO), , Disp: , Rfl:   •  clobetasol (TEMOVATE) 0 05 % ointment, Apply topically 2 (two) times a day, Disp: 30 g, Rfl: 1  •  conjugated estrogens (PREMARIN) vaginal cream, Apply pea-sized amount to the lower vaginal area 1-2 times per week, Disp: 42 5 g, Rfl: 1  •  ezetimibe (ZETIA) 10 mg tablet, Take 1 tablet (10 mg total) by mouth daily, Disp: 90 tablet, Rfl: 0  •  furosemide (LASIX) 20 mg tablet, Take 1 tablet (20 mg total) by mouth daily, Disp: 90 tablet, Rfl: 0  •  glipiZIDE (GLUCOTROL XL) 5 mg 24 hr tablet, Take 1 tablet (5 mg total) by mouth daily, Disp: 90 tablet, Rfl: 0  •  glucose blood test strip, Use 1 each 3 (three) times a day Use as instructed, Disp: 300 each, Rfl: 3  •  insulin lispro (HumaLOG) 100 units/mL injection, Inject 5 Units under the skin 3 (three) times a day with meals 1 unit of insulin for 30 mg over 130, Disp: 15 mL, Rfl: 3  •  Insulin Syringe-Needle U-100 (INSULIN SYRINGE 1CC/31GX5/16") 31G X 5/16" 1 ML MISC, BD Veo Insulin Syringe Ultra-Fine 0 3 mL 31 gauge x 15/64", Disp: , Rfl:   •  KRILL OIL PO, Take by mouth, Disp: , Rfl:   •  levothyroxine 75 mcg tablet, Take 1 tablet (75 mcg total) by mouth daily, Disp: 90 tablet, Rfl: 0  •  Linzess 145 MCG CAPS, Take 1 capsule (145 mcg total) by mouth daily Take 30 minutes before breakfast, Disp: 90 capsule, Rfl: 0  •  LOW-DOSE ASPIRIN PO, , Disp: , Rfl:   •  Magnesium Oxide 400 MG CAPS, Take by mouth, Disp: , Rfl:   •  metFORMIN (GLUCOPHAGE-XR) 500 mg 24 hr tablet, TAKE 1 TABLET IN THE MORNING AND 2 TABLETS IN THE EVENING, Disp: 270 tablet, Rfl: 3  •  olmesartan (BENICAR) 40 mg tablet, Take 1 tablet (40 mg total) by mouth daily, Disp: 90 tablet, Rfl: 0  •  Omega-3 Fatty Acids (FISH OIL) 1200 MG CAPS, Take by mouth, Disp: , Rfl:   •  pantoprazole (PROTONIX) 40 mg tablet, Take 1 tablet (40 mg total) by mouth daily, Disp: 90 tablet, Rfl: 0  •  potassium chloride (MICRO-K) 10 MEQ CR capsule, Take 1 capsule (10 mEq total) by mouth daily, Disp: 90 capsule, Rfl: 0  •  rosuvastatin (CRESTOR) 20 MG tablet, Take 1 tablet (20 mg total) by mouth daily, Disp: 90 tablet, Rfl: 0  •  saccharomyces boulardii (FLORASTOR) 250 mg capsule, Take 250 mg by mouth daily  , Disp: , Rfl:   Allergies   Allergen Reactions   • Amoxicillin-Pot Clavulanate Anaphylaxis, Hives, Itching and Facial Swelling   • Erythromycin Hives, Itching, Swelling, Vomiting, Throat Swelling, Nasal Congestion and Facial Swelling   • Meperidine Anaphylaxis   • Morphine Hives, Itching, Swelling, Other (See Comments) and Syncope     hypotension   • Penicillins Anaphylaxis, Itching and Swelling   • Relafen [Nabumetone] Hives, Itching and Swelling   • Darvon [Propoxyphene] Hives   • Methocarbamol Other (See Comments)     Double vision   • Reglan [Metoclopramide] Anxiety   • Sulfa Antibiotics Hives, Itching, Rash and Swelling   • Tetracyclines & Related Rash       Labs:  Appointment on 12/26/2022   Component Date Value   • Hemoglobin A1C 12/26/2022 6 4 (H)    • EAG 12/26/2022 137    • WBC 12/26/2022 8 15    • RBC 12/26/2022 4 58    • Hemoglobin 12/26/2022 13 3    • Hematocrit 12/26/2022 40 6    • MCV 12/26/2022 89    • MCH 12/26/2022 29 0    • MCHC 12/26/2022 32 8    • RDW 12/26/2022 13 2    • Platelets 29/17/9886 299    • MPV 12/26/2022 9 0    • Sodium 12/26/2022 140    • Potassium 12/26/2022 4 6    • Chloride 12/26/2022 108    • CO2 12/26/2022 27    • ANION GAP 12/26/2022 5    • BUN 12/26/2022 15    • Creatinine 12/26/2022 0 91    • Glucose, Fasting 12/26/2022 139 (H)    • Calcium 12/26/2022 9 2    • AST 12/26/2022 34    • ALT 12/26/2022 43    • Alkaline Phosphatase 12/26/2022 61    • Total Protein 12/26/2022 7 2    • Albumin 12/26/2022 3 9    • Total Bilirubin 12/26/2022 0 44    • eGFR 12/26/2022 62    • Cholesterol 12/26/2022 124    • Triglycerides 12/26/2022 128    • HDL, Direct 12/26/2022 51    • LDL Calculated 12/26/2022 47    • Non-HDL-Chol (CHOL-HDL) 12/26/2022 73    • TSH 3RD GENERATON 12/26/2022 1 370      Imaging: No results found      Review of Systems:  Review of Systems   REVIEW OF SYSTEMS:  Constitutional:  Denies fever or chills   Eyes:  Denies change in visual acuity   HENT:  Denies nasal congestion or sore throat   Respiratory:  Denies cough or shortness of breath   Cardiovascular:  Denies chest pain or edema   GI:  Denies abdominal pain, nausea, vomiting, bloody stools or diarrhea   :  Denies dysuria, frequency, difficulty in micturition and nocturia  Musculoskeletal:  Denies back pain or joint pain   Neurologic:  Denies headache, focal weakness or sensory changes   Endocrine:  Denies polyuria or polydipsia   Lymphatic:  Denies swollen glands   Psychiatric:  Denies depression or anxiety    Physical Exam:    /70 (BP Location: Right arm, Patient Position: Sitting, Cuff Size: Standard) Comment (BP Location): L shoulder replacement  Pulse 76   Resp 16   Ht 5' 2" (1 575 m)   Wt 92 1 kg (203 lb)   LMP  (LMP Unknown)   SpO2 98%   BMI 37 13 kg/m²     Physical Exam   PHYSICAL EXAM:  General:  Patient is not in acute distress   Head: Normocephalic, Atraumatic  HEENT:  Both pupils normal-size atraumatic, normocephalic, nonicteric  Neck:  JVP not raised  Trachea central  No carotid bruit  Respiratory:  normal breath sounds no crackles  no rhonchi  Cardiovascular:  Regular rate and rhythm no S3 no murmurs  GI:  Abdomen soft nontender  No organomegaly  Lymphatic:  No cervical or inguinal lymphadenopathy  Neurologic:  Patient is awake alert, oriented   Grossly nonfocal  Extremities reveal trace to 1+ edema    Discussion/Summary:  Patient with multiple medical problems who seems to be doing reasonably well from cardiac standpoint  Previous studies reviewed with patient  Medications reviewed and possible side effects discussed  concepts of cardiovascular disease , signs and symptoms of heart disease  Dietary and risk factor modification reinforced  All questions answered  Safety measures reviewed  Patient advised to report any problems prompting medical attention  Patient had a stress echocardiogram with good functional LV capacity  No evidence of ischemia  Ejection fraction was normal     Symptoms water from cardiac standpoint which indicate the need for further cardiac ovation discussed  Importance of salt restriction reinforced  Patient instructed to check her blood pressures midmorning after she takes her medications as early morning blood pressures are generally elevated  Patient will report any high readings  Today's blood pressure within acceptable range  Follow-up in 6 months or earlier as needed  Patient is agreeable with plan of care

## 2023-01-26 ENCOUNTER — TELEPHONE (OUTPATIENT)
Dept: PULMONOLOGY | Facility: CLINIC | Age: 73
End: 2023-01-26

## 2023-01-26 NOTE — TELEPHONE ENCOUNTER
Left message for pt to call office to schedule appt for 1 year fu with dr Christopher Olivares in April es

## 2023-02-15 ENCOUNTER — OFFICE VISIT (OUTPATIENT)
Dept: DERMATOLOGY | Facility: CLINIC | Age: 73
End: 2023-02-15

## 2023-02-15 VITALS — BODY MASS INDEX: 36.8 KG/M2 | WEIGHT: 200 LBS | HEIGHT: 62 IN

## 2023-02-15 DIAGNOSIS — I10 BENIGN ESSENTIAL HYPERTENSION: ICD-10-CM

## 2023-02-15 DIAGNOSIS — E11.9 TYPE 2 DIABETES MELLITUS WITHOUT COMPLICATION, WITHOUT LONG-TERM CURRENT USE OF INSULIN (HCC): ICD-10-CM

## 2023-02-15 DIAGNOSIS — K59.09 OTHER CONSTIPATION: ICD-10-CM

## 2023-02-15 DIAGNOSIS — E78.2 MIXED HYPERLIPIDEMIA: ICD-10-CM

## 2023-02-15 DIAGNOSIS — I10 ESSENTIAL HYPERTENSION: ICD-10-CM

## 2023-02-15 DIAGNOSIS — K59.04 CHRONIC IDIOPATHIC CONSTIPATION: ICD-10-CM

## 2023-02-15 DIAGNOSIS — L90.0 LICHEN SCLEROSUS: Primary | ICD-10-CM

## 2023-02-15 DIAGNOSIS — E11.8 TYPE 2 DIABETES MELLITUS WITH COMPLICATION, WITHOUT LONG-TERM CURRENT USE OF INSULIN (HCC): ICD-10-CM

## 2023-02-15 DIAGNOSIS — M25.50 ARTHRALGIA, UNSPECIFIED JOINT: ICD-10-CM

## 2023-02-15 DIAGNOSIS — E03.9 ACQUIRED HYPOTHYROIDISM: ICD-10-CM

## 2023-02-15 RX ORDER — EZETIMIBE 10 MG/1
10 TABLET ORAL DAILY
Qty: 90 TABLET | Refills: 0 | Status: SHIPPED | OUTPATIENT
Start: 2023-02-15

## 2023-02-15 RX ORDER — CLOBETASOL PROPIONATE 0.5 MG/G
OINTMENT TOPICAL 2 TIMES DAILY
Qty: 30 G | Refills: 1 | Status: SHIPPED | OUTPATIENT
Start: 2023-02-15

## 2023-02-15 NOTE — PATIENT INSTRUCTIONS
Lichen sclerosis appears to be active again go ahead and have the patient use the clobetasol twice daily for 3 months and we will reevaluate at that time

## 2023-02-15 NOTE — PROGRESS NOTES
[facility] Note     Patient Name: Zohreh Condon  Encounter Date: February 15, 2023     Have you been cared for by a [facility] in the last 3 years and, if so, which description applies to you? Yes  I have been here within the last 3 years, and my medical history has NOT changed since that time  I am FEMALE/of child-bearing potential     REVIEW OF SYSTEMS:  Have you recently had or currently have any of the following? · No changes in my recent health  PAST MEDICAL HISTORY:  Have you personally ever had or currently have any of the following? If "YES," then please provide more detail  · No changes in my medical history  FAMILY HISTORY:  Any "first degree relatives" (parent, brother, sister, or child) with the following? • No changes in my family's known health  PATIENT EXPERIENCE:    • Do you want the Dermatologist to perform a COMPLETE skin exam today including a clinical examination under the "bra and underwear" areas? NO  • If necessary, do we have your permission to call and leave a detailed message on your Preferred Phone number that includes your specific medical information?   Yes      Allergies   Allergen Reactions   • Amoxicillin-Pot Clavulanate Anaphylaxis, Hives, Itching and Facial Swelling   • Erythromycin Hives, Itching, Swelling, Vomiting, Throat Swelling, Nasal Congestion and Facial Swelling   • Meperidine Anaphylaxis   • Morphine Hives, Itching, Swelling, Other (See Comments) and Syncope     hypotension   • Penicillins Anaphylaxis, Itching and Swelling   • Relafen [Nabumetone] Hives, Itching and Swelling   • Darvon [Propoxyphene] Hives   • Methocarbamol Other (See Comments)     Double vision   • Reglan [Metoclopramide] Anxiety   • Sulfa Antibiotics Hives, Itching, Rash and Swelling   • Tetracyclines & Related Rash      Current Outpatient Medications:   •  albuterol (PROVENTIL HFA,VENTOLIN HFA) 90 mcg/act inhaler, Inhale 2 puffs every 6 (six) hours as needed for wheezing, Disp: 18 g, Rfl: 2  •  Calcium-Magnesium-Vitamin D (CALCIUM 1200+D3 PO), , Disp: , Rfl:   •  clobetasol (TEMOVATE) 0 05 % ointment, Apply topically 2 (two) times a day, Disp: 30 g, Rfl: 1  •  conjugated estrogens (PREMARIN) vaginal cream, Apply pea-sized amount to the lower vaginal area 1-2 times per week, Disp: 42 5 g, Rfl: 1  •  ezetimibe (ZETIA) 10 mg tablet, Take 1 tablet (10 mg total) by mouth daily, Disp: 90 tablet, Rfl: 0  •  furosemide (LASIX) 20 mg tablet, Take 1 tablet (20 mg total) by mouth daily, Disp: 90 tablet, Rfl: 0  •  glipiZIDE (GLUCOTROL XL) 5 mg 24 hr tablet, Take 1 tablet (5 mg total) by mouth daily, Disp: 90 tablet, Rfl: 0  •  glucose blood test strip, Use 1 each 3 (three) times a day Use as instructed, Disp: 300 each, Rfl: 3  •  insulin lispro (HumaLOG) 100 units/mL injection, Inject 5 Units under the skin 3 (three) times a day with meals 1 unit of insulin for 30 mg over 130, Disp: 15 mL, Rfl: 3  •  Insulin Syringe-Needle U-100 (INSULIN SYRINGE 1CC/31GX5/16") 31G X 5/16" 1 ML MISC, BD Veo Insulin Syringe Ultra-Fine 0 3 mL 31 gauge x 15/64", Disp: , Rfl:   •  KRILL OIL PO, Take by mouth, Disp: , Rfl:   •  levothyroxine 75 mcg tablet, Take 1 tablet (75 mcg total) by mouth daily, Disp: 90 tablet, Rfl: 0  •  Linzess 145 MCG CAPS, Take 1 capsule (145 mcg total) by mouth daily Take 30 minutes before breakfast, Disp: 90 capsule, Rfl: 0  •  LOW-DOSE ASPIRIN PO, , Disp: , Rfl:   •  Magnesium Oxide 400 MG CAPS, Take by mouth, Disp: , Rfl:   •  metFORMIN (GLUCOPHAGE-XR) 500 mg 24 hr tablet, TAKE 1 TABLET IN THE MORNING AND 2 TABLETS IN THE EVENING, Disp: 270 tablet, Rfl: 3  •  olmesartan (BENICAR) 40 mg tablet, Take 1 tablet (40 mg total) by mouth daily, Disp: 90 tablet, Rfl: 0  •  Omega-3 Fatty Acids (FISH OIL) 1200 MG CAPS, Take by mouth, Disp: , Rfl:   •  pantoprazole (PROTONIX) 40 mg tablet, Take 1 tablet (40 mg total) by mouth daily, Disp: 90 tablet, Rfl: 0  •  potassium chloride (MICRO-K) 10 MEQ CR capsule, Take 1 capsule (10 mEq total) by mouth daily, Disp: 90 capsule, Rfl: 0  •  rosuvastatin (CRESTOR) 20 MG tablet, Take 1 tablet (20 mg total) by mouth daily, Disp: 90 tablet, Rfl: 0  •  saccharomyces boulardii (FLORASTOR) 250 mg capsule, Take 250 mg by mouth daily  , Disp: , Rfl:           • Whom besides the patient is providing clinical information about today's encounter?   o NO ADDITIONAL HISTORIAN (patient alone provided history)    Physical Exam and Assessment/Plan by Diagnosis:

## 2023-02-15 NOTE — PROGRESS NOTES
[facility] DERMATOLOGY  [address] [place] [id number]  [phone]  [phone]     MRN: [id number] : 1950  Encounter: [id number]  Patient Information: Sina Escobedo  Chief complaint: recheck of lichen sclerosis    History of present illness: 55-year-old female with history of lichen sclerosis presents for recheck patient notes that the area of involvement has been getting quite irritated in the last month or so after she was tapering down her therapy patient thought it might be a yeast infection use clotrimazole as well seem to calm down and then flared again  Past Medical History:   Diagnosis Date   • Abnormal mammogram    • Abnormal weight gain    • Achilles tendinitis    • Allergic 1952    since childhood   • Arthritis    • Asthma    • Cancer ([address] ) 2018    Endometrial adenoma   • Combined forms of age-related cataract of both eyes 2021    Added per ICD-10-CM coding guidelines - provider accepted   • Diabetes mellitus ([address] )    • Disc degeneration, lumbar    • Disease of thyroid gland     hypo   • Dyslipidemia    • Dyslipidemia, goal LDL below 70 3/27/2018   • Early satiety    • Edema     idiopathic peripheral   • Encounter for follow-up examination after completed treatment for malignant neoplasm 3/17/2019   • Encounter for gynecological examination without abnormal finding 2020   • Endometrial cancer ([address] ) 2018   • Enthesopathy     hip region   • Esophagitis, reflux    • GERD (gastroesophageal reflux disease)    • Hard to intubate     difficulty with intubation and had to be intubated twice   • Heart murmur    • Hypercholesterolemia    • Hypertension    • IBS (irritable bowel syndrome)    • Irritable bowel syndrome    • Morbid (severe) obesity due to excess calories ([address] ) 2022    As per CMS ICD10 guidelines:Provider accepted   • Obesity 1998   • Obesity, Class I, BMI 30-34 9 2015   • Osteoarthritis    • Pneumonia    • Rosacea    • Sacroiliitis (HCC)    • Shingles    • Sleep apnea, obstructive      Past Surgical History:   Procedure Laterality Date   •  SECTION      x2   • COLONOSCOPY     • ELBOW SURGERY      left ulna/radius   • ESOPHAGOGASTRODUODENOSCOPY     • FOOT SURGERY Right    • FRACTURE SURGERY     • FRACTURE SURGERY Left     tibia   • HYSTERECTOMY Bilateral 2018   • JOINT REPLACEMENT Left     shoulder,  right knee   • KNEE ARTHROSCOPY     • KNEE ARTHROSCOPY W/ MENISCAL REPAIR Right    • TX LAPS TOTAL HYSTERECT 250 GM/< W/RMVL TUBE/OVARY N/A 2018    Procedure: ROBOTIC TOTAL LAPAROSCOPIC HYSTERECTOMY, BILATERAL SALPINGOOPHORECTOMY, BILATERAL PELVIC LYMPHNODE DISSECTION;  Surgeon: Gaudencio Montesinos MD;  Location[facility];  Service: Gynecology Oncology   • REPLACEMENT TOTAL KNEE Right    • SHOULDER ARTHROPLASTY     • SINUS SURGERY     • TONSILLECTOMY     • TUBAL LIGATION     • WRIST SURGERY      ganglonic cyst     Social History   Social History     Substance and Sexual Activity   Alcohol Use Yes   • Alcohol/week: 4 0 standard drinks   • Types: 2 Glasses of wine, 2 Standard drinks or equivalent per week     Social History     Substance and Sexual Activity   Drug Use No     Social History     Tobacco Use   Smoking Status Never   Smokeless Tobacco Never     Family History   Problem Relation Age of Onset   • Arthritis Mother    • Heart disease Mother         cardiac disorder   • Diabetes Mother    • Hiatal hernia Mother    • Hypertension Mother    • Irritable bowel syndrome Mother    • Breast cancer Mother 77        x2   • Uterine cancer Mother    • Osteoporosis Mother    • Thyroid disease Mother    • Other Mother         gastric reflux   • Stroke Mother    • Hyperlipidemia Mother    • Cancer Mother         ENDOMETRIAL   • Heart disease Father         cardiac disorder   • Hiatal hernia Father    • Ulcers Father    • Other Father         gastric reflux   • Coronary artery disease Father    • Hearing loss Father    • Hiatal hernia Sister    • Thyroid disease Sister    • Other Sister         gastric reflux   • Lung cancer Sister 68   • Cancer Sister         lung adenocarcinoma, mets to meninges   • Irritable bowel syndrome Daughter    • Hyperlipidemia Daughter    • Brain cancer Maternal Grandmother    • Breast cancer Maternal Grandmother         [place]   • No Known Problems Maternal Grandfather    • No Known Problems Paternal Grandmother    • No Known Problems Paternal Grandfather    • No Known Problems Brother    • Malig Hypertension Son    • Hiatal hernia Child    • Other Child         gastric reflux   • Breast cancer Maternal Aunt 65   • Uterine cancer Maternal Aunt         x3 sis   • Stroke Maternal Aunt    • Cancer Maternal Aunt    • Breast cancer Maternal Aunt 55   • Cancer Maternal Aunt    • Breast cancer Maternal Aunt 55   • Cancer Maternal Aunt    • No Known Problems Paternal Aunt    • Colon cancer Neg Hx    • Ovarian cancer Neg Hx    • Cervical cancer Neg Hx      Meds/Allergies   Allergies   Allergen Reactions   • Amoxicillin-Pot Clavulanate Anaphylaxis, Hives, Itching and Facial Swelling   • Erythromycin Hives, Itching, Swelling, Vomiting, Throat Swelling, Nasal Congestion and Facial Swelling   • Meperidine Anaphylaxis   • Morphine Hives, Itching, Swelling, Other (See Comments) and Syncope     hypotension   • Penicillins Anaphylaxis, Itching and Swelling   • Relafen [Nabumetone] Hives, Itching and Swelling   • Darvon [Propoxyphene] Hives   • Methocarbamol Other (See Comments)     Double vision   • Reglan [Metoclopramide] Anxiety   • Sulfa Antibiotics Hives, Itching, Rash and Swelling   • Tetracyclines & Related Rash       Meds:  Prior to Admission medications    Medication Sig Start Date End Date Taking?  Authorizing Provider   albuterol (PROVENTIL HFA,VENTOLIN HFA) 90 mcg/act inhaler Inhale 2 puffs every 6 (six) hours as needed for wheezing 6/28/21  Yes Diony Botello PA-C   Calcium-Magnesium-Vitamin D (CALCIUM 1200+D3 PO)  9/17/1990  Yes Historical Provider, MD   clobetasol (TEMOVATE) 0 05 % ointment Apply topically 2 (two) times a day 12/1/22  Yes Gracia Valenzuela MD   conjugated estrogens (PREMARIN) vaginal cream Apply pea-sized amount to the lower vaginal area 1-2 times per week 7/14/22  Yes PRESLEY Langston   ezetimibe (ZETIA) 10 mg tablet Take 1 tablet (10 mg total) by mouth daily 12/12/22  Yes Hayden Rai DO   furosemide (LASIX) 20 mg tablet Take 1 tablet (20 mg total) by mouth daily 12/12/22  Yes Addie Ferrara MD   glipiZIDE (GLUCOTROL XL) 5 mg 24 hr tablet Take 1 tablet (5 mg total) by mouth daily 12/12/22  Yes Addie Ferrara MD   glucose blood test strip Use 1 each 3 (three) times a day Use as instructed 6/27/22  Yes Addie Ferrara MD   insulin lispro (HumaLOG) 100 units/mL injection Inject 5 Units under the skin 3 (three) times a day with meals 1 unit of insulin for 30 mg over 130 8/4/22  Yes dAdie Ferrara MD   Insulin Syringe-Needle U-100 (INSULIN SYRINGE 1CC/31GX5/16") 31G X 5/16" 1 ML MISC BD Veo Insulin Syringe Ultra-Fine 0 3 mL 31 gauge x 15/64"   Yes Historical Provider, MD   KRILL OIL PO Take by mouth   Yes Historical Provider, MD   levothyroxine 75 mcg tablet Take 1 tablet (75 mcg total) by mouth daily 12/12/22  Yes Addie Ferrara MD   Linzess 145 MCG CAPS Take 1 capsule (145 mcg total) by mouth daily Take 30 minutes before breakfast 12/12/22  Yes Cale Elizabeth MD   LOW-DOSE ASPIRIN PO  9/17/10  Yes Historical Provider, MD   Magnesium Oxide 400 MG CAPS Take by mouth   Yes Historical Provider, MD   metFORMIN (GLUCOPHAGE-XR) 500 mg 24 hr tablet TAKE 1 TABLET IN THE MORNING AND 2 TABLETS IN THE EVENING 9/12/22  Yes Addie Ferrara MD   olmesartan (BENICAR) 40 mg tablet Take 1 tablet (40 mg total) by mouth daily 12/12/22  Yes Eddie Almonte MD   Omega-3 Fatty Acids (FISH OIL) 1200 MG CAPS Take by mouth   Yes Historical Provider, MD   pantoprazole (PROTONIX) 40 mg tablet Take 1 tablet (40 mg total) by mouth daily 12/12/22  Yes Addie Ferrara MD   potassium chloride (MICRO-K) 10 MEQ CR capsule Take 1 capsule (10 mEq total) by mouth daily 12/12/22  Yes Addie Ferrara MD   rosuvastatin (CRESTOR) 20 MG tablet Take 1 tablet (20 mg total) by mouth daily 12/12/22  Yes Addie Ferrara MD   saccharomyces boulardii (FLORASTOR) 250 mg capsule Take 250 mg by mouth daily     Yes Historical Provider, MD       Subjective:     Review of Systems:    General: negative for - chills, fatigue, fever,  weight gain or weight loss  Psychological: negative for - anxiety, behavioral disorder, concentration difficulties, decreased libido, depression, irritability, memory difficulties, mood swings, sleep disturbances or suicidal ideation  ENT: negative for - hearing difficulties , nasal congestion, nasal discharge, oral lesions, sinus pain, sneezing, sore throat  Allergy and Immunology: negative for - hives, insect bite sensitivity,  Hematological and Lymphatic: negative for - bleeding problems, blood clots,bruising, swollen lymph nodes  Endocrine: negative for - hair pattern changes, hot flashes, malaise/lethargy, mood swings, palpitations, polydipsia/polyuria, skin changes, temperature intolerance or unexpected weight change  Respiratory: negative for - cough, hemoptysis, orthopnea, shortness of breath, or wheezing  Cardiovascular: negative for - chest pain, dyspnea on exertion, edema,  Gastrointestinal: negative for - abdominal pain, nausea/vomiting  Genito-Urinary: negative for - dysuria, incontinence, irregular/heavy menses or urinary frequency/urgency  Musculoskeletal: negative for - gait disturbance, joint pain, joint stiffness, joint swelling, muscle pain, muscular weakness  Dermatological:  As in HPI  Neurological: negative for confusion, dizziness, headaches, impaired coordination/balance, memory loss, numbness/tingling, seizures, speech problems, tremors or weakness       Objective:   Ht 5' 2" (1 575 m)   Wt 90 7 kg (200 lb)   LMP  (LMP Unknown)   BMI 36 58 kg/m²     Physical Exam:    General Appearance:    Alert, cooperative, no distress   Head:    Normocephalic, without obvious abnormality, atraumatic           Skin:   A full skin exam was performed including scalp, head scalp, eyes, ears, nose, lips, neck, chest, axilla, abdomen, back, buttocks, bilateral upper extremities, bilateral lower extremities, hands, feet, fingers, toes, fingernails, and toenails pigmented sclerotic areas noted on the area of the clitoris also area of sclerosis of hypopigmentation noted in the area between the anus and the vaginal area     Assessment:     1  Lichen sclerosus              Plan:   Lichen sclerosis appears to be active again go ahead and have the patient use the clobetasol twice daily for 3 months and we will reevaluate at that time    Britt Hoskins MD  2/15/2023,8:52 AM    Portions of the record may have been created with voice recognition software   Occasional wrong word or "sound a like" substitutions may have occurred due to the inherent limitations of voice recognition software   Read the chart carefully and recognize, using context, where substitutions have occurred

## 2023-02-16 RX ORDER — PANTOPRAZOLE SODIUM 40 MG/1
40 TABLET, DELAYED RELEASE ORAL DAILY
Qty: 90 TABLET | Refills: 0 | Status: SHIPPED | OUTPATIENT
Start: 2023-02-16

## 2023-02-16 RX ORDER — GLIPIZIDE 5 MG/1
5 TABLET, FILM COATED, EXTENDED RELEASE ORAL DAILY
Qty: 90 TABLET | Refills: 0 | Status: SHIPPED | OUTPATIENT
Start: 2023-02-16

## 2023-02-16 RX ORDER — POTASSIUM CHLORIDE 750 MG/1
10 CAPSULE, EXTENDED RELEASE ORAL DAILY
Qty: 90 CAPSULE | Refills: 0 | Status: SHIPPED | OUTPATIENT
Start: 2023-02-16

## 2023-02-16 RX ORDER — ROSUVASTATIN CALCIUM 20 MG/1
20 TABLET, COATED ORAL DAILY
Qty: 90 TABLET | Refills: 0 | Status: SHIPPED | OUTPATIENT
Start: 2023-02-16

## 2023-02-16 RX ORDER — LINACLOTIDE 145 UG/1
1 CAPSULE, GELATIN COATED ORAL DAILY
Qty: 90 CAPSULE | Refills: 0 | OUTPATIENT
Start: 2023-02-16

## 2023-02-16 RX ORDER — OLMESARTAN MEDOXOMIL 40 MG/1
40 TABLET ORAL DAILY
Qty: 90 TABLET | Refills: 3 | Status: SHIPPED | OUTPATIENT
Start: 2023-02-16

## 2023-02-16 RX ORDER — LEVOTHYROXINE SODIUM 0.07 MG/1
75 TABLET ORAL DAILY
Qty: 90 TABLET | Refills: 0 | Status: SHIPPED | OUTPATIENT
Start: 2023-02-16

## 2023-02-16 RX ORDER — LINACLOTIDE 145 UG/1
1 CAPSULE, GELATIN COATED ORAL DAILY
Qty: 30 CAPSULE | Refills: 0 | Status: SHIPPED | OUTPATIENT
Start: 2023-02-16

## 2023-02-16 RX ORDER — METFORMIN HYDROCHLORIDE 500 MG/1
TABLET, EXTENDED RELEASE ORAL
Qty: 270 TABLET | Refills: 3 | Status: SHIPPED | OUTPATIENT
Start: 2023-02-16

## 2023-02-16 RX ORDER — FUROSEMIDE 20 MG/1
20 TABLET ORAL DAILY
Qty: 90 TABLET | Refills: 0 | Status: SHIPPED | OUTPATIENT
Start: 2023-02-16

## 2023-03-23 ENCOUNTER — OFFICE VISIT (OUTPATIENT)
Dept: GASTROENTEROLOGY | Facility: CLINIC | Age: 73
End: 2023-03-23

## 2023-03-23 VITALS
SYSTOLIC BLOOD PRESSURE: 142 MMHG | HEIGHT: 62 IN | WEIGHT: 208 LBS | HEART RATE: 94 BPM | OXYGEN SATURATION: 96 % | DIASTOLIC BLOOD PRESSURE: 70 MMHG | BODY MASS INDEX: 38.28 KG/M2

## 2023-03-23 DIAGNOSIS — E66.01 MORBID (SEVERE) OBESITY DUE TO EXCESS CALORIES (HCC): ICD-10-CM

## 2023-03-23 DIAGNOSIS — R14.0 BLOATING: ICD-10-CM

## 2023-03-23 DIAGNOSIS — K21.9 GASTROESOPHAGEAL REFLUX DISEASE, UNSPECIFIED WHETHER ESOPHAGITIS PRESENT: Primary | ICD-10-CM

## 2023-03-23 DIAGNOSIS — K59.09 OTHER CONSTIPATION: ICD-10-CM

## 2023-03-23 DIAGNOSIS — R63.5 WEIGHT GAIN: ICD-10-CM

## 2023-03-23 NOTE — PROGRESS NOTES
[facility]      Chief Complaint: Reflux and bloating    HPI:  Evans Lobo is a 74 y o   female who presents with a fairly complicated history of bloating, reflux, intermittent nausea vomiting, and constipation  She has a history of irritable bowel syndrome  She recently underwent hysterectomy and oophorectomy for stage I uterine cancer  She has been on Linzess 145 mcg  She was decreased to 72 but began to have constipation and went back up to the original dose  Patient's main problem is persistent weight issues  She has gained weight  She did well on a liquid diet under the supervision of [facility]  However there was no transition diet following that and she put the weight back on  She had originally lost 30 pounds  She has a history with some gastroparesis  She is supposed to take high-fiber foods because of her diabetes but these cause significant GI upset including bloating and GERD  She is at an impasse with what to actually eat  She has not tried a FODMAP diet but again this presents challenges as far as her other comorbid illness sees ago  She has no melena  No hematemesis  No fever or chills  No other associated GI issues  No other definitive exacerbating or remitting factors     She most recently has been on a keto diet with minor success  She also has issues with her kidneys and Jardiance actually caused a significant increase in her BUN and creatinine  Review of Systems:   Constitutional: No fever or chills, feels fair, no tiredness, weight gain as above  HENT: No complaints of earache, no hearing loss, no nosebleeds, no nasal discharge, no sore throat, no hoarseness  Eyes: No complaints of eye pain, no red eyes, no discharge from eyes, no itchy eyes  Cardiovascular: No complaints of slow heart rate, no fast heart rate, no chest pain, no palpitations, no leg claudication, no lower extremity edema     Respiratory: No complaints of shortness of breath, no wheezing, no cough, occasional SOB on exertion, no orthopnea  Gastrointestinal: As noted in HPI  Genitourinary: No complaints of dysuria, no incontinence, no hesitancy, no nocturia  Musculoskeletal: Positive arthralgia, no myalgias, no joint swelling or stiffness, no limb pain or swelling  Neurological: No complaints of headache, no confusion, no convulsions, no numbness or tingling, no dizziness or fainting, no limb weakness, no difficulty walking  Skin: No complaints of skin rash or skin lesions, no itching, no skin wound, no dry skin  Hematological/Lymphatic: No complaints of swollen glands, does not bleed easy  Allergic/Immunologic: No immunocompromised state  Endocrine:  No complaints of polyuria, no polydipsia  Psychiatric/Behavioral: is not suicidal, no sleep disturbances, no anxiety or depression, no change in personality, no emotional problems         Historical Information   Past Medical History:   Diagnosis Date   • Abnormal mammogram    • Abnormal weight gain    • Achilles tendinitis    • Allergic 1952    since childhood   • Arthritis 1988   • Asthma    • Cancer ([facility] ) 2018    Endometrial adenoma   • Combined forms of age-related cataract of both eyes 8/18/2021    Added per ICD-10-CM coding guidelines - provider accepted   • Diabetes mellitus ([address] )    • Disc degeneration, lumbar    • Disease of thyroid gland     hypo   • Dyslipidemia    • Dyslipidemia, goal LDL below 70 3/27/2018   • Early satiety    • Edema     idiopathic peripheral   • Encounter for follow-up examination after completed treatment for malignant neoplasm 3/17/2019   • Encounter for gynecological examination without abnormal finding 6/22/2020   • Endometrial cancer ([facility] ) 2018   • Enthesopathy     hip region   • Esophagitis, reflux    • GERD (gastroesophageal reflux disease)    • Hard to intubate     difficulty with intubation and had to be intubated twice   • Heart murmur 2018   • Hypercholesterolemia    • Hypertension    • IBS (irritable bowel syndrome)    • Irritable bowel syndrome    • Morbid (severe) obesity due to excess calories ([facility] ) 2022    As per CMS ICD10 guidelines:Provider accepted   • Obesity    • Obesity, Class I, BMI 30-34 9 2015   • Osteoarthritis    • Pneumonia    • Rosacea    • Sacroiliitis (HCC)    • Shingles    • Sleep apnea, obstructive      Past Surgical History:   Procedure Laterality Date   •  SECTION      x2   • COLONOSCOPY     • ELBOW SURGERY      left ulna/radius   • ESOPHAGOGASTRODUODENOSCOPY     • FOOT SURGERY Right    • FRACTURE SURGERY     • FRACTURE SURGERY Left     tibia   • HYSTERECTOMY Bilateral 2018   • JOINT REPLACEMENT Left     shoulder,  right knee   • KNEE ARTHROSCOPY     • KNEE ARTHROSCOPY W/ MENISCAL REPAIR Right    • IN LAPS TOTAL HYSTERECT 250 GM/< W/RMVL TUBE/OVARY N/A 2018    Procedure: ROBOTIC TOTAL LAPAROSCOPIC HYSTERECTOMY, BILATERAL SALPINGOOPHORECTOMY, BILATERAL PELVIC LYMPHNODE DISSECTION;  Surgeon: Munir Ortez MD;  Location[facility];  Service: Gynecology Oncology   • REPLACEMENT TOTAL KNEE Right    • SHOULDER ARTHROPLASTY     • SINUS SURGERY     • TONSILLECTOMY     • TUBAL LIGATION     • WRIST SURGERY      ganglonic cyst     Social History   Social History     Substance and Sexual Activity   Alcohol Use Yes   • Alcohol/week: 4 0 standard drinks   • Types: 2 Glasses of wine, 2 Standard drinks or equivalent per week     Social History     Substance and Sexual Activity   Drug Use No     Social History     Tobacco Use   Smoking Status Never   Smokeless Tobacco Never     Family History   Problem Relation Age of Onset   • Arthritis Mother    • Heart disease Mother         cardiac disorder   • Diabetes Mother    • Hiatal hernia Mother    • Hypertension Mother    • Irritable bowel syndrome Mother    • Breast cancer Mother         x2   • Uterine cancer Mother    • Osteoporosis Mother    • Thyroid disease Mother    • Other Mother gastric reflux   • Stroke Mother    • Hyperlipidemia Mother    • Cancer Mother         ENDOMETRIAL   • Heart disease Father         cardiac disorder   • Hiatal hernia Father    • Ulcers Father    • Other Father         gastric reflux   • Coronary artery disease Father    • Hearing loss Father    • Hiatal hernia Sister    • Thyroid disease Sister    • Other Sister         gastric reflux   • Lung cancer Sister 68   • Cancer Sister         lung adenocarcinoma, mets to meninges   • Irritable bowel syndrome Daughter    • Hyperlipidemia Daughter    • Brain cancer Maternal Grandmother    • Breast cancer Maternal Grandmother         [place]   • No Known Problems Maternal Grandfather    • No Known Problems Paternal Grandmother    • No Known Problems Paternal Grandfather    • No Known Problems Brother    • Malig Hypertension Son    • Hiatal hernia Child    • Other Child         gastric reflux   • Breast cancer Maternal Aunt 65   • Uterine cancer Maternal Aunt         x3 sis   • Stroke Maternal Aunt    • Cancer Maternal Aunt    • Breast cancer Maternal Aunt 55   • Cancer Maternal Aunt    • Breast cancer Maternal Aunt 55   • Cancer Maternal Aunt    • No Known Problems Paternal Aunt    • Colon cancer Neg Hx    • Ovarian cancer Neg Hx    • Cervical cancer Neg Hx          Current Medications: has a current medication list which includes the following prescription(s): albuterol, calcium-magnesium-vitamin d, clobetasol, conjugated estrogens, ezetimibe, furosemide, glipizide, glucose blood, insulin lispro, insulin syringe 1cc/31gx5/16", krill oil, levothyroxine, linzess, aspirin, magnesium oxide, metformin, olmesartan, fish oil, pantoprazole, potassium chloride, rosuvastatin, and saccharomyces boulardii          Vital Signs: /70   Pulse 94   Ht 5' 2" (1 575 m)   Wt 94 3 kg (208 lb)   LMP  (LMP Unknown)   SpO2 96%   BMI 38 04 kg/m²       Physical Exam:   Constitutional  General Appearance: No acute distress, well appearing and well nourished  Head  Normocephalic  Eyes  Conjunctivae and lids: No swelling, erythema, or discharge  Pupils and irises: Equal, round and reactive to light  Ears, Nose, Mouth, and Throat  External inspection of ears and nose: Normal  Nasal mucosa, septum and turbinates: Normal without edema or erythema/   Oropharynx: Normal with no erythema, edema, exudate or lesions  Neck  Normal range of motion  Neck supple  Cardiovascular  Auscultation of the heart: Normal rate and rhythm, normal S1 and S2 without murmurs  Examination of the extremities for edema and/or varicosities: Normal  Pulmonary/Chest  Respiratory effort: No increased work of breathing or signs of respiratory distress  Auscultation of lungs: Clear to auscultation, equal breath sounds bilaterally, no wheezes, rales, no rhonchi  Abdomen  Abdomen: Non-tender, no masses  Liver and spleen: No hepatomegaly or splenomegaly  Musculoskeletal  Gait and station: normal   Digits and Nails: normal without clubbing or cyanosis  Inspection/palpation of joints, bones, and muscles: Normal  Neurological  No nystagmus or asterixis  Skin  Skin and subcutaneous tissue: Normal without rashes or lesions  Lymphatic  Palpation of the lymph nodes in neck: No lymphadenopathy     Psychiatric  Orientation to person, place and time: Normal   Mood and affect: Normal          Labs:  Lab Results   Component Value Date    ALT 43 12/26/2022    AST 34 12/26/2022    BUN 15 12/26/2022    CALCIUM 9 2 12/26/2022     12/26/2022    CHOL 129 11/04/2015    CO2 27 12/26/2022    CREATININE 0 91 12/26/2022    HDL 51 12/26/2022    HCT 40 6 12/26/2022    HGB 13 3 12/26/2022    HGBA1C 6 4 (H) 12/26/2022    MG 1 9 04/04/2019     12/26/2022    K 4 6 12/26/2022     11/04/2015    TRIG 128 12/26/2022    WBC 8 15 12/26/2022         X-Rays & Procedures:   No orders to display ______________________________________________________________________      Assessment & Plan:     Diagnoses and all orders for this visit:    Gastroesophageal reflux disease, unspecified whether esophagitis present  -     Ambulatory Referral to Nutrition Services; Future    Bloating  -     Ambulatory Referral to Nutrition Services; Future    Other constipation    Weight gain  -     Ambulatory Referral to Nutrition Services; Future    Morbid (severe) obesity due to excess calories ([facility] [facility] )  -     Ambulatory Referral to Nutrition Services; Future      Patient will continue the Linzess 145 mcg  She is given a referral to nutrition because of her dietary issues  Further recommendations will depend on her progress  She will see me again in 1 year  Answers for HPI/ROS submitted by the patient on 3/23/2023  Chronicity: chronic  Onset: more than 1 year ago  Onset quality: gradual  Frequency: daily  Episode duration: 8 Hours  Progression since onset: unchanged  Pain location: LUQ, RUQ  Pain - numeric: 5/10  Pain quality: cramping, a sensation of fullness  Radiates to: does not radiate  anorexia: No  arthralgias: Yes  belching: Yes  constipation: Yes  diarrhea: No  dysuria: No  fever: No  flatus: Yes  frequency: No  headaches: No  hematochezia: No  hematuria: No  melena: No  myalgias: Yes  nausea:  No  weight loss: No  vomiting: No  Aggravated by: eating  Relieved by: passing flatus

## 2023-03-31 DIAGNOSIS — E11.9 TYPE 2 DIABETES MELLITUS WITHOUT COMPLICATION, WITHOUT LONG-TERM CURRENT USE OF INSULIN (HCC): ICD-10-CM

## 2023-03-31 RX ORDER — GLIPIZIDE 5 MG/1
TABLET, FILM COATED, EXTENDED RELEASE ORAL
Qty: 90 TABLET | Refills: 3 | Status: SHIPPED | OUTPATIENT
Start: 2023-03-31

## 2023-04-17 LAB
LEFT EYE DIABETIC RETINOPATHY: NORMAL
RIGHT EYE DIABETIC RETINOPATHY: NORMAL

## 2023-04-17 PROCEDURE — 2023F DILAT RTA XM W/O RTNOPTHY: CPT | Performed by: INTERNAL MEDICINE

## 2023-04-22 ENCOUNTER — APPOINTMENT (OUTPATIENT)
Dept: LAB | Facility: HOSPITAL | Age: 73
End: 2023-04-22

## 2023-04-22 DIAGNOSIS — E11.9 CONTROLLED TYPE 2 DIABETES MELLITUS WITHOUT COMPLICATION, WITH LONG-TERM CURRENT USE OF INSULIN (HCC): ICD-10-CM

## 2023-04-22 DIAGNOSIS — Z79.4 CONTROLLED TYPE 2 DIABETES MELLITUS WITHOUT COMPLICATION, WITH LONG-TERM CURRENT USE OF INSULIN (HCC): ICD-10-CM

## 2023-04-22 LAB
ALBUMIN SERPL BCP-MCNC: 4.5 G/DL (ref 3.5–5)
ALP SERPL-CCNC: 59 U/L (ref 34–104)
ALT SERPL W P-5'-P-CCNC: 36 U/L (ref 7–52)
ANION GAP SERPL CALCULATED.3IONS-SCNC: 9 MMOL/L (ref 4–13)
AST SERPL W P-5'-P-CCNC: 35 U/L (ref 13–39)
BASOPHILS # BLD AUTO: 0.06 THOUSANDS/ΜL (ref 0–0.1)
BASOPHILS NFR BLD AUTO: 1 % (ref 0–1)
BILIRUB SERPL-MCNC: 0.5 MG/DL (ref 0.2–1)
BUN SERPL-MCNC: 21 MG/DL (ref 5–25)
CALCIUM SERPL-MCNC: 10 MG/DL (ref 8.4–10.2)
CHLORIDE SERPL-SCNC: 106 MMOL/L (ref 96–108)
CHOLEST SERPL-MCNC: 138 MG/DL
CO2 SERPL-SCNC: 24 MMOL/L (ref 21–32)
CREAT SERPL-MCNC: 0.85 MG/DL (ref 0.6–1.3)
EOSINOPHIL # BLD AUTO: 0.12 THOUSAND/ΜL (ref 0–0.61)
EOSINOPHIL NFR BLD AUTO: 2 % (ref 0–6)
ERYTHROCYTE [DISTWIDTH] IN BLOOD BY AUTOMATED COUNT: 13.5 % (ref 11.6–15.1)
GFR SERPL CREATININE-BSD FRML MDRD: 67 ML/MIN/1.73SQ M
GLUCOSE P FAST SERPL-MCNC: 149 MG/DL (ref 65–99)
HCT VFR BLD AUTO: 39.7 % (ref 34.8–46.1)
HDLC SERPL-MCNC: 47 MG/DL
HGB BLD-MCNC: 12.8 G/DL (ref 11.5–15.4)
IMM GRANULOCYTES # BLD AUTO: 0.03 THOUSAND/UL (ref 0–0.2)
IMM GRANULOCYTES NFR BLD AUTO: 0 % (ref 0–2)
LDLC SERPL CALC-MCNC: 64 MG/DL (ref 0–100)
LYMPHOCYTES # BLD AUTO: 2.41 THOUSANDS/ΜL (ref 0.6–4.47)
LYMPHOCYTES NFR BLD AUTO: 30 % (ref 14–44)
MCH RBC QN AUTO: 29 PG (ref 26.8–34.3)
MCHC RBC AUTO-ENTMCNC: 32.2 G/DL (ref 31.4–37.4)
MCV RBC AUTO: 90 FL (ref 82–98)
MONOCYTES # BLD AUTO: 0.61 THOUSAND/ΜL (ref 0.17–1.22)
MONOCYTES NFR BLD AUTO: 8 % (ref 4–12)
NEUTROPHILS # BLD AUTO: 4.91 THOUSANDS/ΜL (ref 1.85–7.62)
NEUTS SEG NFR BLD AUTO: 59 % (ref 43–75)
NONHDLC SERPL-MCNC: 91 MG/DL
NRBC BLD AUTO-RTO: 0 /100 WBCS
PLATELET # BLD AUTO: 311 THOUSANDS/UL (ref 149–390)
PMV BLD AUTO: 9.2 FL (ref 8.9–12.7)
POTASSIUM SERPL-SCNC: 4.7 MMOL/L (ref 3.5–5.3)
PROT SERPL-MCNC: 7.7 G/DL (ref 6.4–8.4)
RBC # BLD AUTO: 4.41 MILLION/UL (ref 3.81–5.12)
SODIUM SERPL-SCNC: 139 MMOL/L (ref 135–147)
TRIGL SERPL-MCNC: 136 MG/DL
TSH SERPL DL<=0.05 MIU/L-ACNC: 1.7 UIU/ML (ref 0.45–4.5)
WBC # BLD AUTO: 8.14 THOUSAND/UL (ref 4.31–10.16)

## 2023-04-23 LAB
CREAT UR-MCNC: 74.4 MG/DL
EST. AVERAGE GLUCOSE BLD GHB EST-MCNC: 146 MG/DL
HBA1C MFR BLD: 6.7 %
MICROALBUMIN UR-MCNC: 12.9 MG/L (ref 0–20)
MICROALBUMIN/CREAT 24H UR: 17 MG/G CREATININE (ref 0–30)

## 2023-04-26 ENCOUNTER — OFFICE VISIT (OUTPATIENT)
Age: 73
End: 2023-04-26

## 2023-04-26 VITALS
BODY MASS INDEX: 38.57 KG/M2 | WEIGHT: 209.6 LBS | TEMPERATURE: 97.9 F | DIASTOLIC BLOOD PRESSURE: 70 MMHG | RESPIRATION RATE: 21 BRPM | HEART RATE: 107 BPM | SYSTOLIC BLOOD PRESSURE: 136 MMHG | HEIGHT: 62 IN | OXYGEN SATURATION: 99 %

## 2023-04-26 DIAGNOSIS — C54.1 ENDOMETRIAL CANCER (HCC): ICD-10-CM

## 2023-04-26 DIAGNOSIS — E78.5 DYSLIPIDEMIA: ICD-10-CM

## 2023-04-26 DIAGNOSIS — J34.89 NASAL SEPTAL PERFORATION: ICD-10-CM

## 2023-04-26 DIAGNOSIS — E03.9 ACQUIRED HYPOTHYROIDISM: ICD-10-CM

## 2023-04-26 DIAGNOSIS — E11.9 CONTROLLED TYPE 2 DIABETES MELLITUS WITHOUT COMPLICATION, WITH LONG-TERM CURRENT USE OF INSULIN (HCC): Primary | ICD-10-CM

## 2023-04-26 DIAGNOSIS — I25.10 CORONARY ATHEROSCLEROSIS DUE TO CALCIFIED CORONARY LESION: ICD-10-CM

## 2023-04-26 DIAGNOSIS — Z79.4 CONTROLLED TYPE 2 DIABETES MELLITUS WITHOUT COMPLICATION, WITH LONG-TERM CURRENT USE OF INSULIN (HCC): Primary | ICD-10-CM

## 2023-04-26 DIAGNOSIS — E66.9 OBESITY (BMI 30-39.9): ICD-10-CM

## 2023-04-26 DIAGNOSIS — I25.84 CORONARY ATHEROSCLEROSIS DUE TO CALCIFIED CORONARY LESION: ICD-10-CM

## 2023-04-26 DIAGNOSIS — I10 BENIGN ESSENTIAL HYPERTENSION: ICD-10-CM

## 2023-04-26 PROBLEM — E66.01 MORBID (SEVERE) OBESITY DUE TO EXCESS CALORIES (HCC): Status: RESOLVED | Noted: 2021-03-31 | Resolved: 2023-04-26

## 2023-04-26 NOTE — PROGRESS NOTES
INTERNAL MEDICINE OFFICE VISIT  [facility] AT [address]  Tel: [phone]      NAME: Daniella Marti  AGE: 74 y o  SEX: female  : 1950   MRN: [id number]    DATE: 2023  TIME: 4:43 PM      Assessment and Plan:  1  Controlled type 2 diabetes mellitus without complication, with long-term current use of insulin (HCC)  Continue present medications, A1c is well controlled at 6 7  - CBC and differential; Future  - Comprehensive metabolic panel; Future  - Lipid panel; Future  - TSH, 3rd generation; Future  - Hemoglobin A1C; Future    2  Benign essential hypertension  Continue olmesartan, blood pressure is controlled    3  Dyslipidemia  Continue Zetia and Crestor    4  Acquired hypothyroidism  Continue levothyroxine at the same dose    5  Coronary atherosclerosis due to calcified coronary lesion  Follow-up with cardiology    6  Nasal septal perforation    - Ambulatory Referral to Otolaryngology; Future    7  Endometrial cancer (HCC)  Stable, follow-up with oncology    8  Obesity (BMI 30-39  9)  BMI Counseling: Body mass index is 38 34 kg/m²  The BMI is above normal  Nutrition recommendations include decreasing portion sizes, encouraging healthy choices of fruits and vegetables and moderation in carbohydrate intake  Exercise recommendations include moderate physical activity 150 minutes/week  Rationale for BMI follow-up plan is due to patient being overweight or obese  - Counseling Documentation: patient was counseled regarding: diagnostic results, instructions for management, risk factor reductions, prognosis, patient and family education, risks and benefits of treatment options and importance of compliance with treatment  - Medication Side Effects: Adverse side effects of medications were reviewed with the patient/guardian today  Return for follow up visit in 6 months or earlier, if needed        Chief Complaint:  Chief Complaint   Patient presents with   • Annual Exam         History of Present Illness:   Type 2 diabetes is very well controlled on the present medications  Blood pressure and cholesterol are stable  TSH is within normal range  She follows up with cardiology regularly  She has a nasal septal perforation for which she wants to see ENT and wants to get it fixed  Needs to lose weight      Active Problem List:  Patient Active Problem List   Diagnosis   • Diabetes mellitus type 2, controlled, without complications ([address] )   • Dysmetabolic syndrome X   • Benign essential hypertension   • Obesity (BMI 30-39  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)   • Cortical age-related cataract of both eyes   • Bilateral hip pain   • PND (post-nasal drip)   • Hearing loss of right ear   • Nasal septal perforation         Past Medical History:  Past Medical History:   Diagnosis Date   • Abnormal mammogram    • Abnormal weight gain    • Achilles tendinitis    • Allergic 1952    since childhood   • Arthritis 1988   • Asthma    • Cancer ([address] ) 2018    Endometrial adenoma   • Combined forms of age-related cataract of both eyes 8/18/2021    Added per ICD-10-CM coding guidelines - provider accepted   • Diabetes mellitus ([address] )    • Disc degeneration, lumbar    • Disease of thyroid gland     hypo   • Dyslipidemia    • Dyslipidemia, goal LDL below 70 3/27/2018   • Early satiety    • Edema idiopathic peripheral   • Encounter for follow-up examination after completed treatment for malignant neoplasm 3/17/2019   • Encounter for gynecological examination without abnormal finding 2020   • Endometrial cancer ([facility] [address] ) 2018   • Enthesopathy     hip region   • Esophagitis, reflux    • GERD (gastroesophageal reflux disease)    • Hard to intubate     difficulty with intubation and had to be intubated twice   • Heart murmur 2018   • Hypercholesterolemia    • Hypertension    • IBS (irritable bowel syndrome)    • Irritable bowel syndrome    • Morbid (severe) obesity due to excess calories ([facility] [address] ) 2022    As per CMS ICD10 guidelines:Provider accepted   • Obesity    • Obesity, Class I, BMI 30-34 9 2015   • Osteoarthritis    • Pneumonia    • Rosacea    • Sacroiliitis (HCC)    • Shingles    • Sleep apnea, obstructive          Past Surgical History:  Past Surgical History:   Procedure Laterality Date   •  SECTION      x2   • COLONOSCOPY     • ELBOW SURGERY      left ulna/radius   • ESOPHAGOGASTRODUODENOSCOPY     • FOOT SURGERY Right    • FRACTURE SURGERY     • FRACTURE SURGERY Left     tibia   • HYSTERECTOMY Bilateral 2018   • JOINT REPLACEMENT Left     shoulder,  right knee   • KNEE ARTHROSCOPY     • KNEE ARTHROSCOPY W/ MENISCAL REPAIR Right    • DC LAPS TOTAL HYSTERECT 250 GM/< W/RMVL TUBE/OVARY N/A 2018    Procedure: ROBOTIC TOTAL LAPAROSCOPIC HYSTERECTOMY, BILATERAL SALPINGOOPHORECTOMY, BILATERAL PELVIC LYMPHNODE DISSECTION;  Surgeon: Gifford Goodpasture, MD;  Location: [facility];  Service: Gynecology Oncology   • REPLACEMENT TOTAL KNEE Right    • SHOULDER ARTHROPLASTY     • SINUS SURGERY     • TONSILLECTOMY     • TUBAL LIGATION     • WRIST SURGERY      ganglonic cyst         Family History:  Family History   Problem Relation Age of Onset   • Arthritis Mother    • Heart disease Mother         cardiac disorder   • Diabetes Mother    • Hiatal hernia Mother    • Hypertension Mother    • Irritable bowel syndrome Mother    • Breast cancer Mother         x2   • Uterine cancer Mother    • Osteoporosis Mother    • Thyroid disease Mother    • Other Mother         gastric reflux   • Stroke Mother    • Hyperlipidemia Mother    • Cancer Mother         ENDOMETRIAL   • Heart disease Father         cardiac disorder   • Hiatal hernia Father    • Ulcers Father    • Other Father         gastric reflux   • Coronary artery disease Father    • Hearing loss Father    • Hiatal hernia Sister    • Thyroid disease Sister    • Other Sister         gastric reflux   • Lung cancer Sister 68   • Cancer Sister         lung adenocarcinoma, mets to meninges   • Irritable bowel syndrome Daughter    • Hyperlipidemia Daughter    • Brain cancer Maternal Grandmother    • Breast cancer Maternal Grandmother         uknown   • No Known Problems Maternal Grandfather    • No Known Problems Paternal Grandmother    • No Known Problems Paternal Grandfather    • No Known Problems Brother    • Malig Hypertension Son    • Hiatal hernia Child    • Other Child         gastric reflux   • Breast cancer Maternal Aunt 65   • Uterine cancer Maternal Aunt         x3 sis   • Stroke Maternal Aunt    • Cancer Maternal Aunt    • Breast cancer Maternal Aunt 55   • Cancer Maternal Aunt    • Breast cancer Maternal Aunt 55   • Cancer Maternal Aunt    • No Known Problems Paternal Aunt    • Colon cancer Neg Hx    • Ovarian cancer Neg Hx    • Cervical cancer Neg Hx          Social History:  Social History     Socioeconomic History   • Marital status:      Spouse name: None   • Number of children: 3   • Years of education: None   • Highest education level: None   Occupational History   • Occupation: nurse     Comment: full-time   Tobacco Use   • Smoking status: Never   • Smokeless tobacco: Never   Vaping Use   • Vaping Use: Never used   Substance and Sexual Activity   • Alcohol use:  Yes     Alcohol/week: 4 0 standard drinks     Types: 2 Glasses of wine, 2 Standard drinks or equivalent per week   • Drug use: No   • Sexual activity: Not Currently     Partners: Female     Birth control/protection: Post-menopausal, Surgical, Female Sterilization   Other Topics Concern   • None   Social History Narrative    Active advance directive    DME- freestyle lite blood glucose meter device kit QID     Social Determinants of Health     Financial Resource Strain: Not on file   Food Insecurity: Not on file   Transportation Needs: Not on file   Physical Activity: Not on file   Stress: Not on file   Social Connections: Not on file   Intimate Partner Violence: Not on file   Housing Stability: Not on file         Allergies:   Allergies   Allergen Reactions   • Amoxicillin-Pot Clavulanate Anaphylaxis, Hives, Itching and Facial Swelling   • Erythromycin Hives, Itching, Swelling, Vomiting, Throat Swelling, Nasal Congestion and Facial Swelling   • Meperidine Anaphylaxis   • Morphine Hives, Itching, Swelling, Other (See Comments) and Syncope     hypotension   • Penicillins Anaphylaxis, Itching and Swelling   • Relafen [Nabumetone] Hives, Itching and Swelling   • Darvon [Propoxyphene] Hives   • Methocarbamol Other (See Comments)     Double vision   • Reglan [Metoclopramide] Anxiety   • Sulfa Antibiotics Hives, Itching, Rash and Swelling   • Tetracyclines & Related Rash         Medications:    Current Outpatient Medications:   •  albuterol (PROVENTIL HFA,VENTOLIN HFA) 90 mcg/act inhaler, Inhale 2 puffs every 6 (six) hours as needed for wheezing, Disp: 18 g, Rfl: 2  •  Calcium-Magnesium-Vitamin D (CALCIUM 1200+D3 PO), , Disp: , Rfl:   •  clobetasol (TEMOVATE) 0 05 % ointment, Apply topically 2 (two) times a day, Disp: 30 g, Rfl: 1  •  conjugated estrogens (PREMARIN) vaginal cream, Apply pea-sized amount to the lower vaginal area 1-2 times per week, Disp: 42 5 g, Rfl: 1  •  ezetimibe (ZETIA) 10 mg tablet, Take 1 tablet (10 mg total) by mouth daily, Disp: 90 tablet, Rfl: 0  •  furosemide (LASIX) 20 "mg tablet, Take 1 tablet (20 mg total) by mouth daily, Disp: 90 tablet, Rfl: 0  •  glipiZIDE (GLUCOTROL XL) 5 mg 24 hr tablet, TAKE 1 TABLET DAILY, Disp: 90 tablet, Rfl: 3  •  glucose blood test strip, Use 1 each 3 (three) times a day Use as instructed, Disp: 300 each, Rfl: 3  •  insulin lispro (HumaLOG) 100 units/mL injection, Inject 5 Units under the skin 3 (three) times a day with meals 1 unit of insulin for 30 mg over 130, Disp: 15 mL, Rfl: 3  •  Insulin Syringe-Needle U-100 (INSULIN SYRINGE 1CC/31GX5/16\") 31G X 5/16\" 1 ML MISC, BD Veo Insulin Syringe Ultra-Fine 0 3 mL 31 gauge x 15/64\", Disp: , Rfl:   •  KRILL OIL PO, Take by mouth, Disp: , Rfl:   •  levothyroxine 75 mcg tablet, Take 1 tablet (75 mcg total) by mouth daily, Disp: 90 tablet, Rfl: 0  •  Linzess 145 MCG CAPS, Take 1 capsule (145 mcg total) by mouth daily Take 30 minutes before breakfast, Disp: 30 capsule, Rfl: 0  •  LOW-DOSE ASPIRIN PO, , Disp: , Rfl:   •  Magnesium Oxide 400 MG CAPS, Take by mouth, Disp: , Rfl:   •  metFORMIN (GLUCOPHAGE-XR) 500 mg 24 hr tablet, Take 1 tab in am and 2 tabs in pm, Disp: 270 tablet, Rfl: 3  •  olmesartan (BENICAR) 40 mg tablet, Take 1 tablet (40 mg total) by mouth daily, Disp: 90 tablet, Rfl: 3  •  Omega-3 Fatty Acids (FISH OIL) 1200 MG CAPS, Take by mouth, Disp: , Rfl:   •  pantoprazole (PROTONIX) 40 mg tablet, Take 1 tablet (40 mg total) by mouth daily, Disp: 90 tablet, Rfl: 0  •  potassium chloride (MICRO-K) 10 MEQ CR capsule, Take 1 capsule (10 mEq total) by mouth daily, Disp: 90 capsule, Rfl: 0  •  rosuvastatin (CRESTOR) 20 MG tablet, Take 1 tablet (20 mg total) by mouth daily, Disp: 90 tablet, Rfl: 0  •  saccharomyces boulardii (FLORASTOR) 250 mg capsule, Take 250 mg by mouth daily  , Disp: , Rfl:       The following portions of the patient's history were reviewed and updated as appropriate: past medical history, past surgical history, family history, social history, allergies, current medications and active " problem list       Review of Systems:  Constitutional: Denies fever, chills, weight gain, weight loss, fatigue  Eyes: Denies eye redness, eye discharge, double vision, change in visual acuity  ENT: Denies hearing loss, tinnitus, sneezing, nasal congestion, nasal discharge, sore throat   Respiratory: Denies cough, expectoration, hemoptysis, shortness of breath, wheezing  Cardiovascular: Denies chest pain, palpitations, lower extremity swelling, orthopnea, PND  Gastrointestinal: Denies abdominal pain, heartburn, nausea, vomiting, hematemesis, diarrhea, bloody stools  Genito-Urinary: Denies dysuria, frequency, difficulty in micturition, nocturia, incontinence  Musculoskeletal: Denies back pain, joint pain, muscle pain  Neurologic: Denies confusion, lightheadedness, syncope, headache, focal weakness, sensory changes, seizures  Endocrine: Denies polyuria, polydipsia, temperature intolerance  Allergy and Immunology: Denies hives, insect bite sensitivity  Hematological and Lymphatic: Denies bleeding problems, swollen glands   Psychological: Denies depression, suicidal ideation, anxiety, panic, mood swings  Dermatological: Denies pruritus, rash, skin lesion changes      Vitals:  Vitals:    04/26/23 1611   BP: 136/70   Pulse: (!) 107   Resp: 21   Temp: 97 9 °F (36 6 °C)   SpO2: 99%       Body mass index is 38 34 kg/m²  Weight (last 2 days)     Date/Time Weight    04/26/23 1611 95 1 (209 6)            Physical Examination:  General: Patient is not in acute distress  Awake, alert, responding to commands  No weight gain or loss  Head: Normocephalic  Atraumatic  Eyes: Conjunctiva and lids with no swelling, erythema or discharge  Both pupils normal sized, round and reactive to light  Sclera nonicteric  ENT: External examination of nose and ear normal  Otoscopic examination shows translucent tympanic membranes with patent canals without erythema  Oropharynx moist with no erythema, edema, exudate or lesions  Neck: Supple   JVP not raised  Trachea midline  No masses  No thyromegaly  Lungs: No signs of increased work of breathing or respiratory distress  Bilateral bronchovascular breath sounds with no crackles or rhonchi  Chest wall: No tenderness  Cardiovascular: Normal PMI  No thrills  Regular rate and rhythm  S1 and S2 normal  No murmur, rub or gallop  Gastrointestinal: Abdomen soft, nontender  No guarding or rigidity  Liver and spleen not palpable  Bowel sounds present  Neurologic: Cranial nerves II-XII intact  Cortical functions normal  Motor system - Reflexes 2+ and symmetrical  Sensations normal  Musculoskeletal: Gait normal  No joint tenderness  Integumentary: Skin normal with no rash or lesions  Lymphatic: No palpable lymph nodes in neck, axilla or groin  Extremities: No clubbing, cyanosis, edema or varicosities  Psychological: Judgement and insight normal  Mood and affect normal    Patient's shoes and socks removed  Right Foot/Ankle   Right Foot Inspection  Skin Exam: skin normal and skin intact  No dry skin, no warmth, no callus, no erythema, no maceration, no abnormal color, no pre-ulcer, no ulcer and no callus  Toe Exam: ROM and strength within normal limits  Sensory   Proprioception: diminished and intact  Monofilament testing: intact    Vascular  Capillary refills: < 3 seconds  The right DP pulse is 2+  The right PT pulse is 2+  Left Foot/Ankle  Left Foot Inspection  Skin Exam: skin normal and skin intact  No dry skin, no warmth, no erythema, no maceration, normal color, no pre-ulcer, no ulcer and no callus  Toe Exam: ROM and strength within normal limits  Sensory   Proprioception: intact  Monofilament testing: intact    Vascular  Capillary refills: < 3 seconds  The left DP pulse is 2+  The left PT pulse is 2+       Assign Risk Category  No deformity present  No loss of protective sensation  No weak pulses  Risk: 0    Laboratory Results:  CBC with diff:   Lab Results   Component Value Date    WBC 8 14 04/22/2023    WBC 9 23 11/04/2015    RBC 4 41 04/22/2023    RBC 4 49 11/04/2015    HGB 12 8 04/22/2023    HGB 12 3 11/04/2015    HCT 39 7 04/22/2023    HCT 37 4 11/04/2015    MCV 90 04/22/2023    MCV 83 11/04/2015    MCH 29 0 04/22/2023    MCH 27 4 11/04/2015    RDW 13 5 04/22/2023    RDW 15 7 (H) 11/04/2015     04/22/2023     (H) 11/04/2015       CMP:  Lab Results   Component Value Date    CREATININE 0 85 04/22/2023    CREATININE 0 91 11/04/2015    BUN 21 04/22/2023    BUN 26 (H) 11/04/2015     11/04/2015    K 4 7 04/22/2023    K 4 2 11/04/2015     04/22/2023     (H) 11/04/2015    CO2 24 04/22/2023    CO2 26 11/04/2015    GLUCOSE 107 11/04/2015    PROT 7 0 11/04/2015    ALKPHOS 59 04/22/2023    ALKPHOS 71 11/04/2015    ALT 36 04/22/2023    ALT 30 11/04/2015    AST 35 04/22/2023    AST 20 11/04/2015    BILIDIR 0 10 06/30/2015       Lab Results   Component Value Date    HGBA1C 6 7 (H) 04/22/2023    HGBA1C 6 6 (H) 11/04/2015    MG 1 9 04/04/2019       No results found for: TROPONINI, CKMB, CKTOTAL    Lipid Profile:   Lab Results   Component Value Date    CHOL 129 11/04/2015    CHOL 102 06/25/2015     Lab Results   Component Value Date    HDL 47 (L) 04/22/2023    HDL 51 12/26/2022     Lab Results   Component Value Date    LDLCALC 64 04/22/2023    LDLCALC 47 12/26/2022     Lab Results   Component Value Date    TRIG 136 04/22/2023    TRIG 128 12/26/2022       Imaging Results:  DXA bone density spine hip and pelvis  Narrative: DXA SCAN    CLINICAL HISTORY: 74 years postmenopausal female   OTHER RISK FACTORS:  Prior fracture as a result of minor injury, PPI therapy, furosemide therapy  PHARMACOLOGIC THERAPY FOR OSTEOPOROSIS:  None  TECHNIQUE: Bone densitometry was performed using a [employer] W  bone densitometer  Regions of interest appear properly placed  COMPARISON: 2/10/2021      RESULTS:     LUMBAR SPINE  Level: L1-L4 :   BMD:  1 121  gm/cm2   T-score: 0 7   These values may be artifactually elevated due to degenerative sclerosis and osteophytosis  LEFT  TOTAL HIP:   BMD:  1 039  gm/cm2   T-score:  0 8    LEFT  FEMORAL NECK:   BMD:  0 830  gm/cm2   T score: -0 2   Impression: 1  Normal bone density  2   Since a DXA study from 2/10/2021, there has been:  No statistically significant change in bone mineral density at any of the evaluated skeletal sites  3   The 10 year risk of hip fracture is 0 6% with the 10 year risk of major osteoporotic fracture being 7 0% as calculated by the [facility] fracture risk assessment tool (FRAX, which is based on data generated by the [facility]abolic Bone Diseases)  4   The current NOF guidelines recommend treating patients with a T-score of -2 5 or less in the lumbar spine or hips, or in post-menopausal women and men over the age of 48 with low bone mass (osteopenia) and a FRAX 10 year risk score of >3% for hip   fracture and/or >20% for major osteoporotic fracture  5   The NOF recommends follow-up DXA in 1-2 years after initiating therapy for osteoporosis and every 2 years thereafter  More frequent evaluation is appropriate for patients with conditions associated with rapid bone loss, such as glucocorticoid   therapy  The interval between DXA screenings may be longer for individuals without major risk factors and initial T-score in the normal or upper low bone mass range  The FRAX algorithm has certain limitations:  -FRAX has not been validated in patients currently or previously treated with pharmacotherapy for osteoporosis  In such patients, clinical judgment must be exercised in interpreting FRAX scores      -Prior hip, vertebral and humeral fragility fractures appear to confer greater risk of subsequent fracture than fractures at other sites (this is especially true for individuals with severe vertebral fractures), but quantification of this incremental   risk is not possible with FRAX  -FRAX underestimates fracture risk in patients with history of multiple fragility fractures  -FRAX may underestimate fracture risk in patients with history of frequent falls   -It is not appropriate to use FRAX to monitor treatment response  WHO CLASSIFICATION:  Normal (a T-score of -1 0 or higher)  Low bone mineral density (a T-score of less than -1 0 but higher than -2 5)  Osteoporosis (a T-score of -2 5 or less)  Severe osteoporosis (a T-score of -2 5 or less with a fragility fracture)    LEAST SIGNIFICANT CHANGE (AT 95% C  I):  Lumbar spine: 0 034 g/cm2; 3 4%  Total hip: 0 041 g/cm2; 4 5%  Forearm: 0 025 g/cm2; 4 4%    Workstation performed: [id number]       Health Maintenance:  Health Maintenance   Topic Date Due   • PT PLAN OF CARE  08/05/2021   • Annual Physical  10/11/2022   • BMI: Followup Plan  04/15/2023   • Diabetic Foot Exam  04/15/2023   • Breast Cancer Screening: Mammogram  06/22/2023   • Fall Risk  07/20/2023   • HEMOGLOBIN A1C  10/22/2023   • Depression Screening  12/27/2023   • Urinary Incontinence Screening  12/27/2023   • Kidney Health Evaluation: GFR  04/22/2024   • Kidney Health Evaluation: Microalbumin/Creatinine Ratio  04/22/2024   • BMI: Adult  04/26/2024   • Colorectal Cancer Screening  12/31/2024   • DM Eye Exam  04/17/2025   • DXA SCAN  04/18/2026   • DTaP,Tdap,and Td Vaccines (4 - Td or Tdap) 09/30/2030   • Hepatitis C Screening  Completed   • Osteoporosis Screening  Completed   • Pneumococcal Vaccine: 65+ Years  Completed   • Influenza Vaccine  Completed   • COVID-19 Vaccine  Completed   • HIB Vaccine  Aged Out   • IPV Vaccine  Aged Out   • Hepatitis A Vaccine  Aged Out   • Meningococcal ACWY Vaccine  Aged Out   • HPV Vaccine  Aged Out     Immunization History   Administered Date(s) Administered   • COVID-19 MODERNA VACC 0 5 ML IM 01/05/2021, 02/04/2021, 10/25/2021   • COVID-19 Moderna Vac BIVALENT 12 Yr+ IM (BOOSTER ONLY) 0 5 ML 09/06/2022   • H1N1, All Formulations 11/05/2009   • INFLUENZA 09/26/2021, 09/06/2022   • Influenza Split High Dose Preservative Free IM 10/06/2014, 09/30/2015, 10/20/2017   • Influenza, high dose seasonal 0 7 mL 10/09/2018, 09/30/2020   • Pneumococcal Conjugate 13-Valent 09/30/2015   • Pneumococcal Polysaccharide PPV23 11/17/2008, 10/06/2014, 03/23/2017   • Tdap 10/09/2018, 10/12/2018, 09/30/2020   • Tuberculin Skin Test-PPD Intradermal 07/03/2019   • Zoster 10/25/2017   • influenza, trivalent, adjuvanted 09/28/2019         Eusebio , MD  0/04/7793,6:92 PM

## 2023-05-09 DIAGNOSIS — E03.9 ACQUIRED HYPOTHYROIDISM: ICD-10-CM

## 2023-05-09 DIAGNOSIS — E78.2 MIXED HYPERLIPIDEMIA: ICD-10-CM

## 2023-05-09 DIAGNOSIS — I10 ESSENTIAL HYPERTENSION: ICD-10-CM

## 2023-05-09 DIAGNOSIS — M25.50 ARTHRALGIA, UNSPECIFIED JOINT: ICD-10-CM

## 2023-05-09 RX ORDER — FUROSEMIDE 20 MG/1
TABLET ORAL
Qty: 90 TABLET | Refills: 3 | Status: SHIPPED | OUTPATIENT
Start: 2023-05-09

## 2023-05-09 RX ORDER — LEVOTHYROXINE SODIUM 0.07 MG/1
TABLET ORAL
Qty: 90 TABLET | Refills: 3 | Status: SHIPPED | OUTPATIENT
Start: 2023-05-09

## 2023-05-09 RX ORDER — EZETIMIBE 10 MG/1
TABLET ORAL
Qty: 90 TABLET | Refills: 3 | Status: SHIPPED | OUTPATIENT
Start: 2023-05-09

## 2023-05-09 RX ORDER — POTASSIUM CHLORIDE 750 MG/1
CAPSULE, EXTENDED RELEASE ORAL
Qty: 90 CAPSULE | Refills: 3 | Status: SHIPPED | OUTPATIENT
Start: 2023-05-09

## 2023-05-09 RX ORDER — ROSUVASTATIN CALCIUM 20 MG/1
TABLET, COATED ORAL
Qty: 90 TABLET | Refills: 3 | Status: SHIPPED | OUTPATIENT
Start: 2023-05-09

## 2023-05-09 RX ORDER — PANTOPRAZOLE SODIUM 40 MG/1
TABLET, DELAYED RELEASE ORAL
Qty: 90 TABLET | Refills: 3 | Status: SHIPPED | OUTPATIENT
Start: 2023-05-09

## 2023-05-17 ENCOUNTER — CLINICAL SUPPORT (OUTPATIENT)
Age: 73
End: 2023-05-17

## 2023-05-17 VITALS — WEIGHT: 205 LBS | BODY MASS INDEX: 37.73 KG/M2 | HEIGHT: 62 IN

## 2023-05-17 DIAGNOSIS — L90.0 LICHEN SCLEROSUS: Primary | ICD-10-CM

## 2023-05-17 NOTE — PATIENT INSTRUCTIONS
Process appears under control we will go ahead and decrease her clobetasol to once a day for 3 months and then every other day and reevaluate her in 6 months or earlier if necessary

## 2023-05-17 NOTE — PROGRESS NOTES
[facility] DERMATOLOGY [address] [address]  [phone]  [phone]     MRN: [id number] : 1950  Encounter: [id number]  Patient Information: Brittnee Hudson  Chief complaint: 3 month check    History of present illness: 38-year-old female presents for recheck of lichen sclerosis patient has been using the clobetasol twice a day notes symptomatology has resolved    In addition however patient notes her sugars have been more difficult to control and she feels some swelling of her face  Past Medical History:   Diagnosis Date   • Abnormal mammogram    • Abnormal weight gain    • Achilles tendinitis    • Allergic     since childhood   • Arthritis    • Asthma    • Cancer ([address] ) 2018    Endometrial adenoma   • Combined forms of age-related cataract of both eyes 2021    Added per ICD-10-CM coding guidelines - provider accepted   • Diabetes mellitus ([address] )    • Disc degeneration, lumbar    • Disease of thyroid gland     hypo   • Dyslipidemia    • Dyslipidemia, goal LDL below 70 3/27/2018   • Early satiety    • Edema     idiopathic peripheral   • Encounter for follow-up examination after completed treatment for malignant neoplasm 3/17/2019   • Encounter for gynecological examination without abnormal finding 2020   • Endometrial cancer ([address] ) 2018   • Enthesopathy     hip region   • Esophagitis, reflux    • GERD (gastroesophageal reflux disease)    • Hard to intubate     difficulty with intubation and had to be intubated twice   • Heart murmur 2018   • Hypercholesterolemia    • Hypertension    • IBS (irritable bowel syndrome)    • Irritable bowel syndrome    • Morbid (severe) obesity due to excess calories ([address] ) 2022    As per CMS ICD10 guidelines:Provider accepted   • Obesity    • Obesity, Class I, BMI 30-34 9 2015   • Osteoarthritis    • Pneumonia    • Rosacea    • Sacroiliitis ([address] )    • Shingles    • Sleep apnea, obstructive      Past Surgical History:   Procedure Laterality Date   •  SECTION      x2   • COLONOSCOPY     • ELBOW SURGERY      left ulna/radius   • ESOPHAGOGASTRODUODENOSCOPY     • FOOT SURGERY Right    • FRACTURE SURGERY     • FRACTURE SURGERY Left     tibia   • HYSTERECTOMY Bilateral 2018   • JOINT REPLACEMENT Left     shoulder,  right knee   • KNEE ARTHROSCOPY     • KNEE ARTHROSCOPY W/ MENISCAL REPAIR Right    • ID LAPS TOTAL HYSTERECT 250 GM/< W/RMVL TUBE/OVARY N/A 2018    Procedure: ROBOTIC TOTAL LAPAROSCOPIC HYSTERECTOMY, BILATERAL SALPINGOOPHORECTOMY, BILATERAL PELVIC LYMPHNODE DISSECTION;  Surgeon: Vladimir Blount MD;  Location: [facility];  Service: Gynecology Oncology   • REPLACEMENT TOTAL KNEE Right    • SHOULDER ARTHROPLASTY     • SINUS SURGERY     • TONSILLECTOMY     • TUBAL LIGATION     • WRIST SURGERY      ganglonic cyst     Social History   Social History     Substance and Sexual Activity   Alcohol Use Yes   • Alcohol/week: 4 0 standard drinks   • Types: 2 Glasses of wine, 2 Standard drinks or equivalent per week     Social History     Substance and Sexual Activity   Drug Use No     Social History     Tobacco Use   Smoking Status Never   Smokeless Tobacco Never     Family History   Problem Relation Age of Onset   • Arthritis Mother    • Heart disease Mother         cardiac disorder   • Diabetes Mother    • Hiatal hernia Mother    • Hypertension Mother    • Irritable bowel syndrome Mother    • Breast cancer Mother         x2   • Uterine cancer Mother    • Osteoporosis Mother    • Thyroid disease Mother    • Other Mother         gastric reflux   • Stroke Mother    • Hyperlipidemia Mother    • Cancer Mother         ENDOMETRIAL   • Heart disease Father         cardiac disorder   • Hiatal hernia Father    • Ulcers Father    • Other Father         gastric reflux   • Coronary artery disease Father    • Hearing loss Father    • Hiatal hernia Sister    • Thyroid disease Sister    • Other Sister         gastric reflux   • Lung cancer Sister 68   • Cancer Sister         lung adenocarcinoma, mets to meninges   • Irritable bowel syndrome Daughter    • Hyperlipidemia Daughter    • Brain cancer Maternal Grandmother    • Breast cancer Maternal Grandmother         [place]   • No Known Problems Maternal Grandfather    • No Known Problems Paternal Grandmother    • No Known Problems Paternal Grandfather    • No Known Problems Brother    • Malig Hypertension Son    • Hiatal hernia Child    • Other Child         gastric reflux   • Breast cancer Maternal Aunt 65   • Uterine cancer Maternal Aunt         x3 sis   • Stroke Maternal Aunt    • Cancer Maternal Aunt    • Breast cancer Maternal Aunt 55   • Cancer Maternal Aunt    • Breast cancer Maternal Aunt 55   • Cancer Maternal Aunt    • No Known Problems Paternal Aunt    • Colon cancer Neg Hx    • Ovarian cancer Neg Hx    • Cervical cancer Neg Hx      Meds/Allergies   Allergies   Allergen Reactions   • Amoxicillin-Pot Clavulanate Anaphylaxis, Hives, Itching and Facial Swelling   • Erythromycin Hives, Itching, Swelling, Vomiting, Throat Swelling, Nasal Congestion and Facial Swelling   • Meperidine Anaphylaxis   • Morphine Hives, Itching, Swelling, Other (See Comments) and Syncope     hypotension   • Penicillins Anaphylaxis, Itching and Swelling   • Relafen [Nabumetone] Hives, Itching and Swelling   • Darvon [Propoxyphene] Hives   • Methocarbamol Other (See Comments)     Double vision   • Reglan [Metoclopramide] Anxiety   • Sulfa Antibiotics Hives, Itching, Rash and Swelling   • Tetracyclines & Related Rash       Meds:  Prior to Admission medications    Medication Sig Start Date End Date Taking?  Authorizing Provider   albuterol (PROVENTIL HFA,VENTOLIN HFA) 90 mcg/act inhaler Inhale 2 puffs every 6 (six) hours as needed for wheezing 6/28/21  Yes Naldo Gonzalez PA-C   Calcium-Magnesium-Vitamin D (CALCIUM 1200+D3 PO)  9/17/1990  Yes Historical Provider, MD   clobetasol (TEMOVATE) 0 05 % "ointment Apply topically 2 (two) times a day 2/15/23  Yes Cong Stewart MD   conjugated estrogens (PREMARIN) vaginal cream Apply pea-sized amount to the lower vaginal area 1-2 times per week 7/14/22  Yes PRESLEY Langston   ezetimibe (ZETIA) 10 mg tablet TAKE 1 TABLET DAILY 5/9/23  Yes Belinda Otero MD   furosemide (LASIX) 20 mg tablet TAKE 1 TABLET DAILY 5/9/23  Yes Belinda Otero MD   glipiZIDE (GLUCOTROL XL) 5 mg 24 hr tablet TAKE 1 TABLET DAILY 3/31/23  Yes Belinda Otero MD   glucose blood test strip Use 1 each 3 (three) times a day Use as instructed 6/27/22  Yes Belinda Otero MD   insulin lispro (HumaLOG) 100 units/mL injection Inject 5 Units under the skin 3 (three) times a day with meals 1 unit of insulin for 30 mg over 130 8/4/22  Yes Belinda Otero MD   Insulin Syringe-Needle U-100 (INSULIN SYRINGE 1CC/31GX5/16\") 31G X 5/16\" 1 ML MISC BD Veo Insulin Syringe Ultra-Fine 0 3 mL 31 gauge x 15/64\"   Yes Historical Provider, MD   KRILL OIL PO Take by mouth   Yes Historical Provider, MD   levothyroxine 75 mcg tablet TAKE 1 TABLET DAILY 5/9/23  Yes Belinda Otero MD   Linzess 145 MCG CAPS Take 1 capsule (145 mcg total) by mouth daily Take 30 minutes before breakfast 2/16/23  Yes Kemar Gutierrez MD   LOW-DOSE ASPIRIN PO  9/17/10  Yes Historical Provider, MD   Magnesium Oxide 400 MG CAPS Take by mouth   Yes Historical Provider, MD   metFORMIN (GLUCOPHAGE-XR) 500 mg 24 hr tablet Take 1 tab in am and 2 tabs in pm 2/16/23  Yes Belinda Otero MD   olmesartan (BENICAR) 40 mg tablet Take 1 tablet (40 mg total) by mouth daily 2/16/23  Yes Tiffani Arroyo MD   Omega-3 Fatty Acids (FISH OIL) 1200 MG CAPS Take by mouth   Yes Historical Provider, MD   pantoprazole (PROTONIX) 40 mg tablet TAKE 1 TABLET DAILY 5/9/23  Yes Belinda Otero MD   potassium chloride (MICRO-K) 10 MEQ CR capsule TAKE 1 CAPSULE DAILY 5/9/23  Yes Belinda Otero MD   rosuvastatin (CRESTOR) 20 MG tablet TAKE 1 TABLET DAILY 5/9/23  Yes Dae" Angelo Zee MD   saccharomyces boulardii (FLORASTOR) 250 mg capsule Take 250 mg by mouth daily     Yes Historical Provider, MD       Subjective:     Review of Systems:    General: negative for - chills, fatigue, fever,  weight gain or weight loss  Psychological: negative for - anxiety, behavioral disorder, concentration difficulties, decreased libido, depression, irritability, memory difficulties, mood swings, sleep disturbances or suicidal ideation  ENT: negative for - hearing difficulties , nasal congestion, nasal discharge, oral lesions, sinus pain, sneezing, sore throat  Allergy and Immunology: negative for - hives, insect bite sensitivity,  Hematological and Lymphatic: negative for - bleeding problems, blood clots,bruising, swollen lymph nodes  Endocrine: negative for - hair pattern changes, hot flashes, malaise/lethargy, mood swings, palpitations, polydipsia/polyuria, skin changes, temperature intolerance or unexpected weight change  Respiratory: negative for - cough, hemoptysis, orthopnea, shortness of breath, or wheezing  Cardiovascular: negative for - chest pain, dyspnea on exertion, edema,  Gastrointestinal: negative for - abdominal pain, nausea/vomiting  Genito-Urinary: negative for - dysuria, incontinence, irregular/heavy menses or urinary frequency/urgency  Musculoskeletal: negative for - gait disturbance, joint pain, joint stiffness, joint swelling, muscle pain, muscular weakness  Dermatological:  As in HPI  Neurological: negative for confusion, dizziness, headaches, impaired coordination/balance, memory loss, numbness/tingling, seizures, speech problems, tremors or weakness       Objective:   Ht 5' 2\" (1 575 m)   Wt 93 kg (205 lb)   LMP  (LMP Unknown)   BMI 37 49 kg/m²     Physical Exam:    General Appearance:    Alert, cooperative, no distress   Head:    Normocephalic, without obvious abnormality, atraumatic           Skin:   A full skin exam was performed including scalp, head scalp, eyes, ears, nose, " "lips, neck, chest, axilla, abdomen, back, buttocks, bilateral upper extremities, bilateral lower extremities, hands, feet, fingers, toes, fingernails, and toenails *for pigmentation noted previously has improved with the erythema and lessened     Assessment:     1  Lichen sclerosus              Plan:   Process appears under control we will go ahead and decrease her clobetasol to once a day for 3 months and then every other day and reevaluate her in 6 months or earlier if necessary    Harlo Duane, MD  5/17/2023,8:50 AM    Portions of the record may have been created with voice recognition software   Occasional wrong word or \"sound a like\" substitutions may have occurred due to the inherent limitations of voice recognition software   Read the chart carefully and recognize, using context, where substitutions have occurred    "

## 2023-05-17 NOTE — PROGRESS NOTES
"[facility] Note     Patient Name: Lorita Gitelman  Encounter Date: May 17, 2023     Have you been cared for by a [facility] in the last 3 years and, if so, which description applies to you? Yes  I have been here within the last 3 years, and my medical history has NOT changed since that time  I am FEMALE/of child-bearing potential     REVIEW OF SYSTEMS:  Have you recently had or currently have any of the following? · No changes in my recent health  PAST MEDICAL HISTORY:  Have you personally ever had or currently have any of the following? If \"YES,\" then please provide more detail  · No changes in my medical history  FAMILY HISTORY:  Any \"first degree relatives\" (parent, brother, sister, or child) with the following? • No changes in my family's known health  PATIENT EXPERIENCE:    • Do you want the Dermatologist to perform a COMPLETE skin exam today including a clinical examination under the \"bra and underwear\" areas? NO  • If necessary, do we have your permission to call and leave a detailed message on your Preferred Phone number that includes your specific medical information?   Yes      Allergies   Allergen Reactions   • Amoxicillin-Pot Clavulanate Anaphylaxis, Hives, Itching and Facial Swelling   • Erythromycin Hives, Itching, Swelling, Vomiting, Throat Swelling, Nasal Congestion and Facial Swelling   • Meperidine Anaphylaxis   • Morphine Hives, Itching, Swelling, Other (See Comments) and Syncope     hypotension   • Penicillins Anaphylaxis, Itching and Swelling   • Relafen [Nabumetone] Hives, Itching and Swelling   • Darvon [Propoxyphene] Hives   • Methocarbamol Other (See Comments)     Double vision   • Reglan [Metoclopramide] Anxiety   • Sulfa Antibiotics Hives, Itching, Rash and Swelling   • Tetracyclines & Related Rash      Current Outpatient Medications:   •  albuterol (PROVENTIL HFA,VENTOLIN HFA) 90 mcg/act inhaler, Inhale 2 puffs every 6 (six) hours as " "needed for wheezing, Disp: 18 g, Rfl: 2  •  Calcium-Magnesium-Vitamin D (CALCIUM 1200+D3 PO), , Disp: , Rfl:   •  clobetasol (TEMOVATE) 0 05 % ointment, Apply topically 2 (two) times a day, Disp: 30 g, Rfl: 1  •  conjugated estrogens (PREMARIN) vaginal cream, Apply pea-sized amount to the lower vaginal area 1-2 times per week, Disp: 42 5 g, Rfl: 1  •  ezetimibe (ZETIA) 10 mg tablet, TAKE 1 TABLET DAILY, Disp: 90 tablet, Rfl: 3  •  furosemide (LASIX) 20 mg tablet, TAKE 1 TABLET DAILY, Disp: 90 tablet, Rfl: 3  •  glipiZIDE (GLUCOTROL XL) 5 mg 24 hr tablet, TAKE 1 TABLET DAILY, Disp: 90 tablet, Rfl: 3  •  glucose blood test strip, Use 1 each 3 (three) times a day Use as instructed, Disp: 300 each, Rfl: 3  •  insulin lispro (HumaLOG) 100 units/mL injection, Inject 5 Units under the skin 3 (three) times a day with meals 1 unit of insulin for 30 mg over 130, Disp: 15 mL, Rfl: 3  •  Insulin Syringe-Needle U-100 (INSULIN SYRINGE 1CC/31GX5/16\") 31G X 5/16\" 1 ML MISC, BD Veo Insulin Syringe Ultra-Fine 0 3 mL 31 gauge x 15/64\", Disp: , Rfl:   •  KRILL OIL PO, Take by mouth, Disp: , Rfl:   •  levothyroxine 75 mcg tablet, TAKE 1 TABLET DAILY, Disp: 90 tablet, Rfl: 3  •  Linzess 145 MCG CAPS, Take 1 capsule (145 mcg total) by mouth daily Take 30 minutes before breakfast, Disp: 30 capsule, Rfl: 0  •  LOW-DOSE ASPIRIN PO, , Disp: , Rfl:   •  Magnesium Oxide 400 MG CAPS, Take by mouth, Disp: , Rfl:   •  metFORMIN (GLUCOPHAGE-XR) 500 mg 24 hr tablet, Take 1 tab in am and 2 tabs in pm, Disp: 270 tablet, Rfl: 3  •  olmesartan (BENICAR) 40 mg tablet, Take 1 tablet (40 mg total) by mouth daily, Disp: 90 tablet, Rfl: 3  •  Omega-3 Fatty Acids (FISH OIL) 1200 MG CAPS, Take by mouth, Disp: , Rfl:   •  pantoprazole (PROTONIX) 40 mg tablet, TAKE 1 TABLET DAILY, Disp: 90 tablet, Rfl: 3  •  potassium chloride (MICRO-K) 10 MEQ CR capsule, TAKE 1 CAPSULE DAILY, Disp: 90 capsule, Rfl: 3  •  rosuvastatin (CRESTOR) 20 MG tablet, TAKE 1 TABLET DAILY, " Disp: 90 tablet, Rfl: 3  •  saccharomyces boulardii (FLORASTOR) 250 mg capsule, Take 250 mg by mouth daily  , Disp: , Rfl:           • Whom besides the patient is providing clinical information about today's encounter?   o NO ADDITIONAL HISTORIAN (patient alone provided history)    Physical Exam and Assessment/Plan by Diagnosis:

## 2023-06-25 ENCOUNTER — OFFICE VISIT (OUTPATIENT)
Age: 73
End: 2023-06-25
Payer: COMMERCIAL

## 2023-06-25 VITALS
TEMPERATURE: 97.7 F | HEART RATE: 77 BPM | BODY MASS INDEX: 38.59 KG/M2 | OXYGEN SATURATION: 96 % | RESPIRATION RATE: 18 BRPM | WEIGHT: 211 LBS | SYSTOLIC BLOOD PRESSURE: 150 MMHG | DIASTOLIC BLOOD PRESSURE: 68 MMHG

## 2023-06-25 DIAGNOSIS — M54.50 ACUTE BILATERAL LOW BACK PAIN WITHOUT SCIATICA: Primary | ICD-10-CM

## 2023-06-25 PROCEDURE — 99213 OFFICE O/P EST LOW 20 MIN: CPT | Performed by: PHYSICIAN ASSISTANT

## 2023-06-25 RX ORDER — CYCLOBENZAPRINE HCL 5 MG
5 TABLET ORAL 3 TIMES DAILY PRN
Qty: 15 TABLET | Refills: 0 | Status: SHIPPED | OUTPATIENT
Start: 2023-06-25 | End: 2023-06-30

## 2023-06-25 RX ORDER — PREDNISONE 10 MG/1
TABLET ORAL
Qty: 20 TABLET | Refills: 0 | Status: SHIPPED | OUTPATIENT
Start: 2023-06-25

## 2023-06-25 NOTE — PROGRESS NOTES
[address]        NAME: Madison Barbosa is a 74 y o  female  : 1950    MRN: [id number]  DATE: 2023  TIME: 2:37 PM    Assessment and Plan   Acute bilateral low back pain without sciatica [M54 50]  1  Acute bilateral low back pain without sciatica  predniSONE 10 mg tablet    cyclobenzaprine (FLEXERIL) 5 mg tablet            Patient Instructions     Cyclobenzaprine 5 mg 1 tablet every 8 hours as needed  No driving or use of heavy machinery while on this medication  Prednisone 10 mg 4 tablet at once by mouth daily for 4 days  Monitor your your sugar via your glucometer  Your last hemoglobin A1c was 6 7 and your fasting glucose was 139  If your sugar increases to over 270-300 discontinue prednisone and contact your primary care provider  Follow up with PCP in 3-5 days  Proceed to  ER if symptoms worsen  Chief Complaint     Chief Complaint   Patient presents with   • Back Pain     Pt has osteoarthritis in her back that she has been treating for years  Pt has seen pain management and PCP with prescribed medications that didn't work  Pt has sharp right hip/thigh that causes numbness and pins and needles  Pt also has shooting pain in the left hip down to the knee, starting to go to left groin  Left sided pain is new and has gotten worse in the last 3 weeks  Standing in one place makes pain worse, pt cannot sleep  History of Present Illness       75-year-old female with past medical history of chronic lower back pain, arthritis induced, is presenting for evaluation of 3 weeks of acute on chronic lower back pain  Pt has osteoarthritis in her back that she has been treating for years  Pt has seen pain management and PCP with prescribed medications that didn't work  Pt has sharp right hip/thigh that causes numbness and pins and needles  Pt also has shooting pain in the left hip down to the knee, starting to go to left groin   Left sided pain is new and has gotten worse in the last 3 weeks  Standing in one place makes pain worse, pt cannot sleep  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 and believes she might of irritated her chronic lower back pain  Back Pain  This is a chronic problem  The current episode started 1 to 4 weeks ago (Acute on chronic lower back pain)  The problem occurs constantly  The problem has been gradually worsening since onset  The pain is present in the gluteal, lumbar spine and sacro-iliac  The quality of the pain is described as burning and shooting  The pain radiates to the left knee, left thigh, right foot, right knee and right thigh  The pain is at a severity of 8/10  The pain is the same all the time  The symptoms are aggravated by lying down, standing and twisting  Stiffness is present in the morning and at night  Associated symptoms include leg pain, numbness, paresthesias, pelvic pain and tingling  Pertinent negatives include no abdominal pain, bladder incontinence, bowel incontinence, chest pain, dysuria, fever, headaches, paresis, perianal numbness, weakness or weight loss  Risk factors include history of cancer, history of steroid use and obesity  Review of Systems   Review of Systems   Constitutional: Negative for activity change, appetite change, fatigue, fever and weight loss  Respiratory: Negative for cough  Cardiovascular: Negative for chest pain and palpitations  Gastrointestinal: Negative for abdominal pain and bowel incontinence  Genitourinary: Positive for pelvic pain  Negative for bladder incontinence and dysuria  Musculoskeletal: Positive for back pain  Neurological: Positive for tingling, numbness and paresthesias  Negative for weakness and headaches           Current Medications       Current Outpatient Medications:   •  albuterol (PROVENTIL HFA,VENTOLIN HFA) 90 mcg/act inhaler, Inhale 2 puffs every 6 (six) hours as needed for wheezing, Disp: 18 g, Rfl: 2  • "Calcium-Magnesium-Vitamin D (CALCIUM 1200+D3 PO), , Disp: , Rfl:   •  clobetasol (TEMOVATE) 0 05 % ointment, Apply topically 2 (two) times a day, Disp: 30 g, Rfl: 1  •  conjugated estrogens (PREMARIN) vaginal cream, Apply pea-sized amount to the lower vaginal area 1-2 times per week, Disp: 42 5 g, Rfl: 1  •  cyclobenzaprine (FLEXERIL) 5 mg tablet, Take 1 tablet (5 mg total) by mouth 3 (three) times a day as needed for muscle spasms for up to 5 days, Disp: 15 tablet, Rfl: 0  •  ezetimibe (ZETIA) 10 mg tablet, TAKE 1 TABLET DAILY, Disp: 90 tablet, Rfl: 3  •  furosemide (LASIX) 20 mg tablet, TAKE 1 TABLET DAILY, Disp: 90 tablet, Rfl: 3  •  glipiZIDE (GLUCOTROL XL) 5 mg 24 hr tablet, TAKE 1 TABLET DAILY, Disp: 90 tablet, Rfl: 3  •  glucose blood test strip, Use 1 each 3 (three) times a day Use as instructed, Disp: 300 each, Rfl: 3  •  insulin lispro (HumaLOG) 100 units/mL injection, Inject 5 Units under the skin 3 (three) times a day with meals 1 unit of insulin for 30 mg over 130, Disp: 15 mL, Rfl: 3  •  Insulin Syringe-Needle U-100 (INSULIN SYRINGE 1CC/31GX5/16\") 31G X 5/16\" 1 ML MISC, BD Veo Insulin Syringe Ultra-Fine 0 3 mL 31 gauge x 15/64\", Disp: , Rfl:   •  KRILL OIL PO, Take by mouth, Disp: , Rfl:   •  levothyroxine 75 mcg tablet, TAKE 1 TABLET DAILY, Disp: 90 tablet, Rfl: 3  •  Linzess 145 MCG CAPS, Take 1 capsule (145 mcg total) by mouth daily Take 30 minutes before breakfast, Disp: 30 capsule, Rfl: 0  •  Magnesium Oxide 400 MG CAPS, Take by mouth, Disp: , Rfl:   •  metFORMIN (GLUCOPHAGE-XR) 500 mg 24 hr tablet, Take 1 tab in am and 2 tabs in pm, Disp: 270 tablet, Rfl: 3  •  olmesartan (BENICAR) 40 mg tablet, Take 1 tablet (40 mg total) by mouth daily, Disp: 90 tablet, Rfl: 3  •  Omega-3 Fatty Acids (FISH OIL) 1200 MG CAPS, Take by mouth, Disp: , Rfl:   •  pantoprazole (PROTONIX) 40 mg tablet, TAKE 1 TABLET DAILY, Disp: 90 tablet, Rfl: 3  •  potassium chloride (MICRO-K) 10 MEQ CR capsule, TAKE 1 CAPSULE DAILY, " Disp: 90 capsule, Rfl: 3  •  predniSONE 10 mg tablet, 4 tablets by mouth daily at once for 4 days  , Disp: 20 tablet, Rfl: 0  •  rosuvastatin (CRESTOR) 20 MG tablet, TAKE 1 TABLET DAILY, Disp: 90 tablet, Rfl: 3  •  saccharomyces boulardii (FLORASTOR) 250 mg capsule, Take 250 mg by mouth daily  , Disp: , Rfl:   •  LOW-DOSE ASPIRIN PO, , Disp: , Rfl:     Current Allergies     Allergies as of 06/25/2023 - Reviewed 06/25/2023   Allergen Reaction Noted   • Amoxicillin-pot clavulanate Anaphylaxis, Hives, Itching, and Facial Swelling    • Erythromycin Hives, Itching, Swelling, Vomiting, Throat Swelling, Nasal Congestion, and Facial Swelling    • Meperidine Anaphylaxis 03/23/2017   • Morphine Hives, Itching, Swelling, Other (See Comments), and Syncope    • Penicillins Anaphylaxis, Itching, and Swelling 03/19/2014   • Relafen [nabumetone] Hives, Itching, and Swelling 03/19/2014   • Darvon [propoxyphene] Hives 02/05/2018   • Methocarbamol Other (See Comments) 03/19/2014   • Reglan [metoclopramide] Anxiety 06/27/2018   • Sulfa antibiotics Hives, Itching, Rash, and Swelling    • Tetracyclines & related Rash 08/21/2017            The following portions of the patient's history were reviewed and updated as appropriate: allergies, current medications, past family history, past medical history, past social history, past surgical history and problem list      Past Medical History:   Diagnosis Date   • Abnormal mammogram    • Abnormal weight gain    • Achilles tendinitis    • Allergic 1952    since childhood   • Arthritis 1988   • Asthma    • Cancer ([facility] [address] ) 2018    Endometrial adenoma   • Combined forms of age-related cataract of both eyes 8/18/2021    Added per ICD-10-CM coding guidelines - provider accepted   • Diabetes mellitus ([address] )    • Disc degeneration, lumbar    • Disease of thyroid gland     hypo   • Dyslipidemia    • Dyslipidemia, goal LDL below 70 3/27/2018   • Early satiety    • Edema     idiopathic peripheral   • Encounter for follow-up examination after completed treatment for malignant neoplasm 3/17/2019   • Encounter for gynecological examination without abnormal finding 2020   • Endometrial cancer ([facility] [address] ) 2018   • Enthesopathy     hip region   • Esophagitis, reflux    • GERD (gastroesophageal reflux disease)    • Hard to intubate     difficulty with intubation and had to be intubated twice   • Heart murmur 2018   • Hypercholesterolemia    • Hypertension    • IBS (irritable bowel syndrome)    • Irritable bowel syndrome    • Morbid (severe) obesity due to excess calories ([facility] [address] ) 2022    As per CMS ICD10 guidelines:Provider accepted   • Obesity    • Obesity, Class I, BMI 30-34 9 2015   • Osteoarthritis    • Pneumonia    • Rosacea    • Sacroiliitis (HCC)    • Shingles    • Sleep apnea, obstructive        Past Surgical History:   Procedure Laterality Date   •  SECTION      x2   • COLONOSCOPY     • ELBOW SURGERY      left ulna/radius   • ESOPHAGOGASTRODUODENOSCOPY     • FOOT SURGERY Right    • FRACTURE SURGERY     • FRACTURE SURGERY Left     tibia   • HYSTERECTOMY Bilateral 2018   • JOINT REPLACEMENT Left     shoulder,  right knee   • KNEE ARTHROSCOPY     • KNEE ARTHROSCOPY W/ MENISCAL REPAIR Right    • TX LAPS TOTAL HYSTERECT 250 GM/< W/RMVL TUBE/OVARY N/A 2018    Procedure: ROBOTIC TOTAL LAPAROSCOPIC HYSTERECTOMY, BILATERAL SALPINGOOPHORECTOMY, BILATERAL PELVIC LYMPHNODE DISSECTION;  Surgeon: Tammy Forrest MD;  Location: [facility];  Service: Gynecology Oncology   • REPLACEMENT TOTAL KNEE Right    • SHOULDER ARTHROPLASTY     • SINUS SURGERY     • TONSILLECTOMY     • TUBAL LIGATION     • WRIST SURGERY      ganglonic cyst       Family History   Problem Relation Age of Onset   • Arthritis Mother    • Heart disease Mother         cardiac disorder   • Diabetes Mother    • Hiatal hernia Mother    • Hypertension Mother    • Irritable bowel syndrome Mother    • Breast cancer Mother         x2   • Uterine cancer Mother    • Osteoporosis Mother    • Thyroid disease Mother    • Other Mother         gastric reflux   • Stroke Mother    • Hyperlipidemia Mother    • Cancer Mother         ENDOMETRIAL   • Heart disease Father         cardiac disorder   • Hiatal hernia Father    • Ulcers Father    • Other Father         gastric reflux   • Coronary artery disease Father    • Hearing loss Father    • Hiatal hernia Sister    • Thyroid disease Sister    • Other Sister         gastric reflux   • Lung cancer Sister 68   • Cancer Sister         lung adenocarcinoma, mets to meninges   • Irritable bowel syndrome Daughter    • Hyperlipidemia Daughter    • Brain cancer Maternal Grandmother    • Breast cancer Maternal Grandmother         [place]   • No Known Problems Maternal Grandfather    • No Known Problems Paternal Grandmother    • No Known Problems Paternal Grandfather    • No Known Problems Brother    • Malig Hypertension Son    • Hiatal hernia Child    • Other Child         gastric reflux   • Breast cancer Maternal Aunt 65   • Uterine cancer Maternal Aunt         x3 sis   • Stroke Maternal Aunt    • Cancer Maternal Aunt    • Breast cancer Maternal Aunt 55   • Cancer Maternal Aunt    • Breast cancer Maternal Aunt 55   • Cancer Maternal Aunt    • No Known Problems Paternal Aunt    • Colon cancer Neg Hx    • Ovarian cancer Neg Hx    • Cervical cancer Neg Hx          Medications have been verified  Objective   /68   Pulse 77   Temp 97 7 °F (36 5 °C)   Resp 18   Wt 95 7 kg (211 lb)   LMP  (LMP Unknown)   SpO2 96%   BMI 38 59 kg/m²        Physical Exam     Physical Exam  Vitals and nursing note reviewed  Constitutional:       General: She is not in acute distress  Appearance: Normal appearance  She is not ill-appearing  Eyes:      Extraocular Movements: Extraocular movements intact  Conjunctiva/sclera: Conjunctivae normal       Pupils: Pupils are equal, round, and reactive to light     Cardiovascular: Rate and Rhythm: Normal rate and regular rhythm  Pulses: Normal pulses  Heart sounds: Normal heart sounds  Pulmonary:      Effort: Pulmonary effort is normal  No respiratory distress  Musculoskeletal:         General: Normal range of motion  Cervical back: Normal range of motion and neck supple  Comments: Lower back: There is full range of motion in all planes  Patient was able to flex over 170 degrees  Patient was able to easily move up on top of the exam table without difficulty  There is no lesions masses or deformities of the lower back  Skin is intact  There is no erythema, swelling, masses, lesions, discoloration ecchymosis, inflammation or swelling  Slight tenderness of the paralumbar is noted on palpation  Sitting leg raises are negative bilateral   Pulses are 2+ and symmetric muscle strength is 5/5 bilateral    Skin:     General: Skin is warm and dry  Capillary Refill: Capillary refill takes less than 2 seconds  Neurological:      General: No focal deficit present  Mental Status: She is alert and oriented to person, place, and time  Psychiatric:         Mood and Affect: Mood normal          Behavior: Behavior normal          Thought Content:  Thought content normal          Judgment: Judgment normal

## 2023-06-25 NOTE — PATIENT INSTRUCTIONS
Lower Back Exercises   WHAT YOU NEED TO KNOW:   Lower back exercises help heal and strengthen your back muscles to prevent another injury  Ask your healthcare provider if you need to see a physical therapist for more advanced exercises  DISCHARGE INSTRUCTIONS:   Return to the emergency department if:   You have severe pain that prevents you from moving  Call your doctor if:   Your pain becomes worse  You have new pain  You have questions or concerns about your condition or care  Do lower back exercises safely:   Do the exercises on a mat or firm surface (not on a bed)  A firm surface will support your spine and prevent low back pain  Move slowly and smoothly  Avoid fast or jerky motions  Breathe normally  Do not hold your breath  Stop if you feel pain  It is normal to feel some discomfort at first, but you should not feel pain  Regular exercise will help decrease your discomfort over time  Lower back exercises: Your healthcare provider may recommend that you do back exercises 10 to 30 minutes each day  He or she may also recommend that you do exercises 1 to 3 times each day  Ask your provider which exercises are best for you and how often to do them  Ankle pumps:  Lie on your back  Move your foot up (with your toes pointing toward your head)  Then, move your foot down (with your toes pointing away from you)  Repeat this exercise 10 times on each side  Heel slides:  Lie on your back  Slowly bend one leg and then straighten it  Next, bend the other leg and then straighten it  Repeat 10 times on each side  Pelvic tilt:  Lie on your back with your knees bent and feet flat on the floor  Place your arms in a relaxed position beside your body  Tighten the muscles of your abdomen and flatten your back against the floor  Hold for 5 seconds  Repeat 5 times  Back stretch:  Lie on your back with your hands behind your head   Bend your knees and turn the lower half of your body to one side  Hold this position for 10 seconds  Repeat 3 times on each side  Straight leg raises:  Lie on your back with one leg straight  Bend the other knee  Tighten your abdomen and then slowly lift the straight leg up about 6 to 12 inches off the floor  Hold for 1 to 5 seconds  Lower your leg slowly  Repeat 10 times on each leg  Knee-to-chest:  Lie on your back with your knees bent and feet flat on the floor  Pull one of your knees toward your chest and hold it there for 5 seconds  Return your leg to the starting position  Lift the other knee toward your chest and hold for 5 seconds  Do this 5 times on each side  Cat and camel:  Place your hands and knees on the floor  Arch your back upward toward the ceiling and lower your head  Round out your spine as much as you can  Hold for 5 seconds  Lift your head upward and push your chest downward toward the floor  Hold for 5 seconds  Do 3 sets or as directed  Wall squats:  Stand with your back against a wall  Tighten the muscles of your abdomen  Slowly lower your body until your knees are bent at a 45 degree angle  Hold this position for 5 seconds  Slowly move back up to a standing position  Repeat 10 times  Curl up:  Lie on your back with your knees bent and feet flat on the floor  Place your hands, palms down, underneath the curve in your lower back  Next, with your elbows on the floor, lift your shoulders and chest 2 to 3 inches  Keep your head in line with your shoulders  Hold this position for 5 seconds  When you can do this exercise without pain for 10 to 15 seconds, you may add a rotation  While your shoulders and chest are lifted off the ground, turn slightly to the left and hold  Repeat on the other side  Bird dog:  Place your hands and knees on the floor  Keep your wrists directly below your shoulders and your knees directly below your hips  Pull your belly button in toward your spine   Do not flatten or arch your back  Tighten your abdominal muscles  Raise one arm straight out so that it is aligned with your head  Next, raise the leg opposite your arm  Hold this position for 15 seconds  Lower your arm and leg slowly and change sides  Do 5 sets  Follow up with your doctor as directed:  Write down your questions so you remember to ask them during your visits  © Copyright [facility] 2022 Information is for End User's use only and may not be sold, redistributed or otherwise used for commercial purposes  The above information is an  only  It is not intended as medical advice for individual conditions or treatments  Talk to your doctor, nurse or pharmacist before following any medical regimen to see if it is safe and effective for you

## 2023-06-27 ENCOUNTER — HOSPITAL ENCOUNTER (OUTPATIENT)
Dept: MAMMOGRAPHY | Facility: CLINIC | Age: 73
Discharge: HOME | End: 2023-06-27
Payer: COMMERCIAL

## 2023-06-27 VITALS — WEIGHT: 211 LBS | BODY MASS INDEX: 38.83 KG/M2 | HEIGHT: 62 IN

## 2023-06-27 DIAGNOSIS — Z12.31 SCREENING MAMMOGRAM, ENCOUNTER FOR: ICD-10-CM

## 2023-06-27 PROCEDURE — 77067 SCR MAMMO BI INCL CAD: CPT

## 2023-06-27 PROCEDURE — 77063 BREAST TOMOSYNTHESIS BI: CPT

## 2023-07-05 DIAGNOSIS — Z79.4 CONTROLLED TYPE 2 DIABETES MELLITUS WITHOUT COMPLICATION, WITH LONG-TERM CURRENT USE OF INSULIN (HCC): Primary | ICD-10-CM

## 2023-07-05 DIAGNOSIS — E11.9 CONTROLLED TYPE 2 DIABETES MELLITUS WITHOUT COMPLICATION, WITH LONG-TERM CURRENT USE OF INSULIN (HCC): Primary | ICD-10-CM

## 2023-07-11 ENCOUNTER — APPOINTMENT (OUTPATIENT)
Age: 73
End: 2023-07-11
Payer: COMMERCIAL

## 2023-07-11 ENCOUNTER — CLINICAL SUPPORT (OUTPATIENT)
Dept: NUTRITION | Facility: HOSPITAL | Age: 73
End: 2023-07-11
Attending: INTERNAL MEDICINE
Payer: COMMERCIAL

## 2023-07-11 VITALS — WEIGHT: 212.08 LBS | BODY MASS INDEX: 38.79 KG/M2

## 2023-07-11 DIAGNOSIS — R04.0 EPISTAXIS: ICD-10-CM

## 2023-07-11 DIAGNOSIS — E66.01 MORBID (SEVERE) OBESITY DUE TO EXCESS CALORIES (HCC): ICD-10-CM

## 2023-07-11 DIAGNOSIS — R63.5 WEIGHT GAIN: ICD-10-CM

## 2023-07-11 DIAGNOSIS — K21.9 GASTROESOPHAGEAL REFLUX DISEASE, UNSPECIFIED WHETHER ESOPHAGITIS PRESENT: ICD-10-CM

## 2023-07-11 DIAGNOSIS — R14.0 BLOATING: ICD-10-CM

## 2023-07-11 DIAGNOSIS — J34.89 NASAL SEPTAL PERFORATION: ICD-10-CM

## 2023-07-11 DIAGNOSIS — J34.89 NASAL CRUSTING: ICD-10-CM

## 2023-07-11 PROCEDURE — 97802 MEDICAL NUTRITION INDIV IN: CPT

## 2023-07-11 PROCEDURE — 36415 COLL VENOUS BLD VENIPUNCTURE: CPT

## 2023-07-11 PROCEDURE — 82164 ANGIOTENSIN I ENZYME TEST: CPT

## 2023-07-11 PROCEDURE — 83520 IMMUNOASSAY QUANT NOS NONAB: CPT

## 2023-07-11 PROCEDURE — 86037 ANCA TITER EACH ANTIBODY: CPT

## 2023-07-11 NOTE — PROGRESS NOTES
Nutrition Assessment Form    Patient Name: Tonie French    YOB: 1950    Sex: Female     Assessment Date: 7/11/2023  Start Time: 9:55 Stop Time: 11:00 Total Minutes: 72     Data:  Present at session: Self   Parent/Patient Concerns/reason for visit: "I need to find balance and would like to lose weight"   Medical Dx/Reason for Referral: E66.01 Morbid Obesity; K21.9 GERD; R14.0 Bloating; R63.5 Weight gain   Past Medical History:   Diagnosis Date   • Abnormal mammogram    • Abnormal weight gain    • Achilles tendinitis    • Allergic 1952    since childhood   • Arthritis 1988   • Asthma    • Cancer ([address]) 2018    Endometrial adenoma   • Combined forms of age-related cataract of both eyes 8/18/2021    Added per ICD-10-CM coding guidelines - provider accepted   • Diabetes mellitus ([address])    • Disc degeneration, lumbar    • Disease of thyroid gland     hypo   • Dyslipidemia    • Dyslipidemia, goal LDL below 70 3/27/2018   • Early satiety    • Edema     idiopathic peripheral   • Encounter for follow-up examination after completed treatment for malignant neoplasm 3/17/2019   • Encounter for gynecological examination without abnormal finding 6/22/2020   • Endometrial cancer ([address]) 2018   • Enthesopathy     hip region   • Esophagitis, reflux    • GERD (gastroesophageal reflux disease)    • Hard to intubate     difficulty with intubation and had to be intubated twice   • Heart murmur 2018   • Hypercholesterolemia    • Hypertension    • IBS (irritable bowel syndrome)    • Irritable bowel syndrome    • Morbid (severe) obesity due to excess calories ([address]) 4/8/2022    As per CMS ICD10 guidelines:Provider accepted   • Obesity 1998   • Obesity, Class I, BMI 30-34.9 1/7/2015   • Osteoarthritis    • Pneumonia    • Rosacea    • Sacroiliitis (HCC)    • Shingles    • Sleep apnea, obstructive        Current Outpatient Medications   Medication Sig Dispense Refill   • albuterol (PROVENTIL HFA,VENTOLIN HFA) 90 mcg/act inhaler Inhale 2 puffs every 6 (six) hours as needed for wheezing 18 g 2   • Calcium-Magnesium-Vitamin D (CALCIUM 1200+D3 PO)      • clobetasol (TEMOVATE) 0.05 % ointment Apply topically 2 (two) times a day 30 g 1   • conjugated estrogens (PREMARIN) vaginal cream Apply pea-sized amount to the lower vaginal area 1-2 times per week 42.5 g 1   • cyclobenzaprine (FLEXERIL) 5 mg tablet Take 1 tablet (5 mg total) by mouth 3 (three) times a day as needed for muscle spasms for up to 5 days 15 tablet 0   • ezetimibe (ZETIA) 10 mg tablet TAKE 1 TABLET DAILY 90 tablet 3   • furosemide (LASIX) 20 mg tablet TAKE 1 TABLET DAILY 90 tablet 3   • glipiZIDE (GLUCOTROL XL) 5 mg 24 hr tablet TAKE 1 TABLET DAILY 90 tablet 3   • glucose blood test strip Use 1 each 3 (three) times a day Use as instructed 300 each 3   • insulin lispro (HumaLOG) 100 units/mL injection Inject 5 Units under the skin 3 (three) times a day with meals 1 unit of insulin for 30 mg over 130 15 mL 3   • Insulin Syringe-Needle U-100 (INSULIN SYRINGE 1CC/31GX5/16") 31G X 5/16" 1 ML MISC BD Veo Insulin Syringe Ultra-Fine 0.3 mL 31 gauge x 15/64"     • KRILL OIL PO Take by mouth     • levothyroxine 75 mcg tablet TAKE 1 TABLET DAILY 90 tablet 3   • Linzess 145 MCG CAPS Take 1 capsule (145 mcg total) by mouth daily Take 30 minutes before breakfast 30 capsule 0   • LOW-DOSE ASPIRIN PO  (Patient not taking: Reported on 6/25/2023)     • Magnesium Oxide 400 MG CAPS Take by mouth     • metFORMIN (GLUCOPHAGE-XR) 500 mg 24 hr tablet Take 1 tab in am and 2 tabs in pm 270 tablet 3   • olmesartan (BENICAR) 40 mg tablet Take 1 tablet (40 mg total) by mouth daily 90 tablet 3   • Omega-3 Fatty Acids (FISH OIL) 1200 MG CAPS Take by mouth     • pantoprazole (PROTONIX) 40 mg tablet TAKE 1 TABLET DAILY 90 tablet 3   • potassium chloride (MICRO-K) 10 MEQ CR capsule TAKE 1 CAPSULE DAILY 90 capsule 3   • predniSONE 10 mg tablet 4 tablets by mouth daily at once for 4 days.  20 tablet 0   • rosuvastatin (CRESTOR) 20 MG tablet TAKE 1 TABLET DAILY 90 tablet 3   • saccharomyces boulardii (FLORASTOR) 250 mg capsule Take 250 mg by mouth daily         No current facility-administered medications for this visit. Additional Meds/Supplements: None   Special Learning Needs/barriers to learning/any new barriers None   Height: HC Readings from Last 5 Encounters:   No data found for [facility]      Weight: Wt Readings from Last 10 Encounters:   07/10/23 95.7 kg (211 lb)   06/27/23 95.7 kg (211 lb)   06/25/23 95.7 kg (211 lb)   05/17/23 93 kg (205 lb)   04/26/23 95.1 kg (209 lb 9.6 oz)   03/23/23 94.3 kg (208 lb)   02/15/23 90.7 kg (200 lb)   01/25/23 92.1 kg (203 lb)   12/27/22 93 kg (205 lb)   11/15/22 91.6 kg (202 lb)     Estimated body mass index is 38.59 kg/m² as calculated from the following:    Height as of 7/10/23: 5' 2" (1.575 m). Weight as of 7/10/23: 95.7 kg (211 lb).    Recent Weight Change: [x]Yes     []No  Amount: +0.5 kg      Energy Needs: Rowan- StJesus Donovan Equation:  1556 kcal   Allergies   Allergen Reactions   • Amoxicillin-Pot Clavulanate Anaphylaxis, Hives, Itching and Facial Swelling   • Erythromycin Hives, Itching, Swelling, Vomiting, Throat Swelling, Nasal Congestion and Facial Swelling   • Meperidine Anaphylaxis   • Morphine Hives, Itching, Swelling, Other (See Comments) and Syncope     hypotension   • Penicillins Anaphylaxis, Itching, Swelling and Hives   • Relafen [Nabumetone] Hives, Itching and Swelling   • Sulfa Antibiotics Hives, Itching, Rash and Swelling   • Darvon [Propoxyphene] Hives   • Methocarbamol Other (See Comments)     Double vision   • Reglan [Metoclopramide] Anxiety   • Tetracyclines & Related Rash    or intolerances    Social History     Substance and Sexual Activity   Alcohol Use Yes   • Alcohol/week: 4.0 standard drinks of alcohol   • Types: 2 Glasses of wine, 2 Standard drinks or equivalent per week    ____0___x/wk or month  1 or 2 or 3 or 4 or____ drinks/session   Mixed drinks/ wine/ beer   Social History     Tobacco Use   Smoking Status Never   Smokeless Tobacco Never       Who shops? patient   Who cooks/cooking methods/Eating out/take out habits   patient  Cooking methods: bake/hardy/air hardy/grill/boil/other________    Take out: _2_ x/wk or month   Dining out _2_ x/wk or month   Exercise: Walk/ run/ bike/ gym/elliptical/other Seated exercise, some weight training____ x/wk how many minutes:__30____   strength training       Other: ie: Sleep habits/ stress level/ work habits household-lives with ?/ food security Sleep - During school year 8:30 PM to 6 AM  During summer 11 PM to 7 AM    Stress - 9 during school year (High)    Lives alone  Food security - Good   Prior Nutritional Counseling? [x]Yes     []No  When: February 2019      Why: Weight Management Program        Diet Hx:  Breakfast:  2 cups Black coffee  2 Eggs  1 slice 191 bread, wheat  2-3 slices Zoila Labella  1/2 can of corn beef hash  Roast beef or steak  OR 1 cup oatmeal (Either packet or steel cut) 6:30 AM   Lunch:  Dinner leftovers Spread out, "eating on the go"         Dinner:  Chicken stir hardy   Teriyaki sauce  Brown rice 6-7 PM       Snacks:  Cheese and crackers  Grapefruit from can  Apple AM -   PM - 12-2 PM Snack typically  HS -   *Water intake of 72 oz throughout the day    Other Notes/ Initial Assessment:  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 She reports that it has gotten difficult to manage the multiple disease states including GERD and diabetes and doesn't know what to eat. 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 We discussed the possibility of eating more small, frequent meals. Discussed the importance of eating 3 meals daily and not skipping, and how metabolism is affected.   Also discussed adding in small snacks or 5-6 small meals daily if desired vs 3 larger ones, and appropriate options and portions. Appropriate timing of meals, including eating within 1 hr of waking and eating meals slowly 20mins/meals, minimizing mindless/boredom or habitual eating, etc was also mentioned. Nutrition Diagnosis:   Food and nutrition related knowledge deficit  related to Lack of prior exposure to accurate nutrition related information as evidenced by New medical diagnosis or change in existing diagnosis or condition       Any change or new dx since previous visit:     Nutrition Diagnosis:         Medical Nutrition Therapy Intervention:  [x]Individualized Meal Plan []Understanding Lab Values   []Basic Pathophysiology of Disease []Food/Medication Interactions   [x]Food Diary []Exercise   []Lifestyle/Behavior Modification Techniques []Medication, Mechanism of Action   []Label Reading: CHO/ Na/ Fat/ other_________ []Self Blood Glucose Monitoring   [x]Weight/BMI Goals: gain/lose/maintain []Other -    Goal weight is ~ 150 lbs. Comprehension: []Excellent  []Very Good  [x]Good  []Fair   []Poor    Receptivity: []Excellent  []Very Good  [x]Good  []Fair   []Poor    Expected Compliance: []Excellent  []Very Good  [x]Good  []Fair   []Poor        Goals (initial)/ Progress made on previous goals/new goals:  1. Artem Michael will attempt seated Alirio chi for exercise. [address] [place] [address] will incorporate 2 snacks and 3 meals per day (Boost shakes/granola bars for snacks). 3. Artem Michael will create a 3-day food log with symptoms. No follow-ups on file.   Labs:  CMP  Lab Results   Component Value Date     11/04/2015    K 4.7 04/22/2023     04/22/2023    CO2 24 04/22/2023    ANIONGAP 4 11/04/2015    BUN 21 04/22/2023    CREATININE 0.85 04/22/2023    GLUCOSE 107 11/04/2015    GLUF 149 (H) 04/22/2023    CALCIUM 10.0 04/22/2023    AST 35 04/22/2023    ALT 36 04/22/2023    ALKPHOS 59 04/22/2023    PROT 7.0 11/04/2015    BILITOT 0.41 11/04/2015    EGFR 67 04/22/2023       BMP  Lab Results   Component Value Date GLUCOSE 107 11/04/2015    CALCIUM 10.0 04/22/2023     11/04/2015    K 4.7 04/22/2023    CO2 24 04/22/2023     04/22/2023    BUN 21 04/22/2023    CREATININE 0.85 04/22/2023       Lipids  Lab Results   Component Value Date    CHOL 129 11/04/2015    CHOL 102 06/25/2015    CHOL 165 02/24/2015     Lab Results   Component Value Date    HDL 47 (L) 04/22/2023    HDL 51 12/26/2022    HDL 36 (L) 07/15/2022     Lab Results   Component Value Date    LDLCALC 64 04/22/2023    LDLCALC 47 12/26/2022    LDLCALC 50 07/15/2022     Lab Results   Component Value Date    TRIG 136 04/22/2023    TRIG 128 12/26/2022    TRIG 160 (H) 07/15/2022     No results found for: "CHOLHDL"    Hemoglobin A1C  Lab Results   Component Value Date    HGBA1C 6.7 (H) 04/22/2023       Fasting Glucose  Lab Results   Component Value Date    GLUF 149 (H) 04/22/2023       Insulin     Thyroid  No results found for: "TSH", "[id number]", "A1PQVJO", "THYROIDAB"    Hepatic Function Panel  Lab Results   Component Value Date    ALT 36 04/22/2023    AST 35 04/22/2023    ALKPHOS 59 04/22/2023    BILITOT 0.41 11/04/2015       Celiac Disease Antibody Panel  No results found for: "ENDOMYSIAL IGA", "GLIADIN IGA", "GLIADIN IGG", "IGA", "TISSUE TRANSGLUT AB", "TTG IGA"   Iron  No results found for: "IRON", "TIBC", "FERRITIN"         [id number] [facility] [id number]

## 2023-07-12 LAB — ACE SERPL-CCNC: 37 U/L (ref 14–82)

## 2023-07-13 LAB
C-ANCA TITR SER IF: NORMAL TITER
MYELOPEROXIDASE AB SER IA-ACNC: <0.2 UNITS (ref 0–0.9)
P-ANCA ATYPICAL TITR SER IF: NORMAL TITER
P-ANCA TITR SER IF: NORMAL TITER
PROTEINASE3 AB SER IA-ACNC: <0.2 UNITS (ref 0–0.9)

## 2023-07-15 DIAGNOSIS — E11.8 TYPE 2 DIABETES MELLITUS WITH COMPLICATION, WITHOUT LONG-TERM CURRENT USE OF INSULIN (HCC): ICD-10-CM

## 2023-07-16 RX ORDER — INSULIN LISPRO 100 [IU]/ML
INJECTION, SOLUTION INTRAVENOUS; SUBCUTANEOUS
Qty: 30 ML | Refills: 3 | Status: SHIPPED | OUTPATIENT
Start: 2023-07-16

## 2023-07-24 ENCOUNTER — OFFICE VISIT (OUTPATIENT)
Dept: CARDIOLOGY CLINIC | Facility: CLINIC | Age: 73
End: 2023-07-24
Payer: COMMERCIAL

## 2023-07-24 ENCOUNTER — TELEPHONE (OUTPATIENT)
Dept: CARDIOLOGY CLINIC | Facility: CLINIC | Age: 73
End: 2023-07-24

## 2023-07-24 VITALS
WEIGHT: 212 LBS | SYSTOLIC BLOOD PRESSURE: 142 MMHG | RESPIRATION RATE: 16 BRPM | BODY MASS INDEX: 39.01 KG/M2 | HEART RATE: 89 BPM | HEIGHT: 62 IN | OXYGEN SATURATION: 97 % | DIASTOLIC BLOOD PRESSURE: 74 MMHG

## 2023-07-24 DIAGNOSIS — E78.5 DYSLIPIDEMIA: ICD-10-CM

## 2023-07-24 DIAGNOSIS — I10 BENIGN ESSENTIAL HYPERTENSION: Primary | ICD-10-CM

## 2023-07-24 DIAGNOSIS — R06.02 SOB (SHORTNESS OF BREATH): ICD-10-CM

## 2023-07-24 PROCEDURE — 99213 OFFICE O/P EST LOW 20 MIN: CPT | Performed by: INTERNAL MEDICINE

## 2023-07-24 NOTE — PROGRESS NOTES
[facility] [address] [address]  Cardiology Follow Up    Anitha Reid  1950  [id number]      1. Benign essential hypertension        2. Dyslipidemia        3. SOB (shortness of breath)            Chief Complaint   Patient presents with   • Follow-up       Interval History: Patient presents for follow-up visit. Patient denies any history of chest pain . Patient does have some shortness of breath with exertion which is stable. Patient denies any history of leg edema or orthopnea PND. No history of presyncope syncope. Patient states compliance with the present list of medications. Patient had life screening done recently. Patient Active Problem List   Diagnosis   • Diabetes mellitus type 2, controlled, without complications ([address])   • Dysmetabolic syndrome X   • Benign essential hypertension   • Obesity (BMI 30-39. 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)   • Cortical age-related cataract of both eyes   • Bilateral hip pain   • PND (post-nasal drip)   • Hearing loss of right ear   • Nasal septal perforation     Past Medical History:   Diagnosis Date   • Abnormal mammogram    • Abnormal weight gain    • Achilles tendinitis    • Allergic 1952    since childhood   • Arthritis 1988   • Asthma    • Cancer ([address]) 2018    Endometrial adenoma   • Combined forms of age-related cataract of both eyes 8/18/2021 Added per ICD-10-CM coding guidelines - provider accepted   • Diabetes mellitus ([address])    • Disc degeneration, lumbar    • Disease of thyroid gland     hypo   • Dyslipidemia    • Dyslipidemia, goal LDL below 70 3/27/2018   • Early satiety    • Edema     idiopathic peripheral   • Encounter for follow-up examination after completed treatment for malignant neoplasm 3/17/2019   • Encounter for gynecological examination without abnormal finding 6/22/2020   • Endometrial cancer ([address]) 2018   • Enthesopathy     hip region   • Esophagitis, reflux    • GERD (gastroesophageal reflux disease)    • Hard to intubate     difficulty with intubation and had to be intubated twice   • Heart murmur 2018   • Hypercholesterolemia    • Hypertension    • IBS (irritable bowel syndrome)    • Irritable bowel syndrome    • Morbid (severe) obesity due to excess calories ([address]) 4/8/2022    As per CMS ICD10 guidelines:Provider accepted   • Obesity 1998   • Obesity, Class I, BMI 30-34.9 1/7/2015   • Osteoarthritis    • Pneumonia    • Rosacea    • Sacroiliitis (HCC)    • Shingles    • Sleep apnea, obstructive      Social History     Socioeconomic History   • Marital status:      Spouse name: Not on file   • Number of children: 3   • Years of education: Not on file   • Highest education level: Not on file   Occupational History   • Occupation: nurse     Comment: full-time   Tobacco Use   • Smoking status: Never   • Smokeless tobacco: Never   Vaping Use   • Vaping Use: Never used   Substance and Sexual Activity   • Alcohol use:  Yes     Alcohol/week: 4.0 standard drinks of alcohol     Types: 2 Glasses of wine, 2 Standard drinks or equivalent per week   • Drug use: No   • Sexual activity: Not Currently     Partners: Female     Birth control/protection: Post-menopausal, Surgical, Female Sterilization   Other Topics Concern   • Not on file   Social History Narrative    Active advance directive    DME- freestyle lite blood glucose meter device kit QID     Social Determinants of Health     Financial Resource Strain: Not on file   Food Insecurity: Not on file   Transportation Needs: Not on file   Physical Activity: Inactive (10/11/2021)    Exercise Vital Sign    • Days of Exercise per Week: 0 days    • Minutes of Exercise per Session: 0 min   Stress: No Stress Concern Present (10/11/2021)    [facility]    • Feeling of Stress :  Only a little   Social Connections: Not on file   Intimate Partner Violence: Not on file   Housing Stability: Not on file      Family History   Problem Relation Age of Onset   • Arthritis Mother    • Heart disease Mother         cardiac disorder   • Diabetes Mother    • Hiatal hernia Mother    • Hypertension Mother    • Irritable bowel syndrome Mother    • Breast cancer Mother         x2   • Uterine cancer Mother    • Osteoporosis Mother    • Thyroid disease Mother    • Other Mother         gastric reflux   • Stroke Mother    • Hyperlipidemia Mother    • Cancer Mother         ENDOMETRIAL   • Heart disease Father         cardiac disorder   • Hiatal hernia Father    • Ulcers Father    • Other Father         gastric reflux   • Coronary artery disease Father    • Hearing loss Father    • Hiatal hernia Sister    • Thyroid disease Sister    • Other Sister         gastric reflux   • Lung cancer Sister 68   • Cancer Sister         lung adenocarcinoma, mets to meninges   • Irritable bowel syndrome Daughter    • Hyperlipidemia Daughter    • Brain cancer Maternal Grandmother    • Breast cancer Maternal Grandmother         [place]   • No Known Problems Maternal Grandfather    • No Known Problems Paternal Grandmother    • No Known Problems Paternal Grandfather    • No Known Problems Brother    • [address] Hypertension Son    • Hiatal hernia Child    • Other Child         gastric reflux   • Breast cancer Maternal Aunt 65   • Uterine cancer Maternal Aunt         x3 sis   • Stroke Maternal Aunt • Cancer Maternal Aunt    • Breast cancer Maternal Aunt 54   • Cancer Maternal Aunt    • Breast cancer Maternal Aunt 54   • Cancer Maternal Aunt    • No Known Problems Paternal Aunt    • Colon cancer Neg Hx    • Ovarian cancer Neg Hx    • Cervical cancer Neg Hx      Past Surgical History:   Procedure Laterality Date   •  SECTION      x2   • COLONOSCOPY     • ELBOW SURGERY      left ulna/radius   • ESOPHAGOGASTRODUODENOSCOPY     • FOOT SURGERY Right    • FRACTURE SURGERY     • FRACTURE SURGERY Left     tibia   • HYSTERECTOMY Bilateral 2018   • JOINT REPLACEMENT Left     shoulder,  right knee   • KNEE ARTHROSCOPY     • KNEE ARTHROSCOPY W/ MENISCAL REPAIR Right    • CA LAPS TOTAL HYSTERECT 250 GM/< W/RMVL TUBE/OVARY N/A 2018    Procedure: ROBOTIC TOTAL LAPAROSCOPIC HYSTERECTOMY, BILATERAL SALPINGOOPHORECTOMY, BILATERAL PELVIC LYMPHNODE DISSECTION;  Surgeon: Ariane Harrington MD;  Location: [facility];  Service: Gynecology Oncology   • REPLACEMENT TOTAL KNEE Right    • SHOULDER ARTHROPLASTY     • SINUS SURGERY     • TONSILLECTOMY     • TUBAL LIGATION     • WRIST SURGERY      ganglonic cyst       Current Outpatient Medications:   •  albuterol (PROVENTIL HFA,VENTOLIN HFA) 90 mcg/act inhaler, Inhale 2 puffs every 6 (six) hours as needed for wheezing, Disp: 18 g, Rfl: 2  •  Calcium-Magnesium-Vitamin D (CALCIUM 1200+D3 PO), , Disp: , Rfl:   •  clobetasol (TEMOVATE) 0.05 % ointment, Apply topically 2 (two) times a day, Disp: 30 g, Rfl: 1  •  conjugated estrogens (PREMARIN) vaginal cream, Apply pea-sized amount to the lower vaginal area 1-2 times per week, Disp: 42.5 g, Rfl: 1  •  ezetimibe (ZETIA) 10 mg tablet, TAKE 1 TABLET DAILY, Disp: 90 tablet, Rfl: 3  •  furosemide (LASIX) 20 mg tablet, TAKE 1 TABLET DAILY, Disp: 90 tablet, Rfl: 3  •  glipiZIDE (GLUCOTROL XL) 5 mg 24 hr tablet, TAKE 1 TABLET DAILY, Disp: 90 tablet, Rfl: 3  •  glucose blood test strip, Use 1 each 3 (three) times a day Use as instructed, Disp: 300 each, Rfl: 3  •  HumaLOG 100 UNIT/ML injection, INJECT 5 UNITS UNDER THE SKIN THREE TIMES A DAY WITH MEALS, 1 UNIT OF INSULIN FOR 30 MG OVER 130, Disp: 30 mL, Rfl: 3  •  Insulin Syringe-Needle U-100 (INSULIN SYRINGE 1CC/31GX5/16") 31G X 5/16" 1 ML MISC, BD Veo Insulin Syringe Ultra-Fine 0.3 mL 31 gauge x 15/64", Disp: , Rfl:   •  KRILL OIL PO, Take by mouth, Disp: , Rfl:   •  levothyroxine 75 mcg tablet, TAKE 1 TABLET DAILY, Disp: 90 tablet, Rfl: 3  •  Linzess 145 MCG CAPS, Take 1 capsule (145 mcg total) by mouth daily Take 30 minutes before breakfast, Disp: 30 capsule, Rfl: 0  •  Magnesium Oxide 400 MG CAPS, Take by mouth, Disp: , Rfl:   •  metFORMIN (GLUCOPHAGE-XR) 500 mg 24 hr tablet, Take 1 tab in am and 2 tabs in pm, Disp: 270 tablet, Rfl: 3  •  olmesartan (BENICAR) 40 mg tablet, Take 1 tablet (40 mg total) by mouth daily, Disp: 90 tablet, Rfl: 3  •  Omega-3 Fatty Acids (FISH OIL) 1200 MG CAPS, Take by mouth, Disp: , Rfl:   •  pantoprazole (PROTONIX) 40 mg tablet, TAKE 1 TABLET DAILY, Disp: 90 tablet, Rfl: 3  •  potassium chloride (MICRO-K) 10 MEQ CR capsule, TAKE 1 CAPSULE DAILY, Disp: 90 capsule, Rfl: 3  •  rosuvastatin (CRESTOR) 20 MG tablet, TAKE 1 TABLET DAILY, Disp: 90 tablet, Rfl: 3  •  saccharomyces boulardii (FLORASTOR) 250 mg capsule, Take 250 mg by mouth daily  , Disp: , Rfl:   Allergies   Allergen Reactions   • Amoxicillin-Pot Clavulanate Anaphylaxis, Hives, Itching and Facial Swelling   • Erythromycin Hives, Itching, Swelling, Vomiting, Throat Swelling, Nasal Congestion and Facial Swelling   • Meperidine Anaphylaxis   • Morphine Hives, Itching, Swelling, Other (See Comments) and Syncope     hypotension   • Penicillins Anaphylaxis, Itching, Swelling and Hives   • Relafen [Nabumetone] Hives, Itching and Swelling   • Sulfa Antibiotics Hives, Itching, Rash and Swelling   • Darvon [Propoxyphene] Hives   • Methocarbamol Other (See Comments)     Double vision   • Reglan [Metoclopramide] Anxiety   • Tetracyclines & Related Rash       Labs:  Appointment on 07/11/2023   Component Date Value   • Angio Convert Enzyme 07/11/2023 37    • C-ANCA 07/11/2023 <1:20    • Atypical pANCA 07/11/2023 <1:20    • MPO AB 07/11/2023 <0.2    • HI-3 AB 07/11/2023 <0.2    • P-ANCA 07/11/2023 <1:20      Imaging: Mammo screening bilateral w 3d & cad    Result Date: 6/27/2023  Narrative: DIAGNOSIS: Screening mammogram, encounter for TECHNIQUE: Digital screening mammography was performed. Computer Aided Detection (CAD) analyzed all applicable images. COMPARISONS: Multiple prior exams dated between 11/9/2011 and 6/22/2022. RELEVANT HISTORY: Family Breast Cancer History: History of breast cancer in Mother, Maternal Grandmother, Maternal Aunt, Maternal Aunt, Maternal Aunt. Family Medical History: Family medical history includes breast cancer in 5 relatives (maternal aunt, maternal aunt, maternal aunt, maternal grandmother, mother). Personal History: Hormone history includes combination hormone replacement therapy, birth control and hormone replacement therapy. Surgical history includes hysterectomy. Medical history includes endometrial cancer. The patient is scheduled in a reminder system for screening mammography. 8-10% of cancers will be missed on mammography. Management of a palpable abnormality must be based on clinical grounds. Patients will be notified of their results via letter from our facility. Accredited by [address] [place] [address] of Radiology and FDA. RISK ASSESSMENT: 5 Year Tyrer-Cuzick: 4.86 % 10 Year Tyrer-Cuzick: 10.06 % Lifetime Tyrer-Cuzick: 10.06 % TISSUE DENSITY: The breasts are almost entirely fatty. INDICATION: Diana Lim is a 74 y.o. female presenting for screening mammography. FINDINGS: Bilateral There are no suspicious masses, grouped microcalcifications or areas of unexplained architectural distortion. The skin and nipple areolar complex are unremarkable.   Benign-appearing calcifications are noted in each breast.     Impression: No mammographic evidence of malignancy. ASSESSMENT/BI-RADS CATEGORY: Left: 2 - Benign Right: 2 - Benign Overall: 2 - Benign RECOMMENDATION:      - Routine screening mammogram in 1 year for both breasts. Workstation ID: [id number]       Review of Systems:  Review of Systems   REVIEW OF SYSTEMS:  Constitutional:  Denies fever or chills   Eyes:  Denies change in visual acuity   HENT:  Denies nasal congestion or sore throat   Respiratory:   shortness of breath   Cardiovascular:  Denies chest pain or edema   GI:  Denies abdominal pain, nausea, vomiting, bloody stools or diarrhea   :  Denies dysuria, frequency, difficulty in micturition and nocturia  Musculoskeletal:  Denies back pain or joint pain   Neurologic:  Denies headache, focal weakness or sensory changes   Endocrine:  Denies polyuria or polydipsia   Lymphatic:  Denies swollen glands   Psychiatric:  Denies depression or anxiety    Physical Exam:    /74 (BP Location: Right arm, Patient Position: Sitting, Cuff Size: Large)   Pulse 89   Resp 16   Ht 5' 2" (1.575 m)   Wt 96.2 kg (212 lb)   LMP  (LMP Unknown)   SpO2 97%   BMI 38.78 kg/m²     Physical Exam   PHYSICAL EXAM:  General:  Patient is not in acute distress   Head: Normocephalic, Atraumatic. HEENT:  Both pupils normal-size atraumatic, normocephalic, nonicteric  Neck:  JVP not raised. Trachea central. No carotid bruit  Respiratory:  normal breath sounds no crackles. no rhonchi  Cardiovascular:  Regular rate and rhythm no S3 no murmurs  GI:  Abdomen soft nontender. No organomegaly. Lymphatic:  No cervical or inguinal lymphadenopathy  Neurologic:  Patient is awake alert, oriented . Grossly nonfocal  Extremities no edema    Discussion/Summary:  Patient with multiple medical problems who seems to be doing reasonably well from cardiac standpoint. Previous studies reviewed with patient.  Medications reviewed and possible side effects discussed. concepts of cardiovascular disease , signs and symptoms of heart disease. Dietary and risk factor modification reinforced. All questions answered. Safety measures reviewed. Patient advised to report any problems prompting medical attention. Symptoms to watch out from cardiac standpoint which would indicate the need for further cardiac evaluation discussed with patient. Patient is in the process of establishing with endocrinologist next month. Medications reviewed. Previous cardiovascular studies reviewed. Follow-up in 6 months or earlier as needed.

## 2023-08-08 ENCOUNTER — TELEPHONE (OUTPATIENT)
Dept: RHEUMATOLOGY | Facility: CLINIC | Age: 73
End: 2023-08-08

## 2023-08-22 ENCOUNTER — APPOINTMENT (OUTPATIENT)
Age: 73
End: 2023-08-22
Payer: COMMERCIAL

## 2023-08-22 DIAGNOSIS — Z79.4 CONTROLLED TYPE 2 DIABETES MELLITUS WITHOUT COMPLICATION, WITH LONG-TERM CURRENT USE OF INSULIN (HCC): ICD-10-CM

## 2023-08-22 DIAGNOSIS — E11.9 CONTROLLED TYPE 2 DIABETES MELLITUS WITHOUT COMPLICATION, WITH LONG-TERM CURRENT USE OF INSULIN (HCC): ICD-10-CM

## 2023-08-22 LAB
ALBUMIN SERPL BCP-MCNC: 3.8 G/DL (ref 3.5–5)
ALP SERPL-CCNC: 73 U/L (ref 46–116)
ALT SERPL W P-5'-P-CCNC: 65 U/L (ref 12–78)
ANION GAP SERPL CALCULATED.3IONS-SCNC: 3 MMOL/L
AST SERPL W P-5'-P-CCNC: 55 U/L (ref 5–45)
BASOPHILS # BLD AUTO: 0.07 THOUSANDS/ÂΜL (ref 0–0.1)
BASOPHILS NFR BLD AUTO: 1 % (ref 0–1)
BILIRUB SERPL-MCNC: 0.4 MG/DL (ref 0.2–1)
BUN SERPL-MCNC: 22 MG/DL (ref 5–25)
CALCIUM SERPL-MCNC: 9.4 MG/DL (ref 8.3–10.1)
CHLORIDE SERPL-SCNC: 108 MMOL/L (ref 96–108)
CHOLEST SERPL-MCNC: 119 MG/DL
CO2 SERPL-SCNC: 28 MMOL/L (ref 21–32)
CREAT SERPL-MCNC: 0.93 MG/DL (ref 0.6–1.3)
EOSINOPHIL # BLD AUTO: 0.16 THOUSAND/ÂΜL (ref 0–0.61)
EOSINOPHIL NFR BLD AUTO: 2 % (ref 0–6)
ERYTHROCYTE [DISTWIDTH] IN BLOOD BY AUTOMATED COUNT: 13.3 % (ref 11.6–15.1)
EST. AVERAGE GLUCOSE BLD GHB EST-MCNC: 143 MG/DL
GFR SERPL CREATININE-BSD FRML MDRD: 60 ML/MIN/1.73SQ M
GLUCOSE P FAST SERPL-MCNC: 145 MG/DL (ref 65–99)
HBA1C MFR BLD: 6.6 %
HCT VFR BLD AUTO: 40.6 % (ref 34.8–46.1)
HDLC SERPL-MCNC: 44 MG/DL
HGB BLD-MCNC: 13.3 G/DL (ref 11.5–15.4)
IMM GRANULOCYTES # BLD AUTO: 0.02 THOUSAND/UL (ref 0–0.2)
IMM GRANULOCYTES NFR BLD AUTO: 0 % (ref 0–2)
LDLC SERPL CALC-MCNC: 43 MG/DL (ref 0–100)
LYMPHOCYTES # BLD AUTO: 3 THOUSANDS/ÂΜL (ref 0.6–4.47)
LYMPHOCYTES NFR BLD AUTO: 39 % (ref 14–44)
MCH RBC QN AUTO: 29 PG (ref 26.8–34.3)
MCHC RBC AUTO-ENTMCNC: 32.8 G/DL (ref 31.4–37.4)
MCV RBC AUTO: 89 FL (ref 82–98)
MONOCYTES # BLD AUTO: 0.51 THOUSAND/ÂΜL (ref 0.17–1.22)
MONOCYTES NFR BLD AUTO: 7 % (ref 4–12)
NEUTROPHILS # BLD AUTO: 3.97 THOUSANDS/ÂΜL (ref 1.85–7.62)
NEUTS SEG NFR BLD AUTO: 51 % (ref 43–75)
NONHDLC SERPL-MCNC: 75 MG/DL
NRBC BLD AUTO-RTO: 0 /100 WBCS
PLATELET # BLD AUTO: 317 THOUSANDS/UL (ref 149–390)
PMV BLD AUTO: 9.5 FL (ref 8.9–12.7)
POTASSIUM SERPL-SCNC: 4.5 MMOL/L (ref 3.5–5.3)
PROT SERPL-MCNC: 7.6 G/DL (ref 6.4–8.4)
RBC # BLD AUTO: 4.58 MILLION/UL (ref 3.81–5.12)
SODIUM SERPL-SCNC: 139 MMOL/L (ref 135–147)
TRIGL SERPL-MCNC: 159 MG/DL
TSH SERPL DL<=0.05 MIU/L-ACNC: 2.43 UIU/ML (ref 0.45–4.5)
WBC # BLD AUTO: 7.73 THOUSAND/UL (ref 4.31–10.16)

## 2023-08-22 PROCEDURE — 83036 HEMOGLOBIN GLYCOSYLATED A1C: CPT

## 2023-08-22 PROCEDURE — 36415 COLL VENOUS BLD VENIPUNCTURE: CPT

## 2023-08-22 PROCEDURE — 80061 LIPID PANEL: CPT

## 2023-08-22 PROCEDURE — 85025 COMPLETE CBC W/AUTO DIFF WBC: CPT

## 2023-08-22 PROCEDURE — 80053 COMPREHEN METABOLIC PANEL: CPT

## 2023-08-22 PROCEDURE — 84443 ASSAY THYROID STIM HORMONE: CPT

## 2023-08-23 ENCOUNTER — TELEPHONE (OUTPATIENT)
Age: 73
End: 2023-08-23

## 2023-08-23 NOTE — TELEPHONE ENCOUNTER
----- Message from Alice Sigala DO sent at 8/22/2023  5:48 PM EDT -----  Labs look good. A1c controlled. No significant concerns noted.  Dr. Inocente Pyle can discuss more in future at appointment

## 2023-08-24 ENCOUNTER — CONSULT (OUTPATIENT)
Dept: ENDOCRINOLOGY | Facility: CLINIC | Age: 73
End: 2023-08-24
Payer: COMMERCIAL

## 2023-08-24 VITALS
HEIGHT: 62 IN | HEART RATE: 91 BPM | WEIGHT: 211 LBS | DIASTOLIC BLOOD PRESSURE: 68 MMHG | BODY MASS INDEX: 38.83 KG/M2 | RESPIRATION RATE: 16 BRPM | TEMPERATURE: 98 F | SYSTOLIC BLOOD PRESSURE: 118 MMHG | OXYGEN SATURATION: 98 %

## 2023-08-24 DIAGNOSIS — E11.65 TYPE 2 DIABETES MELLITUS WITH HYPERGLYCEMIA, WITH LONG-TERM CURRENT USE OF INSULIN (HCC): Primary | ICD-10-CM

## 2023-08-24 DIAGNOSIS — G47.33 OBSTRUCTIVE SLEEP APNEA: ICD-10-CM

## 2023-08-24 DIAGNOSIS — E03.9 ACQUIRED HYPOTHYROIDISM: ICD-10-CM

## 2023-08-24 DIAGNOSIS — E66.9 OBESITY (BMI 30-39.9): ICD-10-CM

## 2023-08-24 DIAGNOSIS — E11.628 TYPE 2 DIABETES MELLITUS WITH OTHER SKIN COMPLICATIONS (HCC): ICD-10-CM

## 2023-08-24 DIAGNOSIS — Z79.4 TYPE 2 DIABETES MELLITUS WITH HYPERGLYCEMIA, WITH LONG-TERM CURRENT USE OF INSULIN (HCC): Primary | ICD-10-CM

## 2023-08-24 PROCEDURE — 99204 OFFICE O/P NEW MOD 45 MIN: CPT | Performed by: INTERNAL MEDICINE

## 2023-08-24 RX ORDER — ACYCLOVIR 400 MG/1
1 TABLET ORAL CONTINUOUS
Qty: 1 EACH | Refills: 0 | Status: SHIPPED | OUTPATIENT
Start: 2023-08-24

## 2023-08-24 RX ORDER — INSULIN GLARGINE 100 [IU]/ML
15 INJECTION, SOLUTION SUBCUTANEOUS
Qty: 15 ML | Refills: 0 | Status: SHIPPED | OUTPATIENT
Start: 2023-08-24

## 2023-08-24 RX ORDER — ACYCLOVIR 400 MG/1
1 TABLET ORAL
Qty: 3 EACH | Refills: 5 | Status: SHIPPED | OUTPATIENT
Start: 2023-08-24

## 2023-08-24 NOTE — PROGRESS NOTES
New Consult Note      CC: Type 2 diabetes with hyperglycemia    History of Present Illness:   74 yr female with Type 2DM since 1990, Hypothyroidism, GERD, JUICE, Asthma, endometrial CA, HTN, HLD, DPN, DJD s/p TKR, multiple joint replacements,     She tried Januvia that did not help. Jardiance caused a eGFR drop to 40 from 70. She started using humalog insulin for about 12 years. She has never used basal insulin. SAGM: Checks every morning and usually less than 140 mg/dL. Current meds:  Humalog 1u/7g plus 1u/15mg/dL above 115mg/dL  Metformin 500mg AM and 1000mg PM  Glipizide 5mg/24 hrs    Opthamology: yes, no retinopathy  Podiatry: No -   vaccination: yes  Dental:No  Pancreatitis: Yes -1990 allergic reaction to zantac suspected    Ace/ARB: olmesartan  Statin: crestor  Thyroid issues: hypothyroidism on levothyroxine 75 mcg qdaily    FHx: Mother- breast CA. Patient Active Problem List   Diagnosis   • Diabetes mellitus type 2, controlled, without complications ([address])   • Dysmetabolic syndrome X   • Benign essential hypertension   • Obesity (BMI 30-39. 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)   • Cortical age-related cataract of both eyes   • Bilateral hip pain   • PND (post-nasal drip)   • Hearing loss of right ear   • Nasal septal perforation     Past Medical History:   Diagnosis Date   • Abnormal mammogram    • Abnormal weight gain    • Achilles tendinitis    • Allergic 1952    since childhood   • Arthritis    • Asthma    • Cancer ([address]) 2018    Endometrial adenoma   • Combined forms of age-related cataract of both eyes 2021    Added per ICD-10-CM coding guidelines - provider accepted   • Diabetes mellitus ([address])    • Disc degeneration, lumbar    • Disease of thyroid gland     hypo   • Dyslipidemia    • Dyslipidemia, goal LDL below 70 3/27/2018   • Early satiety    • Edema     idiopathic peripheral   • Encounter for follow-up examination after completed treatment for malignant neoplasm 3/17/2019   • Encounter for gynecological examination without abnormal finding 2020   • Endometrial cancer ([address]) 2018   • Enthesopathy     hip region   • Esophagitis, reflux    • GERD (gastroesophageal reflux disease)    • Hard to intubate     difficulty with intubation and had to be intubated twice   • Heart murmur    • Hypercholesterolemia    • Hypertension    • IBS (irritable bowel syndrome)    • Irritable bowel syndrome    • Morbid (severe) obesity due to excess calories ([address]) 2022    As per CMS ICD10 guidelines:Provider accepted   • Obesity    • Obesity, Class I, BMI 30-34.9 2015   • Osteoarthritis    • Pneumonia    • Rosacea    • Sacroiliitis (HCC)    • Shingles    • Sleep apnea, obstructive       Past Surgical History:   Procedure Laterality Date   •  SECTION      x2   • COLONOSCOPY     • ELBOW SURGERY      left ulna/radius   • ESOPHAGOGASTRODUODENOSCOPY     • FOOT SURGERY Right    • FRACTURE SURGERY     • FRACTURE SURGERY Left     tibia   • HYSTERECTOMY Bilateral 2018   • JOINT REPLACEMENT Left     shoulder,  right knee   • KNEE ARTHROSCOPY     • KNEE ARTHROSCOPY W/ MENISCAL REPAIR Right    • KS LAPS TOTAL HYSTERECT 250 GM/< W/RMVL TUBE/OVARY N/A 2018    Procedure: ROBOTIC TOTAL LAPAROSCOPIC HYSTERECTOMY, BILATERAL SALPINGOOPHORECTOMY, BILATERAL PELVIC LYMPHNODE DISSECTION;  Surgeon: Ajith Astorga MD;  Location: [facility];  Service: Gynecology Oncology   • REPLACEMENT TOTAL KNEE Right    • SHOULDER ARTHROPLASTY     • SINUS SURGERY     • TONSILLECTOMY     • TUBAL LIGATION     • WRIST SURGERY      ganglonic cyst      Family History   Problem Relation Age of Onset   • Arthritis Mother    • Heart disease Mother         cardiac disorder   • Diabetes Mother    • Hiatal hernia Mother    • Hypertension Mother    • Irritable bowel syndrome Mother    • Breast cancer Mother         x2   • Uterine cancer Mother    • Osteoporosis Mother    • Thyroid disease Mother    • Other Mother         gastric reflux   • Stroke Mother    • Hyperlipidemia Mother    • Cancer Mother         ENDOMETRIAL   • Heart disease Father         cardiac disorder   • Hiatal hernia Father    • Ulcers Father    • Other Father         gastric reflux   • Coronary artery disease Father    • Hearing loss Father    • Hiatal hernia Sister    • Thyroid disease Sister    • Other Sister         gastric reflux   • Lung cancer Sister 68   • Cancer Sister         lung adenocarcinoma, mets to meninges   • Irritable bowel syndrome Daughter    • Hyperlipidemia Daughter    • Brain cancer Maternal Grandmother    • Breast cancer Maternal Grandmother         [place]nown   • No Known Problems Maternal Grandfather    • No Known Problems Paternal Grandmother    • No Known Problems Paternal Grandfather    • No Known Problems Brother    • Malig Hypertension Son    • Hiatal hernia Child    • Other Child         gastric reflux   • Breast cancer Maternal Aunt 65   • Uterine cancer Maternal Aunt         x3 sis   • Stroke Maternal Aunt    • Cancer Maternal Aunt    • Breast cancer Maternal Aunt 55   • Cancer Maternal Aunt    • Breast cancer Maternal Aunt 55   • Cancer Maternal Aunt    • No Known Problems Paternal Aunt    • Colon cancer Neg Hx    • Ovarian cancer Neg Hx    • Cervical cancer Neg Hx      Social History     Tobacco Use   • Smoking status: Never   • Smokeless tobacco: Never   Substance Use Topics   • Alcohol use: Yes     Alcohol/week: 4.0 standard drinks of alcohol     Types: 2 Glasses of wine, 2 Standard drinks or equivalent per week     Allergies   Allergen Reactions   • Amoxicillin-Pot Clavulanate Anaphylaxis, Hives, Itching and Facial Swelling   • Erythromycin Hives, Itching, Swelling, Vomiting, Throat Swelling, Nasal Congestion and Facial Swelling   • Meperidine Anaphylaxis   • Morphine Hives, Itching, Swelling, Other (See Comments) and Syncope     hypotension   • Penicillins Anaphylaxis, Itching, Swelling and Hives   • Relafen [Nabumetone] Hives, Itching and Swelling   • Sulfa Antibiotics Hives, Itching, Rash and Swelling   • Darvon [Propoxyphene] Hives   • Methocarbamol Other (See Comments)     Double vision   • Reglan [Metoclopramide] Anxiety   • Tetracyclines & Related Rash         Current Outpatient Medications:   •  albuterol (PROVENTIL HFA,VENTOLIN HFA) 90 mcg/act inhaler, Inhale 2 puffs every 6 (six) hours as needed for wheezing, Disp: 18 g, Rfl: 2  •  Calcium-Magnesium-Vitamin D (CALCIUM 1200+D3 PO), , Disp: , Rfl:   •  clobetasol (TEMOVATE) 0.05 % ointment, Apply topically 2 (two) times a day, Disp: 30 g, Rfl: 1  •  conjugated estrogens (PREMARIN) vaginal cream, Apply pea-sized amount to the lower vaginal area 1-2 times per week, Disp: 42.5 g, Rfl: 1  •  Continuous Blood Gluc  (Dexcom G7 ) SMITH, Use 1 each continuous, Disp: 1 each, Rfl: 0  •  Continuous Blood Gluc Sensor (Dexcom G7 Sensor), Use 1 Device every 10 days, Disp: 3 each, Rfl: 5  •  ezetimibe (ZETIA) 10 mg tablet, TAKE 1 TABLET DAILY, Disp: 90 tablet, Rfl: 3  •  furosemide (LASIX) 20 mg tablet, TAKE 1 TABLET DAILY, Disp: 90 tablet, Rfl: 3  •  glipiZIDE (GLUCOTROL XL) 5 mg 24 hr tablet, TAKE 1 TABLET DAILY, Disp: 90 tablet, Rfl: 3  •  glucose blood test strip, Use 1 each 3 (three) times a day Use as instructed, Disp: 300 each, Rfl: 3  •  HumaLOG 100 UNIT/ML injection, INJECT 5 UNITS UNDER THE SKIN THREE TIMES A DAY WITH MEALS, 1 UNIT OF INSULIN FOR 30 MG OVER 130, Disp: 30 mL, Rfl: 3  •  Insulin Glargine Solostar (Lantus SoloStar) 100 UNIT/ML SOPN, Inject 0.15 mL (15 Units total) under the skin daily at bedtime, Disp: 15 mL, Rfl: 0  •  Insulin Syringe-Needle U-100 (INSULIN SYRINGE 1CC/31GX5/16") 31G X 5/16" 1 ML MISC, BD Veo Insulin Syringe Ultra-Fine 0.3 mL 31 gauge x 15/64", Disp: , Rfl:   •  KRILL OIL PO, Take by mouth, Disp: , Rfl:   •  levothyroxine 75 mcg tablet, TAKE 1 TABLET DAILY, Disp: 90 tablet, Rfl: 3  •  Linzess 145 MCG CAPS, Take 1 capsule (145 mcg total) by mouth daily Take 30 minutes before breakfast, Disp: 30 capsule, Rfl: 0  •  Magnesium Oxide 400 MG CAPS, Take by mouth, Disp: , Rfl:   •  metFORMIN (GLUCOPHAGE-XR) 500 mg 24 hr tablet, Take 1 tab in am and 2 tabs in pm, Disp: 270 tablet, Rfl: 3  •  olmesartan (BENICAR) 40 mg tablet, Take 1 tablet (40 mg total) by mouth daily, Disp: 90 tablet, Rfl: 3  •  Omega-3 Fatty Acids (FISH OIL) 1200 MG CAPS, Take by mouth, Disp: , Rfl:   •  pantoprazole (PROTONIX) 40 mg tablet, TAKE 1 TABLET DAILY, Disp: 90 tablet, Rfl: 3  •  potassium chloride (MICRO-K) 10 MEQ CR capsule, TAKE 1 CAPSULE DAILY, Disp: 90 capsule, Rfl: 3  •  rosuvastatin (CRESTOR) 20 MG tablet, TAKE 1 TABLET DAILY, Disp: 90 tablet, Rfl: 3  •  saccharomyces boulardii (FLORASTOR) 250 mg capsule, Take 250 mg by mouth daily  , Disp: , Rfl:   •  tirzepatide (Mounjaro) 2.5 MG/0.5ML, Inject 0.5 mL (2.5 mg total) under the skin once a week, Disp: 2 mL, Rfl: 0    Review of Systems   HENT: Negative. Eyes: Negative. Respiratory: Negative. Cardiovascular: Negative. Gastrointestinal: Negative. Endocrine: Negative. Musculoskeletal: Negative. Skin: Negative. Allergic/Immunologic: Negative. Neurological: Negative. Hematological: Negative. Psychiatric/Behavioral: Negative. Physical Exam:  Body mass index is 38.59 kg/m².   /68   Pulse 91   Temp 98 °F (36.7 °C) (Tympanic)   Resp 16   Ht 5' 2" (1.575 m)   Wt 95.7 kg (211 lb)   LMP  (LMP Unknown)   SpO2 98%   BMI 38.59 kg/m²    Vitals:    08/24/23 0929   Weight: 95.7 kg (211 lb)        Physical Exam  Constitutional:       General: She is not in acute distress. Appearance: She is well-developed. She is not ill-appearing. HENT:      Head: Normocephalic and atraumatic. Nose: Nose normal.      Mouth/Throat:      Pharynx: Oropharynx is clear. Eyes:      Extraocular Movements: Extraocular movements intact. Conjunctiva/sclera: Conjunctivae normal.   Neck:      Thyroid: No thyromegaly. Cardiovascular:      Rate and Rhythm: Normal rate. Pulmonary:      Effort: Pulmonary effort is normal.   Musculoskeletal:         General: No deformity. Cervical back: Normal range of motion. Skin:     Capillary Refill: Capillary refill takes less than 2 seconds. Coloration: Skin is not pale. Findings: No rash. Neurological:      Mental Status: She is alert and oriented to person, place, and time.    Psychiatric:         Behavior: Behavior normal.         Labs:   Lab Results   Component Value Date    HGBA1C 6.6 (H) 08/22/2023       Lab Results   Component Value Date    YVB5YDJXWEWS 2.429 08/22/2023       Lab Results   Component Value Date    CREATININE 0.93 08/22/2023    CREATININE 0.85 04/22/2023    CREATININE 0.91 12/26/2022    BUN 22 08/22/2023     11/04/2015    K 4.5 08/22/2023     08/22/2023    CO2 28 08/22/2023     eGFR   Date Value Ref Range Status   08/22/2023 60 ml/min/1.73sq m Final       Lab Results   Component Value Date    ALT 65 08/22/2023    AST 55 (H) 08/22/2023    ALKPHOS 73 08/22/2023    BILITOT 0.41 11/04/2015       Lab Results   Component Value Date    CHOLESTEROL 119 08/22/2023    CHOLESTEROL 138 04/22/2023    CHOLESTEROL 124 12/26/2022     Lab Results   Component Value Date    HDL 44 (L) 08/22/2023    HDL 47 (L) 04/22/2023    HDL 51 12/26/2022     Lab Results   Component Value Date    TRIG 159 (H) 08/22/2023    TRIG 136 04/22/2023    TRIG 128 12/26/2022     Lab Results   Component Value Date    [address] 75 08/22/2023    [address] 91 04/22/2023    [address] 73 12/26/2022         Impression:  1. Type 2 diabetes mellitus with hyperglycemia, with long-term current use of insulin ([address])    2. Acquired hypothyroidism    3. Obstructive sleep apnea    4. Obesity (BMI 30-39.9)    5. Type 2 diabetes mellitus with other skin complications (HCC)         Plan:    Diagnoses and all orders for this visit:    Type 2 diabetes mellitus with hyperglycemia, with long-term current use of insulin ([address]). She is generally controlled with an A1c of 6.6% but I am concerned about both hypoglycemia and progressive weight gain that then contributes to other metabolic illnesses including obstructive sleep apnea, hypertension, cardiopulmonary dysfunction, GERD, DJD and worsening hyperglycemia in the future. Today we discussed all aspects of diabetes including pathophysiology, risk factors, complications, SAGM, CGM, diet, lifestyle modifications, medical fitness training, diabetes education, goals of therapy, follow up needs and medications including insulin, metformin, Jardiance and GLP1 agonists. Advised to maintain goal blood sugars 70-180mg/dL. We agreed to stop glipizide and substitute that with a basal insulin. We will initiate Mounjaro to help improve postprandial glycemia and also insulin sensitivity. Advised her to down titrate Humalog as possible. The method of down titration will be done by patient herself as she is well versed being diabetes educator. Continue metformin at current dose. Eventually, we agreed to aim for a regimen of basal insulin, GLP-1 agonist and metformin. Recommended medical fitness training to help lose weight. Follow-up in 4 to 6 weeks. -     Ambulatory Referral to Endocrinology  -     tirzepatimunira Matamoros) 2.5 MG/0.5ML;  Inject 0.5 mL (2.5 mg total) under the skin once a week  - Ambulatory Referral to Medical Fitness Exercise Specialist; Future  -     Continuous Blood Gluc Sensor (Dexcom G7 Sensor); Use 1 Device every 10 days  -     Continuous Blood Gluc  (Dexcom G7 ) SMITH; Use 1 each continuous  -     Insulin Glargine Solostar (Lantus SoloStar) 100 UNIT/ML SOPN; Inject 0.15 mL (15 Units total) under the skin daily at bedtime  -     Hemoglobin A1C; Future  -     Albumin / creatinine urine ratio; Future    Acquired hypothyroidism. He seems clinically and biochemically euthyroid. Continue current dose of levothyroxine 75 mcg daily  -     T4, free; Future  -     TSH, 3rd generation; Future    Obstructive sleep apnea. She is unable to tolerate CPAP. Obesity (BMI 30-39.9). Today we discussed all aspects of obesity and metabolism including pathophysiology, risk factors, complications, goal of sustaining weight loss long term, usual propensity to regain weight with short term approaches, follow up needs and medications - efficacy and limitations. Discussed role of endocrinopathies, inflammatory conditions, sleep disorders, mental health disorders, lifestyle issues and polypharmacy and weight gain. Briefly discussed bariatric surgery. Diet: carb controlled diet <1500cal/day/ VLCD, 64oz fluids/day   lifestyle modifications: intermittent fasting and 10,000 steps/day  medical fitness training: muscle building  education: nutrition input      Type 2 diabetes mellitus with other skin complications ([address])        I have spent 50 minutes with patient today in which greater than 50% of this time was spent in counseling/coordination of care. Discussed with the patient and all questioned fully answered. She will call me if any problems arise. Educated/ Counseled patient on diagnostic test results, prognosis, risk vs benefit of treatment options, importance of treatment compliance, healthy life and lifestyle choices.       [facility]

## 2023-08-24 NOTE — PATIENT INSTRUCTIONS
Instructions    Diet:   Take 1500 cheng/day of balanced diet with 1/3rd carbs, 1/3rd protein and rest fiber and small amount fat. Try to include fasting for 12-16hrs -early dinner and late breakfast.  Drink at least 64 oz fluids daily  Lifestyle:   30 min brisk activity most days. Join [email or web address] medical fitness training to build muscles and burn fat. Medical fitness: follow these exercise links  Full Version - [email or web address]     Warmup - [email or web address]     Lower Body - [email or web address]     Upper Body - [email or web address]     Core -  [email or web address]    Consider switching from medications that cause weight gain to weight neutral medications. Other:   Make sure you are treated appropriately if you have sleep apnea. We may consider bariatric surgery if we dont see benefit over 6-12 months.
28-Jan-2019 01:37

## 2023-08-29 DIAGNOSIS — E11.8 TYPE 2 DIABETES MELLITUS WITH COMPLICATION, WITHOUT LONG-TERM CURRENT USE OF INSULIN (HCC): ICD-10-CM

## 2023-08-29 RX ORDER — BLOOD-GLUCOSE METER
KIT MISCELLANEOUS
Qty: 300 STRIP | Refills: 3 | Status: SHIPPED | OUTPATIENT
Start: 2023-08-29

## 2023-09-14 DIAGNOSIS — Z79.4 CONTROLLED TYPE 2 DIABETES MELLITUS WITHOUT COMPLICATION, WITH LONG-TERM CURRENT USE OF INSULIN (HCC): Primary | ICD-10-CM

## 2023-09-14 DIAGNOSIS — E11.9 CONTROLLED TYPE 2 DIABETES MELLITUS WITHOUT COMPLICATION, WITH LONG-TERM CURRENT USE OF INSULIN (HCC): Primary | ICD-10-CM

## 2023-09-19 DIAGNOSIS — Z79.4 CONTROLLED TYPE 2 DIABETES MELLITUS WITHOUT COMPLICATION, WITH LONG-TERM CURRENT USE OF INSULIN (HCC): ICD-10-CM

## 2023-09-19 DIAGNOSIS — E11.9 CONTROLLED TYPE 2 DIABETES MELLITUS WITHOUT COMPLICATION, WITH LONG-TERM CURRENT USE OF INSULIN (HCC): ICD-10-CM

## 2023-10-09 ENCOUNTER — TELEPHONE (OUTPATIENT)
Dept: RHEUMATOLOGY | Facility: CLINIC | Age: 73
End: 2023-10-09
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
## 2023-10-10 ENCOUNTER — OFFICE VISIT (OUTPATIENT)
Dept: RHEUMATOLOGY | Facility: CLINIC | Age: 73
End: 2023-10-10
Payer: COMMERCIAL

## 2023-10-10 VITALS
BODY MASS INDEX: 36.99 KG/M2 | SYSTOLIC BLOOD PRESSURE: 134 MMHG | WEIGHT: 201 LBS | DIASTOLIC BLOOD PRESSURE: 70 MMHG | HEIGHT: 62 IN

## 2023-10-10 DIAGNOSIS — M47.9 DEGENERATIVE JOINT DISEASE OF LOW BACK: ICD-10-CM

## 2023-10-10 DIAGNOSIS — M54.89 INFLAMMATORY BACK PAIN: ICD-10-CM

## 2023-10-10 DIAGNOSIS — M54.17 LUMBOSACRAL RADICULOPATHY: ICD-10-CM

## 2023-10-10 DIAGNOSIS — J34.89 NASAL SEPTAL PERFORATION: Primary | ICD-10-CM

## 2023-10-10 PROCEDURE — 99204 OFFICE O/P NEW MOD 45 MIN: CPT | Performed by: STUDENT IN AN ORGANIZED HEALTH CARE EDUCATION/TRAINING PROGRAM

## 2023-10-10 NOTE — PATIENT INSTRUCTIONS
Please get blood work and x-rays of your low back and pelvis. I have a low suspicion for an autoimmune disease causing your symptoms, but I do want to investigate a little bit further. I think it's okay for you to take NSAID once in a while. I would recommend AGAINST taking it daily.

## 2023-10-16 ENCOUNTER — APPOINTMENT (OUTPATIENT)
Age: 73
End: 2023-10-16
Payer: COMMERCIAL

## 2023-10-16 DIAGNOSIS — J34.89 NASAL SEPTAL PERFORATION: ICD-10-CM

## 2023-10-16 LAB
CRP SERPL QL: 2.5 MG/L
ERYTHROCYTE [SEDIMENTATION RATE] IN BLOOD: 15 MM/HOUR (ref 0–29)

## 2023-10-16 PROCEDURE — 86140 C-REACTIVE PROTEIN: CPT

## 2023-10-16 PROCEDURE — 86038 ANTINUCLEAR ANTIBODIES: CPT

## 2023-10-16 PROCEDURE — 36415 COLL VENOUS BLD VENIPUNCTURE: CPT

## 2023-10-16 PROCEDURE — 85652 RBC SED RATE AUTOMATED: CPT

## 2023-10-19 LAB — ANA TITR SER IF: NEGATIVE {TITER}

## 2023-10-23 ENCOUNTER — RA CDI HCC (OUTPATIENT)
Dept: OTHER | Facility: HOSPITAL | Age: 73
End: 2023-10-23

## 2023-10-23 NOTE — PROGRESS NOTES
[address] [facility] coding opportunities          Chart Reviewed number of suggestions sent to Provider: 1    E11.36  Patients Insurance        Commercial Insurance: [employer]

## 2023-10-27 ENCOUNTER — TELEPHONE (OUTPATIENT)
Dept: RHEUMATOLOGY | Facility: CLINIC | Age: 73
End: 2023-10-27

## 2023-10-29 ENCOUNTER — HOSPITAL ENCOUNTER (OUTPATIENT)
Dept: RADIOLOGY | Facility: HOSPITAL | Age: 73
Discharge: HOME | End: 2023-10-29
Payer: COMMERCIAL

## 2023-10-29 ENCOUNTER — APPOINTMENT (OUTPATIENT)
Dept: LAB | Facility: CLINIC | Age: 73
End: 2023-10-29
Payer: COMMERCIAL

## 2023-10-29 DIAGNOSIS — M54.89 INFLAMMATORY BACK PAIN: ICD-10-CM

## 2023-10-29 DIAGNOSIS — Z79.4 TYPE 2 DIABETES MELLITUS WITH HYPERGLYCEMIA, WITH LONG-TERM CURRENT USE OF INSULIN (HCC): ICD-10-CM

## 2023-10-29 DIAGNOSIS — M47.9 DEGENERATIVE JOINT DISEASE OF LOW BACK: ICD-10-CM

## 2023-10-29 DIAGNOSIS — E03.9 ACQUIRED HYPOTHYROIDISM: ICD-10-CM

## 2023-10-29 DIAGNOSIS — E11.65 TYPE 2 DIABETES MELLITUS WITH HYPERGLYCEMIA, WITH LONG-TERM CURRENT USE OF INSULIN (HCC): ICD-10-CM

## 2023-10-29 LAB
CREAT UR-MCNC: 152 MG/DL
EST. AVERAGE GLUCOSE BLD GHB EST-MCNC: 137 MG/DL
HBA1C MFR BLD: 6.4 %
MICROALBUMIN UR-MCNC: 17.1 MG/L
MICROALBUMIN/CREAT 24H UR: 11 MG/G CREATININE (ref 0–30)
T4 FREE SERPL-MCNC: 1.16 NG/DL (ref 0.61–1.12)
TSH SERPL DL<=0.05 MIU/L-ACNC: 1.76 UIU/ML (ref 0.45–4.5)

## 2023-10-29 PROCEDURE — 82570 ASSAY OF URINE CREATININE: CPT

## 2023-10-29 PROCEDURE — 84443 ASSAY THYROID STIM HORMONE: CPT

## 2023-10-29 PROCEDURE — 3044F HG A1C LEVEL LT 7.0%: CPT | Performed by: INTERNAL MEDICINE

## 2023-10-29 PROCEDURE — 83036 HEMOGLOBIN GLYCOSYLATED A1C: CPT

## 2023-10-29 PROCEDURE — 72110 X-RAY EXAM L-2 SPINE 4/>VWS: CPT

## 2023-10-29 PROCEDURE — 3061F NEG MICROALBUMINURIA REV: CPT | Performed by: INTERNAL MEDICINE

## 2023-10-29 PROCEDURE — 82043 UR ALBUMIN QUANTITATIVE: CPT

## 2023-10-29 PROCEDURE — 72202 X-RAY EXAM SI JOINTS 3/> VWS: CPT

## 2023-10-29 PROCEDURE — 36415 COLL VENOUS BLD VENIPUNCTURE: CPT

## 2023-10-29 PROCEDURE — 84439 ASSAY OF FREE THYROXINE: CPT

## 2023-10-30 ENCOUNTER — OFFICE VISIT (OUTPATIENT)
Age: 73
End: 2023-10-30
Payer: COMMERCIAL

## 2023-10-30 VITALS
WEIGHT: 195.4 LBS | TEMPERATURE: 98 F | HEART RATE: 82 BPM | DIASTOLIC BLOOD PRESSURE: 62 MMHG | OXYGEN SATURATION: 98 % | RESPIRATION RATE: 16 BRPM | SYSTOLIC BLOOD PRESSURE: 123 MMHG | BODY MASS INDEX: 35.96 KG/M2 | HEIGHT: 62 IN

## 2023-10-30 DIAGNOSIS — E03.9 ACQUIRED HYPOTHYROIDISM: ICD-10-CM

## 2023-10-30 DIAGNOSIS — I10 BENIGN ESSENTIAL HYPERTENSION: ICD-10-CM

## 2023-10-30 DIAGNOSIS — J45.20 MILD INTERMITTENT ASTHMA WITHOUT COMPLICATION: ICD-10-CM

## 2023-10-30 DIAGNOSIS — I25.10 CORONARY ATHEROSCLEROSIS DUE TO CALCIFIED CORONARY LESION: ICD-10-CM

## 2023-10-30 DIAGNOSIS — E78.5 DYSLIPIDEMIA: ICD-10-CM

## 2023-10-30 DIAGNOSIS — Z79.4 CONTROLLED TYPE 2 DIABETES MELLITUS WITHOUT COMPLICATION, WITH LONG-TERM CURRENT USE OF INSULIN (HCC): Primary | ICD-10-CM

## 2023-10-30 DIAGNOSIS — I25.84 CORONARY ATHEROSCLEROSIS DUE TO CALCIFIED CORONARY LESION: ICD-10-CM

## 2023-10-30 DIAGNOSIS — E11.9 CONTROLLED TYPE 2 DIABETES MELLITUS WITHOUT COMPLICATION, WITH LONG-TERM CURRENT USE OF INSULIN (HCC): Primary | ICD-10-CM

## 2023-10-30 PROBLEM — M54.50 LOW BACK PAIN: Status: ACTIVE | Noted: 2023-07-02

## 2023-10-30 PROCEDURE — 1160F RVW MEDS BY RX/DR IN RCRD: CPT | Performed by: INTERNAL MEDICINE

## 2023-10-30 PROCEDURE — 1159F MED LIST DOCD IN RCRD: CPT | Performed by: INTERNAL MEDICINE

## 2023-10-30 PROCEDURE — 99214 OFFICE O/P EST MOD 30 MIN: CPT | Performed by: INTERNAL MEDICINE

## 2023-10-30 NOTE — PROGRESS NOTES
INTERNAL MEDICINE OFFICE VISIT  [facility] AT [facility], [address]  Tel: [phone]      NAME: Peg Aparicio  AGE: 74 y.o. SEX: female  : 1950   MRN: [id number]    DATE: 10/30/2023  TIME: 4:37 PM      Assessment and Plan:  1. Controlled type 2 diabetes mellitus without complication, with long-term current use of insulin ([address])  Doing well with the type 2 diabetes as she has also lost weight on the [facility]. She is off the Humalog and her A1c is very well controlled. Follow-up with endocrinology    - Hemoglobin A1C; Future  - Comprehensive metabolic panel; Future  - CBC and differential; Future  - Lipid Panel with Direct LDL reflex; Future  - TSH, 3rd generation with Free T4 reflex; Future  - Microalbumin, Random Urine (W/Creatinine) ([facility] ONLY); Future  - Microalbumin, Random Urine (W/Creatinine) ([facility] ONLY)    2. Benign essential hypertension  Continue olmesartan    3. Dyslipidemia  Continue Crestor and Zetia    4. Acquired hypothyroidism  Follow-up with endocrinology, continue the same dose of levothyroxine for now    5. Mild intermittent asthma without complication  Continue inhalers as needed    6. Coronary atherosclerosis due to calcified coronary lesion  Follow-up with cardiology      - Counseling Documentation: patient was counseled regarding: diagnostic results, instructions for management, risk factor reductions, prognosis, patient and family education, risks and benefits of treatment options, and importance of compliance with treatment  - Medication Side Effects: Adverse side effects of medications were reviewed with the patient/guardian today. Return for follow up visit in 6 months or earlier, if needed.       Chief Complaint:  Chief Complaint   Patient presents with    Follow-up         History of Present Illness:   Patient has been placed on the wait loss medication by her endocrinologist and has lost about 16 pounds in the last couple of months. She is doing very well with the diabetic control as well as the blood pressure control. She is taking all her medications regularly  The TSH is within normal range but the T4 is on the higher side and the endocrinologist is managing it      Active Problem List:  Patient Active Problem List   Diagnosis    Diabetes mellitus type 2, controlled, without complications ([address])    Dysmetabolic syndrome X    Benign essential hypertension    Obesity (BMI 30-39. 9)    Lichen sclerosus    History of endometrial cancer    SOB (shortness of breath)    Asymptomatic postmenopausal status    At risk for sleep apnea    Dyslipidemia    Asthma    GERD (gastroesophageal reflux disease)    Acquired hypothyroidism    Status post total right knee replacement    Spondylosis of lumbosacral region    Irritable bowel syndrome without diarrhea    Coronary atherosclerosis due to calcified coronary lesion    Chronic pain of both knees    Obstructive sleep apnea    Endometrial cancer (HCC)    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)    Cortical age-related cataract of both eyes    Bilateral hip pain    PND (post-nasal drip)    Hearing loss of right ear    Nasal septal perforation    Low back pain         Past Medical History:  Past Medical History:   Diagnosis Date    Abnormal mammogram     Abnormal weight gain     Achilles tendinitis     Allergic 1952    since childhood    Arthritis 1988    Asthma     Cancer ([address]) 2018    Endometrial adenoma    Combined forms of age-related cataract of both eyes 8/18/2021    Added per ICD-10-CM coding guidelines - provider accepted    Diabetes mellitus ([address])     Disc degeneration, lumbar     Disease of thyroid gland     hypo    Dyslipidemia     Dyslipidemia, goal LDL below 70 3/27/2018    Early satiety     Edema     idiopathic peripheral    Encounter for follow-up examination after completed treatment for malignant neoplasm 3/17/2019    Encounter for gynecological examination without abnormal finding 2020    Endometrial cancer ([address]) 2018    Enthesopathy     hip region    Esophagitis, reflux     GERD (gastroesophageal reflux disease)     Hard to intubate     difficulty with intubation and had to be intubated twice    Heart murmur     Hypercholesterolemia     Hypertension     IBS (irritable bowel syndrome)     Irritable bowel syndrome     Morbid (severe) obesity due to excess calories ([address]) 2022    As per CMS ICD10 guidelines:Provider accepted    Obesity 1998    Obesity, Class I, BMI 30-34.9 2015    Osteoarthritis     Pneumonia     Rosacea     Sacroiliitis (HCC)     Shingles     Sleep apnea, obstructive          Past Surgical History:  Past Surgical History:   Procedure Laterality Date     SECTION      x2    COLONOSCOPY      ELBOW SURGERY      left ulna/radius    ESOPHAGOGASTRODUODENOSCOPY      FOOT SURGERY Right     FRACTURE SURGERY      FRACTURE SURGERY Left     tibia    HYSTERECTOMY Bilateral 2018    JOINT REPLACEMENT Left     shoulder,  right knee    KNEE ARTHROSCOPY      KNEE ARTHROSCOPY W/ MENISCAL REPAIR Right     MT LAPS TOTAL HYSTERECT 250 GM/< W/RMVL TUBE/OVARY N/A 2018    Procedure: ROBOTIC TOTAL LAPAROSCOPIC HYSTERECTOMY, BILATERAL SALPINGOOPHORECTOMY, BILATERAL PELVIC LYMPHNODE DISSECTION;  Surgeon: Flex Ochoa MD;  Location: [facility];  Service: Gynecology Oncology    REPLACEMENT TOTAL KNEE Right     SHOULDER ARTHROPLASTY      SINUS SURGERY      TONSILLECTOMY      TUBAL LIGATION      WRIST SURGERY      ganglonic cyst         Family History:  Family History   Problem Relation Age of Onset    Arthritis Mother     Heart disease Mother         cardiac disorder    Diabetes Mother     Hiatal hernia Mother     Hypertension Mother     Irritable bowel syndrome Mother     Breast cancer Mother         x2    Uterine cancer Mother     Osteoporosis Mother     Thyroid disease Mother     Other Mother         gastric reflux    Stroke Mother     Hyperlipidemia Mother     Cancer Mother         ENDOMETRIAL    Heart disease Father         cardiac disorder    Hiatal hernia Father     Ulcers Father     Other Father         gastric reflux    Coronary artery disease Father     Hearing loss Father     Hiatal hernia Sister     Thyroid disease Sister     Other Sister         gastric reflux    Lung cancer Sister 68    Cancer Sister         lung adenocarcinoma, mets to meninges    Irritable bowel syndrome Daughter     Hyperlipidemia Daughter     Brain cancer Maternal Grandmother     Breast cancer Maternal Grandmother         uknown    No Known Problems Maternal Grandfather     No Known Problems Paternal Grandmother     No Known Problems Paternal Grandfather     No Known Problems Brother     Malig Hypertension Son     Hiatal hernia Child     Other Child         gastric reflux    Breast cancer Maternal Aunt 65    Uterine cancer Maternal Aunt         x3 sis    Stroke Maternal Aunt     Cancer Maternal Aunt     Breast cancer Maternal Aunt 54    Cancer Maternal Aunt     Breast cancer Maternal Aunt 54    Cancer Maternal Aunt     No Known Problems Paternal Aunt     Colon cancer Neg Hx     Ovarian cancer Neg Hx     Cervical cancer Neg Hx          Social History:  Social History     Socioeconomic History    Marital status:      Spouse name: None    Number of children: 3    Years of education: None    Highest education level: None   Occupational History    Occupation: nurse     Comment: full-time   Tobacco Use    Smoking status: Never    Smokeless tobacco: Never   Vaping Use    Vaping Use: Never used   Substance and Sexual Activity    Alcohol use:  Yes     Alcohol/week: 4.0 standard drinks of alcohol     Types: 2 Glasses of wine, 2 Standard drinks or equivalent per week    Drug use: No    Sexual activity: Not Currently     Partners: Female     Birth control/protection: Post-menopausal, Surgical, Female Sterilization   Other Topics Concern    None   Social History Narrative    Active advance directive    DME- freestyle lite blood glucose meter device kit QID     Social Determinants of Health     Financial Resource Strain: Not on file   Food Insecurity: Not on file   Transportation Needs: Not on file   Physical Activity: Inactive (10/11/2021)    Exercise Vital Sign     Days of Exercise per Week: 0 days     Minutes of Exercise per Session: 0 min   Stress: No Stress Concern Present (10/11/2021)    [facility]     Feeling of Stress : Only a little   Social Connections: Not on file   Intimate Partner Violence: Not on file   Housing Stability: Not on file         Allergies:   Allergies   Allergen Reactions    Amoxicillin-Pot Clavulanate Anaphylaxis, Hives, Itching and Facial Swelling    Erythromycin Hives, Itching, Swelling, Vomiting, Throat Swelling, Nasal Congestion and Facial Swelling    Meperidine Anaphylaxis    Morphine Hives, Itching, Swelling, Other (See Comments) and Syncope     hypotension    Penicillins Anaphylaxis, Itching, Swelling and Hives    Relafen [Nabumetone] Hives, Itching and Swelling    Sulfa Antibiotics Hives, Itching, Rash and Swelling    Darvon [Propoxyphene] Hives    Methocarbamol Other (See Comments)     Double vision    Reglan [Metoclopramide] Anxiety    Tetracyclines & Related Rash         Medications:    Current Outpatient Medications:     albuterol (PROVENTIL HFA,VENTOLIN HFA) 90 mcg/act inhaler, Inhale 2 puffs every 6 (six) hours as needed for wheezing, Disp: 18 g, Rfl: 2    Calcium-Magnesium-Vitamin D (CALCIUM 1200+D3 PO), , Disp: , Rfl:     clobetasol (TEMOVATE) 0.05 % ointment, Apply topically 2 (two) times a day, Disp: 30 g, Rfl: 1    conjugated estrogens (PREMARIN) vaginal cream, Apply pea-sized amount to the lower vaginal area 1-2 times per week, Disp: 42.5 g, Rfl: 1    Continuous Blood Gluc  (Dexcom G7 ) SMITH, Use 1 each continuous, Disp: 1 each, Rfl: 0 Continuous Blood Gluc Sensor (Dexcom G7 Sensor), Use 1 Device every 10 days, Disp: 3 each, Rfl: 5    ezetimibe (ZETIA) 10 mg tablet, TAKE 1 TABLET DAILY, Disp: 90 tablet, Rfl: 3    FREESTYLE LITE test strip, USE 1 STRIP THREE TIMES A DAY AS INSTRUCTED, Disp: 300 strip, Rfl: 3    furosemide (LASIX) 20 mg tablet, TAKE 1 TABLET DAILY, Disp: 90 tablet, Rfl: 3    HumaLOG 100 UNIT/ML injection, INJECT 5 UNITS UNDER THE SKIN THREE TIMES A DAY WITH MEALS, 1 UNIT OF INSULIN FOR 30 MG OVER 130, Disp: 30 mL, Rfl: 3    Insulin Glargine Solostar (Lantus SoloStar) 100 UNIT/ML SOPN, Inject 0.15 mL (15 Units total) under the skin daily at bedtime, Disp: 15 mL, Rfl: 0    Insulin Pen Needle (BD Pen Needle Dulce U/F) 32G X 4 MM MISC, Use daily as needed with insulin pen, Disp: 100 each, Rfl: 2    Insulin Syringe-Needle U-100 (INSULIN SYRINGE 1CC/31GX5/16") 31G X 5/16" 1 ML MISC, BD Veo Insulin Syringe Ultra-Fine 0.3 mL 31 gauge x 15/64", Disp: , Rfl:     KRILL OIL PO, Take by mouth, Disp: , Rfl:     levothyroxine 75 mcg tablet, TAKE 1 TABLET DAILY, Disp: 90 tablet, Rfl: 3    Linzess 145 MCG CAPS, Take 1 capsule (145 mcg total) by mouth daily Take 30 minutes before breakfast, Disp: 30 capsule, Rfl: 0    Magnesium Oxide 400 MG CAPS, Take by mouth, Disp: , Rfl:     metFORMIN (GLUCOPHAGE-XR) 500 mg 24 hr tablet, Take 1 tab in am and 2 tabs in pm, Disp: 270 tablet, Rfl: 3    olmesartan (BENICAR) 40 mg tablet, Take 1 tablet (40 mg total) by mouth daily, Disp: 90 tablet, Rfl: 3    Omega-3 Fatty Acids (FISH OIL) 1200 MG CAPS, Take by mouth, Disp: , Rfl:     pantoprazole (PROTONIX) 40 mg tablet, TAKE 1 TABLET DAILY, Disp: 90 tablet, Rfl: 3    potassium chloride (MICRO-K) 10 MEQ CR capsule, TAKE 1 CAPSULE DAILY, Disp: 90 capsule, Rfl: 3    rosuvastatin (CRESTOR) 20 MG tablet, TAKE 1 TABLET DAILY, Disp: 90 tablet, Rfl: 3    saccharomyces boulardii (FLORASTOR) 250 mg capsule, Take 250 mg by mouth daily  , Disp: , Rfl:     tirzepatide 5 MG/0.5ML, Inject 0.5 mL (5 mg total) under the skin once a week, Disp: 6 mL, Rfl: 0      The following portions of the patient's history were reviewed and updated as appropriate: past medical history, past surgical history, family history, social history, allergies, current medications and active problem list.      Review of Systems:  Constitutional: Denies fever, chills, weight gain, weight loss, fatigue  Eyes: Denies eye redness, eye discharge, double vision, change in visual acuity  ENT: Denies hearing loss, tinnitus, sneezing, nasal congestion, nasal discharge, sore throat   Respiratory: Denies cough, expectoration, hemoptysis, shortness of breath, wheezing  Cardiovascular: Denies chest pain, palpitations, lower extremity swelling, orthopnea, PND  Gastrointestinal: Denies abdominal pain, heartburn, nausea, vomiting, hematemesis, diarrhea, bloody stools  Genito-Urinary: Denies dysuria, frequency, difficulty in micturition, nocturia, incontinence  Musculoskeletal: Denies back pain, joint pain, muscle pain  Neurologic: Denies confusion, lightheadedness, syncope, headache, focal weakness, sensory changes, seizures  Endocrine: Denies polyuria, polydipsia, temperature intolerance  Allergy and Immunology: Denies hives, insect bite sensitivity  Hematological and Lymphatic: Denies bleeding problems, swollen glands   Psychological: Denies depression, suicidal ideation, anxiety, panic, mood swings  Dermatological: Denies pruritus, rash, skin lesion changes      Vitals:  Vitals:    10/30/23 1611   BP: 123/62   Pulse: 82   Resp: 16   Temp: 98 °F (36.7 °C)   SpO2: 98%       Body mass index is 35.74 kg/m². Weight (last 2 days)       Date/Time Weight    10/30/23 1611 88.6 (195.4)              Physical Examination:  General: Patient is not in acute distress. Awake, alert, responding to commands. No weight gain or loss  Head: Normocephalic. Atraumatic  Eyes: Conjunctiva and lids with no swelling, erythema or discharge.  Both pupils normal sized, round and reactive to light. Sclera nonicteric  ENT: External examination of nose and ear normal. Otoscopic examination shows translucent tympanic membranes with patent canals without erythema. Oropharynx moist with no erythema, edema, exudate or lesions  Neck: Supple. JVP not raised. Trachea midline. No masses. No thyromegaly  Lungs: No signs of increased work of breathing or respiratory distress. Bilateral bronchovascular breath sounds with no crackles or rhonchi  Chest wall: No tenderness  Cardiovascular: Normal PMI. No thrills. Regular rate and rhythm. S1 and S2 normal. No murmur, rub or gallop  Gastrointestinal: Abdomen soft, nontender. No guarding or rigidity. Liver and spleen not palpable. Bowel sounds present  Neurologic: Cranial nerves II-XII intact.  Cortical functions normal. Motor system - Reflexes 2+ and symmetrical. Sensations normal  Musculoskeletal: Gait normal. No joint tenderness  Integumentary: Skin normal with no rash or lesions  Lymphatic: No palpable lymph nodes in neck, axilla or groin  Extremities: No clubbing, cyanosis, edema or varicosities  Psychological: Judgement and insight normal. Mood and affect normal      Laboratory Results:  CBC with diff:   Lab Results   Component Value Date    WBC 7.73 08/22/2023    WBC 9.23 11/04/2015    RBC 4.58 08/22/2023    RBC 4.49 11/04/2015    HGB 13.3 08/22/2023    HGB 12.3 11/04/2015    HCT 40.6 08/22/2023    HCT 37.4 11/04/2015    MCV 89 08/22/2023    MCV 83 11/04/2015    MCH 29.0 08/22/2023    MCH 27.4 11/04/2015    RDW 13.3 08/22/2023    RDW 15.7 (H) 11/04/2015     08/22/2023     (H) 11/04/2015       CMP:  Lab Results   Component Value Date    CREATININE 0.93 08/22/2023    CREATININE 0.91 11/04/2015    BUN 22 08/22/2023    BUN 26 (H) 11/04/2015     11/04/2015    K 4.5 08/22/2023    K 4.2 11/04/2015     08/22/2023     (H) 11/04/2015    CO2 28 08/22/2023    CO2 26 11/04/2015    GLUCOSE 107 11/04/2015    PROT 7.0 11/04/2015    ALKPHOS 73 08/22/2023    ALKPHOS 71 11/04/2015    ALT 65 08/22/2023    ALT 30 11/04/2015    AST 55 (H) 08/22/2023    AST 20 11/04/2015    BILIDIR 0.10 06/30/2015       Lab Results   Component Value Date    HGBA1C 6.4 (H) 10/29/2023    HGBA1C 6.6 (H) 11/04/2015    MG 1.9 04/04/2019       No results found for: "TROPONINI", "CKMB", "CKTOTAL"    Lipid Profile:   Lab Results   Component Value Date    CHOL 129 11/04/2015    CHOL 102 06/25/2015     Lab Results   Component Value Date    HDL 44 (L) 08/22/2023    HDL 47 (L) 04/22/2023     Lab Results   Component Value Date    LDLCALC 43 08/22/2023    LDLCALC 64 04/22/2023     Lab Results   Component Value Date    TRIG 159 (H) 08/22/2023    TRIG 136 04/22/2023       Imaging Results:  XR spine lumbar minimum 4 views non injury  Narrative: LUMBAR SPINE    INDICATION:   Other dorsalgia. Spondylosis, unspecified. COMPARISON:  Comparison made with previous examination(s) dated (CT) 27-Dec-2021,(DX) 28-Jun-2021,(CT) 23-Mar-2021. VIEWS:  XR SPINE LUMBAR MINIMUM 4 VIEWS NON INJURY  Images: 5    FINDINGS:    There are 5 non rib bearing lumbar vertebral bodies. There is no evidence of acute fracture or destructive osseous lesion. Grade I spondylolithesis L4 on L5. Stable mild degenerative retrolisthesis of L1 on L2 and L2 on L3. Stable multilevel disc space narrowing and facet arthropathy. The pedicles appear intact. Soft tissues are unremarkable. Impression: No acute osseous abnormality. Degenerative changes as described. Electronically signed: 10/30/2023 10:41 AM Adriana Lim MPH,MD  XR sacroiliac joints 3+ views  Narrative: SACROILIAC JOINTS    INDICATION:   Other dorsalgia. Spondylosis, unspecified. COMPARISON: Lumbar spine radiograph 6/28/2021    VIEWS:  XR SACROILIAC JOINTS 3+ VIEWS   Images: 2    FINDINGS:    The SI joints appear symmetric without evidence of focal erosions or joint space widening.  Mild bilateral degenerative change. Sacral arcuate lines appear intact. No fracture or pathologic bone lesions seen. Included portions of the pelvis are unremarkable. Degenerative changes visualized lumbar spine. Impression: Mild bilateral sacroiliac joint degenerative change.     Electronically signed: 10/30/2023 10:30 AM Rene Arndt, MPH,MD       Health Maintenance:  Health Maintenance   Topic Date Due    PT PLAN OF CARE  08/05/2021    Annual Physical  10/11/2022    Fall Risk  07/20/2023    Influenza Vaccine (1) 09/01/2023    Depression Screening  12/27/2023    BMI: Followup Plan  04/26/2024    COVID-19 Vaccine (4 - Moderna series) 11/26/2023    Urinary Incontinence Screening  12/27/2023    Diabetic Foot Exam  04/26/2024    HEMOGLOBIN A1C  04/29/2024    Breast Cancer Screening: Mammogram  06/27/2024    Kidney Health Evaluation: GFR  08/22/2024    BMI: Adult  10/10/2024    Kidney Health Evaluation: Albumin/Creatinine Ratio  10/29/2024    Colorectal Cancer Screening  12/31/2024    DM Eye Exam  04/17/2025    DXA SCAN  04/18/2026    DTaP,Tdap,and Td Vaccines (4 - Td or Tdap) 09/30/2030    Hepatitis C Screening  Completed    Osteoporosis Screening  Completed    Pneumococcal Vaccine: 65+ Years  Completed    HIB Vaccine  Aged Out    IPV Vaccine  Aged Out    Hepatitis A Vaccine  Aged Out    Meningococcal ACWY Vaccine  Aged Out    HPV Vaccine  Aged Out     Immunization History   Administered Date(s) Administered    COVID-19 MODERNA VACC 0.5 ML IM 01/05/2021, 02/04/2021, 09/06/2022    COVID-19 Moderna Vac BIVALENT 12 Yr+ IM 0.5 ML 09/06/2022    COVID-19 Moderna mRNA Vaccine 12 Yr+ 50 mcg/0.5 mL (Spikevax) 10/01/2023    H1N1, All Formulations 11/05/2009    INFLUENZA 09/26/2021, 09/06/2022    Influenza Split High Dose Preservative Free IM 10/06/2014, 09/30/2015, 10/20/2017    Influenza, Seasonal Vaccine, Quadrivalent, Adjuvanted, .5e 09/13/2023    Influenza, high dose seasonal 0.7 mL 10/09/2018, 09/30/2020    Pneumococcal Conjugate 13-Valent 09/30/2015    Pneumococcal Polysaccharide PPV23 11/17/2008, 10/06/2014, 03/23/2017    Tdap 10/09/2018, 10/12/2018, 09/30/2020    Tuberculin Skin Test-PPD Intradermal 07/03/2019    Zoster 10/25/2017    influenza, trivalent, adjuvanted 09/28/2019         Shen Vallejo MD  10/30/2023,4:37 PM

## 2023-11-01 ENCOUNTER — OFFICE VISIT (OUTPATIENT)
Dept: ENDOCRINOLOGY | Facility: CLINIC | Age: 73
End: 2023-11-01
Payer: COMMERCIAL

## 2023-11-01 VITALS
WEIGHT: 192 LBS | BODY MASS INDEX: 35.33 KG/M2 | SYSTOLIC BLOOD PRESSURE: 148 MMHG | OXYGEN SATURATION: 98 % | HEART RATE: 82 BPM | DIASTOLIC BLOOD PRESSURE: 76 MMHG | TEMPERATURE: 98.2 F | HEIGHT: 62 IN

## 2023-11-01 DIAGNOSIS — Z79.4 TYPE 2 DIABETES MELLITUS WITH HYPERGLYCEMIA, WITH LONG-TERM CURRENT USE OF INSULIN (HCC): ICD-10-CM

## 2023-11-01 DIAGNOSIS — E03.9 ACQUIRED HYPOTHYROIDISM: ICD-10-CM

## 2023-11-01 DIAGNOSIS — E11.65 TYPE 2 DIABETES MELLITUS WITH HYPERGLYCEMIA, WITH LONG-TERM CURRENT USE OF INSULIN (HCC): ICD-10-CM

## 2023-11-01 DIAGNOSIS — E66.9 OBESITY (BMI 30-39.9): ICD-10-CM

## 2023-11-01 DIAGNOSIS — E11.9 CONTROLLED TYPE 2 DIABETES MELLITUS WITHOUT COMPLICATION, WITH LONG-TERM CURRENT USE OF INSULIN (HCC): Primary | ICD-10-CM

## 2023-11-01 DIAGNOSIS — E78.5 DYSLIPIDEMIA: ICD-10-CM

## 2023-11-01 DIAGNOSIS — Z79.4 CONTROLLED TYPE 2 DIABETES MELLITUS WITHOUT COMPLICATION, WITH LONG-TERM CURRENT USE OF INSULIN (HCC): Primary | ICD-10-CM

## 2023-11-01 PROCEDURE — 99214 OFFICE O/P EST MOD 30 MIN: CPT | Performed by: INTERNAL MEDICINE

## 2023-11-01 PROCEDURE — 95251 CONT GLUC MNTR ANALYSIS I&R: CPT | Performed by: INTERNAL MEDICINE

## 2023-11-01 RX ORDER — INSULIN GLARGINE 100 [IU]/ML
10 INJECTION, SOLUTION SUBCUTANEOUS
Qty: 15 ML | Refills: 0 | Status: SHIPPED | OUTPATIENT
Start: 2023-11-01

## 2023-11-01 NOTE — LETTER
Medical Fitness Referral    Patient: Klever Brantley  : 1950  MRN: [id number]  Address: [address] [place] [id number]  Phone Number: [phone]  E-mail: [email or web address]    [x] Medical Disorders (Ex. Diabetes)   [] Cardiovascular Disorders (Ex. Hypertension)   [] Pulmonary Disorders (Ex. Asthma)   [] Cancer Disorders (Ex. Breast, Prostate)   [] Immunological & Hematological Disorders (Rheumatoid Arthritis)   [] Neurological Disorders (Ex. Parkinson's Disease)   [] Cognitive Disorders (Ex. Dementia)   [] Children & Adolescents (Ex. BMI)   [x] Older Adults (Ex. Balance & Ambulation)   [] Female Specific Disorders (Ex. Osteoporosis)       Diagnoses:   1. Controlled type 2 diabetes mellitus without complication, with long-term current use of insulin (HCC)  tirzepatide 7.5 MG/0.5ML    Insulin Glargine Solostar (Lantus SoloStar) 100 UNIT/ML SOPN      2. Acquired hypothyroidism        3. Dyslipidemia        4.  Type 2 diabetes mellitus with hyperglycemia, with long-term current use of insulin ([address])          Additional Comments:     Service Requested:  [x] Medical Fitness Consult- Screening For Appropriateness of Medical Fitness Program   [x] Medical Fitness Program- Assessment of Body Composition, Aerobic, Anaerobic, Muscle Strength & Endurance, Flexibility, Neuromuscular & Functional Fitness   [x] Medical Fitness Assessment- Individualized Evidence-Based Exercise Program       Requesting Provider: MD Nohelia  Date: 23

## 2023-11-01 NOTE — PROGRESS NOTES
Follow-up Patient Progress Note      CC: Type 2 diabetes with hyperglycemia     History of Present Illness:   74 yr female with Type 2DM since 1990, Hypothyroidism, GERD, JUICE, Asthma, endometrial CA, HTN, HLD, DPN, DJD s/p TKR, multiple joint replacements, obesity BMI 35 and vitamin D deficiency. Last visit was 8/24/23. Since last visit, she lost 19 lbs. CGM data review[de-identified]  Device: dexcom Dates: 10/19/23-11/1/23 Usage: 93 %  Av glu: 105 mg/dL  SD: 12 mg/dL CV:   % GMI: 5.8 %  TIR: 99 %  TAR: 0 %  TBR: <1  %    Glycemic patters:  excellent control with normoglycemia. Current meds:  Mounjaro 5mg weekly  Lantus 15u QHS  Metformin 500mg am and 1000mg pm     Opthamology: yes, no retinopathy  Podiatry: No -   vaccination: yes  Dental:No  Pancreatitis: Yes -1990 allergic reaction to zantac suspected     Ace/ARB: olmesartan  Statin: crestor  Thyroid issues: hypothyroidism on levothyroxine 75 mcg qdaily     FHx: Mother- breast CA. Patient Active Problem List   Diagnosis    Diabetes mellitus type 2, controlled, without complications ([address])    Dysmetabolic syndrome X    Benign essential hypertension    Obesity (BMI 30-39. 9)    Lichen sclerosus    History of endometrial cancer    SOB (shortness of breath)    Asymptomatic postmenopausal status    At risk for sleep apnea    Dyslipidemia    Asthma    GERD (gastroesophageal reflux disease)    Acquired hypothyroidism    Status post total right knee replacement    Spondylosis of lumbosacral region    Irritable bowel syndrome without diarrhea    Coronary atherosclerosis due to calcified coronary lesion    Chronic pain of both knees    Obstructive sleep apnea    Endometrial cancer (HCC)    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)    Cortical age-related cataract of both eyes    Bilateral hip pain    PND (post-nasal drip)    Hearing loss of right ear    Nasal septal perforation    Low back pain     Past Medical History:   Diagnosis Date    Abnormal mammogram Abnormal weight gain     Achilles tendinitis     Allergic 1952    since childhood    Arthritis 1988    Asthma     Cancer ([address]) 2018    Endometrial adenoma    Combined forms of age-related cataract of both eyes 2021    Added per ICD-10-CM coding guidelines - provider accepted    Diabetes mellitus ([address])     Disc degeneration, lumbar     Disease of thyroid gland     hypo    Dyslipidemia     Dyslipidemia, goal LDL below 70 3/27/2018    Early satiety     Edema     idiopathic peripheral    Encounter for follow-up examination after completed treatment for malignant neoplasm 3/17/2019    Encounter for gynecological examination without abnormal finding 2020    Endometrial cancer ([address]) 2018    Enthesopathy     hip region    Esophagitis, reflux     GERD (gastroesophageal reflux disease)     Hard to intubate     difficulty with intubation and had to be intubated twice    Heart murmur     Hypercholesterolemia     Hypertension     IBS (irritable bowel syndrome)     Irritable bowel syndrome     Morbid (severe) obesity due to excess calories ([address]) 2022    As per CMS ICD10 guidelines:Provider accepted    Obesity 1998    Obesity, Class I, BMI 30-34.9 2015    Osteoarthritis     Pneumonia     Rosacea     Sacroiliitis (HCC)     Shingles     Sleep apnea, obstructive       Past Surgical History:   Procedure Laterality Date     SECTION      x2    COLONOSCOPY      ELBOW SURGERY      left ulna/radius    ESOPHAGOGASTRODUODENOSCOPY      FOOT SURGERY Right     FRACTURE SURGERY      FRACTURE SURGERY Left     tibia    HYSTERECTOMY Bilateral 2018    JOINT REPLACEMENT Left     shoulder,  right knee    KNEE ARTHROSCOPY      KNEE ARTHROSCOPY W/ MENISCAL REPAIR Right     VA LAPS TOTAL HYSTERECT 250 GM/< W/RMVL TUBE/OVARY N/A 2018    Procedure: ROBOTIC TOTAL LAPAROSCOPIC HYSTERECTOMY, BILATERAL SALPINGOOPHORECTOMY, BILATERAL PELVIC LYMPHNODE DISSECTION;  Surgeon: Kassy Coronado MD;  Location: [facility]; Service: Gynecology Oncology    REPLACEMENT TOTAL KNEE Right     SHOULDER ARTHROPLASTY      SINUS SURGERY      TONSILLECTOMY      TUBAL LIGATION      WRIST SURGERY      ganglonic cyst      Family History   Problem Relation Age of Onset    Arthritis Mother     Heart disease Mother         cardiac disorder    Diabetes Mother     Hiatal hernia Mother     Hypertension Mother     Irritable bowel syndrome Mother     Breast cancer Mother         x2    Uterine cancer Mother     Osteoporosis Mother     Thyroid disease Mother     Other Mother         gastric reflux    Stroke Mother     Hyperlipidemia Mother     Cancer Mother         ENDOMETRIAL    Diabetes type II Mother     Thyroid disease unspecified Mother     Hypothyroidism Mother     Heart disease Father         cardiac disorder    Hiatal hernia Father     Ulcers Father     Other Father         gastric reflux    Coronary artery disease Father     Hearing loss Father     Hiatal hernia Sister     Thyroid disease Sister     Other Sister         gastric reflux    Lung cancer Sister 68    Cancer Sister         lung adenocarcinoma, mets to meninges    Thyroid disease unspecified Sister     Hypothyroidism Sister     Hyperlipidemia Sister     Irritable bowel syndrome Daughter     Hyperlipidemia Daughter     Brain cancer Maternal Grandmother     Breast cancer Maternal Grandmother         uknown    No Known Problems Maternal Grandfather     No Known Problems Paternal Grandmother     No Known Problems Paternal Grandfather     No Known Problems Brother     Malig Hypertension Son     Hiatal hernia Child     Other Child         gastric reflux    Breast cancer Maternal Aunt 65    Uterine cancer Maternal Aunt         x3 sis    Stroke Maternal Aunt     Cancer Maternal Aunt     Hyperlipidemia Maternal Aunt     Breast cancer Maternal Aunt 54    Cancer Maternal Aunt     Hyperlipidemia Maternal Aunt     Breast cancer Maternal Aunt 54    Cancer Maternal Aunt     Hyperlipidemia Maternal Aunt No Known Problems Paternal Aunt     Colon cancer Neg Hx     Ovarian cancer Neg Hx     Cervical cancer Neg Hx      Social History     Tobacco Use    Smoking status: Never    Smokeless tobacco: Never   Substance Use Topics    Alcohol use:  Yes     Alcohol/week: 3.0 standard drinks of alcohol     Types: 2 Glasses of wine, 1 Standard drinks or equivalent per week     Allergies   Allergen Reactions    Amoxicillin-Pot Clavulanate Anaphylaxis, Hives, Itching and Facial Swelling    Erythromycin Hives, Itching, Swelling, Vomiting, Throat Swelling, Nasal Congestion and Facial Swelling    Meperidine Anaphylaxis    Morphine Hives, Itching, Swelling, Other (See Comments) and Syncope     hypotension    Penicillins Anaphylaxis, Itching, Swelling and Hives    Relafen [Nabumetone] Hives, Itching and Swelling    Sulfa Antibiotics Hives, Itching, Rash and Swelling    Darvon [Propoxyphene] Hives    Methocarbamol Other (See Comments)     Double vision    Reglan [Metoclopramide] Anxiety    Tetracyclines & Related Rash         Current Outpatient Medications:     albuterol (PROVENTIL HFA,VENTOLIN HFA) 90 mcg/act inhaler, Inhale 2 puffs every 6 (six) hours as needed for wheezing, Disp: 18 g, Rfl: 2    Calcium-Magnesium-Vitamin D (CALCIUM 1200+D3 PO), , Disp: , Rfl:     clobetasol (TEMOVATE) 0.05 % ointment, Apply topically 2 (two) times a day, Disp: 30 g, Rfl: 1    conjugated estrogens (PREMARIN) vaginal cream, Apply pea-sized amount to the lower vaginal area 1-2 times per week, Disp: 42.5 g, Rfl: 1    Continuous Blood Gluc  (Dexcom G7 ) SMITH, Use 1 each continuous, Disp: 1 each, Rfl: 0    Continuous Blood Gluc Sensor (Dexcom G7 Sensor), Use 1 Device every 10 days, Disp: 3 each, Rfl: 5    ezetimibe (ZETIA) 10 mg tablet, TAKE 1 TABLET DAILY, Disp: 90 tablet, Rfl: 3    FREESTYLE LITE test strip, USE 1 STRIP THREE TIMES A DAY AS INSTRUCTED, Disp: 300 strip, Rfl: 3    furosemide (LASIX) 20 mg tablet, TAKE 1 TABLET DAILY, Disp: 90 tablet, Rfl: 3    HumaLOG 100 UNIT/ML injection, INJECT 5 UNITS UNDER THE SKIN THREE TIMES A DAY WITH MEALS, 1 UNIT OF INSULIN FOR 30 MG OVER 130, Disp: 30 mL, Rfl: 3    Insulin Glargine Solostar (Lantus SoloStar) 100 UNIT/ML SOPN, Inject 0.15 mL (15 Units total) under the skin daily at bedtime, Disp: 15 mL, Rfl: 0    Insulin Pen Needle (BD Pen Needle Dulce U/F) 32G X 4 MM MISC, Use daily as needed with insulin pen, Disp: 100 each, Rfl: 2    Insulin Syringe-Needle U-100 (INSULIN SYRINGE 1CC/31GX5/16") 31G X 5/16" 1 ML MISC, BD Veo Insulin Syringe Ultra-Fine 0.3 mL 31 gauge x 15/64", Disp: , Rfl:     KRILL OIL PO, Take by mouth, Disp: , Rfl:     levothyroxine 75 mcg tablet, TAKE 1 TABLET DAILY, Disp: 90 tablet, Rfl: 3    Linzess 145 MCG CAPS, Take 1 capsule (145 mcg total) by mouth daily Take 30 minutes before breakfast, Disp: 30 capsule, Rfl: 0    Magnesium Oxide 400 MG CAPS, Take by mouth, Disp: , Rfl:     metFORMIN (GLUCOPHAGE-XR) 500 mg 24 hr tablet, Take 1 tab in am and 2 tabs in pm, Disp: 270 tablet, Rfl: 3    olmesartan (BENICAR) 40 mg tablet, Take 1 tablet (40 mg total) by mouth daily, Disp: 90 tablet, Rfl: 3    Omega-3 Fatty Acids (FISH OIL) 1200 MG CAPS, Take by mouth, Disp: , Rfl:     pantoprazole (PROTONIX) 40 mg tablet, TAKE 1 TABLET DAILY, Disp: 90 tablet, Rfl: 3    potassium chloride (MICRO-K) 10 MEQ CR capsule, TAKE 1 CAPSULE DAILY, Disp: 90 capsule, Rfl: 3    rosuvastatin (CRESTOR) 20 MG tablet, TAKE 1 TABLET DAILY, Disp: 90 tablet, Rfl: 3    saccharomyces boulardii (FLORASTOR) 250 mg capsule, Take 250 mg by mouth daily  , Disp: , Rfl:     tirzepatide 5 MG/0.5ML, Inject 0.5 mL (5 mg total) under the skin once a week, Disp: 6 mL, Rfl: 0    Review of Systems   HENT: Negative. Eyes: Negative. Respiratory: Negative. Cardiovascular: Negative. Gastrointestinal: Negative. Endocrine: Negative. Musculoskeletal: Negative. Skin: Negative. Allergic/Immunologic: Negative. Neurological: Negative. Hematological: Negative. Psychiatric/Behavioral: Negative. Physical Exam:  Body mass index is 35.12 kg/m². /76 (BP Location: Right arm, Patient Position: Sitting, Cuff Size: Adult)   Pulse 82   Temp 98.2 °F (36.8 °C) (Tympanic)   Ht 5' 2" (1.575 m)   Wt 87.1 kg (192 lb)   LMP  (LMP Unknown)   SpO2 98%   BMI 35.12 kg/m²    Vitals:    11/01/23 0843   Weight: 87.1 kg (192 lb)        Physical Exam  Constitutional:       General: She is not in acute distress. Appearance: She is well-developed. She is not ill-appearing. HENT:      Head: Normocephalic and atraumatic. Nose: Nose normal.      Mouth/Throat:      Pharynx: Oropharynx is clear. Eyes:      Extraocular Movements: Extraocular movements intact. Conjunctiva/sclera: Conjunctivae normal.   Neck:      Thyroid: No thyromegaly. Cardiovascular:      Rate and Rhythm: Normal rate. Pulmonary:      Effort: Pulmonary effort is normal.   Musculoskeletal:         General: No deformity. Cervical back: Normal range of motion. Skin:     Capillary Refill: Capillary refill takes less than 2 seconds. Coloration: Skin is not pale. Findings: No rash. Neurological:      Mental Status: She is alert and oriented to person, place, and time.    Psychiatric:         Behavior: Behavior normal.         Labs:   Lab Results   Component Value Date    HGBA1C 6.4 (H) 10/29/2023       Lab Results   Component Value Date    KGW8YSVGXWQT 1.763 10/29/2023       Lab Results   Component Value Date    CREATININE 0.93 08/22/2023    CREATININE 0.85 04/22/2023    CREATININE 0.91 12/26/2022    BUN 22 08/22/2023     11/04/2015    K 4.5 08/22/2023     08/22/2023    CO2 28 08/22/2023     eGFR   Date Value Ref Range Status   08/22/2023 60 ml/min/1.73sq m Final       Lab Results   Component Value Date    ALT 65 08/22/2023    AST 55 (H) 08/22/2023    ALKPHOS 73 08/22/2023    BILITOT 0.41 11/04/2015       Lab Results   Component Value Date    CHOLESTEROL 119 08/22/2023    CHOLESTEROL 138 04/22/2023    CHOLESTEROL 124 12/26/2022     Lab Results   Component Value Date    HDL 44 (L) 08/22/2023    HDL 47 (L) 04/22/2023    HDL 51 12/26/2022     Lab Results   Component Value Date    TRIG 159 (H) 08/22/2023    TRIG 136 04/22/2023    TRIG 128 12/26/2022     Lab Results   Component Value Date    [address] 75 08/22/2023    [address] 91 04/22/2023    [address] 73 12/26/2022         Impression:  1. Controlled type 2 diabetes mellitus without complication, with long-term current use of insulin ([address])    2. Acquired hypothyroidism    3. Dyslipidemia    4. Type 2 diabetes mellitus with hyperglycemia, with long-term current use of insulin ([facility])    5. Obesity (BMI 30-39. 9)         Plan:    José Manuel Burgos was seen today for diabetes mellitus. Diagnoses and all orders for this visit:    Controlled type 2 diabetes mellitus without complication, with long-term current use of insulin ([address]). She seems in good control with A1c 6.4%. GMI for last 2 weeks on AGP was 5.8%. Today we discussed options and agreed to cut back basal insulin, increase Tirzepatide and continue metformin 1500 mg daily in divided doses. Send dexcom data as needed. In future, will wean off insulin and increase tirzepatide. Follow up in 3 months. -     tirzepatide 7.5 MG/0.5ML; Inject 0.5 mL (7.5 mg total) under the skin once a week  -     Insulin Glargine Solostar (Lantus SoloStar) 100 UNIT/ML SOPN; Inject 0.1 mL (10 Units total) under the skin daily at bedtime  -     Hemoglobin A1C; Future  -     Comprehensive metabolic panel; Future    Acquired hypothyroidism. Seems clinically and biochemically euthyroid. Continue levothyroxine 75mcg qdaily for now. If there is significant weight loss, we may need dose reduction. Follow up in 3 months with repeat labs. -     T4, free; Future  -     TSH, 3rd generation; Future    Dyslipidemia. Continue Crestor. Obesity BMI 36.  Goal weight is 160 lbs. She has lost 19 lbs till date. I have spent 31 minutes with patient today in which greater than 50% of this time was spent in counseling/coordination of care. Discussed with the patient and all questioned fully answered. She will call me if any problems arise. Educated/ Counseled patient on diagnostic test results, prognosis, risk vs benefit of treatment options, importance of treatment compliance, healthy life and lifestyle choices.       Nohelia

## 2023-12-13 ENCOUNTER — PATIENT MESSAGE (OUTPATIENT)
Dept: CARDIOLOGY CLINIC | Facility: CLINIC | Age: 73
End: 2023-12-13

## 2023-12-13 ENCOUNTER — TELEPHONE (OUTPATIENT)
Dept: CARDIOLOGY CLINIC | Facility: CLINIC | Age: 73
End: 2023-12-13

## 2023-12-13 DIAGNOSIS — I10 BENIGN ESSENTIAL HYPERTENSION: ICD-10-CM

## 2023-12-13 RX ORDER — OLMESARTAN MEDOXOMIL 20 MG/1
20 TABLET ORAL DAILY
Qty: 90 TABLET | Refills: 3 | Status: SHIPPED | OUTPATIENT
Start: 2023-12-13

## 2023-12-13 NOTE — TELEPHONE ENCOUNTER
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 My blood pressure has been running - 5 minutes resting and sitting at heart level feet on the floor in from  over 58-62. He told me if I saw a pattern to let you know as I might need medication adjustment. I was able to get an appointment with you for January 25. I've been taking it at work, in the morning and evening, and it's been pretty consistent. If you think I should lower my Benicar dose my pharmacy is [facility].  Or let me know if I need to try to get in sooner       ----- Message from Jori Johnson sent at 12/13/2023  2:19 PM EST -----  Regarding: Blood pressure  Contact: [phone]  [facility]

## 2023-12-13 NOTE — TELEPHONE ENCOUNTER
Patient to decrease the dosage of Benicar to 20 mg p.o. daily. She can take half of the 40 mg till she gets the new prescription. I sent prescription to her [facility] pharmacy.

## 2023-12-14 ENCOUNTER — OFFICE VISIT (OUTPATIENT)
Age: 73
End: 2023-12-14
Payer: COMMERCIAL

## 2023-12-14 DIAGNOSIS — L90.0 LICHEN SCLEROSUS: ICD-10-CM

## 2023-12-14 DIAGNOSIS — Z85.828 HISTORY OF SKIN CANCER: ICD-10-CM

## 2023-12-14 DIAGNOSIS — D22.9 NEVUS: ICD-10-CM

## 2023-12-14 DIAGNOSIS — L82.1 SEBORRHEIC KERATOSIS: ICD-10-CM

## 2023-12-14 DIAGNOSIS — Z13.89 SCREENING FOR SKIN CONDITION: Primary | ICD-10-CM

## 2023-12-14 DIAGNOSIS — D18.01 CHERRY ANGIOMA: ICD-10-CM

## 2023-12-14 PROCEDURE — 99214 OFFICE O/P EST MOD 30 MIN: CPT | Performed by: DERMATOLOGY

## 2023-12-14 RX ORDER — CLOBETASOL PROPIONATE 0.5 MG/G
OINTMENT TOPICAL 2 TIMES DAILY
Qty: 30 G | Refills: 1 | Status: SHIPPED | OUTPATIENT
Start: 2023-12-14 | End: 2023-12-15 | Stop reason: SDUPTHER

## 2023-12-14 NOTE — PROGRESS NOTES
[facility] Note     Patient Name: Denisse Caballero  Encounter Date: 12/14/2023     Have you been cared for by a [facility]ologist in the last 3 years and, if so, which description applies to you? Yes. I have been here within the last 3 years, and my medical history has NOT changed since that time. I am FEMALE/of child-bearing potential.    REVIEW OF SYSTEMS:  Have you recently had or currently have any of the following? No changes in my recent health. PAST MEDICAL HISTORY:  Have you personally ever had or currently have any of the following? If "YES," then please provide more detail. No changes in my medical history. HISTORY OF IMMUNOSUPPRESSION: Do you have a history of any of the following:  Systemic Immunosuppression such as Diabetes, Biologic or Immunotherapy, Chemotherapy, Organ Transplantation, Bone Marrow Transplantation? No     Answering "YES" requires the addition of the dotphrase "IMMUNOSUPPRESSED" as the first diagnosis of the patient's visit. FAMILY HISTORY:  Any "first degree relatives" (parent, brother, sister, or child) with the following? No changes in my family's known health. PATIENT EXPERIENCE:    Do you want the Dermatologist to perform a COMPLETE skin exam today including a clinical examination under the "bra and underwear" areas? Yes  If necessary, do we have your permission to call and leave a detailed message on your Preferred Phone number that includes your specific medical information?   Yes      Allergies   Allergen Reactions    Amoxicillin-Pot Clavulanate Anaphylaxis, Hives, Itching and Facial Swelling    Erythromycin Hives, Itching, Swelling, Vomiting, Throat Swelling, Nasal Congestion and Facial Swelling    Meperidine Anaphylaxis    Morphine Hives, Itching, Swelling, Other (See Comments) and Syncope     hypotension    Penicillins Anaphylaxis, Itching, Swelling and Hives    Relafen [Nabumetone] Hives, Itching and Swelling    Sulfa Antibiotics Hives, Itching, Rash and Swelling    Darvon [Propoxyphene] Hives    Methocarbamol Other (See Comments)     Double vision    Reglan [Metoclopramide] Anxiety    Tetracyclines & Related Rash      Current Outpatient Medications:     albuterol (PROVENTIL HFA,VENTOLIN HFA) 90 mcg/act inhaler, Inhale 2 puffs every 6 (six) hours as needed for wheezing, Disp: 18 g, Rfl: 2    Calcium-Magnesium-Vitamin D (CALCIUM 1200+D3 PO), , Disp: , Rfl:     clobetasol (TEMOVATE) 0.05 % ointment, Apply topically 2 (two) times a day, Disp: 30 g, Rfl: 1    conjugated estrogens (PREMARIN) vaginal cream, Apply pea-sized amount to the lower vaginal area 1-2 times per week, Disp: 42.5 g, Rfl: 1    Continuous Blood Gluc  (Dexcom G7 ) SMITH, Use 1 each continuous, Disp: 1 each, Rfl: 0    Continuous Blood Gluc Sensor (Dexcom G7 Sensor), Use 1 Device every 10 days, Disp: 3 each, Rfl: 5    ezetimibe (ZETIA) 10 mg tablet, TAKE 1 TABLET DAILY, Disp: 90 tablet, Rfl: 3    FREESTYLE LITE test strip, USE 1 STRIP THREE TIMES A DAY AS INSTRUCTED, Disp: 300 strip, Rfl: 3    furosemide (LASIX) 20 mg tablet, TAKE 1 TABLET DAILY, Disp: 90 tablet, Rfl: 3    HumaLOG 100 UNIT/ML injection, INJECT 5 UNITS UNDER THE SKIN THREE TIMES A DAY WITH MEALS, 1 UNIT OF INSULIN FOR 30 MG OVER 130, Disp: 30 mL, Rfl: 3    Insulin Glargine Solostar (Lantus SoloStar) 100 UNIT/ML SOPN, Inject 0.1 mL (10 Units total) under the skin daily at bedtime, Disp: 15 mL, Rfl: 0    Insulin Pen Needle (BD Pen Needle Dulce U/F) 32G X 4 MM MISC, Use daily as needed with insulin pen, Disp: 100 each, Rfl: 2    Insulin Syringe-Needle U-100 (INSULIN SYRINGE 1CC/31GX5/16") 31G X 5/16" 1 ML MISC, BD Veo Insulin Syringe Ultra-Fine 0.3 mL 31 gauge x 15/64", Disp: , Rfl:     KRILL OIL PO, Take by mouth, Disp: , Rfl:     levothyroxine 75 mcg tablet, TAKE 1 TABLET DAILY, Disp: 90 tablet, Rfl: 3    Linzess 145 MCG CAPS, Take 1 capsule (145 mcg total) by mouth daily Take 30 minutes before breakfast, Disp: 30 capsule, Rfl: 0    Magnesium Oxide 400 MG CAPS, Take by mouth, Disp: , Rfl:     metFORMIN (GLUCOPHAGE-XR) 500 mg 24 hr tablet, Take 1 tab in am and 2 tabs in pm, Disp: 270 tablet, Rfl: 3    olmesartan (BENICAR) 20 mg tablet, Take 1 tablet (20 mg total) by mouth daily, Disp: 90 tablet, Rfl: 3    Omega-3 Fatty Acids (FISH OIL) 1200 MG CAPS, Take by mouth, Disp: , Rfl:     pantoprazole (PROTONIX) 40 mg tablet, TAKE 1 TABLET DAILY, Disp: 90 tablet, Rfl: 3    potassium chloride (MICRO-K) 10 MEQ CR capsule, TAKE 1 CAPSULE DAILY, Disp: 90 capsule, Rfl: 3    rosuvastatin (CRESTOR) 20 MG tablet, TAKE 1 TABLET DAILY, Disp: 90 tablet, Rfl: 3    saccharomyces boulardii (FLORASTOR) 250 mg capsule, Take 250 mg by mouth daily  , Disp: , Rfl:     tirzepatide 7.5 MG/0.5ML, Inject 0.5 mL (7.5 mg total) under the skin once a week, Disp: 6 mL, Rfl: 0          Whom besides the patient is providing clinical information about today's encounter? NO ADDITIONAL HISTORIAN (patient alone provided history)    Physical Exam and Assessment/Plan by Diagnosis:   Chief Complaint   Patient presents with    Follow-up     Patient state medication is working. History of lichen sclerosus and non-melanoma             HISTORY OF Non- Melanoma skin cancer  Physical Exam:  Anatomic Location Affected:  Records not available  Morphological Description of scar:  Well healed  Suspected Recurrence: No  Pertinent Positives:  Pertinent Negatives:       Additional History of Present Condition:  History of non-melanoma skin cancer with no sign of recurrence    Assessment and Plan:  Based on a thorough discussion of this condition and the management approach to it (including a comprehensive discussion of the known risks, side effects and potential benefits of treatment), the patient (family) agrees to implement the following specific plan:  Continue to monitor yearly  Sun protection        MELANOCYTIC NEVI ("Moles")    Physical Exam:  Anatomic Location Affected: Mostly on sun-exposed areas of the body. Morphological Description:  Scattered, 1-4mm round to ovoid, symmetrical-appearing, even bordered, skin colored to dark brown macules/papules, mostly in sun-exposed areas  Pertinent Positives:  Pertinent Negatives: Additional History of Present Condition:  present on exam     Assessment and Plan:  Based on a thorough discussion of this condition and the management approach to it (including a comprehensive discussion of the known risks, side effects and potential benefits of treatment), the patient (family) agrees to implement the following specific plan:  Provided handout with information regarding the ABCDE's of moles   Recommend routine skin exams every 6 month    Sun avoidance, protective clothing (known as UPF clothing), and the use of at least SPF 30 sunscreens is advised. Sunscreen should be reapplied every two hours when outside. SEBORRHEIC KERATOSIS; NON-INFLAMED    Physical Exam:  Anatomic Location Affected:  scattered across trunk, extremities,  face  Morphological Description:  Flat and raised, waxy, smooth to warty textured, yellow to brownish-grey to dark brown to blackish, discrete, "stuck-on" appearing papules. Pertinent Positives:  Pertinent Negatives: Additional History of Present Condition:  Patient reports new bumps on the skin. Denies itch, burn, pain, bleeding or ulceration. Present constantly; nothing seems to make it worse or better. No prior treatment.       Assessment and Plan:  Based on a thorough discussion of this condition and the management approach to it (including a comprehensive discussion of the known risks, side effects and potential benefits of treatment), the patient (family) agrees to implement the following specific plan:  Reassured benign        ANGIOMA ("CHERRY ANGIOMA")    Physical Exam:  Anatomic Location: scattered across sun exposed areas of the trunk and extremities   Morphologic Description: Firm red to reddish-blue discrete papules  Pertinent Positives:  Pertinent Negatives: Additional History of Present Condition:  Present on exam.     Assessment and Plan:  Reassured benign       LICHEN SCLEROSUS   Physical Exam:  Anatomic Location Affected:  urethra and labia  perineal   Morphological Description:  erythema erosion note around the urethra      Additional History of Present Condition:  present for while. Patient complains of numbness around her urethra    Assessment and Plan:  Based on a thorough discussion of this condition and the management approach to it (including a comprehensive discussion of the known risks, side effects and potential benefits of treatment), the patient (family) agrees to implement the following specific plan:  Continue with treatment clobetasol Medication sent to the pharmacy   Patient needs to be examined by urology or gynecologist for concerns regarding involvement of her urethra          Lichen sclerosus is a chronic inflammatory skin disorder that most often affects the genital and perianal areas. It is more common in women before puberty or after menopause. It is rare in males, but when present is called "balanitis xerotica obliterans."  Lichen sclerosus can start at any age, although it is most often diagnosed in women over 48. Pre-pubertal children can also be affected. Lichen sclerosis is 10 times more common in women than in men. 15% of patients know of a family member with lichen sclerosis. It may follow or co-exist with another skin condition, most often lichen simplex, psoriasis, erosive lichen planus, vitiligo or morphea. People with lichen sclerosus often have a personal or family history of other autoimmune disease such as thyroid disease (about 20% of patients), pernicious anaemia, or alopecia areata. What causes lichen sclerosus?   The cause of lichen sclerosus is not fully understood, and may include genetic, hormonal, irritant, traumatic and infectious components. Lichen sclerosis is often classified as an autoimmune disease. Autoimmune disorders arise when the body's natural defenses against “foreign” or invading organisms (e.g., antibodies) begin to attack healthy tissue for unknown reasons. Antibodies to other unknown proteins may account for other cases, explaining differing presentations of lichen sclerosis and response to treatment. However, these antibodies could be epigenetic, ie, the results of disease rather than the cause of disease. Some scientists believe that a genetic predisposition to lichen sclerosus exists. A genetic predisposition means that a person may carry a gene for a disease but it may not be expressed unless something in the environment triggers the disease. Other researchers believe that hormonal, irritant and/or infectious factors (or a combination of these) cause this skin condition. Cases where lichen sclerosus appears on skin after it has been damaged (from an injury or trauma) have been reported. What are the clinical features of lichen sclerosus? Lichen sclerosus usually affects the external genitalia (vulva or penis) and/or the area around the anus (perianal region). Sometimes, it is accompanied by intense (intractable) itching, burning, and pain. If the disease is severe, even minor abrasions or chaffing can cause bleeding, tearing, and blistering. The scarring that results from untreated lichen sclerosis produces problems with urination, defecation, and intercourse. The presence of thin, easily irritated, and torn skin affects physical activity and clothing choice. For children with lichen sclerosus affecting the perianal region, constipation may be among the first signs of the presence of the disease. Lichen sclerosus is much more likely to affect males that have not been circumcised than males that have been.   Rarely, lichen sclerosus can also affect other areas of the skin such as the breast, wrists, shoulder, neck, back, thigh, and the mouth. Skin tissue often becomes thin, shiny, wrinkled and parchment-like. Fissures, cracks, and purplish patches (ecchymoses) appear frequently. The earliest areas of lichen sclerosis exhibit a porcelain white appearing center surrounded by redness. This grows together to form larger areas of lichen sclerosus. The areas that are prone to rubbing and friction can develop blisters or bruising. The long term result of lichen sclerosus are areas of shiny, thin skin that has a tendency to be dry, crack, or bleed. This also produces loss of the normal parts of the external genitals, narrowing of the opening of the urethra/vagina/anus, and phimosis (inability to retract the foreskin) in men. The presence of non-healing ulcers or raised ulcerated areas in the external genitalia of women raises suspicion for the development of squamous cell carcinoma. In males, lichen sclerosus most commonly affects the foreskin of the penis, although it may affect other areas of the body. The opening at the end of the foreskin may become narrow and scarred. Discoloration and skin changes may also occur. Symptoms also include itching, soreness, and painful erections. In men, involvement in the perineal area is rare. In some rare cases, skin lesions may also develop in the mouth. The lesions consist of bluish-white flat irregular patchy areas on the inside of the cheeks and/or palate. The tongue, lips, and gums may also be involved. How do we diagnose lichen sclerosus atrophicus? Lichen sclerosus is diagnosed by looking at the skin affected. All those affected require a thorough clinical evaluation, identification of characteristic physical features, and a detailed patient history. A biopsy may be needed to confirm diagnosis. Biopsies may also be performed if squamous cell carcinoma is suspected. How do we treat lichen sclerosis?   General measures for genital lichen sclerosis  Wash gently once or twice daily. Use a non-soap cleanser, if any. Try to avoid tight clothing, rubbing and scratching. Activities such as riding a bicycle or horse may aggravate symptoms. If incontinent, seek medical advice and treatment. Apply emollients to relieve dryness and itching, and as a barrier to protect sensitive skin in genital and anal areas from contact with urine and feces. Topical steroids are the main treatment for lichen sclerosus. An ultrapotent topical steroid is often prescribed, eg clobetasol propionate 0.05%. A potent topical steroid, eg mometasone furoate 0.1% ointment, may also be used in mild disease or when symptoms are controlled. An ointment base is less likely than cream to sting or to cause contact dermatitis. A thin smear should be precisely applied to the white plaques and rubbed in gently. Most patients will be told to apply the steroid ointment once a day. After one to three months (depending on the severity of the disease), the ointment can be used less often. Topical steroid may need to be continued once or twice a week to control symptoms or to prevent lichen sclerosis recurring. Itch often settles within a few days but it may take weeks to months for the skin to return to normal (if at all). One 30-g tube of topical steroid should last 3 to 6 months or longer. It is most important to follow instructions carefully and to attend follow-up appointments regularly. Other topical treatments used in patients with lichen sclerosis include:  Intravaginal estrogen cream or pessaries in postmenopausal women. These reduce symptoms due to atrophic vulvovaginitis (dry, thin, fissured and sensitive vulval and vaginal tissues due to hormonal deficiency). Topical calcineurin inhibitors tacrolimus ointment and pimecrolimus cream instead of or in addition to topical steroids. They tend to cause burning discomfort (at least for the first few days).  Early concern that these medications may have the potential to accelerate cancer growth in the presence of oncogenic human papilloma virus (the cause of genital warts) appears unfounded. Topical retinoid (eg tretinoin cream) is not well tolerated on genital skin but may be applied to other sites affected by lichen sclerosis. It reduces scaling and dryness. Oral medications: when severe, acute, and not responding to topical therapy, systemic treatment may rarely be prescribed. Options include:  Intralesional or systemic corticosteroids   Oral retinoids: acitretin, isotretinoin  Methotrexate  Ciclosporin    Surgery: is essential for high-grade squamous intraepithelial lesions or cancer. In males, circumcision is effective in lichen sclerosus affecting prepuce and glans of the penis. It is best done early if initial topical steroids have not controlled symptoms and signs. If the urethra is stenosed or scarred, reconstructive surgery may be necessary. Unfortunately, lichen sclerosus sometimes closes up the vaginal opening again after surgery has initially appeared successful. It can be repeated. Other reported treatments for lichen sclerosus are considered experimental at this time.   CO2 laser ablation of hyperkeratotic plaques  Phototherapy  Photodynamic therapy  Fat injections  Stem cell and platelet-rich plasma injections     Scribe Attestation      I,:  Gali am acting as a scribe while in the presence of the attending physician.:       I,:  George Laird MD personally performed the services described in this documentation    as scribed in my presence.:

## 2023-12-15 ENCOUNTER — OFFICE VISIT (OUTPATIENT)
Dept: OBGYN CLINIC | Facility: MEDICAL CENTER | Age: 73
End: 2023-12-15
Payer: COMMERCIAL

## 2023-12-15 VITALS
WEIGHT: 180.8 LBS | DIASTOLIC BLOOD PRESSURE: 78 MMHG | HEIGHT: 62 IN | SYSTOLIC BLOOD PRESSURE: 136 MMHG | BODY MASS INDEX: 33.27 KG/M2

## 2023-12-15 DIAGNOSIS — L90.0 LICHEN SCLEROSUS: Primary | ICD-10-CM

## 2023-12-15 PROCEDURE — 99213 OFFICE O/P EST LOW 20 MIN: CPT | Performed by: STUDENT IN AN ORGANIZED HEALTH CARE EDUCATION/TRAINING PROGRAM

## 2023-12-15 RX ORDER — CLOBETASOL PROPIONATE 0.5 MG/G
OINTMENT TOPICAL 2 TIMES DAILY
Qty: 30 G | Refills: 1 | Status: SHIPPED | OUTPATIENT
Start: 2023-12-15

## 2023-12-15 NOTE — PROGRESS NOTES
Assessment/Plan:    Lichen sclerosus  -recommended vuvlar biopsy today, however, patient would like to hold off until appointment that is already scheduled on January 30. She had a bad experience with prior biopsy and not being fully numb. Discussed importance of repeat biopsy to ensure no progression to vulvar cancer. Pt verbalized understanding  of importance of follow up.  -will continue clobetasol daily at this time   -will follow up is symptoms acutely worsen including urinary symptoms       Diagnoses and all orders for this visit:    Lichen sclerosus          Subjective:      Patient ID: Peg Aparicio is a 74 y.o. female. 36 [id number] s/p hysterectomy secondary to endometrial cancer presents for follow up lichen sclerosus. Pt had biopsy proven lichen sclerosus in July 2022. She was started on clobetasol daily then decreased to every other day. She reports her symptoms are stable with occasional itching, but mostly well controlled. Her new symptom is that she occasionally had numbness around urethral area and is unsure when she has completed voiding. She presented to her dermatologist yesterday, 12/14/23, who said her inflammation looks as if it is worsening despite her symptoms being well controlled. He recommended repeat follow up with GYN and to increase clobetasol ointment again to daily. The following portions of the patient's history were reviewed and updated as appropriate: allergies, current medications, past family history, past medical history, past social history, past surgical history, and problem list.    Review of Systems   Constitutional:  Negative for appetite change, chills, fatigue and fever. Respiratory:  Negative for cough, chest tightness, shortness of breath and wheezing. Cardiovascular:  Negative for chest pain, palpitations and leg swelling. Gastrointestinal:  Negative for abdominal distention, abdominal pain, constipation, diarrhea, nausea and vomiting. Endocrine: Negative for cold intolerance, heat intolerance and polyuria. Genitourinary:  Negative for difficulty urinating, dyspareunia, dysuria, genital sores, menstrual problem, vaginal bleeding, vaginal discharge and vaginal pain. Vaginal itching with numbness around urethra   Neurological:  Negative for dizziness, weakness, light-headedness and headaches. Objective:      /78 (BP Location: Right arm, Patient Position: Sitting, Cuff Size: Large)   Ht 5' 1.75" (1.568 m)   Wt 82 kg (180 lb 12.8 oz)   LMP  (LMP Unknown)   BMI 33.34 kg/m²          Physical Exam  Constitutional:       General: She is not in acute distress. Appearance: Normal appearance. She is normal weight. Cardiovascular:      Rate and Rhythm: Normal rate. Pulmonary:      Effort: Pulmonary effort is normal. No respiratory distress. Abdominal:      General: There is no distension. Palpations: There is no mass. Tenderness: There is no abdominal tenderness. There is no guarding or rebound. Genitourinary:     General: Normal vulva. Exam position: Lithotomy position. Pubic Area: No rash. Labia:         Right: No rash, tenderness, lesion or injury. Left: No rash, tenderness, lesion or injury. Urethra: No prolapse, urethral pain, urethral swelling or urethral lesion. Vagina: No signs of injury. No vaginal discharge, tenderness, bleeding, lesions or prolapsed vaginal walls. Cervix: No cervical motion tenderness, discharge, friability, lesion or erythema. Uterus: Not deviated, not enlarged, not fixed, not tender and no uterine prolapse. Adnexa:         Right: No mass, tenderness or fullness. Left: No mass, tenderness or fullness. Comments: Vaginal atrophy present. Lichen sclerosus present. Bilateral vulva with darkened pigmentation. Labia minora with redness, decreased pigmentation, and redness around posterior introitus.      Urethra with decreased pigmentation and thin tissue surrounding urethra and clitoral region. Minimal changes around perianal area. Musculoskeletal:      Right lower leg: No edema. Left lower leg: No edema. Neurological:      Mental Status: She is alert and oriented to person, place, and time.    Psychiatric:         Mood and Affect: Mood normal.

## 2023-12-15 NOTE — ASSESSMENT & PLAN NOTE
-recommended vuvlar biopsy today, however, patient would like to hold off until appointment that is already scheduled on January 30. She had a bad experience with prior biopsy and not being fully numb. Discussed importance of repeat biopsy to ensure no progression to vulvar cancer.  Pt verbalized understanding  of importance of follow up.  -will continue clobetasol daily at this time   -will follow up is symptoms acutely worsen including urinary symptoms

## 2023-12-20 ENCOUNTER — PATIENT MESSAGE (OUTPATIENT)
Dept: OBGYN CLINIC | Facility: MEDICAL CENTER | Age: 73
End: 2023-12-20

## 2023-12-20 DIAGNOSIS — L90.0 LICHEN SCLEROSUS: Primary | ICD-10-CM

## 2023-12-20 DIAGNOSIS — L90.0 LICHEN SCLEROSUS: ICD-10-CM

## 2023-12-20 RX ORDER — LIDOCAINE HYDROCHLORIDE 20 MG/ML
JELLY TOPICAL AS NEEDED
Qty: 5 ML | Refills: 0 | Status: SHIPPED | OUTPATIENT
Start: 2023-12-20 | End: 2023-12-21

## 2023-12-21 DIAGNOSIS — E11.65 TYPE 2 DIABETES MELLITUS WITH HYPERGLYCEMIA, WITH LONG-TERM CURRENT USE OF INSULIN (HCC): ICD-10-CM

## 2023-12-21 DIAGNOSIS — Z79.4 TYPE 2 DIABETES MELLITUS WITH HYPERGLYCEMIA, WITH LONG-TERM CURRENT USE OF INSULIN (HCC): ICD-10-CM

## 2023-12-21 RX ORDER — LIDOCAINE HYDROCHLORIDE 20 MG/ML
SOLUTION OROPHARYNGEAL
Qty: 10 ML | Refills: 0 | Status: SHIPPED | OUTPATIENT
Start: 2023-12-21

## 2023-12-21 RX ORDER — PEN NEEDLE, DIABETIC 32GX 5/32"
NEEDLE, DISPOSABLE MISCELLANEOUS
Qty: 100 EACH | Refills: 2 | Status: SHIPPED | OUTPATIENT
Start: 2023-12-21

## 2024-01-12 ENCOUNTER — PATIENT MESSAGE (OUTPATIENT)
Dept: OBGYN CLINIC | Facility: MEDICAL CENTER | Age: 74
End: 2024-01-12

## 2024-01-12 DIAGNOSIS — L90.0 LICHEN SCLEROSUS: ICD-10-CM

## 2024-01-12 DIAGNOSIS — E11.8 TYPE 2 DIABETES MELLITUS WITH COMPLICATION, WITHOUT LONG-TERM CURRENT USE OF INSULIN (HCC): Primary | ICD-10-CM

## 2024-01-12 DIAGNOSIS — E78.5 DYSLIPIDEMIA: ICD-10-CM

## 2024-01-12 NOTE — PATIENT COMMUNICATION
Discussed over the phone with patient use of lidocaine cream prior to procedure. We have lidocaine prilocaine cream in office that we can use prior to her biopsy. She does not need it from pharmacy.

## 2024-01-19 ENCOUNTER — APPOINTMENT (OUTPATIENT)
Age: 74
End: 2024-01-19
Payer: COMMERCIAL

## 2024-01-19 DIAGNOSIS — E11.8 TYPE 2 DIABETES MELLITUS WITH COMPLICATION, WITHOUT LONG-TERM CURRENT USE OF INSULIN (HCC): ICD-10-CM

## 2024-01-19 DIAGNOSIS — Z79.4 CONTROLLED TYPE 2 DIABETES MELLITUS WITHOUT COMPLICATION, WITH LONG-TERM CURRENT USE OF INSULIN (HCC): ICD-10-CM

## 2024-01-19 DIAGNOSIS — E11.9 CONTROLLED TYPE 2 DIABETES MELLITUS WITHOUT COMPLICATION, WITH LONG-TERM CURRENT USE OF INSULIN (HCC): ICD-10-CM

## 2024-01-19 DIAGNOSIS — E03.9 ACQUIRED HYPOTHYROIDISM: ICD-10-CM

## 2024-01-19 DIAGNOSIS — E78.5 DYSLIPIDEMIA: ICD-10-CM

## 2024-01-19 LAB
ALBUMIN SERPL BCP-MCNC: 4.6 G/DL (ref 3.5–5)
ALP SERPL-CCNC: 48 U/L (ref 34–104)
ALT SERPL W P-5'-P-CCNC: 16 U/L (ref 7–52)
ANION GAP SERPL CALCULATED.3IONS-SCNC: 10 MMOL/L
AST SERPL W P-5'-P-CCNC: 20 U/L (ref 13–39)
BILIRUB SERPL-MCNC: 0.46 MG/DL (ref 0.2–1)
BUN SERPL-MCNC: 27 MG/DL (ref 5–25)
CALCIUM SERPL-MCNC: 10.2 MG/DL (ref 8.4–10.2)
CHLORIDE SERPL-SCNC: 102 MMOL/L (ref 96–108)
CHOLEST SERPL-MCNC: 105 MG/DL
CO2 SERPL-SCNC: 27 MMOL/L (ref 21–32)
CREAT SERPL-MCNC: 0.97 MG/DL (ref 0.6–1.3)
EST. AVERAGE GLUCOSE BLD GHB EST-MCNC: 120 MG/DL
GFR SERPL CREATININE-BSD FRML MDRD: 57 ML/MIN/1.73SQ M
GLUCOSE P FAST SERPL-MCNC: 88 MG/DL (ref 65–99)
HBA1C MFR BLD: 5.8 %
HDLC SERPL-MCNC: 38 MG/DL
LDLC SERPL CALC-MCNC: 39 MG/DL (ref 0–100)
POTASSIUM SERPL-SCNC: 4.3 MMOL/L (ref 3.5–5.3)
PROT SERPL-MCNC: 7.5 G/DL (ref 6.4–8.4)
SODIUM SERPL-SCNC: 139 MMOL/L (ref 135–147)
T4 FREE SERPL-MCNC: 1.19 NG/DL (ref 0.61–1.12)
TRIGL SERPL-MCNC: 138 MG/DL
TSH SERPL DL<=0.05 MIU/L-ACNC: 1.33 UIU/ML (ref 0.45–4.5)

## 2024-01-19 PROCEDURE — 36415 COLL VENOUS BLD VENIPUNCTURE: CPT

## 2024-01-19 PROCEDURE — 84443 ASSAY THYROID STIM HORMONE: CPT

## 2024-01-19 PROCEDURE — 80053 COMPREHEN METABOLIC PANEL: CPT

## 2024-01-19 PROCEDURE — 83036 HEMOGLOBIN GLYCOSYLATED A1C: CPT

## 2024-01-19 PROCEDURE — 80061 LIPID PANEL: CPT

## 2024-01-19 PROCEDURE — 84439 ASSAY OF FREE THYROXINE: CPT

## 2024-01-22 ENCOUNTER — TELEPHONE (OUTPATIENT)
Dept: CARDIOLOGY CLINIC | Facility: CLINIC | Age: 74
End: 2024-01-22

## 2024-01-22 NOTE — TELEPHONE ENCOUNTER
----- Message from Cori Pleitez sent at 1/22/2024  2:13 PM EST -----  Regarding: Upcoming visit  Contact: [phone]  Lab work is complete. Information before my visit. My grandson developed myocarditis after a COVID-19 booster, then has had two episodes since then 8 and 17 months after the first. He had EKG changes and troponins > 11. This last time was more severe he had changes in the inferior leads as well as anterolateral leads. Some residual scarring from the first episode. He is being followed at [facility] and has been referred for genetic testing and immunology follow-up as cardiology has not found a reason for the new episodes though he did test positive for RSV this time but no viral cause the first two times.

## 2024-01-22 NOTE — TELEPHONE ENCOUNTER
----- Message from Cori Pleitez sent at 1/22/2024  2:14 PM EST -----  Regarding: Contined information about cardiomyopathy  Contact: [phone]  They kept asking if we had a family history of cardiomyopathy and had to report nothing that I know of. My Mom and father-in-law were both adopted so family history does not go back far. My daughter then found out that my son is on a beta blocker and when she checked with him he was diagnosed with asymmetrical hypertrophic cardiomyopathy about 6 months ago. [facility] wants first-degree relatives to follow up with cardiology and if possible genetic testing. My daughter and son-in-law were approved because of the problems my grandson is having and are getting genetic testing soon. I know I have had cardiac ultrasounds so figure it would have been picked up there but would like your thoughts. My daughter called your office for a cardiology visit but was told they could not get her in and were not booking April yet. Should she be seen sooner? Want to discuss this when I come in on Thursday.

## 2024-01-25 ENCOUNTER — OFFICE VISIT (OUTPATIENT)
Dept: CARDIOLOGY CLINIC | Facility: CLINIC | Age: 74
End: 2024-01-25
Payer: COMMERCIAL

## 2024-01-25 VITALS
HEIGHT: 62 IN | WEIGHT: 173 LBS | HEART RATE: 71 BPM | BODY MASS INDEX: 31.83 KG/M2 | DIASTOLIC BLOOD PRESSURE: 62 MMHG | RESPIRATION RATE: 16 BRPM | OXYGEN SATURATION: 96 % | SYSTOLIC BLOOD PRESSURE: 110 MMHG

## 2024-01-25 DIAGNOSIS — E11.9 TYPE 2 DIABETES MELLITUS WITHOUT COMPLICATION, WITH LONG-TERM CURRENT USE OF INSULIN (HCC): ICD-10-CM

## 2024-01-25 DIAGNOSIS — E78.5 DYSLIPIDEMIA: ICD-10-CM

## 2024-01-25 DIAGNOSIS — Z79.4 TYPE 2 DIABETES MELLITUS WITHOUT COMPLICATION, WITH LONG-TERM CURRENT USE OF INSULIN (HCC): ICD-10-CM

## 2024-01-25 DIAGNOSIS — I10 BENIGN ESSENTIAL HYPERTENSION: Primary | ICD-10-CM

## 2024-01-25 DIAGNOSIS — R06.02 SOB (SHORTNESS OF BREATH): ICD-10-CM

## 2024-01-25 PROCEDURE — 99214 OFFICE O/P EST MOD 30 MIN: CPT | Performed by: INTERNAL MEDICINE

## 2024-01-25 NOTE — PROGRESS NOTES
[facility]  [address] [place] [address]  Cardiology Follow Up    Cori Pleitez  1950  [id number]      1. Benign essential hypertension        2. Type 2 diabetes mellitus without complication, without long-term current use of insulin (HCC)        3. Dyslipidemia        4. SOB (shortness of breath)            Chief Complaint   Patient presents with    Follow-up       Interval History:   Patient presents for follow-up visit.  Patient denies any history of chest pain shortness of breath.  Patient denies any history of leg edema or orthopnea PND.  No history of presyncope syncope.  Patient states compliance with the present list of medications.  Patient was evaluated by endocrinology for her diabetes.  Some medication changes were made.  She is losing weight.  She is happy with the same.    Patient Active Problem List   Diagnosis    Diabetes mellitus type 2, controlled, without complications (HCC)    Dysmetabolic syndrome X    Benign essential hypertension    Obesity (BMI 30-39.9)    Lichen sclerosus    History of endometrial cancer    SOB (shortness of breath)    Asymptomatic postmenopausal status    At risk for sleep apnea    Dyslipidemia    Asthma    GERD (gastroesophageal reflux disease)    Acquired hypothyroidism    Status post total right knee replacement    Spondylosis of lumbosacral region    Irritable bowel syndrome without diarrhea    Coronary atherosclerosis due to calcified coronary lesion    Chronic pain of both knees    Obstructive sleep apnea    Endometrial cancer (HCC)    Type 2 diabetes mellitus without complication, without long-term current use of insulin (HCC)    Cortical age-related cataract of both eyes    Bilateral hip pain    PND (post-nasal drip)    Hearing loss of right ear    Nasal septal perforation    Low back pain     Past Medical History:   Diagnosis Date    Abnormal mammogram     Abnormal weight gain     Achilles tendinitis     Allergic 1952     since childhood    Arthritis 1988    Asthma     Cancer (HCC) 2018    Endometrial adenoma    Combined forms of age-related cataract of both eyes 08/18/2021    Added per ICD-10-CM coding guidelines - provider accepted    Diabetes mellitus (HCC)     Disc degeneration, lumbar     Disease of thyroid gland     hypo    Dyslipidemia     Dyslipidemia, goal LDL below 70 03/27/2018    Early satiety     Edema     idiopathic peripheral    Encounter for follow-up examination after completed treatment for malignant neoplasm 03/17/2019    Encounter for gynecological examination without abnormal finding 06/22/2020    Endometrial cancer (HCC) 2018    Enthesopathy     hip region    Esophagitis, reflux     GERD (gastroesophageal reflux disease)     Hard to intubate     difficulty with intubation and had to be intubated twice    Heart murmur 2018    Hypercholesterolemia     Hypertension     IBS (irritable bowel syndrome)     Irritable bowel syndrome     Morbid (severe) obesity due to excess calories (HCC) 04/08/2022    As per CMS ICD10 guidelines:Provider accepted    Obesity 1998    Obesity, Class I, BMI 30-34.9 01/07/2015    Osteoarthritis     Pneumonia     Rosacea     Sacroiliitis (HCC)     Shingles     Sleep apnea, obstructive     Varicella      Social History     Socioeconomic History    Marital status:      Spouse name: Not on file    Number of children: 3    Years of education: Not on file    Highest education level: Not on file   Occupational History    Occupation: nurse     Comment: full-time   Tobacco Use    Smoking status: Never    Smokeless tobacco: Never   Vaping Use    Vaping status: Never Used   Substance and Sexual Activity    Alcohol use: Yes     Alcohol/week: 4.0 standard drinks of alcohol     Types: 2 Glasses of wine, 2 Standard drinks or equivalent per week    Drug use: No    Sexual activity: Not Currently     Partners: Female     Birth control/protection: Post-menopausal, Surgical, Female Sterilization    Other Topics Concern    Not on file   Social History Narrative    Active advance directive    DME- freestyle lite blood glucose meter device kit QID     Social Determinants of Health     Financial Resource Strain: Not on file   Food Insecurity: Not on file   Transportation Needs: Not on file   Physical Activity: Inactive (10/11/2021)    Exercise Vital Sign     Days of Exercise per Week: 0 days     Minutes of Exercise per Session: 0 min   Stress: No Stress Concern Present (10/11/2021)    [employer] - Occupational Stress Questionnaire     Feeling of Stress : Only a little   Social Connections: Not on file   Intimate Partner Violence: Not on file   Housing Stability: Not on file      Family History   Problem Relation Age of Onset    Arthritis Mother     Heart disease Mother         cardiac disorder    Diabetes Mother     Hiatal hernia Mother     Hypertension Mother     Irritable bowel syndrome Mother     Breast cancer Mother 66        x2    Uterine cancer Mother     Osteoporosis Mother     Thyroid disease Mother     Other Mother         gastric reflux    Stroke Mother     Hyperlipidemia Mother     Cancer Mother         ENDOMETRIAL    Diabetes type II Mother     Thyroid disease unspecified Mother     Hypothyroidism Mother     Heart disease Father         cardiac disorder    Hiatal hernia Father     Ulcers Father     Other Father         gastric reflux    Coronary artery disease Father     Hearing loss Father     Heart attack Father     Heart failure Father     Hiatal hernia Sister     Thyroid disease Sister     Other Sister         gastric reflux    Lung cancer Sister 77    Cancer Sister         lung adenocarcinoma, mets to meninges    Thyroid disease unspecified Sister     Hypothyroidism Sister     Hyperlipidemia Sister     Irritable bowel syndrome Daughter     Hyperlipidemia Daughter     Brain cancer Maternal Grandmother     Breast cancer Maternal Grandmother         uknown    No Known  Problems Maternal Grandfather     No Known Problems Paternal Grandmother     No Known Problems Paternal Grandfather     No Known Problems Brother     Malig Hypertension Son     Hiatal hernia Child     Other Child         gastric reflux    Breast cancer Maternal Aunt 65    Uterine cancer Maternal Aunt         x3 sis    Stroke Maternal Aunt     Cancer Maternal Aunt     Hyperlipidemia Maternal Aunt     Breast cancer Maternal Aunt 55    Cancer Maternal Aunt     Hyperlipidemia Maternal Aunt     Breast cancer Maternal Aunt 55    Cancer Maternal Aunt     Hyperlipidemia Maternal Aunt     No Known Problems Paternal Aunt     Colon cancer Neg Hx     Ovarian cancer Neg Hx     Cervical cancer Neg Hx      Past Surgical History:   Procedure Laterality Date     SECTION      x2    COLONOSCOPY      ELBOW SURGERY      left ulna/radius    ESOPHAGOGASTRODUODENOSCOPY      FOOT SURGERY Right     FRACTURE SURGERY      FRACTURE SURGERY Left     tibia    HYSTERECTOMY Bilateral 2018    JOINT REPLACEMENT Left     shoulder,  right knee    KNEE ARTHROSCOPY      KNEE ARTHROSCOPY W/ MENISCAL REPAIR Right     HI LAPS TOTAL HYSTERECT 250 GM/< W/RMVL TUBE/OVARY N/A 2018    Procedure: ROBOTIC TOTAL LAPAROSCOPIC HYSTERECTOMY, BILATERAL SALPINGOOPHORECTOMY, BILATERAL PELVIC LYMPHNODE DISSECTION;  Surgeon: Андрей Meng MD;  Location: [facility];  Service: Gynecology Oncology    REPLACEMENT TOTAL KNEE Right     SHOULDER ARTHROPLASTY      SINUS SURGERY      TONSILLECTOMY      TUBAL LIGATION      WRIST SURGERY      ganglonic cyst       Current Outpatient Medications:     albuterol (PROVENTIL HFA,VENTOLIN HFA) 90 mcg/act inhaler, Inhale 2 puffs every 6 (six) hours as needed for wheezing, Disp: 18 g, Rfl: 2    Calcium-Magnesium-Vitamin D (CALCIUM 1200+D3 PO), , Disp: , Rfl:     clobetasol (TEMOVATE) 0.05 % ointment, Apply topically 2 (two) times a day, Disp: 30 g, Rfl: 1    Continuous Blood Gluc  (Dexcom G7 ) SMITH, Use  "1 each continuous, Disp: 1 each, Rfl: 0    Continuous Blood Gluc Sensor (Dexcom G7 Sensor), Use 1 Device every 10 days, Disp: 3 each, Rfl: 5    ezetimibe (ZETIA) 10 mg tablet, TAKE 1 TABLET DAILY, Disp: 90 tablet, Rfl: 3    FREESTYLE LITE test strip, USE 1 STRIP THREE TIMES A DAY AS INSTRUCTED, Disp: 300 strip, Rfl: 3    furosemide (LASIX) 20 mg tablet, TAKE 1 TABLET DAILY, Disp: 90 tablet, Rfl: 3    Insulin Glargine Solostar (Lantus SoloStar) 100 UNIT/ML SOPN, Inject 0.1 mL (10 Units total) under the skin daily at bedtime, Disp: 15 mL, Rfl: 0    Insulin Pen Needle (BD Pen Needle Dulce U/F) 32G X 4 MM MISC, Use daily as needed with insulin pen, Disp: 100 each, Rfl: 2    Insulin Syringe-Needle U-100 (INSULIN SYRINGE 1CC/31GX5/16\") 31G X 5/16\" 1 ML MISC, BD Veo Insulin Syringe Ultra-Fine 0.3 mL 31 gauge x 15/64\", Disp: , Rfl:     KRILL OIL PO, Take by mouth, Disp: , Rfl:     levothyroxine 75 mcg tablet, TAKE 1 TABLET DAILY, Disp: 90 tablet, Rfl: 3    Linzess 145 MCG CAPS, Take 1 capsule (145 mcg total) by mouth daily Take 30 minutes before breakfast, Disp: 30 capsule, Rfl: 0    Magnesium Oxide 400 MG CAPS, Take by mouth, Disp: , Rfl:     metFORMIN (GLUCOPHAGE-XR) 500 mg 24 hr tablet, Take 1 tab in am and 2 tabs in pm, Disp: 270 tablet, Rfl: 3    olmesartan (BENICAR) 20 mg tablet, Take 1 tablet (20 mg total) by mouth daily, Disp: 90 tablet, Rfl: 3    Omega-3 Fatty Acids (FISH OIL) 1200 MG CAPS, Take by mouth, Disp: , Rfl:     pantoprazole (PROTONIX) 40 mg tablet, TAKE 1 TABLET DAILY, Disp: 90 tablet, Rfl: 3    potassium chloride (MICRO-K) 10 MEQ CR capsule, TAKE 1 CAPSULE DAILY, Disp: 90 capsule, Rfl: 3    rosuvastatin (CRESTOR) 20 MG tablet, TAKE 1 TABLET DAILY, Disp: 90 tablet, Rfl: 3    saccharomyces boulardii (FLORASTOR) 250 mg capsule, Take 250 mg by mouth daily  , Disp: , Rfl:     tirzepatide 7.5 MG/0.5ML, Inject 0.5 mL (7.5 mg total) under the skin once a week, Disp: 6 mL, Rfl: 0  Allergies   Allergen Reactions "    Amoxicillin-Pot Clavulanate Anaphylaxis, Hives, Itching and Facial Swelling    Erythromycin Hives, Itching, Swelling, Vomiting, Throat Swelling, Nasal Congestion and Facial Swelling    Meperidine Anaphylaxis    Morphine Hives, Itching, Swelling, Other (See Comments) and Syncope     hypotension    Penicillins Anaphylaxis, Itching, Swelling and Hives    Relafen [Nabumetone] Hives, Itching and Swelling    Sulfa Antibiotics Hives, Itching, Rash and Swelling    Darvon [Propoxyphene] Hives    Methocarbamol Other (See Comments)     Double vision    Reglan [Metoclopramide] Anxiety    Tetracyclines & Related Rash       Labs:  Appointment on 01/19/2024   Component Date Value    Hemoglobin A1C 01/19/2024 5.8 (H)     EAG 01/19/2024 120     Free T4 01/19/2024 1.19 (H)     TSH 3RD GENERATON 01/19/2024 1.329     Sodium 01/19/2024 139     Potassium 01/19/2024 4.3     Chloride 01/19/2024 102     CO2 01/19/2024 27     ANION GAP 01/19/2024 10     BUN 01/19/2024 27 (H)     Creatinine 01/19/2024 0.97     Glucose, Fasting 01/19/2024 88     Calcium 01/19/2024 10.2     AST 01/19/2024 20     ALT 01/19/2024 16     Alkaline Phosphatase 01/19/2024 48     Total Protein 01/19/2024 7.5     Albumin 01/19/2024 4.6     Total Bilirubin 01/19/2024 0.46     eGFR 01/19/2024 57     Cholesterol 01/19/2024 105     Triglycerides 01/19/2024 138     HDL, Direct 01/19/2024 38 (L)     LDL Calculated 01/19/2024 39      Imaging: No results found.    Review of Systems:  Review of Systems  REVIEW OF SYSTEMS:  Constitutional:  Denies fever or chills   Eyes:  Denies change in visual acuity   HENT:  Denies nasal congestion or sore throat   Respiratory:  Denies cough or shortness of breath   Cardiovascular:  Denies chest pain or edema   GI:  Denies abdominal pain, nausea, vomiting, bloody stools or diarrhea   :  Denies dysuria, frequency, difficulty in micturition and nocturia  Musculoskeletal:  Denies back pain or joint pain   Neurologic:  Denies headache, focal  "weakness or sensory changes   Endocrine:  Denies polyuria or polydipsia   Lymphatic:  Denies swollen glands   Psychiatric:  Denies depression or anxiety    Physical Exam:    /62 (BP Location: Left arm, Patient Position: Sitting, Cuff Size: Standard)   Pulse 71   Resp 16   Ht 5' 1.75\" (1.568 m)   Wt 78.5 kg (173 lb)   LMP  (LMP Unknown)   SpO2 96%   BMI 31.90 kg/m²     Physical Exam  PHYSICAL EXAM:  General:  Patient is not in acute distress   Head: Normocephalic, Atraumatic.  HEENT:  Both pupils normal-size atraumatic, normocephalic, nonicteric  Neck:  JVP not raised. Trachea central. No carotid bruit  Respiratory:  normal breath sounds no crackles. no rhonchi  Cardiovascular:  Regular rate and rhythm no S3 no murmurs  GI:  Abdomen soft nontender. No organomegaly.   Lymphatic:  No cervical or inguinal lymphadenopathy  Neurologic:  Patient is awake alert, oriented . Grossly nonfocal  Extremities no edema    Discussion/Summary:    Patient with multiple medical problems who seems to be doing reasonably well from cardiac standpoint. Previous studies reviewed with patient. Medications reviewed and possible side effects discussed. concepts of cardiovascular disease , signs and symptoms of heart disease. Dietary and risk factor modification reinforced. All questions answered.  Safety measures reviewed. Patient advised to report any problems prompting medical attention.    Lipid profile is acceptable.  Medications reviewed.  Continue present therapy.  Patient will have repeat echocardiogram prior to next visit to assess ejection fraction as well as history of mild mitral stenosis.  Last echocardiogram was approximately 2 years ago.    Patient's son has been diagnosed to have asymmetric septal hypertrophy.  Patient's daughter has been recommended to get an echocardiogram through primary care physician to rule out hypertrophic obstructive cardiomyopathy.  The son is also in the midst of getting genetic " testing.    Follow-up in 6 months or earlier as needed.  Patient is agreeable with the plan of care.

## 2024-01-30 ENCOUNTER — ANNUAL EXAM (OUTPATIENT)
Dept: OBGYN CLINIC | Facility: CLINIC | Age: 74
End: 2024-01-30
Payer: COMMERCIAL

## 2024-01-30 VITALS
BODY MASS INDEX: 31.54 KG/M2 | WEIGHT: 171.4 LBS | HEIGHT: 62 IN | SYSTOLIC BLOOD PRESSURE: 110 MMHG | DIASTOLIC BLOOD PRESSURE: 68 MMHG

## 2024-01-30 DIAGNOSIS — L90.0 LICHEN SCLEROSUS: Primary | ICD-10-CM

## 2024-01-30 DIAGNOSIS — Z01.419 ENCNTR FOR GYN EXAM (GENERAL) (ROUTINE) W/O ABN FINDINGS: ICD-10-CM

## 2024-01-30 PROCEDURE — 88305 TISSUE EXAM BY PATHOLOGIST: CPT | Performed by: PATHOLOGY

## 2024-01-30 PROCEDURE — 56605 BIOPSY OF VULVA/PERINEUM: CPT | Performed by: STUDENT IN AN ORGANIZED HEALTH CARE EDUCATION/TRAINING PROGRAM

## 2024-01-30 NOTE — PROGRESS NOTES
Biopsy    Date/Time: 1/30/2024 9:20 AM    Performed by: Gris Amos DO  Authorized by: Gris Amos DO  Universal Protocol:  Consent: Verbal consent obtained.  Risks and benefits: risks, benefits and alternatives were discussed  Consent given by: patient  Patient understanding: patient states understanding of the procedure being performed  Relevant documents: relevant documents present and verified  Test results: test results available and properly labeled  Required items: required blood products, implants, devices, and special equipment available  Patient identity confirmed: verbally with patient    Procedure Details - Lesion Biopsy:     Body area:  Anogenital    Anogenital location:  Vulva    Vaginal Lesion: Yes      Simple excision: punch biopsy.      Biopsy method: punch biopsy      Biopsy tissue type: mucous membrane    Initial size (mm):  4    Final defect size (mm):  4    Malignancy: malignancy unknown       Pt with history of lichen sclerosus. Previously with worsening symptoms and increased clobetasol usage to daily. Since then, physical exam is much improved. Discoloration by clitoral area much improved. Numbness with urination resolved. Will continue clobetasol usage daily. Follow up in 3 months for annual.     Mammogram provided today to be completed in June 2025.

## 2024-02-02 PROCEDURE — 88305 TISSUE EXAM BY PATHOLOGIST: CPT | Performed by: PATHOLOGY

## 2024-02-05 ENCOUNTER — OFFICE VISIT (OUTPATIENT)
Dept: ENDOCRINOLOGY | Facility: CLINIC | Age: 74
End: 2024-02-05
Payer: COMMERCIAL

## 2024-02-05 VITALS
WEIGHT: 171 LBS | HEIGHT: 62 IN | DIASTOLIC BLOOD PRESSURE: 64 MMHG | SYSTOLIC BLOOD PRESSURE: 112 MMHG | RESPIRATION RATE: 12 BRPM | OXYGEN SATURATION: 98 % | HEART RATE: 72 BPM | TEMPERATURE: 97.2 F | BODY MASS INDEX: 31.47 KG/M2

## 2024-02-05 DIAGNOSIS — E66.9 OBESITY (BMI 30-39.9): ICD-10-CM

## 2024-02-05 DIAGNOSIS — E03.9 ACQUIRED HYPOTHYROIDISM: Primary | ICD-10-CM

## 2024-02-05 DIAGNOSIS — E55.9 VITAMIN D DEFICIENCY: ICD-10-CM

## 2024-02-05 DIAGNOSIS — E78.5 DYSLIPIDEMIA: ICD-10-CM

## 2024-02-05 DIAGNOSIS — E11.9 TYPE 2 DIABETES MELLITUS WITHOUT COMPLICATION, WITHOUT LONG-TERM CURRENT USE OF INSULIN (HCC): ICD-10-CM

## 2024-02-05 PROCEDURE — 95251 CONT GLUC MNTR ANALYSIS I&R: CPT | Performed by: INTERNAL MEDICINE

## 2024-02-05 PROCEDURE — 99214 OFFICE O/P EST MOD 30 MIN: CPT | Performed by: INTERNAL MEDICINE

## 2024-02-05 RX ORDER — ACYCLOVIR 400 MG/1
1 TABLET ORAL
Qty: 3 EACH | Refills: 5 | Status: SHIPPED | OUTPATIENT
Start: 2024-02-05 | End: 2024-02-09 | Stop reason: SDUPTHER

## 2024-02-05 RX ORDER — METFORMIN HYDROCHLORIDE 500 MG/1
TABLET, EXTENDED RELEASE ORAL
Qty: 270 TABLET | Refills: 0 | Status: SHIPPED | OUTPATIENT
Start: 2024-02-05 | End: 2024-02-11 | Stop reason: SDUPTHER

## 2024-02-05 NOTE — ASSESSMENT & PLAN NOTE
She has lost 40 lbs.  Today we agreed to continue mounjaro and metformin. Continue diet and lifestyle changes.  Follow up in 6 months.

## 2024-02-05 NOTE — PROGRESS NOTES
"  Follow-up Patient Progress Note      CC: Type 2 diabetes with hyperglycemia     History of Present Illness:   73 yr female with Type 2DM since 1990, Hypothyroidism, GERD, JUICE, Asthma, endometrial CA, HTN, HLD, DPN, DJD s/p TKR, multiple joint replacements, obesity BMI 35 and vitamin D deficiency. Last visit was 11/1/23.  She is a certified diabetes educator.     Since last visit, she lost 21 lbs. Total 40lbs since 8/23.     CGM data review::  Device: dexcom          Dates:  1/3/24-1/16/24         Usage: 93 %               Av glu: 101 mg/dL             SD: 13 mg/dL  CV:   %            GMI: 5.8 %  TIR: 99 %                    TAR: 1 %                     TBR: <1  %     Glycemic patters:  excellent control with normoglycemia.     Current meds:  Mounjaro 7.5mg weekly  Lantus 15u QHS  Metformin 500mg am and 1000mg pm     Opthamology: yes, no retinopathy  Podiatry: No -   vaccination: yes  Dental:No  Pancreatitis: Yes -1990 allergic reaction to zantac suspected     Ace/ARB: olmesartan  Statin: crestor  Thyroid issues: hypothyroidism on levothyroxine 75 mcg qdaily    4/18/23: DXA -nml.     FHx: Mother- breast CA.       Physical Exam:  Body mass index is 31.48 kg/m².  /64 (BP Location: Left arm, Patient Position: Sitting)   Pulse 72   Temp (!) 97.2 °F (36.2 °C) (Tympanic)   Resp 12   Ht 5' 1.8\" (1.57 m)   Wt 77.6 kg (171 lb)   LMP  (LMP Unknown)   SpO2 98%   BMI 31.48 kg/m²    Vitals:    02/05/24 0822   Weight: 77.6 kg (171 lb)        Physical Exam  Constitutional:       General: She is not in acute distress.     Appearance: She is well-developed. She is not ill-appearing.   HENT:      Head: Normocephalic and atraumatic.      Nose: Nose normal.      Mouth/Throat:      Pharynx: Oropharynx is clear.   Eyes:      Extraocular Movements: Extraocular movements intact.      Conjunctiva/sclera: Conjunctivae normal.   Neck:      Thyroid: No thyromegaly.   Cardiovascular:      Rate and Rhythm: Normal rate. "   Pulmonary:      Effort: Pulmonary effort is normal.   Musculoskeletal:         General: No deformity.      Cervical back: Normal range of motion.   Skin:     Capillary Refill: Capillary refill takes less than 2 seconds.      Coloration: Skin is not pale.      Findings: No rash.   Neurological:      Mental Status: She is alert and oriented to person, place, and time.   Psychiatric:         Behavior: Behavior normal.         Labs:   Lab Results   Component Value Date    HGBA1C 5.8 (H) 01/19/2024       Lab Results   Component Value Date    IUS1BEAEZJCS 1.329 01/19/2024       Lab Results   Component Value Date    CREATININE 0.97 01/19/2024    CREATININE 0.93 08/22/2023    CREATININE 0.85 04/22/2023    BUN 27 (H) 01/19/2024     11/04/2015    K 4.3 01/19/2024     01/19/2024    CO2 27 01/19/2024     eGFR   Date Value Ref Range Status   01/19/2024 57 ml/min/1.73sq m Final       Lab Results   Component Value Date    ALT 16 01/19/2024    AST 20 01/19/2024    ALKPHOS 48 01/19/2024    BILITOT 0.41 11/04/2015       Lab Results   Component Value Date    CHOLESTEROL 105 01/19/2024    CHOLESTEROL 119 08/22/2023    CHOLESTEROL 138 04/22/2023     Lab Results   Component Value Date    HDL 38 (L) 01/19/2024    HDL 44 (L) 08/22/2023    HDL 47 (L) 04/22/2023     Lab Results   Component Value Date    TRIG 138 01/19/2024    TRIG 159 (H) 08/22/2023    TRIG 136 04/22/2023     Lab Results   Component Value Date    NONHDLC 75 08/22/2023    NONHDLC 91 04/22/2023    NONHDLC 73 12/26/2022         Assessment/Plan:    Problem List Items Addressed This Visit          Endocrine    Acquired hypothyroidism - Primary     She is clinically and biochemically euthyroid.    Continue levothyroxine 75mg weekly.         Relevant Orders    T4, free    TSH, 3rd generation    Type 2 diabetes mellitus without complication, without long-term current use of insulin (HCC)     She is in good control.  Today we agreed to stop Lantus. Continue Mounjaro  7.5mg weekly and metformin 1500mg daily.     She is managing 5000 steps daily plus muscle strengthening with a  twice weekly.    Continue dexcom.  Follow up in 6 months.      Lab Results   Component Value Date    HGBA1C 5.8 (H) 01/19/2024            Relevant Medications    metFORMIN (GLUCOPHAGE-XR) 500 mg 24 hr tablet    tirzepatide 7.5 MG/0.5ML    Continuous Blood Gluc Sensor (Dexcom G7 Sensor)    Other Relevant Orders    Hemoglobin A1C       Other    Obesity (BMI 30-39.9)     She has lost 40 lbs.  Today we agreed to continue mounjaro and metformin. Continue diet and lifestyle changes.  Follow up in 6 months.         Dyslipidemia     Improved lipid profile. Continue Crestor.         Vitamin D deficiency    Relevant Orders    Vitamin D 25 hydroxy         I have spent a total time of 31 minutes on 02/05/24 in caring for this patient including greater than 50% of this time was spent in counseling/coordination of care as listed above.       Discussed with the patient and all questioned fully answered. She will contact me with concerns.    Piper Lozano

## 2024-02-05 NOTE — ASSESSMENT & PLAN NOTE
She is in good control.  Today we agreed to stop Lantus. Continue Mounjaro 7.5mg weekly and metformin 1500mg daily.     She is managing 5000 steps daily plus muscle strengthening with a  twice weekly.    Continue dexcom.  Follow up in 6 months.      Lab Results   Component Value Date    HGBA1C 5.8 (H) 01/19/2024

## 2024-02-08 DIAGNOSIS — Z00.6 ENCOUNTER FOR EXAMINATION FOR NORMAL COMPARISON OR CONTROL IN CLINICAL RESEARCH PROGRAM: ICD-10-CM

## 2024-02-09 ENCOUNTER — TELEPHONE (OUTPATIENT)
Dept: ENDOCRINOLOGY | Facility: CLINIC | Age: 74
End: 2024-02-09

## 2024-02-09 DIAGNOSIS — E11.9 TYPE 2 DIABETES MELLITUS WITHOUT COMPLICATION, WITHOUT LONG-TERM CURRENT USE OF INSULIN (HCC): ICD-10-CM

## 2024-02-11 DIAGNOSIS — M25.50 ARTHRALGIA, UNSPECIFIED JOINT: ICD-10-CM

## 2024-02-11 DIAGNOSIS — E78.2 MIXED HYPERLIPIDEMIA: ICD-10-CM

## 2024-02-11 DIAGNOSIS — E11.9 TYPE 2 DIABETES MELLITUS WITHOUT COMPLICATION, WITHOUT LONG-TERM CURRENT USE OF INSULIN (HCC): ICD-10-CM

## 2024-02-11 DIAGNOSIS — I10 ESSENTIAL HYPERTENSION: ICD-10-CM

## 2024-02-11 DIAGNOSIS — E03.9 ACQUIRED HYPOTHYROIDISM: ICD-10-CM

## 2024-02-12 RX ORDER — FUROSEMIDE 20 MG/1
20 TABLET ORAL DAILY
Qty: 90 TABLET | Refills: 0 | Status: SHIPPED | OUTPATIENT
Start: 2024-02-12

## 2024-02-12 RX ORDER — LEVOTHYROXINE SODIUM 0.07 MG/1
75 TABLET ORAL DAILY
Qty: 90 TABLET | Refills: 0 | Status: SHIPPED | OUTPATIENT
Start: 2024-02-12

## 2024-02-12 RX ORDER — PANTOPRAZOLE SODIUM 40 MG/1
40 TABLET, DELAYED RELEASE ORAL DAILY
Qty: 90 TABLET | Refills: 0 | Status: SHIPPED | OUTPATIENT
Start: 2024-02-12

## 2024-02-12 RX ORDER — EZETIMIBE 10 MG/1
10 TABLET ORAL DAILY
Qty: 90 TABLET | Refills: 0 | Status: SHIPPED | OUTPATIENT
Start: 2024-02-12

## 2024-02-12 RX ORDER — ROSUVASTATIN CALCIUM 20 MG/1
20 TABLET, COATED ORAL DAILY
Qty: 90 TABLET | Refills: 0 | Status: SHIPPED | OUTPATIENT
Start: 2024-02-12

## 2024-02-12 RX ORDER — POTASSIUM CHLORIDE 750 MG/1
10 CAPSULE, EXTENDED RELEASE ORAL DAILY
Qty: 90 CAPSULE | Refills: 0 | Status: SHIPPED | OUTPATIENT
Start: 2024-02-12

## 2024-02-12 NOTE — TELEPHONE ENCOUNTER
Pt left v/m regarding same issue. [employer] form is in media from 2/9/2024. Pt pays same amount for a 90 day supply as she would for 30.

## 2024-02-13 RX ORDER — METFORMIN HYDROCHLORIDE 500 MG/1
TABLET, EXTENDED RELEASE ORAL
Qty: 270 TABLET | Refills: 1 | Status: SHIPPED | OUTPATIENT
Start: 2024-02-13

## 2024-02-13 RX ORDER — ACYCLOVIR 400 MG/1
1 TABLET ORAL
Qty: 9 EACH | Refills: 1 | Status: SHIPPED | OUTPATIENT
Start: 2024-02-13

## 2024-02-17 DIAGNOSIS — K59.04 CHRONIC IDIOPATHIC CONSTIPATION: ICD-10-CM

## 2024-02-17 DIAGNOSIS — K59.09 OTHER CONSTIPATION: ICD-10-CM

## 2024-02-19 RX ORDER — LINACLOTIDE 145 UG/1
145 CAPSULE, GELATIN COATED ORAL EVERY MORNING
Qty: 90 CAPSULE | Refills: 1 | Status: SHIPPED | OUTPATIENT
Start: 2024-02-19

## 2024-02-21 ENCOUNTER — TELEPHONE (OUTPATIENT)
Age: 74
End: 2024-02-21

## 2024-02-21 NOTE — TELEPHONE ENCOUNTER
Called and left a message for the patient to give us a call back so we could get her scheduled for her annual skin check in December with Dr. Calvert

## 2024-02-29 ENCOUNTER — TELEPHONE (OUTPATIENT)
Age: 74
End: 2024-02-29

## 2024-03-01 NOTE — TELEPHONE ENCOUNTER
I changed the dates.     Just an Epic tip as most may not know--  Go to order review activity (can type order review in search or it may be under your more activities)  Select the labs you want to adjust the expected or  date  Hit the Extend button (they really should rename this button). The extend button does allow you to extend labs if they are about to , however, you may not know that you can also use that button to change the expected date as long as you are changing it to a date that is the current date or in the future.

## 2024-03-04 ENCOUNTER — OFFICE VISIT (OUTPATIENT)
Age: 74
End: 2024-03-04
Payer: COMMERCIAL

## 2024-03-04 VITALS
BODY MASS INDEX: 29.81 KG/M2 | TEMPERATURE: 97.7 F | DIASTOLIC BLOOD PRESSURE: 64 MMHG | WEIGHT: 162 LBS | OXYGEN SATURATION: 97 % | SYSTOLIC BLOOD PRESSURE: 110 MMHG | HEIGHT: 62 IN | HEART RATE: 70 BPM

## 2024-03-04 DIAGNOSIS — R06.02 SOB (SHORTNESS OF BREATH): Primary | ICD-10-CM

## 2024-03-04 DIAGNOSIS — J45.20 MILD INTERMITTENT ASTHMA WITHOUT COMPLICATION: ICD-10-CM

## 2024-03-04 DIAGNOSIS — G47.33 OSA (OBSTRUCTIVE SLEEP APNEA): ICD-10-CM

## 2024-03-04 PROCEDURE — 99214 OFFICE O/P EST MOD 30 MIN: CPT | Performed by: INTERNAL MEDICINE

## 2024-03-04 NOTE — PROGRESS NOTES
Follow Up - Pulmonary Medicine   Cori Pleitez 73 y.o. female MRN: [id number]    Reason for Consult: SOB    Cori Pleitez is a 73 y.o. female hx GERD, DM2, JUICE (not on therapy), ?Asthma, endometrial ca sp hysterectomy, lung nodule who presents for follow up.    Dyspnea  Lung Nodule  ?Asthma  Chronic cough  Resolved between  2015 and 2021 CT: 11mm left lung nodule when last seen in  normal PFTs in 2018. Imaging with minimal biapical scarring  Used albuterol prn but this has never helped   - fu TTE (sees cardiology)  - repeat PFTs w BD  - continue PPI  - continue nasal washing, had bleeding with nasal steroids    Obesity - improved  JUICE  Mild JUICE years ahgo with AHI 9, did not tolerate CPAP (side and belly sleeper)  Overall feels well rested.Also lost weight so likley treated with weight loss    Follow up prn    Vaccines      Immunization History   Administered Date(s) Administered    COVID-19 MODERNA VACC 0.5 ML IM 01/05/2021, 02/04/2021, 10/25/2021, 09/06/2022    COVID-19 Moderna Vac BIVALENT 12 Yr+ IM 0.5 ML 09/06/2022    COVID-19 Moderna mRNA Vaccine 12 Yr+ 50 mcg/0.5 mL (Spikevax) 10/01/2023    H1N1, All Formulations 11/05/2009    INFLUENZA 09/30/2015, 10/20/2017, 09/30/2020, 09/26/2021, 09/06/2022, 09/13/2023    Influenza Split High Dose Preservative Free IM 10/06/2014, 09/30/2015, 10/20/2017    Influenza, Seasonal Vaccine, Quadrivalent, Adjuvanted, .5e 09/13/2023    Influenza, high dose seasonal 0.7 mL 10/09/2018, 09/30/2020    Pneumococcal Conjugate 13-Valent 09/30/2015    Pneumococcal Polysaccharide PPV23 11/17/2008, 10/06/2014, 03/23/2017    Respiratory Syncytial Virus Vaccine (Recombinant, Adjuvanted) 11/11/2023    Tdap 10/09/2018, 10/12/2018, 09/30/2020    Tuberculin Skin Test-PPD Intradermal 07/03/2019    Zoster 10/25/2017    Zoster Vaccine Recombinant 11/28/2023    influenza, trivalent, adjuvanted 09/28/2019        I have spent a total time of 31 minutes on 03/04/24 in caring for this  patient including Risks and benefits of tx options, Instructions for management, Patient and family education, Importance of tx compliance, Risk factor reductions, Impressions, Counseling / Coordination of care, Documenting in the medical record, Reviewing / ordering tests, medicine, procedures  , and Obtaining or reviewing history  .   _____________________________________________________________    HPI:    Cori Pleitez is a 73 y.o. female hx GERD, DM2, JUICE (not on therapy), ?Asthma, endometrial ca, lung nodule who presents for follow up.    Last seen in 2022 in pulm. Normal PFT in 2018  Has been seeing endo, given new medicine and lost 49 lbs    Does have a chronic cough does have chronic sinus issues. Notes to have vocal cord edema, thought to be silent reflux, on PPI      Occupational/Exposure history:  RN - works at school nurse in [place]    Review of Systems:  Review of Systems  Aside from what is mentioned in the HPI, the review of systems otherwise negative.    Current Medications:    Current Outpatient Medications:     albuterol (PROVENTIL HFA,VENTOLIN HFA) 90 mcg/act inhaler, Inhale 2 puffs every 6 (six) hours as needed for wheezing, Disp: 18 g, Rfl: 2    Calcium-Magnesium-Vitamin D (CALCIUM 1200+D3 PO), , Disp: , Rfl:     clobetasol (TEMOVATE) 0.05 % ointment, Apply topically 2 (two) times a day, Disp: 30 g, Rfl: 1    Continuous Blood Gluc  (Dexcom G7 ) SMITH, Use 1 each continuous, Disp: 1 each, Rfl: 0    Continuous Blood Gluc Sensor (Dexcom G7 Sensor), Use 1 Device every 10 days, Disp: 9 each, Rfl: 1    ezetimibe (ZETIA) 10 mg tablet, Take 1 tablet (10 mg total) by mouth daily, Disp: 90 tablet, Rfl: 0    FREESTYLE LITE test strip, USE 1 STRIP THREE TIMES A DAY AS INSTRUCTED, Disp: 300 strip, Rfl: 3    furosemide (LASIX) 20 mg tablet, Take 1 tablet (20 mg total) by mouth daily, Disp: 90 tablet, Rfl: 0    Insulin Pen Needle (BD Pen Needle Dulce U/F) 32G X 4 MM MISC, Use daily as  "needed with insulin pen, Disp: 100 each, Rfl: 2    Insulin Syringe-Needle U-100 (INSULIN SYRINGE 1CC/31GX5/16\") 31G X 5/16\" 1 ML MISC, BD Veo Insulin Syringe Ultra-Fine 0.3 mL 31 gauge x 15/64\", Disp: , Rfl:     KRILL OIL PO, Take by mouth, Disp: , Rfl:     levothyroxine 75 mcg tablet, Take 1 tablet (75 mcg total) by mouth daily, Disp: 90 tablet, Rfl: 0    linaCLOtide (Linzess) 145 MCG CAPS, TAKE 1 CAPSULE EVERY MORNING, Disp: 90 capsule, Rfl: 1    Magnesium Oxide 400 MG CAPS, Take by mouth, Disp: , Rfl:     metFORMIN (GLUCOPHAGE-XR) 500 mg 24 hr tablet, Take 1 tab in am and 2 tabs in pm, Disp: 270 tablet, Rfl: 1    olmesartan (BENICAR) 20 mg tablet, Take 1 tablet (20 mg total) by mouth daily, Disp: 90 tablet, Rfl: 3    Omega-3 Fatty Acids (FISH OIL) 1200 MG CAPS, Take by mouth, Disp: , Rfl:     potassium chloride (MICRO-K) 10 MEQ CR capsule, Take 1 capsule (10 mEq total) by mouth daily, Disp: 90 capsule, Rfl: 0    rosuvastatin (CRESTOR) 20 MG tablet, Take 1 tablet (20 mg total) by mouth daily, Disp: 90 tablet, Rfl: 0    saccharomyces boulardii (FLORASTOR) 250 mg capsule, Take 250 mg by mouth daily  , Disp: , Rfl:     tirzepatide 7.5 MG/0.5ML, Inject 0.5 mL (7.5 mg total) under the skin once a week, Disp: 6 mL, Rfl: 0    pantoprazole (PROTONIX) 40 mg tablet, Take 1 tablet (40 mg total) by mouth daily, Disp: 90 tablet, Rfl: 0    Historical Information   Past Medical History:   Diagnosis Date    Abnormal mammogram     Abnormal weight gain     Achilles tendinitis     Allergic 1952    since childhood    Arthritis 1988    Asthma     Cancer (HCC) 2018    Endometrial adenoma    Combined forms of age-related cataract of both eyes 08/18/2021    Added per ICD-10-CM coding guidelines - provider accepted    Diabetes mellitus (HCC)     Disc degeneration, lumbar     Disease of thyroid gland     hypo    Dyslipidemia     Dyslipidemia, goal LDL below 70 03/27/2018    Early satiety     Edema     idiopathic peripheral    Encounter for " follow-up examination after completed treatment for malignant neoplasm 2019    Encounter for gynecological examination without abnormal finding 2020    Endometrial cancer (HCC) 2018    Enthesopathy     hip region    Esophagitis, reflux     GERD (gastroesophageal reflux disease)     Hard to intubate     difficulty with intubation and had to be intubated twice    Heart murmur     Hypercholesterolemia     Hypertension     IBS (irritable bowel syndrome)     Irritable bowel syndrome     Morbid (severe) obesity due to excess calories (HCC) 2022    As per CMS ICD10 guidelines:Provider accepted    Obesity 1998    Obesity, Class I, BMI 30-34.9 2015    Osteoarthritis     Pneumonia     Rosacea     Sacroiliitis (HCC)     Shingles     Sleep apnea, obstructive     Varicella      Past Surgical History:   Procedure Laterality Date     SECTION      x2    COLONOSCOPY      ELBOW SURGERY      left ulna/radius    ESOPHAGOGASTRODUODENOSCOPY      FOOT SURGERY Right     FRACTURE SURGERY      FRACTURE SURGERY Left     tibia    HYSTERECTOMY Bilateral 2018    JOINT REPLACEMENT Left     shoulder,  right knee    KNEE ARTHROSCOPY      KNEE ARTHROSCOPY W/ MENISCAL REPAIR Right     ND LAPS TOTAL HYSTERECT 250 GM/< W/RMVL TUBE/OVARY N/A 2018    Procedure: ROBOTIC TOTAL LAPAROSCOPIC HYSTERECTOMY, BILATERAL SALPINGOOPHORECTOMY, BILATERAL PELVIC LYMPHNODE DISSECTION;  Surgeon: Андрей Meng MD;  Location: [facility];  Service: Gynecology Oncology    REPLACEMENT TOTAL KNEE Right     SHOULDER ARTHROPLASTY      SINUS SURGERY      TONSILLECTOMY      TUBAL LIGATION      WRIST SURGERY      ganglonic cyst     Social History   Social History     Tobacco Use   Smoking Status Never   Smokeless Tobacco Never       Family History:   Family History   Problem Relation Age of Onset    Arthritis Mother     Heart disease Mother         cardiac disorder    Diabetes Mother     Hiatal hernia Mother     Hypertension  "Mother     Irritable bowel syndrome Mother     Breast cancer Mother 66        x2    Uterine cancer Mother     Osteoporosis Mother     Thyroid disease Mother     Other Mother         gastric reflux    Stroke Mother     Hyperlipidemia Mother     Cancer Mother         ENDOMETRIAL    Diabetes type II Mother     Thyroid disease unspecified Mother     Hypothyroidism Mother     Heart disease Father         cardiac disorder    Hiatal hernia Father     Ulcers Father     Other Father         gastric reflux    Coronary artery disease Father     Hearing loss Father     Heart attack Father     Heart failure Father     Hiatal hernia Sister     Thyroid disease Sister     Other Sister         gastric reflux    Lung cancer Sister 77    Cancer Sister         lung adenocarcinoma, mets to meninges    Thyroid disease unspecified Sister     Hypothyroidism Sister     Hyperlipidemia Sister     Irritable bowel syndrome Daughter     Hyperlipidemia Daughter     Brain cancer Maternal Grandmother     Breast cancer Maternal Grandmother         uknown    No Known Problems Maternal Grandfather     No Known Problems Paternal Grandmother     No Known Problems Paternal Grandfather     No Known Problems Brother     Malig Hypertension Son     Hiatal hernia Child     Other Child         gastric reflux    Breast cancer Maternal Aunt 65    Uterine cancer Maternal Aunt         x3 sis    Stroke Maternal Aunt     Cancer Maternal Aunt     Hyperlipidemia Maternal Aunt     Breast cancer Maternal Aunt 55    Cancer Maternal Aunt     Hyperlipidemia Maternal Aunt     Breast cancer Maternal Aunt 55    Cancer Maternal Aunt     Hyperlipidemia Maternal Aunt     No Known Problems Paternal Aunt     Colon cancer Neg Hx     Ovarian cancer Neg Hx     Cervical cancer Neg Hx          PhysicalExamination:  Vitals:   /64   Pulse 70   Temp 97.7 °F (36.5 °C)   Ht 5' 1.8\" (1.57 m)   Wt 73.5 kg (162 lb)   LMP  (LMP Unknown)   SpO2 97%   BMI 29.82 kg/m²     Appearance " -- NAD, speaking full sentences  HEENT -- anicteric sclera, clear OP, MMM  Neck -- no JVD  Heart -- RRR, no murmurs  Lungs -- CTAB  Abdomen -- soft, NTND, +bs  Extremities -- WWP, no LE edema  Skin -- no rash  Neuro -- A&Ox3, wnl  Psych -- no obvious depression or hallucination        Diagnostic Data:  Labs:  I personally reviewed the most recent laboratory data pertinent to today's visit    Lab Results   Component Value Date    WBC 7.73 08/22/2023    HGB 13.3 08/22/2023    HCT 40.6 08/22/2023    MCV 89 08/22/2023     08/22/2023     Lab Results   Component Value Date    GLUCOSE 107 11/04/2015    CALCIUM 10.2 01/19/2024     11/04/2015    K 4.3 01/19/2024    CO2 27 01/19/2024     01/19/2024    BUN 27 (H) 01/19/2024    CREATININE 0.97 01/19/2024     Lab Results   Component Value Date    IGE 44 04/06/2015     Lab Results   Component Value Date    ALT 16 01/19/2024    AST 20 01/19/2024    ALKPHOS 48 01/19/2024    BILITOT 0.41 11/04/2015       PFT results:  The most recent pulmonary function tests were reviewed.  2018: No obstruction, no restriction, normal DLCO    Imaging:  I personally reviewed the images on the PAC system pertinent to today's visit  CT Chest 2022: Left greater than right apical pleural parenchymal scarring noted, stable. Lungs are otherwise clear. No pulmonary nodule identified.           Elif Lawrence MD  [facility]

## 2024-04-01 ENCOUNTER — HOSPITAL ENCOUNTER (OUTPATIENT)
Dept: PULMONOLOGY | Facility: HOSPITAL | Age: 74
Discharge: HOME | End: 2024-04-01
Payer: COMMERCIAL

## 2024-04-01 DIAGNOSIS — R06.02 SOB (SHORTNESS OF BREATH): ICD-10-CM

## 2024-04-01 DIAGNOSIS — J45.20 MILD INTERMITTENT ASTHMA WITHOUT COMPLICATION: ICD-10-CM

## 2024-04-01 PROCEDURE — 94729 DIFFUSING CAPACITY: CPT | Performed by: INTERNAL MEDICINE

## 2024-04-01 PROCEDURE — 94060 EVALUATION OF WHEEZING: CPT

## 2024-04-01 PROCEDURE — 94726 PLETHYSMOGRAPHY LUNG VOLUMES: CPT

## 2024-04-01 PROCEDURE — 94760 N-INVAS EAR/PLS OXIMETRY 1: CPT

## 2024-04-01 PROCEDURE — 94726 PLETHYSMOGRAPHY LUNG VOLUMES: CPT | Performed by: INTERNAL MEDICINE

## 2024-04-01 PROCEDURE — 94729 DIFFUSING CAPACITY: CPT

## 2024-04-01 PROCEDURE — 94060 EVALUATION OF WHEEZING: CPT | Performed by: INTERNAL MEDICINE

## 2024-04-01 RX ORDER — ALBUTEROL SULFATE 2.5 MG/3ML
2.5 SOLUTION RESPIRATORY (INHALATION) ONCE
Status: COMPLETED | OUTPATIENT
Start: 2024-04-01 | End: 2024-04-01

## 2024-04-01 RX ADMIN — ALBUTEROL SULFATE 2.5 MG: 2.5 SOLUTION RESPIRATORY (INHALATION) at 09:10

## 2024-04-03 ENCOUNTER — TELEPHONE (OUTPATIENT)
Dept: PULMONOLOGY | Facility: CLINIC | Age: 74
End: 2024-04-03

## 2024-04-03 DIAGNOSIS — J45.20 MILD INTERMITTENT ASTHMA WITHOUT COMPLICATION: Primary | ICD-10-CM

## 2024-04-03 DIAGNOSIS — J45.20 MILD INTERMITTENT ASTHMA WITHOUT COMPLICATION: ICD-10-CM

## 2024-04-03 RX ORDER — MOMETASONE FUROATE AND FORMOTEROL FUMARATE DIHYDRATE 50; 5 UG/1; UG/1
2 AEROSOL RESPIRATORY (INHALATION) 2 TIMES DAILY
Qty: 13 G | Refills: 5 | Status: SHIPPED | OUTPATIENT
Start: 2024-04-03 | End: 2024-04-03 | Stop reason: SDUPTHER

## 2024-04-03 RX ORDER — MOMETASONE FUROATE AND FORMOTEROL FUMARATE DIHYDRATE 50; 5 UG/1; UG/1
2 AEROSOL RESPIRATORY (INHALATION) 2 TIMES DAILY
Qty: 13 G | Refills: 5 | Status: SHIPPED | OUTPATIENT
Start: 2024-04-03 | End: 2024-07-02

## 2024-04-03 NOTE — TELEPHONE ENCOUNTER
Called to let her know results show asthma that might be responsive to steroid inhaler.     No answer, left message    Dulera low dose sent

## 2024-04-05 ENCOUNTER — ANNUAL EXAM (OUTPATIENT)
Dept: OBGYN CLINIC | Facility: MEDICAL CENTER | Age: 74
End: 2024-04-05
Payer: COMMERCIAL

## 2024-04-05 ENCOUNTER — APPOINTMENT (OUTPATIENT)
Age: 74
End: 2024-04-05
Payer: COMMERCIAL

## 2024-04-05 VITALS
SYSTOLIC BLOOD PRESSURE: 112 MMHG | WEIGHT: 157.2 LBS | BODY MASS INDEX: 28.93 KG/M2 | HEIGHT: 62 IN | DIASTOLIC BLOOD PRESSURE: 70 MMHG

## 2024-04-05 DIAGNOSIS — L90.0 LICHEN SCLEROSUS: ICD-10-CM

## 2024-04-05 DIAGNOSIS — E11.9 CONTROLLED TYPE 2 DIABETES MELLITUS WITHOUT COMPLICATION, WITH LONG-TERM CURRENT USE OF INSULIN (HCC): ICD-10-CM

## 2024-04-05 DIAGNOSIS — Z12.11 ENCOUNTER FOR SCREENING FOR MALIGNANT NEOPLASM OF COLON: ICD-10-CM

## 2024-04-05 DIAGNOSIS — Z79.4 CONTROLLED TYPE 2 DIABETES MELLITUS WITHOUT COMPLICATION, WITH LONG-TERM CURRENT USE OF INSULIN (HCC): ICD-10-CM

## 2024-04-05 DIAGNOSIS — Z01.419 ENCOUNTER FOR GYNECOLOGICAL EXAMINATION (GENERAL) (ROUTINE) WITHOUT ABNORMAL FINDINGS: Primary | ICD-10-CM

## 2024-04-05 LAB
ALBUMIN SERPL BCP-MCNC: 4.4 G/DL (ref 3.5–5)
ALP SERPL-CCNC: 48 U/L (ref 34–104)
ALT SERPL W P-5'-P-CCNC: 19 U/L (ref 7–52)
ANION GAP SERPL CALCULATED.3IONS-SCNC: 10 MMOL/L (ref 4–13)
AST SERPL W P-5'-P-CCNC: 19 U/L (ref 13–39)
BASOPHILS # BLD AUTO: 0.06 THOUSANDS/ÂΜL (ref 0–0.1)
BASOPHILS NFR BLD AUTO: 1 % (ref 0–1)
BILIRUB SERPL-MCNC: 0.49 MG/DL (ref 0.2–1)
BUN SERPL-MCNC: 22 MG/DL (ref 5–25)
CALCIUM SERPL-MCNC: 9.7 MG/DL (ref 8.4–10.2)
CHLORIDE SERPL-SCNC: 102 MMOL/L (ref 96–108)
CHOLEST SERPL-MCNC: 114 MG/DL
CO2 SERPL-SCNC: 28 MMOL/L (ref 21–32)
CREAT SERPL-MCNC: 0.85 MG/DL (ref 0.6–1.3)
EOSINOPHIL # BLD AUTO: 0.08 THOUSAND/ÂΜL (ref 0–0.61)
EOSINOPHIL NFR BLD AUTO: 1 % (ref 0–6)
ERYTHROCYTE [DISTWIDTH] IN BLOOD BY AUTOMATED COUNT: 12.9 % (ref 11.6–15.1)
EST. AVERAGE GLUCOSE BLD GHB EST-MCNC: 108 MG/DL
GFR SERPL CREATININE-BSD FRML MDRD: 67 ML/MIN/1.73SQ M
GLUCOSE P FAST SERPL-MCNC: 96 MG/DL (ref 65–99)
HBA1C MFR BLD: 5.4 %
HCT VFR BLD AUTO: 41.3 % (ref 34.8–46.1)
HDLC SERPL-MCNC: 42 MG/DL
HGB BLD-MCNC: 13.6 G/DL (ref 11.5–15.4)
IMM GRANULOCYTES # BLD AUTO: 0.03 THOUSAND/UL (ref 0–0.2)
IMM GRANULOCYTES NFR BLD AUTO: 0 % (ref 0–2)
LDLC SERPL CALC-MCNC: 47 MG/DL (ref 0–100)
LYMPHOCYTES # BLD AUTO: 2.89 THOUSANDS/ÂΜL (ref 0.6–4.47)
LYMPHOCYTES NFR BLD AUTO: 41 % (ref 14–44)
MCH RBC QN AUTO: 30.1 PG (ref 26.8–34.3)
MCHC RBC AUTO-ENTMCNC: 32.9 G/DL (ref 31.4–37.4)
MCV RBC AUTO: 91 FL (ref 82–98)
MONOCYTES # BLD AUTO: 0.48 THOUSAND/ÂΜL (ref 0.17–1.22)
MONOCYTES NFR BLD AUTO: 7 % (ref 4–12)
NEUTROPHILS # BLD AUTO: 3.55 THOUSANDS/ÂΜL (ref 1.85–7.62)
NEUTS SEG NFR BLD AUTO: 50 % (ref 43–75)
NRBC BLD AUTO-RTO: 0 /100 WBCS
PLATELET # BLD AUTO: 328 THOUSANDS/UL (ref 149–390)
PMV BLD AUTO: 9.7 FL (ref 8.9–12.7)
POTASSIUM SERPL-SCNC: 4 MMOL/L (ref 3.5–5.3)
PROT SERPL-MCNC: 7.2 G/DL (ref 6.4–8.4)
RBC # BLD AUTO: 4.52 MILLION/UL (ref 3.81–5.12)
SODIUM SERPL-SCNC: 140 MMOL/L (ref 135–147)
TRIGL SERPL-MCNC: 123 MG/DL
TSH SERPL DL<=0.05 MIU/L-ACNC: 2.26 UIU/ML (ref 0.45–4.5)
WBC # BLD AUTO: 7.09 THOUSAND/UL (ref 4.31–10.16)

## 2024-04-05 PROCEDURE — 80053 COMPREHEN METABOLIC PANEL: CPT

## 2024-04-05 PROCEDURE — 36415 COLL VENOUS BLD VENIPUNCTURE: CPT

## 2024-04-05 PROCEDURE — S0612 ANNUAL GYNECOLOGICAL EXAMINA: HCPCS | Performed by: STUDENT IN AN ORGANIZED HEALTH CARE EDUCATION/TRAINING PROGRAM

## 2024-04-05 PROCEDURE — 84443 ASSAY THYROID STIM HORMONE: CPT

## 2024-04-05 PROCEDURE — 83036 HEMOGLOBIN GLYCOSYLATED A1C: CPT

## 2024-04-05 PROCEDURE — 80061 LIPID PANEL: CPT

## 2024-04-05 PROCEDURE — G0145 SCR C/V CYTO,THINLAYER,RESCR: HCPCS | Performed by: STUDENT IN AN ORGANIZED HEALTH CARE EDUCATION/TRAINING PROGRAM

## 2024-04-05 PROCEDURE — 85025 COMPLETE CBC W/AUTO DIFF WBC: CPT

## 2024-04-05 NOTE — ASSESSMENT & PLAN NOTE
-much improved using clobetasol once daily   -plan to do every other day for 3 months, three times a week for 3 months   -pt to follow up in 6 months for repeat exam

## 2024-04-05 NOTE — PROGRESS NOTES
Assessment/Plan:    Encounter for gynecological examination (general) (routine) without abnormal findings  -pap: vaginal cuff pap performed today due to history of endometrial cancer s/p hysterectomy 2018   -mammogram: 23 BIRADs 2  -colonoscopy: 19, plan to repeat in 2024  -DEXA:  23 normal  -denies postmenopausal bleeding   -not sexually active   -STD testing: denies  -smoking: denies   -drinks alcohol socially  -recommend 30 min of exercise daily   -denies family history of ovarian, colon cancer  -mother, maternal grandmother, maternal aunt with breast cancer. Pt herself had negative BRCA and genetic testing    Lichen sclerosus  -much improved using clobetasol once daily   -plan to do every other day for 3 months, three times a week for 3 months   -pt to follow up in 6 months for repeat exam      Diagnoses and all orders for this visit:    Encounter for screening for malignant neoplasm of colon  -     Ambulatory Referral to Gastroenterology; Future    Encounter for gynecological examination (general) (routine) without abnormal findings    Lichen sclerosus          Subjective:      Patient ID: Cori Pleitez is a 73 y.o. female.    72yo  postmenopausal presents for annual exam.  No acute complaints. Reports clobetasol once daily has much improved symptoms.        The following portions of the patient's history were reviewed and updated as appropriate: allergies, current medications, past family history, past medical history, past social history, past surgical history, and problem list.    Review of Systems   Constitutional:  Negative for appetite change, chills, fatigue and fever.   Respiratory:  Negative for cough, chest tightness, shortness of breath and wheezing.    Cardiovascular:  Negative for chest pain, palpitations and leg swelling.   Gastrointestinal:  Negative for abdominal distention, abdominal pain, constipation, diarrhea, nausea and vomiting.   Endocrine: Negative  "for cold intolerance, heat intolerance and polyuria.   Genitourinary:  Negative for difficulty urinating, dyspareunia, dysuria, genital sores, menstrual problem, vaginal bleeding, vaginal discharge and vaginal pain.   Neurological:  Negative for dizziness, weakness, light-headedness and headaches.         Objective:      /70 (BP Location: Right arm, Patient Position: Sitting, Cuff Size: Adult)   Ht 5' 2\" (1.575 m)   Wt 71.3 kg (157 lb 3.2 oz)   LMP  (LMP Unknown)   BMI 28.75 kg/m²          Physical Exam  Constitutional:       General: She is not in acute distress.     Appearance: Normal appearance. She is normal weight.   Cardiovascular:      Rate and Rhythm: Normal rate.   Pulmonary:      Effort: Pulmonary effort is normal. No respiratory distress.   Chest:      Chest wall: No mass or tenderness.   Breasts:     Breasts are symmetrical.      Right: No swelling, bleeding, inverted nipple, mass, nipple discharge, skin change or tenderness.      Left: No swelling, bleeding, inverted nipple, mass, nipple discharge, skin change or tenderness.   Abdominal:      General: There is no distension.      Palpations: There is no mass.      Tenderness: There is no abdominal tenderness. There is no guarding or rebound.   Genitourinary:     General: Normal vulva.      Exam position: Lithotomy position.      Pubic Area: No rash.       Labia:         Right: No rash, tenderness, lesion or injury.         Left: No rash, tenderness, lesion or injury.       Urethra: No prolapse, urethral pain, urethral swelling or urethral lesion.      Vagina: No signs of injury. No vaginal discharge, tenderness, bleeding, lesions or prolapsed vaginal walls.      Uterus: Absent.       Adnexa:         Right: No mass, tenderness or fullness.          Left: No mass, tenderness or fullness.        Comments: Uterus and cervix surgically absent, lichen sclerosus with minimal discoloration skin changes at introitus, vaginal atrophy " noted  Musculoskeletal:      Right lower leg: No edema.      Left lower leg: No edema.   Lymphadenopathy:      Upper Body:      Right upper body: No supraclavicular, axillary or pectoral adenopathy.      Left upper body: No supraclavicular, axillary or pectoral adenopathy.   Neurological:      Mental Status: She is alert and oriented to person, place, and time.   Psychiatric:         Mood and Affect: Mood normal.

## 2024-04-05 NOTE — ASSESSMENT & PLAN NOTE
-pap: vaginal cuff pap performed today due to history of endometrial cancer s/p hysterectomy 2018   -mammogram: 6/27/23 BIRADs 2  -colonoscopy: 12/31/19, plan to repeat in December 2024  -DEXA:  4/18/23 normal  -denies postmenopausal bleeding   -not sexually active   -STD testing: denies  -smoking: denies   -drinks alcohol socially  -recommend 30 min of exercise daily   -denies family history of ovarian, colon cancer  -mother, maternal grandmother, maternal aunt with breast cancer. Pt herself had negative BRCA and genetic testing

## 2024-04-08 DIAGNOSIS — E11.9 CONTROLLED TYPE 2 DIABETES MELLITUS WITHOUT COMPLICATION, WITH LONG-TERM CURRENT USE OF INSULIN (HCC): ICD-10-CM

## 2024-04-08 DIAGNOSIS — Z79.4 CONTROLLED TYPE 2 DIABETES MELLITUS WITHOUT COMPLICATION, WITH LONG-TERM CURRENT USE OF INSULIN (HCC): Primary | ICD-10-CM

## 2024-04-08 DIAGNOSIS — E11.9 CONTROLLED TYPE 2 DIABETES MELLITUS WITHOUT COMPLICATION, WITH LONG-TERM CURRENT USE OF INSULIN (HCC): Primary | ICD-10-CM

## 2024-04-08 DIAGNOSIS — Z79.4 CONTROLLED TYPE 2 DIABETES MELLITUS WITHOUT COMPLICATION, WITH LONG-TERM CURRENT USE OF INSULIN (HCC): ICD-10-CM

## 2024-04-10 ENCOUNTER — TELEPHONE (OUTPATIENT)
Age: 74
End: 2024-04-10

## 2024-04-10 DIAGNOSIS — Z79.4 CONTROLLED TYPE 2 DIABETES MELLITUS WITHOUT COMPLICATION, WITH LONG-TERM CURRENT USE OF INSULIN (HCC): ICD-10-CM

## 2024-04-10 DIAGNOSIS — E11.9 CONTROLLED TYPE 2 DIABETES MELLITUS WITHOUT COMPLICATION, WITH LONG-TERM CURRENT USE OF INSULIN (HCC): ICD-10-CM

## 2024-04-10 NOTE — TELEPHONE ENCOUNTER
Patient calling said that [facility] in [place] has the Mounjaro in stock her dose 10mg would like 30 day supply with refills sent there, she asks if this can be done asap before pharmacy runs out.  Please notify patient when sent.

## 2024-04-10 NOTE — TELEPHONE ENCOUNTER
PA for mounjaro    Submitted via    [x]CMM-KEY [id number]  [][employer]-Case ID #   []Faxed to plan   []Other website   []Phone call Case ID #     Office notes sent, clinical questions answered. Awaiting determination    Turnaround time for your insurance to make a decision on your Prior Authorization can take 7-21 business days.

## 2024-04-10 NOTE — TELEPHONE ENCOUNTER
Tried to call patient to make her aware that medication was approved and sent to pharmacy, however, it looks like it was not released due to needing prior auth?  No answer from patient.  Will route to prior auth team to initiate prior auth.

## 2024-04-10 NOTE — TELEPHONE ENCOUNTER
Spoke with patient, she has terrible reception so her last call was disconnected right away. I as able to tell her that the medication was approved and is now being sent to the PA team for a prior authorization.     No further actions needed.

## 2024-04-13 LAB
LAB AP GYN PRIMARY INTERPRETATION: NORMAL
Lab: NORMAL

## 2024-05-02 ENCOUNTER — TELEPHONE (OUTPATIENT)
Age: 74
End: 2024-05-02

## 2024-05-02 ENCOUNTER — OFFICE VISIT (OUTPATIENT)
Age: 74
End: 2024-05-02
Payer: COMMERCIAL

## 2024-05-02 VITALS
HEIGHT: 62 IN | SYSTOLIC BLOOD PRESSURE: 118 MMHG | HEART RATE: 95 BPM | TEMPERATURE: 98.5 F | WEIGHT: 150 LBS | RESPIRATION RATE: 18 BRPM | OXYGEN SATURATION: 96 % | DIASTOLIC BLOOD PRESSURE: 70 MMHG | BODY MASS INDEX: 27.6 KG/M2

## 2024-05-02 DIAGNOSIS — E78.5 DYSLIPIDEMIA: ICD-10-CM

## 2024-05-02 DIAGNOSIS — I25.84 CORONARY ATHEROSCLEROSIS DUE TO CALCIFIED CORONARY LESION: ICD-10-CM

## 2024-05-02 DIAGNOSIS — E11.9 TYPE 2 DIABETES MELLITUS WITHOUT COMPLICATION, WITHOUT LONG-TERM CURRENT USE OF INSULIN (HCC): Primary | ICD-10-CM

## 2024-05-02 DIAGNOSIS — I25.10 CORONARY ATHEROSCLEROSIS DUE TO CALCIFIED CORONARY LESION: ICD-10-CM

## 2024-05-02 DIAGNOSIS — I10 BENIGN ESSENTIAL HYPERTENSION: ICD-10-CM

## 2024-05-02 DIAGNOSIS — E03.9 ACQUIRED HYPOTHYROIDISM: ICD-10-CM

## 2024-05-02 PROBLEM — M19.079 OSTEOARTHRITIS OF ANKLE OR FOOT: Status: ACTIVE | Noted: 2024-05-02

## 2024-05-02 PROBLEM — M76.829 TIBIALIS POSTERIOR TENDINITIS: Status: ACTIVE | Noted: 2024-05-02

## 2024-05-02 PROBLEM — Z01.419 ENCOUNTER FOR GYNECOLOGICAL EXAMINATION (GENERAL) (ROUTINE) WITHOUT ABNORMAL FINDINGS: Status: RESOLVED | Noted: 2024-04-05 | Resolved: 2024-05-02

## 2024-05-02 PROCEDURE — 1159F MED LIST DOCD IN RCRD: CPT | Performed by: INTERNAL MEDICINE

## 2024-05-02 PROCEDURE — 1160F RVW MEDS BY RX/DR IN RCRD: CPT | Performed by: INTERNAL MEDICINE

## 2024-05-02 PROCEDURE — 1101F PT FALLS ASSESS-DOCD LE1/YR: CPT | Performed by: INTERNAL MEDICINE

## 2024-05-02 PROCEDURE — 99214 OFFICE O/P EST MOD 30 MIN: CPT | Performed by: INTERNAL MEDICINE

## 2024-05-02 PROCEDURE — 3288F FALL RISK ASSESSMENT DOCD: CPT | Performed by: INTERNAL MEDICINE

## 2024-05-02 PROCEDURE — 3074F SYST BP LT 130 MM HG: CPT | Performed by: INTERNAL MEDICINE

## 2024-05-02 PROCEDURE — 3078F DIAST BP <80 MM HG: CPT | Performed by: INTERNAL MEDICINE

## 2024-05-02 PROCEDURE — 3725F SCREEN DEPRESSION PERFORMED: CPT | Performed by: INTERNAL MEDICINE

## 2024-05-02 NOTE — TELEPHONE ENCOUNTER
Pt was called regarding her inquiry about scheduling a dermatology 1 year fu exam    pt refused scheduling with any PA/AP and stated she only wanted to schedule with a DR.    Pt was requested to call back [phone] in 2 - 3 weeks to inquire if a DR. Schedule has been released for her to schedule a December exam as she has requested

## 2024-05-02 NOTE — PROGRESS NOTES
INTERNAL MEDICINE OFFICE VISIT  [facility] [place] [address]  Tel: [phone]      NAME: Cori Pleitez  AGE: 73 y.o.  SEX: female  : 1950   MRN: [id number]    DATE: 2024  TIME: 1:45 PM    Assessment and Plan:  1. Type 2 diabetes mellitus without complication, without long-term current use of insulin (HCC)  Type 2 diabetes is extremely well-controlled since he lost weight.  A1c is down to 5.4.  Continue to follow-up with endocrinology and continue metformin and Mounjaro as advised by them    2. Benign essential hypertension  Blood pressure is stable, continue olmesartan 20 mg daily    3. Dyslipidemia  Continue Crestor    4. Acquired hypothyroidism  Continue levothyroxine 75 mcg daily    5. Coronary atherosclerosis due to calcified coronary lesion  Follow-up with cardiology      - Counseling Documentation: patient was counseled regarding: diagnostic results, instructions for management, risk factor reductions, prognosis, patient and family education, risks and benefits of treatment options, and importance of compliance with treatment  - Medication Side Effects: Adverse side effects of medications were reviewed with the patient/guardian today.      Return for follow up visit in 4 months or earlier, if needed.      Chief Complaint:  Chief Complaint   Patient presents with    Follow-up         History of Present Illness:   Type 2 diabetes is extremely well-controlled as she has lost about 62 pounds since her last visit.  She has been taking the Mounjaro and following a keto diet as per instructions from her endocrinologist.  Blood pressure and cholesterol are stable on medication  TSH is within normal range  She is doing very well and all her labs are within normal range      Active Problem List:  Patient Active Problem List   Diagnosis    Dysmetabolic syndrome X    Benign essential hypertension    Obesity (BMI 30-39.9)    Lichen  sclerosus    History of endometrial cancer    SOB (shortness of breath)    Asymptomatic postmenopausal status    At risk for sleep apnea    Dyslipidemia    Asthma    GERD (gastroesophageal reflux disease)    Acquired hypothyroidism    Status post total right knee replacement    Spondylosis of lumbosacral region    Irritable bowel syndrome without diarrhea    Coronary atherosclerosis due to calcified coronary lesion    Chronic pain of both knees    Obstructive sleep apnea    Endometrial cancer (HCC)    Type 2 diabetes mellitus without complication, without long-term current use of insulin (HCC)    Cortical age-related cataract of both eyes    Bilateral hip pain    PND (post-nasal drip)    Hearing loss of right ear    Nasal septal perforation    Low back pain    Vitamin D deficiency    Tibialis posterior tendinitis    Osteoarthritis of ankle or foot         Past Medical History:  Past Medical History:   Diagnosis Date    Abnormal mammogram     Abnormal weight gain     Achilles tendinitis     Allergic 1952    since childhood    Arthritis 1988    Asthma     Cancer (HCC) 2018    Endometrial adenoma    Combined forms of age-related cataract of both eyes 08/18/2021    Added per ICD-10-CM coding guidelines - provider accepted    Diabetes mellitus (HCC)     Disc degeneration, lumbar     Disease of thyroid gland     hypo    Dyslipidemia     Dyslipidemia, goal LDL below 70 03/27/2018    Early satiety     Edema     idiopathic peripheral    Encounter for follow-up examination after completed treatment for malignant neoplasm 03/17/2019    Encounter for gynecological examination without abnormal finding 06/22/2020    Endometrial cancer (HCC) 2018    Enthesopathy     hip region    Esophagitis, reflux     GERD (gastroesophageal reflux disease)     Hard to intubate     difficulty with intubation and had to be intubated twice    Heart murmur 2018    HL (hearing loss) 2015    Hypercholesterolemia     Hypertension     IBS (irritable  bowel syndrome)     Irritable bowel syndrome     Morbid (severe) obesity due to excess calories (HCC) 2022    As per CMS ICD10 guidelines:Provider accepted    Obesity 1998    Obesity, Class I, BMI 30-34.9 2015    Osteoarthritis     Pneumonia     Rosacea     Sacroiliitis (HCC)     Shingles     Sleep apnea, obstructive     Varicella          Past Surgical History:  Past Surgical History:   Procedure Laterality Date     SECTION      x2    COLONOSCOPY      ELBOW SURGERY      left ulna/radius    ESOPHAGOGASTRODUODENOSCOPY      FOOT SURGERY Right     FRACTURE SURGERY      FRACTURE SURGERY Left     tibia    HYSTERECTOMY Bilateral 2018    JOINT REPLACEMENT Left     shoulder,  right knee    KNEE ARTHROSCOPY      KNEE ARTHROSCOPY W/ MENISCAL REPAIR Right     RI LAPS TOTAL HYSTERECT 250 GM/< W/RMVL TUBE/OVARY N/A 2018    Procedure: ROBOTIC TOTAL LAPAROSCOPIC HYSTERECTOMY, BILATERAL SALPINGOOPHORECTOMY, BILATERAL PELVIC LYMPHNODE DISSECTION;  Surgeon: Андрей Meng MD;  Location: [facility];  Service: Gynecology Oncology    REPLACEMENT TOTAL KNEE Right     SHOULDER ARTHROPLASTY      SINUS SURGERY      TONSILLECTOMY      TUBAL LIGATION      WRIST SURGERY      ganglonic cyst         Family History:  Family History   Problem Relation Age of Onset    Arthritis Mother     Heart disease Mother         cardiac disorder    Diabetes Mother     Hiatal hernia Mother     Hypertension Mother     Irritable bowel syndrome Mother     Breast cancer Mother 66        x2    Uterine cancer Mother     Osteoporosis Mother     Thyroid disease Mother     Other Mother         gastric reflux    Stroke Mother     Hyperlipidemia Mother     Cancer Mother         ENDOMETRIAL    Diabetes type II Mother     Thyroid disease unspecified Mother     Hypothyroidism Mother     Heart disease Father         cardiac disorder    Hiatal hernia Father     Ulcers Father     Other Father         gastric reflux    Coronary artery disease  Father     Hearing loss Father     Heart attack Father     Heart failure Father     Hiatal hernia Sister     Thyroid disease Sister     Other Sister         gastric reflux    Lung cancer Sister 77    Cancer Sister         lung adenocarcinoma, mets to meninges    Thyroid disease unspecified Sister     Hypothyroidism Sister     Hyperlipidemia Sister     Arthritis Sister     Irritable bowel syndrome Daughter     Hyperlipidemia Daughter     Brain cancer Maternal Grandmother     Breast cancer Maternal Grandmother         uknown    No Known Problems Maternal Grandfather     No Known Problems Paternal Grandmother     Hearing loss Paternal Grandfather     No Known Problems Brother     Malig Hypertension Son     Hiatal hernia Child     Other Child         gastric reflux    Breast cancer Maternal Aunt 65    Uterine cancer Maternal Aunt         x3 sis    Stroke Maternal Aunt     Cancer Maternal Aunt     Hyperlipidemia Maternal Aunt     Breast cancer Maternal Aunt 55    Cancer Maternal Aunt     Hyperlipidemia Maternal Aunt     Breast cancer Maternal Aunt 55    Cancer Maternal Aunt     Hyperlipidemia Maternal Aunt     No Known Problems Paternal Aunt     Hyperlipidemia Maternal Aunt     Hyperlipidemia Maternal Aunt     Colon cancer Neg Hx     Ovarian cancer Neg Hx     Cervical cancer Neg Hx          Social History:  Social History     Socioeconomic History    Marital status:      Spouse name: None    Number of children: 3    Years of education: None    Highest education level: None   Occupational History    Occupation: nurse     Comment: full-time   Tobacco Use    Smoking status: Never    Smokeless tobacco: Never   Vaping Use    Vaping status: Never Used   Substance and Sexual Activity    Alcohol use: Yes     Alcohol/week: 2.0 standard drinks of alcohol     Types: 1 Glasses of wine, 1 Standard drinks or equivalent per week    Drug use: Never    Sexual activity: Not Currently     Partners: Male     Birth  control/protection: Post-menopausal, Surgical, Female Sterilization   Other Topics Concern    None   Social History Narrative    Active advance directive    DME- freestyle lite blood glucose meter device kit QID     Social Determinants of Health     Financial Resource Strain: Not on file   Food Insecurity: Not on file   Transportation Needs: Not on file   Physical Activity: Inactive (10/11/2021)    Exercise Vital Sign     Days of Exercise per Week: 0 days     Minutes of Exercise per Session: 0 min   Stress: No Stress Concern Present (10/11/2021)    [employer] - Occupational Stress Questionnaire     Feeling of Stress : Only a little   Social Connections: Not on file   Intimate Partner Violence: Not on file   Housing Stability: Not on file         Allergies:  Allergies   Allergen Reactions    Amoxicillin-Pot Clavulanate Anaphylaxis, Hives, Itching and Facial Swelling    Erythromycin Hives, Itching, Swelling, Vomiting, Throat Swelling, Nasal Congestion and Facial Swelling    Meperidine Anaphylaxis    Morphine Hives, Itching, Swelling, Other (See Comments) and Syncope     hypotension    Penicillins Anaphylaxis, Itching, Swelling and Hives    Relafen [Nabumetone] Hives, Itching and Swelling    Sulfa Antibiotics Hives, Itching, Rash and Swelling    Darvon [Propoxyphene] Hives    Methocarbamol Other (See Comments)     Double vision    Reglan [Metoclopramide] Anxiety    Tetracyclines & Related Rash         Medications:    Current Outpatient Medications:     albuterol (PROVENTIL HFA,VENTOLIN HFA) 90 mcg/act inhaler, Inhale 2 puffs every 6 (six) hours as needed for wheezing, Disp: 18 g, Rfl: 2    Calcium-Magnesium-Vitamin D (CALCIUM 1200+D3 PO), , Disp: , Rfl:     clobetasol (TEMOVATE) 0.05 % ointment, Apply topically 2 (two) times a day (Patient taking differently: Apply topically 2 (two) times a day Taking once daily), Disp: 30 g, Rfl: 1    Continuous Blood Gluc  (Dexcom G7 )  "SMITH, Use 1 each continuous, Disp: 1 each, Rfl: 0    Continuous Blood Gluc Sensor (Dexcom G7 Sensor), Use 1 Device every 10 days, Disp: 9 each, Rfl: 1    ezetimibe (ZETIA) 10 mg tablet, Take 1 tablet (10 mg total) by mouth daily, Disp: 90 tablet, Rfl: 0    FREESTYLE LITE test strip, USE 1 STRIP THREE TIMES A DAY AS INSTRUCTED, Disp: 300 strip, Rfl: 3    furosemide (LASIX) 20 mg tablet, Take 1 tablet (20 mg total) by mouth daily, Disp: 90 tablet, Rfl: 0    Insulin Pen Needle (BD Pen Needle Dulce U/F) 32G X 4 MM MISC, Use daily as needed with insulin pen, Disp: 100 each, Rfl: 2    Insulin Syringe-Needle U-100 (INSULIN SYRINGE 1CC/31GX5/16\") 31G X 5/16\" 1 ML MISC, BD Veo Insulin Syringe Ultra-Fine 0.3 mL 31 gauge x 15/64\", Disp: , Rfl:     KRILL OIL PO, Take by mouth, Disp: , Rfl:     levothyroxine 75 mcg tablet, Take 1 tablet (75 mcg total) by mouth daily, Disp: 90 tablet, Rfl: 0    linaCLOtide (Linzess) 145 MCG CAPS, TAKE 1 CAPSULE EVERY MORNING, Disp: 90 capsule, Rfl: 1    Magnesium Oxide 400 MG CAPS, Take by mouth, Disp: , Rfl:     metFORMIN (GLUCOPHAGE-XR) 500 mg 24 hr tablet, Take 1 tab in am and 2 tabs in pm, Disp: 270 tablet, Rfl: 1    olmesartan (BENICAR) 20 mg tablet, Take 1 tablet (20 mg total) by mouth daily, Disp: 90 tablet, Rfl: 3    Omega-3 Fatty Acids (FISH OIL) 1200 MG CAPS, Take by mouth, Disp: , Rfl:     pantoprazole (PROTONIX) 40 mg tablet, Take 1 tablet (40 mg total) by mouth daily, Disp: 90 tablet, Rfl: 0    potassium chloride (MICRO-K) 10 MEQ CR capsule, Take 1 capsule (10 mEq total) by mouth daily, Disp: 90 capsule, Rfl: 0    rosuvastatin (CRESTOR) 20 MG tablet, Take 1 tablet (20 mg total) by mouth daily, Disp: 90 tablet, Rfl: 0    saccharomyces boulardii (FLORASTOR) 250 mg capsule, Take 250 mg by mouth daily  , Disp: , Rfl:     tirzepatide 10 MG/0.5ML, Inject 0.5 mL (10 mg total) under the skin every 7 days, Disp: 2 mL, Rfl: 5      The following portions of the patient's history were reviewed " and updated as appropriate: past medical history, past surgical history, family history, social history, allergies, current medications and active problem list.      Review of Systems:  Constitutional: Denies fever, chills, weight gain, weight loss, fatigue  Eyes: Denies eye redness, eye discharge, double vision, change in visual acuity  ENT: Denies hearing loss, tinnitus, sneezing, nasal congestion, nasal discharge, sore throat   Respiratory: Denies cough, expectoration, hemoptysis, shortness of breath, wheezing  Cardiovascular: Denies chest pain, palpitations, lower extremity swelling, orthopnea, PND  Gastrointestinal: Denies abdominal pain, heartburn, nausea, vomiting, hematemesis, diarrhea, bloody stools  Genito-Urinary: Denies dysuria, frequency, difficulty in micturition, nocturia, incontinence  Musculoskeletal: Denies back pain, joint pain, muscle pain  Neurologic: Denies confusion, lightheadedness, syncope, headache, focal weakness, sensory changes, seizures  Endocrine: Denies polyuria, polydipsia, temperature intolerance  Allergy and Immunology: Denies hives, insect bite sensitivity  Hematological and Lymphatic: Denies bleeding problems, swollen glands   Psychological: Denies depression, suicidal ideation, anxiety, panic, mood swings  Dermatological: Denies pruritus, rash, skin lesion changes      Vitals:  Vitals:    05/02/24 1312   BP: 118/70   Pulse: 95   Resp: 18   Temp: 98.5 °F (36.9 °C)   SpO2: 96%       Body mass index is 27.44 kg/m².    Weight (last 2 days)       Date/Time Weight    05/02/24 1312 68 (150)              Physical Examination:  General: Patient is not in acute distress. Awake, alert, responding to commands. No weight gain or loss  Head: Normocephalic. Atraumatic  Eyes: Conjunctiva and lids with no swelling, erythema or discharge. Both pupils normal sized, round and reactive to light. Sclera nonicteric  ENT: External examination of nose and ear normal. Otoscopic examination shows  translucent tympanic membranes with patent canals without erythema. Oropharynx moist with no erythema, edema, exudate or lesions  Neck: Supple. JVP not raised. Trachea midline. No masses. No thyromegaly  Lungs: No signs of increased work of breathing or respiratory distress. Bilateral bronchovascular breath sounds with no crackles or rhonchi  Chest wall: No tenderness  Cardiovascular: Normal PMI. No thrills. Regular rate and rhythm. S1 and S2 normal. No murmur, rub or gallop  Gastrointestinal: Abdomen soft, nontender. No guarding or rigidity. Liver and spleen not palpable. Bowel sounds present  Neurologic: Cranial nerves II-XII intact. Cortical functions normal. Motor system - Reflexes 2+ and symmetrical. Sensations normal  Musculoskeletal: Gait normal. No joint tenderness  Integumentary: Skin normal with no rash or lesions  Lymphatic: No palpable lymph nodes in neck, axilla or groin  Extremities: No clubbing, cyanosis, edema or varicosities  Psychological: Judgement and insight normal. Mood and affect normal      Laboratory Results:  CBC with diff:   Lab Results   Component Value Date    WBC 7.09 04/05/2024    WBC 9.23 11/04/2015    RBC 4.52 04/05/2024    RBC 4.49 11/04/2015    HGB 13.6 04/05/2024    HGB 12.3 11/04/2015    HCT 41.3 04/05/2024    HCT 37.4 11/04/2015    MCV 91 04/05/2024    MCV 83 11/04/2015    MCH 30.1 04/05/2024    MCH 27.4 11/04/2015    RDW 12.9 04/05/2024    RDW 15.7 (H) 11/04/2015     04/05/2024     (H) 11/04/2015       CMP:  Lab Results   Component Value Date    CREATININE 0.85 04/05/2024    CREATININE 0.90 06/09/2021    BUN 22 04/05/2024    BUN 23 06/09/2021     11/04/2015    K 4.0 04/05/2024    K 4.1 06/09/2021     04/05/2024     (H) 06/09/2021    CO2 28 04/05/2024    CO2 23 06/09/2021    GLUCOSE 107 11/04/2015    PROT 7.0 11/04/2015    ALKPHOS 48 04/05/2024    ALKPHOS 71 11/04/2015    ALT 19 04/05/2024    ALT 30 11/04/2015    AST 19 04/05/2024    AST 20  "2015    BILIDIR 0.10 2015       Lab Results   Component Value Date    HGBA1C 5.4 2024    HGBA1C 6.6 (H) 2015    MG 1.9 2019       No results found for: \"TROPONINI\", \"CKMB\", \"CKTOTAL\"    Lipid Profile:   Lab Results   Component Value Date    CHOL 129 2015    CHOL 102 2015     Lab Results   Component Value Date    HDL 42 (L) 2024    HDL 38 (L) 2024     Lab Results   Component Value Date    LDLCALC 47 2024    LDLCALC 39 2024     Lab Results   Component Value Date    TRIG 123 2024    TRIG 138 2024       Imaging Results:  Complete PFT with post bronchodilator  Pulmonary Function Test Report  Cori Plietez 73 y.o. female MRN: [id number]    Date of Testin2024    Date of Interpretation: 4/3/2024     Requesting Physician: Dr. Lawrence    Reason for Testing: Shortness of breath    Reference set for interpretation: [id number]    Procedure: The patient was taken to pulmonary function testing laboratory.    The patient demonstrated good effort and cooperation.  The results of   this test meet ATS standards for acceptability and repeatability.  Data   set appears appropriate for interpretation.  Where applicable, degree of   obstruction interpreted utilizing GOLD 2023 criteria.    Post bronchodilator testing performed after the administration of 2.5mg   albuterol in 3cc normal saline administered via nebulizer per   bronchodilator protocol.    Results:  FEV1/FVC Ratio: 64 %  FEV1: 1.77 L 91 % predicted  Forced Vital Capacity: 2.73 L 109 % predicted  After administration of bronchodilator: There is improvement    Lung volumes: Total Lung Capacity 112 % predicted Residual volume 114 %   predicted    DLCO corrected for patients hemoglobin level: 114 % predicted    Flow volume loop: Consistent with obstruction    Interpretation:    Spirometry with mild obstruction  There is improvement with bronchodilators  Normal lung volumes  Normal diffusion " capacity    Viji Small D.O., [facility]  [facility]       Health Maintenance:  Health Maintenance   Topic Date Due    PT PLAN OF CARE  08/05/2021    Annual Physical  10/11/2022    Fall Risk  07/20/2023    COVID-19 Vaccine (7 - 2023-24 season) 11/26/2023    Urinary Incontinence Screening  12/27/2023    Breast Cancer Screening: Mammogram  06/27/2024    HEMOGLOBIN A1C  10/05/2024    Kidney Health Evaluation: Albumin/Creatinine Ratio  10/29/2024    Colorectal Cancer Screening  12/31/2024    Kidney Health Evaluation: GFR  04/05/2025    DM Eye Exam  04/17/2025    Depression Screening  05/02/2025    Diabetic Foot Exam  05/02/2025    DXA SCAN  04/18/2026    DTaP,Tdap,and Td Vaccines (4 - Td or Tdap) 09/30/2030    Hepatitis C Screening  Completed    Osteoporosis Screening  Completed    Zoster Vaccine  Completed    Pneumococcal Vaccine: 65+ Years  Completed    Influenza Vaccine  Completed    HIB Vaccine  Aged Out    IPV Vaccine  Aged Out    Hepatitis A Vaccine  Aged Out    Meningococcal ACWY Vaccine  Aged Out    HPV Vaccine  Aged Out     Immunization History   Administered Date(s) Administered    COVID-19 MODERNA VACC 0.5 ML IM 01/05/2021, 02/04/2021, 10/25/2021, 09/06/2022    COVID-19 Moderna Vac BIVALENT 12 Yr+ IM 0.5 ML 09/06/2022    COVID-19 Moderna mRNA Vaccine 12 Yr+ 50 mcg/0.5 mL (Spikevax) 10/01/2023    H1N1, All Formulations 11/05/2009    INFLUENZA 09/30/2015, 10/20/2017, 09/30/2020, 09/26/2021, 09/06/2022, 09/13/2023    Influenza Split High Dose Preservative Free IM 10/06/2014, 09/30/2015, 10/20/2017    Influenza, Seasonal Vaccine, Quadrivalent, Adjuvanted, .5e 09/13/2023    Influenza, high dose seasonal 0.7 mL 10/09/2018, 09/30/2020    Pneumococcal Conjugate 13-Valent 09/30/2015    Pneumococcal Polysaccharide PPV23 11/17/2008, 10/06/2014, 03/23/2017    Respiratory Syncytial Virus Vaccine (Recombinant, Adjuvanted) 11/11/2023    Tdap 10/09/2018, 10/12/2018, 09/30/2020     Tuberculin Skin Test-PPD Intradermal 07/03/2019    Zoster 10/25/2017    Zoster Vaccine Recombinant 11/28/2023, 04/25/2024    influenza, trivalent, adjuvanted 09/28/2019         Dae Calvert MD  5/2/2024,1:45 PM    Diabetic Foot Exam    Patient's shoes and socks removed.    Right Foot/Ankle   Right Foot Inspection  Skin Exam: skin normal. No dry skin, no warmth, no callus, no erythema, no maceration, no abnormal color, no pre-ulcer, no ulcer and no callus.     Toe Exam: ROM and strength within normal limits.     Sensory   Monofilament testing: intact    Left Foot/Ankle  Left Foot Inspection  Skin Exam: skin normal. No dry skin, no warmth, no erythema, no maceration, normal color, no pre-ulcer, no ulcer and no callus.     Toe Exam: ROM and strength within normal limits.     Sensory   Monofilament testing: intact    Assign Risk Category  No deformity present  No loss of protective sensation  No weak pulses  Risk: 0

## 2024-05-07 ENCOUNTER — OFFICE VISIT (OUTPATIENT)
Age: 74
End: 2024-05-07
Payer: COMMERCIAL

## 2024-05-07 ENCOUNTER — PATIENT MESSAGE (OUTPATIENT)
Dept: ENDOCRINOLOGY | Facility: CLINIC | Age: 74
End: 2024-05-07

## 2024-05-07 ENCOUNTER — PREP FOR PROCEDURE (OUTPATIENT)
Age: 74
End: 2024-05-07

## 2024-05-07 VITALS
SYSTOLIC BLOOD PRESSURE: 118 MMHG | OXYGEN SATURATION: 96 % | HEART RATE: 81 BPM | BODY MASS INDEX: 27.6 KG/M2 | WEIGHT: 150 LBS | HEIGHT: 62 IN | DIASTOLIC BLOOD PRESSURE: 80 MMHG

## 2024-05-07 DIAGNOSIS — Z79.4 CONTROLLED TYPE 2 DIABETES MELLITUS WITHOUT COMPLICATION, WITH LONG-TERM CURRENT USE OF INSULIN (HCC): ICD-10-CM

## 2024-05-07 DIAGNOSIS — Z12.11 ENCOUNTER FOR SCREENING FOR MALIGNANT NEOPLASM OF COLON: ICD-10-CM

## 2024-05-07 DIAGNOSIS — E11.9 CONTROLLED TYPE 2 DIABETES MELLITUS WITHOUT COMPLICATION, WITH LONG-TERM CURRENT USE OF INSULIN (HCC): ICD-10-CM

## 2024-05-07 DIAGNOSIS — Z86.010 HISTORY OF COLON POLYPS: Primary | ICD-10-CM

## 2024-05-07 DIAGNOSIS — R19.01 RIGHT UPPER QUADRANT ABDOMINAL MASS: ICD-10-CM

## 2024-05-07 PROCEDURE — 99204 OFFICE O/P NEW MOD 45 MIN: CPT | Performed by: INTERNAL MEDICINE

## 2024-05-07 NOTE — PROGRESS NOTES
[facility]    Dear Dr. Amos,    I had the pleasure of seeing your patient Cori Pleitez in the office today and I thank you for this kind referral.       Chief Complaint: History of polyps      HPI:  Cori Pleitez is a 73 y.o. female who presents with history of colon polyps with a last colonoscopy in 2019.  Patient has recently lost a significant amount of weight voluntarily to bring her diabetes under better control, which she has.  Upon losing the weight she noticed a lump in the right upper quadrant of the abdomen.  She has not had this characterized.  It is nontender.  It is localized and mobile.  Patient has no upper GI complaints of any kind.  No nausea or vomiting.  She does have mild constipation.  She has no melena hematochezia or rectal bleeding.  She has no other significant issues.  She is going to be going for cataract surgery soon..      Review of Systems:   Constitutional: No fever or chills, feels well, no tiredness, no recent weight gain or weight loss.   HENT: No complaints of earache, no hearing loss, no nosebleeds, no nasal discharge, no sore throat, no hoarseness.  Positive cataracts  Eyes: No complaints of eye pain, no red eyes, no discharge from eyes, no itchy eyes.  Cardiovascular: No complaints of slow heart rate, no fast heart rate, no chest pain, no palpitations, no leg claudication, no lower extremity edema.   Respiratory: No complaints of shortness of breath, no wheezing, no cough, no SOB on exertion, no orthopnea.   Gastrointestinal: As noted in HPI  Genitourinary: No complaints of dysuria, no incontinence, no hesitancy, no nocturia.   Musculoskeletal: Positive arthralgia, no myalgias, no joint swelling or stiffness, no limb pain or swelling.   Neurological: No complaints of headache, no confusion, no convulsions, no numbness or tingling, no dizziness or fainting, no limb weakness, no difficulty walking.    Skin: No complaints of skin rash or  skin lesions, no itching, no skin wound, no dry skin.    Hematological/Lymphatic: No complaints of swollen glands, does not bleed easy.   Allergic/Immunologic: No immunocompromised state.  Endocrine:  No complaints of polyuria, no polydipsia.   Psychiatric/Behavioral: is not suicidal, no sleep disturbances, no anxiety or depression, no change in personality, no emotional problems.       Historical Information   Past Medical History:   Diagnosis Date    Abnormal mammogram     Abnormal weight gain     Achilles tendinitis     Allergic     since childhood    Arthritis     Asthma     Cancer (HCC) 2018    Endometrial adenoma    Combined forms of age-related cataract of both eyes 2021    Added per ICD-10-CM coding guidelines - provider accepted    Diabetes mellitus (HCC)     Disc degeneration, lumbar     Disease of thyroid gland     hypo    Dyslipidemia     Dyslipidemia, goal LDL below 70 2018    Early satiety     Edema     idiopathic peripheral    Encounter for follow-up examination after completed treatment for malignant neoplasm 2019    Encounter for gynecological examination without abnormal finding 2020    Endometrial cancer (HCC) 2018    Enthesopathy     hip region    Esophagitis, reflux     GERD (gastroesophageal reflux disease)     Hard to intubate     difficulty with intubation and had to be intubated twice    Heart murmur     HL (hearing loss)     Hypercholesterolemia     Hypertension     IBS (irritable bowel syndrome)     Irritable bowel syndrome     Morbid (severe) obesity due to excess calories (HCC) 2022    As per CMS ICD10 guidelines:Provider accepted    Obesity 1998    Obesity, Class I, BMI 30-34.9 2015    Osteoarthritis     Pneumonia     Rosacea     Sacroiliitis ([facility])     Shingles     Sleep apnea, obstructive     Varicella      Past Surgical History:   Procedure Laterality Date     SECTION      x2    COLONOSCOPY      ELBOW SURGERY      left  ulna/radius    ESOPHAGOGASTRODUODENOSCOPY      FOOT SURGERY Right     FRACTURE SURGERY      FRACTURE SURGERY Left     tibia    HYSTERECTOMY Bilateral 07/28/2018    JOINT REPLACEMENT Left     shoulder,  right knee    KNEE ARTHROSCOPY      KNEE ARTHROSCOPY W/ MENISCAL REPAIR Right     NC LAPS TOTAL HYSTERECT 250 GM/< W/RMVL TUBE/OVARY N/A 7/30/2018    Procedure: ROBOTIC TOTAL LAPAROSCOPIC HYSTERECTOMY, BILATERAL SALPINGOOPHORECTOMY, BILATERAL PELVIC LYMPHNODE DISSECTION;  Surgeon: Андрей Meng MD;  Location: [facility];  Service: Gynecology Oncology    REPLACEMENT TOTAL KNEE Right     SHOULDER ARTHROPLASTY      SINUS SURGERY      TONSILLECTOMY      TUBAL LIGATION      WRIST SURGERY      ganglonic cyst     Social History   Social History     Substance and Sexual Activity   Alcohol Use Yes    Alcohol/week: 4.0 standard drinks of alcohol    Types: 2 Glasses of wine, 2 Standard drinks or equivalent per week     Social History     Substance and Sexual Activity   Drug Use No     Social History     Tobacco Use   Smoking Status Never   Smokeless Tobacco Never     Family History   Problem Relation Age of Onset    Arthritis Mother     Heart disease Mother         cardiac disorder    Diabetes Mother     Hiatal hernia Mother     Hypertension Mother     Irritable bowel syndrome Mother     Breast cancer Mother 66        x2    Uterine cancer Mother     Osteoporosis Mother     Thyroid disease Mother     Other Mother         gastric reflux    Stroke Mother     Hyperlipidemia Mother     Cancer Mother         ENDOMETRIAL    Diabetes type II Mother     Thyroid disease unspecified Mother     Hypothyroidism Mother     Heart disease Father         cardiac disorder    Hiatal hernia Father     Ulcers Father     Other Father         gastric reflux    Coronary artery disease Father     Hearing loss Father     Heart attack Father     Heart failure Father     Hiatal hernia Sister     Thyroid disease Sister     Other Sister         gastric  "reflux    Lung cancer Sister 77    Cancer Sister         lung adenocarcinoma, mets to meninges    Thyroid disease unspecified Sister     Hypothyroidism Sister     Hyperlipidemia Sister     Arthritis Sister     Irritable bowel syndrome Daughter     Hyperlipidemia Daughter     Brain cancer Maternal Grandmother     Breast cancer Maternal Grandmother         [place]nown    No Known Problems Maternal Grandfather     No Known Problems Paternal Grandmother     Hearing loss Paternal Grandfather     No Known Problems Brother     Malig Hypertension Son     Hiatal hernia Child     Other Child         gastric reflux    Breast cancer Maternal Aunt 65    Uterine cancer Maternal Aunt         x3 sis    Stroke Maternal Aunt     Cancer Maternal Aunt     Hyperlipidemia Maternal Aunt     Breast cancer Maternal Aunt 55    Cancer Maternal Aunt     Hyperlipidemia Maternal Aunt     Breast cancer Maternal Aunt 55    Cancer Maternal Aunt     Hyperlipidemia Maternal Aunt     No Known Problems Paternal Aunt     Hyperlipidemia Maternal Aunt     Hyperlipidemia Maternal Aunt     Colon cancer Neg Hx     Ovarian cancer Neg Hx     Cervical cancer Neg Hx          Current Medications: has a current medication list which includes the following prescription(s): albuterol, calcium-magnesium-vitamin d, clobetasol, dexcom g7 , dexcom g7 sensor, ezetimibe, freestyle lite, furosemide, bd pen needle elsi u/f, insulin syringe 1cc/31gx5/16\", krill oil, levothyroxine, linzess, magnesium oxide, metformin, olmesartan, fish oil, pantoprazole, potassium chloride, rosuvastatin, saccharomyces boulardii, and tirzepatide.       Vital Signs: /80   Pulse 81   Ht 5' 2\" (1.575 m)   Wt 68 kg (150 lb)   LMP  (LMP Unknown)   SpO2 96%   BMI 27.44 kg/m²     Physical Exam:   Constitutional  General Appearance: No acute distress, well appearing and well nourished  Head  Normocephalic  Eyes  Conjunctivae and lids: No swelling, erythema, or discharge.    Pupils and " irises: Equal, round and reactive to light.   Ears, Nose, Mouth, and Throat  External inspection of ears and nose: Normal  Nasal mucosa, septum and turbinates: Normal without edema or erythema/   Oropharynx: Normal with no erythema, edema, exudate or lesions.   Neck  Normal range of motion. Neck supple.   Cardiovascular  Auscultation of the heart: Normal rate and rhythm, normal S1 and S2 without murmurs.  Examination of the extremities for edema and/or varicosities: Normal  Pulmonary/Chest  Respiratory effort: No increased work of breathing or signs of respiratory distress.   Auscultation of lungs: Clear to auscultation, equal breath sounds bilaterally, no wheezes, rales, no rhonchi.   Abdomen  Abdomen: Non-tender, 1 cm mobile nontender round right upper quadrant mass on palpation in the erect position.  Not palpable supine.  Liver and spleen: No hepatomegaly or splenomegaly.   Musculoskeletal  Gait and station: normal.  Digits and Nails: normal without clubbing or cyanosis.  Inspection/palpation of joints, bones, and muscles: Normal  Neurological  No nystagmus or asterixis.   Skin  Skin and subcutaneous tissue: Normal without rashes or lesions.   Lymphatic  Palpation of the lymph nodes in neck: No lymphadenopathy.   Psychiatric  Orientation to person, place and time: Normal.  Mood and affect: Normal.         Labs:   Lab Results   Component Value Date    ALT 19 04/05/2024    AST 19 04/05/2024    BUN 22 04/05/2024    CALCIUM 9.7 04/05/2024     04/05/2024    CHOL 129 11/04/2015    CO2 28 04/05/2024    CREATININE 0.85 04/05/2024    HDL 42 (L) 04/05/2024    HCT 41.3 04/05/2024    HGB 13.6 04/05/2024    HGBA1C 5.4 04/05/2024    MG 1.9 04/04/2019     04/05/2024    K 4.0 04/05/2024     11/04/2015    TRIG 123 04/05/2024    WBC 7.09 04/05/2024         X-Rays & Procedures:   US abdomen limited    (Results Pending)         ______________________________________________________________________      Assessment  & Plan:      Diagnoses and all orders for this visit:    History of colon polyps  -     Colonoscopy; Future    Encounter for screening for malignant neoplasm of colon  -     Ambulatory Referral to Gastroenterology    Right upper quadrant abdominal mass  -     US abdomen limited; Future      It is very likely that the right upper quadrant abnormality is a palpable subcutaneous cyst or perhaps lipoma.  It is nontender and mobile.  However we will obtain an ultrasound to see if it can be seen.  She will schedule her follow-up colonoscopy for her history of polyps.  She will continue her other current medical management.  I will be happy to inform you of her results and further recommendations.  I like to thank you for allowing me to participate in her care.        I obtained informed consent from the patient. The risks/benefits/alternatives of the procedure were discussed with the patient. Risks included, but not limited to, infection, bleeding, perforation, injury to organs in the abdomen, missed lesion and incomplete procedure were discussed. Patient was agreeable and electronic signature was obtained.        With warmest regards,    Alejandro Cabezas MD, FACG

## 2024-05-07 NOTE — PATIENT INSTRUCTIONS
Scheduled date of colonoscopy (as of today): 12/2/24  Physician performing colonoscopy: Jocelynn  Location of colonoscopy: [facility]  Bowel prep reviewed with patient: Miralax  Instructions reviewed with patient by: Consuelo CELAYA  Clearances:

## 2024-05-07 NOTE — LETTER
May 7, 2024     Gris Amos DO  [address] [facility] [id number]    Patient: Cori Pleitez   YOB: 1950   Date of Visit: 5/7/2024       Dear Dr. Amos:    Thank you for referring Cori Pleitez to me for evaluation. Below are my notes for this consultation.    If you have questions, please do not hesitate to call me. I look forward to following your patient along with you.         Sincerely,        Alejandro Cabezas MD        CC: No Recipients    Alejandro Cabezas MD  5/7/2024  4:21 PM  Incomplete  [facility]    Dear Dr. Amos,    I had the pleasure of seeing your patient Cori Pleitez in the office today and I thank you for this kind referral.       Chief Complaint: History of polyps      HPI:  Cori Pleitez is a 73 y.o. female who presents with history of colon polyps with a last colonoscopy in 2019.  Patient has recently lost a significant amount of weight voluntarily to bring her diabetes under better control, which she has.  Upon losing the weight she noticed a lump in the right upper quadrant of the abdomen.  She has not had this characterized.  It is nontender.  It is localized and mobile.  Patient has no upper GI complaints of any kind.  No nausea or vomiting.  She does have mild constipation.  She has no melena hematochezia or rectal bleeding.  She has no other significant issues.  She is going to be going for cataract surgery soon..      Review of Systems:   Constitutional: No fever or chills, feels well, no tiredness, no recent weight gain or weight loss.   HENT: No complaints of earache, no hearing loss, no nosebleeds, no nasal discharge, no sore throat, no hoarseness.  Positive cataracts  Eyes: No complaints of eye pain, no red eyes, no discharge from eyes, no itchy eyes.  Cardiovascular: No complaints of slow heart rate, no fast heart rate, no chest pain, no palpitations, no leg claudication, no lower extremity edema.    Respiratory: No complaints of shortness of breath, no wheezing, no cough, no SOB on exertion, no orthopnea.   Gastrointestinal: As noted in HPI  Genitourinary: No complaints of dysuria, no incontinence, no hesitancy, no nocturia.   Musculoskeletal: Positive arthralgia, no myalgias, no joint swelling or stiffness, no limb pain or swelling.   Neurological: No complaints of headache, no confusion, no convulsions, no numbness or tingling, no dizziness or fainting, no limb weakness, no difficulty walking.    Skin: No complaints of skin rash or skin lesions, no itching, no skin wound, no dry skin.    Hematological/Lymphatic: No complaints of swollen glands, does not bleed easy.   Allergic/Immunologic: No immunocompromised state.  Endocrine:  No complaints of polyuria, no polydipsia.   Psychiatric/Behavioral: is not suicidal, no sleep disturbances, no anxiety or depression, no change in personality, no emotional problems.       Historical Information  Past Medical History:   Diagnosis Date   • Abnormal mammogram    • Abnormal weight gain    • Achilles tendinitis    • Allergic 1952    since childhood   • Arthritis 1988   • Asthma    • Cancer (HCC) 2018    Endometrial adenoma   • Combined forms of age-related cataract of both eyes 08/18/2021    Added per ICD-10-CM coding guidelines - provider accepted   • Diabetes mellitus (HCC)    • Disc degeneration, lumbar    • Disease of thyroid gland     hypo   • Dyslipidemia    • Dyslipidemia, goal LDL below 70 03/27/2018   • Early satiety    • Edema     idiopathic peripheral   • Encounter for follow-up examination after completed treatment for malignant neoplasm 03/17/2019   • Encounter for gynecological examination without abnormal finding 06/22/2020   • Endometrial cancer (HCC) 2018   • Enthesopathy     hip region   • Esophagitis, reflux    • GERD (gastroesophageal reflux disease)    • Hard to intubate     difficulty with intubation and had to be intubated twice   • Heart murmur     • HL (hearing loss)    • Hypercholesterolemia    • Hypertension    • IBS (irritable bowel syndrome)    • Irritable bowel syndrome    • Morbid (severe) obesity due to excess calories (HCC) 2022    As per CMS ICD10 guidelines:Provider accepted   • Obesity    • Obesity, Class I, BMI 30-34.9 2015   • Osteoarthritis    • Pneumonia    • Rosacea    • Sacroiliitis (HCC)    • Shingles    • Sleep apnea, obstructive    • Varicella      Past Surgical History:   Procedure Laterality Date   •  SECTION      x2   • COLONOSCOPY     • ELBOW SURGERY      left ulna/radius   • ESOPHAGOGASTRODUODENOSCOPY     • FOOT SURGERY Right    • FRACTURE SURGERY     • FRACTURE SURGERY Left     tibia   • HYSTERECTOMY Bilateral 2018   • JOINT REPLACEMENT Left     shoulder,  right knee   • KNEE ARTHROSCOPY     • KNEE ARTHROSCOPY W/ MENISCAL REPAIR Right    • SC LAPS TOTAL HYSTERECT 250 GM/< W/RMVL TUBE/OVARY N/A 2018    Procedure: ROBOTIC TOTAL LAPAROSCOPIC HYSTERECTOMY, BILATERAL SALPINGOOPHORECTOMY, BILATERAL PELVIC LYMPHNODE DISSECTION;  Surgeon: Андрей Meng MD;  Location: [facility];  Service: Gynecology Oncology   • REPLACEMENT TOTAL KNEE Right    • SHOULDER ARTHROPLASTY     • SINUS SURGERY     • TONSILLECTOMY     • TUBAL LIGATION     • WRIST SURGERY      ganglonic cyst     Social History  Social History     Substance and Sexual Activity   Alcohol Use Yes   • Alcohol/week: 4.0 standard drinks of alcohol   • Types: 2 Glasses of wine, 2 Standard drinks or equivalent per week     Social History     Substance and Sexual Activity   Drug Use No     Social History     Tobacco Use   Smoking Status Never   Smokeless Tobacco Never     Family History   Problem Relation Age of Onset   • Arthritis Mother    • Heart disease Mother         cardiac disorder   • Diabetes Mother    • Hiatal hernia Mother    • Hypertension Mother    • Irritable bowel syndrome Mother    • Breast cancer Mother 66        x2   •  Uterine cancer Mother    • Osteoporosis Mother    • Thyroid disease Mother    • Other Mother         gastric reflux   • Stroke Mother    • Hyperlipidemia Mother    • Cancer Mother         ENDOMETRIAL   • Diabetes type II Mother    • Thyroid disease unspecified Mother    • Hypothyroidism Mother    • Heart disease Father         cardiac disorder   • Hiatal hernia Father    • Ulcers Father    • Other Father         gastric reflux   • Coronary artery disease Father    • Hearing loss Father    • Heart attack Father    • Heart failure Father    • Hiatal hernia Sister    • Thyroid disease Sister    • Other Sister         gastric reflux   • Lung cancer Sister 77   • Cancer Sister         lung adenocarcinoma, mets to meninges   • Thyroid disease unspecified Sister    • Hypothyroidism Sister    • Hyperlipidemia Sister    • Arthritis Sister    • Irritable bowel syndrome Daughter    • Hyperlipidemia Daughter    • Brain cancer Maternal Grandmother    • Breast cancer Maternal Grandmother         [place]own   • No Known Problems Maternal Grandfather    • No Known Problems Paternal Grandmother    • Hearing loss Paternal Grandfather    • No Known Problems Brother    • Malig Hypertension Son    • Hiatal hernia Child    • Other Child         gastric reflux   • Breast cancer Maternal Aunt 65   • Uterine cancer Maternal Aunt         x3 sis   • Stroke Maternal Aunt    • Cancer Maternal Aunt    • Hyperlipidemia Maternal Aunt    • Breast cancer Maternal Aunt 55   • Cancer Maternal Aunt    • Hyperlipidemia Maternal Aunt    • Breast cancer Maternal Aunt 55   • Cancer Maternal Aunt    • Hyperlipidemia Maternal Aunt    • No Known Problems Paternal Aunt    • Hyperlipidemia Maternal Aunt    • Hyperlipidemia Maternal Aunt    • Colon cancer Neg Hx    • Ovarian cancer Neg Hx    • Cervical cancer Neg Hx          Current Medications: has a current medication list which includes the following prescription(s): albuterol, calcium-magnesium-vitamin d,  "clobetasol, dexcom g7 , dexcom g7 sensor, ezetimibe, freestyle lite, furosemide, bd pen needle elsi u/f, insulin syringe 1cc/31gx5/16\", krill oil, levothyroxine, linzess, magnesium oxide, metformin, olmesartan, fish oil, pantoprazole, potassium chloride, rosuvastatin, saccharomyces boulardii, and tirzepatide.       Vital Signs: /80   Pulse 81   Ht 5' 2\" (1.575 m)   Wt 68 kg (150 lb)   LMP  (LMP Unknown)   SpO2 96%   BMI 27.44 kg/m²     Physical Exam:   Constitutional  General Appearance: No acute distress, well appearing and well nourished  Head  Normocephalic  Eyes  Conjunctivae and lids: No swelling, erythema, or discharge.    Pupils and irises: Equal, round and reactive to light.   Ears, Nose, Mouth, and Throat  External inspection of ears and nose: Normal  Nasal mucosa, septum and turbinates: Normal without edema or erythema/   Oropharynx: Normal with no erythema, edema, exudate or lesions.   Neck  Normal range of motion. Neck supple.   Cardiovascular  Auscultation of the heart: Normal rate and rhythm, normal S1 and S2 without murmurs.  Examination of the extremities for edema and/or varicosities: Normal  Pulmonary/Chest  Respiratory effort: No increased work of breathing or signs of respiratory distress.   Auscultation of lungs: Clear to auscultation, equal breath sounds bilaterally, no wheezes, rales, no rhonchi.   Abdomen  Abdomen: Non-tender, 1 cm mobile nontender round right upper quadrant mass on palpation in the erect position.  Not palpable supine.  Liver and spleen: No hepatomegaly or splenomegaly.   Musculoskeletal  Gait and station: normal.  Digits and Nails: normal without clubbing or cyanosis.  Inspection/palpation of joints, bones, and muscles: Normal  Neurological  No nystagmus or asterixis.   Skin  Skin and subcutaneous tissue: Normal without rashes or lesions.   Lymphatic  Palpation of the lymph nodes in neck: No lymphadenopathy.   Psychiatric  Orientation to person, place " and time: Normal.  Mood and affect: Normal.         Labs:   Lab Results   Component Value Date    ALT 19 04/05/2024    AST 19 04/05/2024    BUN 22 04/05/2024    CALCIUM 9.7 04/05/2024     04/05/2024    CHOL 129 11/04/2015    CO2 28 04/05/2024    CREATININE 0.85 04/05/2024    HDL 42 (L) 04/05/2024    HCT 41.3 04/05/2024    HGB 13.6 04/05/2024    HGBA1C 5.4 04/05/2024    MG 1.9 04/04/2019     04/05/2024    K 4.0 04/05/2024     11/04/2015    TRIG 123 04/05/2024    WBC 7.09 04/05/2024         X-Rays & Procedures:   US abdomen limited    (Results Pending)         ______________________________________________________________________      Assessment & Plan:      Diagnoses and all orders for this visit:    History of colon polyps  -     Colonoscopy; Future    Encounter for screening for malignant neoplasm of colon  -     Ambulatory Referral to Gastroenterology    Right upper quadrant abdominal mass  -     US abdomen limited; Future      It is very likely that the right upper quadrant abnormality is a palpable subcutaneous cyst or perhaps lipoma.  It is nontender and mobile.  However we will obtain an ultrasound to see if it can be seen.  She will schedule her follow-up colonoscopy for her history of polyps.  She will continue her other current medical management.  I will be happy to inform you of her results and further recommendations.  I like to thank you for allowing me to participate in her care.        I obtained informed consent from the patient. The risks/benefits/alternatives of the procedure were discussed with the patient. Risks included, but not limited to, infection, bleeding, perforation, injury to organs in the abdomen, missed lesion and incomplete procedure were discussed. Patient was agreeable and electronic signature was obtained.        With warmest regards,    Alejandro Cabezas MD, FACG

## 2024-05-24 ENCOUNTER — TELEPHONE (OUTPATIENT)
Age: 74
End: 2024-05-24

## 2024-05-24 NOTE — TELEPHONE ENCOUNTER
Caller: Cori Pleitez     Doctor: Amita    Reason for call: Pt wants appt in July before work begins in Sept.  She has cataract surger 7/1 & requests something after 7/15/24 if possible.  10/22/24 appt scheduled & on wait list.  Please let pt know.  Thank you.    Call back#: [phone]

## 2024-05-31 ENCOUNTER — HOSPITAL ENCOUNTER (OUTPATIENT)
Dept: ULTRASOUND IMAGING | Facility: CLINIC | Age: 74
Discharge: HOME | End: 2024-05-31
Payer: COMMERCIAL

## 2024-05-31 DIAGNOSIS — R19.01 RIGHT UPPER QUADRANT ABDOMINAL MASS: ICD-10-CM

## 2024-05-31 PROCEDURE — 76705 ECHO EXAM OF ABDOMEN: CPT

## 2024-06-03 ENCOUNTER — APPOINTMENT (OUTPATIENT)
Age: 74
End: 2024-06-03
Payer: COMMERCIAL

## 2024-06-03 ENCOUNTER — CONSULT (OUTPATIENT)
Age: 74
End: 2024-06-03
Payer: COMMERCIAL

## 2024-06-03 VITALS
HEIGHT: 62 IN | DIASTOLIC BLOOD PRESSURE: 60 MMHG | OXYGEN SATURATION: 98 % | HEART RATE: 75 BPM | SYSTOLIC BLOOD PRESSURE: 110 MMHG | TEMPERATURE: 97 F | BODY MASS INDEX: 26.94 KG/M2 | WEIGHT: 146.4 LBS | RESPIRATION RATE: 12 BRPM

## 2024-06-03 DIAGNOSIS — K21.9 GASTROESOPHAGEAL REFLUX DISEASE, UNSPECIFIED WHETHER ESOPHAGITIS PRESENT: ICD-10-CM

## 2024-06-03 DIAGNOSIS — C54.1 ENDOMETRIAL CANCER (HCC): ICD-10-CM

## 2024-06-03 DIAGNOSIS — J45.20 MILD INTERMITTENT ASTHMA WITHOUT COMPLICATION: ICD-10-CM

## 2024-06-03 DIAGNOSIS — Z01.818 PRE-OP EXAMINATION: ICD-10-CM

## 2024-06-03 DIAGNOSIS — E11.9 TYPE 2 DIABETES MELLITUS WITHOUT COMPLICATION, WITHOUT LONG-TERM CURRENT USE OF INSULIN (HCC): ICD-10-CM

## 2024-06-03 DIAGNOSIS — I25.84 CORONARY ATHEROSCLEROSIS DUE TO CALCIFIED CORONARY LESION: ICD-10-CM

## 2024-06-03 DIAGNOSIS — I10 BENIGN ESSENTIAL HYPERTENSION: ICD-10-CM

## 2024-06-03 DIAGNOSIS — G47.33 OBSTRUCTIVE SLEEP APNEA: ICD-10-CM

## 2024-06-03 DIAGNOSIS — E03.9 ACQUIRED HYPOTHYROIDISM: ICD-10-CM

## 2024-06-03 DIAGNOSIS — H25.9 AGE-RELATED CATARACT OF BOTH EYES, UNSPECIFIED AGE-RELATED CATARACT TYPE: Primary | ICD-10-CM

## 2024-06-03 DIAGNOSIS — I25.10 CORONARY ATHEROSCLEROSIS DUE TO CALCIFIED CORONARY LESION: ICD-10-CM

## 2024-06-03 PROCEDURE — 36415 COLL VENOUS BLD VENIPUNCTURE: CPT

## 2024-06-03 PROCEDURE — 99214 OFFICE O/P EST MOD 30 MIN: CPT

## 2024-06-03 PROCEDURE — 80053 COMPREHEN METABOLIC PANEL: CPT

## 2024-06-03 NOTE — ASSESSMENT & PLAN NOTE
Chest pain history. Some SOB - uncertain whether asthma or not. Cardiac cath and min disease in LAD. Echos and stress tests normal and another echo scheduled., see cardio every 6 months-1 year. No aspirin or plavix. Takes fish oil and d3.

## 2024-06-03 NOTE — PROGRESS NOTES
Presurgical Evaluation    Subjective:      Patient ID: Cori Pleitez is a 73 y.o. female.    Chief Complaint   Patient presents with   • Pre-op Exam       Cori is a 73-year-old female who presents for preoperative clearance for cataract surgery.  She has a history of some mild blockage of the LAD and she follows with cardiology every 6-12 months, but echoes and stress tests have been normal.  No current chest pain.  She is not on any blood thinners.      The following portions of the patient's history were reviewed and updated as appropriate: allergies, current medications, past family history, past medical history, past social history, past surgical history, and problem list.    Procedure date: June 17 and July 1    Surgeon:  Dr. Lovelace  Planned procedure: Cataract surgery  Diagnosis for procedure: Bilateral cataracts    Prior anesthesia: Yes   General; Complications:  None / Tolerated well  FH of hyperthermic response, but none personally.     CAD History: CAD   NOTE: Patient should see Cardiology if time available before surgery, and if appropriate.    Pulmonary History: Asthma    Renal history: None    Diabetes History:  Type 2  Controlled     Neurological History: None     On Immunosuppressant meds/biologics: No      Review of Systems   Constitutional:  Negative for chills and fever.   HENT:  Negative for congestion and sore throat.    Eyes:  Negative for pain and visual disturbance.   Respiratory:  Negative for cough and shortness of breath.    Cardiovascular:  Negative for chest pain and palpitations.   Gastrointestinal:  Negative for abdominal pain, constipation and diarrhea.   Genitourinary:  Negative for dysuria and hematuria.   Musculoskeletal:  Negative for arthralgias and myalgias.   Skin:  Negative for color change and rash.   Neurological:  Negative for dizziness and headaches.         Current Outpatient Medications   Medication Sig Dispense Refill   • albuterol (PROVENTIL HFA,VENTOLIN HFA) 90  "mcg/act inhaler Inhale 2 puffs every 6 (six) hours as needed for wheezing 18 g 2   • Calcium-Magnesium-Vitamin D (CALCIUM 1200+D3 PO)      • clobetasol (TEMOVATE) 0.05 % ointment Apply topically 2 (two) times a day (Patient taking differently: Apply topically 2 (two) times a day Taking once daily) 30 g 1   • Continuous Blood Gluc  (Dexcom G7 ) SMITH Use 1 each continuous 1 each 0   • Continuous Blood Gluc Sensor (Dexcom G7 Sensor) Use 1 Device every 10 days 9 each 1   • ezetimibe (ZETIA) 10 mg tablet Take 1 tablet (10 mg total) by mouth daily 90 tablet 0   • FREESTYLE LITE test strip USE 1 STRIP THREE TIMES A DAY AS INSTRUCTED 300 strip 3   • furosemide (LASIX) 20 mg tablet Take 1 tablet (20 mg total) by mouth daily 90 tablet 0   • Insulin Pen Needle (BD Pen Needle Dulce U/F) 32G X 4 MM MISC Use daily as needed with insulin pen 100 each 2   • Insulin Syringe-Needle U-100 (INSULIN SYRINGE 1CC/31GX5/16\") 31G X 5/16\" 1 ML MISC BD Veo Insulin Syringe Ultra-Fine 0.3 mL 31 gauge x 15/64\"     • KRILL OIL PO Take by mouth     • levothyroxine 75 mcg tablet Take 1 tablet (75 mcg total) by mouth daily 90 tablet 0   • linaCLOtide (Linzess) 145 MCG CAPS TAKE 1 CAPSULE EVERY MORNING 90 capsule 1   • Magnesium Oxide 400 MG CAPS Take by mouth     • metFORMIN (GLUCOPHAGE-XR) 500 mg 24 hr tablet Take 1 tab in am and 2 tabs in pm 270 tablet 1   • olmesartan (BENICAR) 20 mg tablet Take 1 tablet (20 mg total) by mouth daily 90 tablet 3   • Omega-3 Fatty Acids (FISH OIL) 1200 MG CAPS Take by mouth     • pantoprazole (PROTONIX) 40 mg tablet Take 1 tablet (40 mg total) by mouth daily 90 tablet 0   • potassium chloride (MICRO-K) 10 MEQ CR capsule Take 1 capsule (10 mEq total) by mouth daily 90 capsule 0   • rosuvastatin (CRESTOR) 20 MG tablet Take 1 tablet (20 mg total) by mouth daily 90 tablet 0   • saccharomyces boulardii (FLORASTOR) 250 mg capsule Take 250 mg by mouth daily       • tirzepatide 10 MG/0.5ML Inject 0.5 mL (10 " mg total) under the skin every 7 days 6 mL 1     No current facility-administered medications for this visit.       Allergies on file:   Amoxicillin-pot clavulanate, Erythromycin, Meperidine, Morphine, Penicillins, Relafen [nabumetone], Sulfa antibiotics, Darvon [propoxyphene], Methocarbamol, Reglan [metoclopramide], and Tetracyclines & related    Patient Active Problem List   Diagnosis   • Dysmetabolic syndrome X   • Benign essential hypertension   • Obesity (BMI 30-39.9)   • Lichen sclerosus   • History of endometrial cancer   • SOB (shortness of breath)   • Asymptomatic postmenopausal status   • At risk for sleep apnea   • Dyslipidemia   • Asthma   • GERD (gastroesophageal reflux disease)   • Acquired hypothyroidism   • Status post total right knee replacement   • Spondylosis of lumbosacral region   • Irritable bowel syndrome without diarrhea   • Coronary atherosclerosis due to calcified coronary lesion   • Chronic pain of both knees   • Obstructive sleep apnea   • Endometrial cancer (HCC)   • Type 2 diabetes mellitus without complication, without long-term current use of insulin (HCC)   • Cortical age-related cataract of both eyes   • Bilateral hip pain   • PND (post-nasal drip)   • Hearing loss of right ear   • Nasal septal perforation   • Low back pain   • Vitamin D deficiency   • Tibialis posterior tendinitis   • Osteoarthritis of ankle or foot        Past Medical History:   Diagnosis Date   • Abnormal mammogram    • Abnormal weight gain    • Achilles tendinitis    • Allergic 1952    since childhood   • Arthritis 1988   • Asthma    • Cancer (HCC) 2018    Endometrial adenoma   • Combined forms of age-related cataract of both eyes 08/18/2021    Added per ICD-10-CM coding guidelines - provider accepted   • Diabetes mellitus (HCC)    • Disc degeneration, lumbar    • Disease of thyroid gland     hypo   • Dyslipidemia    • Dyslipidemia, goal LDL below 70 03/27/2018   • Early satiety    • Edema     idiopathic  peripheral   • Encounter for follow-up examination after completed treatment for malignant neoplasm 2019   • Encounter for gynecological examination without abnormal finding 2020   • Endometrial cancer (HCC) 2018   • Enthesopathy     hip region   • Esophagitis, reflux    • GERD (gastroesophageal reflux disease)    • Hard to intubate     difficulty with intubation and had to be intubated twice   • Heart murmur    • HL (hearing loss)    • Hypercholesterolemia    • Hypertension    • IBS (irritable bowel syndrome)    • Irritable bowel syndrome    • Morbid (severe) obesity due to excess calories ([facility]) 2022    As per CMS ICD10 guidelines:Provider accepted   • Obesity    • Obesity, Class I, BMI 30-34.9 2015   • Osteoarthritis    • Pneumonia    • Rosacea    • Sacroiliitis ([facility])    • Shingles    • Sleep apnea, obstructive    • Varicella        Past Surgical History:   Procedure Laterality Date   •  SECTION      x2   • COLONOSCOPY     • ELBOW SURGERY      left ulna/radius   • ESOPHAGOGASTRODUODENOSCOPY     • FOOT SURGERY Right    • FRACTURE SURGERY     • FRACTURE SURGERY Left     tibia   • HYSTERECTOMY Bilateral 2018   • JOINT REPLACEMENT Left     shoulder,  right knee   • KNEE ARTHROSCOPY     • KNEE ARTHROSCOPY W/ MENISCAL REPAIR Right    • GA LAPS TOTAL HYSTERECT 250 GM/< W/RMVL TUBE/OVARY N/A 2018    Procedure: ROBOTIC TOTAL LAPAROSCOPIC HYSTERECTOMY, BILATERAL SALPINGOOPHORECTOMY, BILATERAL PELVIC LYMPHNODE DISSECTION;  Surgeon: Андрей Meng MD;  Location: [facility];  Service: Gynecology Oncology   • REPLACEMENT TOTAL KNEE Right    • SHOULDER ARTHROPLASTY     • SINUS SURGERY     • TONSILLECTOMY     • TUBAL LIGATION     • WRIST SURGERY      ganglonic cyst       Family History   Problem Relation Age of Onset   • Arthritis Mother    • Heart disease Mother         cardiac disorder   • Diabetes Mother    • Hiatal hernia Mother    • Hypertension Mother    • Irritable  bowel syndrome Mother    • Breast cancer Mother 66        x2   • Uterine cancer Mother    • Osteoporosis Mother    • Thyroid disease Mother    • Other Mother         gastric reflux   • Stroke Mother    • Hyperlipidemia Mother    • Cancer Mother         ENDOMETRIAL   • Diabetes type II Mother    • Thyroid disease unspecified Mother    • Hypothyroidism Mother    • Heart disease Father         cardiac disorder   • Hiatal hernia Father    • Ulcers Father    • Other Father         gastric reflux   • Coronary artery disease Father    • Hearing loss Father    • Heart attack Father    • Heart failure Father    • Hiatal hernia Sister    • Thyroid disease Sister    • Other Sister         gastric reflux   • Lung cancer Sister 77   • Cancer Sister         lung adenocarcinoma, mets to meninges   • Thyroid disease unspecified Sister    • Hypothyroidism Sister    • Hyperlipidemia Sister    • Arthritis Sister    • Irritable bowel syndrome Daughter    • Hyperlipidemia Daughter    • Brain cancer Maternal Grandmother    • Breast cancer Maternal Grandmother         [place]   • No Known Problems Maternal Grandfather    • No Known Problems Paternal Grandmother    • Hearing loss Paternal Grandfather    • No Known Problems Brother    • Malig Hypertension Son    • Hiatal hernia Child    • Other Child         gastric reflux   • Breast cancer Maternal Aunt 65   • Uterine cancer Maternal Aunt         x3 sis   • Stroke Maternal Aunt    • Cancer Maternal Aunt    • Hyperlipidemia Maternal Aunt    • Breast cancer Maternal Aunt 55   • Cancer Maternal Aunt    • Hyperlipidemia Maternal Aunt    • Breast cancer Maternal Aunt 55   • Cancer Maternal Aunt    • Hyperlipidemia Maternal Aunt    • No Known Problems Paternal Aunt    • Hyperlipidemia Maternal Aunt    • Hyperlipidemia Maternal Aunt    • Colon cancer Neg Hx    • Ovarian cancer Neg Hx    • Cervical cancer Neg Hx        Social History     Tobacco Use   • Smoking status: Never     Passive exposure:  "Never   • Smokeless tobacco: Never   Vaping Use   • Vaping status: Never Used   Substance Use Topics   • Alcohol use: Yes     Alcohol/week: 4.0 standard drinks of alcohol     Types: 2 Glasses of wine, 2 Standard drinks or equivalent per week   • Drug use: No       Objective:    Vitals:    06/03/24 1450   BP: 110/60   BP Location: Right arm   Patient Position: Sitting   Cuff Size: Standard   Pulse: 75   Resp: 12   Temp: (!) 97 °F (36.1 °C)   TempSrc: Tympanic   SpO2: 98%   Weight: 66.4 kg (146 lb 6.4 oz)   Height: 5' 2\" (1.575 m)        Physical Exam  Constitutional:       Appearance: Normal appearance.   HENT:      Head: Normocephalic and atraumatic.      Right Ear: Tympanic membrane normal.      Left Ear: Tympanic membrane normal.      Nose: Nose normal.      Mouth/Throat:      Mouth: Mucous membranes are moist.      Pharynx: Oropharynx is clear. No oropharyngeal exudate.   Eyes:      Extraocular Movements: Extraocular movements intact.      Conjunctiva/sclera: Conjunctivae normal.   Cardiovascular:      Rate and Rhythm: Normal rate and regular rhythm.      Pulses: Normal pulses.      Heart sounds: Normal heart sounds. No murmur heard.  Pulmonary:      Effort: Pulmonary effort is normal.      Breath sounds: Normal breath sounds. No wheezing, rhonchi or rales.   Abdominal:      Palpations: Abdomen is soft.   Musculoskeletal:         General: Normal range of motion.      Cervical back: Normal range of motion.   Skin:     General: Skin is warm and dry.   Neurological:      General: No focal deficit present.      Mental Status: She is alert. Mental status is at baseline.   Psychiatric:         Mood and Affect: Mood normal.         Behavior: Behavior normal.           Preop labs/testing available and reviewed: yes    eGFR   Date Value Ref Range Status   06/03/2024 69 ml/min/1.73sq m Final             EKG yes    Echo no    Stress test/cath no    PFT/Tommie no    Functional capacity: Climb stairs                        4 " Mets   Pick the highest level patient can comfortably perform   4 mets or greater for surgery    RCRI  High Risk surgery?         1 Point  CAD History:         1 Point   MI; Positive Stress Test; CP due to Mi;  Nitrate Usage to control Angina; Pathologic Q wave on EKG  CHF Active:         1 Point   Pulm Edema; Paroxysmal Nocturnal Dyspnea;  Bibasilar Rales (crackles);S3; CHF on CXR  Cerebrovascular Disease (TIA or CVA):     1 Point  DM on Insulin:        1 Point  Serum Creat >2.0 mg/dl:       1 Point          Total Points: 0     Scorin: Class I, Very Low Risk (0.4%)     1: Class II, Low risk (0.9%)     2: Class III Moderate (6.6%)     3: Class IV High (>11%)      LAVERNE Risk:  GFR:   eGFR   Date Value Ref Range Status   2024 69 ml/min/1.73sq m Final         For PCP:  If GFR>60, Hold ACE/ARB/Diuretic on the day of surgery, and NSAIDS 10 days before.    If GFR<45, Consider PRE and POST op Nephrology Consult.    If 46 <GFR> 59 : Has Patient had LAVERNE in last 6 Months? no   If YES: Preop Nephrology consult   If No:  Post Op Nephrology consult.           Assessment/Plan:    Patient is medically optimized (cleared) for the planned procedure.    Further testing/evaluation is not required.    Postop concerns: no    Problem List Items Addressed This Visit       Benign essential hypertension     Hold Lasix and olmesartan day of surgery.         Relevant Orders    Comprehensive metabolic panel    Asthma     Stable.  Continue Dulera daily and albuterol as needed for shortness of breath.         GERD (gastroesophageal reflux disease)     Continue pantoprazole         Acquired hypothyroidism     Continue levothyroxine         Coronary atherosclerosis due to calcified coronary lesion     Continue routine follow-up with cardiology         Relevant Orders    POCT ECG    Obstructive sleep apnea     Not currently on CPAP.  Patient states that her symptoms have improved since losing weight.         Endometrial cancer (HCC)      "Resolved.  Patient s/p hysterectomy.  Currently cancer free.         Type 2 diabetes mellitus without complication, without long-term current use of insulin (HCC)       Lab Results   Component Value Date    HGBA1C 5.4 04/05/2024   Well-controlled.  Hold metformin the day of surgery unless instructed otherwise surgical team.          Relevant Orders    Comprehensive metabolic panel     Other Visit Diagnoses       Age-related cataract of both eyes, unspecified age-related cataract type    -  Primary  Follow any specific preoperative instructions as provided by her surgical team.    Pre-op examination        Relevant Orders    POCT ECG             Diagnoses and all orders for this visit:    Age-related cataract of both eyes, unspecified age-related cataract type    Pre-op examination  -     POCT ECG    Benign essential hypertension  -     Comprehensive metabolic panel; Future    Obstructive sleep apnea    Coronary atherosclerosis due to calcified coronary lesion  -     POCT ECG    Mild intermittent asthma without complication    Gastroesophageal reflux disease, unspecified whether esophagitis present    Acquired hypothyroidism    Type 2 diabetes mellitus without complication, without long-term current use of insulin (HCC)  -     Comprehensive metabolic panel; Future    Endometrial cancer (HCC)          No outpatient medications have been marked as taking for the 6/3/24 encounter (Appointment) with Darline Rizvi PA-C.        NOTE: Please use the above to review important meds for your specialty, the remainder \"per anesthesia Guidelines.\"    NOTE: Please place an Inbasket message for \"SOC\" pool for complicated patients.      "

## 2024-06-03 NOTE — ASSESSMENT & PLAN NOTE
Lab Results   Component Value Date    HGBA1C 5.4 04/05/2024   Well-controlled.  Hold metformin the day of surgery unless instructed otherwise surgical team.

## 2024-06-04 ENCOUNTER — TELEPHONE (OUTPATIENT)
Age: 74
End: 2024-06-04

## 2024-06-04 LAB
ALBUMIN SERPL BCP-MCNC: 4.6 G/DL (ref 3.5–5)
ALP SERPL-CCNC: 50 U/L (ref 34–104)
ALT SERPL W P-5'-P-CCNC: 17 U/L (ref 7–52)
ANION GAP SERPL CALCULATED.3IONS-SCNC: 11 MMOL/L (ref 4–13)
AST SERPL W P-5'-P-CCNC: 19 U/L (ref 13–39)
BILIRUB SERPL-MCNC: 0.39 MG/DL (ref 0.2–1)
BUN SERPL-MCNC: 19 MG/DL (ref 5–25)
CALCIUM SERPL-MCNC: 9.7 MG/DL (ref 8.4–10.2)
CHLORIDE SERPL-SCNC: 104 MMOL/L (ref 96–108)
CO2 SERPL-SCNC: 27 MMOL/L (ref 21–32)
CREAT SERPL-MCNC: 0.83 MG/DL (ref 0.6–1.3)
GFR SERPL CREATININE-BSD FRML MDRD: 69 ML/MIN/1.73SQ M
GLUCOSE P FAST SERPL-MCNC: 80 MG/DL (ref 65–99)
POTASSIUM SERPL-SCNC: 4 MMOL/L (ref 3.5–5.3)
PROT SERPL-MCNC: 7.3 G/DL (ref 6.4–8.4)
SODIUM SERPL-SCNC: 142 MMOL/L (ref 135–147)

## 2024-06-04 PROCEDURE — 93000 ELECTROCARDIOGRAM COMPLETE: CPT

## 2024-06-04 NOTE — TELEPHONE ENCOUNTER
----- Message from Darline Rizvi PA-C sent at 6/4/2024  3:47 PM EDT -----  Blood work is all within normal range.  Her preoperative note was completed and sent to be faxed to [facility].

## 2024-06-08 DIAGNOSIS — L90.0 LICHEN SCLEROSUS: ICD-10-CM

## 2024-06-08 RX ORDER — CLOBETASOL PROPIONATE 0.5 MG/G
OINTMENT TOPICAL 2 TIMES DAILY
Qty: 30 G | Refills: 5 | Status: SHIPPED | OUTPATIENT
Start: 2024-06-08

## 2024-06-10 ENCOUNTER — HOSPITAL ENCOUNTER (OUTPATIENT)
Dept: NON INVASIVE DIAGNOSTICS | Facility: CLINIC | Age: 74
Discharge: HOME | End: 2024-06-10
Payer: COMMERCIAL

## 2024-06-10 VITALS
BODY MASS INDEX: 26.78 KG/M2 | HEART RATE: 82 BPM | DIASTOLIC BLOOD PRESSURE: 60 MMHG | WEIGHT: 146.4 LBS | SYSTOLIC BLOOD PRESSURE: 110 MMHG

## 2024-06-10 DIAGNOSIS — R06.02 SOB (SHORTNESS OF BREATH): ICD-10-CM

## 2024-06-10 DIAGNOSIS — I10 BENIGN ESSENTIAL HYPERTENSION: ICD-10-CM

## 2024-06-10 LAB
AORTIC ROOT: 3.2 CM
AORTIC VALVE MEAN VELOCITY: 11 M/S
APICAL FOUR CHAMBER EJECTION FRACTION: 53 %
AV AREA BY CONTINUOUS VTI: 2 CM2
AV AREA PEAK VELOCITY: 2 CM2
AV LVOT MEAN GRADIENT: 2 MMHG
AV LVOT PEAK GRADIENT: 5 MMHG
AV MEAN GRADIENT: 6 MMHG
AV PEAK GRADIENT: 11 MMHG
AV VALVE AREA: 1.98 CM2
AV VELOCITY RATIO: 0.7
DOP CALC AO PEAK VEL: 1.66 M/S
DOP CALC AO VTI: 33.71 CM
DOP CALC LVOT AREA: 2.83 CM2
DOP CALC LVOT CARDIAC INDEX: 2.93 L/MIN/M2
DOP CALC LVOT CARDIAC OUTPUT: 4.88 L/MIN
DOP CALC LVOT DIAMETER: 1.9 CM
DOP CALC LVOT PEAK VEL VTI: 23.56 CM
DOP CALC LVOT PEAK VEL: 1.17 M/S
DOP CALC LVOT STROKE INDEX: 40.7 ML/M2
DOP CALC LVOT STROKE VOLUME: 66.77
E WAVE DECELERATION TIME: 406 MS
E/A RATIO: 0.65
FRACTIONAL SHORTENING: 30 (ref 28–44)
INTERVENTRICULAR SEPTUM IN DIASTOLE (PARASTERNAL SHORT AXIS VIEW): 1.2 CM
INTERVENTRICULAR SEPTUM: 1.2 CM (ref 0.6–1.1)
LAAS-AP2: 14.6 CM2
LAAS-AP4: 10.7 CM2
LEFT ATRIUM AREA SYSTOLE SINGLE PLANE A4C: 11.6 CM2
LEFT ATRIUM SIZE: 3.7 CM
LEFT ATRIUM VOLUME (MOD BIPLANE): 28 ML
LEFT ATRIUM VOLUME INDEX (MOD BIPLANE): 16.8 ML/M2
LEFT INTERNAL DIMENSION IN SYSTOLE: 3 CM (ref 2.1–4)
LEFT VENTRICULAR INTERNAL DIMENSION IN DIASTOLE: 4.3 CM (ref 3.5–6)
LEFT VENTRICULAR POSTERIOR WALL IN END DIASTOLE: 0.8 CM
LEFT VENTRICULAR STROKE VOLUME: 50 ML
LVSV (TEICH): 50 ML
MV E'TISSUE VEL-SEP: 7 CM/S
MV PEAK A VEL: 1.62 M/S
MV PEAK E VEL: 105 CM/S
MV STENOSIS PRESSURE HALF TIME: 118 MS
MV VALVE AREA P 1/2 METHOD: 1.86
PV PEAK GRADIENT: 9 MMHG
RIGHT ATRIUM AREA SYSTOLE A4C: 9.3 CM2
RIGHT VENTRICLE ID DIMENSION: 2.1 CM
SL CV LEFT ATRIUM LENGTH A2C: 5.1 CM
SL CV LV EF: 60
SL CV PED ECHO LEFT VENTRICLE DIASTOLIC VOLUME (MOD BIPLANE) 2D: 85 ML
SL CV PED ECHO LEFT VENTRICLE SYSTOLIC VOLUME (MOD BIPLANE) 2D: 36 ML
TR MAX PG: 26 MMHG
TR PEAK VELOCITY: 2.6 M/S
TRICUSPID ANNULAR PLANE SYSTOLIC EXCURSION: 1.9 CM
TRICUSPID VALVE PEAK REGURGITATION VELOCITY: 2.57 M/S

## 2024-06-10 PROCEDURE — 93306 TTE W/DOPPLER COMPLETE: CPT | Performed by: INTERNAL MEDICINE

## 2024-06-10 PROCEDURE — 93306 TTE W/DOPPLER COMPLETE: CPT

## 2024-06-11 ENCOUNTER — TELEPHONE (OUTPATIENT)
Dept: CARDIOLOGY CLINIC | Facility: CLINIC | Age: 74
End: 2024-06-11

## 2024-06-11 NOTE — TELEPHONE ENCOUNTER
----- Message from Tanisha Levi MD sent at 6/10/2024  8:07 PM EDT -----  Please call the patient.  Echocardiogram shows normal ejection fraction with mild mitral stenosis and regurgitation.  This has been noted in the past and no major change noted.

## 2024-06-13 ENCOUNTER — TELEPHONE (OUTPATIENT)
Dept: OBGYN CLINIC | Facility: MEDICAL CENTER | Age: 74
End: 2024-06-13

## 2024-06-13 NOTE — TELEPHONE ENCOUNTER
[facility] would like a call back to clarify clobetasol (TEMOVATE) 0.05 % ointment    at [phone] Ref # [id number]

## 2024-06-28 ENCOUNTER — HOSPITAL ENCOUNTER (OUTPATIENT)
Age: 74
Discharge: HOME | End: 2024-06-28
Payer: COMMERCIAL

## 2024-06-28 VITALS — HEIGHT: 62 IN | WEIGHT: 140 LBS | BODY MASS INDEX: 25.76 KG/M2

## 2024-06-28 DIAGNOSIS — Z01.419 ENCNTR FOR GYN EXAM (GENERAL) (ROUTINE) W/O ABN FINDINGS: ICD-10-CM

## 2024-06-28 PROCEDURE — 77063 BREAST TOMOSYNTHESIS BI: CPT

## 2024-06-28 PROCEDURE — 77067 SCR MAMMO BI INCL CAD: CPT

## 2024-07-08 DIAGNOSIS — J45.20 MILD INTERMITTENT ASTHMA WITHOUT COMPLICATION: Primary | ICD-10-CM

## 2024-07-08 RX ORDER — MOMETASONE FUROATE AND FORMOTEROL FUMARATE DIHYDRATE 50; 5 UG/1; UG/1
2 AEROSOL RESPIRATORY (INHALATION) 2 TIMES DAILY
Qty: 130 G | Refills: 3 | Status: SHIPPED | OUTPATIENT
Start: 2024-07-08

## 2024-07-12 DIAGNOSIS — E03.9 ACQUIRED HYPOTHYROIDISM: ICD-10-CM

## 2024-07-12 DIAGNOSIS — I10 ESSENTIAL HYPERTENSION: ICD-10-CM

## 2024-07-12 DIAGNOSIS — E78.2 MIXED HYPERLIPIDEMIA: ICD-10-CM

## 2024-07-12 DIAGNOSIS — M25.50 ARTHRALGIA, UNSPECIFIED JOINT: ICD-10-CM

## 2024-07-12 RX ORDER — ROSUVASTATIN CALCIUM 20 MG/1
20 TABLET, COATED ORAL DAILY
Qty: 90 TABLET | Refills: 3 | Status: SHIPPED | OUTPATIENT
Start: 2024-07-12

## 2024-07-12 RX ORDER — PANTOPRAZOLE SODIUM 40 MG/1
40 TABLET, DELAYED RELEASE ORAL DAILY
Qty: 90 TABLET | Refills: 3 | Status: SHIPPED | OUTPATIENT
Start: 2024-07-12

## 2024-07-12 RX ORDER — EZETIMIBE 10 MG/1
10 TABLET ORAL DAILY
Qty: 90 TABLET | Refills: 3 | Status: SHIPPED | OUTPATIENT
Start: 2024-07-12

## 2024-07-12 RX ORDER — LEVOTHYROXINE SODIUM 0.07 MG/1
75 TABLET ORAL DAILY
Qty: 90 TABLET | Refills: 3 | Status: SHIPPED | OUTPATIENT
Start: 2024-07-12

## 2024-07-12 RX ORDER — POTASSIUM CHLORIDE 750 MG/1
10 CAPSULE, EXTENDED RELEASE ORAL DAILY
Qty: 90 CAPSULE | Refills: 3 | Status: SHIPPED | OUTPATIENT
Start: 2024-07-12

## 2024-07-12 RX ORDER — FUROSEMIDE 20 MG/1
20 TABLET ORAL DAILY
Qty: 90 TABLET | Refills: 3 | Status: SHIPPED | OUTPATIENT
Start: 2024-07-12

## 2024-07-24 ENCOUNTER — OFFICE VISIT (OUTPATIENT)
Age: 74
End: 2024-07-24
Payer: COMMERCIAL

## 2024-07-24 VITALS
WEIGHT: 136 LBS | BODY MASS INDEX: 25.03 KG/M2 | DIASTOLIC BLOOD PRESSURE: 60 MMHG | TEMPERATURE: 97.7 F | HEIGHT: 62 IN | HEART RATE: 94 BPM | OXYGEN SATURATION: 98 % | SYSTOLIC BLOOD PRESSURE: 104 MMHG

## 2024-07-24 DIAGNOSIS — J45.20 MILD INTERMITTENT ASTHMA WITHOUT COMPLICATION: ICD-10-CM

## 2024-07-24 PROCEDURE — 99214 OFFICE O/P EST MOD 30 MIN: CPT | Performed by: INTERNAL MEDICINE

## 2024-07-24 RX ORDER — MOMETASONE FUROATE AND FORMOTEROL FUMARATE DIHYDRATE 50; 5 UG/1; UG/1
2 AEROSOL RESPIRATORY (INHALATION) 2 TIMES DAILY
Qty: 390 G | Refills: 1 | Status: SHIPPED | OUTPATIENT
Start: 2024-07-24 | End: 2025-01-20

## 2024-07-24 NOTE — PROGRESS NOTES
Follow Up - Pulmonary Medicine   Cori Pleitez 74 y.o. female MRN: [id number]    Reason for Consult: SOB    Cori Pleitez is a 74 y.o. female hx GERD, DM2, JUICE (not on therapy), ?Asthma, endometrial ca sp hysterectomy, lung nodule who presents for follow up.    Dyspnea, improved  Lung Nodule  Asthma, mild persistent, well controlled  Chronic cough, resolved  Resolved between  2015 and 2021 CT: 11mm left lung nodule when last seen in  normal PFTs in 2018. Imaging with minimal biapical scarring  Repeat Pfts overall normal except evidence of dysanapsis vs mild obstruction (Normal FEV1/FVC but impaired ratio which resolved with bronchodilator). Highest eos 430, so given trial of Dulera low dose and has been feeling much better  Has not had to use albuterol, symptom now well controleld and no dyspnea, good exercise tolerance  TTE with diastolic dysfunction, no PH    - continue low dose ICS/LABA - Dulera  - albuterol prn  - saw ENT for hoarseness, though to be due to silent reflux  - continue PPI  - continue nasal washing, had bleeding with nasal steroids    Obesity - resolved  JUICE  Mild JUICE years ago with AHI 9, did not tolerate CPAP (side and belly sleeper)  Overall feels well rested. Also lost weight so likely treated with weight loss    Follow up 6 months    Vaccines  - up to date    Immunization History   Administered Date(s) Administered    COVID-19 MODERNA VACC 0.5 ML IM 01/05/2021, 02/04/2021, 10/25/2021, 09/06/2022    COVID-19 Moderna Vac BIVALENT 12 Yr+ IM 0.5 ML 09/06/2022    COVID-19 Moderna mRNA Vaccine 12 Yr+ 50 mcg/0.5 mL (Spikevax) 10/01/2023    H1N1, All Formulations 11/05/2009    INFLUENZA 09/30/2015, 10/20/2017, 09/30/2020, 09/26/2021, 09/06/2022, 09/13/2023    Influenza Split High Dose Preservative Free IM 10/06/2014, 09/30/2015, 10/20/2017    Influenza, Seasonal Vaccine, Quadrivalent, Adjuvanted, .5e 09/13/2023    Influenza, high dose seasonal 0.7 mL 10/09/2018, 09/30/2020     Pneumococcal Conjugate 13-Valent 09/30/2015    Pneumococcal Polysaccharide PPV23 11/17/2008, 10/06/2014, 03/23/2017    Respiratory Syncytial Virus Vaccine (Recombinant, Adjuvanted) 11/11/2023    Tdap 10/09/2018, 10/12/2018, 09/30/2020    Tuberculin Skin Test-PPD Intradermal 07/03/2019    Zoster 10/25/2017    Zoster Vaccine Recombinant 11/28/2023, 04/25/2024    influenza, trivalent, adjuvanted 09/28/2019      ____________________________________________    Interval Hx:    Feels better with Dulera inhaler, significantly less cough and has been able to breathe better  Has been exercising more and hiking without issues  No exacerbations  Rare albuterol use    HPI:    Cori Pleitez is a 74 y.o. female hx GERD, DM2, JUICE (not on therapy), ?Asthma, endometrial ca, lung nodule who presents for follow up.    Last seen in 2022 in pulm. Normal PFT in 2018  Has been seeing endo, given new medicine and lost 49 lbs    Does have a chronic cough does have chronic sinus issues. Notes to have vocal cord edema, thought to be silent reflux, on PPI      Occupational/Exposure history:  RN - works at school nurse in [place]    Review of Systems:  Review of Systems   Constitutional:  Negative for appetite change and fever.   HENT:  Positive for postnasal drip and sneezing. Negative for ear pain, rhinorrhea, sore throat and trouble swallowing.    Cardiovascular:  Negative for chest pain.   Musculoskeletal:  Positive for myalgias.   Neurological:  Negative for headaches.     Aside from what is mentioned in the HPI, the review of systems otherwise negative.    Current Medications:    Current Outpatient Medications:     albuterol (PROVENTIL HFA,VENTOLIN HFA) 90 mcg/act inhaler, Inhale 2 puffs every 6 (six) hours as needed for wheezing, Disp: 18 g, Rfl: 2    Calcium-Magnesium-Vitamin D (CALCIUM 1200+D3 PO), , Disp: , Rfl:     clobetasol (TEMOVATE) 0.05 % ointment, Apply topically 2 (two) times a day Taking once daily, Disp: 30 g, Rfl:  "5    Continuous Blood Gluc  (Dexcom G7 ) SMITH, Use 1 each continuous, Disp: 1 each, Rfl: 0    Continuous Blood Gluc Sensor (Dexcom G7 Sensor), Use 1 Device every 10 days, Disp: 9 each, Rfl: 1    ezetimibe (ZETIA) 10 mg tablet, TAKE 1 TABLET DAILY, Disp: 90 tablet, Rfl: 3    FREESTYLE LITE test strip, USE 1 STRIP THREE TIMES A DAY AS INSTRUCTED, Disp: 300 strip, Rfl: 3    furosemide (LASIX) 20 mg tablet, TAKE 1 TABLET DAILY, Disp: 90 tablet, Rfl: 3    Insulin Pen Needle (BD Pen Needle Dulce U/F) 32G X 4 MM MISC, Use daily as needed with insulin pen, Disp: 100 each, Rfl: 2    Insulin Syringe-Needle U-100 (INSULIN SYRINGE 1CC/31GX5/16\") 31G X 5/16\" 1 ML MISC, BD Veo Insulin Syringe Ultra-Fine 0.3 mL 31 gauge x 15/64\", Disp: , Rfl:     KRILL OIL PO, Take by mouth, Disp: , Rfl:     levothyroxine 75 mcg tablet, TAKE 1 TABLET DAILY, Disp: 90 tablet, Rfl: 3    linaCLOtide (Linzess) 145 MCG CAPS, TAKE 1 CAPSULE EVERY MORNING, Disp: 90 capsule, Rfl: 1    Magnesium Oxide 400 MG CAPS, Take by mouth, Disp: , Rfl:     metFORMIN (GLUCOPHAGE-XR) 500 mg 24 hr tablet, Take 1 tab in am and 2 tabs in pm, Disp: 270 tablet, Rfl: 1    Mometasone Furo-Formoterol Fum (Dulera) 50-5 MCG/ACT AERO, Inhale 2 puffs 2 (two) times a day Rinse mouth after use., Disp: 130 g, Rfl: 3    olmesartan (BENICAR) 20 mg tablet, Take 1 tablet (20 mg total) by mouth daily, Disp: 90 tablet, Rfl: 3    Omega-3 Fatty Acids (FISH OIL) 1200 MG CAPS, Take by mouth, Disp: , Rfl:     pantoprazole (PROTONIX) 40 mg tablet, TAKE 1 TABLET DAILY, Disp: 90 tablet, Rfl: 3    potassium chloride (MICRO-K) 10 MEQ CR capsule, TAKE 1 CAPSULE DAILY, Disp: 90 capsule, Rfl: 3    rosuvastatin (CRESTOR) 20 MG tablet, TAKE 1 TABLET DAILY, Disp: 90 tablet, Rfl: 3    saccharomyces boulardii (FLORASTOR) 250 mg capsule, Take 250 mg by mouth daily  , Disp: , Rfl:     tirzepatide 10 MG/0.5ML, Inject 0.5 mL (10 mg total) under the skin every 7 days, Disp: 6 mL, Rfl: " 1    Historical Information   Past Medical History:   Diagnosis Date    Abnormal mammogram     Abnormal weight gain     Achilles tendinitis     Allergic 1952    since childhood    Arthritis     Asthma     Cancer (HCC) 2018    Endometrial adenoma    Combined forms of age-related cataract of both eyes 2021    Added per ICD-10-CM coding guidelines - provider accepted    Diabetes mellitus (HCC)     Disc degeneration, lumbar     Disease of thyroid gland     hypo    Dyslipidemia     Dyslipidemia, goal LDL below 70 2018    Early satiety     Edema     idiopathic peripheral    Encounter for follow-up examination after completed treatment for malignant neoplasm 2019    Encounter for gynecological examination without abnormal finding 2020    Endometrial cancer (HCC) 2018    Enthesopathy     hip region    Esophagitis, reflux     GERD (gastroesophageal reflux disease)     Hard to intubate     difficulty with intubation and had to be intubated twice    Heart murmur     HL (hearing loss)     Hypercholesterolemia     Hypertension     IBS (irritable bowel syndrome)     Irritable bowel syndrome     Morbid (severe) obesity due to excess calories (HCC) 2022    As per CMS ICD10 guidelines:Provider accepted    Obesity 1998    Obesity, Class I, BMI 30-34.9 2015    Osteoarthritis     Pneumonia     Rosacea     Sacroiliitis (HCC)     Shingles     Sleep apnea, obstructive     Varicella      Past Surgical History:   Procedure Laterality Date     SECTION      x2    COLONOSCOPY      ELBOW SURGERY      left ulna/radius    ESOPHAGOGASTRODUODENOSCOPY      FOOT SURGERY Right     FRACTURE SURGERY      FRACTURE SURGERY Left     tibia    HYSTERECTOMY Bilateral 2018    JOINT REPLACEMENT Left     shoulder,  right knee    KNEE ARTHROSCOPY      KNEE ARTHROSCOPY W/ MENISCAL REPAIR Right     MI LAPS TOTAL HYSTERECT 250 GM/< W/RMVL TUBE/OVARY N/A 2018    Procedure: ROBOTIC TOTAL LAPAROSCOPIC  HYSTERECTOMY, BILATERAL SALPINGOOPHORECTOMY, BILATERAL PELVIC LYMPHNODE DISSECTION;  Surgeon: Андрей Meng MD;  Location: [facility];  Service: Gynecology Oncology    REPLACEMENT TOTAL KNEE Right     SHOULDER ARTHROPLASTY      SINUS SURGERY      TONSILLECTOMY      TUBAL LIGATION      WRIST SURGERY      ganglonic cyst     Social History   Social History     Tobacco Use   Smoking Status Never    Passive exposure: Never   Smokeless Tobacco Never       Family History:   Family History   Problem Relation Age of Onset    Arthritis Mother     Heart disease Mother         cardiac disorder    Diabetes Mother     Hiatal hernia Mother     Hypertension Mother     Irritable bowel syndrome Mother     Breast cancer Mother 66        x2    Uterine cancer Mother     Osteoporosis Mother     Thyroid disease Mother     Other Mother         gastric reflux    Stroke Mother     Hyperlipidemia Mother     Cancer Mother         ENDOMETRIAL    Diabetes type II Mother     Thyroid disease unspecified Mother     Hypothyroidism Mother     Heart disease Father         cardiac disorder    Hiatal hernia Father     Ulcers Father     Other Father         gastric reflux    Coronary artery disease Father     Hearing loss Father     Heart attack Father     Heart failure Father     Hiatal hernia Sister     Thyroid disease Sister     Other Sister         gastric reflux    Lung cancer Sister 77    Cancer Sister         lung adenocarcinoma, mets to meninges    Thyroid disease unspecified Sister     Hypothyroidism Sister     Hyperlipidemia Sister     Arthritis Sister     Irritable bowel syndrome Daughter     Hyperlipidemia Daughter     Brain cancer Maternal Grandmother     Breast cancer Maternal Grandmother         uknown    No Known Problems Maternal Grandfather     No Known Problems Paternal Grandmother     Hearing loss Paternal Grandfather     No Known Problems Brother     Malig Hypertension Son     Hiatal hernia Child     Other Child         gastric  "reflux    Breast cancer Maternal Aunt 65    Uterine cancer Maternal Aunt         x3 sis    Stroke Maternal Aunt     Cancer Maternal Aunt     Hyperlipidemia Maternal Aunt     Breast cancer Maternal Aunt 55    Cancer Maternal Aunt     Hyperlipidemia Maternal Aunt     Breast cancer Maternal Aunt 55    Cancer Maternal Aunt     Hyperlipidemia Maternal Aunt     No Known Problems Paternal Aunt     Colon cancer Neg Hx     Ovarian cancer Neg Hx     Cervical cancer Neg Hx          PhysicalExamination:  Vitals:   /60   Pulse 94   Temp 97.7 °F (36.5 °C)   Ht 5' 2\" (1.575 m)   Wt 61.7 kg (136 lb)   LMP  (LMP Unknown)   SpO2 98%   BMI 24.87 kg/m²     Appearance -- NAD, speaking full sentences  HEENT -- anicteric sclera, clear OP, MMM  Neck -- no JVD  Heart -- RRR, no murmurs  Lungs -- CTAB  Abdomen -- soft, NTND, +bs  Extremities -- WWP, no LE edema  Skin -- no rash  Neuro -- A&Ox3, wnl  Psych -- no obvious depression or hallucination        Diagnostic Data:  Labs:  I personally reviewed the most recent laboratory data pertinent to today's visit    Lab Results   Component Value Date    WBC 7.09 04/05/2024    HGB 13.6 04/05/2024    HCT 41.3 04/05/2024    MCV 91 04/05/2024     04/05/2024     Lab Results   Component Value Date    GLUCOSE 107 11/04/2015    CALCIUM 9.7 06/03/2024     11/04/2015    K 4.0 06/03/2024    CO2 27 06/03/2024     06/03/2024    BUN 19 06/03/2024    CREATININE 0.83 06/03/2024     Lab Results   Component Value Date    IGE 44 04/06/2015     Lab Results   Component Value Date    ALT 17 06/03/2024    AST 19 06/03/2024    ALKPHOS 50 06/03/2024    BILITOT 0.41 11/04/2015       PFT results:  The most recent pulmonary function tests were reviewed.  2018: No obstruction, no restriction, normal DLCO  2024: Borderline obstruction/dysanapsis (normal FEV1/FVC but impaired ratio), + BD response, normal lung volumes and gas transfer    Imaging:  I personally reviewed the images on the PAC " system pertinent to today's visit  CT Chest 2022: Left greater than right apical pleural parenchymal scarring noted, stable. Lungs are otherwise clear. No pulmonary nodule identified.     Other  TTE 2024: mild diastolic dysfunction, no PH      Elif Lawrence MD  [facility]

## 2024-07-31 ENCOUNTER — APPOINTMENT (OUTPATIENT)
Age: 74
End: 2024-07-31
Payer: COMMERCIAL

## 2024-07-31 DIAGNOSIS — E55.9 VITAMIN D DEFICIENCY: ICD-10-CM

## 2024-07-31 DIAGNOSIS — E03.9 ACQUIRED HYPOTHYROIDISM: ICD-10-CM

## 2024-07-31 DIAGNOSIS — E11.9 TYPE 2 DIABETES MELLITUS WITHOUT COMPLICATION, WITHOUT LONG-TERM CURRENT USE OF INSULIN (HCC): ICD-10-CM

## 2024-07-31 LAB
25(OH)D3 SERPL-MCNC: 71.7 NG/ML (ref 30–100)
CREAT UR-MCNC: 62.3 MG/DL
EST. AVERAGE GLUCOSE BLD GHB EST-MCNC: 114 MG/DL
HBA1C MFR BLD: 5.6 %
MICROALBUMIN UR-MCNC: <7 MG/L
T4 FREE SERPL-MCNC: 1.13 NG/DL (ref 0.61–1.12)
TSH SERPL DL<=0.05 MIU/L-ACNC: 1.49 UIU/ML (ref 0.45–4.5)

## 2024-07-31 PROCEDURE — 84439 ASSAY OF FREE THYROXINE: CPT

## 2024-07-31 PROCEDURE — 83036 HEMOGLOBIN GLYCOSYLATED A1C: CPT

## 2024-07-31 PROCEDURE — 3044F HG A1C LEVEL LT 7.0%: CPT | Performed by: INTERNAL MEDICINE

## 2024-07-31 PROCEDURE — 82306 VITAMIN D 25 HYDROXY: CPT

## 2024-07-31 PROCEDURE — 36415 COLL VENOUS BLD VENIPUNCTURE: CPT

## 2024-07-31 PROCEDURE — 84443 ASSAY THYROID STIM HORMONE: CPT

## 2024-08-05 ENCOUNTER — OFFICE VISIT (OUTPATIENT)
Dept: ENDOCRINOLOGY | Facility: CLINIC | Age: 74
End: 2024-08-05
Payer: COMMERCIAL

## 2024-08-05 VITALS
HEIGHT: 62 IN | OXYGEN SATURATION: 98 % | SYSTOLIC BLOOD PRESSURE: 124 MMHG | BODY MASS INDEX: 25.58 KG/M2 | WEIGHT: 139 LBS | HEART RATE: 66 BPM | RESPIRATION RATE: 12 BRPM | TEMPERATURE: 97.9 F | DIASTOLIC BLOOD PRESSURE: 70 MMHG

## 2024-08-05 DIAGNOSIS — Z79.4 CONTROLLED TYPE 2 DIABETES MELLITUS WITHOUT COMPLICATION, WITH LONG-TERM CURRENT USE OF INSULIN (HCC): ICD-10-CM

## 2024-08-05 DIAGNOSIS — Z79.4 TYPE 2 DIABETES MELLITUS WITH DIABETIC CATARACT, WITH LONG-TERM CURRENT USE OF INSULIN (HCC): ICD-10-CM

## 2024-08-05 DIAGNOSIS — E78.5 DYSLIPIDEMIA: ICD-10-CM

## 2024-08-05 DIAGNOSIS — E11.9 TYPE 2 DIABETES MELLITUS WITHOUT COMPLICATION, WITHOUT LONG-TERM CURRENT USE OF INSULIN (HCC): Primary | ICD-10-CM

## 2024-08-05 DIAGNOSIS — E03.9 ACQUIRED HYPOTHYROIDISM: ICD-10-CM

## 2024-08-05 DIAGNOSIS — E55.9 VITAMIN D DEFICIENCY: ICD-10-CM

## 2024-08-05 DIAGNOSIS — E11.36 TYPE 2 DIABETES MELLITUS WITH DIABETIC CATARACT, WITH LONG-TERM CURRENT USE OF INSULIN (HCC): ICD-10-CM

## 2024-08-05 DIAGNOSIS — E11.9 CONTROLLED TYPE 2 DIABETES MELLITUS WITHOUT COMPLICATION, WITH LONG-TERM CURRENT USE OF INSULIN (HCC): ICD-10-CM

## 2024-08-05 PROCEDURE — 99214 OFFICE O/P EST MOD 30 MIN: CPT | Performed by: INTERNAL MEDICINE

## 2024-08-05 PROCEDURE — 95251 CONT GLUC MNTR ANALYSIS I&R: CPT | Performed by: INTERNAL MEDICINE

## 2024-08-05 RX ORDER — ACYCLOVIR 400 MG/1
1 TABLET ORAL
Qty: 9 EACH | Refills: 1 | Status: SHIPPED | OUTPATIENT
Start: 2024-08-05 | End: 2024-08-06 | Stop reason: SDUPTHER

## 2024-08-05 RX ORDER — METFORMIN HYDROCHLORIDE 500 MG/1
TABLET, EXTENDED RELEASE ORAL
Qty: 270 TABLET | Refills: 1 | Status: SHIPPED | OUTPATIENT
Start: 2024-08-05

## 2024-08-05 RX ORDER — LEVOTHYROXINE SODIUM 0.07 MG/1
75 TABLET ORAL DAILY
Qty: 90 TABLET | Refills: 3 | Status: SHIPPED | OUTPATIENT
Start: 2024-08-05

## 2024-08-05 RX ORDER — ACYCLOVIR 400 MG/1
1 TABLET ORAL
Qty: 9 EACH | Refills: 1 | Status: SHIPPED | OUTPATIENT
Start: 2024-08-05 | End: 2024-08-05

## 2024-08-05 NOTE — PROGRESS NOTES
"    Follow-up Patient Progress Note      CC: Type 2 diabetes with hyperglycemia     History of Present Illness:   73 yr female with Type 2DM since 1990, Hypothyroidism, GERD, JUICE, Asthma, endometrial CA, HTN, HLD, DPN, DJD s/p TKR, multiple joint replacements, obesity BMI 35 and vitamin D deficiency. Last visit was 2/5/24.  She is a certified diabetes educator.     Since last visit, she lost 32 lbs. Total 72 lbs since 8/23.     CGM data review::  Device: dexcom          Dates:  7/20/24-8/2/24         Usage: 100 %               Av glu: 102 mg/dL             SD: 13 mg/dL  CV:   %            GMI: 5.8 %  TIR: 100 %                    TAR: 0 %                     TBR: 0 %  Glycemic patters:  excellent control with normoglycemia.     Current meds:  Mounjaro 10mg weekly  Metformin 500mg am and 1000mg pm     Opthamology: yes, no retinopathy  Podiatry: No -   vaccination: yes  Dental:No  Pancreatitis: Yes -1990 allergic reaction to zantac suspected     Ace/ARB: olmesartan  Statin: crestor, zetia  Thyroid issues: hypothyroidism on levothyroxine 75 mcg qdaily     4/18/23: DXA -nml.     FHx: Mother- breast CA.        Physical Exam:  Body mass index is 25.42 kg/m².  /70 (BP Location: Right arm, Patient Position: Sitting)   Pulse 66   Temp 97.9 °F (36.6 °C) (Tympanic)   Resp 12   Ht 5' 2\" (1.575 m)   Wt 63 kg (139 lb)   LMP  (LMP Unknown)   SpO2 98%   BMI 25.42 kg/m²    Vitals:    08/05/24 0840   Weight: 63 kg (139 lb)        Physical Exam  Constitutional:       General: She is not in acute distress.     Appearance: She is well-developed.   HENT:      Head: Normocephalic and atraumatic.      Nose: Nose normal.   Eyes:      Conjunctiva/sclera: Conjunctivae normal.   Pulmonary:      Effort: Pulmonary effort is normal.   Abdominal:      General: There is no distension.   Musculoskeletal:      Cervical back: Normal range of motion and neck supple.   Skin:     Findings: No rash.      Comments: No icterus   Neurological: "      Mental Status: She is alert and oriented to person, place, and time.         Labs:   Lab Results   Component Value Date    HGBA1C 5.6 07/31/2024       Lab Results   Component Value Date    BZK9TDBEYIOU 1.494 07/31/2024       Lab Results   Component Value Date    CREATININE 0.83 06/03/2024    CREATININE 0.85 04/05/2024    CREATININE 0.97 01/19/2024    BUN 19 06/03/2024     11/04/2015    K 4.0 06/03/2024     06/03/2024    CO2 27 06/03/2024     eGFR   Date Value Ref Range Status   06/03/2024 69 ml/min/1.73sq m Final       Lab Results   Component Value Date    ALT 17 06/03/2024    AST 19 06/03/2024    ALKPHOS 50 06/03/2024    BILITOT 0.41 11/04/2015       Lab Results   Component Value Date    CHOLESTEROL 114 04/05/2024    CHOLESTEROL 105 01/19/2024    CHOLESTEROL 119 08/22/2023     Lab Results   Component Value Date    HDL 42 (L) 04/05/2024    HDL 38 (L) 01/19/2024    HDL 44 (L) 08/22/2023     Lab Results   Component Value Date    TRIG 123 04/05/2024    TRIG 138 01/19/2024    TRIG 159 (H) 08/22/2023     Lab Results   Component Value Date    NONHDLC 75 08/22/2023    NONHDLC 91 04/22/2023    NONHDLC 73 12/26/2022         Assessment/Plan:    1. Type 2 diabetes mellitus without complication, without long-term current use of insulin (Former[facility])  Assessment & Plan:  She is in excellent control.  Continue Mounjaro 10mg weekly and metformin 1500mg daily.     She is compliant with medical fitness training and feels well.  In future, we will aim to wean off mounjaro and then metformin.    Continue dexcom.  Follow up in 6 months.      Lab Results   Component Value Date    HGBA1C 5.6 07/31/2024       Lab Results   Component Value Date    HGBA1C 5.6 07/31/2024     Orders:  -     Continuous Glucose Sensor (Dexcom G7 Sensor); Use 1 Device every 10 days  -     metFORMIN (GLUCOPHAGE-XR) 500 mg 24 hr tablet; Take 1 tab in am and 2 tabs in pm  2. Type 2 diabetes mellitus with diabetic cataract, with long-term current use of  insulin (HCC)  -     Hemoglobin A1C; Future  3. Acquired hypothyroidism  Assessment & Plan:  She is clinically and biochemically euthyroid.    Continue levothyroxine 75mg weekly.  Repeat labs prior to next visit in 6 months.  Orders:  -     T4, free; Future  -     TSH, 3rd generation; Future  -     levothyroxine 75 mcg tablet; Take 1 tablet (75 mcg total) by mouth daily  4. Dyslipidemia  Assessment & Plan:  Improved lipid profile. Continue Crestor and zetia.    Repeat labs prior to next visit in 6 months.  Orders:  -     Lipid panel; Future  5. Vitamin D deficiency  -     Vitamin D 25 hydroxy; Future  6. Controlled type 2 diabetes mellitus without complication, with long-term current use of insulin (HCC)  -     tirzepatide 10 MG/0.5ML; Inject 0.5 mL (10 mg total) under the skin every 7 days        I have spent a total time of 31 minutes on 08/05/24 in caring for this patient including greater than 50% of this time was spent in counseling/coordination of care as listed above.       Discussed with the patient and all questioned fully answered. She will contact me with concerns.    Piper Lozano

## 2024-08-05 NOTE — ASSESSMENT & PLAN NOTE
Improved lipid profile. Continue Crestor and zetia.    Repeat labs prior to next visit in 6 months.

## 2024-08-05 NOTE — ASSESSMENT & PLAN NOTE
She is in excellent control.  Continue Mounjaro 10mg weekly and metformin 1500mg daily.     She is compliant with medical fitness training and feels well.  In future, we will aim to wean off mounjaro and then metformin.    Continue dexcom.  Follow up in 6 months.      Lab Results   Component Value Date    HGBA1C 5.6 07/31/2024       Lab Results   Component Value Date    HGBA1C 5.6 07/31/2024

## 2024-08-05 NOTE — ASSESSMENT & PLAN NOTE
She is clinically and biochemically euthyroid.    Continue levothyroxine 75mg weekly.  Repeat labs prior to next visit in 6 months.

## 2024-08-06 ENCOUNTER — PATIENT MESSAGE (OUTPATIENT)
Dept: ENDOCRINOLOGY | Facility: CLINIC | Age: 74
End: 2024-08-06

## 2024-08-06 DIAGNOSIS — E11.9 TYPE 2 DIABETES MELLITUS WITHOUT COMPLICATION, WITHOUT LONG-TERM CURRENT USE OF INSULIN (HCC): ICD-10-CM

## 2024-08-06 RX ORDER — ACYCLOVIR 400 MG/1
1 TABLET ORAL
Qty: 9 EACH | Refills: 1 | Status: SHIPPED | OUTPATIENT
Start: 2024-08-06 | End: 2024-08-07 | Stop reason: SDUPTHER

## 2024-08-06 NOTE — TELEPHONE ENCOUNTER
Medication: (Dexcom G7 Sensor)     Dose/Frequency: Use 1 Device every 10 days, Starting Mon 8/5/2024     Quantity:  9 each     Pharmacy: [facility] - [place], [place] - [address] [place] [address] [[id number]]     Office:   [x] PCP/Provider - Piper Lozano MD   [] Speciality/Provider -     Does the patient have enough for 3 days?   [x] Yes   [] No - Send as HP to POD      *Trina from [facility] called and stated the prescription was discontinued and they can't refill it until they receive a new prescription.

## 2024-08-07 RX ORDER — ACYCLOVIR 400 MG/1
1 TABLET ORAL
Qty: 9 EACH | Refills: 1 | Status: SHIPPED | OUTPATIENT
Start: 2024-08-07 | End: 2024-08-08 | Stop reason: SDUPTHER

## 2024-08-08 DIAGNOSIS — E11.9 TYPE 2 DIABETES MELLITUS WITHOUT COMPLICATION, WITHOUT LONG-TERM CURRENT USE OF INSULIN (HCC): ICD-10-CM

## 2024-08-08 RX ORDER — ACYCLOVIR 400 MG/1
1 TABLET ORAL
Qty: 9 EACH | Refills: 1 | Status: SHIPPED | OUTPATIENT
Start: 2024-08-08

## 2024-08-20 DIAGNOSIS — M25.50 ARTHRALGIA, UNSPECIFIED JOINT: ICD-10-CM

## 2024-08-20 DIAGNOSIS — E03.9 ACQUIRED HYPOTHYROIDISM: ICD-10-CM

## 2024-08-20 DIAGNOSIS — E78.2 MIXED HYPERLIPIDEMIA: ICD-10-CM

## 2024-08-20 DIAGNOSIS — I10 ESSENTIAL HYPERTENSION: ICD-10-CM

## 2024-08-20 RX ORDER — POTASSIUM CHLORIDE 750 MG/1
10 CAPSULE, EXTENDED RELEASE ORAL DAILY
Qty: 90 CAPSULE | Refills: 3 | Status: SHIPPED | OUTPATIENT
Start: 2024-08-20

## 2024-08-20 RX ORDER — PANTOPRAZOLE SODIUM 40 MG/1
40 TABLET, DELAYED RELEASE ORAL DAILY
Qty: 90 TABLET | Refills: 3 | Status: SHIPPED | OUTPATIENT
Start: 2024-08-20

## 2024-08-20 RX ORDER — EZETIMIBE 10 MG/1
10 TABLET ORAL DAILY
Qty: 90 TABLET | Refills: 3 | Status: SHIPPED | OUTPATIENT
Start: 2024-08-20

## 2024-08-20 RX ORDER — FUROSEMIDE 20 MG
20 TABLET ORAL DAILY
Qty: 90 TABLET | Refills: 3 | Status: SHIPPED | OUTPATIENT
Start: 2024-08-20

## 2024-08-20 RX ORDER — ROSUVASTATIN CALCIUM 20 MG/1
20 TABLET, COATED ORAL DAILY
Qty: 90 TABLET | Refills: 3 | Status: SHIPPED | OUTPATIENT
Start: 2024-08-20

## 2024-08-20 RX ORDER — LEVOTHYROXINE SODIUM 75 UG/1
75 TABLET ORAL DAILY
Qty: 90 TABLET | Refills: 3 | Status: SHIPPED | OUTPATIENT
Start: 2024-08-20

## 2024-08-25 ENCOUNTER — PATIENT MESSAGE (OUTPATIENT)
Dept: CARDIOLOGY CLINIC | Facility: CLINIC | Age: 74
End: 2024-08-25

## 2024-08-26 ENCOUNTER — TELEPHONE (OUTPATIENT)
Dept: CARDIOLOGY CLINIC | Facility: CLINIC | Age: 74
End: 2024-08-26

## 2024-08-26 DIAGNOSIS — I10 BENIGN ESSENTIAL HYPERTENSION: Primary | ICD-10-CM

## 2024-08-26 NOTE — TELEPHONE ENCOUNTER
Cori Pleitez   to [facility] (supporting Tanisha Levi MD)         8/25/24  3:48 PM  Your staff had asked me to call mid-April for a July appointment but when I called they couldn't get me in until October. I have lost a total of 81 pounds and have again noticed that my blood pressure is running in the 90s/50s to low 100s over the 50s. Should I try again to get an earlier appointment or should we decrease my olmesartan again? I am having some dizziness at times when going from sitting to standing. I do strength training 2-3 times weekly and aerobic exercise 3-5 times a week. I don't want to be hypertensive for sure as I am afraid of a stroke but I think I might be running a little too low for my age. Let me know.  Cori

## 2024-08-26 NOTE — TELEPHONE ENCOUNTER
Patient to stop olmesartan for now.    To continue to monitor blood pressures.    Patient to get BMP done in 1 week.  Please order the same.

## 2024-09-07 ENCOUNTER — APPOINTMENT (OUTPATIENT)
Age: 74
End: 2024-09-07
Payer: COMMERCIAL

## 2024-09-07 DIAGNOSIS — E11.36 TYPE 2 DIABETES MELLITUS WITH DIABETIC CATARACT, WITH LONG-TERM CURRENT USE OF INSULIN (HCC): ICD-10-CM

## 2024-09-07 DIAGNOSIS — E78.5 DYSLIPIDEMIA: ICD-10-CM

## 2024-09-07 DIAGNOSIS — E03.9 ACQUIRED HYPOTHYROIDISM: ICD-10-CM

## 2024-09-07 DIAGNOSIS — Z79.4 TYPE 2 DIABETES MELLITUS WITH DIABETIC CATARACT, WITH LONG-TERM CURRENT USE OF INSULIN (HCC): ICD-10-CM

## 2024-09-07 DIAGNOSIS — E55.9 VITAMIN D DEFICIENCY: ICD-10-CM

## 2024-09-07 LAB
25(OH)D3 SERPL-MCNC: 84.2 NG/ML (ref 30–100)
CHOLEST SERPL-MCNC: 109 MG/DL
EST. AVERAGE GLUCOSE BLD GHB EST-MCNC: 105 MG/DL
HBA1C MFR BLD: 5.3 %
HDLC SERPL-MCNC: 41 MG/DL
LDLC SERPL CALC-MCNC: 50 MG/DL (ref 0–100)
NONHDLC SERPL-MCNC: 68 MG/DL
T4 FREE SERPL-MCNC: 1.36 NG/DL (ref 0.61–1.12)
TRIGL SERPL-MCNC: 88 MG/DL
TSH SERPL DL<=0.05 MIU/L-ACNC: 1.22 UIU/ML (ref 0.45–4.5)

## 2024-09-07 PROCEDURE — 36415 COLL VENOUS BLD VENIPUNCTURE: CPT

## 2024-09-07 PROCEDURE — 83036 HEMOGLOBIN GLYCOSYLATED A1C: CPT

## 2024-09-07 PROCEDURE — 80061 LIPID PANEL: CPT

## 2024-09-07 PROCEDURE — 84439 ASSAY OF FREE THYROXINE: CPT

## 2024-09-07 PROCEDURE — 3044F HG A1C LEVEL LT 7.0%: CPT | Performed by: INTERNAL MEDICINE

## 2024-09-07 PROCEDURE — 84443 ASSAY THYROID STIM HORMONE: CPT

## 2024-09-07 PROCEDURE — 82306 VITAMIN D 25 HYDROXY: CPT

## 2024-09-09 ENCOUNTER — TELEPHONE (OUTPATIENT)
Dept: CARDIOLOGY CLINIC | Facility: CLINIC | Age: 74
End: 2024-09-09

## 2024-09-09 NOTE — TELEPHONE ENCOUNTER
Cori Pleitez   to [facility] (supporting Tanisha Levi MD)         9/8/24  8:09 PM  Amita Alba, I went in for my lab work and I gave them your name and they said they didn't see anything and then I said he's my cardiologist. Instead they ran the labs ordered by Dr. Whitehead my endocrinologist which wasn't due for another 4 months. I thought you wanted a metabolic profile and maybe lipids. The lipids were done as Dr. Whitehead had ordered them but I have not seen a [facility]. Sorry, your nurse said the orders were in. Should I go again? BP in the mornings, off Benicar for 2 weeks now, systolic  (112-114)over diastolic 62-70. Evening 102-106 over 52-58

## 2024-09-09 NOTE — TELEPHONE ENCOUNTER
I still see an open order for BMP dated August 26, 2024    Not sure why the lab did not do this blood test.    Please recheck and let the patient know so that she can get the blood work done.

## 2024-09-10 DIAGNOSIS — E11.9 TYPE 2 DIABETES MELLITUS WITHOUT COMPLICATION, WITHOUT LONG-TERM CURRENT USE OF INSULIN (HCC): Primary | ICD-10-CM

## 2024-09-22 ENCOUNTER — APPOINTMENT (OUTPATIENT)
Dept: LAB | Facility: CLINIC | Age: 74
End: 2024-09-22
Payer: COMMERCIAL

## 2024-09-22 DIAGNOSIS — I10 BENIGN ESSENTIAL HYPERTENSION: ICD-10-CM

## 2024-09-22 LAB
ANION GAP SERPL CALCULATED.3IONS-SCNC: 6 MMOL/L (ref 4–13)
BUN SERPL-MCNC: 24 MG/DL (ref 5–25)
CALCIUM SERPL-MCNC: 9.5 MG/DL (ref 8.4–10.2)
CHLORIDE SERPL-SCNC: 105 MMOL/L (ref 96–108)
CO2 SERPL-SCNC: 28 MMOL/L (ref 21–32)
CREAT SERPL-MCNC: 0.78 MG/DL (ref 0.6–1.3)
GFR SERPL CREATININE-BSD FRML MDRD: 74 ML/MIN/1.73SQ M
GLUCOSE P FAST SERPL-MCNC: 91 MG/DL (ref 65–99)
POTASSIUM SERPL-SCNC: 4.1 MMOL/L (ref 3.5–5.3)
SODIUM SERPL-SCNC: 139 MMOL/L (ref 135–147)

## 2024-09-22 PROCEDURE — 80048 BASIC METABOLIC PNL TOTAL CA: CPT

## 2024-09-22 PROCEDURE — 36415 COLL VENOUS BLD VENIPUNCTURE: CPT

## 2024-09-23 ENCOUNTER — TELEPHONE (OUTPATIENT)
Dept: CARDIOLOGY CLINIC | Facility: CLINIC | Age: 74
End: 2024-09-23

## 2024-09-23 NOTE — TELEPHONE ENCOUNTER
----- Message from Tanisha Levi MD sent at 9/23/2024  9:13 AM EDT -----  Blood work shows BMP to be within normal limits.

## 2024-09-23 NOTE — TELEPHONE ENCOUNTER
..  Time: 1832    Old EKG Present:    [] Yes   [] No    Type:    [x] Standard Left   [] Right Sided   [] Posterior Sided   [] Rhythm Strip    Shown to:     Called and was unable to leave message. Phone hangs up, no option for vm.

## 2024-09-28 DIAGNOSIS — K59.09 OTHER CONSTIPATION: ICD-10-CM

## 2024-09-28 DIAGNOSIS — E11.8 TYPE 2 DIABETES MELLITUS WITH COMPLICATION, WITHOUT LONG-TERM CURRENT USE OF INSULIN (HCC): ICD-10-CM

## 2024-09-28 DIAGNOSIS — K59.04 CHRONIC IDIOPATHIC CONSTIPATION: ICD-10-CM

## 2024-09-30 RX ORDER — LINACLOTIDE 145 UG/1
145 CAPSULE, GELATIN COATED ORAL EVERY MORNING
Qty: 90 CAPSULE | Refills: 1 | Status: SHIPPED | OUTPATIENT
Start: 2024-09-30

## 2024-09-30 RX ORDER — BLOOD-GLUCOSE METER
KIT MISCELLANEOUS
Qty: 300 STRIP | Refills: 3 | Status: SHIPPED | OUTPATIENT
Start: 2024-09-30

## 2024-10-10 DIAGNOSIS — Z79.4 TYPE 2 DIABETES MELLITUS WITH HYPERGLYCEMIA, WITH LONG-TERM CURRENT USE OF INSULIN (HCC): ICD-10-CM

## 2024-10-10 DIAGNOSIS — E11.65 TYPE 2 DIABETES MELLITUS WITH HYPERGLYCEMIA, WITH LONG-TERM CURRENT USE OF INSULIN (HCC): ICD-10-CM

## 2024-10-10 RX ORDER — ACYCLOVIR 400 MG/1
TABLET ORAL
Qty: 1 EACH | Refills: 3 | Status: SHIPPED | OUTPATIENT
Start: 2024-10-10

## 2024-10-11 ENCOUNTER — OFFICE VISIT (OUTPATIENT)
Dept: OBGYN CLINIC | Facility: MEDICAL CENTER | Age: 74
End: 2024-10-11
Payer: COMMERCIAL

## 2024-10-11 VITALS — DIASTOLIC BLOOD PRESSURE: 62 MMHG | BODY MASS INDEX: 23.89 KG/M2 | SYSTOLIC BLOOD PRESSURE: 120 MMHG | WEIGHT: 130.6 LBS

## 2024-10-11 DIAGNOSIS — L90.0 LICHEN SCLEROSUS: Primary | ICD-10-CM

## 2024-10-11 PROCEDURE — 99213 OFFICE O/P EST LOW 20 MIN: CPT | Performed by: STUDENT IN AN ORGANIZED HEALTH CARE EDUCATION/TRAINING PROGRAM

## 2024-10-11 NOTE — PROGRESS NOTES
Ambulatory Visit  Name: Cori Pleitez      : 1950      MRN: [id number]  Encounter Provider: Gris Amos DO  Encounter Date: 10/11/2024   Encounter department: [facility] [place]    Assessment & Plan  Lichen sclerosus  -will continue every other day clobetasol due to increase in symptoms with downtitrating. Physical exam stable.   -follow up scheduled for April, will call if symptoms exacerbate for biopsy.            History of Present Illness     Cori Pleitez is a 74 y.o. female  postmenopausal s/p hysterectomy presents for follow up lichen sclerosus. Pt reports symptoms worsened when she tried to use 2-3 times per week. Symptoms are currently well controlled and uses every other day. She last tried to unsuccessfully downtitrate 2 weeks ago. Denies vaginal bleeding or worsening skin changes.       Review of Systems   Constitutional:  Negative for appetite change, chills, fatigue and fever.   Respiratory:  Negative for cough, chest tightness, shortness of breath and wheezing.    Cardiovascular:  Negative for chest pain, palpitations and leg swelling.   Gastrointestinal:  Negative for abdominal distention, abdominal pain, constipation, diarrhea, nausea and vomiting.   Endocrine: Negative for cold intolerance, heat intolerance and polyuria.   Genitourinary:  Negative for difficulty urinating, dyspareunia, dysuria, genital sores, menstrual problem, vaginal bleeding, vaginal discharge and vaginal pain.   Neurological:  Negative for dizziness, weakness, light-headedness and headaches.           Objective     /62 (BP Location: Right arm, Patient Position: Sitting, Cuff Size: Standard)   Wt 59.2 kg (130 lb 9.6 oz)   LMP  (LMP Unknown)   BMI 23.89 kg/m²     Physical Exam  Constitutional:       General: She is not in acute distress.     Appearance: Normal appearance. She is not ill-appearing.   Cardiovascular:      Rate and Rhythm: Normal rate.    Pulmonary:      Effort: Pulmonary effort is normal. No respiratory distress.   Abdominal:      General: Abdomen is flat. There is no distension.      Palpations: Abdomen is soft.      Tenderness: There is no abdominal tenderness. There is no guarding or rebound.   Genitourinary:     Exam position: Lithotomy position.      Pubic Area: No rash.       Labia:         Right: No rash, tenderness, lesion or injury.         Left: No rash, tenderness, lesion or injury.       Comments: Uterus and cervix surgically absent, lichen sclerosus with minimal discoloration skin changes at introitus, vaginal atrophy noted  Musculoskeletal:      Right lower leg: No edema.      Left lower leg: No edema.   Neurological:      General: No focal deficit present.      Mental Status: She is alert and oriented to person, place, and time.   Psychiatric:         Mood and Affect: Mood normal.         Behavior: Behavior normal.         Thought Content: Thought content normal.         Judgment: Judgment normal.

## 2024-10-11 NOTE — ASSESSMENT & PLAN NOTE
-will continue every other day clobetasol due to increase in symptoms with downtitrating. Physical exam stable.   -follow up scheduled for April, will call if symptoms exacerbate for biopsy.

## 2024-10-21 DIAGNOSIS — E11.9 CONTROLLED TYPE 2 DIABETES MELLITUS WITHOUT COMPLICATION, WITH LONG-TERM CURRENT USE OF INSULIN (HCC): ICD-10-CM

## 2024-10-21 DIAGNOSIS — Z79.4 CONTROLLED TYPE 2 DIABETES MELLITUS WITHOUT COMPLICATION, WITH LONG-TERM CURRENT USE OF INSULIN (HCC): ICD-10-CM

## 2024-10-21 DIAGNOSIS — E11.9 TYPE 2 DIABETES MELLITUS WITHOUT COMPLICATION, WITHOUT LONG-TERM CURRENT USE OF INSULIN (HCC): ICD-10-CM

## 2024-10-22 RX ORDER — ACYCLOVIR 400 MG/1
1 TABLET ORAL
Qty: 9 EACH | Refills: 1 | Status: SHIPPED | OUTPATIENT
Start: 2024-10-22

## 2024-11-04 ENCOUNTER — OFFICE VISIT (OUTPATIENT)
Age: 74
End: 2024-11-04
Payer: COMMERCIAL

## 2024-11-04 VITALS
HEART RATE: 70 BPM | TEMPERATURE: 98.1 F | RESPIRATION RATE: 16 BRPM | BODY MASS INDEX: 24.03 KG/M2 | OXYGEN SATURATION: 99 % | WEIGHT: 130.6 LBS | SYSTOLIC BLOOD PRESSURE: 120 MMHG | HEIGHT: 62 IN | DIASTOLIC BLOOD PRESSURE: 58 MMHG

## 2024-11-04 DIAGNOSIS — K21.9 GASTROESOPHAGEAL REFLUX DISEASE WITHOUT ESOPHAGITIS: ICD-10-CM

## 2024-11-04 DIAGNOSIS — I10 BENIGN ESSENTIAL HYPERTENSION: ICD-10-CM

## 2024-11-04 DIAGNOSIS — Z00.00 HEALTHCARE MAINTENANCE: Primary | ICD-10-CM

## 2024-11-04 DIAGNOSIS — E03.9 ACQUIRED HYPOTHYROIDISM: ICD-10-CM

## 2024-11-04 DIAGNOSIS — N18.2 CKD (CHRONIC KIDNEY DISEASE) STAGE 2, GFR 60-89 ML/MIN: ICD-10-CM

## 2024-11-04 DIAGNOSIS — E11.9 TYPE 2 DIABETES MELLITUS WITHOUT COMPLICATION, WITHOUT LONG-TERM CURRENT USE OF INSULIN (HCC): ICD-10-CM

## 2024-11-04 PROCEDURE — 99397 PER PM REEVAL EST PAT 65+ YR: CPT | Performed by: FAMILY MEDICINE

## 2024-11-04 PROCEDURE — 99214 OFFICE O/P EST MOD 30 MIN: CPT | Performed by: FAMILY MEDICINE

## 2024-11-04 NOTE — PROGRESS NOTES
[facility]  Annual Physical & Office Visit     PATIENT INFORMATION   Name: Cori Pleitez   YOB: 1950   MRN: [id number]  Encounter Provider: Horace Hay MD    Encounter Date: 11/4/2024    ASSESSMENT & PLAN     Assessment & Plan  Healthcare maintenance  Healthcare Maintenance  Health maintenance completed today  - Medical history reviewed, including existing medical conditions, medications, and surgeries.   - Labs discussed to evaluate cholesterol, blood sugar, kidney function, liver function, and other important markers of health.  - BMI evaluated and discussed.  - Lifestyle and health counseling completed including diet, exercise habits, smoking status, alcohol consumption.   - Bone & Heart health reviewed  - Cancer screenings discussed: Mammogram/Pap smear/CT lung/colonoscopy.   - Vaccination status reviewed and pertinent immunizations and booster shots discussed.  - Skin examination: Discussed importance of sunscreen and other preventative measures for skin cancer.  - Mental health and wellbeing evaluated and discussed.  - Family history obtained to identify any of hereditary health risks.  Lab orders in place as discussed  Start/continue preventative measures as discussed/advised  Complete preventative orders in place as recommended.   Refer to screenings problem list        Benign essential hypertension  BP Readings from Last 3 Encounters:   11/04/24 120/58   10/11/24 120/62   08/05/24 124/70      Currently not prescribed any meds.  However, taking Lasix 20 mg once every 10 days for edema.   Previously  previously on medication but improved with weight loss.   Today pt reports: no concerns.   Assessment: controlled off medication  Med changes: None. Continue to focus on lifestyle improvement only.   Lifestyle modifications recommended, including reduced sodium intake, regular exercise, maintaining a healthy weight, limiting alcohol consumption, and/or avoiding cig smoking.         Acquired hypothyroidism  Lab Results   Component Value Date    TDM2KJMNXWQT 1.223 09/07/2024      Currently prescribed levothyroxine 75 mcg daily in the morning  Today pt reports: Taking as prescribed.  Assessment: Controlled  Med changes: None. Continue current regimen.   Follow up with thyroid blood work every 3-6 months, sooner if noted symptom changes.         Type 2 diabetes mellitus without complication, without long-term current use of insulin ([facility]ly [facility])    Lab Results   Component Value Date    HGBA1C 5.3 09/07/2024     Well controlled with previous A1c of 5.6.   Currently prescribed: Metformin 1500 mg/day, tirzepatide 10 mg once a week injection  on statin & no ACEi/ARB  Previously: on insulin and with endocrinology got off of insulin and now on injectables.   Currently using Dexcom  Med Adjusted: none   Continue with endocrinology.   Advised low-carb, low-sodium diet and limit snacking, really working on building good habits with nutritional intake and exercising regularly.   Reviewed hypoglycemia protocol, will monitor and discuss frequency at each apt with plan to adjust management to avoid occurences          Gastroesophageal reflux disease without esophagitis  With history of hiatal hernia.   Currently on pantoprazole. Follows GI.   Has been on PPI for a long time now. Tried to taper but unsuccessful.        CKD (chronic kidney disease) stage 2, GFR 60-89 ml/min  Lab Results   Component Value Date    EGFR 74 09/22/2024    EGFR 69 06/03/2024    EGFR 67 04/05/2024    CREATININE 0.78 09/22/2024    CREATININE 0.83 06/03/2024    CREATININE 0.85 04/05/2024   Labs reviewed & discussed   Stable, most recent GFR 74.   Improve lifestyle and dietary changes to slow its progression as discussed  Low-sodium, low-carb diet, limit snacking, exercise regularly.  Avoid nephrotoxic agents including over-the-counter NSAIDs as discussed  Hydrate well with water.           COUNSELING    Alcohol/drug use: discussed  moderation in alcohol intake, the recommendations for healthy alcohol use, and avoidance of illicit drug use.  Dental Health: discussed importance of regular tooth brushing, flossing, and dental visits.  Injury prevention: discussed safety/seat belts, safety helmets, smoke detectors, carbon monoxide detectors, and smoking near bedding or upholstery.  Sexual health: discussed sexually transmitted diseases, partner selection, use of condoms, avoidance of unintended pregnancy, and contraceptive alternatives.  Exercise: the importance of regular exercise/physical activity was discussed. Recommend exercise 3-5 times per week for at least 30 minutes.      HEALTH MAINTENANCE     Immunization History   Administered Date(s) Administered    COVID-19 MODERNA VACC 0.5 ML IM 01/05/2021, 02/04/2021, 10/25/2021, 09/06/2022    COVID-19 Moderna Vac BIVALENT 12 Yr+ IM 0.5 ML 09/06/2022    COVID-19 Moderna mRNA Vaccine 12 Yr+ 50 mcg/0.5 mL (Spikevax) 10/01/2023, 09/17/2024    H1N1, All Formulations 11/05/2009    INFLUENZA 09/30/2015, 10/20/2017, 09/30/2020, 09/26/2021, 09/06/2022, 09/13/2023    Influenza Split High Dose Preservative Free IM 10/06/2014, 09/30/2015, 10/20/2017    Influenza, Seasonal Vaccine, Quadrivalent, Adjuvanted, .5e 09/13/2023    Influenza, high dose seasonal 0.7 mL 10/09/2018, 09/30/2020    Pneumococcal Conjugate 13-Valent 09/30/2015    Pneumococcal Polysaccharide PPV23 11/17/2008, 10/06/2014, 03/23/2017    Respiratory Syncytial Virus Vaccine (Recombinant, Adjuvanted) 11/11/2023    Tdap 10/09/2018, 10/12/2018, 09/30/2020    Tuberculin Skin Test-PPD Intradermal 07/03/2019    Zoster 10/25/2017    Zoster Vaccine Recombinant 11/28/2023, 04/25/2024    influenza, trivalent, adjuvanted 09/28/2019     Pap smear:04/05/2024  Mammogram:06/28/2024   Colonoscopy:12/31/2019 12/31/2019  Cologuard:Not on file   DEXA scan:04/18/2023    FOLLOW UP   Return in about 6 months (around 5/4/2025) for routine follow up.    CURRENT  "MEDICATIONS     Current Outpatient Medications:     albuterol (PROVENTIL HFA,VENTOLIN HFA) 90 mcg/act inhaler, Inhale 2 puffs every 6 (six) hours as needed for wheezing, Disp: 18 g, Rfl: 2    Calcium-Magnesium-Vitamin D (CALCIUM 1200+D3 PO), , Disp: , Rfl:     clobetasol (TEMOVATE) 0.05 % ointment, Apply topically 2 (two) times a day Taking once daily, Disp: 30 g, Rfl: 5    Continuous Glucose  (Dexcom G7 ) SMITH, USE CONTINUOUSLY AS DIRECTED, Disp: 1 each, Rfl: 3    Continuous Glucose Sensor (Dexcom G7 Sensor), Use 1 Device every 10 days, Disp: 9 each, Rfl: 1    ezetimibe (ZETIA) 10 mg tablet, Take 1 tablet (10 mg total) by mouth daily, Disp: 90 tablet, Rfl: 3    FREESTYLE LITE test strip, USE 1 STRIP THREE TIMES A DAY AS INSTRUCTED, Disp: 300 strip, Rfl: 3    furosemide (LASIX) 20 mg tablet, Take 1 tablet (20 mg total) by mouth daily (Patient taking differently: Take 20 mg by mouth daily Taking as PRN), Disp: 90 tablet, Rfl: 3    Insulin Pen Needle (BD Pen Needle Dulce U/F) 32G X 4 MM MISC, Use daily as needed with insulin pen, Disp: 100 each, Rfl: 2    Insulin Syringe-Needle U-100 (INSULIN SYRINGE 1CC/31GX5/16\") 31G X 5/16\" 1 ML MISC, BD Veo Insulin Syringe Ultra-Fine 0.3 mL 31 gauge x 15/64\", Disp: , Rfl:     KRILL OIL PO, Take by mouth, Disp: , Rfl:     levothyroxine 75 mcg tablet, Take 1 tablet (75 mcg total) by mouth daily, Disp: 90 tablet, Rfl: 3    linaCLOtide (Linzess) 145 MCG CAPS, TAKE 1 CAPSULE EVERY MORNING, Disp: 90 capsule, Rfl: 1    Magnesium Oxide 400 MG CAPS, Take by mouth, Disp: , Rfl:     metFORMIN (GLUCOPHAGE-XR) 500 mg 24 hr tablet, Take 1 tab in am and 2 tabs in pm, Disp: 270 tablet, Rfl: 1    Mometasone Furo-Formoterol Fum (Dulera) 50-5 MCG/ACT AERO, Inhale 2 puffs 2 (two) times a day Rinse mouth after use., Disp: 390 g, Rfl: 1    Omega-3 Fatty Acids (FISH OIL) 1200 MG CAPS, Take by mouth, Disp: , Rfl:     pantoprazole (PROTONIX) 40 mg tablet, Take 1 tablet (40 mg total) by " mouth daily, Disp: 90 tablet, Rfl: 3    potassium chloride (MICRO-K) 10 MEQ CR capsule, Take 1 capsule (10 mEq total) by mouth daily, Disp: 90 capsule, Rfl: 3    rosuvastatin (CRESTOR) 20 MG tablet, Take 1 tablet (20 mg total) by mouth daily, Disp: 90 tablet, Rfl: 3    saccharomyces boulardii (FLORASTOR) 250 mg capsule, Take 250 mg by mouth daily  , Disp: , Rfl:     tirzepatide 10 MG/0.5ML, Inject 0.5 mL (10 mg total) under the skin every 7 days, Disp: 6 mL, Rfl: 1    ANNUAL PHYSICAL QUESTIONNAIRE    History of Present Illness     Cori Pleitez is a 74 y.o. who is here for annual physical exam.  History obtained from : patient  HPI    Concerns today: no    Diet and Physical Activity  Diet: well balanced diet  Exercise: moderate cardiovascular exercise, strength training exercises, and 3-4 times a week on average  Do you struggle with your weight? no    General Health  Sleep: gets 7-8 hours of sleep on average   Hearing: requires use of hearing aids  Vision: no vision problems  Dental: regular dental visits and brushes teeth twice daily    Mental Health  PHQ-2/9 Depression Screening           Anxiety: no  History of SI/SH: no  Significant past trauma that has impacted patient's mental health: no    /GYN Health  Menstrual cycles: post menopause.   Sexually Active: No  not sexually active  Urinary symptoms: none    Smoking/Alcohol Use:  Smoking Cig: no  Vaping: no  Recreational drugs: no  Alcohol consumption: no    Review of Systems   Constitutional:  Negative for chills, fever and unexpected weight change.   Respiratory:  Negative for chest tightness and shortness of breath.    Cardiovascular:  Negative for chest pain.   Gastrointestinal:  Negative for abdominal pain, constipation, diarrhea, nausea and vomiting.   Endocrine: Negative for polydipsia and polyuria.   Genitourinary:  Negative for dysuria and hematuria.   Neurological:  Negative for dizziness, weakness, light-headedness and headaches.        ALLERGIES      Allergies   Allergen Reactions    Amoxicillin-Pot Clavulanate Anaphylaxis, Hives, Itching and Facial Swelling    Erythromycin Hives, Itching, Swelling, Vomiting, Throat Swelling, Nasal Congestion and Facial Swelling    Meperidine Anaphylaxis    Morphine Hives, Itching, Swelling, Other (See Comments) and Syncope     hypotension    Penicillins Anaphylaxis, Itching, Swelling and Hives    Relafen [Nabumetone] Hives, Itching and Swelling    Sulfa Antibiotics Hives, Itching, Rash and Swelling    Darvon [Propoxyphene] Hives    Methocarbamol Other (See Comments)     Double vision    Reglan [Metoclopramide] Anxiety    Tetracycline Rash    Tetracyclines & Related Rash       PAST MEDICAL & SURGICAL HISTORY      Past Medical History:   Diagnosis Date    Abnormal mammogram     Abnormal weight gain     Achilles tendinitis     Allergic 1952    since childhood    Arthritis 1988    Asthma     Cancer (HCC) 2018    Endometrial adenoma    Combined forms of age-related cataract of both eyes 08/18/2021    Added per ICD-10-CM coding guidelines - provider accepted    Diabetes mellitus (HCC)     Disc degeneration, lumbar     Disease of thyroid gland     hypo    Dyslipidemia     Dyslipidemia, goal LDL below 70 03/27/2018    Early satiety     Edema     idiopathic peripheral    Encounter for follow-up examination after completed treatment for malignant neoplasm 03/17/2019    Encounter for gynecological examination without abnormal finding 06/22/2020    Endometrial cancer (HCC) 2018    Enthesopathy     hip region    Esophagitis, reflux     GERD (gastroesophageal reflux disease)     Hard to intubate     difficulty with intubation and had to be intubated twice    Heart murmur 2018    HL (hearing loss) 2015    Hypercholesterolemia     Hypertension     IBS (irritable bowel syndrome)     Irritable bowel syndrome     Morbid (severe) obesity due to excess calories (HCC) 04/08/2022    As per CMS ICD10 guidelines:Provider accepted     Obesity 1998    Obesity, Class I, BMI 30-34.9 2015    Osteoarthritis     Pneumonia     Rosacea     Sacroiliitis (HCC)     Shingles     Sleep apnea, obstructive     Varicella      Past Surgical History:   Procedure Laterality Date    CATARACT EXTRACTION Right 2024    CATARACT EXTRACTION Left 2024     SECTION      x2    COLONOSCOPY      ELBOW SURGERY      left ulna/radius    ESOPHAGOGASTRODUODENOSCOPY      FOOT SURGERY Right     FRACTURE SURGERY      FRACTURE SURGERY Left     tibia    HYSTERECTOMY Bilateral 2018    JOINT REPLACEMENT Left     shoulder,  right knee    KNEE ARTHROSCOPY      KNEE ARTHROSCOPY W/ MENISCAL REPAIR Right     HI LAPS TOTAL HYSTERECT 250 GM/< W/RMVL TUBE/OVARY N/A 2018    Procedure: ROBOTIC TOTAL LAPAROSCOPIC HYSTERECTOMY, BILATERAL SALPINGOOPHORECTOMY, BILATERAL PELVIC LYMPHNODE DISSECTION;  Surgeon: Андрей Meng MD;  Location: [facility];  Service: Gynecology Oncology    REPLACEMENT TOTAL KNEE Right     SHOULDER ARTHROPLASTY      SINUS SURGERY      TONSILLECTOMY      TUBAL LIGATION      WRIST SURGERY      ganglonic cyst       FAMILY HISTORY & SOCIAL HISTORY      Family History   Problem Relation Age of Onset    Arthritis Mother     Heart disease Mother         cardiac disorder    Diabetes Mother     Hiatal hernia Mother     Hypertension Mother     Irritable bowel syndrome Mother     Breast cancer Mother 66        x2    Uterine cancer Mother     Osteoporosis Mother     Thyroid disease Mother     Other Mother         gastric reflux    Stroke Mother     Hyperlipidemia Mother     Cancer Mother         ENDOMETRIAL    Diabetes type II Mother     Thyroid disease unspecified Mother     Hypothyroidism Mother     Heart disease Father         cardiac disorder    Hiatal hernia Father     Ulcers Father     Other Father         gastric reflux    Coronary artery disease Father     Hearing loss Father     Heart attack Father     Heart failure Father     Hiatal  "hernia Sister     Thyroid disease Sister     Other Sister         gastric reflux    Lung cancer Sister 77    Cancer Sister         lung adenocarcinoma, mets to meninges    Thyroid disease unspecified Sister     Hypothyroidism Sister     Hyperlipidemia Sister     Arthritis Sister     Irritable bowel syndrome Daughter     Hyperlipidemia Daughter     Brain cancer Maternal Grandmother     Breast cancer Maternal Grandmother         uknown    No Known Problems Maternal Grandfather     No Known Problems Paternal Grandmother     Hearing loss Paternal Grandfather     No Known Problems Brother     Malrobin Hypertension Son     Hiatal hernia Child     Other Child         gastric reflux    Breast cancer Maternal Aunt 65    Uterine cancer Maternal Aunt         x3 sis    Stroke Maternal Aunt     Cancer Maternal Aunt     Hyperlipidemia Maternal Aunt     Breast cancer Maternal Aunt 55    Cancer Maternal Aunt     Hyperlipidemia Maternal Aunt     Breast cancer Maternal Aunt 55    Cancer Maternal Aunt     Hyperlipidemia Maternal Aunt     No Known Problems Paternal Aunt     Colon cancer Neg Hx     Ovarian cancer Neg Hx     Cervical cancer Neg Hx       Social History     Tobacco Use    Smoking status: Never     Passive exposure: Never    Smokeless tobacco: Never   Vaping Use    Vaping status: Never Used   Substance and Sexual Activity    Alcohol use: Yes     Alcohol/week: 4.0 standard drinks of alcohol     Types: 2 Glasses of wine, 2 Standard drinks or equivalent per week    Drug use: No    Sexual activity: Not Currently     Partners: Female     Birth control/protection: Post-menopausal, Surgical, Female Sterilization        OBJECTIVES      /58 (BP Location: Left arm, Patient Position: Sitting, Cuff Size: Standard)   Pulse 70   Temp 98.1 °F (36.7 °C) (Tympanic)   Resp 16   Ht 5' 2\" (1.575 m)   Wt 59.2 kg (130 lb 9.6 oz)   LMP  (LMP Unknown)   SpO2 99%   BMI 23.89 kg/m²    Physical Exam  Vitals reviewed.   Constitutional:      "  General: She is not in acute distress.     Appearance: Normal appearance. She is not ill-appearing, toxic-appearing or diaphoretic.   HENT:      Head: Normocephalic and atraumatic.      Right Ear: External ear normal.      Left Ear: External ear normal.      Nose: Nose normal.      Mouth/Throat:      Mouth: Mucous membranes are moist.   Eyes:      General: No scleral icterus.        Right eye: No discharge.         Left eye: No discharge.      Extraocular Movements: Extraocular movements intact.      Conjunctiva/sclera: Conjunctivae normal.   Cardiovascular:      Rate and Rhythm: Normal rate and regular rhythm.      Pulses: Normal pulses.      Heart sounds: Normal heart sounds.   Pulmonary:      Effort: Pulmonary effort is normal. No respiratory distress.      Breath sounds: Normal breath sounds.   Abdominal:      Palpations: Abdomen is soft.      Tenderness: There is no abdominal tenderness.   Musculoskeletal:         General: No swelling. Normal range of motion.      Cervical back: Normal range of motion.   Skin:     General: Skin is warm and dry.   Neurological:      General: No focal deficit present.      Mental Status: She is alert and oriented to person, place, and time.   Psychiatric:         Mood and Affect: Mood normal.         Behavior: Behavior normal.         Thought Content: Thought content normal.           Horace Hay MD  Family Medicine Physician   [facility] [place]      Administrative Statements     Medications have been reviewed by provider in current encounter

## 2024-11-11 ENCOUNTER — OFFICE VISIT (OUTPATIENT)
Age: 74
End: 2024-11-11
Payer: COMMERCIAL

## 2024-11-11 VITALS
TEMPERATURE: 97.8 F | RESPIRATION RATE: 18 BRPM | OXYGEN SATURATION: 98 % | HEART RATE: 92 BPM | SYSTOLIC BLOOD PRESSURE: 117 MMHG | DIASTOLIC BLOOD PRESSURE: 72 MMHG | BODY MASS INDEX: 23.96 KG/M2 | WEIGHT: 131 LBS

## 2024-11-11 DIAGNOSIS — M54.50 ACUTE RIGHT-SIDED LOW BACK PAIN WITHOUT SCIATICA: Primary | ICD-10-CM

## 2024-11-11 PROCEDURE — 99213 OFFICE O/P EST LOW 20 MIN: CPT | Performed by: PHYSICIAN ASSISTANT

## 2024-11-11 RX ORDER — PREDNISONE 20 MG/1
40 TABLET ORAL DAILY
Qty: 8 TABLET | Refills: 0 | Status: SHIPPED | OUTPATIENT
Start: 2024-11-11 | End: 2024-11-15

## 2024-11-11 RX ORDER — CYCLOBENZAPRINE HCL 10 MG
10 TABLET ORAL 3 TIMES DAILY PRN
Qty: 20 TABLET | Refills: 0 | Status: SHIPPED | OUTPATIENT
Start: 2024-11-11

## 2024-11-11 NOTE — PATIENT INSTRUCTIONS
New Medications Ordered This Visit   Medications    predniSONE 20 mg tablet     Sig: Take 2 tablets (40 mg total) by mouth daily for 4 days     Dispense:  8 tablet     Refill:  0    cyclobenzaprine (FLEXERIL) 10 mg tablet     Sig: Take 1 tablet (10 mg total) by mouth 3 (three) times a day as needed for muscle spasms     Dispense:  20 tablet     Refill:  0     Take muscle relaxant as needed for muscle spasms and pain.   Do not mix with alcohol or other medications that cause drowsiness.   Do not take before driving or operating heavy machinery.     See PCP if not better in one week. Return sooner if worsening pain, new weakness, numbness, tingling, foot drop, fever, incontinence.

## 2024-11-11 NOTE — PROGRESS NOTES
[facility]        NAME: Cori Pleitez is a 74 y.o. female  : 1950    MRN: [id number]  DATE: 2024  TIME: 10:33 AM    Assessment and Plan   Acute right-sided low back pain without sciatica [M54.50]  1. Acute right-sided low back pain without sciatica  predniSONE 20 mg tablet    cyclobenzaprine (FLEXERIL) 10 mg tablet            Patient Instructions       Patient Instructions     New Medications Ordered This Visit   Medications    predniSONE 20 mg tablet     Sig: Take 2 tablets (40 mg total) by mouth daily for 4 days     Dispense:  8 tablet     Refill:  0    cyclobenzaprine (FLEXERIL) 10 mg tablet     Sig: Take 1 tablet (10 mg total) by mouth 3 (three) times a day as needed for muscle spasms     Dispense:  20 tablet     Refill:  0     Take muscle relaxant as needed for muscle spasms and pain.   Do not mix with alcohol or other medications that cause drowsiness.   Do not take before driving or operating heavy machinery.     See PCP if not better in one week. Return sooner if worsening pain, new weakness, numbness, tingling, foot drop, fever, incontinence.      If tests are performed, our office will contact you with results only if changes need to made to the care plan discussed with you at the visit. [facility]    Chief Complaint     Chief Complaint   Patient presents with    Back Pain     Pt states she has had back pain for a week and recently had an injury to her lower back. Pt having sharp lower back pain on the right.          History of Present Illness     She presents with right low back pain which radiates to right upper buttocks.   One week ago tweaked back but did raking, strength training, massage therapy no problems.   Last night bending over to load  and felt sudden sharp pain.   Pain with laying, sitting, standing, walking.  Known protrusion of L5 from past MRI.   Took ibuprofen 600mg, alternating heat and ice.    Then took 40mg prednisone, tylenol  Now feeling slightly better, was able to sleep last night, pain is tolerable. Still moderate pain with twisting, turning, sudden movement.   No numbness, tingling, weakness of lower extremities, saddle anesthesia, incontinence of bowel/bladder.  Monitoring blood sugars closely- slight raise to 130.   She has had flare in the past treated in urgent care, best results with prednisone burst and prn flexeril which she tolerates well.    Back Pain  This is a recurrent problem. The current episode started yesterday. The problem occurs intermittently. The problem has been rapidly worsening since onset. The pain is present in the gluteal and sacro-iliac. The quality of the pain is described as shooting and stabbing. The pain does not radiate. The pain is at a severity of 8/10. The pain is The same all the time. The symptoms are aggravated by coughing, position, sitting, standing and twisting. Stiffness is present All day. Pertinent negatives include no abdominal pain, bladder incontinence, bowel incontinence, chest pain, dysuria, fever, headaches, leg pain, numbness, paresis, paresthesias, pelvic pain, perianal numbness, tingling, weakness or weight loss. Risk factors include history of cancer and history of steroid use.       Review of Systems   Review of Systems   Constitutional:  Negative for activity change, chills, fever and weight loss.   Respiratory:  Negative for shortness of breath.    Cardiovascular:  Negative for chest pain.   Gastrointestinal:  Negative for abdominal pain and bowel incontinence.   Genitourinary:  Negative for bladder incontinence, dysuria and pelvic pain.   Musculoskeletal:  Positive for back pain.   Neurological:  Negative for tingling, weakness, numbness, headaches and paresthesias.         Current Medications       Current Outpatient Medications:     albuterol (PROVENTIL HFA,VENTOLIN HFA) 90 mcg/act inhaler, Inhale 2 puffs every 6 (six) hours as needed  "for wheezing, Disp: 18 g, Rfl: 2    Calcium-Magnesium-Vitamin D (CALCIUM 1200+D3 PO), , Disp: , Rfl:     clobetasol (TEMOVATE) 0.05 % ointment, Apply topically 2 (two) times a day Taking once daily, Disp: 30 g, Rfl: 5    Continuous Glucose  (Dexcom G7 ) SMITH, USE CONTINUOUSLY AS DIRECTED, Disp: 1 each, Rfl: 3    Continuous Glucose Sensor (Dexcom G7 Sensor), Use 1 Device every 10 days, Disp: 9 each, Rfl: 1    cyclobenzaprine (FLEXERIL) 10 mg tablet, Take 1 tablet (10 mg total) by mouth 3 (three) times a day as needed for muscle spasms, Disp: 20 tablet, Rfl: 0    ezetimibe (ZETIA) 10 mg tablet, Take 1 tablet (10 mg total) by mouth daily, Disp: 90 tablet, Rfl: 3    FREESTYLE LITE test strip, USE 1 STRIP THREE TIMES A DAY AS INSTRUCTED, Disp: 300 strip, Rfl: 3    furosemide (LASIX) 20 mg tablet, Take 1 tablet (20 mg total) by mouth daily (Patient taking differently: Take 20 mg by mouth daily Taking as PRN), Disp: 90 tablet, Rfl: 3    Insulin Pen Needle (BD Pen Needle Dulce U/F) 32G X 4 MM MISC, Use daily as needed with insulin pen, Disp: 100 each, Rfl: 2    Insulin Syringe-Needle U-100 (INSULIN SYRINGE 1CC/31GX5/16\") 31G X 5/16\" 1 ML MISC, BD Veo Insulin Syringe Ultra-Fine 0.3 mL 31 gauge x 15/64\", Disp: , Rfl:     KRILL OIL PO, Take by mouth, Disp: , Rfl:     levothyroxine 75 mcg tablet, Take 1 tablet (75 mcg total) by mouth daily, Disp: 90 tablet, Rfl: 3    linaCLOtide (Linzess) 145 MCG CAPS, TAKE 1 CAPSULE EVERY MORNING, Disp: 90 capsule, Rfl: 1    Magnesium Oxide 400 MG CAPS, Take by mouth, Disp: , Rfl:     metFORMIN (GLUCOPHAGE-XR) 500 mg 24 hr tablet, Take 1 tab in am and 2 tabs in pm, Disp: 270 tablet, Rfl: 1    Mometasone Furo-Formoterol Fum (Dulera) 50-5 MCG/ACT AERO, Inhale 2 puffs 2 (two) times a day Rinse mouth after use., Disp: 390 g, Rfl: 1    Omega-3 Fatty Acids (FISH OIL) 1200 MG CAPS, Take by mouth, Disp: , Rfl:     pantoprazole (PROTONIX) 40 mg tablet, Take 1 tablet (40 mg total) by " mouth daily, Disp: 90 tablet, Rfl: 3    potassium chloride (MICRO-K) 10 MEQ CR capsule, Take 1 capsule (10 mEq total) by mouth daily, Disp: 90 capsule, Rfl: 3    predniSONE 20 mg tablet, Take 2 tablets (40 mg total) by mouth daily for 4 days, Disp: 8 tablet, Rfl: 0    rosuvastatin (CRESTOR) 20 MG tablet, Take 1 tablet (20 mg total) by mouth daily, Disp: 90 tablet, Rfl: 3    saccharomyces boulardii (FLORASTOR) 250 mg capsule, Take 250 mg by mouth daily  , Disp: , Rfl:     tirzepatide 10 MG/0.5ML, Inject 0.5 mL (10 mg total) under the skin every 7 days, Disp: 6 mL, Rfl: 1    Current Allergies     Allergies as of 11/11/2024 - Reviewed 11/11/2024   Allergen Reaction Noted    Amoxicillin-pot clavulanate Anaphylaxis, Hives, Itching, and Facial Swelling     Erythromycin Hives, Itching, Swelling, Vomiting, Throat Swelling, Nasal Congestion, and Facial Swelling     Meperidine Anaphylaxis 03/23/2017    Morphine Hives, Itching, Swelling, Other (See Comments), and Syncope     Penicillins Anaphylaxis, Itching, Swelling, and Hives 03/19/2014    Relafen [nabumetone] Hives, Itching, and Swelling 03/19/2014    Sulfa antibiotics Hives, Itching, Rash, and Swelling     Darvon [propoxyphene] Hives 02/05/2018    Methocarbamol Other (See Comments) 03/19/2014    Reglan [metoclopramide] Anxiety 06/27/2018    Tetracycline Rash 06/09/2021    Tetracyclines & related Rash 08/21/2017            The following portions of the patient's history were reviewed and updated as appropriate: allergies, current medications, past family history, past medical history, past social history, past surgical history and problem list.     Past Medical History:   Diagnosis Date    Abnormal mammogram     Abnormal weight gain     Achilles tendinitis     Allergic 1952    since childhood    Arthritis 1988    Asthma     Cancer (HCC) 2018    Endometrial adenoma    Combined forms of age-related cataract of both eyes 08/18/2021    Added per ICD-10-CM coding guidelines -  provider accepted    Diabetes mellitus (HCC)     Disc degeneration, lumbar     Disease of thyroid gland     hypo    Dyslipidemia     Dyslipidemia, goal LDL below 70 2018    Early satiety     Edema     idiopathic peripheral    Encounter for follow-up examination after completed treatment for malignant neoplasm 2019    Encounter for gynecological examination without abnormal finding 2020    Endometrial cancer (HCC) 2018    Enthesopathy     hip region    Esophagitis, reflux     GERD (gastroesophageal reflux disease)     Hard to intubate     difficulty with intubation and had to be intubated twice    Heart murmur     HL (hearing loss)     Hypercholesterolemia     Hypertension     IBS (irritable bowel syndrome)     Irritable bowel syndrome     Morbid (severe) obesity due to excess calories (HCC) 2022    As per CMS ICD10 guidelines:Provider accepted    Obesity 1998    Obesity, Class I, BMI 30-34.9 2015    Osteoarthritis     Pneumonia     Rosacea     Sacroiliitis (HCC)     Shingles     Sleep apnea, obstructive     Varicella        Past Surgical History:   Procedure Laterality Date    CATARACT EXTRACTION Right 2024    CATARACT EXTRACTION Left 2024     SECTION      x2    COLONOSCOPY      ELBOW SURGERY      left ulna/radius    ESOPHAGOGASTRODUODENOSCOPY      FOOT SURGERY Right     FRACTURE SURGERY      FRACTURE SURGERY Left     tibia    HYSTERECTOMY Bilateral 2018    JOINT REPLACEMENT Left     shoulder,  right knee    KNEE ARTHROSCOPY      KNEE ARTHROSCOPY W/ MENISCAL REPAIR Right     AR LAPS TOTAL HYSTERECT 250 GM/< W/RMVL TUBE/OVARY N/A 2018    Procedure: ROBOTIC TOTAL LAPAROSCOPIC HYSTERECTOMY, BILATERAL SALPINGOOPHORECTOMY, BILATERAL PELVIC LYMPHNODE DISSECTION;  Surgeon: Андрей Meng MD;  Location: [facility];  Service: Gynecology Oncology    REPLACEMENT TOTAL KNEE Right     SHOULDER ARTHROPLASTY      SINUS SURGERY      TONSILLECTOMY      TUBAL  LIGATION      WRIST SURGERY      ganglonic cyst       Family History   Problem Relation Age of Onset    Arthritis Mother     Heart disease Mother         cardiac disorder    Diabetes Mother     Hiatal hernia Mother     Hypertension Mother     Irritable bowel syndrome Mother     Breast cancer Mother 66        x2    Uterine cancer Mother     Osteoporosis Mother     Thyroid disease Mother     Other Mother         gastric reflux    Stroke Mother     Hyperlipidemia Mother     Cancer Mother         ENDOMETRIAL    Diabetes type II Mother     Thyroid disease unspecified Mother     Hypothyroidism Mother     Heart disease Father         cardiac disorder    Hiatal hernia Father     Ulcers Father     Other Father         gastric reflux    Coronary artery disease Father     Hearing loss Father     Heart attack Father     Heart failure Father     Hiatal hernia Sister     Thyroid disease Sister     Other Sister         gastric reflux    Lung cancer Sister 77    Cancer Sister         lung adenocarcinoma, mets to meninges    Thyroid disease unspecified Sister     Hypothyroidism Sister     Hyperlipidemia Sister     Arthritis Sister     Irritable bowel syndrome Daughter     Hyperlipidemia Daughter     Brain cancer Maternal Grandmother     Breast cancer Maternal Grandmother         uknown    No Known Problems Maternal Grandfather     No Known Problems Paternal Grandmother     Hearing loss Paternal Grandfather     No Known Problems Brother     Malig Hypertension Son     Hiatal hernia Child     Other Child         gastric reflux    Breast cancer Maternal Aunt 65    Uterine cancer Maternal Aunt         x3 sis    Stroke Maternal Aunt     Cancer Maternal Aunt     Hyperlipidemia Maternal Aunt     Breast cancer Maternal Aunt 55    Cancer Maternal Aunt     Hyperlipidemia Maternal Aunt     Breast cancer Maternal Aunt 55    Cancer Maternal Aunt     Hyperlipidemia Maternal Aunt     No Known Problems Paternal Aunt     Colon cancer Neg Hx      Ovarian cancer Neg Hx     Cervical cancer Neg Hx          Medications have been verified.        Objective   /72   Pulse 92   Temp 97.8 °F (36.6 °C)   Resp 18   Wt 59.4 kg (131 lb)   LMP  (LMP Unknown)   SpO2 98%   BMI 23.96 kg/m²        Physical Exam     Physical Exam  Vitals and nursing note reviewed.   Constitutional:       General: She is not in acute distress.     Appearance: Normal appearance. She is not ill-appearing.   HENT:      Head: Normocephalic and atraumatic.      Right Ear: External ear normal.      Left Ear: External ear normal.      Nose: Nose normal.   Eyes:      Extraocular Movements: Extraocular movements intact.      Pupils: Pupils are equal, round, and reactive to light.   Cardiovascular:      Rate and Rhythm: Normal rate and regular rhythm.      Heart sounds: Normal heart sounds.   Pulmonary:      Effort: Pulmonary effort is normal. No respiratory distress.      Breath sounds: Normal breath sounds.   Abdominal:      General: There is no distension.      Palpations: Abdomen is soft.   Musculoskeletal:      Cervical back: No deformity.      Lumbar back: Spasms (Mild, b/l lower back) and tenderness (Midline L5-S1, right lower lumbar paraspinal) present. Decreased range of motion (Limited twisting. Able to ambulate and change positions, sit on exam table.). Negative right straight leg raise test and negative left straight leg raise test.   Skin:     General: Skin is warm.      Capillary Refill: Capillary refill takes less than 2 seconds.   Neurological:      General: No focal deficit present.      Mental Status: She is alert and oriented to person, place, and time.      Sensory: Sensation is intact.      Motor: Motor function is intact.      Coordination: Coordination is intact.   Psychiatric:         Mood and Affect: Mood normal.         Behavior: Behavior normal.

## 2024-11-13 ENCOUNTER — OFFICE VISIT (OUTPATIENT)
Dept: CARDIOLOGY CLINIC | Facility: CLINIC | Age: 74
End: 2024-11-13
Payer: COMMERCIAL

## 2024-11-13 VITALS
SYSTOLIC BLOOD PRESSURE: 118 MMHG | OXYGEN SATURATION: 98 % | WEIGHT: 128 LBS | HEART RATE: 89 BPM | BODY MASS INDEX: 23.55 KG/M2 | RESPIRATION RATE: 16 BRPM | HEIGHT: 62 IN | DIASTOLIC BLOOD PRESSURE: 76 MMHG

## 2024-11-13 DIAGNOSIS — R09.89 BRUIT OF LEFT CAROTID ARTERY: ICD-10-CM

## 2024-11-13 DIAGNOSIS — E78.5 DYSLIPIDEMIA: ICD-10-CM

## 2024-11-13 DIAGNOSIS — R06.02 SHORTNESS OF BREATH: Primary | ICD-10-CM

## 2024-11-13 DIAGNOSIS — I10 BENIGN ESSENTIAL HYPERTENSION: ICD-10-CM

## 2024-11-13 PROCEDURE — 99214 OFFICE O/P EST MOD 30 MIN: CPT | Performed by: INTERNAL MEDICINE

## 2024-11-13 NOTE — ASSESSMENT & PLAN NOTE
Patient is on rosuvastatin as well as Zetia.    Patient is also followed by endocrinology for diabetes mellitus and her sugars have significantly improved on the new regimen.    Symptoms to watch out from cardiac standpoint which would indicate the need for further cardiac evaluation discussed with patient.  Follow-up in 6 months or earlier as needed.  Patient is agreeable with the plan of care.

## 2024-11-13 NOTE — ASSESSMENT & PLAN NOTE
Blood pressures are stable.  Patient is off medication as she lost close to 80 pounds.  Patient is only on diuretics as needed presently.

## 2024-11-13 NOTE — PROGRESS NOTES
[facility] [place]  [address]  [place] [address]  Cardiology Follow Up    Cori Pleitez  1950  [id number]      Assessment & Plan  Shortness of breath  Patient has symptoms of shortness of breath and has risk factors for coronary artery disease.  No recent functional cardiac evaluation.  Patient will be scheduled for a stress echocardiogram to evaluate for exercise capacity as well as ischemia.  Sensitivity and specificity as well as limitations of cardiac modalities of imaging discussed with patient.  Continue diet and risk factor modification.  Bruit of left carotid artery  Check carotid ultrasound  Benign essential hypertension  Blood pressures are stable.  Patient is off medication as she lost close to 80 pounds.  Patient is only on diuretics as needed presently.  Dyslipidemia  Patient is on rosuvastatin as well as Zetia.    Patient is also followed by endocrinology for diabetes mellitus and her sugars have significantly improved on the new regimen.    Symptoms to watch out from cardiac standpoint which would indicate the need for further cardiac evaluation discussed with patient.  Follow-up in 6 months or earlier as needed.  Patient is agreeable with the plan of care.       Chief Complaint   Patient presents with    Follow-up       Interval History:   Patient presents for follow-up visit.  Patient denies any history of chest pain .  Patient denies any history of leg edema or orthopnea PND.  No history of presyncope syncope.  Patient states compliance with the present list of medications.    Patient has been followed by endocrinology and has lost approximately 70 to 80 pounds and is feeling good about the same.  She is presently off the insulin.  She does have some shortness of breath with exertion.  No recent functional cardiac evaluation.    Patient Active Problem List   Diagnosis    Dysmetabolic syndrome X    Benign essential hypertension    Lichen sclerosus     History of endometrial cancer    SOB (shortness of breath)    Asymptomatic postmenopausal status    At risk for sleep apnea    Dyslipidemia    Asthma    Gastroesophageal reflux disease without esophagitis    Acquired hypothyroidism    Status post total right knee replacement    Spondylosis of lumbosacral region    Irritable bowel syndrome without diarrhea    Coronary atherosclerosis due to calcified coronary lesion    Chronic pain of both knees    Obstructive sleep apnea    Endometrial cancer (HCC)    Type 2 diabetes mellitus without complication, without long-term current use of insulin (HCC)    Cortical age-related cataract of both eyes    Bilateral hip pain    PND (post-nasal drip)    Hearing loss of right ear    Nasal septal perforation    Low back pain    Vitamin D deficiency    Tibialis posterior tendinitis    Osteoarthritis of ankle or foot     Past Medical History:   Diagnosis Date    Abnormal mammogram     Abnormal weight gain     Achilles tendinitis     Allergic 1952    since childhood    Arthritis 1988    Asthma     Cancer (HCC) 2018    Endometrial adenoma    Combined forms of age-related cataract of both eyes 08/18/2021    Added per ICD-10-CM coding guidelines - provider accepted    Diabetes mellitus (HCC)     Disc degeneration, lumbar     Disease of thyroid gland     hypo    Dyslipidemia     Dyslipidemia, goal LDL below 70 03/27/2018    Early satiety     Edema     idiopathic peripheral    Encounter for follow-up examination after completed treatment for malignant neoplasm 03/17/2019    Encounter for gynecological examination without abnormal finding 06/22/2020    Endometrial cancer (HCC) 2018    Enthesopathy     hip region    Esophagitis, reflux     GERD (gastroesophageal reflux disease)     Hard to intubate     difficulty with intubation and had to be intubated twice    Heart murmur 2018    HL (hearing loss) 2015    Hypercholesterolemia     Hypertension     IBS (irritable bowel syndrome)      Irritable bowel syndrome     Morbid (severe) obesity due to excess calories (HCC) 04/08/2022    As per CMS ICD10 guidelines:Provider accepted    Obesity 1998    Obesity, Class I, BMI 30-34.9 01/07/2015    Osteoarthritis     Pneumonia     Rosacea     Sacroiliitis (HCC)     Shingles     Sleep apnea, obstructive     Varicella      Social History     Socioeconomic History    Marital status:      Spouse name: Not on file    Number of children: 3    Years of education: Not on file    Highest education level: Not on file   Occupational History    Occupation: nurse     Comment: full-time   Tobacco Use    Smoking status: Never     Passive exposure: Never    Smokeless tobacco: Never   Vaping Use    Vaping status: Never Used   Substance and Sexual Activity    Alcohol use: Yes     Alcohol/week: 4.0 standard drinks of alcohol     Types: 2 Glasses of wine, 2 Standard drinks or equivalent per week    Drug use: No    Sexual activity: Not Currently     Partners: Female     Birth control/protection: Post-menopausal, Surgical, Female Sterilization   Other Topics Concern    Not on file   Social History Narrative    Active advance directive    DME- freestyle lite blood glucose meter device kit QID     Social Drivers of Health     Financial Resource Strain: Low Risk  (7/1/2024)    Received from [facility]    Overall Financial Resource Strain (CARDIA)     Difficulty of Paying Living Expenses: Not very hard   Food Insecurity: No Food Insecurity (7/1/2024)    Received from [facility]    Hunger Vital Sign     Worried About Running Out of Food in the Last Year: Never true     Ran Out of Food in the Last Year: Never true   Transportation Needs: No Transportation Needs (7/1/2024)    Received from [facility]    PRAPARE - Transportation     Lack of Transportation (Medical): No     Lack of Transportation (Non-Medical): No   Physical Activity: Inactive (10/11/2021)    Exercise Vital Sign      Days of Exercise per Week: 0 days     Minutes of Exercise per Session: 0 min   Stress: No Stress Concern Present (10/11/2021)    [employer] - Occupational Stress Questionnaire     Feeling of Stress : Only a little   Social Connections: Unknown (6/18/2024)    Received from [employer]     How often do you feel lonely or isolated from those around you? (Adult - for ages 18 years and over): Not on file   Intimate Partner Violence: Not At Risk (7/1/2024)    Received from [facility]    Humiliation, Afraid, Rape, and Kick questionnaire     Fear of Current or Ex-Partner: No     Emotionally Abused: No     Physically Abused: No     Sexually Abused: No   Housing Stability: Low Risk  (7/1/2024)    Received from [facility]    Housing Stability Vital Sign     Unable to Pay for Housing in the Last Year: No     Number of Times Moved in the Last Year: 0     Homeless in the Last Year: No      Family History   Problem Relation Age of Onset    Arthritis Mother     Heart disease Mother         cardiac disorder    Diabetes Mother     Hiatal hernia Mother     Hypertension Mother     Irritable bowel syndrome Mother     Breast cancer Mother 66        x2    Uterine cancer Mother     Osteoporosis Mother     Thyroid disease Mother     Other Mother         gastric reflux    Stroke Mother     Hyperlipidemia Mother     Cancer Mother         ENDOMETRIAL    Diabetes type II Mother     Thyroid disease unspecified Mother     Hypothyroidism Mother     Heart disease Father         cardiac disorder    Hiatal hernia Father     Ulcers Father     Other Father         gastric reflux    Coronary artery disease Father     Hearing loss Father     Heart attack Father     Heart failure Father     Hiatal hernia Sister     Thyroid disease Sister     Other Sister         gastric reflux    Lung cancer Sister 77    Cancer Sister         lung adenocarcinoma, mets to meninges     Thyroid disease unspecified Sister     Hypothyroidism Sister     Hyperlipidemia Sister     Arthritis Sister     Irritable bowel syndrome Daughter     Hyperlipidemia Daughter     Brain cancer Maternal Grandmother     Breast cancer Maternal Grandmother         uknown    No Known Problems Maternal Grandfather     No Known Problems Paternal Grandmother     Hearing loss Paternal Grandfather     No Known Problems Brother     Malig Hypertension Son     Hiatal hernia Child     Other Child         gastric reflux    Breast cancer Maternal Aunt 65    Uterine cancer Maternal Aunt         x3 sis    Stroke Maternal Aunt     Cancer Maternal Aunt     Hyperlipidemia Maternal Aunt     Breast cancer Maternal Aunt 55    Cancer Maternal Aunt     Hyperlipidemia Maternal Aunt     Breast cancer Maternal Aunt 55    Cancer Maternal Aunt     Hyperlipidemia Maternal Aunt     No Known Problems Paternal Aunt     Colon cancer Neg Hx     Ovarian cancer Neg Hx     Cervical cancer Neg Hx      Past Surgical History:   Procedure Laterality Date    CATARACT EXTRACTION Right 2024    CATARACT EXTRACTION Left 2024     SECTION      x2    COLONOSCOPY      ELBOW SURGERY      left ulna/radius    ESOPHAGOGASTRODUODENOSCOPY      FOOT SURGERY Right     FRACTURE SURGERY      FRACTURE SURGERY Left     tibia    HYSTERECTOMY Bilateral 2018    JOINT REPLACEMENT Left     shoulder,  right knee    KNEE ARTHROSCOPY      KNEE ARTHROSCOPY W/ MENISCAL REPAIR Right     SD LAPS TOTAL HYSTERECT 250 GM/< W/RMVL TUBE/OVARY N/A 2018    Procedure: ROBOTIC TOTAL LAPAROSCOPIC HYSTERECTOMY, BILATERAL SALPINGOOPHORECTOMY, BILATERAL PELVIC LYMPHNODE DISSECTION;  Surgeon: Андрей Meng MD;  [facility];  Service: Gynecology Oncology    REPLACEMENT TOTAL KNEE Right     SHOULDER ARTHROPLASTY      SINUS SURGERY      TONSILLECTOMY      TUBAL LIGATION      WRIST SURGERY      ganglonic cyst       Current Outpatient Medications:     albuterol  "(PROVENTIL HFA,VENTOLIN HFA) 90 mcg/act inhaler, Inhale 2 puffs every 6 (six) hours as needed for wheezing, Disp: 18 g, Rfl: 2    Calcium-Magnesium-Vitamin D (CALCIUM 1200+D3 PO), , Disp: , Rfl:     clobetasol (TEMOVATE) 0.05 % ointment, Apply topically 2 (two) times a day Taking once daily, Disp: 30 g, Rfl: 5    Continuous Glucose  (Dexcom G7 ) SMITH, USE CONTINUOUSLY AS DIRECTED, Disp: 1 each, Rfl: 3    Continuous Glucose Sensor (Dexcom G7 Sensor), Use 1 Device every 10 days, Disp: 9 each, Rfl: 1    cyclobenzaprine (FLEXERIL) 10 mg tablet, Take 1 tablet (10 mg total) by mouth 3 (three) times a day as needed for muscle spasms, Disp: 20 tablet, Rfl: 0    ezetimibe (ZETIA) 10 mg tablet, Take 1 tablet (10 mg total) by mouth daily, Disp: 90 tablet, Rfl: 3    FREESTYLE LITE test strip, USE 1 STRIP THREE TIMES A DAY AS INSTRUCTED, Disp: 300 strip, Rfl: 3    furosemide (LASIX) 20 mg tablet, Take 1 tablet (20 mg total) by mouth daily, Disp: 90 tablet, Rfl: 3    Insulin Pen Needle (BD Pen Needle Dulce U/F) 32G X 4 MM MISC, Use daily as needed with insulin pen, Disp: 100 each, Rfl: 2    Insulin Syringe-Needle U-100 (INSULIN SYRINGE 1CC/31GX5/16\") 31G X 5/16\" 1 ML MISC, BD Veo Insulin Syringe Ultra-Fine 0.3 mL 31 gauge x 15/64\", Disp: , Rfl:     KRILL OIL PO, Take by mouth, Disp: , Rfl:     levothyroxine 75 mcg tablet, Take 1 tablet (75 mcg total) by mouth daily, Disp: 90 tablet, Rfl: 3    linaCLOtide (Linzess) 145 MCG CAPS, TAKE 1 CAPSULE EVERY MORNING, Disp: 90 capsule, Rfl: 1    Magnesium Oxide 400 MG CAPS, Take by mouth, Disp: , Rfl:     metFORMIN (GLUCOPHAGE-XR) 500 mg 24 hr tablet, Take 1 tab in am and 2 tabs in pm (Patient taking differently: Take 1 tab in am and 1 tabs in pm), Disp: 270 tablet, Rfl: 1    Mometasone Furo-Formoterol Fum (Dulera) 50-5 MCG/ACT AERO, Inhale 2 puffs 2 (two) times a day Rinse mouth after use., Disp: 390 g, Rfl: 1    Omega-3 Fatty Acids (FISH OIL) 1200 MG CAPS, Take by mouth, " Disp: , Rfl:     pantoprazole (PROTONIX) 40 mg tablet, Take 1 tablet (40 mg total) by mouth daily, Disp: 90 tablet, Rfl: 3    potassium chloride (MICRO-K) 10 MEQ CR capsule, Take 1 capsule (10 mEq total) by mouth daily, Disp: 90 capsule, Rfl: 3    predniSONE 20 mg tablet, Take 2 tablets (40 mg total) by mouth daily for 4 days, Disp: 8 tablet, Rfl: 0    rosuvastatin (CRESTOR) 20 MG tablet, Take 1 tablet (20 mg total) by mouth daily, Disp: 90 tablet, Rfl: 3    saccharomyces boulardii (FLORASTOR) 250 mg capsule, Take 250 mg by mouth daily  , Disp: , Rfl:     tirzepatide 10 MG/0.5ML, Inject 0.5 mL (10 mg total) under the skin every 7 days, Disp: 6 mL, Rfl: 1  Allergies   Allergen Reactions    Amoxicillin-Pot Clavulanate Anaphylaxis, Hives, Itching and Facial Swelling    Erythromycin Hives, Itching, Swelling, Vomiting, Throat Swelling, Nasal Congestion and Facial Swelling    Meperidine Anaphylaxis    Morphine Hives, Itching, Swelling, Other (See Comments) and Syncope     hypotension    Penicillins Anaphylaxis, Itching, Swelling and Hives    Relafen [Nabumetone] Hives, Itching and Swelling    Sulfa Antibiotics Hives, Itching, Rash and Swelling    Darvon [Propoxyphene] Hives    Methocarbamol Other (See Comments)     Double vision    Reglan [Metoclopramide] Anxiety    Tetracycline Rash    Tetracyclines & Related Rash       Labs:  Appointment on 09/22/2024   Component Date Value    Sodium 09/22/2024 139     Potassium 09/22/2024 4.1     Chloride 09/22/2024 105     CO2 09/22/2024 28     ANION GAP 09/22/2024 6     BUN 09/22/2024 24     Creatinine 09/22/2024 0.78     Glucose, Fasting 09/22/2024 91     Calcium 09/22/2024 9.5     eGFR 09/22/2024 74      Imaging: No results found.    Review of Systems:  Review of Systems  REVIEW OF SYSTEMS:  Constitutional:  Denies fever or chills   Eyes:  Denies change in visual acuity   HENT:  Denies nasal congestion or sore throat   Respiratory:   shortness of breath   Cardiovascular:  Denies  "chest pain or edema   GI:  Denies abdominal pain, nausea, vomiting, bloody stools or diarrhea   :  Denies dysuria, frequency, difficulty in micturition and nocturia  Musculoskeletal:  Denies back pain or joint pain   Neurologic:  Denies headache, focal weakness or sensory changes   Endocrine:  Denies polyuria or polydipsia   Lymphatic:  Denies swollen glands   Psychiatric:  Denies depression or anxiety    Physical Exam:    /76 (BP Location: Right arm, Patient Position: Sitting, Cuff Size: Standard)   Pulse 89   Resp 16   Ht 5' 2\" (1.575 m)   Wt 58.1 kg (128 lb)   LMP  (LMP Unknown)   SpO2 98%   BMI 23.41 kg/m²     Physical Exam  PHYSICAL EXAM:  General:  Patient is not in acute distress   Head: Normocephalic, Atraumatic.  HEENT:  Both pupils normal-size atraumatic, normocephalic, nonicteric  Neck:  JVP not raised. Trachea central.  Left carotid bruit  Respiratory:  normal breath sounds no crackles. no rhonchi  Cardiovascular:  Regular rate and rhythm no S3 no murmurs  GI:  Abdomen soft nontender. No organomegaly.   Lymphatic:  No cervical or inguinal lymphadenopathy  Neurologic:  Patient is awake alert, oriented . Grossly nonfocal   no edema        "

## 2024-12-02 ENCOUNTER — ANESTHESIA (OUTPATIENT)
Dept: GASTROENTEROLOGY | Facility: HOSPITAL | Age: 74
End: 2024-12-02
Payer: COMMERCIAL

## 2024-12-02 ENCOUNTER — HOSPITAL ENCOUNTER (OUTPATIENT)
Dept: GASTROENTEROLOGY | Facility: HOSPITAL | Age: 74
Setting detail: OUTPATIENT SURGERY
Discharge: HOME | End: 2024-12-02
Attending: INTERNAL MEDICINE
Payer: COMMERCIAL

## 2024-12-02 ENCOUNTER — ANESTHESIA EVENT (OUTPATIENT)
Dept: GASTROENTEROLOGY | Facility: HOSPITAL | Age: 74
End: 2024-12-02
Payer: COMMERCIAL

## 2024-12-02 VITALS
HEIGHT: 62 IN | HEART RATE: 63 BPM | WEIGHT: 128.31 LBS | BODY MASS INDEX: 23.61 KG/M2 | RESPIRATION RATE: 18 BRPM | SYSTOLIC BLOOD PRESSURE: 139 MMHG | DIASTOLIC BLOOD PRESSURE: 67 MMHG | OXYGEN SATURATION: 100 % | TEMPERATURE: 97.8 F

## 2024-12-02 DIAGNOSIS — Z86.0100 HISTORY OF COLON POLYPS: ICD-10-CM

## 2024-12-02 LAB — GLUCOSE SERPL-MCNC: 78 MG/DL (ref 65–140)

## 2024-12-02 PROCEDURE — 88305 TISSUE EXAM BY PATHOLOGIST: CPT | Performed by: PATHOLOGY

## 2024-12-02 PROCEDURE — 82948 REAGENT STRIP/BLOOD GLUCOSE: CPT

## 2024-12-02 PROCEDURE — 45380 COLONOSCOPY AND BIOPSY: CPT | Performed by: INTERNAL MEDICINE

## 2024-12-02 PROCEDURE — 45385 COLONOSCOPY W/LESION REMOVAL: CPT | Performed by: INTERNAL MEDICINE

## 2024-12-02 RX ORDER — LIDOCAINE HYDROCHLORIDE 10 MG/ML
0.5 INJECTION, SOLUTION EPIDURAL; INFILTRATION; INTRACAUDAL; PERINEURAL ONCE AS NEEDED
Status: DISCONTINUED | OUTPATIENT
Start: 2024-12-02 | End: 2024-12-06 | Stop reason: HOSPADM

## 2024-12-02 RX ORDER — LIDOCAINE HYDROCHLORIDE 20 MG/ML
INJECTION, SOLUTION EPIDURAL; INFILTRATION; INTRACAUDAL; PERINEURAL AS NEEDED
Status: DISCONTINUED | OUTPATIENT
Start: 2024-12-02 | End: 2024-12-02

## 2024-12-02 RX ORDER — SODIUM CHLORIDE, SODIUM LACTATE, POTASSIUM CHLORIDE, CALCIUM CHLORIDE 600; 310; 30; 20 MG/100ML; MG/100ML; MG/100ML; MG/100ML
75 INJECTION, SOLUTION INTRAVENOUS CONTINUOUS
Status: DISCONTINUED | OUTPATIENT
Start: 2024-12-02 | End: 2024-12-06 | Stop reason: HOSPADM

## 2024-12-02 RX ORDER — SODIUM CHLORIDE, SODIUM LACTATE, POTASSIUM CHLORIDE, CALCIUM CHLORIDE 600; 310; 30; 20 MG/100ML; MG/100ML; MG/100ML; MG/100ML
125 INJECTION, SOLUTION INTRAVENOUS CONTINUOUS
Status: DISCONTINUED | OUTPATIENT
Start: 2024-12-02 | End: 2024-12-03 | Stop reason: SDUPTHER

## 2024-12-02 RX ORDER — PROPOFOL 10 MG/ML
INJECTION, EMULSION INTRAVENOUS AS NEEDED
Status: DISCONTINUED | OUTPATIENT
Start: 2024-12-02 | End: 2024-12-02

## 2024-12-02 RX ADMIN — PROPOFOL 20 MG: 10 INJECTION, EMULSION INTRAVENOUS at 07:32

## 2024-12-02 RX ADMIN — PROPOFOL 30 MG: 10 INJECTION, EMULSION INTRAVENOUS at 07:22

## 2024-12-02 RX ADMIN — PROPOFOL 30 MG: 10 INJECTION, EMULSION INTRAVENOUS at 07:36

## 2024-12-02 RX ADMIN — PROPOFOL 80 MG: 10 INJECTION, EMULSION INTRAVENOUS at 07:17

## 2024-12-02 RX ADMIN — PROPOFOL 20 MG: 10 INJECTION, EMULSION INTRAVENOUS at 07:29

## 2024-12-02 RX ADMIN — PROPOFOL 30 MG: 10 INJECTION, EMULSION INTRAVENOUS at 07:20

## 2024-12-02 RX ADMIN — PROPOFOL 40 MG: 10 INJECTION, EMULSION INTRAVENOUS at 07:25

## 2024-12-02 RX ADMIN — PROPOFOL 20 MG: 10 INJECTION, EMULSION INTRAVENOUS at 07:27

## 2024-12-02 RX ADMIN — LIDOCAINE HYDROCHLORIDE 60 MG: 20 INJECTION, SOLUTION EPIDURAL; INFILTRATION; INTRACAUDAL; PERINEURAL at 07:17

## 2024-12-02 RX ADMIN — SODIUM CHLORIDE, SODIUM LACTATE, POTASSIUM CHLORIDE, AND CALCIUM CHLORIDE 75 ML/HR: .6; .31; .03; .02 INJECTION, SOLUTION INTRAVENOUS at 06:59

## 2024-12-02 RX ADMIN — PROPOFOL 20 MG: 10 INJECTION, EMULSION INTRAVENOUS at 07:19

## 2024-12-02 NOTE — ANESTHESIA POSTPROCEDURE EVALUATION
Post-Op Assessment Note    CV Status:  Stable    Pain management: adequate       Mental Status:  Alert and awake   Hydration Status:  Euvolemic   PONV Controlled:  Controlled   Airway Patency:  Patent     Post Op Vitals Reviewed: Yes    No anethesia notable event occurred.    Staff: CRNA           Last Filed PACU Vitals:  Vitals Value Taken Time   Temp 97.8 °F (36.6 °C) 12/02/24 0745   Pulse 66 12/02/24 0745   /65 12/02/24 0745   Resp 17 12/02/24 0745   SpO2 100 % 12/02/24 0745       Modified Louie:  Activity: 2 (12/2/2024  7:45 AM)  Respiration: 2 (12/2/2024  7:45 AM)  Circulation: 2 (12/2/2024  7:45 AM)  Consciousness: 1 (12/2/2024  7:45 AM)  Oxygen Saturation: 2 (12/2/2024  7:45 AM)  Modified Louie Score: 9 (12/2/2024  7:45 AM)

## 2024-12-02 NOTE — INTERVAL H&P NOTE
H&P reviewed. After examining the patient I find no changes in the patients condition since the H&P had been written.    Vitals:    12/02/24 0651   BP: 130/62   Pulse: 68   Resp: 12   Temp: 97.8 °F (36.6 °C)   SpO2: 100%

## 2024-12-02 NOTE — ANESTHESIA PREPROCEDURE EVALUATION
Procedure:  COLONOSCOPY    Son with     Relevant Problems   CARDIO   (+) Benign essential hypertension   (+) Coronary atherosclerosis due to calcified coronary lesion      ENDO   (+) Acquired hypothyroidism   (+) Type 2 diabetes mellitus without complication, without long-term current use of insulin (HCC)      GI/HEPATIC   (+) Gastroesophageal reflux disease without esophagitis      GYN   (+) Endometrial cancer (HCC)      MUSCULOSKELETAL   (+) Low back pain   (+) Osteoarthritis of ankle or foot   (+) Spondylosis of lumbosacral region      PULMONARY   (+) Asthma   (+) Obstructive sleep apnea   (+) SOB (shortness of breath)        Physical Exam    Airway    Mallampati score: II  TM Distance: >3 FB  Neck ROM: full     Dental   No notable dental hx     Cardiovascular  Rhythm: regular, Rate: normal, Cardiovascular exam normal    Pulmonary  Pulmonary exam normal Breath sounds clear to auscultation    Other Findings  post-pubertal.      Anesthesia Plan  ASA Score- 3     Anesthesia Type- IV sedation with anesthesia with ASA Monitors.         Additional Monitors:     Airway Plan:     Comment: Discussed risks/benefits, including medication reactions, awareness, aspiration, and serious/life threatening complications. Plan to maintain native airway with IVGA, monitored with EtCO2.       Plan Factors-Exercise tolerance (METS): >4 METS.    Chart reviewed.    Patient summary reviewed.      Patient instructed to abstain from smoking on day of procedure. Patient did not smoke on day of surgery.            Induction- intravenous.    Postoperative Plan-         Informed Consent- Anesthetic plan and risks discussed with patient.  I personally reviewed this patient with the CRNA. Discussed and agreed on the Anesthesia Plan with the CRNA..

## 2024-12-02 NOTE — H&P
History and Physical - [facility]  Cori Pleitez 74 y.o. female MRN: [id number]                  HPI: Cori Pleitez is a 74 y.o. year old female who presents for colonoscopy for history of colon polyps.  Last colonoscopy 5 years ago      REVIEW OF SYSTEMS: Per the HPI, and otherwise unremarkable.    Historical Information   Past Medical History:   Diagnosis Date    Abnormal mammogram     Abnormal weight gain     Achilles tendinitis     Allergic     since childhood    Arthritis     Asthma     Cancer (HCC) 2018    Endometrial adenoma    Combined forms of age-related cataract of both eyes 2021    Added per ICD-10-CM coding guidelines - provider accepted    Diabetes mellitus (HCC)     Disc degeneration, lumbar     Disease of thyroid gland     hypo    Dyslipidemia     Dyslipidemia, goal LDL below 70 2018    Early satiety     Edema     idiopathic peripheral    Encounter for follow-up examination after completed treatment for malignant neoplasm 2019    Encounter for gynecological examination without abnormal finding 2020    Endometrial cancer (HCC) 2018    Enthesopathy     hip region    Esophagitis, reflux     GERD (gastroesophageal reflux disease)     Hard to intubate     difficulty with intubation and had to be intubated twice    Heart murmur     HL (hearing loss)     Hypercholesterolemia     Hypertension     IBS (irritable bowel syndrome)     Irritable bowel syndrome     Morbid (severe) obesity due to excess calories (HCC) 2022    As per CMS ICD10 guidelines:Provider accepted    Obesity 1998    Obesity, Class I, BMI 30-34.9 2015    Osteoarthritis     Pneumonia     Rosacea     Sacroiliitis (HCC)     Shingles     Sleep apnea, obstructive     Varicella      Past Surgical History:   Procedure Laterality Date    CATARACT EXTRACTION Right 2024    CATARACT EXTRACTION Left 2024     SECTION      x2    COLONOSCOPY      ELBOW  SURGERY      left ulna/radius    ESOPHAGOGASTRODUODENOSCOPY      FOOT SURGERY Right     FRACTURE SURGERY      FRACTURE SURGERY Left     tibia    HYSTERECTOMY Bilateral 07/28/2018    JOINT REPLACEMENT Left     shoulder,  right knee    KNEE ARTHROSCOPY      KNEE ARTHROSCOPY W/ MENISCAL REPAIR Right     [place] LAPS TOTAL HYSTERECT 250 GM/< W/RMVL TUBE/OVARY N/A 07/30/2018    Procedure: ROBOTIC TOTAL LAPAROSCOPIC HYSTERECTOMY, BILATERAL SALPINGOOPHORECTOMY, BILATERAL PELVIC LYMPHNODE DISSECTION;  Surgeon: Андрей Meng MD;  Location: [facility];  Service: Gynecology Oncology    REPLACEMENT TOTAL KNEE Right     SHOULDER ARTHROPLASTY      SINUS SURGERY      TONSILLECTOMY      TUBAL LIGATION      WRIST SURGERY      ganglonic cyst     Social History   Social History     Substance and Sexual Activity   Alcohol Use Yes    Alcohol/week: 4.0 standard drinks of alcohol    Types: 2 Glasses of wine, 2 Standard drinks or equivalent per week    Comment: twice a month     Social History     Substance and Sexual Activity   Drug Use No     Social History     Tobacco Use   Smoking Status Never    Passive exposure: Never   Smokeless Tobacco Never     Family History   Problem Relation Age of Onset    Arthritis Mother     Heart disease Mother         cardiac disorder    Diabetes Mother     Hiatal hernia Mother     Hypertension Mother     Irritable bowel syndrome Mother     Breast cancer Mother 66        x2    Uterine cancer Mother     Osteoporosis Mother     Thyroid disease Mother     Other Mother         gastric reflux    Stroke Mother     Hyperlipidemia Mother     Cancer Mother         ENDOMETRIAL    Diabetes type II Mother     Thyroid disease unspecified Mother     Hypothyroidism Mother     Heart disease Father         cardiac disorder    Hiatal hernia Father     Ulcers Father     Other Father         gastric reflux    Coronary artery disease Father     Hearing loss Father     Heart attack Father     Heart failure Father     Hiatal  hernia Sister     Thyroid disease Sister     Other Sister         gastric reflux    Lung cancer Sister 77    Cancer Sister         lung adenocarcinoma, mets to meninges    Thyroid disease unspecified Sister     Hypothyroidism Sister     Hyperlipidemia Sister     Arthritis Sister     Irritable bowel syndrome Daughter     Hyperlipidemia Daughter     Brain cancer Maternal Grandmother     Breast cancer Maternal Grandmother         uknown    No Known Problems Maternal Grandfather     No Known Problems Paternal Grandmother     Hearing loss Paternal Grandfather     No Known Problems Brother     Malrobin Hypertension Son     Hiatal hernia Child     Other Child         gastric reflux    Breast cancer Maternal Aunt 65    Uterine cancer Maternal Aunt         x3 sis    Stroke Maternal Aunt     Cancer Maternal Aunt     Hyperlipidemia Maternal Aunt     Breast cancer Maternal Aunt 55    Cancer Maternal Aunt     Hyperlipidemia Maternal Aunt     Breast cancer Maternal Aunt 55    Cancer Maternal Aunt     Hyperlipidemia Maternal Aunt     No Known Problems Paternal Aunt     Colon cancer Neg Hx     Ovarian cancer Neg Hx     Cervical cancer Neg Hx        Meds/Allergies     Not in a hospital admission.    Allergies   Allergen Reactions    Amoxicillin-Pot Clavulanate Anaphylaxis, Hives, Itching and Facial Swelling    Erythromycin Hives, Itching, Swelling, Vomiting, Throat Swelling, Nasal Congestion and Facial Swelling    Meperidine Anaphylaxis    Morphine Hives, Itching, Swelling, Other (See Comments) and Syncope     hypotension    Penicillins Anaphylaxis, Itching, Swelling and Hives    Relafen [Nabumetone] Hives, Itching and Swelling    Sulfa Antibiotics Hives, Itching, Rash and Swelling    Darvon [Propoxyphene] Hives    Methocarbamol Other (See Comments)     Double vision    Reglan [Metoclopramide] Anxiety    Tetracycline Rash    Tetracyclines & Related Rash       Objective     not currently breastfeeding.      PHYSICAL EXAM    Gen:  NAD  CV: RRR  CHEST: Clear  ABD: soft, NT/ND  EXT: no edema  Neuro: AAO      ASSESSMENT/PLAN:  This is a 74 y.o. year old female here for history of colon polyps    PLAN:   Procedure: Colonoscopy

## 2024-12-04 PROCEDURE — 88305 TISSUE EXAM BY PATHOLOGIST: CPT | Performed by: PATHOLOGY

## 2024-12-15 DIAGNOSIS — J45.20 MILD INTERMITTENT ASTHMA WITHOUT COMPLICATION: ICD-10-CM

## 2024-12-17 RX ORDER — MOMETASONE FUROATE AND FORMOTEROL FUMARATE DIHYDRATE 50; 5 UG/1; UG/1
2 AEROSOL RESPIRATORY (INHALATION) 2 TIMES DAILY
Qty: 390 G | Refills: 3 | Status: SHIPPED | OUTPATIENT
Start: 2024-12-17 | End: 2025-06-15

## 2024-12-26 ENCOUNTER — HOSPITAL ENCOUNTER (OUTPATIENT)
Dept: NON INVASIVE DIAGNOSTICS | Facility: CLINIC | Age: 74
Discharge: HOME | End: 2024-12-26
Payer: COMMERCIAL

## 2024-12-26 ENCOUNTER — RESULTS FOLLOW-UP (OUTPATIENT)
Dept: CARDIOLOGY CLINIC | Facility: CLINIC | Age: 74
End: 2024-12-26

## 2024-12-26 VITALS
SYSTOLIC BLOOD PRESSURE: 122 MMHG | DIASTOLIC BLOOD PRESSURE: 62 MMHG | BODY MASS INDEX: 23.55 KG/M2 | OXYGEN SATURATION: 98 % | HEART RATE: 91 BPM | HEIGHT: 62 IN | WEIGHT: 128 LBS

## 2024-12-26 DIAGNOSIS — R06.02 SHORTNESS OF BREATH: ICD-10-CM

## 2024-12-26 LAB
CHEST PAIN STATEMENT: NORMAL
CHEST PAIN STATEMENT: NORMAL
MAX DIASTOLIC BP: 70 MMHG
MAX DIASTOLIC BP: 70 MMHG
MAX HR PERCENT: 106 %
MAX HR: 153 BPM
MAX PREDICTED HEART RATE: 144 BPM
MAX PREDICTED HEART RATE: 144 BPM
PROTOCOL NAME: NORMAL
PROTOCOL NAME: NORMAL
RATE PRESSURE PRODUCT: NORMAL
SL CV STRESS RECOVERY BP: NORMAL MMHG
SL CV STRESS RECOVERY HR: 98 BPM
SL CV STRESS RECOVERY O2 SAT: 97 %
SL CV STRESS STAGE REACHED: 3
STRESS ANGINA INDEX: 0
STRESS BASELINE BP: NORMAL MMHG
STRESS BASELINE HR: 91 BPM
STRESS O2 SAT REST: 98 %
STRESS PEAK HR: 153 BPM
STRESS POST ESTIMATED WORKLOAD: 10.1 METS
STRESS POST EXERCISE DUR MIN: 8 MIN
STRESS POST EXERCISE DUR SEC: 24 SEC
STRESS POST O2 SAT PEAK: 98 %
STRESS POST PEAK BP: 176 MMHG
STRESS POST PEAK HR: 153 BPM
STRESS POST PEAK HR: 153 BPM
STRESS POST PEAK SYSTOLIC BP: 176 MMHG
STRESS POST PEAK SYSTOLIC BP: 176 MMHG
TARGET HR FORMULA: NORMAL
TARGET HR FORMULA: NORMAL
TEST INDICATION: NORMAL
TEST INDICATION: NORMAL

## 2024-12-26 PROCEDURE — 93350 STRESS TTE ONLY: CPT | Performed by: STUDENT IN AN ORGANIZED HEALTH CARE EDUCATION/TRAINING PROGRAM

## 2024-12-26 PROCEDURE — 93350 STRESS TTE ONLY: CPT

## 2024-12-31 ENCOUNTER — OFFICE VISIT (OUTPATIENT)
Dept: URGENT CARE | Facility: CLINIC | Age: 74
End: 2024-12-31
Payer: COMMERCIAL

## 2024-12-31 ENCOUNTER — APPOINTMENT (OUTPATIENT)
Dept: RADIOLOGY | Facility: CLINIC | Age: 74
End: 2024-12-31
Payer: COMMERCIAL

## 2024-12-31 VITALS
DIASTOLIC BLOOD PRESSURE: 70 MMHG | RESPIRATION RATE: 18 BRPM | OXYGEN SATURATION: 97 % | SYSTOLIC BLOOD PRESSURE: 144 MMHG | TEMPERATURE: 97.8 F | HEART RATE: 68 BPM

## 2024-12-31 DIAGNOSIS — M25.562 ACUTE PAIN OF LEFT KNEE: ICD-10-CM

## 2024-12-31 DIAGNOSIS — M71.22 BAKER'S CYST OF KNEE, LEFT: Primary | ICD-10-CM

## 2024-12-31 PROCEDURE — 99214 OFFICE O/P EST MOD 30 MIN: CPT | Performed by: PHYSICAL MEDICINE & REHABILITATION

## 2024-12-31 PROCEDURE — 73564 X-RAY EXAM KNEE 4 OR MORE: CPT

## 2024-12-31 RX ORDER — TIRZEPATIDE 10 MG/.5ML
INJECTION, SOLUTION SUBCUTANEOUS
COMMUNITY
Start: 2024-12-25

## 2024-12-31 NOTE — PROGRESS NOTES
[facility]        NAME: Cori Pleitez is a 74 y.o. female  : 1950    MRN: [id number]  DATE: 2024  TIME: 3:03 PM    Assessment and Plan   Baker's cyst of knee, left [M71.22]  1. Baker's cyst of knee, left  diclofenac sodium (VOLTAREN) 50 mg EC tablet    Ambulatory Referral to Orthopedic Surgery      2. Acute pain of left knee  XR knee 4+ vw left injury          Xray negative for acute fracture  Recommend follow up with orthopedics for baker cyst treatment   Ace wrap applied in clinic    Patient Instructions       Follow up with PCP in 3-5 days.  Proceed to  ER if symptoms worsen.    If tests are performed, our office will contact you with results only if changes need to made to the care plan discussed with you at the visit. [facility]    Chief Complaint     Chief Complaint   Patient presents with    Knee Pain     Left knee pain x 3 days. Pt reports she did fall on her deck on . Taking motrin,  icing. Denies hitting head. Noticed lump to back of left knee, reports she has cyst          History of Present Illness       Pt is a 74 year old female presenting with left knee pain that started 24. She states the pain started after a fall on her deck. She has been taking Motrin and applying ice. She does note a lump to the posterior aspect of the left knee.    Ankle Swelling  Associated symptoms include arthralgias. The symptoms are aggravated by movement and weight bearing.       Review of Systems   Review of Systems   Constitutional: Negative.    Respiratory: Negative.     Cardiovascular: Negative.    Musculoskeletal:  Positive for arthralgias.   Skin: Negative.    Neurological: Negative.          Current Medications       Current Outpatient Medications:     albuterol (PROVENTIL HFA,VENTOLIN HFA) 90 mcg/act inhaler, Inhale 2 puffs every 6 (six) hours as needed for wheezing, Disp: 18 g, Rfl: 2    Calcium-Magnesium-Vitamin D  (CALCIUM 1200+D3 PO), , Disp: , Rfl:     clobetasol (TEMOVATE) 0.05 % ointment, Apply topically 2 (two) times a day Taking once daily, Disp: 30 g, Rfl: 5    Continuous Glucose  (Dexcom G7 ) SMITH, USE CONTINUOUSLY AS DIRECTED, Disp: 1 each, Rfl: 3    diclofenac sodium (VOLTAREN) 50 mg EC tablet, Take 1 tablet (50 mg total) by mouth 2 (two) times a day, Disp: 60 tablet, Rfl: 0    ezetimibe (ZETIA) 10 mg tablet, Take 1 tablet (10 mg total) by mouth daily, Disp: 90 tablet, Rfl: 3    FREESTYLE LITE test strip, USE 1 STRIP THREE TIMES A DAY AS INSTRUCTED, Disp: 300 strip, Rfl: 3    furosemide (LASIX) 20 mg tablet, Take 1 tablet (20 mg total) by mouth daily, Disp: 90 tablet, Rfl: 3    KRILL OIL PO, Take by mouth, Disp: , Rfl:     levothyroxine 75 mcg tablet, Take 1 tablet (75 mcg total) by mouth daily, Disp: 90 tablet, Rfl: 3    linaCLOtide (Linzess) 145 MCG CAPS, TAKE 1 CAPSULE EVERY MORNING, Disp: 90 capsule, Rfl: 1    Magnesium Oxide 400 MG CAPS, Take by mouth, Disp: , Rfl:     Mometasone Furo-Formoterol Fum (Dulera) 50-5 MCG/ACT AERO, Inhale 2 puffs 2 (two) times a day Rinse mouth after use., Disp: 390 g, Rfl: 3    Omega-3 Fatty Acids (FISH OIL) 1200 MG CAPS, Take by mouth, Disp: , Rfl:     pantoprazole (PROTONIX) 40 mg tablet, Take 1 tablet (40 mg total) by mouth daily, Disp: 90 tablet, Rfl: 3    potassium chloride (MICRO-K) 10 MEQ CR capsule, Take 1 capsule (10 mEq total) by mouth daily, Disp: 90 capsule, Rfl: 3    rosuvastatin (CRESTOR) 20 MG tablet, Take 1 tablet (20 mg total) by mouth daily, Disp: 90 tablet, Rfl: 3    saccharomyces boulardii (FLORASTOR) 250 mg capsule, Take 250 mg by mouth daily  , Disp: , Rfl:     tirzepatide 10 MG/0.5ML, Inject 0.5 mL (10 mg total) under the skin every 7 days, Disp: 6 mL, Rfl: 1    Continuous Glucose Sensor (Dexcom G7 Sensor), Use 1 Device every 10 days, Disp: 9 each, Rfl: 1    cyclobenzaprine (FLEXERIL) 10 mg tablet, Take 1 tablet (10 mg total) by mouth 3  "(three) times a day as needed for muscle spasms (Patient not taking: Reported on 12/31/2024), Disp: 20 tablet, Rfl: 0    Insulin Pen Needle (BD Pen Needle Dulce U/F) 32G X 4 MM MISC, Use daily as needed with insulin pen (Patient not taking: Reported on 12/31/2024), Disp: 100 each, Rfl: 2    Insulin Syringe-Needle U-100 (INSULIN SYRINGE 1CC/31GX5/16\") 31G X 5/16\" 1 ML MISC, BD Veo Insulin Syringe Ultra-Fine 0.3 mL 31 gauge x 15/64\" (Patient not taking: Reported on 12/31/2024), Disp: , Rfl:     metFORMIN (GLUCOPHAGE-XR) 500 mg 24 hr tablet, Take 1 tab in am and 2 tabs in pm (Patient not taking: Reported on 12/31/2024), Disp: 270 tablet, Rfl: 1    Mounjaro 10 MG/0.5ML SOAJ, , Disp: , Rfl:     Current Allergies     Allergies as of 12/31/2024 - Reviewed 12/31/2024   Allergen Reaction Noted    Amoxicillin-pot clavulanate Anaphylaxis, Hives, Itching, and Facial Swelling     Erythromycin Hives, Itching, Swelling, Vomiting, Throat Swelling, Nasal Congestion, and Facial Swelling     Meperidine Anaphylaxis 03/23/2017    Morphine Hives, Itching, Swelling, Other (See Comments), and Syncope     Penicillins Anaphylaxis, Itching, Swelling, and Hives 03/19/2014    Relafen [nabumetone] Hives, Itching, and Swelling 03/19/2014    Sulfa antibiotics Hives, Itching, Rash, and Swelling     Darvon [propoxyphene] Hives 02/05/2018    Methocarbamol Other (See Comments) 03/19/2014    Reglan [metoclopramide] Anxiety 06/27/2018    Tetracycline Rash 06/09/2021    Tetracyclines & related Rash 08/21/2017            The following portions of the patient's history were reviewed and updated as appropriate: allergies, current medications, past family history, past medical history, past social history, past surgical history and problem list.     Past Medical History:   Diagnosis Date    Abnormal mammogram     Abnormal weight gain     Achilles tendinitis     Allergic 1952    since childhood    Arthritis 1988    Asthma     Cancer (HCC) 2018    Endometrial " adenoma    Combined forms of age-related cataract of both eyes 2021    Added per ICD-10-CM coding guidelines - provider accepted    Diabetes mellitus (HCC)     Disc degeneration, lumbar     Disease of thyroid gland     hypo    Dyslipidemia     Dyslipidemia, goal LDL below 70 2018    Early satiety     Edema     idiopathic peripheral    Encounter for follow-up examination after completed treatment for malignant neoplasm 2019    Encounter for gynecological examination without abnormal finding 2020    Endometrial cancer (HCC) 2018    Enthesopathy     hip region    Esophagitis, reflux     GERD (gastroesophageal reflux disease)     Hard to intubate     difficulty with intubation and had to be intubated twice    Heart murmur     HL (hearing loss)     Hypercholesterolemia     Hypertension     IBS (irritable bowel syndrome)     Irritable bowel syndrome     Morbid (severe) obesity due to excess calories (HCC) 2022    As per CMS ICD10 guidelines:Provider accepted    Obesity 1998    Obesity, Class I, BMI 30-34.9 2015    Osteoarthritis     Pneumonia     Rosacea     Sacroiliitis (HCC)     Shingles     Sleep apnea, obstructive     Varicella        Past Surgical History:   Procedure Laterality Date    CATARACT EXTRACTION Right 2024    CATARACT EXTRACTION Left 2024     SECTION      x2    COLONOSCOPY      ELBOW SURGERY      left ulna/radius    ESOPHAGOGASTRODUODENOSCOPY      FOOT SURGERY Right     FRACTURE SURGERY      FRACTURE SURGERY Left     tibia    HYSTERECTOMY Bilateral 2018    JOINT REPLACEMENT Left     shoulder,  right knee    KNEE ARTHROSCOPY      KNEE ARTHROSCOPY W/ MENISCAL REPAIR Right     WI LAPS TOTAL HYSTERECT 250 GM/< W/RMVL TUBE/OVARY N/A 2018    Procedure: ROBOTIC TOTAL LAPAROSCOPIC HYSTERECTOMY, BILATERAL SALPINGOOPHORECTOMY, BILATERAL PELVIC LYMPHNODE DISSECTION;  Surgeon: Андрей Meng MD;  Location: [facility];  Service: Gynecology  Oncology    REPLACEMENT TOTAL KNEE Right     SHOULDER ARTHROPLASTY      SINUS SURGERY      TONSILLECTOMY      TUBAL LIGATION      WRIST SURGERY      ganglonic cyst       Family History   Problem Relation Age of Onset    Arthritis Mother     Heart disease Mother         cardiac disorder    Diabetes Mother     Hiatal hernia Mother     Hypertension Mother     Irritable bowel syndrome Mother     Breast cancer Mother 66        x2    Uterine cancer Mother     Osteoporosis Mother     Thyroid disease Mother     Other Mother         gastric reflux    Stroke Mother     Hyperlipidemia Mother     Cancer Mother         ENDOMETRIAL    Diabetes type II Mother     Thyroid disease unspecified Mother     Hypothyroidism Mother     Heart disease Father         cardiac disorder    Hiatal hernia Father     Ulcers Father     Other Father         gastric reflux    Coronary artery disease Father     Hearing loss Father     Heart attack Father     Heart failure Father     Hiatal hernia Sister     Thyroid disease Sister     Other Sister         gastric reflux    Lung cancer Sister 77    Cancer Sister         lung adenocarcinoma, mets to meninges    Thyroid disease unspecified Sister     Hypothyroidism Sister     Hyperlipidemia Sister     Arthritis Sister     Irritable bowel syndrome Daughter     Hyperlipidemia Daughter     Brain cancer Maternal Grandmother     Breast cancer Maternal Grandmother         uknown    No Known Problems Maternal Grandfather     No Known Problems Paternal Grandmother     Hearing loss Paternal Grandfather     No Known Problems Brother     Malig Hypertension Son     Hiatal hernia Child     Other Child         gastric reflux    Breast cancer Maternal Aunt 65    Uterine cancer Maternal Aunt         x3 sis    Stroke Maternal Aunt     Cancer Maternal Aunt     Hyperlipidemia Maternal Aunt     Breast cancer Maternal Aunt 55    Cancer Maternal Aunt     Hyperlipidemia Maternal Aunt     Breast cancer Maternal Aunt 55    Cancer  Maternal Aunt     Hyperlipidemia Maternal Aunt     No Known Problems Paternal Aunt     Colon cancer Neg Hx     Ovarian cancer Neg Hx     Cervical cancer Neg Hx          Medications have been verified.        Objective   /70   Pulse 68   Temp 97.8 °F (36.6 °C)   Resp 18   LMP  (LMP Unknown)   SpO2 97%        Physical Exam     Physical Exam  Vitals reviewed.   Constitutional:       General: She is not in acute distress.  Cardiovascular:      Rate and Rhythm: Normal rate and regular rhythm.      Pulses: Normal pulses.      Heart sounds: Normal heart sounds.   Pulmonary:      Effort: Pulmonary effort is normal.      Breath sounds: Normal breath sounds.   Musculoskeletal:         General: Tenderness present. No swelling, deformity or signs of injury.      Comments: Posterior aspect of left knee tender to touch   Cannot fully extend LLE   Skin:     General: Skin is warm.      Findings: No bruising.      Comments: Baker's cyst to posterior aspect, left knee   Neurological:      General: No focal deficit present.      Mental Status: She is alert.      Sensory: No sensory deficit.      Motor: No weakness.      Gait: Gait normal.                   Answers submitted by the patient for this visit:  Lower Extremity Injury Questionnaire (Submitted on 12/31/2024)  Chief Complaint: Lower extremity pain  Incident occurred: 2 days ago  Injury mechanism: a fall  Pain location: left knee  Pain quality: aching  Pain - numeric: 7/10  Pain course: fluctuating  tingling: No  inability to bear weight: No  loss of motion: Yes  loss of sensation: No  muscle weakness: No  Foreign body present: no foreign bodies

## 2025-01-02 ENCOUNTER — DOCUMENTATION (OUTPATIENT)
Dept: ADMINISTRATIVE | Facility: OTHER | Age: 75
End: 2025-01-02

## 2025-01-02 NOTE — PROGRESS NOTES
Kandace Reed, DAI  P Patient Reported Team         Blood pressure elevated  Appointment department: [facility]  Appointment provider: Alicia Andres PA-C  Blood pressure  12/31/24 1416 144/70  12/31/24 1409 149/68  01/02/25 9:52 AM    Patient was called after the Urgent Care visit ; a message was left for the patient to return the call    Thank you.  Chriss Weathers MA  [facility]

## 2025-01-13 ENCOUNTER — OFFICE VISIT (OUTPATIENT)
Dept: OBGYN CLINIC | Facility: CLINIC | Age: 75
End: 2025-01-13
Payer: COMMERCIAL

## 2025-01-13 VITALS — HEIGHT: 62 IN | BODY MASS INDEX: 24.77 KG/M2 | WEIGHT: 134.6 LBS

## 2025-01-13 DIAGNOSIS — M17.11 PRIMARY OSTEOARTHRITIS OF RIGHT KNEE: Primary | ICD-10-CM

## 2025-01-13 DIAGNOSIS — M71.22 BAKER'S CYST OF KNEE, LEFT: ICD-10-CM

## 2025-01-13 DIAGNOSIS — G89.29 CHRONIC PAIN OF LEFT KNEE: ICD-10-CM

## 2025-01-13 DIAGNOSIS — M25.562 CHRONIC PAIN OF LEFT KNEE: ICD-10-CM

## 2025-01-13 PROCEDURE — 99203 OFFICE O/P NEW LOW 30 MIN: CPT | Performed by: STUDENT IN AN ORGANIZED HEALTH CARE EDUCATION/TRAINING PROGRAM

## 2025-01-13 NOTE — PROGRESS NOTES
ASSESSMENT/PLAN:    Problem List Items Addressed This Visit    None  Visit Diagnoses         Primary osteoarthritis of right knee    -  Primary      Baker's cyst of knee, left          Chronic pain of left knee                Discussed history, exam, and imaging with patient. Presentation most consistent with osteoarthritis and we will plan for nonoperative management at this time.  Discussed oral/topical medication regimen. Will plan for use of Tylenol as needed as she is unable to take NSAIDs  Discussed injections as a pain adjunct.  Steroid injection will be avoided given her sensitivity with history of type 2 diabetes.  Can consider viscosupplementation injections in the future if needed  Discussed rehabilitation efforts. Will plan for continuation of her exercise routine with her .  It is encouraged to continue to maintain her strength as well as healthy weight  If symptoms persist or worsen she may contact our office via phone and request authorization for viscosupplementation injections for the left knee.  Follow-up with me as needed  _____________________________________________________  CHIEF COMPLAINT:  Chief Complaint   Patient presents with    Left Knee - Pain     Xray 12/31/24. Patient used to see Dr. Faye regarding the knee. She suffered a fall in December - she slipped on ice. She does have a Baker's cyst behind the L knee. She was prescribed Diclofenac BID by urgent care. Pain is worse with extension. She's been doing some exercises with her .       SUBJECTIVE:  Cori Pleitez is a 74 y.o. year old female who presents for evaluation of her left knee.  She has experienced active related pain into her left knee for several years.  She does have a known history of severe tricompartmental osteoarthritis.  She has been previously evaluated by Dr. Faye with her last visit with him on July 7, 2021.  She does have history of a right total knee arthroplasty performed  by Dr. Zhong in 2015.  She states that she did have a complicated recovery as she did experience a torn posterior tibial tendon soon after the surgery further complicating her recovery resulting in tendon transfers and graphs of her foot.  She states that her left knee had exacerbation of pain following a recent fall.  She states that she did not have pain immediately at the time of her fall and was able to pursue her daily activities.  She states that, that evening is when she experienced pain and stiffness when attempting a positional change from sitting to standing.  She did also acknowledge significant swelling to the posterior aspect of her knee.  She notes that there is residual swelling and Baker's cyst remaining that can be uncomfortable at times.  She does try to remain active and does have a  and states that she does perform weight training exercises and notes that she is able to do so without significant pain currently.  She does have a history of diabetes and did see an endocrinologist a couple years ago resulting in her being able to lose approximately 80 pounds.  She does utilize Tylenol as she is unable to take NSAIDs.  She states that this does not provide her with lasting relief.  She does also note that she utilizes turmeric for its anti-inflammatory properties.  Today she denies any distal paresthesias.        PAST MEDICAL HISTORY:  Past Medical History:   Diagnosis Date    Abnormal mammogram     Abnormal weight gain     Achilles tendinitis     Allergic 1952    since childhood    Arthritis 1988    Asthma     Cancer (HCC) 2018    Endometrial adenoma    Combined forms of age-related cataract of both eyes 08/18/2021    Added per ICD-10-CM coding guidelines - provider accepted    Diabetes mellitus (HCC)     Disc degeneration, lumbar     Disease of thyroid gland     hypo    Dyslipidemia     Dyslipidemia, goal LDL below 70 03/27/2018    Early satiety     Edema     idiopathic  peripheral    Encounter for follow-up examination after completed treatment for malignant neoplasm 2019    Encounter for gynecological examination without abnormal finding 2020    Endometrial cancer (HCC) 2018    Enthesopathy     hip region    Esophagitis, reflux     Fractures     left tibia    GERD (gastroesophageal reflux disease)     Hard to intubate     difficulty with intubation and had to be intubated twice    Heart murmur     HL (hearing loss)     Hypercholesterolemia     Hypertension     IBS (irritable bowel syndrome)     Irritable bowel syndrome     Morbid (severe) obesity due to excess calories (HCC) 2022    As per CMS ICD10 guidelines:Provider accepted    Obesity 1998    Obesity, Class I, BMI 30-34.9 2015    Osteoarthritis     Pneumonia     Rosacea     Sacroiliitis (HCC)     Shingles     Skin disorder     rosacea    Sleep apnea, obstructive     Stomach disorder 1968    hiatal hernia, GERD    Varicella        PAST SURGICAL HISTORY:  Past Surgical History:   Procedure Laterality Date    CATARACT EXTRACTION Right 2024    CATARACT EXTRACTION Left 2024     SECTION      x2    COLONOSCOPY      ELBOW SURGERY      left ulna/radius    ESOPHAGOGASTRODUODENOSCOPY      FOOT SURGERY Right     FRACTURE SURGERY      FRACTURE SURGERY Left     tibia    HYSTERECTOMY Bilateral 2018    JOINT REPLACEMENT Left     shoulder,  right knee    KNEE ARTHROSCOPY      KNEE ARTHROSCOPY W/ MENISCAL REPAIR Right     WV LAPS TOTAL HYSTERECT 250 GM/< W/RMVL TUBE/OVARY N/A 2018    Procedure: ROBOTIC TOTAL LAPAROSCOPIC HYSTERECTOMY, BILATERAL SALPINGOOPHORECTOMY, BILATERAL PELVIC LYMPHNODE DISSECTION;  Surgeon: Андрей Meng MD;  Location: [facility];  Service: Gynecology Oncology    REPLACEMENT TOTAL KNEE Right     SHOULDER ARTHROPLASTY      SHOULDER SURGERY  2006    SINUS SURGERY      TENDON REPAIR  2016    TONSILLECTOMY      TUBAL LIGATION      WRIST SURGERY       ganglonic cyst       FAMILY HISTORY:  Family History   Problem Relation Age of Onset    Arthritis Mother     Heart disease Mother         cardiac disorder    Diabetes Mother     Hiatal hernia Mother     Hypertension Mother     Irritable bowel syndrome Mother     Breast cancer Mother 66        x2    Uterine cancer Mother     Osteoporosis Mother     Thyroid disease Mother     Stroke Mother     Hyperlipidemia Mother     Cancer Mother         ENDOMETRIAL    Diabetes type II Mother     Thyroid disease unspecified Mother     Hypothyroidism Mother     Heart disease Father         cardiac disorder    Hiatal hernia Father     Ulcers Father     Coronary artery disease Father     Hearing loss Father     Heart attack Father     Heart failure Father     Anesthesia problems Father     Hiatal hernia Sister     Thyroid disease Sister     Lung cancer Sister 77    Cancer Sister         lung adenocarcinoma, mets to meninges    Thyroid disease unspecified Sister     Hypothyroidism Sister     Hyperlipidemia Sister     Arthritis Sister     Irritable bowel syndrome Daughter     Hyperlipidemia Daughter     Brain cancer Maternal Grandmother     Breast cancer Maternal Grandmother         uknown    No Known Problems Maternal Grandfather     No Known Problems Paternal Grandmother     Hearing loss Paternal Grandfather     No Known Problems Brother     Malig Hypertension Son     Hiatal hernia Child     Breast cancer Maternal Aunt 65    Uterine cancer Maternal Aunt         x3 sis    Stroke Maternal Aunt     Cancer Maternal Aunt     Hyperlipidemia Maternal Aunt     Breast cancer Maternal Aunt 55    Cancer Maternal Aunt     Hyperlipidemia Maternal Aunt     Breast cancer Maternal Aunt 55    Cancer Maternal Aunt     Hyperlipidemia Maternal Aunt     No Known Problems Paternal Aunt     Colon cancer Neg Hx     Ovarian cancer Neg Hx     Cervical cancer Neg Hx        SOCIAL HISTORY:  Social History     Tobacco Use    Smoking status: Never     Passive  exposure: Never    Smokeless tobacco: Never   Vaping Use    Vaping status: Never Used   Substance Use Topics    Alcohol use: Yes     Alcohol/week: 3.0 standard drinks of alcohol     Types: 2 Glasses of wine, 1 Standard drinks or equivalent per week     Comment: twice a month    Drug use: No       MEDICATIONS:    Current Outpatient Medications:     albuterol (PROVENTIL HFA,VENTOLIN HFA) 90 mcg/act inhaler, Inhale 2 puffs every 6 (six) hours as needed for wheezing, Disp: 18 g, Rfl: 2    Calcium-Magnesium-Vitamin D (CALCIUM 1200+D3 PO), , Disp: , Rfl:     clobetasol (TEMOVATE) 0.05 % ointment, Apply topically 2 (two) times a day Taking once daily, Disp: 30 g, Rfl: 5    Continuous Glucose  (Dexcom G7 ) SMITH, USE CONTINUOUSLY AS DIRECTED, Disp: 1 each, Rfl: 3    Continuous Glucose Sensor (Dexcom G7 Sensor), Use 1 Device every 10 days, Disp: 9 each, Rfl: 1    diclofenac sodium (VOLTAREN) 50 mg EC tablet, Take 1 tablet (50 mg total) by mouth 2 (two) times a day, Disp: 60 tablet, Rfl: 0    ezetimibe (ZETIA) 10 mg tablet, Take 1 tablet (10 mg total) by mouth daily, Disp: 90 tablet, Rfl: 3    FREESTYLE LITE test strip, USE 1 STRIP THREE TIMES A DAY AS INSTRUCTED, Disp: 300 strip, Rfl: 3    furosemide (LASIX) 20 mg tablet, Take 1 tablet (20 mg total) by mouth daily, Disp: 90 tablet, Rfl: 3    KRILL OIL PO, Take by mouth, Disp: , Rfl:     levothyroxine 75 mcg tablet, Take 1 tablet (75 mcg total) by mouth daily, Disp: 90 tablet, Rfl: 3    linaCLOtide (Linzess) 145 MCG CAPS, TAKE 1 CAPSULE EVERY MORNING, Disp: 90 capsule, Rfl: 1    Magnesium Oxide 400 MG CAPS, Take by mouth, Disp: , Rfl:     Mometasone Furo-Formoterol Fum (Dulera) 50-5 MCG/ACT AERO, Inhale 2 puffs 2 (two) times a day Rinse mouth after use., Disp: 390 g, Rfl: 3    Mounjaro 10 MG/0.5ML SOAJ, , Disp: , Rfl:     Omega-3 Fatty Acids (FISH OIL) 1200 MG CAPS, Take by mouth, Disp: , Rfl:     pantoprazole (PROTONIX) 40 mg tablet, Take 1 tablet (40 mg  "total) by mouth daily, Disp: 90 tablet, Rfl: 3    potassium chloride (MICRO-K) 10 MEQ CR capsule, Take 1 capsule (10 mEq total) by mouth daily, Disp: 90 capsule, Rfl: 3    rosuvastatin (CRESTOR) 20 MG tablet, Take 1 tablet (20 mg total) by mouth daily, Disp: 90 tablet, Rfl: 3    saccharomyces boulardii (FLORASTOR) 250 mg capsule, Take 250 mg by mouth daily  , Disp: , Rfl:     tirzepatide 10 MG/0.5ML, Inject 0.5 mL (10 mg total) under the skin every 7 days, Disp: 6 mL, Rfl: 1    cyclobenzaprine (FLEXERIL) 10 mg tablet, Take 1 tablet (10 mg total) by mouth 3 (three) times a day as needed for muscle spasms (Patient not taking: Reported on 12/31/2024), Disp: 20 tablet, Rfl: 0    Insulin Syringe-Needle U-100 (INSULIN SYRINGE 1CC/31GX5/16\") 31G X 5/16\" 1 ML MISC, BD Veo Insulin Syringe Ultra-Fine 0.3 mL 31 gauge x 15/64\" (Patient not taking: Reported on 12/31/2024), Disp: , Rfl:     metFORMIN (GLUCOPHAGE-XR) 500 mg 24 hr tablet, Take 1 tab in am and 2 tabs in pm (Patient not taking: Reported on 12/31/2024), Disp: 270 tablet, Rfl: 1    ALLERGIES:  Allergies   Allergen Reactions    Amoxicillin-Pot Clavulanate Anaphylaxis, Hives, Itching and Facial Swelling    Erythromycin Hives, Itching, Swelling, Vomiting, Throat Swelling, Nasal Congestion and Facial Swelling    Meperidine Anaphylaxis    Morphine Hives, Itching, Swelling, Other (See Comments) and Syncope     hypotension    Penicillins Anaphylaxis, Itching, Swelling and Hives    Relafen [Nabumetone] Hives, Itching and Swelling    Sulfa Antibiotics Hives, Itching, Rash and Swelling    Darvon [Propoxyphene] Hives    Methocarbamol Other (See Comments)     Double vision    Reglan [Metoclopramide] Anxiety    Tetracycline Rash    Tetracyclines & Related Rash       Review of systems:   Constitutional: Negative for fatigue, fever or loss of apetite.   HENT: Negative.    Respiratory: Negative for shortness of breath, dyspnea.    Cardiovascular: Negative for chest pain/tightness. " "  Gastrointestinal: Negative for abdominal pain, N/V.   Endocrine: Negative for cold/heat intolerance, unexplained weight loss/gain.   Genitourinary: Negative for flank pain, dysuria, hematuria.   Musculoskeletal: As in HPI   Skin: Negative for rash.    Neurological: Negative for numbness tingling  Psychiatric/Behavioral: Negative for agitation.  _____________________________________________________  PHYSICAL EXAMINATION:    Height 5' 2\" (1.575 m), weight 61.1 kg (134 lb 9.6 oz), not currently breastfeeding.    General: well developed and well nourished, alert, oriented times 3, and appears comfortable  HEENT: Benign, normocephalic, atraumatic  Cardiovascular: WNL    Pulmonary: No wheezing or stridor  Abdomen: Soft, Nontender  Skin: No masses, erythema, lacerations, fluctation, ulcerations  Neurovascular: as per MSK exam below    MUSCULOSKELETAL EXAMINATION:  Left Knee  Range of motion from 10 to 125.    There is moderate crepitus with range of motion.   There is no effusion.    There is tenderness over the medial and lateral joint line.    There is 5/5 quadriceps strength and appropriate tone.    The patient is able to perform a straight leg raise.      negative patellar grind test.  Anterior drawer tests is negative  negative Lachman Test.   Posterior drawer test is   negative   Varus stress testing reveals stable at 0 and 30 degrees   Valgus stress testing reveals stable at 0 and 30 degrees  Alexandra's testing is negative   Steinmanns: negative  Pivot shift : not tested  Thessaly test: not tested  The patient is neurovascular intact distally.   2+ PT pulse      _____________________________________________________  STUDIES REVIEWED:  Images personally reviewed by me today. X-rays of the left knee demonstrates no acute fracture. Severe tricompartmental osteoarthritis with joint space narrowing, subchondral sclerosis and osteophytosis.      Scribe Attestation      I,:  Immanuel Solitario am acting as a scribe while in " the presence of the attending physician.:       I,:  Juan Damon MD personally performed the services described in this documentation    as scribed in my presence.:

## 2025-01-17 ENCOUNTER — TELEPHONE (OUTPATIENT)
Dept: ENDOCRINOLOGY | Facility: CLINIC | Age: 75
End: 2025-01-17

## 2025-01-21 NOTE — TELEPHONE ENCOUNTER
CALLED INSURANCE [phone]. SPOKE WITH PORSHA.     PA for Dexcom G7 Sensor SUBMITTED to [employer]    via    [x]Other website [phone]  [x]Phone call Case ID # [id number]    [x]PA sent as URGENT    All office notes, labs and other pertaining documents and studies sent. Clinical questions answered. Awaiting determination from insurance company.     Turnaround time for your insurance to make a decision on your Prior Authorization can take 7-21 business days.

## 2025-01-22 ENCOUNTER — PATIENT MESSAGE (OUTPATIENT)
Dept: ENDOCRINOLOGY | Facility: CLINIC | Age: 75
End: 2025-01-22

## 2025-01-22 DIAGNOSIS — I10 BENIGN ESSENTIAL HYPERTENSION: Primary | ICD-10-CM

## 2025-01-22 DIAGNOSIS — E78.5 DYSLIPIDEMIA: ICD-10-CM

## 2025-01-31 ENCOUNTER — APPOINTMENT (OUTPATIENT)
Age: 75
End: 2025-01-31
Payer: COMMERCIAL

## 2025-01-31 ENCOUNTER — RESULTS FOLLOW-UP (OUTPATIENT)
Age: 75
End: 2025-01-31

## 2025-01-31 ENCOUNTER — OFFICE VISIT (OUTPATIENT)
Age: 75
End: 2025-01-31
Payer: COMMERCIAL

## 2025-01-31 ENCOUNTER — HOSPITAL ENCOUNTER (OUTPATIENT)
Dept: NON INVASIVE DIAGNOSTICS | Facility: CLINIC | Age: 75
Discharge: HOME | End: 2025-01-31
Payer: COMMERCIAL

## 2025-01-31 VITALS
HEIGHT: 62 IN | WEIGHT: 123 LBS | HEART RATE: 75 BPM | BODY MASS INDEX: 22.63 KG/M2 | SYSTOLIC BLOOD PRESSURE: 114 MMHG | DIASTOLIC BLOOD PRESSURE: 64 MMHG | TEMPERATURE: 97.6 F | OXYGEN SATURATION: 99 %

## 2025-01-31 DIAGNOSIS — J45.20 MILD INTERMITTENT ASTHMA WITHOUT COMPLICATION: ICD-10-CM

## 2025-01-31 DIAGNOSIS — R09.89 BRUIT OF LEFT CAROTID ARTERY: ICD-10-CM

## 2025-01-31 DIAGNOSIS — E78.5 DYSLIPIDEMIA: ICD-10-CM

## 2025-01-31 DIAGNOSIS — E11.9 TYPE 2 DIABETES MELLITUS WITHOUT COMPLICATION, WITHOUT LONG-TERM CURRENT USE OF INSULIN (HCC): ICD-10-CM

## 2025-01-31 LAB
ALBUMIN SERPL BCG-MCNC: 4.5 G/DL (ref 3.5–5)
ALP SERPL-CCNC: 66 U/L (ref 34–104)
ALT SERPL W P-5'-P-CCNC: 29 U/L (ref 7–52)
ANION GAP SERPL CALCULATED.3IONS-SCNC: 10 MMOL/L (ref 4–13)
AST SERPL W P-5'-P-CCNC: 31 U/L (ref 13–39)
BILIRUB SERPL-MCNC: 0.57 MG/DL (ref 0.2–1)
BUN SERPL-MCNC: 25 MG/DL (ref 5–25)
CALCIUM SERPL-MCNC: 10 MG/DL (ref 8.4–10.2)
CHLORIDE SERPL-SCNC: 99 MMOL/L (ref 96–108)
CHOLEST SERPL-MCNC: 137 MG/DL (ref ?–200)
CO2 SERPL-SCNC: 29 MMOL/L (ref 21–32)
CREAT SERPL-MCNC: 0.87 MG/DL (ref 0.6–1.3)
EST. AVERAGE GLUCOSE BLD GHB EST-MCNC: 105 MG/DL
GFR SERPL CREATININE-BSD FRML MDRD: 64 ML/MIN/1.73SQ M
GLUCOSE P FAST SERPL-MCNC: 63 MG/DL (ref 65–99)
HBA1C MFR BLD: 5.3 %
HDLC SERPL-MCNC: 58 MG/DL
LDLC SERPL CALC-MCNC: 66 MG/DL (ref 0–100)
NONHDLC SERPL-MCNC: 79 MG/DL
POTASSIUM SERPL-SCNC: 4.3 MMOL/L (ref 3.5–5.3)
PROT SERPL-MCNC: 7.4 G/DL (ref 6.4–8.4)
SODIUM SERPL-SCNC: 138 MMOL/L (ref 135–147)
TRIGL SERPL-MCNC: 67 MG/DL (ref ?–150)
TSH SERPL DL<=0.05 MIU/L-ACNC: 1.17 UIU/ML (ref 0.45–4.5)

## 2025-01-31 PROCEDURE — 80061 LIPID PANEL: CPT

## 2025-01-31 PROCEDURE — 80053 COMPREHEN METABOLIC PANEL: CPT

## 2025-01-31 PROCEDURE — 99214 OFFICE O/P EST MOD 30 MIN: CPT | Performed by: INTERNAL MEDICINE

## 2025-01-31 PROCEDURE — 84443 ASSAY THYROID STIM HORMONE: CPT

## 2025-01-31 PROCEDURE — 82172 ASSAY OF APOLIPOPROTEIN: CPT

## 2025-01-31 PROCEDURE — 36415 COLL VENOUS BLD VENIPUNCTURE: CPT

## 2025-01-31 PROCEDURE — 93880 EXTRACRANIAL BILAT STUDY: CPT

## 2025-01-31 PROCEDURE — 83036 HEMOGLOBIN GLYCOSYLATED A1C: CPT

## 2025-01-31 PROCEDURE — 93880 EXTRACRANIAL BILAT STUDY: CPT | Performed by: INTERNAL MEDICINE

## 2025-01-31 RX ORDER — CLINDAMYCIN HYDROCHLORIDE 300 MG/1
CAPSULE ORAL
COMMUNITY
Start: 2025-01-30

## 2025-01-31 RX ORDER — MOMETASONE FUROATE AND FORMOTEROL FUMARATE DIHYDRATE 50; 5 UG/1; UG/1
2 AEROSOL RESPIRATORY (INHALATION) 2 TIMES DAILY
Qty: 1170 G | Refills: 1 | Status: SHIPPED | OUTPATIENT
Start: 2025-01-31 | End: 2025-07-30

## 2025-02-01 LAB — APO B SERPL-MCNC: 75 MG/DL

## 2025-02-03 DIAGNOSIS — E78.5 DYSLIPIDEMIA: ICD-10-CM

## 2025-02-03 DIAGNOSIS — R09.89 BRUIT OF LEFT CAROTID ARTERY: Primary | ICD-10-CM

## 2025-02-03 RX ORDER — ASPIRIN 81 MG/1
81 TABLET, CHEWABLE ORAL DAILY
Start: 2025-02-03

## 2025-02-04 ENCOUNTER — TELEPHONE (OUTPATIENT)
Age: 75
End: 2025-02-04

## 2025-02-04 NOTE — TELEPHONE ENCOUNTER
"Patient calling in regards to appointment scheduled 2/6/25.    States \"We are supposed to get bad weather and I really don't want to chance it.  I also do not want to reschedule because how far out it will get pushed so can we do a virtual visit. I can do my dexcom download so he would have it and I completed my labs.\"    Please advise patient if requested visit is approved for virtual visit.  "

## 2025-02-04 NOTE — TELEPHONE ENCOUNTER
Patient calling. Relayed message from Dr. Levi about carotid us results   Tanisha Levi MD  1/31/2025  3:06 PM EST       Please call the patient.  Less than 50% stenosis on the right side and no significant stenosis on the left side carotid arteries by ultrasound.     Patient verbalized understanding and has started 81 mg aspirin as she discussed with Dr. Levi.

## 2025-02-05 DIAGNOSIS — E11.9 TYPE 2 DIABETES MELLITUS WITHOUT COMPLICATION, WITHOUT LONG-TERM CURRENT USE OF INSULIN (HCC): ICD-10-CM

## 2025-02-05 NOTE — TELEPHONE ENCOUNTER
"Pt my chart message     \"Could you please send a new prescription for the Dexcom sensors, 1 month's supply with refills to:  [facility]  [address], [address], [place] [address]  Phone: [phone]  Fax: [phone]     My insurance will not cover as I do not take insulin on a regular basis.\"  "

## 2025-02-06 ENCOUNTER — TELEMEDICINE (OUTPATIENT)
Dept: ENDOCRINOLOGY | Facility: CLINIC | Age: 75
End: 2025-02-06
Payer: COMMERCIAL

## 2025-02-06 ENCOUNTER — PATIENT MESSAGE (OUTPATIENT)
Dept: ENDOCRINOLOGY | Facility: CLINIC | Age: 75
End: 2025-02-06

## 2025-02-06 DIAGNOSIS — E11.9 TYPE 2 DIABETES MELLITUS WITHOUT COMPLICATION, WITHOUT LONG-TERM CURRENT USE OF INSULIN (HCC): ICD-10-CM

## 2025-02-06 DIAGNOSIS — E78.5 DYSLIPIDEMIA: ICD-10-CM

## 2025-02-06 DIAGNOSIS — M81.0 AGE-RELATED OSTEOPOROSIS WITHOUT CURRENT PATHOLOGICAL FRACTURE: ICD-10-CM

## 2025-02-06 DIAGNOSIS — E03.9 ACQUIRED HYPOTHYROIDISM: Primary | ICD-10-CM

## 2025-02-06 DIAGNOSIS — E55.9 VITAMIN D DEFICIENCY: ICD-10-CM

## 2025-02-06 PROCEDURE — 99214 OFFICE O/P EST MOD 30 MIN: CPT | Performed by: INTERNAL MEDICINE

## 2025-02-06 RX ORDER — ACYCLOVIR 400 MG/1
1 TABLET ORAL
Qty: 3 EACH | Refills: 5 | Status: SHIPPED | OUTPATIENT
Start: 2025-02-06 | End: 2025-02-11 | Stop reason: SDUPTHER

## 2025-02-06 RX ORDER — TIRZEPATIDE 10 MG/.5ML
10 INJECTION, SOLUTION SUBCUTANEOUS WEEKLY
Qty: 6 ML | Refills: 0 | Status: SHIPPED | OUTPATIENT
Start: 2025-02-06

## 2025-02-06 RX ORDER — ACYCLOVIR 400 MG/1
1 TABLET ORAL
Qty: 9 EACH | Refills: 1 | Status: SHIPPED | OUTPATIENT
Start: 2025-02-06 | End: 2025-02-06 | Stop reason: SDUPTHER

## 2025-02-06 NOTE — PROGRESS NOTES
REQUIRED DOCUMENTATION:      1. This service was provided via Telemedicine -[employer]  2. Provider located at [place], [place].  3. TeleMed provider: Piper Lozano MD.  4. Identify all parties in room with patient during tele consult:  5.Patient was then informed that this was a Telemedicine encounter was being conducted confidentially.  Patient acknowledged consent and understanding of privacy and security of the Telemedicine encounter.  I informed the patient that I have reviewed their record in Epic and presented the opportunity for them to ask any questions regarding the visit today.  The patient agreed to participate.  6. The time spent today does not include the time spent in establishing the connection with patient.     Patient is aware this is a billable service.       Follow-up Patient Progress Note      CC: Type 2 diabetes with hyperglycemia     History of Present Illness:   73 yr female with Type 2DM since 1990, Hypothyroidism, GERD, JUICE, Asthma, endometrial CA, HTN, HLD, DPN, DJD s/p TKR, multiple joint replacements, obesity BMI 35 and vitamin D deficiency. Last visit was 2/5/24.  She is a certified diabetes educator.     Since last visit, she lost 32 lbs. Total 72 lbs since 8/23.     CGM data review::  Device: dexcomG7          Dates:  1/23/25-2/6/25         Usage: 100 %               Av glu: 102 mg/dL             SD: 13 mg/dL  CV:   %            GMI: 5.8 %  TIR: 100 %                    TAR: 0 %                     TBR: 0 %  Glycemic patters:  excellent control with normoglycemia.     Current meds:  Mounjaro 10mg weekly     Opthamology: yes, no retinopathy  Podiatry: No -   vaccination: yes  Dental:No  Pancreatitis: Yes -1990 allergic reaction to zantac suspected     Ace/ARB: olmesartan  Statin: crestor, zetia  Thyroid issues: hypothyroidism on levothyroxine 75 mcg qdaily     4/18/23: DXA -nml.     FHx: Mother- breast CA.     Physical Exam:  There is no height or weight on file to calculate  BMI.  LMP  (LMP Unknown)    There were no vitals filed for this visit.     Physical Exam  Constitutional:       General: She is not in acute distress.     Appearance: She is well-developed.   HENT:      Head: Normocephalic and atraumatic.      Nose: Nose normal.   Eyes:      Conjunctiva/sclera: Conjunctivae normal.   Pulmonary:      Effort: Pulmonary effort is normal.   Abdominal:      General: There is no distension.   Musculoskeletal:      Cervical back: Normal range of motion and neck supple.   Skin:     Findings: No rash.      Comments: No icterus   Neurological:      Mental Status: She is alert and oriented to person, place, and time.         Labs:   Lab Results   Component Value Date    HGBA1C 5.3 01/31/2025       Lab Results   Component Value Date    FPD1LWGGPTGR 1.168 01/31/2025       Lab Results   Component Value Date    CREATININE 0.87 01/31/2025    CREATININE 0.78 09/22/2024    CREATININE 0.83 06/03/2024    BUN 25 01/31/2025     11/04/2015    K 4.3 01/31/2025    CL 99 01/31/2025    CO2 29 01/31/2025     eGFR   Date Value Ref Range Status   01/31/2025 64 ml/min/1.73sq m Final       Lab Results   Component Value Date    ALT 29 01/31/2025    AST 31 01/31/2025    ALKPHOS 66 01/31/2025    BILITOT 0.41 11/04/2015       Lab Results   Component Value Date    CHOLESTEROL 137 01/31/2025    CHOLESTEROL 109 09/07/2024    CHOLESTEROL 114 04/05/2024     Lab Results   Component Value Date    HDL 58 01/31/2025    HDL 41 (L) 09/07/2024    HDL 42 (L) 04/05/2024     Lab Results   Component Value Date    TRIG 67 01/31/2025    TRIG 88 09/07/2024    TRIG 123 04/05/2024     Lab Results   Component Value Date    NONHDLC 79 01/31/2025    NONHDLC 68 09/07/2024    NONHDLC 75 08/22/2023         Assessment/Plan:    1. Acquired hypothyroidism  Assessment & Plan:  She is clinically and biochemically euthyroid.    Continue levothyroxine 75mg weekly.  Since she lost significant weight about 90 lbs.    Follow up in 3 months with  repeat labs.  Orders:  -     T4, free; Future  -     TSH, 3rd generation; Future  2. Type 2 diabetes mellitus without complication, without long-term current use of insulin ([facility])  Assessment & Plan:  She is in good control. She lost a total 90 lbs.    She is compliant with medical fitness training and feels well.    we will aim to wean off mounjaro after 3 months as she reports some postprandial hyperglycemia. Stop metformin.    Continue dexcom.  Follow up in 3 months.      Lab Results   Component Value Date    HGBA1C 5.3 01/31/2025       Lab Results   Component Value Date    HGBA1C 5.3 01/31/2025     Orders:  -     Hemoglobin A1C; Future  -     Mounjaro 10 MG/0.5ML SOAJ; Inject 10 mg under the skin once a week  3. Dyslipidemia  4. Vitamin D deficiency  5. Age-related osteoporosis without current pathological fracture  -     DXA bone density spine hip and pelvis; Future; Expected date: 02/06/2025  -     Vitamin D 25 hydroxy; Future  -     PTH, intact; Future  -     Comprehensive metabolic panel; Future        I have spent a total time of  minutes on 02/06/25 in caring for this patient including greater than 50% of this time was spent in counseling/coordination of care as listed above.       Discussed with the patient and all questioned fully answered. She will contact me with concerns.    Piper Lozano

## 2025-02-06 NOTE — PATIENT COMMUNICATION
Tried to call pt to see if provider was on due to schedule stating visit was in progress but was sent to voicemail.

## 2025-02-06 NOTE — ASSESSMENT & PLAN NOTE
She is clinically and biochemically euthyroid.    Continue levothyroxine 75mg weekly.  Since she lost significant weight about 90 lbs.    Follow up in 3 months with repeat labs.

## 2025-02-06 NOTE — TELEPHONE ENCOUNTER
Not a duplicate - Pharmacy Change  Please only send 3 sensors with refills, as patient will be paying out of pocket.     Reason for call:   [x] Refill   [] Prior Auth  [] Other:     Office:   [] PCP/Provider -   [x] Specialty/Provider - Piper Lozano MD / [facility] [place]     Medication: Continuous Glucose Sensor (Dexcom G7 Sensor) / Use 1 Device every 10 days     Pharmacy: [facility] [id number]     Does the patient have enough for 3 days?   [] Yes   [x] No - Send as HP to POD

## 2025-02-06 NOTE — ASSESSMENT & PLAN NOTE
She is in good control. She lost a total 90 lbs.    She is compliant with medical fitness training and feels well.    we will aim to wean off mounjaro after 3 months as she reports some postprandial hyperglycemia. Stop metformin.    Continue dexcom.  Follow up in 3 months.      Lab Results   Component Value Date    HGBA1C 5.3 01/31/2025       Lab Results   Component Value Date    HGBA1C 5.3 01/31/2025

## 2025-02-11 ENCOUNTER — TELEPHONE (OUTPATIENT)
Age: 75
End: 2025-02-11

## 2025-02-11 DIAGNOSIS — E11.9 TYPE 2 DIABETES MELLITUS WITHOUT COMPLICATION, WITHOUT LONG-TERM CURRENT USE OF INSULIN (HCC): ICD-10-CM

## 2025-02-11 DIAGNOSIS — E11.65 TYPE 2 DIABETES MELLITUS WITH HYPERGLYCEMIA, WITH LONG-TERM CURRENT USE OF INSULIN (HCC): ICD-10-CM

## 2025-02-11 DIAGNOSIS — Z79.4 TYPE 2 DIABETES MELLITUS WITH HYPERGLYCEMIA, WITH LONG-TERM CURRENT USE OF INSULIN (HCC): ICD-10-CM

## 2025-02-11 RX ORDER — ACYCLOVIR 400 MG/1
1 TABLET ORAL
Qty: 3 EACH | Refills: 5 | Status: SHIPPED | OUTPATIENT
Start: 2025-02-11

## 2025-02-11 RX ORDER — ACYCLOVIR 400 MG/1
1 TABLET ORAL
Qty: 3 EACH | Refills: 5 | Status: SHIPPED | OUTPATIENT
Start: 2025-02-11 | End: 2025-02-11 | Stop reason: SDUPTHER

## 2025-02-11 NOTE — TELEPHONE ENCOUNTER
Medication: Dexcom G7 sensors    Dose/Frequency: use 1 device every 10 days    Quantity: 3 each    Pharmacy: [facility]    Office:   [] PCP/Provider -   [x] Speciality/Provider - Endo    Does the patient have enough for 3 days?   [] Yes   [x] No - Send as HP to POD       THIS IS NOT A DUPLICATE, PHARMACY NEVER RECEIVED.

## 2025-02-11 NOTE — TELEPHONE ENCOUNTER
PA for Mounjaro 10MG/0.5ML SUBMITTED to [employer]    via    [x]CMM-KEY: [id number]      [x]PA sent as URGENT    All office notes, labs and other pertaining documents and studies sent. Clinical questions answered. Awaiting determination from insurance company.     Turnaround time for your insurance to make a decision on your Prior Authorization can take 7-21 business days.

## 2025-02-13 ENCOUNTER — TELEPHONE (OUTPATIENT)
Dept: ENDOCRINOLOGY | Facility: CLINIC | Age: 75
End: 2025-02-13

## 2025-02-14 NOTE — TELEPHONE ENCOUNTER
PA for Mounjaro 10MG/0.5ML  APPROVED     Date(s) approved 02/09/2025 to 02/09/2026     Case #[id number]    Patient advised by          []ExactFlathart Message  [x]Phone call   []LMOM  []L/M to call office as no active Communication consent on file  []Unable to leave detailed message as VM not approved on Communication consent       Pharmacy advised by    []Fax  []Phone call    Specialty Pharmacy    []     Approval letter scanned into Media Yes

## 2025-02-14 NOTE — TELEPHONE ENCOUNTER
PA for Dexcom G7 Sensor SUBMITTED to [employer]    via    [x]CMM-KEY: [id number]  [x]PA sent as URGENT    All office notes, labs and other pertaining documents and studies sent. Clinical questions answered. Awaiting determination from insurance company.     Turnaround time for your insurance to make a decision on your Prior Authorization can take 7-21 business days.

## 2025-03-14 ENCOUNTER — TELEPHONE (OUTPATIENT)
Dept: ENDOCRINOLOGY | Facility: CLINIC | Age: 75
End: 2025-03-14

## 2025-03-14 NOTE — TELEPHONE ENCOUNTER
Patient states that she needs another prior auth for mounjaro (please see my chart message from patient)

## 2025-03-21 ENCOUNTER — TELEPHONE (OUTPATIENT)
Age: 75
End: 2025-03-21

## 2025-03-21 DIAGNOSIS — E11.9 TYPE 2 DIABETES MELLITUS WITHOUT COMPLICATION, WITHOUT LONG-TERM CURRENT USE OF INSULIN (HCC): ICD-10-CM

## 2025-03-21 RX ORDER — TIRZEPATIDE 10 MG/.5ML
10 INJECTION, SOLUTION SUBCUTANEOUS WEEKLY
Qty: 6 ML | Refills: 1 | Status: SHIPPED | OUTPATIENT
Start: 2025-03-21

## 2025-03-21 NOTE — TELEPHONE ENCOUNTER
Not a duplicate    Patient spoke with "[facility]" and was told that the prescription dated 02/06/25 has been cancelled because it was too soon to fill at that time. They told her to have a new script sent in.    Reason for call:   [x] Refill   [] Prior Auth  [] Other:     Office:   [x] Specialty/Provider - endo / [facility]    Medication: Mounjaro    Dose/Frequency: 10mg/0.5mL once a week    Quantity: 6mL    Pharmacy: [facility] [address] [facility]   Does the patient have enough for 3 days?   [] Yes   [] No - Send as HP to POD    Mail Away Pharmacy   Does the patient have enough for 10 days?   [x] Yes   [] No - Send as HP to POD

## 2025-03-21 NOTE — TELEPHONE ENCOUNTER
Patient spoke with [facility] mail order pharmacy regarding her Mounjaro prescription. They informed her that she needs a new script (forwarded to endo clinical pod for review in refill encounter 03/21/25). They also said that the provider must complete a patient-level authorization form.    Please contact [facility] at phone #[phone] to request this form.

## 2025-03-24 NOTE — TELEPHONE ENCOUNTER
Called insurance company at number provider and sp/w Yaniv HERNANDEZ Who stated that medication sent on 3/21 is now paid and no further action needed. Called and informed patient with verbalized understanding.

## 2025-03-25 ENCOUNTER — TREATMENT (OUTPATIENT)
Dept: ENDOCRINOLOGY | Facility: CLINIC | Age: 75
End: 2025-03-25
Payer: COMMERCIAL

## 2025-03-25 DIAGNOSIS — E11.9 TYPE 2 DIABETES MELLITUS WITHOUT COMPLICATION, WITHOUT LONG-TERM CURRENT USE OF INSULIN (HCC): Primary | ICD-10-CM

## 2025-03-25 PROCEDURE — 95251 CONT GLUC MNTR ANALYSIS I&R: CPT | Performed by: INTERNAL MEDICINE

## 2025-03-25 NOTE — PROGRESS NOTES
CGM data review::  Device:  Dates: 3/11/25-3/24/25 Usage: 99 % Av glu: 101 mg/dL  SD: 17 mg/dL CV: 17.2  % GMI: 5.7  %  TIR: 99 %  TAR: 1 %  TBR:   %    Glycemic patters:  excellent control with completely normal glucose levels. These reflect a non diabetes state.   Hypoglycemia: No    Recommendation: suggest avoid medications for diabetes completely.

## 2025-04-03 ENCOUNTER — TREATMENT (OUTPATIENT)
Dept: ENDOCRINOLOGY | Facility: CLINIC | Age: 75
End: 2025-04-03
Payer: COMMERCIAL

## 2025-04-03 DIAGNOSIS — E11.9 TYPE 2 DIABETES MELLITUS WITHOUT COMPLICATION, WITHOUT LONG-TERM CURRENT USE OF INSULIN (HCC): Primary | ICD-10-CM

## 2025-04-03 PROCEDURE — 95251 CONT GLUC MNTR ANALYSIS I&R: CPT | Performed by: INTERNAL MEDICINE

## 2025-04-03 NOTE — PROGRESS NOTES
CGM data review::  Device: dexcom          Dates:  3/11/25-3/24/25           Usage: 90 %   Av glu: 101 mg/dL                   SD: 17 mg/dL        CV: 17  %        GMI: 5.7  %  TIR: 99 %                    TAR: 1 %                     TBR:   %     Glycemic patters:  normal glucose. No glucose in diabetes range at all.  Hypoglycemia: No

## 2025-04-11 ENCOUNTER — ANNUAL EXAM (OUTPATIENT)
Dept: OBGYN CLINIC | Facility: MEDICAL CENTER | Age: 75
End: 2025-04-11
Payer: COMMERCIAL

## 2025-04-11 ENCOUNTER — TELEPHONE (OUTPATIENT)
Age: 75
End: 2025-04-11

## 2025-04-11 VITALS
OXYGEN SATURATION: 96 % | WEIGHT: 126 LBS | SYSTOLIC BLOOD PRESSURE: 108 MMHG | TEMPERATURE: 98.6 F | DIASTOLIC BLOOD PRESSURE: 68 MMHG | BODY MASS INDEX: 23.19 KG/M2 | HEIGHT: 62 IN | HEART RATE: 73 BPM

## 2025-04-11 DIAGNOSIS — Z85.42 HISTORY OF ENDOMETRIAL CANCER: ICD-10-CM

## 2025-04-11 DIAGNOSIS — Z12.31 ENCOUNTER FOR SCREENING MAMMOGRAM FOR MALIGNANT NEOPLASM OF BREAST: ICD-10-CM

## 2025-04-11 DIAGNOSIS — Z01.419 ENCNTR FOR GYN EXAM (GENERAL) (ROUTINE) W/O ABN FINDINGS: Primary | ICD-10-CM

## 2025-04-11 DIAGNOSIS — L90.0 LICHEN SCLEROSUS: ICD-10-CM

## 2025-04-11 PROCEDURE — G0145 SCR C/V CYTO,THINLAYER,RESCR: HCPCS | Performed by: STUDENT IN AN ORGANIZED HEALTH CARE EDUCATION/TRAINING PROGRAM

## 2025-04-11 PROCEDURE — G0476 HPV COMBO ASSAY CA SCREEN: HCPCS | Performed by: STUDENT IN AN ORGANIZED HEALTH CARE EDUCATION/TRAINING PROGRAM

## 2025-04-11 PROCEDURE — S0612 ANNUAL GYNECOLOGICAL EXAMINA: HCPCS | Performed by: STUDENT IN AN ORGANIZED HEALTH CARE EDUCATION/TRAINING PROGRAM

## 2025-04-11 NOTE — TELEPHONE ENCOUNTER
"Hello,    The following message was sent via e-mail to the leadership team:     Please advise if you can help facilitate the following overbook request:    Patient Name: Cori Pleitez    Patient MRN: [id number]    Call back #: [phone] (Press Option 1)    Insurance: [facility]    Department:Cardiology    Speciality: General Cardiology    Reason for overbook request: PATIENT REQUEST    Comments (Write \"N/a\" if no comments): Patient's appointment was cancelled with Dr. Levi in the [place] location on May 23rd. Patient did request to be seen specifically with Dr. Levi in either the [place] or [place] office. Patient is a school nurse, and she did request to be seen in the month of June or early July as it would be easier for her since she won't have to take off of work. I did advise patient that Dr. Levi is scheduling out to January 2nd in [place], but patient did not feel comfortable scheduling an appointment and potentially waiting that long for an appointment.     Requested doctor and location: Dr. Levi/[place] or [place]    Date of current appointment: N/A      Thank you.    "

## 2025-04-11 NOTE — ASSESSMENT & PLAN NOTE
-will continue every other day clobetasol due to increase in symptoms with downtitrating. Physical exam stable.   -will call if symptoms exacerbate for biopsy.     Orders:    Liquid-based pap, screening

## 2025-04-11 NOTE — PROGRESS NOTES
Name: Cori Pleitez      : 1950      MRN: [id number]  Encounter Provider: Gris Amos DO  Encounter Date: 2025   Encounter department: [facility]  :  Assessment & Plan  Encntr for gyn exam (general) (routine) w/o abn findings  -mammogram: 24 BIRADs 1   -colonoscopy: 24, repeat in 5 years   -DEXA: 23 normal, scheduled 25  -denies postmenopausal bleeding   -not currently sexually active   -STD testing: denies  -smoking: denies  -drinks alcohol socially  -recommend 30 min of exercise daily   -denies family history of ovarian, colon cancer  -mother, maternal grandmother, maternal aunt with breast cancer. Pt herself had negative BRCA and genetic testing   -return in one year or earlier if needed     Orders:    Liquid-based pap, screening    Encounter for screening mammogram for malignant neoplasm of breast    Orders:    Mammo screening bilateral w 3d and cad; Future    Lichen sclerosus  -will continue every other day clobetasol due to increase in symptoms with downtitrating. Physical exam stable.   -will call if symptoms exacerbate for biopsy.     Orders:    Liquid-based pap, screening    History of endometrial cancer  -pap collected today of vaginal cuff    Orders:    Liquid-based pap, screening        History of Present Illness   HPI  Cori Pleitez is a 74 y.o. female  postmenopausal s/p hysterectomy presents for annual exam.       Review of Systems   Constitutional:  Negative for appetite change, chills, fatigue and fever.   Respiratory:  Negative for cough, chest tightness, shortness of breath and wheezing.    Cardiovascular:  Negative for chest pain, palpitations and leg swelling.   Gastrointestinal:  Negative for abdominal distention, abdominal pain, constipation, diarrhea, nausea and vomiting.   Endocrine: Negative for cold intolerance, heat intolerance and polyuria.   Genitourinary:  Negative for difficulty  "urinating, dyspareunia, dysuria, genital sores, menstrual problem, vaginal bleeding, vaginal discharge and vaginal pain.   Neurological:  Negative for dizziness, weakness, light-headedness and headaches.          Objective   /68 (Patient Position: Sitting, Cuff Size: Adult)   Pulse 73   Temp 98.6 °F (37 °C) (Temporal)   Ht 5' 2\" (1.575 m)   Wt 57.2 kg (126 lb)   LMP  (LMP Unknown)   SpO2 96%   BMI 23.05 kg/m²      Physical Exam  Constitutional:       General: She is not in acute distress.     Appearance: Normal appearance. She is not ill-appearing.   Cardiovascular:      Rate and Rhythm: Normal rate.   Pulmonary:      Effort: Pulmonary effort is normal. No respiratory distress.   Chest:   Breasts:     Breasts are symmetrical.      Right: Normal. No swelling, bleeding, inverted nipple, mass, nipple discharge, skin change or tenderness.      Left: Normal. No swelling, bleeding, inverted nipple, mass, nipple discharge, skin change or tenderness.   Abdominal:      General: Abdomen is flat. There is no distension.      Palpations: Abdomen is soft.      Tenderness: There is no abdominal tenderness. There is no guarding or rebound.   Genitourinary:     General: Normal vulva.      Exam position: Lithotomy position.      Labia:         Right: No rash, tenderness, lesion or injury.         Left: No rash, tenderness, lesion or injury.       Urethra: No prolapse, urethral pain, urethral swelling or urethral lesion.      Vagina: Normal. No foreign body. No vaginal discharge, erythema, tenderness, bleeding or lesions.      Uterus: Absent.       Adnexa: Right adnexa normal and left adnexa normal.        Right: No mass, tenderness or fullness.          Left: No mass, tenderness or fullness.        Comments: Vaginal atrophy, uterus and cervix surgically absent   Musculoskeletal:      Right lower leg: No edema.      Left lower leg: No edema.   Lymphadenopathy:      Upper Body:      Right upper body: No supraclavicular, " axillary or pectoral adenopathy.      Left upper body: No supraclavicular, axillary or pectoral adenopathy.   Neurological:      General: No focal deficit present.      Mental Status: She is alert and oriented to person, place, and time.   Psychiatric:         Mood and Affect: Mood normal.         Behavior: Behavior normal.         Thought Content: Thought content normal.         Judgment: Judgment normal.

## 2025-04-11 NOTE — ASSESSMENT & PLAN NOTE
-mammogram: 6/28/24 BIRADs 1   -colonoscopy: 12/5/24, repeat in 5 years   -DEXA: 4/18/23 normal, scheduled 4/17/25  -denies postmenopausal bleeding   -not currently sexually active   -STD testing: denies  -smoking: denies  -drinks alcohol socially  -recommend 30 min of exercise daily   -denies family history of ovarian, colon cancer  -mother, maternal grandmother, maternal aunt with breast cancer. Pt herself had negative BRCA and genetic testing   -return in one year or earlier if needed     Orders:    Liquid-based pap, screening

## 2025-04-14 LAB
HPV HR 12 DNA CVX QL NAA+PROBE: NEGATIVE
HPV16 DNA CVX QL NAA+PROBE: NEGATIVE
HPV18 DNA CVX QL NAA+PROBE: NEGATIVE

## 2025-04-17 ENCOUNTER — RESULTS FOLLOW-UP (OUTPATIENT)
Dept: OBGYN CLINIC | Facility: CLINIC | Age: 75
End: 2025-04-17

## 2025-04-17 ENCOUNTER — HOSPITAL ENCOUNTER (OUTPATIENT)
Age: 75
Discharge: HOME | End: 2025-04-17
Payer: COMMERCIAL

## 2025-04-17 VITALS — WEIGHT: 129 LBS | HEIGHT: 62 IN | BODY MASS INDEX: 23.74 KG/M2

## 2025-04-17 DIAGNOSIS — M81.0 AGE-RELATED OSTEOPOROSIS WITHOUT CURRENT PATHOLOGICAL FRACTURE: ICD-10-CM

## 2025-04-17 LAB
LAB AP GYN PRIMARY INTERPRETATION: NORMAL
Lab: NORMAL

## 2025-04-17 PROCEDURE — 77080 DXA BONE DENSITY AXIAL: CPT

## 2025-04-26 ENCOUNTER — APPOINTMENT (OUTPATIENT)
Dept: LAB | Facility: HOSPITAL | Age: 75
End: 2025-04-26
Payer: COMMERCIAL

## 2025-04-26 DIAGNOSIS — M81.0 AGE-RELATED OSTEOPOROSIS WITHOUT CURRENT PATHOLOGICAL FRACTURE: ICD-10-CM

## 2025-04-26 DIAGNOSIS — E11.9 TYPE 2 DIABETES MELLITUS WITHOUT COMPLICATION, WITHOUT LONG-TERM CURRENT USE OF INSULIN (HCC): ICD-10-CM

## 2025-04-26 DIAGNOSIS — E03.9 ACQUIRED HYPOTHYROIDISM: ICD-10-CM

## 2025-04-26 LAB
25(OH)D3 SERPL-MCNC: 85.9 NG/ML (ref 30–100)
ALBUMIN SERPL BCG-MCNC: 4.6 G/DL (ref 3.5–5)
ALP SERPL-CCNC: 58 U/L (ref 34–104)
ALT SERPL W P-5'-P-CCNC: 14 U/L (ref 7–52)
ANION GAP SERPL CALCULATED.3IONS-SCNC: 7 MMOL/L (ref 4–13)
AST SERPL W P-5'-P-CCNC: 15 U/L (ref 13–39)
BILIRUB SERPL-MCNC: 0.66 MG/DL (ref 0.2–1)
BUN SERPL-MCNC: 27 MG/DL (ref 5–25)
CALCIUM SERPL-MCNC: 10.4 MG/DL (ref 8.4–10.2)
CHLORIDE SERPL-SCNC: 102 MMOL/L (ref 96–108)
CO2 SERPL-SCNC: 28 MMOL/L (ref 21–32)
CREAT SERPL-MCNC: 0.98 MG/DL (ref 0.6–1.3)
EST. AVERAGE GLUCOSE BLD GHB EST-MCNC: 100 MG/DL
GFR SERPL CREATININE-BSD FRML MDRD: 56 ML/MIN/1.73SQ M
GLUCOSE P FAST SERPL-MCNC: 96 MG/DL (ref 65–99)
HBA1C MFR BLD: 5.1 %
POTASSIUM SERPL-SCNC: 4.3 MMOL/L (ref 3.5–5.3)
PROT SERPL-MCNC: 7.3 G/DL (ref 6.4–8.4)
PTH-INTACT SERPL-MCNC: 14.6 PG/ML (ref 12–88)
SODIUM SERPL-SCNC: 137 MMOL/L (ref 135–147)
T4 FREE SERPL-MCNC: 1.2 NG/DL (ref 0.61–1.12)
TSH SERPL DL<=0.05 MIU/L-ACNC: 1.32 UIU/ML (ref 0.45–4.5)

## 2025-04-26 PROCEDURE — 36415 COLL VENOUS BLD VENIPUNCTURE: CPT

## 2025-04-26 PROCEDURE — 84439 ASSAY OF FREE THYROXINE: CPT

## 2025-04-26 PROCEDURE — 83036 HEMOGLOBIN GLYCOSYLATED A1C: CPT

## 2025-04-26 PROCEDURE — 83970 ASSAY OF PARATHORMONE: CPT

## 2025-04-26 PROCEDURE — 82306 VITAMIN D 25 HYDROXY: CPT

## 2025-04-26 PROCEDURE — 84443 ASSAY THYROID STIM HORMONE: CPT

## 2025-04-26 PROCEDURE — 80053 COMPREHEN METABOLIC PANEL: CPT

## 2025-05-02 ENCOUNTER — OFFICE VISIT (OUTPATIENT)
Dept: ENDOCRINOLOGY | Facility: CLINIC | Age: 75
End: 2025-05-02
Payer: COMMERCIAL

## 2025-05-02 VITALS
DIASTOLIC BLOOD PRESSURE: 72 MMHG | BODY MASS INDEX: 22.63 KG/M2 | HEART RATE: 59 BPM | HEIGHT: 62 IN | SYSTOLIC BLOOD PRESSURE: 116 MMHG | OXYGEN SATURATION: 98 % | WEIGHT: 123 LBS | TEMPERATURE: 97.5 F

## 2025-05-02 DIAGNOSIS — K59.09 OTHER CONSTIPATION: ICD-10-CM

## 2025-05-02 DIAGNOSIS — Z79.4 TYPE 2 DIABETES MELLITUS WITH DIABETIC CATARACT, WITH LONG-TERM CURRENT USE OF INSULIN (HCC): ICD-10-CM

## 2025-05-02 DIAGNOSIS — E11.9 TYPE 2 DIABETES MELLITUS WITHOUT COMPLICATION, WITHOUT LONG-TERM CURRENT USE OF INSULIN (HCC): ICD-10-CM

## 2025-05-02 DIAGNOSIS — E55.9 VITAMIN D DEFICIENCY: ICD-10-CM

## 2025-05-02 DIAGNOSIS — K59.04 CHRONIC IDIOPATHIC CONSTIPATION: ICD-10-CM

## 2025-05-02 DIAGNOSIS — E78.5 DYSLIPIDEMIA: ICD-10-CM

## 2025-05-02 DIAGNOSIS — E11.36 TYPE 2 DIABETES MELLITUS WITH DIABETIC CATARACT, WITH LONG-TERM CURRENT USE OF INSULIN (HCC): ICD-10-CM

## 2025-05-02 DIAGNOSIS — E03.9 ACQUIRED HYPOTHYROIDISM: Primary | ICD-10-CM

## 2025-05-02 PROCEDURE — 95251 CONT GLUC MNTR ANALYSIS I&R: CPT | Performed by: INTERNAL MEDICINE

## 2025-05-02 PROCEDURE — 99214 OFFICE O/P EST MOD 30 MIN: CPT | Performed by: INTERNAL MEDICINE

## 2025-05-02 RX ORDER — TIRZEPATIDE 5 MG/.5ML
5 INJECTION, SOLUTION SUBCUTANEOUS WEEKLY
Qty: 6 ML | Refills: 0 | Status: SHIPPED | OUTPATIENT
Start: 2025-05-02

## 2025-05-02 RX ORDER — LEVOTHYROXINE SODIUM 50 UG/1
50 TABLET ORAL DAILY
Qty: 90 TABLET | Refills: 1 | Status: SHIPPED | OUTPATIENT
Start: 2025-05-02

## 2025-05-02 RX ORDER — TIRZEPATIDE 5 MG/.5ML
5 INJECTION, SOLUTION SUBCUTANEOUS WEEKLY
Qty: 6 ML | Refills: 0 | Status: SHIPPED | OUTPATIENT
Start: 2025-05-02 | End: 2025-05-02 | Stop reason: SDUPTHER

## 2025-05-02 RX ORDER — LINACLOTIDE 145 UG/1
145 CAPSULE, GELATIN COATED ORAL EVERY MORNING
Qty: 90 CAPSULE | Refills: 1 | Status: SHIPPED | OUTPATIENT
Start: 2025-05-02

## 2025-05-02 NOTE — ASSESSMENT & PLAN NOTE
She is in good control. She lost a total 90 lbs.    She is compliant with medical fitness training and feels well.    we agreed to reduce Mounjaro 5mg weekly. Stop metformin.    Follow up in 3 months.      Lab Results   Component Value Date    HGBA1C 5.1 04/26/2025       Lab Results   Component Value Date    HGBA1C 5.1 04/26/2025

## 2025-05-02 NOTE — PROGRESS NOTES
"    Follow-up Patient Progress Note      CC: Type 2 diabetes with hyperglycemia     History of Present Illness:   73 yr female with Type 2DM since 1990, Hypothyroidism, GERD, JUICE, Asthma, endometrial CA, HTN, HLD, DPN, DJD s/p TKR, multiple joint replacements, obesity BMI 35 and vitamin D deficiency. Last visit was 2/6/25.  She is a certified diabetes educator.     Since last visit, she lost 16 lbs. Total 88 lbs since 8/23.  She is strength training 3 times a week.     CGM data review::  Device: dexcomG7          Dates:  4/1/25-4/14/25         Usage: 98 %               Av glu: 105 mg/dL             SD: 22 mg/dL  CV:  20.7 %            GMI: 5.8 %  TIR: 98 %                    TAR: 1 %                     TBR: 1 %  Glycemic patters:  excellent control with normoglycemia.     Current meds:  Mounjaro 10mg weekly     Opthamology: yes, no retinopathy  Podiatry: No -   vaccination: yes  Dental:No  Pancreatitis: Yes -1990 allergic reaction to zantac suspected     Ace/ARB: olmesartan  Statin: crestor, zetia  Thyroid issues: hypothyroidism on levothyroxine 75 mcg qdaily     4/17/25: DXA - T scores 0.5 LS, LTH 0.3, LFN -0.3. 10 yr FRAX score 1.5% hip and 13% major osteoporosis porosis.     FHx: Mother- breast CA.       Physical Exam:  Body mass index is 22.5 kg/m².  /72 (BP Location: Left arm, Patient Position: Sitting, Cuff Size: Standard)   Pulse 59   Temp 97.5 °F (36.4 °C) (Tympanic)   Ht 5' 2\" (1.575 m)   Wt 55.8 kg (123 lb)   LMP  (LMP Unknown)   SpO2 98%   BMI 22.50 kg/m²    Vitals:    05/02/25 0934   Weight: 55.8 kg (123 lb)        Physical Exam  Constitutional:       General: She is not in acute distress.     Appearance: She is well-developed.   HENT:      Head: Normocephalic and atraumatic.      Nose: Nose normal.   Eyes:      Conjunctiva/sclera: Conjunctivae normal.   Pulmonary:      Effort: Pulmonary effort is normal.   Abdominal:      General: There is no distension.   Musculoskeletal:      Cervical " back: Normal range of motion and neck supple.   Skin:     Findings: No rash.      Comments: No icterus   Neurological:      Mental Status: She is alert and oriented to person, place, and time.         Labs:   Lab Results   Component Value Date    HGBA1C 5.1 04/26/2025       Lab Results   Component Value Date    GUM0YJGVQIES 1.319 04/26/2025       Lab Results   Component Value Date    CREATININE 0.98 04/26/2025    CREATININE 0.87 01/31/2025    CREATININE 0.78 09/22/2024    BUN 27 (H) 04/26/2025     11/04/2015    K 4.3 04/26/2025     04/26/2025    CO2 28 04/26/2025     eGFR   Date Value Ref Range Status   04/26/2025 56 ml/min/1.73sq m Final       Lab Results   Component Value Date    ALT 14 04/26/2025    AST 15 04/26/2025    ALKPHOS 58 04/26/2025    BILITOT 0.41 11/04/2015       Lab Results   Component Value Date    CHOLESTEROL 137 01/31/2025    CHOLESTEROL 109 09/07/2024    CHOLESTEROL 114 04/05/2024     Lab Results   Component Value Date    HDL 58 01/31/2025    HDL 41 (L) 09/07/2024    HDL 42 (L) 04/05/2024     Lab Results   Component Value Date    TRIG 67 01/31/2025    TRIG 88 09/07/2024    TRIG 123 04/05/2024     Lab Results   Component Value Date    NONHDLC 79 01/31/2025    NONHDLC 68 09/07/2024    NONHDLC 75 08/22/2023         Assessment/Plan:    1. Acquired hypothyroidism  Assessment & Plan:  She is clinically and biochemically euthyroid. TSH is slightly low and fT4 was slightly.    Continue levothyroxine 50mg weekly.  Since she lost significant weight about 90 lbs.    Follow up in 3 months with repeat labs.  Orders:  -     levothyroxine (Euthyrox) 50 mcg tablet; Take 1 tablet (50 mcg total) by mouth daily  -     T4, free; Future  -     TSH, 3rd generation; Future  2. Type 2 diabetes mellitus without complication, without long-term current use of insulin (HCC)  Assessment & Plan:  She is in good control. She lost a total 90 lbs.    She is compliant with medical fitness training and feels well.    we  agreed to reduce Mounjaro 5mg weekly. Stop metformin.    Follow up in 3 months.      Lab Results   Component Value Date    HGBA1C 5.1 04/26/2025       Lab Results   Component Value Date    HGBA1C 5.1 04/26/2025     Orders:  -     Mounjaro 5 MG/0.5ML SOAJ; Inject 5 mg under the skin once a week  3. Dyslipidemia  4. Vitamin D deficiency  5. Type 2 diabetes mellitus with diabetic cataract, with long-term current use of insulin (HCC)        I have spent a total time of 40 minutes on 05/02/25 in caring for this patient including greater than 50% of this time was spent in counseling/coordination of care as listed above.       Discussed with the patient and all questioned fully answered. She will contact me with concerns.    Piper Lozano

## 2025-05-02 NOTE — ASSESSMENT & PLAN NOTE
She is clinically and biochemically euthyroid. TSH is slightly low and fT4 was slightly.    Continue levothyroxine 50mg weekly.  Since she lost significant weight about 90 lbs.    Follow up in 3 months with repeat labs.

## 2025-05-03 ENCOUNTER — OFFICE VISIT (OUTPATIENT)
Age: 75
End: 2025-05-03
Payer: COMMERCIAL

## 2025-05-03 VITALS
DIASTOLIC BLOOD PRESSURE: 60 MMHG | RESPIRATION RATE: 18 BRPM | HEART RATE: 71 BPM | OXYGEN SATURATION: 97 % | WEIGHT: 127 LBS | TEMPERATURE: 97.5 F | BODY MASS INDEX: 23.23 KG/M2 | SYSTOLIC BLOOD PRESSURE: 118 MMHG

## 2025-05-03 DIAGNOSIS — L24.9 IRRITANT CONTACT DERMATITIS, UNSPECIFIED TRIGGER: Primary | ICD-10-CM

## 2025-05-03 PROCEDURE — 99213 OFFICE O/P EST LOW 20 MIN: CPT | Performed by: PHYSICIAN ASSISTANT

## 2025-05-03 RX ORDER — HYDROCORTISONE 25 MG/G
CREAM TOPICAL 2 TIMES DAILY
Qty: 20 G | Refills: 0 | Status: SHIPPED | OUTPATIENT
Start: 2025-05-03

## 2025-05-03 NOTE — PATIENT INSTRUCTIONS
Patient Education     Contact dermatitis   The Basics   Written by the doctors and editors at [facility]   What is dermatitis? -- Dermatitis is a type of skin rash that can happen after your skin touches something that irritates it or something you are allergic to.  Things that irritate the skin can be found in products that you use every day, such as soaps or cleansers. Some of the things that can cause skin allergies include:   Certain medicines, perfumes, or cosmetics   The metal in some kinds of jewelry   Plants, such as poison ivy and poison oak  Sometimes, you can develop a rash the first time you touch something. But it is also possible to get a rash from something that you have used before without any problems.  What other symptoms should I watch for? -- If you have a rash, your skin might be dry, itchy, or cracked (picture 1). In people with light skin, the rash is often red. In people with darker skin, it might appear purple, brown, gray, or black (picture 2). If your rash is caused by an allergy, you might also have some swelling or blisters where you have the rash.  Severe symptoms include:   Pain   Widespread swelling   Blisters, oozing, or crusting of the skin  Is there anything I can do on my own? -- Yes. You can:   Avoid using or touching whatever might have caused your rash.   Protect your skin from anything that might irritate it or cause an allergy. For example, wear gloves if you need to work with harsh soaps.   Use cool or warm water, not hot, for baths and showers. You can also try a special kind of bath called an oatmeal bath.   Try using soothing skin products to help with the itching and discomfort. Examples include thick moisturizing cream or petroleum jelly. Put this on your skin right after you get out of the bath or shower and after washing your hands.   Avoid scratching your skin. It might help to:   Wear cotton gloves at night.   Keep your nails short and clean.   Cover the parts of your  skin that itch.  How are skin rashes treated? -- Your doctor might prescribe different treatments or medicines to help your rash heal. These can include:   Steroid creams and ointments - These go on the skin, and they relieve itching and redness.   Steroid pills - You might need to take these for a short time if your rash is severe. But your doctor or nurse will want to take you off of the steroid pills as soon as possible. Even though these medicines help, they can also cause problems of their own.   Wet or damp dressings - These can be helpful for skin that is crusting or oozing. To use a wet or damp dressing, you need to wear 2 layers of clothing. First, put on a layer of damp cotton clothes over your rash. Then, put on a layer of dry clothes on top of the damp ones. People who need these dressings often wear them at night when they sleep.  When should I call the doctor? -- Call your doctor or nurse for advice if:   You have a rash that does not go away within 2 weeks.   Your rash gets worse or spreads over large parts of your body.   You have signs of infection like swelling, warmth, pain, or fever.  All topics are updated as new evidence becomes available and our peer review process is complete.  This topic retrieved from [employer] on: Feb 26, 2024.  Topic 15465 Version 12.0  Release: 32.2.4 - C32.56  © 2024 UpToDate, Inc. and/or its affiliates. All rights reserved.  picture 1: Chronic irritant contact dermatitis     If you have dermatitis, your skin might be red, dry, itchy, or cracked.  Graphic 546019 Version 2.0  picture 2: Dermatitis caused by nickel allergy     This person has an allergy to nickel, a type of metal. They have dermatitis where the button of their jeans touched their skin.  Graphic 266004 Version 1.0  Consumer Information Use and Disclaimer   Disclaimer: This generalized information is a limited summary of diagnosis, treatment, and/or medication information. It is not meant to be comprehensive  and should be used as a tool to help the user understand and/or assess potential diagnostic and treatment options. It does NOT include all information about conditions, treatments, medications, side effects, or risks that may apply to a specific patient. It is not intended to be medical advice or a substitute for the medical advice, diagnosis, or treatment of a health care provider based on the health care provider's examination and assessment of a patient's specific and unique circumstances. Patients must speak with a health care provider for complete information about their health, medical questions, and treatment options, including any risks or benefits regarding use of medications. This information does not endorse any treatments or medications as safe, effective, or approved for treating a specific patient. UpToDate, Inc. and its affiliates disclaim any warranty or liability relating to this information or the use thereof.The use of this information is governed by the Terms of Use, available at https://www.Smartest[employer].com/en/know/clinical-effectiveness-terms. 2024© UpToDate, Inc. and its affiliates and/or licensors. All rights reserved.  Copyright   © 2024 UpToDate, Inc. and/or its affiliates. All rights reserved.

## 2025-05-03 NOTE — PROGRESS NOTES
[facility]        NAME: Cori Pleitez is a 74 y.o. female  : 1950    MRN: [id number]  DATE: May 3, 2025  TIME: 12:28 PM    Assessment and Plan   Irritant contact dermatitis, unspecified trigger [L24.9]  1. Irritant contact dermatitis, unspecified trigger  hydrocortisone 2.5 % cream            Patient Instructions     Contact dermatitis    Loratadine 10 mg in the daytime as needed  May continue to use Benadryl at night as needed  Hydrocortisone 2.5% cream apply a thin film to affected area twice daily  Cool compresses    Follow up with PCP in 3-5 days.  Proceed to  ER if symptoms worsen.    Chief Complaint     Chief Complaint   Patient presents with    Rash     Sporadic rash on chest, back and arms X 1-2 days that is pruritic         History of Present Illness       58-year-old female presenting with complaint of a sporadic rash on chest, back and arms X 1-2 days that is pruritic.  Patient denies fever, chills, malaise, fatigue, headaches, weakness.  She denies insect bites however has been gardening.  Mitts to using over-the-counter hydrocortisone with Benadryl.        Review of Systems   Review of Systems   Constitutional:  Negative for appetite change.   HENT:  Negative for congestion, rhinorrhea, sneezing, sore throat and trouble swallowing.    Respiratory:  Negative for cough.    Gastrointestinal:  Negative for abdominal pain, nausea and vomiting.   Musculoskeletal:  Negative for myalgias.   Skin:  Positive for rash.   Neurological:  Negative for weakness and light-headedness.         Current Medications       Current Outpatient Medications:     albuterol (PROVENTIL HFA,VENTOLIN HFA) 90 mcg/act inhaler, Inhale 2 puffs every 6 (six) hours as needed for wheezing, Disp: 18 g, Rfl: 2    aspirin 81 mg chewable tablet, Chew 1 tablet (81 mg total) daily, Disp: , Rfl:     Calcium-Magnesium-Vitamin D (CALCIUM 1200+D3 PO), , Disp: , Rfl:     clindamycin (CLEOCIN) 300 MG capsule, , Disp: , Rfl:      clobetasol (TEMOVATE) 0.05 % ointment, Apply topically 2 (two) times a day Taking once daily, Disp: 30 g, Rfl: 5    Continuous Glucose Sensor (Dexcom G7 Sensor), Use 1 Device every 10 days, Disp: 3 each, Rfl: 5    ezetimibe (ZETIA) 10 mg tablet, Take 1 tablet (10 mg total) by mouth daily, Disp: 90 tablet, Rfl: 3    FREESTYLE LITE test strip, USE 1 STRIP THREE TIMES A DAY AS INSTRUCTED, Disp: 300 strip, Rfl: 3    furosemide (LASIX) 20 mg tablet, Take 1 tablet (20 mg total) by mouth daily, Disp: 90 tablet, Rfl: 3    hydrocortisone 2.5 % cream, Apply topically 2 (two) times a day, Disp: 20 g, Rfl: 0    KRILL OIL PO, Take by mouth, Disp: , Rfl:     levothyroxine (Euthyrox) 50 mcg tablet, Take 1 tablet (50 mcg total) by mouth daily, Disp: 90 tablet, Rfl: 1    linaCLOtide (Linzess) 145 MCG CAPS, TAKE 1 CAPSULE EVERY MORNING, Disp: 90 capsule, Rfl: 1    Magnesium Oxide 400 MG CAPS, Take by mouth, Disp: , Rfl:     Mometasone Furo-Formoterol Fum (Dulera) 50-5 MCG/ACT AERO, Inhale 2 puffs 2 (two) times a day Rinse mouth after use., Disp: 1170 g, Rfl: 1    Mounjaro 5 MG/0.5ML SOAJ, Inject 5 mg under the skin once a week, Disp: 6 mL, Rfl: 0    Omega-3 Fatty Acids (FISH OIL) 1200 MG CAPS, Take by mouth, Disp: , Rfl:     pantoprazole (PROTONIX) 40 mg tablet, Take 1 tablet (40 mg total) by mouth daily, Disp: 90 tablet, Rfl: 3    potassium chloride (MICRO-K) 10 MEQ CR capsule, Take 1 capsule (10 mEq total) by mouth daily, Disp: 90 capsule, Rfl: 3    rosuvastatin (CRESTOR) 20 MG tablet, Take 1 tablet (20 mg total) by mouth daily, Disp: 90 tablet, Rfl: 3    saccharomyces boulardii (FLORASTOR) 250 mg capsule, Take 250 mg by mouth daily  , Disp: , Rfl:     Continuous Glucose  (Dexcom G7 ) SMITH, USE CONTINUOUSLY AS DIRECTED (Patient not taking: No sig reported), Disp: 1 each, Rfl: 3    Current Allergies     Allergies as of 05/03/2025 - Reviewed 05/03/2025   Allergen Reaction Noted    Amoxicillin-pot clavulanate  Anaphylaxis, Hives, Itching, and Facial Swelling     Erythromycin Hives, Itching, Swelling, Vomiting, Throat Swelling, Nasal Congestion, and Facial Swelling     Meperidine Anaphylaxis 03/23/2017    Morphine Hives, Itching, Swelling, Other (See Comments), and Syncope     Penicillins Anaphylaxis, Itching, Swelling, and Hives 03/19/2014    Relafen [nabumetone] Hives, Itching, and Swelling 03/19/2014    Sulfa antibiotics Hives, Itching, Rash, and Swelling     Darvon [propoxyphene] Hives 02/05/2018    Methocarbamol Other (See Comments) 03/19/2014    Reglan [metoclopramide] Anxiety 06/27/2018    Tetracycline Rash 06/09/2021    Tetracyclines & related Rash 08/21/2017            The following portions of the patient's history were reviewed and updated as appropriate: allergies, current medications, past family history, past medical history, past social history, past surgical history and problem list.     Past Medical History:   Diagnosis Date    Abnormal mammogram     Abnormal weight gain     Achilles tendinitis     Acne 1960    Allergic 1952    since childhood    Allergic rhinitis     Arthritis 1988    Asthma     Breast cyst 2018    Breast injury 2021    Cancer (HCC) 2018    Endometrial adenoma    Combined forms of age-related cataract of both eyes 08/18/2021    Added per ICD-10-CM coding guidelines - provider accepted    Diabetes mellitus (HCC)     Disc degeneration, lumbar     Disease of thyroid gland     hypo    Dyslipidemia     Dyslipidemia, goal LDL below 70 03/27/2018    Early satiety     Edema     idiopathic peripheral    Encounter for follow-up examination after completed treatment for malignant neoplasm 03/17/2019    Encounter for gynecological examination without abnormal finding 06/22/2020    Endometrial cancer (HCC) 2018    Enthesopathy     hip region    Esophagitis, reflux     Fatigue 2022    Fibromyalgia, primary 1995    Fractures 2000    left tibia    GERD (gastroesophageal reflux disease)     Hard to  intubate     difficulty with intubation and had to be intubated twice    Heart murmur     HL (hearing loss)     Hypercholesterolemia     Hypertension     Hypothyroidism 2004    IBS (irritable bowel syndrome)     Irritable bowel syndrome     Morbid (severe) obesity due to excess calories (HCC) 2022    As per CMS ICD10 guidelines:Provider accepted    Nasal congestion     Nosebleed 2006    Obesity 1998    Obesity, Class I, BMI 30-34.9 2015    Osteoarthritis     Pneumonia     Rosacea     Sacroiliitis (HCC)     Shingles     Sinusitis     Skin cancer 2018    pre-cancerous facial lesions removed    Skin disorder     rosacea    Skin tag     Sleep apnea, obstructive     Stomach disorder     hiatal hernia, GERD    Tinnitus     Varicella     [address]       Past Surgical History:   Procedure Laterality Date    CARDIAC CATHETERIZATION  2013    CATARACT EXTRACTION Right 2024    CATARACT EXTRACTION Left 2024     SECTION      x2    COLONOSCOPY      COLPOSCOPY  2019    ELBOW SURGERY      left ulna/radius    ESOPHAGOGASTRODUODENOSCOPY      FOOT SURGERY Right     FRACTURE SURGERY      FRACTURE SURGERY Left     tibia    HYSTERECTOMY Bilateral 2018    JOINT REPLACEMENT Left     shoulder,  right knee    KNEE ARTHROSCOPY      KNEE ARTHROSCOPY W/ MENISCAL REPAIR Right     OOPHORECTOMY  2018    adenocarcinoma    ORTHOPEDIC SURGERY  2016 tendon repair    R foot    OH LAPS TOTAL HYSTERECT 250 GM/< W/RMVL TUBE/OVARY N/A 2018    Procedure: ROBOTIC TOTAL LAPAROSCOPIC HYSTERECTOMY, BILATERAL SALPINGOOPHORECTOMY, BILATERAL PELVIC LYMPHNODE DISSECTION;  Surgeon: Андрей Meng MD;  Location: [facility];  Service: Gynecology Oncology    REPLACEMENT TOTAL KNEE Right     SHOULDER ARTHROPLASTY      SHOULDER SURGERY  2006    SINUS SURGERY      SKIN BIOPSY  2017    freezing    SKIN CANCER EXCISION  2017    lesion removed    TENDON REPAIR  2016    TONSILLECTOMY       TUBAL LIGATION      WRIST SURGERY      ganglonic cyst       Family History   Problem Relation Age of Onset    Arthritis Mother     Heart disease Mother         cardiac disorder    Diabetes Mother     Hiatal hernia Mother     Hypertension Mother     Irritable bowel syndrome Mother     Breast cancer Mother 66        x2    Uterine cancer Mother     Osteoporosis Mother     Thyroid disease Mother     Stroke Mother     Hyperlipidemia Mother     Cancer Mother         ENDOMETRIAL    Diabetes type II Mother     Thyroid disease unspecified Mother     Hypothyroidism Mother     Deep vein thrombosis Mother     Endometrial cancer Mother         Radiation and hysterectomy    Osteoarthritis Mother     Heart disease Father         cardiac disorder    Hiatal hernia Father     Ulcers Father     Coronary artery disease Father     Hearing loss Father     Heart attack Father     Heart failure Father     Anesthesia problems Father     Acne Father     Rashes / Skin problems Father         Rosacea    Hiatal hernia Sister     Thyroid disease Sister     Lung cancer Sister 77    Cancer Sister         lung adenocarcinoma, mets to meninges    Thyroid disease unspecified Sister     Hypothyroidism Sister     Hyperlipidemia Sister     Arthritis Sister     Autoimmune disease Sister         sarcoidosis    ADD / ADHD Sister     Osteoarthritis Sister     Irritable bowel syndrome Daughter     Hyperlipidemia Daughter     Brain cancer Maternal Grandmother     Breast cancer Maternal Grandmother         [place]own    Hyperlipidemia Maternal Grandmother     Stroke Maternal Grandmother     No Known Problems Maternal Grandfather     No Known Problems Paternal Grandmother     Hearing loss Paternal Grandfather     No Known Problems Brother     Malig Hypertension Son     Hiatal hernia Child     Breast cancer Maternal Aunt 65    Uterine cancer Maternal Aunt         x3 sis    Stroke Maternal Aunt     Cancer Maternal Aunt     Hyperlipidemia Maternal Aunt     Breast  cancer Maternal Aunt 55    Cancer Maternal Aunt     Hyperlipidemia Maternal Aunt     Breast cancer Maternal Aunt 55    Cancer Maternal Aunt     Hyperlipidemia Maternal Aunt     No Known Problems Paternal Aunt     Hyperlipidemia Maternal Aunt     Stroke Maternal Aunt     Thyroid disease Sister     Hyperlipidemia Maternal Aunt     Stroke Maternal Aunt     Breast cancer Maternal Aunt     Cancer Maternal Aunt     Uterine cancer Maternal Aunt     Hyperlipidemia Daughter     Hyperlipidemia Maternal Aunt     Cancer Maternal Aunt     Cancer Maternal Aunt     Hyperlipidemia Maternal Aunt     Colon cancer Neg Hx     Ovarian cancer Neg Hx     Cervical cancer Neg Hx          Medications have been verified.        Objective   /60 (BP Location: Right arm, Patient Position: Sitting, Cuff Size: Adult)   Pulse 71   Temp 97.5 °F (36.4 °C) (Tympanic)   Resp 18   Wt 57.6 kg (127 lb)   LMP  (LMP Unknown)   SpO2 97%   BMI 23.23 kg/m²        Physical Exam     Physical Exam  Vitals and nursing note reviewed.   Constitutional:       Appearance: Normal appearance. She is normal weight.   HENT:      Mouth/Throat:      Mouth: Mucous membranes are moist.      Pharynx: Oropharynx is clear.   Eyes:      General: No scleral icterus.     Conjunctiva/sclera: Conjunctivae normal.      Pupils: Pupils are equal, round, and reactive to light.   Cardiovascular:      Rate and Rhythm: Normal rate and regular rhythm.      Pulses: Normal pulses.      Heart sounds: Normal heart sounds.   Pulmonary:      Effort: Pulmonary effort is normal. No respiratory distress.      Breath sounds: Normal breath sounds.   Musculoskeletal:         General: Normal range of motion.      Cervical back: Normal range of motion and neck supple.   Skin:            Comments: Erythematous papular lesions approximately 3 to 4 mm diffusely on the chest right neck and abdomen.  Some are in linear formation   Neurological:      General: No focal deficit present.      Mental  Status: She is alert and oriented to person, place, and time.      Coordination: Coordination normal.      Gait: Gait normal.   Psychiatric:         Mood and Affect: Mood normal.         Behavior: Behavior normal.         Thought Content: Thought content normal.         Judgment: Judgment normal.

## 2025-05-09 ENCOUNTER — OFFICE VISIT (OUTPATIENT)
Age: 75
End: 2025-05-09
Payer: COMMERCIAL

## 2025-05-09 VITALS
OXYGEN SATURATION: 99 % | BODY MASS INDEX: 23.34 KG/M2 | DIASTOLIC BLOOD PRESSURE: 68 MMHG | HEIGHT: 62 IN | SYSTOLIC BLOOD PRESSURE: 128 MMHG | TEMPERATURE: 96.6 F | WEIGHT: 126.8 LBS | HEART RATE: 69 BPM | RESPIRATION RATE: 18 BRPM

## 2025-05-09 DIAGNOSIS — N18.2 CKD (CHRONIC KIDNEY DISEASE) STAGE 2, GFR 60-89 ML/MIN: ICD-10-CM

## 2025-05-09 DIAGNOSIS — I10 BENIGN ESSENTIAL HYPERTENSION: ICD-10-CM

## 2025-05-09 DIAGNOSIS — E03.9 ACQUIRED HYPOTHYROIDISM: Primary | ICD-10-CM

## 2025-05-09 DIAGNOSIS — C54.1 ENDOMETRIAL CANCER (HCC): ICD-10-CM

## 2025-05-09 DIAGNOSIS — E78.5 DYSLIPIDEMIA: ICD-10-CM

## 2025-05-09 DIAGNOSIS — E55.9 VITAMIN D DEFICIENCY: ICD-10-CM

## 2025-05-09 DIAGNOSIS — E11.9 TYPE 2 DIABETES MELLITUS WITHOUT COMPLICATION, WITHOUT LONG-TERM CURRENT USE OF INSULIN (HCC): ICD-10-CM

## 2025-05-09 DIAGNOSIS — K76.89 HEPATIC CYST: ICD-10-CM

## 2025-05-09 DIAGNOSIS — K76.0 FATTY LIVER: ICD-10-CM

## 2025-05-09 PROCEDURE — 99214 OFFICE O/P EST MOD 30 MIN: CPT | Performed by: FAMILY MEDICINE

## 2025-05-09 NOTE — ASSESSMENT & PLAN NOTE
Will follow up with liver US per patient request.   Orders:  •  US right upper quadrant with liver dopplers; Future

## 2025-05-09 NOTE — ASSESSMENT & PLAN NOTE
Lab Results   Component Value Date    HGBA1C 5.1 04/26/2025     Follows endo.   Well controlled with previous A1c of 5.6.   Currently prescribed: tirzepatide 10 mg once a week injection  on statin & no ACEi/ARB  Previously: on insulin and with endocrinology got off of insulin and now on injectables.   Currently using Dexcom  Med Adjusted: none   Continue with endocrinology.   Advised low-carb, low-sodium diet and limit snacking, really working on building good habits with nutritional intake and exercising regularly.   Reviewed hypoglycemia protocol, will monitor and discuss frequency at each apt with plan to adjust management to avoid occurences

## 2025-05-09 NOTE — ASSESSMENT & PLAN NOTE
Will follow up with liver US per patient request.   Orders:  •  US right upper quadrant with liver dopplers; Future   Zuleyka called from [facility], stating patient called there and asked that the work release she had received from our office, be faxed to her employer at [phone], cheikh Michelle

## 2025-05-09 NOTE — ASSESSMENT & PLAN NOTE
BP Readings from Last 3 Encounters:   05/09/25 128/68   05/03/25 118/60   05/02/25 116/72      Currently not prescribed any meds.  However, taking Lasix 20 mg once every 10 days for edema.   Previously  previously on medication but improved with weight loss.   Today pt reports: no concerns.   Assessment: controlled off medication  Med changes: None. Continue to focus on lifestyle improvement only.   Lifestyle modifications recommended, including reduced sodium intake, regular exercise, maintaining a healthy weight, limiting alcohol consumption, and/or avoiding cig smoking.

## 2025-05-09 NOTE — PATIENT INSTRUCTIONS
"Patient Education     Urinary incontinence in females   The Basics   Written by the doctors and editors at [facility]   What is urinary incontinence? -- Urinary incontinence is the medical term for when a person leaks urine or loses bladder control.  Incontinence is a very common problem, but it is not a normal part of aging. If you have this problem, there are treatments that can help. There are also things that you can do on your own to stop or reduce urine leakage so you don't have to \"just live with it.\"  What are the symptoms of incontinence? -- There are different types of incontinence. Each causes different symptoms. The 3 most common types are:   Stress incontinence - With stress incontinence, you leak urine when you laugh, cough, sneeze, or do anything that \"stresses\" the belly. Stress incontinence is most common in females, especially those who have had a baby.   Urgency incontinence - With urgency incontinence, you feel a strong need to urinate all of a sudden. This is also known as \"urge incontinence.\" Often, the \"urge\" is so strong that you can't make it to the bathroom in time. \"Overactive bladder\" is another term for having a sudden, frequent urge to urinate. People with overactive bladder might or might not actually leak urine.   Mixed incontinence - With mixed incontinence, you have symptoms of both stress and urgency incontinence.  Is there anything I can do on my own to feel better? -- Yes. Here are some things that can help reduce urine leaks:   Reduce the amount of liquid that you drink, especially a few hours before bed.   Cut down on any foods or drinks that make your symptoms worse. Some people find that alcohol, caffeine, or spicy or acidic foods irritate the bladder.   Try to lose weight, if you are overweight. Your doctor or nurse can help you do this in a healthy way.   If you have diabetes, keep your blood sugar as close to your goal level as possible.   If you take medicines called " "diuretics, plan ahead. These medicines increase the need to urinate. Try to take them when you know you will be near a bathroom for a few hours. If you keep having problems with leakage because of diuretics, ask your doctor if you can take a lower dose or switch to a different medicine.  These techniques can also help improve bladder control:   Bladder retraining - During bladder retraining, you go to the bathroom at scheduled times. For instance, you might decide that you will go every hour. Make yourself go every hour, even if you don't feel like you need to. Try to wait the whole hour, even if you need to go sooner. Then, once you get used to going every hour, increase the amount of time you wait in between bathroom visits. Over time, you might be able to \"retrain\" your bladder to wait 3 or 4 hours between bathroom visits.   Pelvic floor muscle training - This involves learning exercises to strengthen and relax your pelvic muscles. These include the muscles that control the flow of urine and bowel movements. When done right, these exercises can help. But people often do them wrong. Ask your doctor or nurse how to do them right. Your doctor might suggest working with a physical therapist who has special training in these exercises.  Should I see my doctor or nurse? -- Yes. Your doctor or nurse can find out what might be causing your incontinence. They can also suggest ways to relieve the problem.  When you speak to your doctor or nurse, ask if any of the medicines you take could be causing your symptoms. Some medicines can cause incontinence or make it worse.  Some people choose to wear pads or special underwear. These can help if you accidentally leak urine once in a while. But they can also cause skin irritation if you use them a lot. If you have incontinence, ask your doctor or nurse how to treat it.  How is incontinence treated? -- The treatment options differ depending on what type of incontinence you have. " Some of the options include:   Medicines to relax the bladder   Surgery to repair the tissues that support the bladder or to improve the flow of urine   Electrical stimulation of the nerves that relax the bladder  Urinary incontinence is more common in people who have been through menopause. (Menopause is when you stop having monthly periods). Some people have vaginal dryness after menopause. If this is the case for you, a treatment called vaginal estrogen might help.  What will my life be like? -- Many people with incontinence can regain bladder control or at least reduce the amount of leakage they have. The most important thing is to tell your doctor or nurse. Then, work with them to find an approach that helps you.  All topics are updated as new evidence becomes available and our peer review process is complete.  This topic retrieved from [employer] on: Feb 26, 2024.  Topic 03108 Version 18.0  Release: 32.2.4 - C32.56  © 2024 UpToDate, Inc. and/or its affiliates. All rights reserved.  figure 1: Location of the bladder     This drawing shows the side view of a woman's body. The bladder is in front of the vagina. The urethra is the tube that carries urine from the bladder out of the body.  Graphic 966969 Version 1.0  Consumer Information Use and Disclaimer   Disclaimer: This generalized information is a limited summary of diagnosis, treatment, and/or medication information. It is not meant to be comprehensive and should be used as a tool to help the user understand and/or assess potential diagnostic and treatment options. It does NOT include all information about conditions, treatments, medications, side effects, or risks that may apply to a specific patient. It is not intended to be medical advice or a substitute for the medical advice, diagnosis, or treatment of a health care provider based on the health care provider's examination and assessment of a patient's specific and unique circumstances. Patients must speak  with a health care provider for complete information about their health, medical questions, and treatment options, including any risks or benefits regarding use of medications. This information does not endorse any treatments or medications as safe, effective, or approved for treating a specific patient. UpToDate, Inc. and its affiliates disclaim any warranty or liability relating to this information or the use thereof.The use of this information is governed by the Terms of Use, available at https://www.[employer].com/en/know/clinical-effectiveness-terms. 2024© UpToDate, Inc. and its affiliates and/or licensors. All rights reserved.  Copyright   © 2024 UpToDate, Inc. and/or its affiliates. All rights reserved.      Patient Education     Preventing falls in adults   The Basics   Written by the doctors and editors at [employer]   Am I at risk of falling? -- Falls can happen to anyone, no matter how old they are. Several things can increase your risk of a fall, including:   Vision problems   Having certain chronic health conditions, being sick, having recently been in the hospital, or having had surgery   Changing the medicines you take   An unsafe or unfamiliar setting (for example, a room with rugs or furniture that might trip you, or an area you don't know well)  Your risk of falling increases as you grow older. That's because getting older can make it harder to walk steadily and keep your balance. Also, the effects of falls are more serious for older people.  Overall, 3 to 4 out of every 10 people over the age of 65 fall each year. Many people who fracture a hip never fully recover to how they were before the fracture. If you have fallen in the past, you are at higher risk of falling again.  How can my doctor help me avoid falling? -- Your doctor can talk to you about the following things:   Past falls - It is important to tell your doctor about any times that you have fallen or almost fallen. They can then  suggest ways to prevent another fall.   Your health conditions - Some health problems can put you at risk of falling. These include conditions that affect eyesight, hearing, muscle strength, or balance.   What to do if you are in the hospital - Falls can happen in the hospital because you are in an unfamiliar place. You might feel unsteady or drowsy from medicines or anesthesia. Or you might be attached to catheters or IV tubing that you could trip over. You are also likely to be weaker than usual.   Your medicines - Certain medicines can increase the risk of falling. These include some medicines for sleeping problems, anxiety, high blood pressure, or depression. Adding new medicines, or changing doses of some medicines, can also affect your risk of falling.  The more your doctor knows about your situation, the better they can help you. For example, if you fell because you have a condition that causes pain, your doctor might suggest treatments to help with the pain. Or if any of your medicines are making you dizzy and more likely to fall, your doctor might switch you to a different medicine.  Is there anything I can do on my own? -- Yes. To help keep from falling, you can:   Make your home safer - To avoid falling at home, get rid of things that might make you trip or slip. This can include furniture, electrical cords, clutter, and loose rugs (figure 1). Keep your home well lit so you can easily see where you are going. Avoid storing things in high places so you don't have to reach or climb.   Wear non-slip socks or sturdy shoes that fit well - Wearing shoes with high heels or slippery soles, or shoes that are too loose, can lead to falls. Walking around in bare feet, or only socks, can also increase your risk of falling.   Take vitamin D pills - Taking vitamin D might lower the risk of falls in older people. This is because vitamin D helps make bones and muscles stronger. Your doctor can talk to you about whether  you should take extra vitamin D, and how much.   Stay active - Moving your body regularly can help lower your risk of falling. It might also help prevent you from getting hurt if you do fall. It is best to do a few different activities that help with both strength and balance. There are many kinds of exercise that can be safe for older people. These include walking, swimming, and steve chi (a Chinese martial art involving slow, gentle movements).   Use a cane, walker, or other safety devices - If your doctor recommends that you use a cane or walker, make sure that it's the right size and you know how to use it. There are other devices that might help you avoid falling, too. These include grab bars or a sturdy seat for the shower, non-slip bath mats, and hand rails or treads for the stairs (to prevent slipping).   Take extra care if you have had surgery or are in the hospital - Ask for help with getting out of bed, getting up from a chair, or going to the bathroom. Your body needs time to heal, and it's normal to need help with these things while you recover. It can also help to keep your belongings within reach, and avoid walking around in the dark. If you need help, use the call button instead of trying to get up.  If you worry that you could fall, you can get an emergency alert system. This is usually an alarm button that lets you call for help if you fall and can't get up. If you live alone and you do not have an emergency alert system, always carry a cell phone or portable phone with you when moving around the house.  How do I get up from a fall? -- If you do fall, try not to be afraid. Stay calm, and take slow, deep breaths. If someone is near you, call for help right away. If you have an emergency alert system, use it.  Try to find out if you are hurt. If you are hurt badly, trying to get up can make things worse. If you do not think that you are hurt badly, come up with a plan to get up off of the floor.  Some  tips to get up after a fall if you are alone:   Look around you for a piece of sturdy furniture such as a couch or chair. Try to move your body to the furniture. You might need to scoot, crawl, or roll to get close. Do these movements very slowly. If you feel any sharp pains, you might need to stop.   Once you are close to the furniture, roll onto your side. Pull your knees up toward your chest, and try to get on your hands and knees.   Put your hands on the seat of the couch or chair.   Bring 1 leg forward so the foot is flat on the floor. If you have a stronger leg, move this leg first.   Now, try to stand up. Once both feet are on the ground, slowly turn and lower yourself to sit down on the seat.  What should I do after a fall? -- If you had a fall, see your doctor right away, even if you aren't hurt. Your doctor can try to figure out what caused you to fall, and how likely you are to fall again. They will do an exam and talk to you about your health problems, medicines, and activities. They can also check how well you walk, move, and balance. Then, they can suggest things you can do to lower your risk of falling again.  Many older people have a hard time recovering after a fall. Doing things to prevent falling can help you protect your health and independence.  All topics are updated as new evidence becomes available and our peer review process is complete.  This topic retrieved from [employer] on: Apr 04, 2024.  Topic 62892 Version 25.0  Release: 32.2.4 - C32.93  © 2024 UpToDate, Inc. and/or its affiliates. All rights reserved.  figure 1: How to avoid falling at home     This picture shows some of the things that can cause a fall in your home. Look around and remove any loose rugs, electrical cords, clutter, or furniture that could trip you.  Graphic 59121 Version 1.0  Consumer Information Use and Disclaimer   Disclaimer: This generalized information is a limited summary of diagnosis, treatment, and/or  medication information. It is not meant to be comprehensive and should be used as a tool to help the user understand and/or assess potential diagnostic and treatment options. It does NOT include all information about conditions, treatments, medications, side effects, or risks that may apply to a specific patient. It is not intended to be medical advice or a substitute for the medical advice, diagnosis, or treatment of a health care provider based on the health care provider's examination and assessment of a patient's specific and unique circumstances. Patients must speak with a health care provider for complete information about their health, medical questions, and treatment options, including any risks or benefits regarding use of medications. This information does not endorse any treatments or medications as safe, effective, or approved for treating a specific patient. UpToDate, Inc. and its affiliates disclaim any warranty or liability relating to this information or the use thereof.The use of this information is governed by the Terms of Use, available at https://www.[employer].com/en/know/clinical-effectiveness-terms. 2024© UpToDate, Inc. and its affiliates and/or licensors. All rights reserved.  Copyright   © 2024 UpToDate, Inc. and/or its affiliates. All rights reserved.

## 2025-05-09 NOTE — PROGRESS NOTES
[facility]  Ambulatory Office Visit     PATIENT INFORMATION   Name: Cori Pleitez   YOB: 1950   MRN: [id number]  Encounter Provider: Horace Hay MD    Encounter Date: 5/9/2025    ASSESSMENT & PLAN     Assessment & Plan  Acquired hypothyroidism  Lab Results   Component Value Date    [id number] 1.319 04/26/2025   Follows endo.   Currently prescribed levothyroxine 50 mcg daily in the morning  Today pt reports: Taking as prescribed.  Assessment: Controlled  Med changes: Continue management per specialist.          CKD (chronic kidney disease) stage 2, GFR 60-89 ml/min  Lab Results   Component Value Date    EGFR 56 04/26/2025    EGFR 64 01/31/2025    EGFR 74 09/22/2024    CREATININE 0.98 04/26/2025    CREATININE 0.87 01/31/2025    CREATININE 0.78 09/22/2024     Labs reviewed & discussed   Assessment: fluctuating, drink plenty of plain water fluids.   Improve lifestyle and dietary changes to slow its progression as discussed  Low-sodium, low-carb diet, limit snacking, exercise regularly.  Avoid nephrotoxic agents including over-the-counter NSAIDs as discussed  Hydrate well with water.        Benign essential hypertension  BP Readings from Last 3 Encounters:   05/09/25 128/68   05/03/25 118/60   05/02/25 116/72      Currently not prescribed any meds.  However, taking Lasix 20 mg once every 10 days for edema.   Previously  previously on medication but improved with weight loss.   Today pt reports: no concerns.   Assessment: controlled off medication  Med changes: None. Continue to focus on lifestyle improvement only.   Lifestyle modifications recommended, including reduced sodium intake, regular exercise, maintaining a healthy weight, limiting alcohol consumption, and/or avoiding cig smoking.          Type 2 diabetes mellitus without complication, without long-term current use of insulin (HCC)  Lab Results   Component Value Date    HGBA1C 5.1 04/26/2025     Follows cedric.   Well  controlled with previous A1c of 5.6.   Currently prescribed: tirzepatide 10 mg once a week injection  on statin & no ACEi/ARB  Previously: on insulin and with endocrinology got off of insulin and now on injectables.   Currently using Dexcom  Med Adjusted: none   Continue with endocrinology.   Advised low-carb, low-sodium diet and limit snacking, really working on building good habits with nutritional intake and exercising regularly.   Reviewed hypoglycemia protocol, will monitor and discuss frequency at each apt with plan to adjust management to avoid occurences        Vitamin D deficiency         Fatty liver  Will follow up with liver US per patient request.   Orders:  •  US right upper quadrant with liver dopplers; Future    Hepatic cyst  Will follow up with liver US per patient request.   Orders:  •  US right upper quadrant with liver dopplers; Future    Endometrial cancer (HCC)  History of hysterectomy.        Dyslipidemia  On statin medication.   Orders:  •  Lipid panel; Future  •  Apolipoprotein B; Future         HEALTH MAINTENANCE     Immunization History   Administered Date(s) Administered   • COVID-19 MODERNA VACC 0.5 ML IM 01/05/2021, 02/04/2021, 10/25/2021, 09/06/2022   • COVID-19 Moderna Vac BIVALENT 12 Yr+ IM 0.5 ML 09/06/2022   • COVID-19 Moderna mRNA Vaccine 12 Yr+ 50 mcg/0.5 mL (Spikevax) 10/01/2023, 09/17/2024, 04/13/2025   • H1N1, All Formulations 11/05/2009   • INFLUENZA 09/30/2015, 10/20/2017, 09/30/2020, 09/26/2021, 09/06/2022, 09/13/2023   • Influenza Split High Dose Preservative Free IM 10/06/2014, 09/30/2015, 10/20/2017   • Influenza, Seasonal Vaccine, Quadrivalent, Adjuvanted, .5e 09/13/2023   • Influenza, high dose seasonal 0.7 mL 10/09/2018, 09/30/2020   • Pneumococcal Conjugate 13-Valent 09/30/2015   • Pneumococcal Polysaccharide PPV23 11/17/2008, 10/06/2014, 03/23/2017   • Respiratory Syncytial Virus Vaccine (Recombinant, Adjuvanted) 11/11/2023   • Tdap 10/09/2018, 10/12/2018, 09/30/2020    • Tuberculin Skin Test-PPD Intradermal 07/03/2019   • Zoster 10/25/2017   • Zoster Vaccine Recombinant 11/28/2023, 04/25/2024   • influenza, trivalent, adjuvanted 09/28/2019, 08/29/2024     Pap smear:04/11/2025  Mammogram:06/28/2024   Colonoscopy:12/02/2024 12/02/2024  Cologuard:Not on file   DEXA scan:04/17/2025    Depression Screening and Follow-up Plan: Patient was screened for depression during today's encounter. They screened negative with a PHQ-2 score of 0.      Falls Plan of Care: balance, strength, and gait training instructions were provided.     Urinary Incontinence Plan of Care: counseling topics discussed: practice Kegel (pelvic floor strengthening) exercises, limit alcohol, caffeine, spicy foods, and acidic foods and limiting fluid intake 3-4 hours before bed.        FOLLOW UP   Return in about 6 months (around 11/9/2025) for routine follow up.    CURRENT MEDICATIONS     Current Outpatient Medications:   •  albuterol (PROVENTIL HFA,VENTOLIN HFA) 90 mcg/act inhaler, Inhale 2 puffs every 6 (six) hours as needed for wheezing, Disp: 18 g, Rfl: 2  •  aspirin 81 mg chewable tablet, Chew 1 tablet (81 mg total) daily, Disp: , Rfl:   •  Calcium-Magnesium-Vitamin D (CALCIUM 1200+D3 PO), , Disp: , Rfl:   •  clindamycin (CLEOCIN) 300 MG capsule, , Disp: , Rfl:   •  clobetasol (TEMOVATE) 0.05 % ointment, Apply topically 2 (two) times a day Taking once daily, Disp: 30 g, Rfl: 5  •  Continuous Glucose  (Dexcom G7 ) SMITH, USE CONTINUOUSLY AS DIRECTED, Disp: 1 each, Rfl: 3  •  Continuous Glucose Sensor (Dexcom G7 Sensor), Use 1 Device every 10 days, Disp: 3 each, Rfl: 5  •  ezetimibe (ZETIA) 10 mg tablet, Take 1 tablet (10 mg total) by mouth daily, Disp: 90 tablet, Rfl: 3  •  FREESTYLE LITE test strip, USE 1 STRIP THREE TIMES A DAY AS INSTRUCTED, Disp: 300 strip, Rfl: 3  •  furosemide (LASIX) 20 mg tablet, Take 1 tablet (20 mg total) by mouth daily, Disp: 90 tablet, Rfl: 3  •  KRILL OIL PO, Take by  mouth, Disp: , Rfl:   •  levothyroxine (Euthyrox) 50 mcg tablet, Take 1 tablet (50 mcg total) by mouth daily, Disp: 90 tablet, Rfl: 1  •  linaCLOtide (Linzess) 145 MCG CAPS, TAKE 1 CAPSULE EVERY MORNING, Disp: 90 capsule, Rfl: 1  •  Magnesium Oxide 400 MG CAPS, Take by mouth, Disp: , Rfl:   •  Mometasone Furo-Formoterol Fum (Dulera) 50-5 MCG/ACT AERO, Inhale 2 puffs 2 (two) times a day Rinse mouth after use., Disp: 1170 g, Rfl: 1  •  Mounjaro 5 MG/0.5ML SOAJ, Inject 5 mg under the skin once a week, Disp: 6 mL, Rfl: 0  •  Omega-3 Fatty Acids (FISH OIL) 1200 MG CAPS, Take by mouth, Disp: , Rfl:   •  pantoprazole (PROTONIX) 40 mg tablet, Take 1 tablet (40 mg total) by mouth daily, Disp: 90 tablet, Rfl: 3  •  potassium chloride (MICRO-K) 10 MEQ CR capsule, Take 1 capsule (10 mEq total) by mouth daily, Disp: 90 capsule, Rfl: 3  •  rosuvastatin (CRESTOR) 20 MG tablet, Take 1 tablet (20 mg total) by mouth daily, Disp: 90 tablet, Rfl: 3  •  saccharomyces boulardii (FLORASTOR) 250 mg capsule, Take 250 mg by mouth daily  , Disp: , Rfl:       CHIEF COMPLIANT     Chief Complaint   Patient presents with   • Follow-up     Wanted to talk about a lipid panel and follow up on fatty liver disease         HISTORY OF PRESENT ILLNESS    History of Present Illness     History obtained from : patient  HPI  Review of Systems    PAST MEDICAL & SURGICAL HISTORY     Past Medical History:   Diagnosis Date   • Abnormal mammogram    • Abnormal weight gain    • Achilles tendinitis    • Acne 1960   • Allergic 1952    since childhood   • Allergic rhinitis    • Arthritis 1988   • Asthma    • Breast cyst 2018   • Breast injury 2021   • Cancer (HCC) 2018    Endometrial adenoma   • Combined forms of age-related cataract of both eyes 08/18/2021    Added per ICD-10-CM coding guidelines - provider accepted   • Diabetes mellitus (HCC)    • Disc degeneration, lumbar    • Disease of thyroid gland     hypo   • Dyslipidemia    • Dyslipidemia, goal LDL below 70  2018   • Early satiety    • Edema     idiopathic peripheral   • Encounter for follow-up examination after completed treatment for malignant neoplasm 2019   • Encounter for gynecological examination without abnormal finding 2020   • Endometrial cancer ([facility])    • Enthesopathy     hip region   • Esophagitis, reflux    • Fatigue    • Fibromyalgia, primary    • Fractures     left tibia   • GERD (gastroesophageal reflux disease)    • Hard to intubate     difficulty with intubation and had to be intubated twice   • Heart murmur    • HL (hearing loss)    • Hypercholesterolemia    • Hypertension    • Hypothyroidism    • IBS (irritable bowel syndrome)    • Irritable bowel syndrome    • Morbid (severe) obesity due to excess calories ([facility]) 2022    As per CMS ICD10 guidelines:Provider accepted   • Nasal congestion    • Nosebleed    • Obesity    • Obesity, Class I, BMI 30-34.9 2015   • Osteoarthritis    • Pneumonia    • Rosacea    • Sacroiliitis ([facility])    • Shingles    • Sinusitis    • Skin cancer 2018    pre-cancerous facial lesions removed   • Skin disorder     rosacea   • Skin tag    • Sleep apnea, obstructive    • Stomach disorder     hiatal hernia, GERD   • Tinnitus    • Varicella    • Verruca      Past Surgical History:   Procedure Laterality Date   • CARDIAC CATHETERIZATION     • CATARACT EXTRACTION Right 2024   • CATARACT EXTRACTION Left 2024   •  SECTION      x2   • COLONOSCOPY     • COLPOSCOPY  2019   • ELBOW SURGERY      left ulna/radius   • ESOPHAGOGASTRODUODENOSCOPY     • FOOT SURGERY Right    • FRACTURE SURGERY     • FRACTURE SURGERY Left     tibia   • HYSTERECTOMY Bilateral 2018   • JOINT REPLACEMENT Left     shoulder,  right knee   • KNEE ARTHROSCOPY     • KNEE ARTHROSCOPY W/ MENISCAL REPAIR Right    • OOPHORECTOMY  2018    adenocarcinoma   • ORTHOPEDIC SURGERY  2016 tendon repair    R foot  "  • KS LAPS TOTAL HYSTERECT 250 GM/< W/RMVL TUBE/OVARY N/A 07/30/2018    Procedure: ROBOTIC TOTAL LAPAROSCOPIC HYSTERECTOMY, BILATERAL SALPINGOOPHORECTOMY, BILATERAL PELVIC LYMPHNODE DISSECTION;  Surgeon: Андрей Meng MD;  Location: [facility];  Service: Gynecology Oncology   • REPLACEMENT TOTAL KNEE Right    • SHOULDER ARTHROPLASTY     • SHOULDER SURGERY  2006   • SINUS SURGERY     • SKIN BIOPSY  11/2017    freezing   • SKIN CANCER EXCISION  11/2017    lesion removed   • TENDON REPAIR  2016   • TONSILLECTOMY     • TUBAL LIGATION     • WRIST SURGERY      ganglonic cyst       OBJECTIVES      /68 (BP Location: Left arm, Patient Position: Sitting, Cuff Size: Standard)   Pulse 69   Temp (!) 96.6 °F (35.9 °C) (Tympanic)   Resp 18   Ht 5' 1.5\" (1.562 m)   Wt 57.5 kg (126 lb 12.8 oz)   LMP  (LMP Unknown)   SpO2 99%   BMI 23.57 kg/m²    Physical Exam  Vitals reviewed.   Constitutional:       General: She is not in acute distress.     Appearance: Normal appearance. She is not ill-appearing, toxic-appearing or diaphoretic.   HENT:      Head: Normocephalic and atraumatic.      Right Ear: External ear normal.      Left Ear: External ear normal.      Nose: Nose normal.      Mouth/Throat:      Mouth: Mucous membranes are moist.   Eyes:      General: No scleral icterus.        Right eye: No discharge.         Left eye: No discharge.      Extraocular Movements: Extraocular movements intact.      Conjunctiva/sclera: Conjunctivae normal.   Cardiovascular:      Rate and Rhythm: Normal rate and regular rhythm.      Pulses: Normal pulses. no weak pulses.           Dorsalis pedis pulses are 2+ on the right side and 2+ on the left side.        Posterior tibial pulses are 2+ on the right side and 2+ on the left side.      Heart sounds: Normal heart sounds.   Pulmonary:      Effort: Pulmonary effort is normal. No respiratory distress.      Breath sounds: Normal breath sounds.   Abdominal:      Palpations: Abdomen is soft. "      Tenderness: There is no abdominal tenderness.   Musculoskeletal:         General: No swelling. Normal range of motion.      Cervical back: Normal range of motion.   Feet:      Right foot:      Skin integrity: No ulcer, skin breakdown, erythema, warmth, callus or dry skin.      Left foot:      Skin integrity: No ulcer, skin breakdown, erythema, warmth, callus or dry skin.   Skin:     General: Skin is warm and dry.   Neurological:      General: No focal deficit present.      Mental Status: She is alert and oriented to person, place, and time.   Psychiatric:         Mood and Affect: Mood normal.         Behavior: Behavior normal.         Thought Content: Thought content normal.           Horace Hay MD  Family Medicine Physician   [facility]      Administrative Statements     Medications have been reviewed by provider in current encounter        Diabetic Foot Exam    Patient's shoes and socks removed.    Right Foot/Ankle   Right Foot Inspection  Skin Exam: skin normal. No dry skin, no warmth, no callus, no erythema, no maceration, no abnormal color, no pre-ulcer, no ulcer and no callus.     Toe Exam: ROM and strength within normal limits.     Sensory   Monofilament testing: intact    Vascular  The right DP pulse is 2+. The right PT pulse is 2+.     Left Foot/Ankle  Left Foot Inspection  Skin Exam: skin normal. No dry skin, no warmth, no erythema, no maceration, normal color, no pre-ulcer, no ulcer and no callus.     Toe Exam: ROM and strength within normal limits.     Sensory   Monofilament testing: intact    Vascular  The left DP pulse is 2+. The left PT pulse is 2+.     Assign Risk Category  No deformity present  No loss of protective sensation  No weak pulses  Risk: 0

## 2025-05-09 NOTE — ASSESSMENT & PLAN NOTE
Lab Results   Component Value Date    [id number] 1.319 04/26/2025   Follows endo.   Currently prescribed levothyroxine 50 mcg daily in the morning  Today pt reports: Taking as prescribed.  Assessment: Controlled  Med changes: Continue management per specialist.

## 2025-05-11 PROBLEM — Z01.419 ENCNTR FOR GYN EXAM (GENERAL) (ROUTINE) W/O ABN FINDINGS: Status: RESOLVED | Noted: 2025-04-11 | Resolved: 2025-05-11

## 2025-05-14 ENCOUNTER — OFFICE VISIT (OUTPATIENT)
Age: 75
End: 2025-05-14
Payer: COMMERCIAL

## 2025-05-14 VITALS
BODY MASS INDEX: 24.35 KG/M2 | TEMPERATURE: 97.5 F | WEIGHT: 131 LBS | RESPIRATION RATE: 18 BRPM | HEART RATE: 64 BPM | DIASTOLIC BLOOD PRESSURE: 58 MMHG | OXYGEN SATURATION: 99 % | SYSTOLIC BLOOD PRESSURE: 123 MMHG

## 2025-05-14 DIAGNOSIS — M54.50 ACUTE LOW BACK PAIN WITHOUT SCIATICA, UNSPECIFIED BACK PAIN LATERALITY: Primary | ICD-10-CM

## 2025-05-14 PROCEDURE — 99214 OFFICE O/P EST MOD 30 MIN: CPT | Performed by: PHYSICIAN ASSISTANT

## 2025-05-14 RX ORDER — PREDNISONE 20 MG/1
40 TABLET ORAL DAILY
Qty: 10 TABLET | Refills: 0 | Status: SHIPPED | OUTPATIENT
Start: 2025-05-14 | End: 2025-05-19

## 2025-05-14 NOTE — PATIENT INSTRUCTIONS
Lower back pain  Prednisone once daily x 5 days-patient aware that blood glucose levels may rise.  Follow up with PCP in 3-5 days.  Proceed to  ER if symptoms worsen.

## 2025-05-14 NOTE — PROGRESS NOTES
[facility]        NAME: Cori Pleitez is a 74 y.o. female  : 1950    MRN: [id number]  DATE: May 14, 2025  TIME: 5:48 PM    Assessment and Plan   Acute low back pain without sciatica, unspecified back pain laterality [M54.50]  1. Acute low back pain without sciatica, unspecified back pain laterality  predniSONE 20 mg tablet            Patient Instructions     Lower back pain  Prednisone once daily x 5 days-patient aware that blood glucose levels may rise.  Follow up with PCP in 3-5 days.  Proceed to  ER if symptoms worsen.    Chief Complaint     Chief Complaint   Patient presents with    Back Pain     Pt states last week she did yard work and developed lower back pain.          History of Present Illness       74-year-old female who presents complaining of lower back pain.  Patient states that the pain started after raking her yard/garden.  Has been taking over-the-counter medications with no relief.  States that she has taken steroids in the past for similar symptoms and is aware of glucose levels increasing.  Denies abdominal pain, nausea, vomiting, palpitations.    Back Pain  Pertinent negatives include no chest pain.       Review of Systems   Review of Systems   Constitutional: Negative.    HENT: Negative.     Eyes: Negative.    Respiratory: Negative.  Negative for cough, chest tightness, shortness of breath, wheezing and stridor.    Cardiovascular: Negative.  Negative for chest pain, palpitations and leg swelling.   Musculoskeletal:  Positive for back pain.         Current Medications     Current Medications[1]    Current Allergies     Allergies as of 2025 - Reviewed 2025   Allergen Reaction Noted    Amoxicillin-pot clavulanate Anaphylaxis, Hives, Itching, and Facial Swelling     Erythromycin Hives, Itching, Swelling, Vomiting, Throat Swelling, Nasal Congestion, and Facial Swelling     Meperidine Anaphylaxis 2017    Morphine Hives, Itching, Swelling, Other (See  Comments), and Syncope     Penicillins Anaphylaxis, Itching, Swelling, and Hives 03/19/2014    Relafen [nabumetone] Hives, Itching, and Swelling 03/19/2014    Sulfa antibiotics Hives, Itching, Rash, and Swelling     Darvon [propoxyphene] Hives 02/05/2018    Methocarbamol Other (See Comments) 03/19/2014    Reglan [metoclopramide] Anxiety 06/27/2018    Tetracycline Rash 06/09/2021    Tetracyclines & related Rash 08/21/2017            The following portions of the patient's history were reviewed and updated as appropriate: allergies, current medications, past family history, past medical history, past social history, past surgical history and problem list.     Past Medical History:   Diagnosis Date    Abnormal mammogram     Abnormal weight gain     Achilles tendinitis     Acne 1960    Allergic 1952    since childhood    Allergic rhinitis     Arthritis 1988    Asthma     Breast cyst 2018    Breast injury 2021    Cancer (HCC) 2018    Endometrial adenoma    Combined forms of age-related cataract of both eyes 08/18/2021    Added per ICD-10-CM coding guidelines - provider accepted    Diabetes mellitus (HCC)     Disc degeneration, lumbar     Disease of thyroid gland     hypo    Dyslipidemia     Dyslipidemia, goal LDL below 70 03/27/2018    Early satiety     Edema     idiopathic peripheral    Encounter for follow-up examination after completed treatment for malignant neoplasm 03/17/2019    Encounter for gynecological examination without abnormal finding 06/22/2020    Endometrial cancer (HCC) 2018    Enthesopathy     hip region    Esophagitis, reflux     Fatigue 2022    Fibromyalgia, primary 1995    Fractures 2000    left tibia    GERD (gastroesophageal reflux disease)     Hard to intubate     difficulty with intubation and had to be intubated twice    Heart murmur 2018    HL (hearing loss) 2015    Hypercholesterolemia     Hypertension     Hypothyroidism 2004    IBS (irritable bowel syndrome)     Irritable bowel syndrome      Morbid (severe) obesity due to excess calories (HCC) 2022    As per CMS ICD10 guidelines:Provider accepted    Nasal congestion     Nosebleed 2006    Obesity 1998    Obesity, Class I, BMI 30-34.9 2015    Osteoarthritis     Pneumonia     Rosacea     Sacroiliitis (HCC)     Shingles     Sinusitis     Skin cancer 2018    pre-cancerous facial lesions removed    Skin disorder 2000    rosacea    Skin tag     Sleep apnea, obstructive     Stomach disorder 1968    hiatal hernia, GERD    Tinnitus     Varicella     [address]       Past Surgical History:   Procedure Laterality Date    CARDIAC CATHETERIZATION      CATARACT EXTRACTION Right 2024    CATARACT EXTRACTION Left 2024     SECTION      x2    COLONOSCOPY      COLPOSCOPY  2019    ELBOW SURGERY      left ulna/radius    ESOPHAGOGASTRODUODENOSCOPY      FOOT SURGERY Right     FRACTURE SURGERY      FRACTURE SURGERY Left     tibia    HYSTERECTOMY Bilateral 2018    JOINT REPLACEMENT Left     shoulder,  right knee    KNEE ARTHROSCOPY      KNEE ARTHROSCOPY W/ MENISCAL REPAIR Right     OOPHORECTOMY  2018    adenocarcinoma    ORTHOPEDIC SURGERY  2016 tendon repair    R foot    WV LAPS TOTAL HYSTERECT 250 GM/< W/RMVL TUBE/OVARY N/A 2018    Procedure: ROBOTIC TOTAL LAPAROSCOPIC HYSTERECTOMY, BILATERAL SALPINGOOPHORECTOMY, BILATERAL PELVIC LYMPHNODE DISSECTION;  Surgeon: Андрей Meng MD;  Location: [facility];  Service: Gynecology Oncology    REPLACEMENT TOTAL KNEE Right     SHOULDER ARTHROPLASTY      SHOULDER SURGERY  2006    SINUS SURGERY      SKIN BIOPSY  2017    freezing    SKIN CANCER EXCISION  2017    lesion removed    TENDON REPAIR  2016    TONSILLECTOMY      TUBAL LIGATION      WRIST SURGERY      ganglonic cyst       Family History   Problem Relation Age of Onset    Arthritis Mother     Heart disease Mother         cardiac disorder    Diabetes Mother     Hiatal hernia Mother     Hypertension Mother      Irritable bowel syndrome Mother     Breast cancer Mother 66        x2    Uterine cancer Mother     Osteoporosis Mother     Thyroid disease Mother     Stroke Mother     Hyperlipidemia Mother     Cancer Mother         ENDOMETRIAL    Diabetes type II Mother     Thyroid disease unspecified Mother     Hypothyroidism Mother     Deep vein thrombosis Mother     Endometrial cancer Mother         Radiation and hysterectomy    Osteoarthritis Mother     Heart disease Father         cardiac disorder    Hiatal hernia Father     Ulcers Father     Coronary artery disease Father     Hearing loss Father     Heart attack Father     Heart failure Father     Anesthesia problems Father     Acne Father     Rashes / Skin problems Father         Rosacea    Hiatal hernia Sister     Thyroid disease Sister     Lung cancer Sister 77    Cancer Sister         lung adenocarcinoma, mets to meninges    Thyroid disease unspecified Sister     Hypothyroidism Sister     Hyperlipidemia Sister     Arthritis Sister     Autoimmune disease Sister         sarcoidosis    ADD / ADHD Sister     Osteoarthritis Sister     Irritable bowel syndrome Daughter     Hyperlipidemia Daughter     Brain cancer Maternal Grandmother     Breast cancer Maternal Grandmother         [place]own    Hyperlipidemia Maternal Grandmother     Stroke Maternal Grandmother     No Known Problems Maternal Grandfather     No Known Problems Paternal Grandmother     Hearing loss Paternal Grandfather     No Known Problems Brother     Malig Hypertension Son     Hiatal hernia Child     Breast cancer Maternal Aunt 65    Uterine cancer Maternal Aunt         x3 sis    Stroke Maternal Aunt     Cancer Maternal Aunt     Hyperlipidemia Maternal Aunt     Breast cancer Maternal Aunt 55    Cancer Maternal Aunt     Hyperlipidemia Maternal Aunt     Breast cancer Maternal Aunt 55    Cancer Maternal Aunt     Hyperlipidemia Maternal Aunt     No Known Problems Paternal Aunt     Hyperlipidemia Maternal Aunt     Stroke  Maternal Aunt     Thyroid disease Sister     Hyperlipidemia Maternal Aunt     Stroke Maternal Aunt     Breast cancer Maternal Aunt     Cancer Maternal Aunt     Uterine cancer Maternal Aunt     Hyperlipidemia Daughter     Hyperlipidemia Maternal Aunt     Cancer Maternal Aunt     Cancer Maternal Aunt     Hyperlipidemia Maternal Aunt     Colon cancer Neg Hx     Ovarian cancer Neg Hx     Cervical cancer Neg Hx          Medications have been verified.        Objective   /58   Pulse 64   Temp 97.5 °F (36.4 °C)   Resp 18   Wt 59.4 kg (131 lb)   LMP  (LMP Unknown)   SpO2 99%   BMI 24.35 kg/m²        Physical Exam     Physical Exam  Constitutional:       Appearance: She is well-developed.   HENT:      Head: Normocephalic and atraumatic.      Right Ear: External ear normal.      Left Ear: External ear normal.      Nose: Nose normal.      Mouth/Throat:      Pharynx: No oropharyngeal exudate.     Cardiovascular:      Rate and Rhythm: Normal rate and regular rhythm.      Heart sounds: Normal heart sounds.   Pulmonary:      Effort: Pulmonary effort is normal. No respiratory distress.      Breath sounds: Normal breath sounds. No wheezing or rales.   Chest:      Chest wall: No tenderness.   Abdominal:      General: Bowel sounds are normal. There is no distension.      Palpations: Abdomen is soft. There is no mass.      Tenderness: There is no abdominal tenderness. There is no right CVA tenderness, left CVA tenderness, guarding or rebound.     Musculoskeletal:      Cervical back: Normal, normal range of motion and neck supple.      Thoracic back: Normal.      Lumbar back: Spasms and tenderness present. No swelling, edema, deformity, signs of trauma, lacerations or bony tenderness. Decreased range of motion. Negative right straight leg raise test and negative left straight leg raise test. No scoliosis.   Lymphadenopathy:      Cervical: No cervical adenopathy.                        [1]   Current Outpatient Medications:      albuterol (PROVENTIL HFA,VENTOLIN HFA) 90 mcg/act inhaler, Inhale 2 puffs every 6 (six) hours as needed for wheezing, Disp: 18 g, Rfl: 2    aspirin 81 mg chewable tablet, Chew 1 tablet (81 mg total) daily, Disp: , Rfl:     Calcium-Magnesium-Vitamin D (CALCIUM 1200+D3 PO), , Disp: , Rfl:     clindamycin (CLEOCIN) 300 MG capsule, , Disp: , Rfl:     clobetasol (TEMOVATE) 0.05 % ointment, Apply topically 2 (two) times a day Taking once daily, Disp: 30 g, Rfl: 5    Continuous Glucose  (Dexcom G7 ) SMITH, USE CONTINUOUSLY AS DIRECTED, Disp: 1 each, Rfl: 3    Continuous Glucose Sensor (Dexcom G7 Sensor), Use 1 Device every 10 days, Disp: 3 each, Rfl: 5    ezetimibe (ZETIA) 10 mg tablet, Take 1 tablet (10 mg total) by mouth daily, Disp: 90 tablet, Rfl: 3    FREESTYLE LITE test strip, USE 1 STRIP THREE TIMES A DAY AS INSTRUCTED, Disp: 300 strip, Rfl: 3    furosemide (LASIX) 20 mg tablet, Take 1 tablet (20 mg total) by mouth daily, Disp: 90 tablet, Rfl: 3    KRILL OIL PO, Take by mouth, Disp: , Rfl:     levothyroxine (Euthyrox) 50 mcg tablet, Take 1 tablet (50 mcg total) by mouth daily, Disp: 90 tablet, Rfl: 1    linaCLOtide (Linzess) 145 MCG CAPS, TAKE 1 CAPSULE EVERY MORNING, Disp: 90 capsule, Rfl: 1    Magnesium Oxide 400 MG CAPS, Take by mouth, Disp: , Rfl:     Mometasone Furo-Formoterol Fum (Dulera) 50-5 MCG/ACT AERO, Inhale 2 puffs 2 (two) times a day Rinse mouth after use., Disp: 1170 g, Rfl: 1    Mounjaro 5 MG/0.5ML SOAJ, Inject 5 mg under the skin once a week, Disp: 6 mL, Rfl: 0    Omega-3 Fatty Acids (FISH OIL) 1200 MG CAPS, Take by mouth, Disp: , Rfl:     pantoprazole (PROTONIX) 40 mg tablet, Take 1 tablet (40 mg total) by mouth daily, Disp: 90 tablet, Rfl: 3    potassium chloride (MICRO-K) 10 MEQ CR capsule, Take 1 capsule (10 mEq total) by mouth daily, Disp: 90 capsule, Rfl: 3    predniSONE 20 mg tablet, Take 2 tablets (40 mg total) by mouth daily for 5 days, Disp: 10 tablet, Rfl: 0     rosuvastatin (CRESTOR) 20 MG tablet, Take 1 tablet (20 mg total) by mouth daily, Disp: 90 tablet, Rfl: 3    saccharomyces boulardii (FLORASTOR) 250 mg capsule, Take 250 mg by mouth in the morning., Disp: , Rfl:

## 2025-06-05 ENCOUNTER — OFFICE VISIT (OUTPATIENT)
Dept: DERMATOLOGY | Facility: CLINIC | Age: 75
End: 2025-06-05
Payer: COMMERCIAL

## 2025-06-05 VITALS — TEMPERATURE: 97.6 F

## 2025-06-05 DIAGNOSIS — D18.01 CHERRY ANGIOMA: Primary | ICD-10-CM

## 2025-06-05 DIAGNOSIS — L90.0 LICHEN SCLEROSUS: ICD-10-CM

## 2025-06-05 DIAGNOSIS — L85.3 XEROSIS OF SKIN: ICD-10-CM

## 2025-06-05 DIAGNOSIS — L81.4 LENTIGO: ICD-10-CM

## 2025-06-05 DIAGNOSIS — D22.9 MULTIPLE MELANOCYTIC NEVI: ICD-10-CM

## 2025-06-05 DIAGNOSIS — L82.1 SEBORRHEIC KERATOSES: ICD-10-CM

## 2025-06-05 PROCEDURE — 99213 OFFICE O/P EST LOW 20 MIN: CPT | Performed by: DERMATOLOGY

## 2025-06-05 NOTE — PROGRESS NOTES
"[facility] Note     Patient Name: Cori Pleitez  Encounter Date: 6/5/25       Have you been cared for by a [facility] Dermatologist in the last 3 years and, if so, which description applies to you? Yes. I have been here within the last 3 years, and my medical history has NOT changed since that time. I am of child-bearing potential.     REVIEW OF SYSTEMS:  Have you recently had or currently have any of the following? No changes in my recent health.   PAST MEDICAL HISTORY:  Have you personally ever had or currently have any of the following?  If \"YES,\" then please provide more detail. No changes in my medical history.   HISTORY OF IMMUNOSUPPRESSION: Do you have a history of any of the following:  Systemic Immunosuppression such as Diabetes, Biologic or Immunotherapy, Chemotherapy, Organ Transplantation, Bone Marrow Transplantation or Prednisone?  YES, type 2 diabetes      Answering \"YES\" requires the addition of the dotphrase \"IMMUNOSUPPRESSED\" as the first diagnosis of the patient's visit.   FAMILY HISTORY:  Any \"first degree relatives\" (parent, brother, sister, or child) with the following?    No changes in my family's known health.   PATIENT EXPERIENCE:    Do you want the Dermatologist to perform a COMPLETE skin exam today including a clinical examination under the \"bra and underwear\" areas?  Yes  If necessary, do we have your permission to call and leave a detailed message on your Preferred Phone number that includes your specific medical information?  Yes      Allergies[1] Current Medications[2]              Whom besides the patient is providing clinical information about today's encounter?   NO ADDITIONAL HISTORIAN (patient alone provided history)    Physical Exam and Assessment/Plan by Diagnosis:    POSSIBLE FLEE BITES     Physical Exam:  Anatomic Location Affected:  right thigh  Morphological Description:  pink papules  Pertinent Positives:  Pertinent Negatives:    Additional History " "of Present Condition:  pt thinks that she might have some flee bites from her cat. She has used frontline on her cat. She states that she only gets them every once in a while.     Assessment and Plan:  Based on a thorough discussion of this condition and the management approach to it (including a comprehensive discussion of the known risks, side effects and potential benefits of treatment), the patient (family) agrees to implement the following specific plan:  Monitor changes    LICHEN SCLEROSUS ATROPHICUS    Physical Exam:  Anatomic Location Affected:  genitals   Morphological Description:  white plaques   Pertinent Positives:  Pertinent Negatives:    Additional History of Present Condition:  pt was diagnosed by Dr. Calvert and was given Clobetasol     Assessment and Plan:  Based on a thorough discussion of this condition and the management approach to it (including a comprehensive discussion of the known risks, side effects and potential benefits of treatment), the patient (family) agrees to implement the following specific plan:  Being monitored by her Gynecologist and is using Clobetasol 0.05% ointment every other day    Lichen sclerosus is a chronic inflammatory skin disorder that most often affects the genital and perianal areas. It is more common in women before puberty or after menopause. It is rare in males, but when present is called \"balanitis xerotica obliterans.\"  Lichen sclerosus can start at any age, although it is most often diagnosed in women over 50. Pre-pubertal children can also be affected.  Lichen sclerosis is 10 times more common in women than in men.  15% of patients know of a family member with lichen sclerosis.  It may follow or co-exist with another skin condition, most often lichen simplex, psoriasis, erosive lichen planus, vitiligo or morphea.  People with lichen sclerosus often have a personal or family history of other autoimmune disease such as thyroid disease (about 20% of " patients), pernicious anaemia, or alopecia areata.    What causes lichen sclerosus?  The cause of lichen sclerosus is not fully understood, and may include genetic, hormonal, irritant, traumatic and infectious components.  Lichen sclerosis is often classified as an autoimmune disease. Autoimmune disorders arise when the body's natural defenses against “foreign” or invading organisms (e.g., antibodies) begin to attack healthy tissue for unknown reasons.  Antibodies to other unknown proteins may account for other cases, explaining differing presentations of lichen sclerosis and response to treatment.  However, these antibodies could be epigenetic, ie, the results of disease rather than the cause of disease.  Some scientists believe that a genetic predisposition to lichen sclerosus exists. A genetic predisposition means that a person may carry a gene for a disease but it may not be expressed unless something in the environment triggers the disease. Other researchers believe that hormonal, irritant and/or infectious factors (or a combination of these) cause this skin condition. Cases where lichen sclerosus appears on skin after it has been damaged (from an injury or trauma) have been reported.    What are the clinical features of lichen sclerosus?  Lichen sclerosus usually affects the external genitalia (vulva or penis) and/or the area around the anus (perianal region). Sometimes, it is accompanied by intense (intractable) itching, burning, and pain. If the disease is severe, even minor abrasions or chaffing can cause bleeding, tearing, and blistering. The scarring that results from untreated lichen sclerosis produces problems with urination, defecation, and intercourse. The presence of thin, easily irritated, and torn skin affects physical activity and clothing choice.    For children with lichen sclerosus affecting the perianal region, constipation may be among the first signs of the presence of the disease. Lichen  sclerosus is much more likely to affect males that have not been circumcised than males that have been.  Rarely, lichen sclerosus can also affect other areas of the skin such as the breast, wrists, shoulder, neck, back, thigh, and the mouth.    Skin tissue often becomes thin, shiny, wrinkled and parchment-like. Fissures, cracks, and purplish patches (ecchymoses) appear frequently. The earliest areas of lichen sclerosis exhibit a porcelain white appearing center surrounded by redness. This grows together to form larger areas of lichen sclerosus. The areas that are prone to rubbing and friction can develop blisters or bruising. The long term result of lichen sclerosus are areas of shiny, thin skin that has a tendency to be dry, crack, or bleed. This also produces loss of the normal parts of the external genitals, narrowing of the opening of the urethra/vagina/anus, and phimosis (inability to retract the foreskin) in men. The presence of non-healing ulcers or raised ulcerated areas in the external genitalia of women raises suspicion for the development of squamous cell carcinoma.    In males, lichen sclerosus most commonly affects the foreskin of the penis, although it may affect other areas of the body. The opening at the end of the foreskin may become narrow and scarred. Discoloration and skin changes may also occur. Symptoms also include itching, soreness, and painful erections. In men, involvement in the perineal area is rare.    In some rare cases, skin lesions may also develop in the mouth. The lesions consist of bluish-white flat irregular patchy areas on the inside of the cheeks and/or palate. The tongue, lips, and gums may also be involved.    How do we diagnose lichen sclerosus atrophicus?  Lichen sclerosus is diagnosed by looking at the skin affected. All those affected require a thorough clinical evaluation, identification of characteristic physical features, and a detailed patient history. A biopsy may be  needed to confirm diagnosis. Biopsies may also be performed if squamous cell carcinoma is suspected.    How do we treat lichen sclerosis?  General measures for genital lichen sclerosis  Wash gently once or twice daily.  Use a non-soap cleanser, if any.  Try to avoid tight clothing, rubbing and scratching.  Activities such as riding a bicycle or horse may aggravate symptoms.  If incontinent, seek medical advice and treatment.  Apply emollients to relieve dryness and itching, and as a barrier to protect sensitive skin in genital and anal areas from contact with urine and feces.    Topical steroids are the main treatment for lichen sclerosus. An ultrapotent topical steroid is often prescribed, eg clobetasol propionate 0.05%. A potent topical steroid, eg mometasone furoate 0.1% ointment, may also be used in mild disease or when symptoms are controlled.  An ointment base is less likely than cream to sting or to cause contact dermatitis.  A thin smear should be precisely applied to the white plaques and rubbed in gently.  Most patients will be told to apply the steroid ointment once a day. After one to three months (depending on the severity of the disease), the ointment can be used less often.  Topical steroid may need to be continued once or twice a week to control symptoms or to prevent lichen sclerosis recurring.  Itch often settles within a few days but it may take weeks to months for the skin to return to normal (if at all).  One 30-g tube of topical steroid should last 3 to 6 months or longer.    It is most important to follow instructions carefully and to attend follow-up appointments regularly.    Other topical treatments used in patients with lichen sclerosis include:  Intravaginal estrogen cream or pessaries in postmenopausal women. These reduce symptoms due to atrophic vulvovaginitis (dry, thin, fissured and sensitive vulval and vaginal tissues due to hormonal deficiency).  Topical calcineurin inhibitors  "tacrolimus ointment and pimecrolimus cream instead of or in addition to topical steroids. They tend to cause burning discomfort (at least for the first few days). Early concern that these medications may have the potential to accelerate cancer growth in the presence of oncogenic human papilloma virus (the cause of genital warts) appears unfounded.  Topical retinoid (eg tretinoin cream) is not well tolerated on genital skin but may be applied to other sites affected by lichen sclerosis. It reduces scaling and dryness.    Oral medications: when severe, acute, and not responding to topical therapy, systemic treatment may rarely be prescribed. Options include:  Intralesional or systemic corticosteroids   Oral retinoids: acitretin, isotretinoin  Methotrexate  Ciclosporin    Surgery: is essential for high-grade squamous intraepithelial lesions or cancer.  In males, circumcision is effective in lichen sclerosus affecting prepuce and glans of the penis. It is best done early if initial topical steroids have not controlled symptoms and signs. If the urethra is stenosed or scarred, reconstructive surgery may be necessary.  Unfortunately, lichen sclerosus sometimes closes up the vaginal opening again after surgery has initially appeared successful. It can be repeated.  Other reported treatments for lichen sclerosus are considered experimental at this time.  CO2 laser ablation of hyperkeratotic plaques  Phototherapy  Photodynamic therapy  Fat injections  Stem cell and platelet-rich plasma injections       MELANOCYTIC NEVI (\"Moles\")    Physical Exam:  Anatomic Location Affected:   Mostly on sun-exposed areas of the trunk and extremities  Morphological Description:  Scattered, 1-4mm round to ovoid, symmetrical-appearing, even bordered, skin colored to dark brown macules/papules, mostly in sun-exposed areas  Pertinent Positives:  Pertinent Negatives:    Additional History of Present Condition:      Assessment and Plan:  Based on a " "thorough discussion of this condition and the management approach to it (including a comprehensive discussion of the known risks, side effects and potential benefits of treatment), the patient (family) agrees to implement the following specific plan:  When outside we recommend using a wide brim hat, sunglasses, long sleeve and pants, sunscreen with SPF 30+ with reapplication every 2 hours, or SPF specific clothing   Benign, reassured  Annual skin check     Melanocytic Nevi  Melanocytic nevi (\"moles\") are tan or brown, raised or flat areas of the skin which have an increased number of melanocytes. Melanocytes are the cells in our body which make pigment and account for skin color.    Some moles are present at birth (I.e., \"congenital nevi\"), while others come up later in life (i.e., \"acquired nevi\").  The sun can stimulate the body to make more moles.  Sunburns are not the only thing that triggers more moles.  Chronic sun exposure can do it too.     Clinically distinguishing a healthy mole from melanoma may be difficult, even for experienced dermatologists. The \"ABCDE's\" of moles have been suggested as a means of helping to alert a person to a suspicious mole and the possible increased risk of melanoma.  The suggestions for raising alert are as follows:    Asymmetry: Healthy moles tend to be symmetric, while melanomas are often asymmetric.  Asymmetry means if you draw a line through the mole, the two halves do not match in color, size, shape, or surface texture. Asymmetry can be a result of rapid enlargement of a mole, the development of a raised area on a previously flat lesion, scaling, ulceration, bleeding or scabbing within the mole.  Any mole that starts to demonstrate \"asymmetry\" should be examined promptly by a board certified dermatologist.     Border: Healthy moles tend to have discrete, even borders.  The border of a melanoma often blends into the normal skin and does not sharply delineate the mole from " "normal skin.  Any mole that starts to demonstrate \"uneven borders\" should be examined promptly by a board certified dermatologist.     Color: Healthy moles tend to be one color throughout.  Melanomas tend to be made up of different colors ranging from dark black, blue, white, or red.  Any mole that demonstrates a color change should be examined promptly by a board certified dermatologist.     Diameter: Healthy moles tend to be smaller than 0.6 cm in size; an exception are \"congenital nevi\" that can be larger.  Melanomas tend to grow and can often be greater than 0.6 cm (1/4 of an inch, or the size of a pencil eraser). This is only a guideline, and many normal moles may be larger than 0.6 cm without being unhealthy.  Any mole that starts to change in size (small to bigger or bigger to smaller) should be examined promptly by a board certified dermatologist.     Evolving: Healthy moles tend to \"stay the same.\"  Melanomas may often show signs of change or evolution such as a change in size, shape, color, or elevation.  Any mole that starts to itch, bleed, crust, burn, hurt, or ulcerate or demonstrate a change or evolution should be examined promptly by a board certified dermatologist.        LENTIGO    Physical Exam:  Anatomic Location Affected:  trunk, arms  Morphological Description:  Light brown macules  Pertinent Positives:  Pertinent Negatives:    Additional History of Present Condition:      Assessment and Plan:  Based on a thorough discussion of this condition and the management approach to it (including a comprehensive discussion of the known risks, side effects and potential benefits of treatment), the patient (family) agrees to implement the following specific plan:  When outside we recommend using a wide brim hat, sunglasses, long sleeve and pants, sunscreen with SPF 30+ with reapplication every 2 hours, or SPF specific clothing       What is a lentigo?  A lentigo is a pigmented flat or slightly raised lesion " with a clearly defined edge. Unlike an ephelis (freckle), it does not fade in the winter months. There are several kinds of lentigo.  The name lentigo originally referred to its appearance resembling a small lentil. The plural of lentigo is lentigines, although “lentigos” is also in common use.    Who gets lentigines?  Lentigines can affect males and females of all ages and races. Solar lentigines are especially prevalent in fair skinned adults. Lentigines associated with syndromes are present at birth or arise during childhood.    What causes lentigines?  Common forms of lentigo are due to exposure to ultraviolet radiation:  Sun damage including sunburn   Indoor tanning   Phototherapy, especially photochemotherapy (PUVA)    Ionizing radiation, eg radiation therapy, can also cause lentigines.  Several familial syndromes associated with widespread lentigines originate from mutations in Augusto-MAP kinase, mTOR signaling and PTEN pathways.    What is the treatment for lentigines?  Most lentigines are left alone. Attempts to lighten them may not be successful. The following approaches are used:  SPF 50+ broad-spectrum sunscreen   Hydroquinone bleaching cream   Alpha hydroxy acids   Vitamin C   Retinoids   Azelaic acid   Chemical peels  Individual lesions can be permanently removed using:  Cryotherapy   Intense pulsed light   Pigment lasers    How can lentigines be prevented?  Lentigines associated with exposure ultraviolet radiation can be prevented by very careful sun protection. Clothing is more successful at preventing new lentigines than are sunscreens.    What is the outlook for lentigines?  Lentigines usually persist. They may increase in number with age and sun exposure. Some in sun-protected sites may fade and disappear.    PÉREZ ANGIOMAS    Physical Exam:  Anatomic Location Affected:  trunk  Morphological Description:  Scattered cherry red, 1-4 mm papules.  Pertinent Positives:  Pertinent Negatives:    Additional  "History of Present Condition:      Assessment and Plan:  Based on a thorough discussion of this condition and the management approach to it (including a comprehensive discussion of the known risks, side effects and potential benefits of treatment), the patient (family) agrees to implement the following specific plan:  Monitor for changes  Benign, reassured      Assessment and Plan:    Cherry angioma, also known as Marks de Dany spots, are benign vascular skin lesions. A \"cherry angioma\" is a firm red, blue or purple papule, 0.1-1 cm in diameter. When thrombosed, they can appear black in colour until evaluated with a dermatoscope when the red or purple colour is more easily seen. Cherry angioma may develop on any part of the body but most often appear on the scalp, face, lips and trunk.  An angioma is due to proliferating endothelial cells; these are the cells that line the inside of a blood vessel.    Angiomas can arise in early life or later in life; the most common type of angioma is a cherry angioma.  Cherry angiomas are very common in males and females of any age or race. They are more noticeable in white skin than in skin of colour. They markedly increase in number from about the age of 40. There may be a family history of similar lesions. Eruptive cherry angiomas have been rarely reported to be associated with internal malignancy. The cause of angiomas is unknown. Genetic analysis of cherry angiomas has shown that they frequently carry specific somatic missense mutations in the GNAQ and GNA11 (Q209H) genes, which are involved in other vascular and melanocytic proliferations.      SEBORRHEIC KERATOSIS; NON-INFLAMED    Physical Exam:  Anatomic Location Affected:  trunk  Morphological Description:  Flat and raised, waxy, smooth to warty textured, yellow to brownish-grey to dark brown to blackish, discrete, \"stuck-on\" appearing papules.  Pertinent Positives:  Pertinent Negatives:    Additional History of " "Present Condition:      Assessment and Plan:  Based on a thorough discussion of this condition and the management approach to it (including a comprehensive discussion of the known risks, side effects and potential benefits of treatment), the patient (family) agrees to implement the following specific plan:  Monitor for changes  Benign, reassured      Seborrheic Keratosis  A seborrheic keratosis is a harmless warty spot that appears during adult life as a common sign of skin aging.  Seborrheic keratoses can arise on any area of skin, covered or uncovered, with the usual exception of the palms and soles. They do not arise from mucous membranes. Seborrheic keratoses can have highly variable appearance.      Seborrheic keratoses are extremely common. It has been estimated that over 90% of adults over the age of 60 years have one or more of them. They occur in males and females of all races, typically beginning to erupt in the 30s or 40s. They are uncommon under the age of 20 years.  The precise cause of seborrhoeic keratoses is not known.  Seborrhoeic keratoses are considered degenerative in nature. As time goes by, seborrheic keratoses tend to become more numerous. Some people inherit a tendency to develop a very large number of them; some people may have hundreds of them.      There is no easy way to remove multiple lesions on a single occasion.  Unless a specific lesion is \"inflamed\" and is causing pain or stinging/burning or is bleeding, most insurance companies do not authorize treatment.    XEROSIS (\"DRY SKIN\")    Physical Exam:  Anatomic Location Affected:  diffuse  Morphological Description:  xerosis  Pertinent Positives:  Pertinent Negatives:    Additional History of Present Condition:      Assessment and Plan:  Based on a thorough discussion of this condition and the management approach to it (including a comprehensive discussion of the known risks, side effects and potential benefits of treatment), the " patient (family) agrees to implement the following specific plan:  Use moisturizer like Eucerin,Cerave or Aveeno Cream 3 times a day for the dry skin            Dry skin refers to skin that feels dry to touch. Dry skin has a dull surface with a rough, scaly quality. The skin is less pliable and cracked. When dryness is severe, the skin may become inflamed and fissured.  Although any body site can be dry, dry skin tends to affect the shins more than any other site.    Dry skin is lacking moisture in the outer horny cell layer (stratum corneum) and this results in cracks in the skin surface.  Dry skin is also called xerosis, xeroderma or asteatosis (lack of fat).  It can affect males and females of all ages. There is some racial variability in water and lipid content of the skin.  Dry skin that starts in early childhood may be one of about 20 types of ichthyosis (fish-scale skin). There is often a family history of dry skin.   Dry skin is commonly seen in people with atopic dermatitis.  Nearly everyone > 60 years has dry skin.    Dry skin that begins later may be seen in people with certain diseases and conditions.  Postmenopausal women  Hypothyroidism  Chronic renal disease   Malnutrition and weight loss   Subclinical dermatitis   Treatment with certain drugs such as oral retinoids, diuretics and epidermal growth factor receptor inhibitors      What is the treatment for dry skin?  The mainstay of treatment of dry skin and ichthyosis is moisturisers/emollients. They should be applied liberally and often enough to:  Reduce itch   Improve the barrier function   Prevent entry of irritants, bacteria   Reduce transepidermal water loss.      How can dry skin be prevented?  Eliminate aggravating factors:  Reduce the frequency of bathing.   A humidifier in winter and air conditioner in summer   Compare having a short shower with a prolonged soak in a bath.   Use lukewarm, not hot, water.   Replace standard soap with a  substitute such as a synthetic detergent cleanser, water-miscible emollient, bath oil, anti-pruritic tar oil, colloidal oatmeal etc.   Apply an emollient liberally and often, particularly shortly after bathing, and when itchy. The drier the skin, the thicker this should be, especially on the hands.    What is the outlook for dry skin?  A tendency to dry skin may persist life-long, or it may improve once contributing factors are controlled.    Scribe Attestation      I,:  Nasim Frost MA am acting as a scribe while in the presence of the attending physician.:       I,:  Sravanthi Swift MD personally performed the services described in this documentation    as scribed in my presence.:                  [1]   Allergies  Allergen Reactions    Amoxicillin-Pot Clavulanate Anaphylaxis, Hives, Itching and Facial Swelling    Erythromycin Hives, Itching, Swelling, Vomiting, Throat Swelling, Nasal Congestion and Facial Swelling    Meperidine Anaphylaxis    Morphine Hives, Itching, Swelling, Other (See Comments) and Syncope     hypotension    Penicillins Anaphylaxis, Itching, Swelling and Hives    Relafen [Nabumetone] Hives, Itching and Swelling    Sulfa Antibiotics Hives, Itching, Rash and Swelling    Darvon [Propoxyphene] Hives    Methocarbamol Other (See Comments)     Double vision    Reglan [Metoclopramide] Anxiety    Tetracycline Rash    Tetracyclines & Related Rash   [2]   Current Outpatient Medications:     albuterol (PROVENTIL HFA,VENTOLIN HFA) 90 mcg/act inhaler, Inhale 2 puffs every 6 (six) hours as needed for wheezing, Disp: 18 g, Rfl: 2    aspirin 81 mg chewable tablet, Chew 1 tablet (81 mg total) daily, Disp: , Rfl:     Calcium-Magnesium-Vitamin D (CALCIUM 1200+D3 PO), , Disp: , Rfl:     clindamycin (CLEOCIN) 300 MG capsule, , Disp: , Rfl:     clobetasol (TEMOVATE) 0.05 % ointment, Apply topically 2 (two) times a day Taking once daily, Disp: 30 g, Rfl: 5    Continuous Glucose  (Dexcom G7 ) SMITH, USE  CONTINUOUSLY AS DIRECTED, Disp: 1 each, Rfl: 3    Continuous Glucose Sensor (Dexcom G7 Sensor), Use 1 Device every 10 days, Disp: 3 each, Rfl: 5    ezetimibe (ZETIA) 10 mg tablet, Take 1 tablet (10 mg total) by mouth daily, Disp: 90 tablet, Rfl: 3    FREESTYLE LITE test strip, USE 1 STRIP THREE TIMES A DAY AS INSTRUCTED, Disp: 300 strip, Rfl: 3    furosemide (LASIX) 20 mg tablet, Take 1 tablet (20 mg total) by mouth daily, Disp: 90 tablet, Rfl: 3    KRILL OIL PO, Take by mouth, Disp: , Rfl:     levothyroxine (Euthyrox) 50 mcg tablet, Take 1 tablet (50 mcg total) by mouth daily, Disp: 90 tablet, Rfl: 1    linaCLOtide (Linzess) 145 MCG CAPS, TAKE 1 CAPSULE EVERY MORNING, Disp: 90 capsule, Rfl: 1    Magnesium Oxide 400 MG CAPS, Take by mouth, Disp: , Rfl:     Mometasone Furo-Formoterol Fum (Dulera) 50-5 MCG/ACT AERO, Inhale 2 puffs 2 (two) times a day Rinse mouth after use., Disp: 1170 g, Rfl: 1    Mounjaro 5 MG/0.5ML SOAJ, Inject 5 mg under the skin once a week, Disp: 6 mL, Rfl: 0    Omega-3 Fatty Acids (FISH OIL) 1200 MG CAPS, Take by mouth, Disp: , Rfl:     pantoprazole (PROTONIX) 40 mg tablet, Take 1 tablet (40 mg total) by mouth daily, Disp: 90 tablet, Rfl: 3    potassium chloride (MICRO-K) 10 MEQ CR capsule, Take 1 capsule (10 mEq total) by mouth daily, Disp: 90 capsule, Rfl: 3    rosuvastatin (CRESTOR) 20 MG tablet, Take 1 tablet (20 mg total) by mouth daily, Disp: 90 tablet, Rfl: 3    saccharomyces boulardii (FLORASTOR) 250 mg capsule, Take 250 mg by mouth in the morning., Disp: , Rfl:

## 2025-06-05 NOTE — PATIENT INSTRUCTIONS
"POSSIBLE FLEE BITES     Assessment and Plan:  Based on a thorough discussion of this condition and the management approach to it (including a comprehensive discussion of the known risks, side effects and potential benefits of treatment), the patient (family) agrees to implement the following specific plan:  Monitor changes    LICHEN SCLEROSUS ATROPHICUS    Assessment and Plan:  Based on a thorough discussion of this condition and the management approach to it (including a comprehensive discussion of the known risks, side effects and potential benefits of treatment), the patient (family) agrees to implement the following specific plan:  Being monitored by her Gynecologist and is using Clobetasol 0.05% ointment every other day    Lichen sclerosus is a chronic inflammatory skin disorder that most often affects the genital and perianal areas. It is more common in women before puberty or after menopause. It is rare in males, but when present is called \"balanitis xerotica obliterans.\"  Lichen sclerosus can start at any age, although it is most often diagnosed in women over 50. Pre-pubertal children can also be affected.  Lichen sclerosis is 10 times more common in women than in men.  15% of patients know of a family member with lichen sclerosis.  It may follow or co-exist with another skin condition, most often lichen simplex, psoriasis, erosive lichen planus, vitiligo or morphea.  People with lichen sclerosus often have a personal or family history of other autoimmune disease such as thyroid disease (about 20% of patients), pernicious anaemia, or alopecia areata.    What causes lichen sclerosus?  The cause of lichen sclerosus is not fully understood, and may include genetic, hormonal, irritant, traumatic and infectious components.  Lichen sclerosis is often classified as an autoimmune disease. Autoimmune disorders arise when the body's natural defenses against “foreign” or invading organisms (e.g., antibodies) begin to " attack healthy tissue for unknown reasons.  Antibodies to other unknown proteins may account for other cases, explaining differing presentations of lichen sclerosis and response to treatment.  However, these antibodies could be epigenetic, ie, the results of disease rather than the cause of disease.  Some scientists believe that a genetic predisposition to lichen sclerosus exists. A genetic predisposition means that a person may carry a gene for a disease but it may not be expressed unless something in the environment triggers the disease. Other researchers believe that hormonal, irritant and/or infectious factors (or a combination of these) cause this skin condition. Cases where lichen sclerosus appears on skin after it has been damaged (from an injury or trauma) have been reported.    What are the clinical features of lichen sclerosus?  Lichen sclerosus usually affects the external genitalia (vulva or penis) and/or the area around the anus (perianal region). Sometimes, it is accompanied by intense (intractable) itching, burning, and pain. If the disease is severe, even minor abrasions or chaffing can cause bleeding, tearing, and blistering. The scarring that results from untreated lichen sclerosis produces problems with urination, defecation, and intercourse. The presence of thin, easily irritated, and torn skin affects physical activity and clothing choice.    For children with lichen sclerosus affecting the perianal region, constipation may be among the first signs of the presence of the disease. Lichen sclerosus is much more likely to affect males that have not been circumcised than males that have been.  Rarely, lichen sclerosus can also affect other areas of the skin such as the breast, wrists, shoulder, neck, back, thigh, and the mouth.    Skin tissue often becomes thin, shiny, wrinkled and parchment-like. Fissures, cracks, and purplish patches (ecchymoses) appear frequently. The earliest areas of lichen  sclerosis exhibit a porcelain white appearing center surrounded by redness. This grows together to form larger areas of lichen sclerosus. The areas that are prone to rubbing and friction can develop blisters or bruising. The long term result of lichen sclerosus are areas of shiny, thin skin that has a tendency to be dry, crack, or bleed. This also produces loss of the normal parts of the external genitals, narrowing of the opening of the urethra/vagina/anus, and phimosis (inability to retract the foreskin) in men. The presence of non-healing ulcers or raised ulcerated areas in the external genitalia of women raises suspicion for the development of squamous cell carcinoma.    In males, lichen sclerosus most commonly affects the foreskin of the penis, although it may affect other areas of the body. The opening at the end of the foreskin may become narrow and scarred. Discoloration and skin changes may also occur. Symptoms also include itching, soreness, and painful erections. In men, involvement in the perineal area is rare.    In some rare cases, skin lesions may also develop in the mouth. The lesions consist of bluish-white flat irregular patchy areas on the inside of the cheeks and/or palate. The tongue, lips, and gums may also be involved.    How do we diagnose lichen sclerosus atrophicus?  Lichen sclerosus is diagnosed by looking at the skin affected. All those affected require a thorough clinical evaluation, identification of characteristic physical features, and a detailed patient history. A biopsy may be needed to confirm diagnosis. Biopsies may also be performed if squamous cell carcinoma is suspected.    How do we treat lichen sclerosis?  General measures for genital lichen sclerosis  Wash gently once or twice daily.  Use a non-soap cleanser, if any.  Try to avoid tight clothing, rubbing and scratching.  Activities such as riding a bicycle or horse may aggravate symptoms.  If incontinent, seek medical advice  and treatment.  Apply emollients to relieve dryness and itching, and as a barrier to protect sensitive skin in genital and anal areas from contact with urine and feces.    Topical steroids are the main treatment for lichen sclerosus. An ultrapotent topical steroid is often prescribed, eg clobetasol propionate 0.05%. A potent topical steroid, eg mometasone furoate 0.1% ointment, may also be used in mild disease or when symptoms are controlled.  An ointment base is less likely than cream to sting or to cause contact dermatitis.  A thin smear should be precisely applied to the white plaques and rubbed in gently.  Most patients will be told to apply the steroid ointment once a day. After one to three months (depending on the severity of the disease), the ointment can be used less often.  Topical steroid may need to be continued once or twice a week to control symptoms or to prevent lichen sclerosis recurring.  Itch often settles within a few days but it may take weeks to months for the skin to return to normal (if at all).  One 30-g tube of topical steroid should last 3 to 6 months or longer.    It is most important to follow instructions carefully and to attend follow-up appointments regularly.    Other topical treatments used in patients with lichen sclerosis include:  Intravaginal estrogen cream or pessaries in postmenopausal women. These reduce symptoms due to atrophic vulvovaginitis (dry, thin, fissured and sensitive vulval and vaginal tissues due to hormonal deficiency).  Topical calcineurin inhibitors tacrolimus ointment and pimecrolimus cream instead of or in addition to topical steroids. They tend to cause burning discomfort (at least for the first few days). Early concern that these medications may have the potential to accelerate cancer growth in the presence of oncogenic human papilloma virus (the cause of genital warts) appears unfounded.  Topical retinoid (eg tretinoin cream) is not well tolerated on  "genital skin but may be applied to other sites affected by lichen sclerosis. It reduces scaling and dryness.    Oral medications: when severe, acute, and not responding to topical therapy, systemic treatment may rarely be prescribed. Options include:  Intralesional or systemic corticosteroids   Oral retinoids: acitretin, isotretinoin  Methotrexate  Ciclosporin    Surgery: is essential for high-grade squamous intraepithelial lesions or cancer.  In males, circumcision is effective in lichen sclerosus affecting prepuce and glans of the penis. It is best done early if initial topical steroids have not controlled symptoms and signs. If the urethra is stenosed or scarred, reconstructive surgery may be necessary.  Unfortunately, lichen sclerosus sometimes closes up the vaginal opening again after surgery has initially appeared successful. It can be repeated.  Other reported treatments for lichen sclerosus are considered experimental at this time.  CO2 laser ablation of hyperkeratotic plaques  Phototherapy  Photodynamic therapy  Fat injections  Stem cell and platelet-rich plasma injections       MELANOCYTIC NEVI (\"Moles\")    Assessment and Plan:  Based on a thorough discussion of this condition and the management approach to it (including a comprehensive discussion of the known risks, side effects and potential benefits of treatment), the patient (family) agrees to implement the following specific plan:  When outside we recommend using a wide brim hat, sunglasses, long sleeve and pants, sunscreen with SPF 30+ with reapplication every 2 hours, or SPF specific clothing   Benign, reassured  Annual skin check     Melanocytic Nevi  Melanocytic nevi (\"moles\") are tan or brown, raised or flat areas of the skin which have an increased number of melanocytes. Melanocytes are the cells in our body which make pigment and account for skin color.    Some moles are present at birth (I.e., \"congenital nevi\"), while others come up later in " "life (i.e., \"acquired nevi\").  The sun can stimulate the body to make more moles.  Sunburns are not the only thing that triggers more moles.  Chronic sun exposure can do it too.     Clinically distinguishing a healthy mole from melanoma may be difficult, even for experienced dermatologists. The \"ABCDE's\" of moles have been suggested as a means of helping to alert a person to a suspicious mole and the possible increased risk of melanoma.  The suggestions for raising alert are as follows:    Asymmetry: Healthy moles tend to be symmetric, while melanomas are often asymmetric.  Asymmetry means if you draw a line through the mole, the two halves do not match in color, size, shape, or surface texture. Asymmetry can be a result of rapid enlargement of a mole, the development of a raised area on a previously flat lesion, scaling, ulceration, bleeding or scabbing within the mole.  Any mole that starts to demonstrate \"asymmetry\" should be examined promptly by a board certified dermatologist.     Border: Healthy moles tend to have discrete, even borders.  The border of a melanoma often blends into the normal skin and does not sharply delineate the mole from normal skin.  Any mole that starts to demonstrate \"uneven borders\" should be examined promptly by a board certified dermatologist.     Color: Healthy moles tend to be one color throughout.  Melanomas tend to be made up of different colors ranging from dark black, blue, white, or red.  Any mole that demonstrates a color change should be examined promptly by a board certified dermatologist.     Diameter: Healthy moles tend to be smaller than 0.6 cm in size; an exception are \"congenital nevi\" that can be larger.  Melanomas tend to grow and can often be greater than 0.6 cm (1/4 of an inch, or the size of a pencil eraser). This is only a guideline, and many normal moles may be larger than 0.6 cm without being unhealthy.  Any mole that starts to change in size (small to bigger or " "bigger to smaller) should be examined promptly by a board certified dermatologist.     Evolving: Healthy moles tend to \"stay the same.\"  Melanomas may often show signs of change or evolution such as a change in size, shape, color, or elevation.  Any mole that starts to itch, bleed, crust, burn, hurt, or ulcerate or demonstrate a change or evolution should be examined promptly by a board certified dermatologist.        LENTIGO    Assessment and Plan:  Based on a thorough discussion of this condition and the management approach to it (including a comprehensive discussion of the known risks, side effects and potential benefits of treatment), the patient (family) agrees to implement the following specific plan:  When outside we recommend using a wide brim hat, sunglasses, long sleeve and pants, sunscreen with SPF 30+ with reapplication every 2 hours, or SPF specific clothing       What is a lentigo?  A lentigo is a pigmented flat or slightly raised lesion with a clearly defined edge. Unlike an ephelis (freckle), it does not fade in the winter months. There are several kinds of lentigo.  The name lentigo originally referred to its appearance resembling a small lentil. The plural of lentigo is lentigines, although “lentigos” is also in common use.    Who gets lentigines?  Lentigines can affect males and females of all ages and races. Solar lentigines are especially prevalent in fair skinned adults. Lentigines associated with syndromes are present at birth or arise during childhood.    What causes lentigines?  Common forms of lentigo are due to exposure to ultraviolet radiation:  Sun damage including sunburn   Indoor tanning   Phototherapy, especially photochemotherapy (PUVA)    Ionizing radiation, eg radiation therapy, can also cause lentigines.  Several familial syndromes associated with widespread lentigines originate from mutations in Augusto-MAP kinase, mTOR signaling and PTEN pathways.    What is the treatment for " "lentigines?  Most lentigines are left alone. Attempts to lighten them may not be successful. The following approaches are used:  SPF 50+ broad-spectrum sunscreen   Hydroquinone bleaching cream   Alpha hydroxy acids   Vitamin C   Retinoids   Azelaic acid   Chemical peels  Individual lesions can be permanently removed using:  Cryotherapy   Intense pulsed light   Pigment lasers    How can lentigines be prevented?  Lentigines associated with exposure ultraviolet radiation can be prevented by very careful sun protection. Clothing is more successful at preventing new lentigines than are sunscreens.    What is the outlook for lentigines?  Lentigines usually persist. They may increase in number with age and sun exposure. Some in sun-protected sites may fade and disappear.    PÉREZ ANGIOMAS    Assessment and Plan:  Based on a thorough discussion of this condition and the management approach to it (including a comprehensive discussion of the known risks, side effects and potential benefits of treatment), the patient (family) agrees to implement the following specific plan:  Monitor for changes  Benign, reassured      Assessment and Plan:    Cherry angioma, also known as Marks de Dany spots, are benign vascular skin lesions. A \"cherry angioma\" is a firm red, blue or purple papule, 0.1-1 cm in diameter. When thrombosed, they can appear black in colour until evaluated with a dermatoscope when the red or purple colour is more easily seen. Cherry angioma may develop on any part of the body but most often appear on the scalp, face, lips and trunk.  An angioma is due to proliferating endothelial cells; these are the cells that line the inside of a blood vessel.    Angiomas can arise in early life or later in life; the most common type of angioma is a cherry angioma.  Cherry angiomas are very common in males and females of any age or race. They are more noticeable in white skin than in skin of colour. They markedly increase in " "number from about the age of 40. There may be a family history of similar lesions. Eruptive cherry angiomas have been rarely reported to be associated with internal malignancy. The cause of angiomas is unknown. Genetic analysis of cherry angiomas has shown that they frequently carry specific somatic missense mutations in the GNAQ and GNA11 (Q209H) genes, which are involved in other vascular and melanocytic proliferations.      SEBORRHEIC KERATOSIS; NON-INFLAMED    Assessment and Plan:  Based on a thorough discussion of this condition and the management approach to it (including a comprehensive discussion of the known risks, side effects and potential benefits of treatment), the patient (family) agrees to implement the following specific plan:  Monitor for changes  Benign, reassured      Seborrheic Keratosis  A seborrheic keratosis is a harmless warty spot that appears during adult life as a common sign of skin aging.  Seborrheic keratoses can arise on any area of skin, covered or uncovered, with the usual exception of the palms and soles. They do not arise from mucous membranes. Seborrheic keratoses can have highly variable appearance.      Seborrheic keratoses are extremely common. It has been estimated that over 90% of adults over the age of 60 years have one or more of them. They occur in males and females of all races, typically beginning to erupt in the 30s or 40s. They are uncommon under the age of 20 years.  The precise cause of seborrhoeic keratoses is not known.  Seborrhoeic keratoses are considered degenerative in nature. As time goes by, seborrheic keratoses tend to become more numerous. Some people inherit a tendency to develop a very large number of them; some people may have hundreds of them.      There is no easy way to remove multiple lesions on a single occasion.  Unless a specific lesion is \"inflamed\" and is causing pain or stinging/burning or is bleeding, most insurance companies do not authorize " "treatment.    XEROSIS (\"DRY SKIN\")      Assessment and Plan:  Based on a thorough discussion of this condition and the management approach to it (including a comprehensive discussion of the known risks, side effects and potential benefits of treatment), the patient (family) agrees to implement the following specific plan:  Use moisturizer like Eucerin,Cerave or Aveeno Cream 3 times a day for the dry skin            Dry skin refers to skin that feels dry to touch. Dry skin has a dull surface with a rough, scaly quality. The skin is less pliable and cracked. When dryness is severe, the skin may become inflamed and fissured.  Although any body site can be dry, dry skin tends to affect the shins more than any other site.    Dry skin is lacking moisture in the outer horny cell layer (stratum corneum) and this results in cracks in the skin surface.  Dry skin is also called xerosis, xeroderma or asteatosis (lack of fat).  It can affect males and females of all ages. There is some racial variability in water and lipid content of the skin.  Dry skin that starts in early childhood may be one of about 20 types of ichthyosis (fish-scale skin). There is often a family history of dry skin.   Dry skin is commonly seen in people with atopic dermatitis.  Nearly everyone > 60 years has dry skin.    Dry skin that begins later may be seen in people with certain diseases and conditions.  Postmenopausal women  Hypothyroidism  Chronic renal disease   Malnutrition and weight loss   Subclinical dermatitis   Treatment with certain drugs such as oral retinoids, diuretics and epidermal growth factor receptor inhibitors      What is the treatment for dry skin?  The mainstay of treatment of dry skin and ichthyosis is moisturisers/emollients. They should be applied liberally and often enough to:  Reduce itch   Improve the barrier function   Prevent entry of irritants, bacteria   Reduce transepidermal water loss.      How can dry skin be " prevented?  Eliminate aggravating factors:  Reduce the frequency of bathing.   A humidifier in winter and air conditioner in summer   Compare having a short shower with a prolonged soak in a bath.   Use lukewarm, not hot, water.   Replace standard soap with a substitute such as a synthetic detergent cleanser, water-miscible emollient, bath oil, anti-pruritic tar oil, colloidal oatmeal etc.   Apply an emollient liberally and often, particularly shortly after bathing, and when itchy. The drier the skin, the thicker this should be, especially on the hands.    What is the outlook for dry skin?  A tendency to dry skin may persist life-long, or it may improve once contributing factors are controlled.

## 2025-06-30 ENCOUNTER — HOSPITAL ENCOUNTER (OUTPATIENT)
Age: 75
Discharge: HOME | End: 2025-06-30
Payer: COMMERCIAL

## 2025-06-30 DIAGNOSIS — Z12.31 ENCOUNTER FOR SCREENING MAMMOGRAM FOR MALIGNANT NEOPLASM OF BREAST: ICD-10-CM

## 2025-06-30 PROCEDURE — 77067 SCR MAMMO BI INCL CAD: CPT

## 2025-06-30 PROCEDURE — 77063 BREAST TOMOSYNTHESIS BI: CPT

## 2025-07-02 NOTE — PROGRESS NOTES
INTERNAL MEDICINE OFFICE VISIT  [facility] AT [address]  Tel: [phone]      NAME: Adrien Nageotte  AGE: 74 y o  SEX: female  : 1950   MRN: [id number]    DATE: 2022  TIME: 8:53 AM      Assessment and Plan:  1  Controlled type 2 diabetes mellitus without complication, with long-term current use of insulin ([facility] )   continue present medications  - CBC and differential; Future  - Comprehensive metabolic panel; Future  - Lipid panel; Future  - TSH, 3rd generation; Future  - Hemoglobin A1C; Future  - Microalbumin / creatinine urine ratio    2  Benign essential hypertension   she takes olmesartan 40 mg daily which was recently increased by her cardiologist and takes the furosemide but her blood pressure remains high  She was told to cut down on the salt intake  I cannot give her amlodipine because of the tendency to have swelling of feet  The beta blockers may not be appropriate as her heart rate is on the lower side  I told her to discuss it with her cardiologist regarding the blood pressure control    3  Dyslipidemia   continue Crestor    4  Acquired hypothyroidism   continue levothyroxine at the same dose    5  Mild intermittent asthma without complication   continue albuterol inhaler as needed    6  Gastroesophageal reflux disease without esophagitis   continue pantoprazole    7  PND (post-nasal drip)   she has a tendency to have a nose bleed and is not able to take the Flonase nasal spray or the Zyrtec on a regular basis  I told her to decrease the frequency of the nasal spray to alternate days and to decrease the dose of Zyrtec to 5 mg daily and try  She will continue to follow-up with ENT    8  Hearing loss of right ear, unspecified hearing loss type   follow-up with ENT    9   Arthritis   wants to see the rheumatologist as he continues to have pain all over her body  - Ambulatory Referral to Rheumatology; Will need appointment for evaluation if he needs antibiotic   Future      - Counseling Documentation: patient was counseled regarding: diagnostic results, instructions for management, risk factor reductions, prognosis, patient and family education, risks and benefits of treatment options and importance of compliance with treatment  - Medication Side Effects: Adverse side effects of medications were reviewed with the patient/guardian today  Return for follow up visit in  6 months or earlier, if needed  Chief Complaint:  Chief Complaint   Patient presents with   • Follow-up     4 months  - patient stated that since having covid she has to constantly clear her through of clear to white mucus and it has never gone away and she has a cough every now and again out of the blue  Patient also has numbness in her (R) leg and she has fibromyalgia that may irritate the issue and she has tries Lyrica, gabpentin, altram and flexeril but caused GI issues (constipation)     • Ear issues     (R) ear that she would like to address - patient tried OTC meds and the issue is background noise    • Lesions     On top her head and she wants to have it checked    • Neck Stiffness     And in other parts of body          History of Present Illness:    type 2 diabetes is fairly well controlled   Blood pressure is on the higher side and has been for a long time  Cholesterol and TSH are stable   GERD is stable   Has complaints of postnasal drip and allergies but cannot take medication due to epistaxis   Has decreased hearing on the right  Complains of pain in all her joints      Active Problem List:  Patient Active Problem List   Diagnosis   • Diabetes mellitus type 2, controlled, without complications ([facility] )   • Dysmetabolic syndrome X   • Benign essential hypertension   • Lichen sclerosus   • History of endometrial cancer   • SOB (shortness of breath)   • Asymptomatic postmenopausal status   • At risk for sleep apnea   • Dyslipidemia   • Asthma   • GERD (gastroesophageal reflux disease)   • Acquired hypothyroidism   • Status post total right knee replacement   • Spondylosis of lumbosacral region   • Irritable bowel syndrome without diarrhea   • Coronary atherosclerosis due to calcified coronary lesion   • Morbid (severe) obesity due to excess calories (HCC)   • Chronic pain of both knees   • Obstructive sleep apnea   • Endometrial cancer (HCC)   • 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)   • Cortical age-related cataract of both eyes   • Bilateral hip pain   • PND (post-nasal drip)   • Hearing loss of right ear         Past Medical History:  Past Medical History:   Diagnosis Date   • Abnormal mammogram    • Abnormal weight gain    • Achilles tendinitis    • Allergic 1952    since childhood   • Arthritis 1988   • Asthma    • Cancer ([address] ) 2018    Endometrial adenoma   • Combined forms of age-related cataract of both eyes 8/18/2021    Added per ICD-10-CM coding guidelines - provider accepted   • Diabetes mellitus ([address] )    • Disc degeneration, lumbar    • Disease of thyroid gland     hypo   • Dyslipidemia    • Dyslipidemia, goal LDL below 70 3/27/2018   • Early satiety    • Edema     idiopathic peripheral   • Encounter for follow-up examination after completed treatment for malignant neoplasm 3/17/2019   • Encounter for gynecological examination without abnormal finding 6/22/2020   • Endometrial cancer ([address] ) 2018   • Enthesopathy     hip region   • Esophagitis, reflux    • GERD (gastroesophageal reflux disease)    • Hard to intubate     difficulty with intubation and had to be intubated twice   • Heart murmur 2018   • Hypercholesterolemia    • Hypertension    • IBS (irritable bowel syndrome)    • Irritable bowel syndrome    • Morbid (severe) obesity due to excess calories ([address] ) 4/8/2022    As per CMS ICD10 guidelines:Provider accepted   • Obesity 1998   • Obesity, Class I, BMI 30-34 9 1/7/2015   • Osteoarthritis    • Pneumonia    • Rosacea    • Sacroiliitis ([address] )    • Shingles    • Sleep apnea, obstructive          Past Surgical History:  Past Surgical History:   Procedure Laterality Date   •  SECTION      x2   • COLONOSCOPY     • ELBOW SURGERY      left ulna/radius   • ESOPHAGOGASTRODUODENOSCOPY     • FOOT SURGERY Right    • FRACTURE SURGERY     • FRACTURE SURGERY Left     tibia   • HYSTERECTOMY Bilateral 2018   • JOINT REPLACEMENT Left     shoulder,  right knee   • KNEE ARTHROSCOPY     • KNEE ARTHROSCOPY W/ MENISCAL REPAIR Right    • OH LAPS TOTAL HYSTERECT 250 GM/< W/RMVL TUBE/OVARY N/A 2018    Procedure: ROBOTIC TOTAL LAPAROSCOPIC HYSTERECTOMY, BILATERAL SALPINGOOPHORECTOMY, BILATERAL PELVIC LYMPHNODE DISSECTION;  Surgeon: Louis Morales MD;  Location: [facility];  Service: Gynecology Oncology   • REPLACEMENT TOTAL KNEE Right    • SHOULDER ARTHROPLASTY     • SINUS SURGERY     • TONSILLECTOMY     • TUBAL LIGATION     • WRIST SURGERY      ganglonic cyst         Family History:  Family History   Problem Relation Age of Onset   • Arthritis Mother    • Heart disease Mother         cardiac disorder   • Diabetes Mother    • Hiatal hernia Mother    • Hypertension Mother    • Irritable bowel syndrome Mother    • Breast cancer Mother 77        x2   • Uterine cancer Mother    • Osteoporosis Mother    • Thyroid disease Mother    • Other Mother         gastric reflux   • Stroke Mother    • Hyperlipidemia Mother    • Cancer Mother         ENDOMETRIAL   • Heart disease Father         cardiac disorder   • Hiatal hernia Father    • Ulcers Father    • Other Father         gastric reflux   • Coronary artery disease Father    • Hearing loss Father    • Hiatal hernia Sister    • Thyroid disease Sister    • Other Sister         gastric reflux   • Lung cancer Sister 68   • Cancer Sister         lung adenocarcinoma, mets to meninges   • Irritable bowel syndrome Daughter    • Hyperlipidemia Daughter    • Brain cancer Maternal Grandmother    • Breast cancer Maternal Grandmother         uknown   • No Known Problems Maternal Grandfather    • No Known Problems Paternal Grandmother    • No Known Problems Paternal Grandfather    • No Known Problems Brother    • Malig Hypertension Son    • Hiatal hernia Child    • Other Child         gastric reflux   • Breast cancer Maternal Aunt 65   • Uterine cancer Maternal Aunt         x3 sis   • Stroke Maternal Aunt    • Cancer Maternal Aunt    • Breast cancer Maternal Aunt 55   • Cancer Maternal Aunt    • Breast cancer Maternal Aunt 55   • Cancer Maternal Aunt    • No Known Problems Paternal Aunt    • Colon cancer Neg Hx    • Ovarian cancer Neg Hx    • Cervical cancer Neg Hx          Social History:  Social History     Socioeconomic History   • Marital status:      Spouse name: None   • Number of children: 3   • Years of education: None   • Highest education level: None   Occupational History   • Occupation: nurse     Comment: full-time   Tobacco Use   • Smoking status: Never   • Smokeless tobacco: Never   Vaping Use   • Vaping Use: Never used   Substance and Sexual Activity   • Alcohol use: Yes     Alcohol/week: 4 0 standard drinks     Types: 2 Glasses of wine, 2 Standard drinks or equivalent per week   • Drug use: No   • Sexual activity: Not Currently     Partners: Female     Birth control/protection: Post-menopausal, Surgical, Female Sterilization   Other Topics Concern   • None   Social History Narrative    Active advance directive    DME- freestyle lite blood glucose meter device kit QID     Social Determinants of Health     Financial Resource Strain: Not on file   Food Insecurity: Not on file   Transportation Needs: Not on file   Physical Activity: Not on file   Stress: Not on file   Social Connections: Not on file   Intimate Partner Violence: Not on file   Housing Stability: Not on file         Allergies:   Allergies   Allergen Reactions   • Amoxicillin-Pot Clavulanate Anaphylaxis, Hives, Itching and Facial Swelling   • Erythromycin Hives, Itching, Swelling, Vomiting, Throat Swelling, Nasal Congestion and Facial Swelling   • Meperidine Anaphylaxis   • Morphine Hives, Itching, Swelling, Other (See Comments) and Syncope     hypotension   • Penicillins Anaphylaxis, Itching and Swelling   • Relafen [Nabumetone] Hives, Itching and Swelling   • Darvon [Propoxyphene] Hives   • Methocarbamol Other (See Comments)     Double vision   • Reglan [Metoclopramide] Anxiety   • Sulfa Antibiotics Hives, Itching, Rash and Swelling   • Tetracyclines & Related Rash         Medications:    Current Outpatient Medications:   •  albuterol (PROVENTIL HFA,VENTOLIN HFA) 90 mcg/act inhaler, Inhale 2 puffs every 6 (six) hours as needed for wheezing, Disp: 18 g, Rfl: 2  •  benzonatate (TESSALON PERLES) 100 mg capsule, Take 1 capsule (100 mg total) by mouth 3 (three) times a day as needed for cough, Disp: 30 capsule, Rfl: 0  •  Calcium-Magnesium-Vitamin D (CALCIUM 1200+D3 PO), , Disp: , Rfl:   •  clobetasol (TEMOVATE) 0 05 % ointment, Apply topically 2 (two) times a day, Disp: 30 g, Rfl: 1  •  conjugated estrogens (PREMARIN) vaginal cream, Apply pea-sized amount to the lower vaginal area 1-2 times per week, Disp: 42 5 g, Rfl: 1  •  ezetimibe (ZETIA) 10 mg tablet, Take 1 tablet (10 mg total) by mouth daily, Disp: 90 tablet, Rfl: 0  •  furosemide (LASIX) 20 mg tablet, Take 1 tablet (20 mg total) by mouth daily, Disp: 90 tablet, Rfl: 0  •  glipiZIDE (GLUCOTROL XL) 5 mg 24 hr tablet, Take 1 tablet (5 mg total) by mouth daily, Disp: 90 tablet, Rfl: 0  •  glucose blood test strip, Use 1 each 3 (three) times a day Use as instructed, Disp: 300 each, Rfl: 3  •  insulin lispro (HumaLOG) 100 units/mL injection, Inject 5 Units under the skin 3 (three) times a day with meals 1 unit of insulin for 30 mg over 130, Disp: 15 mL, Rfl: 3  •  Insulin Syringe-Needle U-100 (INSULIN SYRINGE 1CC/31GX5/16") 31G X 5/16" 1 ML MISC, BD Veo Insulin Syringe Ultra-Fine 0 3 mL 31 gauge x 15/64", Disp: , Rfl:   •  KRILL OIL PO, Take by mouth, Disp: , Rfl:   •  levothyroxine 75 mcg tablet, Take 1 tablet (75 mcg total) by mouth daily, Disp: 90 tablet, Rfl: 0  •  Linzess 145 MCG CAPS, Take 1 capsule (145 mcg total) by mouth daily Take 30 minutes before breakfast, Disp: 90 capsule, Rfl: 0  •  LOW-DOSE ASPIRIN PO, , Disp: , Rfl:   •  Magnesium Oxide 400 MG CAPS, Take by mouth, Disp: , Rfl:   •  metFORMIN (GLUCOPHAGE-XR) 500 mg 24 hr tablet, TAKE 1 TABLET IN THE MORNING AND 2 TABLETS IN THE EVENING, Disp: 270 tablet, Rfl: 3  •  olmesartan (BENICAR) 40 mg tablet, Take 1 tablet (40 mg total) by mouth daily, Disp: 90 tablet, Rfl: 0  •  Omega-3 Fatty Acids (FISH OIL) 1200 MG CAPS, Take by mouth, Disp: , Rfl:   •  pantoprazole (PROTONIX) 40 mg tablet, Take 1 tablet (40 mg total) by mouth daily, Disp: 90 tablet, Rfl: 0  •  potassium chloride (MICRO-K) 10 MEQ CR capsule, Take 1 capsule (10 mEq total) by mouth daily, Disp: 90 capsule, Rfl: 0  •  rosuvastatin (CRESTOR) 20 MG tablet, Take 1 tablet (20 mg total) by mouth daily, Disp: 90 tablet, Rfl: 0  •  saccharomyces boulardii (FLORASTOR) 250 mg capsule, Take 250 mg by mouth daily  , Disp: , Rfl:       The following portions of the patient's history were reviewed and updated as appropriate: past medical history, past surgical history, family history, social history, allergies, current medications and active problem list       Review of Systems:  Constitutional: Denies fever, chills, weight gain, weight loss, fatigue  Eyes: Denies eye redness, eye discharge, double vision, change in visual acuity  ENT:  Has right-sided hearing loss,  sneezing, nasal congestion, nasal discharge,  Postnasal drip   Respiratory: Denies cough, expectoration, hemoptysis, shortness of breath, wheezing  Cardiovascular: Denies chest pain, palpitations, lower extremity swelling, orthopnea, PND  Gastrointestinal: Denies abdominal pain, heartburn, nausea, vomiting, hematemesis, diarrhea, bloody stools  Genito-Urinary: Denies dysuria, frequency, difficulty in micturition, nocturia, incontinence  Musculoskeletal:  Complains of back pain, joint pain, muscle pain  Neurologic: Denies confusion, lightheadedness, syncope, headache, focal weakness, sensory changes, seizures  Endocrine: Denies polyuria, polydipsia, temperature intolerance  Allergy and Immunology: Denies hives, insect bite sensitivity  Hematological and Lymphatic: Denies bleeding problems, swollen glands   Psychological: Denies depression, suicidal ideation, anxiety, panic, mood swings  Dermatological: Denies pruritus, rash, skin lesion changes      Vitals:  Vitals:    12/27/22 0802   BP: 152/60   Pulse: 73   Temp: 98 °F (36 7 °C)   SpO2: 97%       Body mass index is 37 49 kg/m²  Weight (last 2 days)     Date/Time Weight    12/27/22 0802 93 (205)            Physical Examination:  General: Patient is not in acute distress  Awake, alert, responding to commands  No weight gain or loss  Head: Normocephalic  Atraumatic  Eyes: Conjunctiva and lids with no swelling, erythema or discharge  Both pupils normal sized, round and reactive to light  Sclera nonicteric  ENT: External examination of nose and ear normal  Otoscopic examination shows some wax in the right ear  Oropharynx moist with  erythema  Neck: Supple  JVP not raised  Trachea midline  No masses  No thyromegaly  Lungs: No signs of increased work of breathing or respiratory distress  Bilateral bronchovascular breath sounds with no crackles or rhonchi  Chest wall: No tenderness  Cardiovascular: Normal PMI  No thrills  Regular rate and rhythm  S1 and S2 normal  No murmur, rub or gallop  Gastrointestinal: Abdomen soft, nontender  No guarding or rigidity  Liver and spleen not palpable  Bowel sounds present  Neurologic: Cranial nerves II-XII intact   Cortical functions normal  Motor system - Reflexes 2+ and symmetrical  Sensations normal  Musculoskeletal: Gait normal  No joint tenderness  Integumentary: Skin normal with no rash or lesions  Lymphatic: No palpable lymph nodes in neck, axilla or groin  Extremities: No clubbing, cyanosis, edema or varicosities  Psychological: Judgement and insight normal  Mood and affect normal      Laboratory Results:  CBC with diff:   Lab Results   Component Value Date    WBC 8 15 12/26/2022    WBC 9 23 11/04/2015    RBC 4 58 12/26/2022    RBC 4 49 11/04/2015    HGB 13 3 12/26/2022    HGB 12 3 11/04/2015    HCT 40 6 12/26/2022    HCT 37 4 11/04/2015    MCV 89 12/26/2022    MCV 83 11/04/2015    MCH 29 0 12/26/2022    MCH 27 4 11/04/2015    RDW 13 2 12/26/2022    RDW 15 7 (H) 11/04/2015     12/26/2022     (H) 11/04/2015       CMP:  Lab Results   Component Value Date    CREATININE 0 91 12/26/2022    CREATININE 0 91 11/04/2015    BUN 15 12/26/2022    BUN 26 (H) 11/04/2015     11/04/2015    K 4 6 12/26/2022    K 4 2 11/04/2015     12/26/2022     (H) 11/04/2015    CO2 27 12/26/2022    CO2 26 11/04/2015    GLUCOSE 107 11/04/2015    PROT 7 0 11/04/2015    ALKPHOS 61 12/26/2022    ALKPHOS 71 11/04/2015    ALT 43 12/26/2022    ALT 30 11/04/2015    AST 34 12/26/2022    AST 20 11/04/2015    BILIDIR 0 10 06/30/2015       Lab Results   Component Value Date    HGBA1C 6 4 (H) 12/26/2022    HGBA1C 6 6 (H) 11/04/2015    MG 1 9 04/04/2019       No results found for: TROPONINI, CKMB, CKTOTAL    Lipid Profile:   Lab Results   Component Value Date    CHOL 129 11/04/2015    CHOL 102 06/25/2015     Lab Results   Component Value Date    HDL 51 12/26/2022    HDL 36 (L) 07/15/2022     Lab Results   Component Value Date    LDLCALC 47 12/26/2022    LDLCALC 50 07/15/2022     Lab Results   Component Value Date    TRIG 128 12/26/2022    TRIG 160 (H) 07/15/2022       Imaging Results:  XR hips bilateral 3-4 vw w pelvis if performed  Narrative: BILATERAL HIPS AND PELVIS    INDICATION:   M25 551: Pain in right hip  M25 552: Pain in left hip        COMPARISON:  None    VIEWS:  XR HIPS BILATERAL 3-4 VW W PELVIS IF PERFORMED  Images: 5     FINDINGS:  The bony pelvis appears intact  Degenerative changes visualized lower lumbar spine  LEFT HIP:  There is no acute fracture or dislocation  Mild left hip osteoarthritis is seen  No lytic or blastic osseous lesion  Atherosclerotic calcifications  RIGHT HIP:  There is no acute fracture or dislocation  Mild right hip osteoarthritis is seen  No lytic or blastic osseous lesion  Atherosclerotic calcifications  Impression: Mild degenerative changes of bilateral hips      Workstation performed: [id number]       Health Maintenance:  Health Maintenance   Topic Date Due   • Hepatitis B Vaccine (1 of 3 - 3-dose series) Never done   • PT PLAN OF CARE  08/05/2021   • COVID-19 Vaccine (4 - Booster for Moderna series) 12/20/2021   • Annual Physical  10/11/2022   • BMI: Followup Plan  04/15/2023   • Diabetic Foot Exam  04/15/2023   • Breast Cancer Screening: Mammogram  06/22/2023   • HEMOGLOBIN A1C  06/26/2023   • Fall Risk  07/20/2023   • Depression Screening  12/27/2023   • Urinary Incontinence Screening  12/27/2023   • BMI: Adult  12/27/2023   • DXA SCAN  02/10/2024   • DM Eye Exam  03/21/2024   • Colorectal Cancer Screening  12/31/2024   • DTaP,Tdap,and Td Vaccines (4 - Td or Tdap) 09/30/2030   • Hepatitis C Screening  Completed   • Osteoporosis Screening  Completed   • Pneumococcal Vaccine: 65+ Years  Completed   • Influenza Vaccine  Completed   • HIB Vaccine  Aged Out   • IPV Vaccine  Aged Out   • Hepatitis A Vaccine  Aged Out   • Meningococcal ACWY Vaccine  Aged Out   • HPV Vaccine  Aged Out     Immunization History   Administered Date(s) Administered   • COVID-19 MODERNA VACC 0 5 ML IM 01/05/2021, 02/04/2021, 10/25/2021   • COVID-19 Moderna Vac BIVALENT 18 Yr+ Im (BOOSTER ONLY) 09/06/2022   • H1N1, All Formulations 11/05/2009   • INFLUENZA 09/26/2021, 09/06/2022   • Influenza Split High Dose Preservative Free IM 10/06/2014, 09/30/2015, 10/20/2017   • Influenza, high dose seasonal 0 7 mL 10/09/2018, 09/30/2020   • Pneumococcal Conjugate 13-Valent 09/30/2015   • Pneumococcal Polysaccharide PPV23 11/17/2008, 10/06/2014, 03/23/2017   • Tdap 10/09/2018, 10/12/2018, 09/30/2020   • Tuberculin Skin Test-PPD Intradermal 07/03/2019   • Zoster 10/25/2017   • influenza, trivalent, adjuvanted 09/28/2019         Moshe Norman MD  12/27/2022,8:53 AM

## 2025-07-07 ENCOUNTER — OFFICE VISIT (OUTPATIENT)
Age: 75
End: 2025-07-07
Payer: COMMERCIAL

## 2025-07-07 VITALS
DIASTOLIC BLOOD PRESSURE: 68 MMHG | HEIGHT: 62 IN | HEART RATE: 63 BPM | TEMPERATURE: 98.1 F | SYSTOLIC BLOOD PRESSURE: 118 MMHG | RESPIRATION RATE: 16 BRPM | BODY MASS INDEX: 23.74 KG/M2 | WEIGHT: 129 LBS | OXYGEN SATURATION: 98 %

## 2025-07-07 DIAGNOSIS — J45.20 MILD INTERMITTENT ASTHMA WITHOUT COMPLICATION: Primary | ICD-10-CM

## 2025-07-07 PROCEDURE — 99213 OFFICE O/P EST LOW 20 MIN: CPT

## 2025-07-07 RX ORDER — MOMETASONE FUROATE AND FORMOTEROL FUMARATE DIHYDRATE 50; 5 UG/1; UG/1
2 AEROSOL RESPIRATORY (INHALATION) 2 TIMES DAILY
Qty: 130 G | Refills: 3 | Status: SHIPPED | OUTPATIENT
Start: 2025-07-07

## 2025-07-07 NOTE — ASSESSMENT & PLAN NOTE
- PFTs in 2024 with mild obstruction and + BD response consistent with asthma.  Also with history of elevated eosinophils, highest 430 cells in 2018  -Patient symptoms have improved on ICS/LABA inhaler  -Symptoms currently stable without any recent exacerbations  -Continue low-dose Dulera twice daily and albuterol HFA every 6 hours as needed    Orders:    Mometasone Furo-Formoterol Fum (Dulera) 50-5 MCG/ACT AERO; Inhale 2 puffs in the morning and 2 puffs before bedtime. Rinse mouth after use.

## 2025-07-07 NOTE — PROGRESS NOTES
Follow-up  Visit - Pulmonary Medicine   Name: Cori Pleitez      : 1950      MRN: [id number]  Encounter Provider: PRESLEY Benites  Encounter Date: 2025   Encounter department: [facility] PULMON[facility]  :  Assessment & Plan  Mild intermittent asthma without complication  - PFTs in  with mild obstruction and + BD response consistent with asthma.  Also with history of elevated eosinophils, highest 430 cells in 2018  -Patient symptoms have improved on ICS/LABA inhaler  -Symptoms currently stable without any recent exacerbations  -Continue low-dose Dulera twice daily and albuterol HFA every 6 hours as needed    Orders:    Mometasone Furo-Formoterol Fum (Dulera) 50-5 MCG/ACT AERO; Inhale 2 puffs in the morning and 2 puffs before bedtime. Rinse mouth after use.      No follow-ups on file.    History of Present Illness   Cori Pleitez is a 75 y.o. female with a history of asthma, JUICE not on CPAP, lung nodule, GERD, DM 2, endometrial cancer s/p hysterectomy who is here today for a follow-up visit.  Last seen in the office 5 months ago with Dr. Lawrence. Maintained on low-dose Dulera, albuterol as needed.  Patient reports her symptoms have remained stable since her last office visit.  Denies any exacerbations or respiratory infections requiring steroids and antibiotics.  Rarely requires use of rescue inhaler.    Review of Systems    Aside from what is mentioned in the HPI, ROS is otherwise negative         Medical History Reviewed by provider this encounter:     .    Objective   LMP  (LMP Unknown)     Physical Exam  Vitals and nursing note reviewed.   Constitutional:       General: She is not in acute distress.     Appearance: Normal appearance. She is well-developed.     Cardiovascular:      Rate and Rhythm: Normal rate and regular rhythm.      Heart sounds: Normal heart sounds, S1 normal and S2 normal. No murmur heard.  Pulmonary:      Effort: Pulmonary  effort is normal.      Breath sounds: Normal breath sounds. No decreased breath sounds, wheezing, rhonchi or rales.     Musculoskeletal:         General: No swelling.      Right lower leg: No edema.      Left lower leg: No edema.     Neurological:      Mental Status: She is alert.     Psychiatric:         Mood and Affect: Mood and affect normal.         Behavior: Behavior normal. Behavior is cooperative.           Diagnostic Data:  Labs: I personally reviewed the most recent laboratory data pertinent to today's visit.      Radiology results:  Radiology Results Review : No pertinent imaging studies reviewed.      PFT/spirometry results: Reviewed study from/1/24      Oximetry testing:      Other studies:      PRESLEY Benites

## 2025-07-16 DIAGNOSIS — T75.3XXA MOTION SICKNESS, INITIAL ENCOUNTER: Primary | ICD-10-CM

## 2025-07-16 RX ORDER — SCOPOLAMINE 1 MG/3D
1 PATCH, EXTENDED RELEASE TRANSDERMAL
Qty: 3 PATCH | Refills: 0 | Status: SHIPPED | OUTPATIENT
Start: 2025-07-16

## 2025-07-24 DIAGNOSIS — E11.9 TYPE 2 DIABETES MELLITUS WITHOUT COMPLICATION, WITHOUT LONG-TERM CURRENT USE OF INSULIN (HCC): ICD-10-CM

## 2025-07-25 ENCOUNTER — APPOINTMENT (OUTPATIENT)
Age: 75
End: 2025-07-25
Attending: FAMILY MEDICINE
Payer: COMMERCIAL

## 2025-07-25 DIAGNOSIS — E78.5 DYSLIPIDEMIA: ICD-10-CM

## 2025-07-25 LAB
CHOLEST SERPL-MCNC: 130 MG/DL (ref ?–200)
HDLC SERPL-MCNC: 58 MG/DL
LDLC SERPL CALC-MCNC: 56 MG/DL (ref 0–100)
NONHDLC SERPL-MCNC: 72 MG/DL
TRIGL SERPL-MCNC: 82 MG/DL (ref ?–150)

## 2025-07-25 PROCEDURE — 82172 ASSAY OF APOLIPOPROTEIN: CPT

## 2025-07-25 PROCEDURE — 36415 COLL VENOUS BLD VENIPUNCTURE: CPT

## 2025-07-25 PROCEDURE — 80061 LIPID PANEL: CPT

## 2025-07-25 RX ORDER — ACYCLOVIR 400 MG/1
1 TABLET ORAL
Qty: 3 EACH | Refills: 0 | Status: SHIPPED | OUTPATIENT
Start: 2025-07-25

## 2025-07-26 LAB — APO B SERPL-MCNC: 66 MG/DL

## 2025-07-28 ENCOUNTER — OFFICE VISIT (OUTPATIENT)
Dept: CARDIOLOGY CLINIC | Facility: CLINIC | Age: 75
End: 2025-07-28
Payer: COMMERCIAL

## 2025-07-28 VITALS
BODY MASS INDEX: 23.74 KG/M2 | DIASTOLIC BLOOD PRESSURE: 72 MMHG | RESPIRATION RATE: 16 BRPM | SYSTOLIC BLOOD PRESSURE: 126 MMHG | OXYGEN SATURATION: 98 % | WEIGHT: 129 LBS | HEART RATE: 66 BPM | HEIGHT: 62 IN

## 2025-07-28 DIAGNOSIS — I10 BENIGN ESSENTIAL HYPERTENSION: Primary | ICD-10-CM

## 2025-07-28 DIAGNOSIS — I65.21 CAROTID STENOSIS, RIGHT: ICD-10-CM

## 2025-07-28 DIAGNOSIS — E78.5 DYSLIPIDEMIA: ICD-10-CM

## 2025-07-28 DIAGNOSIS — E11.9 TYPE 2 DIABETES MELLITUS WITHOUT COMPLICATION, WITHOUT LONG-TERM CURRENT USE OF INSULIN (HCC): ICD-10-CM

## 2025-07-28 PROCEDURE — 99213 OFFICE O/P EST LOW 20 MIN: CPT | Performed by: INTERNAL MEDICINE

## 2025-07-28 RX ORDER — TIRZEPATIDE 5 MG/.5ML
5 INJECTION, SOLUTION SUBCUTANEOUS
Start: 2025-07-28

## 2025-07-30 ENCOUNTER — HOSPITAL ENCOUNTER (OUTPATIENT)
Dept: ULTRASOUND IMAGING | Facility: CLINIC | Age: 75
Discharge: HOME | End: 2025-07-30
Attending: STUDENT IN AN ORGANIZED HEALTH CARE EDUCATION/TRAINING PROGRAM
Payer: COMMERCIAL

## 2025-07-30 ENCOUNTER — HOSPITAL ENCOUNTER (OUTPATIENT)
Dept: MAMMOGRAPHY | Facility: CLINIC | Age: 75
Discharge: HOME | End: 2025-07-30
Attending: STUDENT IN AN ORGANIZED HEALTH CARE EDUCATION/TRAINING PROGRAM
Payer: COMMERCIAL

## 2025-07-30 DIAGNOSIS — R92.8 ABNORMAL MAMMOGRAM: ICD-10-CM

## 2025-07-30 PROCEDURE — 77065 DX MAMMO INCL CAD UNI: CPT

## 2025-07-30 PROCEDURE — G0279 TOMOSYNTHESIS, MAMMO: HCPCS

## 2025-07-30 PROCEDURE — 76642 ULTRASOUND BREAST LIMITED: CPT

## 2025-07-31 ENCOUNTER — APPOINTMENT (OUTPATIENT)
Age: 75
End: 2025-07-31
Payer: COMMERCIAL

## 2025-07-31 DIAGNOSIS — E03.9 ACQUIRED HYPOTHYROIDISM: ICD-10-CM

## 2025-07-31 DIAGNOSIS — E11.9 TYPE 2 DIABETES MELLITUS WITHOUT COMPLICATION, WITHOUT LONG-TERM CURRENT USE OF INSULIN (HCC): ICD-10-CM

## 2025-07-31 LAB
EST. AVERAGE GLUCOSE BLD GHB EST-MCNC: 108 MG/DL
HBA1C MFR BLD: 5.4 %

## 2025-07-31 PROCEDURE — 83036 HEMOGLOBIN GLYCOSYLATED A1C: CPT

## 2025-07-31 PROCEDURE — 36415 COLL VENOUS BLD VENIPUNCTURE: CPT

## (undated) DEVICE — PROGRASP FORCEPS: Brand: ENDOWRIST

## (undated) DEVICE — INTENDED FOR TISSUE SEPARATION, AND OTHER PROCEDURES THAT REQUIRE A SHARP SURGICAL BLADE TO PUNCTURE OR CUT.: Brand: BARD-PARKER SAFETY BLADES SIZE 15, STERILE

## (undated) DEVICE — TRAY FOLEY 16FR URIMETER SILICONE SURESTEP

## (undated) DEVICE — COLUMN DRAPE

## (undated) DEVICE — NEEDLE 25G X 1 1/2

## (undated) DEVICE — SUT STRATAFIX SPIRAL 2-0 CT-1 20 CM SXPD1B400

## (undated) DEVICE — GLOVE SRG LF STRL BGL SKNSNS 7.5 PF

## (undated) DEVICE — ARM DRAPE

## (undated) DEVICE — LARGE NEEDLE DRIVER: Brand: ENDOWRIST

## (undated) DEVICE — ANTI-FOG SOLUTION WITH FOAM PAD: Brand: DEVON

## (undated) DEVICE — TROCAR PORT ACCESS 12 X120MM W/BLDLS OPTICAL TIP AIRSEAL

## (undated) DEVICE — SYRINGE 50ML LL

## (undated) DEVICE — LUBRICANT INST ELECTROLUBE ANTISTK WO PAD

## (undated) DEVICE — ENDOPATH XCEL BLADELESS TROCARS WITH STABILITY SLEEVES: Brand: ENDOPATH XCEL

## (undated) DEVICE — 3000CC GUARDIAN II: Brand: GUARDIAN

## (undated) DEVICE — INTENDED FOR TISSUE SEPARATION, AND OTHER PROCEDURES THAT REQUIRE A SHARP SURGICAL BLADE TO PUNCTURE OR CUT.: Brand: BARD-PARKER SAFETY BLADES SIZE 11, STERILE

## (undated) DEVICE — MAYO STAND COVER: Brand: CONVERTORS

## (undated) DEVICE — CHLORHEXIDINE 4PCT 4 OZ

## (undated) DEVICE — GLOVE INDICATOR PI UNDERGLOVE SZ 8 BLUE

## (undated) DEVICE — AIRSEAL TUBE SMOKE EVAC LUMENX3 FILTERED

## (undated) DEVICE — SPECIMEN TRAP: Brand: ARGYLE

## (undated) DEVICE — PACK ROBOTIC PROSTATE PBDS DA VINCI SI/XI

## (undated) DEVICE — CHLORAPREP HI-LITE 26ML ORANGE

## (undated) DEVICE — FENESTRATED BIPOLAR FORCEPS: Brand: ENDOWRIST

## (undated) DEVICE — GLOVE INDICATOR PI UNDERGLOVE SZ 7 BLUE

## (undated) DEVICE — TIP COVER ACCESSORY

## (undated) DEVICE — ENDOPOUCH RETRIEVER SPECIMEN RETRIEVAL BAGS: Brand: ENDOPOUCH RETRIEVER

## (undated) DEVICE — RETRACT ENDO ENDORETRACT 36CM 10MM STR HNDL LF

## (undated) DEVICE — CLAMP TOWEL TUBING DISPOSABLE

## (undated) DEVICE — STRL COTTON TIP APPLCTR 6IN PK: Brand: CARDINAL HEALTH

## (undated) DEVICE — PREMIUM DRY TRAY LF: Brand: MEDLINE INDUSTRIES, INC.

## (undated) DEVICE — ANTIBACTERIAL VIOLET BRAIDED (POLYGLACTIN 910), SYNTHETIC ABSORBABLE SUTURE: Brand: COATED VICRYL

## (undated) DEVICE — SKIN MARKER DUAL TIP WITH RULER CAP, FLEXIBLE RULER AND LABELS: Brand: DEVON

## (undated) DEVICE — MONOPOLAR CURVED SCISSORS: Brand: ENDOWRIST

## (undated) DEVICE — ADHESIVE SKN CLSR HISTOACRYL FLEX 0.5ML LF

## (undated) DEVICE — 1820 FOAM BLOCK NEEDLE COUNTER: Brand: DEVON

## (undated) DEVICE — CANNULA SEAL

## (undated) DEVICE — SUT MONOCRYL 4-0 PS-2 27 IN Y426H